# Patient Record
Sex: FEMALE | Race: WHITE | Employment: OTHER | URBAN - METROPOLITAN AREA
[De-identification: names, ages, dates, MRNs, and addresses within clinical notes are randomized per-mention and may not be internally consistent; named-entity substitution may affect disease eponyms.]

---

## 2017-04-25 ENCOUNTER — OFFICE VISIT (OUTPATIENT)
Dept: CARDIOLOGY CLINIC | Age: 82
End: 2017-04-25

## 2017-04-25 VITALS
DIASTOLIC BLOOD PRESSURE: 74 MMHG | SYSTOLIC BLOOD PRESSURE: 118 MMHG | BODY MASS INDEX: 35.15 KG/M2 | HEIGHT: 62 IN | HEART RATE: 62 BPM | WEIGHT: 191 LBS

## 2017-04-25 DIAGNOSIS — I48.0 PAF (PAROXYSMAL ATRIAL FIBRILLATION) (HCC): ICD-10-CM

## 2017-04-25 DIAGNOSIS — I47.1 SVT (SUPRAVENTRICULAR TACHYCARDIA) (HCC): Primary | ICD-10-CM

## 2017-04-25 PROCEDURE — 99213 OFFICE O/P EST LOW 20 MIN: CPT | Performed by: NURSE PRACTITIONER

## 2017-04-25 PROCEDURE — 93000 ELECTROCARDIOGRAM COMPLETE: CPT | Performed by: NURSE PRACTITIONER

## 2017-04-25 RX ORDER — GLYBURIDE 1.25 MG/1
2.5 TABLET ORAL 2 TIMES DAILY WITH MEALS
COMMUNITY

## 2017-04-25 RX ORDER — LOSARTAN POTASSIUM 100 MG/1
100 TABLET ORAL DAILY
COMMUNITY
End: 2019-04-30 | Stop reason: ALTCHOICE

## 2018-05-04 ENCOUNTER — TELEPHONE (OUTPATIENT)
Dept: CARDIOLOGY CLINIC | Age: 83
End: 2018-05-04

## 2018-05-04 ENCOUNTER — OFFICE VISIT (OUTPATIENT)
Dept: CARDIOLOGY CLINIC | Age: 83
End: 2018-05-04

## 2018-05-04 VITALS
BODY MASS INDEX: 34.23 KG/M2 | WEIGHT: 186 LBS | SYSTOLIC BLOOD PRESSURE: 130 MMHG | DIASTOLIC BLOOD PRESSURE: 80 MMHG | HEART RATE: 66 BPM | HEIGHT: 62 IN

## 2018-05-04 DIAGNOSIS — I48.0 PAF (PAROXYSMAL ATRIAL FIBRILLATION) (HCC): Primary | ICD-10-CM

## 2018-05-04 DIAGNOSIS — I47.1 SVT (SUPRAVENTRICULAR TACHYCARDIA) (HCC): ICD-10-CM

## 2018-05-04 DIAGNOSIS — R07.2 PRECORDIAL PAIN: ICD-10-CM

## 2018-05-04 PROCEDURE — 99214 OFFICE O/P EST MOD 30 MIN: CPT | Performed by: INTERNAL MEDICINE

## 2018-05-04 PROCEDURE — 93000 ELECTROCARDIOGRAM COMPLETE: CPT | Performed by: INTERNAL MEDICINE

## 2018-07-18 ENCOUNTER — HOSPITAL ENCOUNTER (INPATIENT)
Age: 83
LOS: 3 days | Discharge: HOME HEALTH | DRG: 389 | End: 2018-07-21
Attending: EMERGENCY MEDICINE | Admitting: INTERNAL MEDICINE
Payer: MEDICARE

## 2018-07-18 ENCOUNTER — APPOINTMENT (OUTPATIENT)
Dept: CT IMAGING | Age: 83
DRG: 389 | End: 2018-07-18
Payer: MEDICARE

## 2018-07-18 DIAGNOSIS — I16.0 HYPERTENSIVE URGENCY: ICD-10-CM

## 2018-07-18 DIAGNOSIS — R10.33 PERIUMBILICAL ABDOMINAL PAIN: Primary | ICD-10-CM

## 2018-07-18 PROBLEM — R10.9 ABDOMINAL PAIN: Status: ACTIVE | Noted: 2018-07-18

## 2018-07-18 LAB
A/G RATIO: 0.9 (ref 1.1–2.2)
ABO/RH: NORMAL
ALBUMIN SERPL-MCNC: 3.9 G/DL (ref 3.4–5)
ALP BLD-CCNC: 102 U/L (ref 40–129)
ALT SERPL-CCNC: 23 U/L (ref 10–40)
ANION GAP SERPL CALCULATED.3IONS-SCNC: 13 MMOL/L (ref 3–16)
ANTIBODY SCREEN: NORMAL
APTT: 30.6 SEC (ref 26–36)
AST SERPL-CCNC: 26 U/L (ref 15–37)
BASOPHILS ABSOLUTE: 0.1 K/UL (ref 0–0.2)
BASOPHILS RELATIVE PERCENT: 0.5 %
BILIRUB SERPL-MCNC: 0.4 MG/DL (ref 0–1)
BUN BLDV-MCNC: 12 MG/DL (ref 7–20)
CALCIUM SERPL-MCNC: 9.8 MG/DL (ref 8.3–10.6)
CHLORIDE BLD-SCNC: 97 MMOL/L (ref 99–110)
CO2: 26 MMOL/L (ref 21–32)
CREAT SERPL-MCNC: 0.8 MG/DL (ref 0.6–1.2)
EKG ATRIAL RATE: 84 BPM
EKG DIAGNOSIS: NORMAL
EKG P AXIS: 50 DEGREES
EKG P-R INTERVAL: 156 MS
EKG Q-T INTERVAL: 356 MS
EKG QRS DURATION: 88 MS
EKG QTC CALCULATION (BAZETT): 420 MS
EKG R AXIS: -3 DEGREES
EKG T AXIS: 75 DEGREES
EKG VENTRICULAR RATE: 84 BPM
EOSINOPHILS ABSOLUTE: 0.3 K/UL (ref 0–0.6)
EOSINOPHILS RELATIVE PERCENT: 3 %
GFR AFRICAN AMERICAN: >60
GFR NON-AFRICAN AMERICAN: >60
GLOBULIN: 4.2 G/DL
GLUCOSE BLD-MCNC: 170 MG/DL (ref 70–99)
GLUCOSE BLD-MCNC: 206 MG/DL (ref 70–99)
GLUCOSE BLD-MCNC: 223 MG/DL (ref 70–99)
HCT VFR BLD CALC: 44.6 % (ref 36–48)
HEMOGLOBIN: 14.4 G/DL (ref 12–16)
INR BLD: 1.04 (ref 0.86–1.14)
LACTIC ACID: 1.5 MMOL/L (ref 0.4–2)
LIPASE: 23 U/L (ref 13–60)
LYMPHOCYTES ABSOLUTE: 2.8 K/UL (ref 1–5.1)
LYMPHOCYTES RELATIVE PERCENT: 24.1 %
MCH RBC QN AUTO: 25.8 PG (ref 26–34)
MCHC RBC AUTO-ENTMCNC: 32.3 G/DL (ref 31–36)
MCV RBC AUTO: 80 FL (ref 80–100)
MONOCYTES ABSOLUTE: 1.2 K/UL (ref 0–1.3)
MONOCYTES RELATIVE PERCENT: 10.3 %
NEUTROPHILS ABSOLUTE: 7.3 K/UL (ref 1.7–7.7)
NEUTROPHILS RELATIVE PERCENT: 62.1 %
PDW BLD-RTO: 15.6 % (ref 12.4–15.4)
PERFORMED ON: ABNORMAL
PERFORMED ON: ABNORMAL
PLATELET # BLD: 321 K/UL (ref 135–450)
PMV BLD AUTO: 8.9 FL (ref 5–10.5)
POTASSIUM REFLEX MAGNESIUM: 3.9 MMOL/L (ref 3.5–5.1)
PROTHROMBIN TIME: 11.8 SEC (ref 9.8–13)
RBC # BLD: 5.58 M/UL (ref 4–5.2)
SODIUM BLD-SCNC: 136 MMOL/L (ref 136–145)
TOTAL PROTEIN: 8.1 G/DL (ref 6.4–8.2)
WBC # BLD: 11.8 K/UL (ref 4–11)

## 2018-07-18 PROCEDURE — 6360000002 HC RX W HCPCS: Performed by: EMERGENCY MEDICINE

## 2018-07-18 PROCEDURE — 6360000002 HC RX W HCPCS: Performed by: PHYSICIAN ASSISTANT

## 2018-07-18 PROCEDURE — 74177 CT ABD & PELVIS W/CONTRAST: CPT

## 2018-07-18 PROCEDURE — 80053 COMPREHEN METABOLIC PANEL: CPT

## 2018-07-18 PROCEDURE — 2580000003 HC RX 258: Performed by: PHYSICIAN ASSISTANT

## 2018-07-18 PROCEDURE — 2060000000 HC ICU INTERMEDIATE R&B

## 2018-07-18 PROCEDURE — 2580000003 HC RX 258: Performed by: INTERNAL MEDICINE

## 2018-07-18 PROCEDURE — 86901 BLOOD TYPING SEROLOGIC RH(D): CPT

## 2018-07-18 PROCEDURE — 6370000000 HC RX 637 (ALT 250 FOR IP): Performed by: INTERNAL MEDICINE

## 2018-07-18 PROCEDURE — 85730 THROMBOPLASTIN TIME PARTIAL: CPT

## 2018-07-18 PROCEDURE — 93005 ELECTROCARDIOGRAM TRACING: CPT | Performed by: PHYSICIAN ASSISTANT

## 2018-07-18 PROCEDURE — 96361 HYDRATE IV INFUSION ADD-ON: CPT

## 2018-07-18 PROCEDURE — 2500000003 HC RX 250 WO HCPCS: Performed by: INTERNAL MEDICINE

## 2018-07-18 PROCEDURE — 93010 ELECTROCARDIOGRAM REPORT: CPT | Performed by: INTERNAL MEDICINE

## 2018-07-18 PROCEDURE — 83690 ASSAY OF LIPASE: CPT

## 2018-07-18 PROCEDURE — 99285 EMERGENCY DEPT VISIT HI MDM: CPT

## 2018-07-18 PROCEDURE — 6360000002 HC RX W HCPCS: Performed by: INTERNAL MEDICINE

## 2018-07-18 PROCEDURE — 85025 COMPLETE CBC W/AUTO DIFF WBC: CPT

## 2018-07-18 PROCEDURE — 86900 BLOOD TYPING SEROLOGIC ABO: CPT

## 2018-07-18 PROCEDURE — 83605 ASSAY OF LACTIC ACID: CPT

## 2018-07-18 PROCEDURE — 2500000003 HC RX 250 WO HCPCS: Performed by: EMERGENCY MEDICINE

## 2018-07-18 PROCEDURE — 86850 RBC ANTIBODY SCREEN: CPT

## 2018-07-18 PROCEDURE — 96374 THER/PROPH/DIAG INJ IV PUSH: CPT

## 2018-07-18 PROCEDURE — 6360000004 HC RX CONTRAST MEDICATION: Performed by: PHYSICIAN ASSISTANT

## 2018-07-18 PROCEDURE — 85610 PROTHROMBIN TIME: CPT

## 2018-07-18 PROCEDURE — 96375 TX/PRO/DX INJ NEW DRUG ADDON: CPT

## 2018-07-18 PROCEDURE — 96376 TX/PRO/DX INJ SAME DRUG ADON: CPT

## 2018-07-18 PROCEDURE — 93005 ELECTROCARDIOGRAM TRACING: CPT | Performed by: EMERGENCY MEDICINE

## 2018-07-18 RX ORDER — FAMOTIDINE 20 MG/1
20 TABLET, FILM COATED ORAL 2 TIMES DAILY
Status: DISCONTINUED | OUTPATIENT
Start: 2018-07-18 | End: 2018-07-21 | Stop reason: HOSPADM

## 2018-07-18 RX ORDER — GLUCAGON 1 MG/ML
1 KIT INJECTION PRN
Status: DISCONTINUED | OUTPATIENT
Start: 2018-07-18 | End: 2018-07-21 | Stop reason: HOSPADM

## 2018-07-18 RX ORDER — SODIUM CHLORIDE 9 MG/ML
INJECTION, SOLUTION INTRAVENOUS CONTINUOUS
Status: DISCONTINUED | OUTPATIENT
Start: 2018-07-18 | End: 2018-07-20

## 2018-07-18 RX ORDER — CIPROFLOXACIN 2 MG/ML
400 INJECTION, SOLUTION INTRAVENOUS EVERY 12 HOURS
Status: DISCONTINUED | OUTPATIENT
Start: 2018-07-18 | End: 2018-07-21 | Stop reason: HOSPADM

## 2018-07-18 RX ORDER — PRAVASTATIN SODIUM 40 MG
40 TABLET ORAL DAILY
Status: DISCONTINUED | OUTPATIENT
Start: 2018-07-18 | End: 2018-07-21 | Stop reason: HOSPADM

## 2018-07-18 RX ORDER — MORPHINE SULFATE 2 MG/ML
2 INJECTION, SOLUTION INTRAMUSCULAR; INTRAVENOUS
Status: DISCONTINUED | OUTPATIENT
Start: 2018-07-18 | End: 2018-07-21 | Stop reason: HOSPADM

## 2018-07-18 RX ORDER — ONDANSETRON 2 MG/ML
4 INJECTION INTRAMUSCULAR; INTRAVENOUS EVERY 6 HOURS PRN
Status: DISCONTINUED | OUTPATIENT
Start: 2018-07-18 | End: 2018-07-21 | Stop reason: HOSPADM

## 2018-07-18 RX ORDER — 0.9 % SODIUM CHLORIDE 0.9 %
1000 INTRAVENOUS SOLUTION INTRAVENOUS ONCE
Status: COMPLETED | OUTPATIENT
Start: 2018-07-18 | End: 2018-07-18

## 2018-07-18 RX ORDER — DEXTROSE MONOHYDRATE 50 MG/ML
100 INJECTION, SOLUTION INTRAVENOUS PRN
Status: DISCONTINUED | OUTPATIENT
Start: 2018-07-18 | End: 2018-07-21 | Stop reason: HOSPADM

## 2018-07-18 RX ORDER — FENTANYL CITRATE 50 UG/ML
25 INJECTION, SOLUTION INTRAMUSCULAR; INTRAVENOUS ONCE
Status: COMPLETED | OUTPATIENT
Start: 2018-07-18 | End: 2018-07-18

## 2018-07-18 RX ORDER — ONDANSETRON 2 MG/ML
4 INJECTION INTRAMUSCULAR; INTRAVENOUS ONCE
Status: COMPLETED | OUTPATIENT
Start: 2018-07-18 | End: 2018-07-18

## 2018-07-18 RX ORDER — NICOTINE POLACRILEX 4 MG
15 LOZENGE BUCCAL PRN
Status: DISCONTINUED | OUTPATIENT
Start: 2018-07-18 | End: 2018-07-21 | Stop reason: HOSPADM

## 2018-07-18 RX ORDER — ONDANSETRON 2 MG/ML
4 INJECTION INTRAMUSCULAR; INTRAVENOUS EVERY 30 MIN PRN
Status: DISCONTINUED | OUTPATIENT
Start: 2018-07-18 | End: 2018-07-18

## 2018-07-18 RX ORDER — DEXTROSE MONOHYDRATE 25 G/50ML
12.5 INJECTION, SOLUTION INTRAVENOUS PRN
Status: DISCONTINUED | OUTPATIENT
Start: 2018-07-18 | End: 2018-07-21 | Stop reason: HOSPADM

## 2018-07-18 RX ORDER — SODIUM CHLORIDE 0.9 % (FLUSH) 0.9 %
10 SYRINGE (ML) INJECTION PRN
Status: DISCONTINUED | OUTPATIENT
Start: 2018-07-18 | End: 2018-07-21 | Stop reason: HOSPADM

## 2018-07-18 RX ORDER — LOSARTAN POTASSIUM 25 MG/1
100 TABLET ORAL DAILY
Status: DISCONTINUED | OUTPATIENT
Start: 2018-07-18 | End: 2018-07-21 | Stop reason: HOSPADM

## 2018-07-18 RX ORDER — FENTANYL CITRATE 50 UG/ML
50 INJECTION, SOLUTION INTRAMUSCULAR; INTRAVENOUS
Status: DISCONTINUED | OUTPATIENT
Start: 2018-07-18 | End: 2018-07-18

## 2018-07-18 RX ORDER — METOPROLOL TARTRATE 5 MG/5ML
5 INJECTION INTRAVENOUS EVERY 5 MIN PRN
Status: DISCONTINUED | OUTPATIENT
Start: 2018-07-18 | End: 2018-07-18

## 2018-07-18 RX ORDER — SODIUM CHLORIDE 0.9 % (FLUSH) 0.9 %
10 SYRINGE (ML) INJECTION EVERY 12 HOURS SCHEDULED
Status: DISCONTINUED | OUTPATIENT
Start: 2018-07-18 | End: 2018-07-21 | Stop reason: HOSPADM

## 2018-07-18 RX ADMIN — SODIUM CHLORIDE 1000 ML: 9 INJECTION, SOLUTION INTRAVENOUS at 14:22

## 2018-07-18 RX ADMIN — ENOXAPARIN SODIUM 40 MG: 40 INJECTION SUBCUTANEOUS at 18:53

## 2018-07-18 RX ADMIN — IOHEXOL 50 ML: 240 INJECTION, SOLUTION INTRATHECAL; INTRAVASCULAR; INTRAVENOUS; ORAL at 14:11

## 2018-07-18 RX ADMIN — PRAVASTATIN SODIUM 40 MG: 40 TABLET ORAL at 18:53

## 2018-07-18 RX ADMIN — Medication 10 ML: at 20:25

## 2018-07-18 RX ADMIN — ONDANSETRON 4 MG: 2 INJECTION INTRAMUSCULAR; INTRAVENOUS at 16:41

## 2018-07-18 RX ADMIN — FENTANYL CITRATE 50 MCG: 50 INJECTION INTRAMUSCULAR; INTRAVENOUS at 15:26

## 2018-07-18 RX ADMIN — CIPROFLOXACIN 400 MG: 2 INJECTION, SOLUTION INTRAVENOUS at 18:53

## 2018-07-18 RX ADMIN — FENTANYL CITRATE 25 MCG: 50 INJECTION INTRAMUSCULAR; INTRAVENOUS at 14:38

## 2018-07-18 RX ADMIN — METRONIDAZOLE 500 MG: 500 INJECTION, SOLUTION INTRAVENOUS at 20:26

## 2018-07-18 RX ADMIN — METOPROLOL TARTRATE 25 MG: 25 TABLET ORAL at 20:25

## 2018-07-18 RX ADMIN — LOSARTAN POTASSIUM 100 MG: 25 TABLET, FILM COATED ORAL at 18:53

## 2018-07-18 RX ADMIN — SODIUM CHLORIDE: 9 INJECTION, SOLUTION INTRAVENOUS at 18:46

## 2018-07-18 RX ADMIN — ONDANSETRON 4 MG: 2 INJECTION INTRAMUSCULAR; INTRAVENOUS at 14:38

## 2018-07-18 RX ADMIN — HYDROMORPHONE HYDROCHLORIDE 0.5 MG: 1 INJECTION, SOLUTION INTRAMUSCULAR; INTRAVENOUS; SUBCUTANEOUS at 16:36

## 2018-07-18 RX ADMIN — FAMOTIDINE 20 MG: 20 TABLET ORAL at 20:25

## 2018-07-18 RX ADMIN — MORPHINE SULFATE 2 MG: 2 INJECTION, SOLUTION INTRAMUSCULAR; INTRAVENOUS at 22:27

## 2018-07-18 RX ADMIN — METOPROLOL TARTRATE 5 MG: 5 INJECTION, SOLUTION INTRAVENOUS at 15:15

## 2018-07-18 ASSESSMENT — PAIN DESCRIPTION - ORIENTATION: ORIENTATION: MID

## 2018-07-18 ASSESSMENT — PAIN DESCRIPTION - LOCATION
LOCATION: ABDOMEN
LOCATION: ABDOMEN

## 2018-07-18 ASSESSMENT — PAIN DESCRIPTION - PAIN TYPE
TYPE: ACUTE PAIN
TYPE: ACUTE PAIN

## 2018-07-18 ASSESSMENT — PAIN SCALES - GENERAL
PAINLEVEL_OUTOF10: 7
PAINLEVEL_OUTOF10: 2
PAINLEVEL_OUTOF10: 3
PAINLEVEL_OUTOF10: 9
PAINLEVEL_OUTOF10: 5

## 2018-07-18 ASSESSMENT — PAIN DESCRIPTION - DESCRIPTORS: DESCRIPTORS: SHOOTING;SHARP

## 2018-07-18 NOTE — PROGRESS NOTES
Patient admitted to 310 bed 2. Admission questions completed.  at bedside. Patient provided clear liquids to eat. No distress noted. Will continue to monitor.

## 2018-07-18 NOTE — ED NOTES
Pt drinking last of oral contrast, began to vomit into garbage can. HR elevated, Dr. Leopold Bloodgood informed and new ECG obtained. Pt remains A&O, new medication orders placed. Will continue to monitor.       Keely Funez RN  07/18/18 [id number]

## 2018-07-18 NOTE — ED PROVIDER NOTES
had an episode of vomiting in which her heart rate became greater than 202 EKGs were done during this episode. It does show sinus tachycardia no atrial fibrillation and no SVT. Patient had good relief of her symptoms with pain control and antinausea medications as well as given IV beta blockers to control her tachycardia as well as her hypertensive urgency. CT scan showed the possibility of ischemic bowel disease. Patient will be admitted. All diagnostic, treatment, and disposition decisions were made by myself in conjunction with the SHON/resident. For further details of the patient's emergency department visit, please see SHON/resident documentation. Please note, critical care time was 0 minutes, exclusive of separately billable procedures and discussion with the family, specifically for management of the presenting complaint and symptoms initially, direct bedside care, reevaluation, review of records, and consultation. The note was completed using Dragon voice recognition transcription. Every effort was made to ensure accuracy; however, inadvertent transcription errors may be present despite my best efforts to edit errors.     Alfred Opitz, MD  [address] MD  07/18/18 [id number]

## 2018-07-18 NOTE — ED PROVIDER NOTES
Department of Emergency Medicine   ED  Provider Note  Admit Date/Time: 7/18/2018  1:31 PM  ED Bed: /3036-10  Chief Complaint:       Abdominal Pain (Pt c/o abdominal pain and dark stools that started yesterday and is getting worse. Pt denies N/V)    History of Present Illness   Source of history provided by:  patient. History/Exam Limitations: none. Newton Tiwari is a 81 y.o. 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 Denies vomiting or diarrhea. Onset several days prior to arrival significantly worsened yesterday and today. Accompanied by darkening of stools. Denies any fevers. Denies any history of similar symptoms. History of appendectomy. No recent antibiotics  ROS   Pertinent positives and negatives are stated within HPI, all other systems reviewed and are negative. Past Surgical History:   Procedure Laterality Date    ABLATION OF DYSRHYTHMIC FOCUS      -2014    APPENDECTOMY      DILATATION, ESOPHAGUS      FRACTURE SURGERY      L ankle    HYSTERECTOMY      SKIN BIOPSY      TONSILLECTOMY     Social History:  reports that she has never smoked. She has never used smokeless tobacco. She reports that she does not drink alcohol or use drugs. Family History: family history includes Cancer in her sister; Diabetes in her mother; Heart Disease in her brother and father; High Blood Pressure in her brother and father; High Cholesterol in her brother and father.    Allergies: Hydrocodone; Metformin and related; and Omeprazole    Physical Exam            ED Triage Vitals [07/18/18 1328]   BP Temp Temp Source Pulse Resp SpO2 Height Weight   (!) 218/99 97.2 °F (36.2 °C) Temporal 88 15 98 % 5' 2\" (1.575 m) 183 lb (83 kg)      Oxygen Saturation Interpretation: Normal.    · General Appearance/Constitutional:  Alert, development consistent with age, NAD. · HEENT:  NC/NT. PERRLA. Airway patent. · Neck:  Supple. No lymphadenopathy. · Respiratory:  No retractions. Lungs Clear to auscultation and breath sounds equal.  · CV:  Regular rate and rhythm. · GI:  General Appearance: normal.         Bowel sounds: normal bowel sounds. Distension:  Mild. Tenderness: moderate tenderness is present in the entire abdomen, guarding is present in the entire abdomen. Liver: non-palpable and non-tender. Spleen:  non-palpable and non-tender. Pulsatile Mass: absent. Hernia:  no inguinal or femoral hernias noted. · Back: CVA Tenderness: No.  · Integument:  Normal turgor. Warm, dry, without visible rash, unless noted elsewhere. · Lymphatics: No edema, cap.refill <3sec. · Neurological:  Orientation age-appropriate. Motor functions intact.     Lab / Imaging Results   (All laboratory and radiology results have been personally reviewed by myself)  Labs:  Results for orders placed or performed during the hospital encounter of 07/18/18   CBC auto differential   Result Value Ref Range    WBC 11.8 (H) 4.0 - 11.0 K/uL    RBC 5.58 (H) 4.00 - 5.20 M/uL    Hemoglobin 14.4 12.0 - 16.0 g/dL    Hematocrit 44.6 36.0 - 48.0 %    MCV 80.0 80.0 - 100.0 fL    MCH 25.8 (L) 26.0 - 34.0 pg    MCHC 32.3 31.0 - 36.0 g/dL    RDW 15.6 (H) 12.4 - 15.4 %    Platelets 921 662 - 058 K/uL    MPV 8.9 5.0 - 10.5 fL    Neutrophils % 62.1 %    Lymphocytes % 24.1 %    Monocytes % 10.3 %    Eosinophils % 3.0 %    Basophils % 0.5 %    Neutrophils # 7.3 1.7 - 7.7 K/uL    Lymphocytes # 2.8 1.0 - 5.1 K/uL    Monocytes # 1.2 0.0 - 1.3 K/uL    Eosinophils # 0.3 0.0 - 0.6 K/uL    Basophils # 0.1 0.0 - 0.2 K/uL   Comprehensive Metabolic Panel w/ Reflex to MG   Result Value Ref Range    Sodium 136 136 - 145 mmol/L    Potassium reflex Magnesium 3.9 3.5 - 5.1 mmol/L    Chloride 97 (L) 99 - 110 mmol/L    CO2 26 21 - 32 mmol/L    Anion Gap 13 3 - 16    Glucose 170 (H) 70 - 99 mg/dL    BUN 12 7 - 20 mg/dL    CREATININE 0.8 0.6 - 1.2 mg/dL    GFR Non-African American >60 >60    GFR African American >60 >60    Calcium 9.8 8.3 - 10.6 mg/dL    Total Protein 8.1 6.4 - 8.2 g/dL    Alb 3.9 3.4 - 5.0 g/dL    Albumin/Globulin Ratio 0.9 (L) 1.1 - 2.2    Total Bilirubin 0.4 0.0 - 1.0 mg/dL    Alkaline Phosphatase 102 40 - 129 U/L    ALT 23 10 - 40 U/L    AST 26 15 - 37 U/L    Globulin 4.2 g/dL   Lipase   Result Value Ref Range    Lipase 23.0 13.0 - 60.0 U/L   APTT   Result Value Ref Range    aPTT 30.6 26.0 - 36.0 sec   Protime-INR   Result Value Ref Range    Protime 11.8 9.8 - 13.0 sec    INR 1.04 0.86 - 1.14   Lactic acid, plasma   Result Value Ref Range    Lactic Acid 1.5 0.4 - 2.0 mmol/L   Protime-INR   Result Value Ref Range    Protime 13.0 9.8 - 13.0 sec    INR 1.14 0.86 - 1.14   POCT Glucose   Result Value Ref Range    POC Glucose 206 (H) 70 - 99 mg/dl    Performed on ACCU-CHEK    POCT Glucose   Result Value Ref Range    POC Glucose 223 (H) 70 - 99 mg/dl    Performed on ACCU-CHEK    POCT Glucose   Result Value Ref Range    POC Glucose 164 (H) 70 - 99 mg/dl    Performed on ACCU-CHEK    POCT Glucose   Result Value Ref Range    POC Glucose 201 (H) 70 - 99 mg/dl    Performed on ACCU-CHEK    POCT Glucose   Result Value Ref Range    POC Glucose 125 (H) 70 - 99 mg/dl    Performed on ACCU-CHEK    POCT Glucose   Result Value Ref Range    POC Glucose 157 (H) 70 - 99 mg/dl    Performed on ACCU-[place]    EKG 12 lead   Result Value Ref Range    Ventricular Rate 84 BPM    Atrial Rate 84 BPM    P-R Interval 156 ms    QRS Duration 88 ms    Q-T Interval 356 ms    QTc Calculation (Bazett) 420 ms    P Axis 50 degrees    R Axis -3 degrees    T 5232)        Re-Evaluations:  7/18/18      Time: 1700   Patients symptoms show no change. Consultations:  General Surgery; Dr. Kaz Barrera; Agrees to admit the patient in stable condition. MDM:Afebrile, stable, patient presents to the ED for evaluation. Seen in conjunction with attending ED provider who agrees with assessment and plan. Patient is exquisitely hypertensive on initial presentation blood pressure of 218/99, patient has no signs of any end organ damage denies any headache no visual changes. No chest pain or shortness of breath. In spite of this I  Discussed this hypertension with attending ED provider. Patients PO2 is 93% on room air they are not hypoxic, patient with severe lower abdominal pain. Distended and tender. CT orders are placed in addition to IV fluids and pain control. Patient is given Zofran and fentanyl and Dilaudid in addition to  Labs also ordered and evaluated. CT shows possibility of ischemic bowel disease feel this is likely consistent with patient's presentation as patient has a history of atrial fibrillation she is no longer anticoagulated has a history of diabetes. Significant abdominal tenderness. I do not feel is safe to discharge the patient home  Especially considering patient's hypertension on initial presentation. Consult to general surgery who agrees to round on the patient and follow her. Consult to hospitalist who agrees to admit the patient in stable condition. Orders are placed the patient on 2 W. which I do not feel is a high enough acuity for this patient considering her hypertensive on initial presentation therefore I requested the hospitalist be re-paged to admit the patient to telemetry. Counseling:   I have  spoken with the spouse and patient and discussed todays results, in addition to providing specific details for the plan of care and counseling regarding the diagnosis and prognosis and are agreeable with the plan. Assessment      1. Periumbilical abdominal pain    2. Hypertensive urgency      This patient's ED course included: a personal history and physicial examination, re-evaluation prior to disposition, multiple bedside re-evaluations, IV medications, cardiac monitoring and continuous pulse oximetry  This patient has remained hemodynamically stable during their ED course. Plan   Admit to telemetry. Patient condition is stable. New Medications     Current Discharge Medication List        Electronically signed by Chacho Lemus PA-C   DD: 7/18/18  **This report was transcribed using voice recognition software. Every effort was made to ensure accuracy; however, inadvertent computerized transcription errors may be present.   END OF PROVIDER NOTE        Chacho Lemus PA-C  07/19/18 [id number]

## 2018-07-18 NOTE — ED NOTES
1642- Call placed to general surgery. 1707-Dr. Blanche Trujillo returned the call.           Annmarie Frank  07/18/18 209 Elvin Alex  07/18/18 172

## 2018-07-19 LAB
EKG ATRIAL RATE: 129 BPM
EKG ATRIAL RATE: 163 BPM
EKG DIAGNOSIS: NORMAL
EKG DIAGNOSIS: NORMAL
EKG P AXIS: 76 DEGREES
EKG P-R INTERVAL: 152 MS
EKG P-R INTERVAL: 96 MS
EKG Q-T INTERVAL: 286 MS
EKG Q-T INTERVAL: 316 MS
EKG QRS DURATION: 82 MS
EKG QRS DURATION: 88 MS
EKG QTC CALCULATION (BAZETT): 442 MS
EKG QTC CALCULATION (BAZETT): 470 MS
EKG R AXIS: 0 DEGREES
EKG R AXIS: 1 DEGREES
EKG T AXIS: 162 DEGREES
EKG T AXIS: 71 DEGREES
EKG VENTRICULAR RATE: 118 BPM
EKG VENTRICULAR RATE: 163 BPM
GLUCOSE BLD-MCNC: 125 MG/DL (ref 70–99)
GLUCOSE BLD-MCNC: 157 MG/DL (ref 70–99)
GLUCOSE BLD-MCNC: 164 MG/DL (ref 70–99)
GLUCOSE BLD-MCNC: 201 MG/DL (ref 70–99)
INR BLD: 1.14 (ref 0.86–1.14)
PERFORMED ON: ABNORMAL
PROTHROMBIN TIME: 13 SEC (ref 9.8–13)

## 2018-07-19 PROCEDURE — 6360000002 HC RX W HCPCS: Performed by: INTERNAL MEDICINE

## 2018-07-19 PROCEDURE — 36415 COLL VENOUS BLD VENIPUNCTURE: CPT

## 2018-07-19 PROCEDURE — 99223 1ST HOSP IP/OBS HIGH 75: CPT | Performed by: INTERNAL MEDICINE

## 2018-07-19 PROCEDURE — 85610 PROTHROMBIN TIME: CPT

## 2018-07-19 PROCEDURE — 93010 ELECTROCARDIOGRAM REPORT: CPT | Performed by: INTERNAL MEDICINE

## 2018-07-19 PROCEDURE — 6370000000 HC RX 637 (ALT 250 FOR IP): Performed by: INTERNAL MEDICINE

## 2018-07-19 PROCEDURE — 2580000003 HC RX 258: Performed by: INTERNAL MEDICINE

## 2018-07-19 PROCEDURE — 2500000003 HC RX 250 WO HCPCS: Performed by: INTERNAL MEDICINE

## 2018-07-19 PROCEDURE — 99222 1ST HOSP IP/OBS MODERATE 55: CPT | Performed by: SURGERY

## 2018-07-19 PROCEDURE — 2060000000 HC ICU INTERMEDIATE R&B

## 2018-07-19 RX ORDER — PROMETHAZINE HYDROCHLORIDE 25 MG/ML
6.25 INJECTION, SOLUTION INTRAMUSCULAR; INTRAVENOUS EVERY 6 HOURS PRN
Status: DISCONTINUED | OUTPATIENT
Start: 2018-07-19 | End: 2018-07-21 | Stop reason: HOSPADM

## 2018-07-19 RX ADMIN — PROMETHAZINE HYDROCHLORIDE 6.25 MG: 25 INJECTION INTRAMUSCULAR; INTRAVENOUS at 09:32

## 2018-07-19 RX ADMIN — ENOXAPARIN SODIUM 40 MG: 40 INJECTION SUBCUTANEOUS at 08:15

## 2018-07-19 RX ADMIN — Medication 10 ML: at 20:41

## 2018-07-19 RX ADMIN — METOPROLOL TARTRATE 25 MG: 25 TABLET ORAL at 20:41

## 2018-07-19 RX ADMIN — LOSARTAN POTASSIUM 100 MG: 25 TABLET, FILM COATED ORAL at 08:15

## 2018-07-19 RX ADMIN — METRONIDAZOLE 500 MG: 500 INJECTION, SOLUTION INTRAVENOUS at 11:31

## 2018-07-19 RX ADMIN — ONDANSETRON 4 MG: 2 INJECTION INTRAMUSCULAR; INTRAVENOUS at 02:07

## 2018-07-19 RX ADMIN — METRONIDAZOLE 500 MG: 500 INJECTION, SOLUTION INTRAVENOUS at 03:50

## 2018-07-19 RX ADMIN — PROMETHAZINE HYDROCHLORIDE 6.25 MG: 25 INJECTION INTRAMUSCULAR; INTRAVENOUS at 16:30

## 2018-07-19 RX ADMIN — MORPHINE SULFATE 2 MG: 2 INJECTION, SOLUTION INTRAMUSCULAR; INTRAVENOUS at 07:02

## 2018-07-19 RX ADMIN — INSULIN LISPRO 1 UNITS: 100 INJECTION, SOLUTION INTRAVENOUS; SUBCUTANEOUS at 01:00

## 2018-07-19 RX ADMIN — ONDANSETRON 4 MG: 2 INJECTION INTRAMUSCULAR; INTRAVENOUS at 08:13

## 2018-07-19 RX ADMIN — SODIUM CHLORIDE: 9 INJECTION, SOLUTION INTRAVENOUS at 17:31

## 2018-07-19 RX ADMIN — MORPHINE SULFATE 2 MG: 2 INJECTION, SOLUTION INTRAMUSCULAR; INTRAVENOUS at 20:41

## 2018-07-19 RX ADMIN — SODIUM CHLORIDE: 9 INJECTION, SOLUTION INTRAVENOUS at 03:50

## 2018-07-19 RX ADMIN — METOPROLOL TARTRATE 25 MG: 25 TABLET ORAL at 08:16

## 2018-07-19 RX ADMIN — MORPHINE SULFATE 2 MG: 2 INJECTION, SOLUTION INTRAMUSCULAR; INTRAVENOUS at 11:35

## 2018-07-19 RX ADMIN — FAMOTIDINE 20 MG: 20 TABLET ORAL at 20:41

## 2018-07-19 RX ADMIN — CIPROFLOXACIN 400 MG: 2 INJECTION, SOLUTION INTRAVENOUS at 19:11

## 2018-07-19 RX ADMIN — MORPHINE SULFATE 2 MG: 2 INJECTION, SOLUTION INTRAMUSCULAR; INTRAVENOUS at 02:06

## 2018-07-19 RX ADMIN — Medication 10 ML: at 08:13

## 2018-07-19 RX ADMIN — FAMOTIDINE 20 MG: 20 TABLET ORAL at 08:16

## 2018-07-19 RX ADMIN — METRONIDAZOLE 500 MG: 500 INJECTION, SOLUTION INTRAVENOUS at 20:41

## 2018-07-19 RX ADMIN — CIPROFLOXACIN 400 MG: 2 INJECTION, SOLUTION INTRAVENOUS at 06:58

## 2018-07-19 RX ADMIN — MORPHINE SULFATE 2 MG: 2 INJECTION, SOLUTION INTRAMUSCULAR; INTRAVENOUS at 16:30

## 2018-07-19 RX ADMIN — PRAVASTATIN SODIUM 40 MG: 40 TABLET ORAL at 08:16

## 2018-07-19 ASSESSMENT — PAIN DESCRIPTION - PAIN TYPE
TYPE: ACUTE PAIN

## 2018-07-19 ASSESSMENT — PAIN SCALES - GENERAL
PAINLEVEL_OUTOF10: 5
PAINLEVEL_OUTOF10: 6
PAINLEVEL_OUTOF10: 5
PAINLEVEL_OUTOF10: 0
PAINLEVEL_OUTOF10: 5
PAINLEVEL_OUTOF10: 6
PAINLEVEL_OUTOF10: 2

## 2018-07-19 ASSESSMENT — PAIN DESCRIPTION - ORIENTATION
ORIENTATION: MID;UPPER
ORIENTATION: MID

## 2018-07-19 ASSESSMENT — PAIN DESCRIPTION - LOCATION
LOCATION: ABDOMEN

## 2018-07-19 ASSESSMENT — PAIN DESCRIPTION - DESCRIPTORS
DESCRIPTORS: SHARP;SHOOTING

## 2018-07-19 ASSESSMENT — PAIN DESCRIPTION - ONSET
ONSET: ON-GOING

## 2018-07-19 ASSESSMENT — PAIN DESCRIPTION - PROGRESSION
CLINICAL_PROGRESSION: NOT CHANGED

## 2018-07-19 NOTE — CONSULTS
Full consult dictated    69year old female with history of HTN, HLD, DM, SVT s/p ablation admitted with progressively worsening of severe abdominal pain. CT showed enteritis,    Recommendation  1. Continue supportive care  2. Advance to low residue diet  3. Agree with antibiotics  4.  Outpatient follow up upon discharge

## 2018-07-19 NOTE — H&P
Hospital Medicine History & Physical      PCP: Taqueria Lemus MD    Date of Admission: 7/18/2018    Date of Service: Pt seen/examined on 7/19/2018    Chief Complaint:    Chief Complaint   Patient presents with    Abdominal Pain     Pt c/o abdominal pain and dark stools that started yesterday and is getting worse. Pt denies N/V         History Of Present Illness: The patient is a 81 y.o. female with a PMH of Atrial Fibrillation s/p ablation, Type II DM, HLD, and HTN who presented to the ED with complaint of abdominal pain. Pt states the pain came on 2 days ago. Location: epigastric. Described as sharp pain. Lasted all day. Pt states she ate a salad prior to the abdominal pain. Pt states the nausea meds in the ER and the pain meds made in better. No exacerbating factors. Non-radiating. Pt did try Pepto-Bismol without any relief. Pt did vomit in  ER but prior to that no fevers, N/V/D/C. No hematemesis. She states her stools are black but denies any hematochezia. Her last BM was yesterday. Pt does not have an appetite. Pt does have a hx of afib s/p ablation. She does take a baby aspirin daily but does not take any anticoagulant. Pt sees Dr. Nino Gosselin as her cardiologist. She had the ablation about 3 years ago. Past Medical History:        Diagnosis Date    Atrial fibrillation ([address]     Diabetes mellitus ([facility]     Hydronephrosis 2/14/2014    Hyperlipidemia     Hypertension     SVT (supraventricular tachycardia) ([address] 8/26/2013    AVNRT       Past Surgical History:        Procedure Laterality Date    ABLATION OF DYSRHYTHMIC FOCUS      -2014    APPENDECTOMY      DILATATION, ESOPHAGUS      FRACTURE SURGERY      L ankle    HYSTERECTOMY      SKIN BIOPSY      TONSILLECTOMY         Medications Prior to Admission:    Prior to Admission medications    Medication Sig Start Date End Date Taking?  Authorizing Provider   metoprolol tartrate (LOPRESSOR) 25 MG tablet TAKE ONE TABLET BY MOUTH TWICE A DAY mild periumbilical tenderness. Non-distended. No masses. No organomegaly. Normal bowel sounds. No hernia. Skin: Warm and dry. No nodule on exposed extremities. No rash on exposed extremities. Lymph: No cervical LAD. No supraclavicular LAD. M/S: No cyanosis. No joint deformity. No clubbing. Neuro: Awake. Moves all 4 extremities, non focal  Psych: Oriented x 3. No anxiety or agitation.            SAM Cullen      CBC:   Recent Labs      07/18/18   1350   WBC  11.8*   HGB  14.4   HCT  44.6   MCV  80.0   PLT  321     BMP:   Recent Labs      07/18/18   1350   NA  136   K  3.9   CL  97*   CO2  26   BUN  12   CREATININE  0.8     LIVER PROFILE:   Recent Labs      07/18/18   1350   AST  26   ALT  23   LIPASE  23.0   BILITOT  0.4   ALKPHOS  102     PT/INR:   Recent Labs      07/18/18   1350  07/19/18   0432   PROTIME  11.8  13.0   INR  1.04  1.14     APTT:   Recent Labs      07/18/18   1350   APTT  30.6     U/A:    Lab Results   Component Value Date    COLORU Yellow 02/18/2014    WBCUA 6-10 02/18/2014    RBCUA 20-50 02/18/2014    BACTERIA 1+ 02/18/2014    CLARITYU Clear 02/18/2014    SPECGRAV 1.015 02/18/2014    LEUKOCYTESUR TRACE 02/18/2014    BLOODU MODERATE 02/18/2014    GLUCOSEU Negative 02/18/2014       CULTURES  N/A    EKG:  I have reviewed the EKG with the following interpretation:   7/18/2018 1508  Supraventricular tachycardia   Premature ventricular complexes  Anterior infarct, age undetermined  Abnormal ECG  When compared with ECG of 18-JUL-2018 15:08, (unconfirmed)  Supraventricular tachycardia is new  Confirmed by ZAIRE Smith MD ([id number]) on 7/19/2018 9:00:20 AM     7/18/2018 1357  Normal sinus rhythm  Nonspecific ST abnormality  No previous ECGs available  Confirmed by ZAIRE FERNÁNDEZ MD ([id number]) on 7/18/2018 3:31:05 PM     RADIOLOGY    CT ABDOMEN PELVIS W IV CONTRAST Additional Contrast? Oral 7/18/2018   Final Result   No evidence of perforation however there is abnormal small bowel wall thickening in the right lower quadrant. This may be due to infection,   inflammation, or ischemia related to developing obstruction. Nearby small   amount of fluid not immediately adjacent to bowel loops is most likely   reactive. Close follow-up is recommended. Pertinent previous results reviewed     Limited ECHO 2/12/2014   Summary   Global ejection fraction is normal and estimated 55%.   There is a trivial posterior pericardial effusion. ASSESSMENT/PLAN:    Abdominal Pain  Dark Stools  - c/o sharp sudden onset epigastric pain, dark stools  - CT abdomen: no evidence of perforation however there is abnormal small bowel wall thickening in the RLQ  Likely gastroenteritis with ileus  - Admit to PCU, telemetry  - NPO, check occult stool, Hgb 14.4  - IVF, Pepcid, Cipro, Flagyl, PRN pain meds, PRN nausea meds  - GI consult  - General Surgery consult    Supraventricular Tachycardia  Hx of Atrial Fibrillation s/p Ablation  - in ED, pt had an episode of vomiting and HR increased to 202 bpm  - given BB in ED, HR improved  - cont Lopressor  - monitor on tele - currently in NSR    HTN  - uncontrolled on admission  -  in ED  - cont Cozaar, Lopressor  - BP improving, if continues to remain elevated, may consider adding another agent    Type II DM  - uncontrolled  - held PO meds, low dose SSI  - POC Glucose, monitor    Leukocytosis  - 11.8  - likely reactive  - monitor    HLD  - cont statin    DVT Prophylaxis: Lovenox  Diet: Diet NPO Effective Now  Code Status: Full Code      Livier Monet PA-C 10:15 AM 7/19/2018      NICHELLE Dubose M.D.

## 2018-07-19 NOTE — FLOWSHEET NOTE
07/18/18 2231   Vital Signs   Temp 98.1 °F (36.7 °C)   Temp Source Oral   Pulse 73   Heart Rate Source Monitor   Resp 18   BP (!) 158/77   Patient Position Semi fowlers   Level of Consciousness 0   MEWS Score 1   Oxygen Therapy   SpO2 96 %   O2 Device None (Room air)   Vital signs stable. Pt is alert and oriented and denies any abdominal pain or nausea/vomiting at the moment. Nothing new noted on head to toe assessment. Pt is NSR on the monitor. Evening medication administration completed. Flagyl infusing without any sign or symptoms of reactions. Bowel sounds equal and active in all four quadrants. Pt denies any further assistance at the moment. Will continue to monitor.

## 2018-07-19 NOTE — CARE COORDINATION
Case Management Assessment  Initial Evaluation    Date/Time of Evaluation: 7/19/2018 11:49 AM  Assessment Completed by: Crescencio Orozco    Patient Name: Savita Hardy Payer  YOB: 1935  Diagnosis: Abdominal pain [R10.9]  Date / Time: 7/18/2018  1:31 PM  Admission status/Date:inpatient 07/18/18  Chart Reviewed: Yes      Patient Interviewed: Yes   Family Interviewed:  No      Hospitalization in the last 30 days:  No    Contacts  : Neomi Romberg Payer   Relationship to Patient:  Phone Number: [phone]  Alternate Contact: Renata Talbot   Relationship to Patient: Son   Phone Number: [phone]    Current PCP  Phoenix Banuelos MD      Financial  Commercial    ADLS  Support Systems:    Transportation: self    Meal Preparation: self    Housing  Home Environment: home with spouse  Steps: one  Plans to Return to Present Housing: Yes  Other Identified Issues: no    Home Care Information  Currently active with [address] : No     Passport/Waiver : No  Passport/Waiver Services: no    Durable Medical Equipment   DME Provider: n/a  Equipment: none    Has Home O2 in place on admit:  No  Informed of need to bring portable home O2 tank on day of discharge for nursing to connect prior to leaving:   Not Indicated  Verbalized agreement/Understanding:   Not Indicated    Community Service Affiliation  Dialysis:  No  Outpatient PT/OT: No    Cancer Center: No     CHF Clinic: No     Pulmonary Rehab: No  Pain Clinic: No  Community Mental Health: No    Wound Clinic: No     Other: no    DISCHARGE PLAN: Reviewed Chart. Met with the pt. Role of dcp explained. Pt from home with spouse and plan return. Pt IPTA and still drives. Probably no needs. Following.

## 2018-07-19 NOTE — CONSULTS
Surgery Consult Note     [place], [place]  Pt Name: Jessica Phillipser  MRN: [id number]  YOB: 1935  Date of evaluation: 7/19/2018  Primary Care Physician: Sarah Silva MD  Patient evaluated at the request of  Alison Ramos PA-C  Reason for evaluation: ABD pain ? For ischemic bowel  IMPRESSIONS:   1. Ct abd and pelvis: abnormal SB wall thickening int eh RLQ ? For infection, inflammation or ischemia (although vessels to dilated loops appear open)  2. ABD: soft, + distention, + N, no vomiting since yesterday, + BM yesterday and dark in color  3. Leuks 11.8  4. VSS  5. Pt rpeorts she is nauseated but, feels better than in the ER  6.  does not have any pertinent problems on file. PLANS:   1. Probable enteritis   2. IV antibiotics  3. NPO  4. ABD xray in the am   5. Pain control and antiemetics  6. Await return of bowel function    SUBJECTIVE:   History of Chief Complaint:    Tonio Hendrickson is a 81 y.o. female who presented to the ER with reports of abdominal pain with nausea and vomiting. The patient states she ate a salad without washing the lettuce about 5 days ago. She was dizzy then, she developed cramps with intermittent pains in the middle of her abdomen. She felt clammy and became nauseated. Yesterday the pain became more sharp so, she decided to come to the er for evaluation. She had a BM yesterday which was dark but, normal. She vomited 3 times in the ER after taking oral contrast. She denies ever having a colonoscopy. She denies having pain like this before. She had a subtotal colectomy (/ for intussusception per chart review). She currently states her abdominal pain feels better. In the ER a CT scan was performed and demonstrated the above findings. We were consulted to see this patient to evaluate the patient's abdominal pain. Past Medical History   has a past medical history of Atrial fibrillation ([address] Diabetes mellitus ([address] Hydronephrosis;  Hyperlipidemia; Hypertension; and SVT (supraventricular tachycardia) ([facility] [address] Past Surgical History   has a past surgical history that includes Hysterectomy; Appendectomy; Tonsillectomy; skin biopsy; ablation of dysrhythmic focus; Dilatation, esophagus; and fracture surgery. Medications  Prior to Admission medications    Medication Sig Start Date End Date Taking? Authorizing Provider   metoprolol tartrate (LOPRESSOR) 25 MG tablet TAKE ONE TABLET BY MOUTH TWICE A DAY 7/6/18  Yes Los Robertson MD   losartan (COZAAR) 100 MG tablet Take 100 mg by mouth daily   Yes Historical Provider, MD   glyBURIDE (DIABETA) 1.25 MG tablet Take 1.25 mg by mouth daily (with breakfast)   Yes Historical Provider, MD   aspirin 81 MG tablet Take 81 mg by mouth daily   Yes Historical Provider, MD   pravastatin (PRAVACHOL) 40 MG tablet Take 1 tablet by mouth daily. 2/20/14  Yes Los Robertson MD   ranitidine (ZANTAC) 300 MG capsule Take 300 mg by mouth every evening. Yes Historical Provider, MD    Scheduled Meds:   losartan  100 mg Oral Daily    metoprolol tartrate  25 mg Oral BID    pravastatin  40 mg Oral Daily    famotidine  20 mg Oral BID    sodium chloride flush  10 mL Intravenous 2 times per day    enoxaparin  40 mg Subcutaneous Daily    insulin lispro  0-6 Units Subcutaneous TID WC    insulin lispro  0-3 Units Subcutaneous Nightly    ciprofloxacin  400 mg Intravenous Q12H    metroNIDAZOLE  500 mg Intravenous Q8H     Continuous Infusions:   dextrose      sodium chloride 125 mL/hr at 07/19/18 0350     PRN Meds:.promethazine, sodium chloride flush, magnesium hydroxide, ondansetron, glucose, dextrose, glucagon (rDNA), dextrose, morphine    Allergies  is allergic to hydrocodone; metformin and related; and omeprazole.     Family History  family history includes Cancer in her sister; Diabetes in her mother; Heart Disease in her brother and father; High Blood Pressure in her brother and father; High Cholesterol in her brother and father. Social History   reports that she has never smoked. She has never used smokeless tobacco. She reports that she does not drink alcohol or use drugs. Review of Systems:  General positive for  Sweats and dizziness  Denies any fever or chills  Resp Denies any shortness of breath, cough or wheezing  Cardiac Denies any chest pain, palpitations, claudication or edema  GI Positive for nausea, vomiting and abdominal pain/bloating  Denies any melena, hematochezia, hematemesis or pyrosis   Denies any frequency, urgency, hesitancy or incontinence  Heme Denies bruising or bleeding easily  Endocrine DM  Neuro Denies any focal motor or sensory deficits    OBJECTIVE:   VITALS:  height is 5' 2\" (1.575 m) and weight is 186 lb (84.4 kg). Her oral temperature is 98.4 °F (36.9 °C). Her blood pressure is 161/68 (abnormal) and her pulse is 67. Her respiration is 16 and oxygen saturation is 94%. CONSTITUTIONAL: Alert and oriented times 3, no acute distress and cooperative to examination with proper mood and affect. SKIN: Skin color, texture, turgor normal. No rashes or lesions. HEENT: Head is normocephalic, atraumatic. EOMI, PERRLA. NECK: Supple, symmetrical, trachea midline, no adenopathy, thyroid symmetric, not enlarged and no tenderness, skin normal.  CHEST/LUNGS: chest symmetric with normal A/P diameter, normal respiratory rate and rhythm, lungs clear to auscultation without wheezes, rales or rhonchi. No accessory muscle use. Scars None   CARDIOVASCULAR: Heart regular rate and rhythm Normal S1 and S2.  ABDOMEN: obese and distended large midline scar and low transverse scar(s) present. As above. no evidence of hernia. Percussion: Normal without hepatosplenomegally. Tenderness: periumbilical.  RECTAL: deferred, not clinically indicated  NEUROLOGIC: There are no focalizing motor or sensory deficits. CN II-XII are grossly intact. Aguilar Rocha EXTREMITIES: no cyanosis and no clubbing.     LABS:     Recent Labs      07/18/18   1350 07/19/18   0432   WBC  11.8*   --    HGB  14.4   --    HCT  44.6   --    PLT  321   --    NA  136   --    K  3.9   --    CL  97*   --    CO2  26   --    BUN  12   --    CREATININE  0.8   --    CALCIUM  9.8   --    INR  1.04  1.14   AST  26   --    ALT  23   --    BILITOT  0.4   --      Recent Labs      07/18/18   1350   ALKPHOS  102   ALT  23   AST  26   BILITOT  0.4   LABALBU  3.9   LIPASE  23.0     CBC with Differential:    Lab Results   Component Value Date    WBC 11.8 07/18/2018    RBC 5.58 07/18/2018    HGB 14.4 07/18/2018    HCT 44.6 07/18/2018     07/18/2018    MCV 80.0 07/18/2018    MCH 25.8 07/18/2018    MCHC 32.3 07/18/2018    RDW 15.6 07/18/2018    BANDSPCT 17 02/19/2014    LYMPHOPCT 24.1 07/18/2018    MONOPCT 10.3 07/18/2018    BASOPCT 0.5 07/18/2018    MONOSABS 1.2 07/18/2018    LYMPHSABS 2.8 07/18/2018    EOSABS 0.3 07/18/2018    BASOSABS 0.1 07/18/2018     CMP:    Lab Results   Component Value Date     07/18/2018    K 3.9 07/18/2018    CL 97 07/18/2018    CO2 26 07/18/2018    BUN 12 07/18/2018    CREATININE 0.8 07/18/2018    GFRAA >60 07/18/2018    AGRATIO 0.9 07/18/2018    LABGLOM >60 07/18/2018    GLUCOSE 170 07/18/2018    PROT 8.1 07/18/2018    LABALBU 3.9 07/18/2018    CALCIUM 9.8 07/18/2018    BILITOT 0.4 07/18/2018    ALKPHOS 102 07/18/2018    AST 26 07/18/2018    ALT 23 07/18/2018       RADIOLOGY:   I have personally reviewed the following films:    CT scan abd/pelvis: See radiology report    Thank you for the interesting evaluation. Further recommendations to follow.       Electronically signed by GUANAKO Galloway CNP on 7/19/2018 at 11:59 AM

## 2018-07-20 ENCOUNTER — APPOINTMENT (OUTPATIENT)
Dept: GENERAL RADIOLOGY | Age: 83
DRG: 389 | End: 2018-07-20
Payer: MEDICARE

## 2018-07-20 LAB
ANION GAP SERPL CALCULATED.3IONS-SCNC: 13 MMOL/L (ref 3–16)
BASOPHILS ABSOLUTE: 0.1 K/UL (ref 0–0.2)
BASOPHILS RELATIVE PERCENT: 0.7 %
BUN BLDV-MCNC: 8 MG/DL (ref 7–20)
CALCIUM SERPL-MCNC: 8.2 MG/DL (ref 8.3–10.6)
CHLORIDE BLD-SCNC: 103 MMOL/L (ref 99–110)
CO2: 22 MMOL/L (ref 21–32)
CREAT SERPL-MCNC: 0.8 MG/DL (ref 0.6–1.2)
EOSINOPHILS ABSOLUTE: 0.4 K/UL (ref 0–0.6)
EOSINOPHILS RELATIVE PERCENT: 5.1 %
GFR AFRICAN AMERICAN: >60
GFR NON-AFRICAN AMERICAN: >60
GLUCOSE BLD-MCNC: 129 MG/DL (ref 70–99)
GLUCOSE BLD-MCNC: 143 MG/DL (ref 70–99)
GLUCOSE BLD-MCNC: 171 MG/DL (ref 70–99)
GLUCOSE BLD-MCNC: 175 MG/DL (ref 70–99)
GLUCOSE BLD-MCNC: 176 MG/DL (ref 70–99)
HCT VFR BLD CALC: 38.9 % (ref 36–48)
HEMOGLOBIN: 12.6 G/DL (ref 12–16)
LYMPHOCYTES ABSOLUTE: 1.5 K/UL (ref 1–5.1)
LYMPHOCYTES RELATIVE PERCENT: 17.9 %
MCH RBC QN AUTO: 26.1 PG (ref 26–34)
MCHC RBC AUTO-ENTMCNC: 32.5 G/DL (ref 31–36)
MCV RBC AUTO: 80.3 FL (ref 80–100)
MONOCYTES ABSOLUTE: 0.8 K/UL (ref 0–1.3)
MONOCYTES RELATIVE PERCENT: 9 %
NEUTROPHILS ABSOLUTE: 5.7 K/UL (ref 1.7–7.7)
NEUTROPHILS RELATIVE PERCENT: 67.3 %
PDW BLD-RTO: 15.5 % (ref 12.4–15.4)
PERFORMED ON: ABNORMAL
PLATELET # BLD: 279 K/UL (ref 135–450)
PMV BLD AUTO: 8.8 FL (ref 5–10.5)
POTASSIUM SERPL-SCNC: 3.9 MMOL/L (ref 3.5–5.1)
RBC # BLD: 4.84 M/UL (ref 4–5.2)
SODIUM BLD-SCNC: 138 MMOL/L (ref 136–145)
WBC # BLD: 8.5 K/UL (ref 4–11)

## 2018-07-20 PROCEDURE — 2060000000 HC ICU INTERMEDIATE R&B

## 2018-07-20 PROCEDURE — 2580000003 HC RX 258: Performed by: INTERNAL MEDICINE

## 2018-07-20 PROCEDURE — 6370000000 HC RX 637 (ALT 250 FOR IP): Performed by: INTERNAL MEDICINE

## 2018-07-20 PROCEDURE — 2500000003 HC RX 250 WO HCPCS: Performed by: INTERNAL MEDICINE

## 2018-07-20 PROCEDURE — 6360000002 HC RX W HCPCS: Performed by: INTERNAL MEDICINE

## 2018-07-20 PROCEDURE — 85025 COMPLETE CBC W/AUTO DIFF WBC: CPT

## 2018-07-20 PROCEDURE — 36415 COLL VENOUS BLD VENIPUNCTURE: CPT

## 2018-07-20 PROCEDURE — 80048 BASIC METABOLIC PNL TOTAL CA: CPT

## 2018-07-20 PROCEDURE — 99232 SBSQ HOSP IP/OBS MODERATE 35: CPT | Performed by: SURGERY

## 2018-07-20 PROCEDURE — 74019 RADEX ABDOMEN 2 VIEWS: CPT

## 2018-07-20 PROCEDURE — 2580000003 HC RX 258: Performed by: NURSE PRACTITIONER

## 2018-07-20 RX ORDER — AMLODIPINE BESYLATE 5 MG/1
5 TABLET ORAL DAILY
Status: DISCONTINUED | OUTPATIENT
Start: 2018-07-20 | End: 2018-07-21 | Stop reason: HOSPADM

## 2018-07-20 RX ORDER — SODIUM CHLORIDE 9 MG/ML
INJECTION, SOLUTION INTRAVENOUS CONTINUOUS
Status: DISCONTINUED | OUTPATIENT
Start: 2018-07-20 | End: 2018-07-21

## 2018-07-20 RX ORDER — HYDRALAZINE HYDROCHLORIDE 20 MG/ML
10 INJECTION INTRAMUSCULAR; INTRAVENOUS EVERY 6 HOURS PRN
Status: DISCONTINUED | OUTPATIENT
Start: 2018-07-20 | End: 2018-07-21 | Stop reason: HOSPADM

## 2018-07-20 RX ADMIN — CIPROFLOXACIN 400 MG: 2 INJECTION, SOLUTION INTRAVENOUS at 17:53

## 2018-07-20 RX ADMIN — FAMOTIDINE 20 MG: 20 TABLET ORAL at 21:01

## 2018-07-20 RX ADMIN — FAMOTIDINE 20 MG: 20 TABLET ORAL at 09:24

## 2018-07-20 RX ADMIN — ENOXAPARIN SODIUM 40 MG: 40 INJECTION SUBCUTANEOUS at 09:24

## 2018-07-20 RX ADMIN — PROMETHAZINE HYDROCHLORIDE 6.25 MG: 25 INJECTION INTRAMUSCULAR; INTRAVENOUS at 17:53

## 2018-07-20 RX ADMIN — SODIUM CHLORIDE: 9 INJECTION, SOLUTION INTRAVENOUS at 16:05

## 2018-07-20 RX ADMIN — AMLODIPINE BESYLATE 5 MG: 5 TABLET ORAL at 16:51

## 2018-07-20 RX ADMIN — METRONIDAZOLE 500 MG: 500 INJECTION, SOLUTION INTRAVENOUS at 11:31

## 2018-07-20 RX ADMIN — MAGNESIUM HYDROXIDE 30 ML: 400 SUSPENSION ORAL at 07:02

## 2018-07-20 RX ADMIN — METRONIDAZOLE 500 MG: 500 INJECTION, SOLUTION INTRAVENOUS at 21:02

## 2018-07-20 RX ADMIN — CIPROFLOXACIN 400 MG: 2 INJECTION, SOLUTION INTRAVENOUS at 07:02

## 2018-07-20 RX ADMIN — PRAVASTATIN SODIUM 40 MG: 40 TABLET ORAL at 09:24

## 2018-07-20 RX ADMIN — METRONIDAZOLE 500 MG: 500 INJECTION, SOLUTION INTRAVENOUS at 04:09

## 2018-07-20 RX ADMIN — METOPROLOL TARTRATE 25 MG: 25 TABLET ORAL at 09:24

## 2018-07-20 RX ADMIN — SODIUM CHLORIDE: 9 INJECTION, SOLUTION INTRAVENOUS at 03:57

## 2018-07-20 RX ADMIN — METOPROLOL TARTRATE 25 MG: 25 TABLET ORAL at 21:01

## 2018-07-20 RX ADMIN — PROMETHAZINE HYDROCHLORIDE 6.25 MG: 25 INJECTION INTRAMUSCULAR; INTRAVENOUS at 08:07

## 2018-07-20 RX ADMIN — LOSARTAN POTASSIUM 100 MG: 25 TABLET, FILM COATED ORAL at 09:24

## 2018-07-20 RX ADMIN — HYDRALAZINE HYDROCHLORIDE 10 MG: 20 INJECTION INTRAMUSCULAR; INTRAVENOUS at 16:51

## 2018-07-20 RX ADMIN — MORPHINE SULFATE 2 MG: 2 INJECTION, SOLUTION INTRAMUSCULAR; INTRAVENOUS at 01:53

## 2018-07-20 ASSESSMENT — PAIN DESCRIPTION - PROGRESSION: CLINICAL_PROGRESSION: NOT CHANGED

## 2018-07-20 ASSESSMENT — PAIN DESCRIPTION - ORIENTATION: ORIENTATION: MID

## 2018-07-20 ASSESSMENT — PAIN DESCRIPTION - DESCRIPTORS: DESCRIPTORS: SHARP;SHOOTING

## 2018-07-20 ASSESSMENT — PAIN DESCRIPTION - LOCATION: LOCATION: ABDOMEN

## 2018-07-20 ASSESSMENT — PAIN SCALES - GENERAL
PAINLEVEL_OUTOF10: 0
PAINLEVEL_OUTOF10: 8

## 2018-07-20 ASSESSMENT — PAIN DESCRIPTION - ONSET: ONSET: ON-GOING

## 2018-07-20 ASSESSMENT — PAIN DESCRIPTION - PAIN TYPE: TYPE: ACUTE PAIN

## 2018-07-20 NOTE — PLAN OF CARE
Problem: Falls - Risk of:  Goal: Will remain free from falls  Will remain free from falls   Outcome: Ongoing      Problem: Infection:  Goal: Will remain free from infection  Will remain free from infection   Outcome: Ongoing      Problem: Pain:  Goal: Patient's pain/discomfort is manageable  Patient's pain/discomfort is manageable   Outcome: Ongoing      Problem: Skin Integrity:  Goal: Skin integrity will stabilize  Skin integrity will stabilize   Outcome: Ongoing

## 2018-07-20 NOTE — PROGRESS NOTES
Prn hydralazine and newly scheduled norvasc given at this time for BP of 190/71. Pt asymptomatic, will continue to monitor.

## 2018-07-20 NOTE — PROGRESS NOTES
Pt is lying in bed with their eyes closed. Respirations are easy and even. Call light within reach bed in lowest position with the wheels locked. Will continue to monitor.  Chu Reveles

## 2018-07-20 NOTE — FLOWSHEET NOTE
07/20/18 0658   Vital Signs   Temp 97.6 °F (36.4 °C)   Temp Source Oral   Pulse 71   Heart Rate Source Monitor   Resp 16   BP (!) 157/65   BP Location Right upper arm   BP Upper/Lower Upper   Level of Consciousness 0   MEWS Score 1   Oxygen Therapy   SpO2 94 %   O2 Device None (Room air)     Shift assessment completed. See flow sheet. Respiration easy, even and non labored. Vital signs WNL. Pt alert and oriented. Prn phenergan given per pt request. BS active in all quads, no BM however. Side rails up x 2. IVF infusing without complications. Pt denies any needs at this time. Call light within reach. Will continue to monitor.

## 2018-07-20 NOTE — PROGRESS NOTES
Pt states to having large BM, passing gas and feels much better. Less distention noted. Okay with NP to start clears again and monitor. No need for suppository at this time.

## 2018-07-20 NOTE — CONSULTS
[address]                  [address] [address]                                   CONSULTATION    PATIENT NAME: Olivia Graham                :        1935  MED REC NO:   [id number]                          ROOM:         ACCOUNT NO:   [de-identified]                           ADMIT DATE: 2018  PROVIDER:     Kacey Wilson MD    CONSULT DATE:  2018    REASON FOR REFERRAL:  1. Severe abdominal pain. 2.  Enteritis per CT scan. REFERRING PROVIDER:  Dr. Sherry Colin. HISTORY OF PRESENT ILLNESS:  This is an 27-year-old female with history of  hypertension, hyperlipidemia, diabetes, severe ventricular tachycardia  status post ablation, admitted with complaints of progressively worsening  mid abdominal pain associated with nausea and vomiting. Her symptoms began  three days ago after eating unwashed salad. She has noticed significant  amount of abdominal pain and indigestion. She has tried taking  Pepto-Bismol and [place] without any significant relief. She  subsequently developed dark color stool. Symptoms were unrelentless  prompting a visit to the emergency room. She denies any recent antibiotic  use or NSAID use. She has never had a colonoscopy. REVIEW OF SYSTEMS:  CONSTITUTIONAL:  No fever, sweats, or chills. No  weight loss. ENT:  No sore throat, no double vision, no blurry vision. CARDIOVASCULAR:  No chest pain, no shortness of breath, no dyspnea on  exertion. RESPIRATORY:  No cough, no sputum production, no hemoptysis. GI:  As per HPI. :  No dysuria, hematuria, urinary hesitancy or  frequency. SKIN:  No jaundice, rash, or bruising. PAST MEDICAL HISTORY:  Hypertension, hyperlipidemia, diabetes, SVT, atrial  fibrillation status post ablation. PAST SURGICAL HISTORY:  Tonsillectomy, hysterectomy, and appendectomy.     CURRENT MEDICATIONS:  At home, metoprolol, losartan, glyburide, aspirin,  pravastatin, ranitidine. ALLERGIES:  HYDROCODONE, METFORMIN, and OMEPRAZOLE. SOCIAL HISTORY:  Denies tobacco use, alcohol use, and drug use. FAMILY HISTORY:  Negative for GI malignancy. PHYSICAL EXAMINATION:  VITAL SIGNS:  Temperature 98, pulse is 66, respirations 16, blood pressure  147/73, sats 95% on room air. Weight is 186 pounds. GENERAL:  She is alert, awake, oriented x3, well-developed, well-nourished  female, who appears to be in no apparent distress. HEENT:  NC/AT, PERRL, dry mucous membranes. NECK:  Supple. No thyromegaly. No cervical lymphadenopathy. No JVD. HEART:  Regular rate and rhythm without any murmurs, gallops, or rubs. LUNGS:  Clear to auscultation with bibasilar rales. ABDOMEN:  Soft, nondistended, nontender. Bowel sounds are present. EXTREMITIES:  No clubbing, cyanosis, or edema. LABORATORY DATA:  White blood cell count 9.8, hemoglobin 14.4, hematocrit  44.6, platelets 632. Sodium 136, potassium 3.9, chloride 97, bicarb 26,  BUN 12, creatinine 0.8, glucose 170. Liver profile; alk phos 102, AST 26,  ALT 23, total protein 8.1, albumin 3.9, total bilirubin 0.4. Lipase 23. IMAGING:  CT scan of the abdomen and pelvis, no evidence of perforation. However, there is abnormal small bowel wall thickening in the right lower  quadrant. This may be due to infection, inflammation, or ischemia, nearby  small amount of fluid not immediately, adjacent to bowel loop is most  likely reactive. IMPRESSION:  This is an 36-year-old female with history of hypertension,  hyperlipidemia, diabetes, atrial fibrillation status post ablation,  admitted with progressively worsening self-limited abdominal pain. CT of  the abdomen did show enteritis likely infectious. RECOMMENDATIONS:  1. Continue supportive care. 2.  Agree with antibiotics. 3.  Advance low residue diet. 4.  If symptoms continue to persist, small bowel follow-through can be  considered.   5.  Outpatient followup upon discharge.         Vicki Santos MD    D: 07/19/2018 16:51:06       T: 07/19/2018 16:56:18     [id number]  Job#: [id number]     Doc#: [id number]    CC:  MD Royal Segura MD

## 2018-07-20 NOTE — PROGRESS NOTES
Hospitalist Progress Note      PCP: Haile Lacy MD    Date of Admission: 7/18/2018    Subjective: states her abd pain improved, tolerating PO diet, no BM yet    Medications:  Reviewed    Infusion Medications    sodium chloride 100 mL/hr at 07/20/18 1605    dextrose       Scheduled Medications    amLODIPine  5 mg Oral Daily    losartan  100 mg Oral Daily    metoprolol tartrate  25 mg Oral BID    pravastatin  40 mg Oral Daily    famotidine  20 mg Oral BID    sodium chloride flush  10 mL Intravenous 2 times per day    enoxaparin  40 mg Subcutaneous Daily    insulin lispro  0-6 Units Subcutaneous TID WC    insulin lispro  0-3 Units Subcutaneous Nightly    ciprofloxacin  400 mg Intravenous Q12H    metroNIDAZOLE  500 mg Intravenous Q8H     PRN Meds: hydrALAZINE, promethazine, sodium chloride flush, magnesium hydroxide, ondansetron, glucose, dextrose, glucagon (rDNA), dextrose, morphine      Intake/Output Summary (Last 24 hours) at 07/20/18 1801  Last data filed at 07/20/18 0807   Gross per 24 hour   Intake              210 ml   Output             1250 ml   Net            -1040 ml       Physical Exam Performed:    BP (!) 190/71   Pulse 66   Temp 97 °F (36.1 °C) (Oral)   Resp 16   Ht 5' 2\" (1.575 m)   Wt 189 lb 12.8 oz (86.1 kg)   SpO2 96%   BMI 34.71 kg/m²     Gen: No distress. Alert. Pleasant  Eyes: PERRL. No sclera icterus. No conjunctival injection. ENT: No discharge. Pharynx clear. Neck: Trachea midline. Resp: No accessory muscle use. No crackles. No wheezes. No rhonchi. RA  CV: Regular rate. Regular rhythm. No murmur. No rub. No edema. Capillary Refill: Brisk,< 3 seconds   Peripheral Pulses: +2 palpable, equal bilaterally   GI: tender epigastric region with voluntary guarding. Non-distended. No masses. No organomegaly. hypo bowel sounds. No hernia. Skin: Warm and dry. No nodule on exposed extremities. No rash on exposed extremities. M/S: No cyanosis. No joint deformity. No clubbing. Neuro: Awake. Grossly nonfocal    Psych: Oriented x 3. No anxiety or agitation. Labs:   Recent Labs      07/18/18   1350  07/20/18   1000   WBC  11.8*  8.5   HGB  14.4  12.6   HCT  44.6  38.9   PLT  321  279     Recent Labs      07/18/18   1350  07/20/18   1000   NA  136  138   K  3.9  3.9   CL  97*  103   CO2  26  22   BUN  12  8   CREATININE  0.8  0.8   CALCIUM  9.8  8.2*     Recent Labs      07/18/18   1350   AST  26   ALT  23   BILITOT  0.4   ALKPHOS  102     Recent Labs      07/18/18   1350  07/19/18   0432   INR  1.04  1.14     No results for input(s): Bree Mccain in the last 72 hours. Urinalysis:    Lab Results   Component Value Date    NITRU Negative 02/18/2014    WBCUA 6-10 02/18/2014    BACTERIA 1+ 02/18/2014    RBCUA 20-50 02/18/2014    BLOODU MODERATE 02/18/2014    SPECGRAV 1.015 02/18/2014    GLUCOSEU Negative 02/18/2014       Radiology:  XR ABDOMEN (2 VIEWS)   Final Result   Increased severity of partial versus incomplete small bowel obstruction. CT ABDOMEN PELVIS W IV CONTRAST Additional Contrast? Oral   Final Result   No evidence of perforation however there is abnormal small bowel wall   thickening in the right lower quadrant. This may be due to infection,   inflammation, or ischemia related to developing obstruction. Nearby small   amount of fluid not immediately adjacent to bowel loops is most likely   reactive. Close follow-up is recommended.                  Assessment/Plan:    Active Hospital Problems    Diagnosis Date Noted    Hypertensive urgency [I16.0]     Enteritis [K52.9]     Abdominal pain [R10.9] 07/18/2018         Abdominal Pain  Dark Stools  - c/o sharp sudden onset epigastric pain, dark stools  - CT abdomen: no evidence of perforation however there is abnormal small bowel wall thickening in the RLQ  Likely gastroenteritis with ileus  - NPO-->diet advanced as tolerated, Hgb 14.4  - IVF, Pepcid, Cipro, Flagyl, PRN pain meds, PRN nausea meds  - GI consulted  - General Surgery consulted  - repeat KUB today     Supraventricular Tachycardia  Hx of Atrial Fibrillation s/p Ablation  - in ED, pt had an episode of vomiting and HR increased to 202 bpm  - given BB in ED, HR improved  - cont Lopressor     HTN  - uncontrolled on admission  -  in ED  - cont Cozaar, Lopressor  - still uncontrolled, add norvasc today, prn IV hydralazine     Type II DM  - fairly controlled  - low dose SSI  - POC Glucose, adjust as diet improves     Leukocytosis  - 11.8  - likely reactive  - resolved     HLD  - cont statin       DVT Prophylaxis: Lovenox  Diet: DIET LOW FIBER;  Code Status: Full Code    PT/OT Eval Status: ordered    Dispo - cont care, await SBO improvement and BP controlled    Zehra Saldivar MD

## 2018-07-21 ENCOUNTER — APPOINTMENT (OUTPATIENT)
Dept: GENERAL RADIOLOGY | Age: 83
DRG: 389 | End: 2018-07-21
Payer: MEDICARE

## 2018-07-21 VITALS
SYSTOLIC BLOOD PRESSURE: 133 MMHG | WEIGHT: 189.2 LBS | DIASTOLIC BLOOD PRESSURE: 85 MMHG | RESPIRATION RATE: 16 BRPM | BODY MASS INDEX: 34.82 KG/M2 | OXYGEN SATURATION: 99 % | HEIGHT: 62 IN | TEMPERATURE: 98.6 F | HEART RATE: 76 BPM

## 2018-07-21 LAB
GLUCOSE BLD-MCNC: 152 MG/DL (ref 70–99)
GLUCOSE BLD-MCNC: 184 MG/DL (ref 70–99)
PERFORMED ON: ABNORMAL
PERFORMED ON: ABNORMAL

## 2018-07-21 PROCEDURE — 2580000003 HC RX 258: Performed by: INTERNAL MEDICINE

## 2018-07-21 PROCEDURE — 6370000000 HC RX 637 (ALT 250 FOR IP): Performed by: INTERNAL MEDICINE

## 2018-07-21 PROCEDURE — 74019 RADEX ABDOMEN 2 VIEWS: CPT

## 2018-07-21 PROCEDURE — 2500000003 HC RX 250 WO HCPCS: Performed by: INTERNAL MEDICINE

## 2018-07-21 PROCEDURE — 99232 SBSQ HOSP IP/OBS MODERATE 35: CPT | Performed by: SURGERY

## 2018-07-21 PROCEDURE — 6360000002 HC RX W HCPCS: Performed by: INTERNAL MEDICINE

## 2018-07-21 RX ORDER — CIPROFLOXACIN 250 MG/1
250 TABLET, FILM COATED ORAL 2 TIMES DAILY
Qty: 14 TABLET | Refills: 0 | Status: SHIPPED | OUTPATIENT
Start: 2018-07-21 | End: 2018-07-28

## 2018-07-21 RX ADMIN — ENOXAPARIN SODIUM 40 MG: 40 INJECTION SUBCUTANEOUS at 08:26

## 2018-07-21 RX ADMIN — METOPROLOL TARTRATE 25 MG: 25 TABLET ORAL at 08:26

## 2018-07-21 RX ADMIN — AMLODIPINE BESYLATE 5 MG: 5 TABLET ORAL at 08:26

## 2018-07-21 RX ADMIN — PRAVASTATIN SODIUM 40 MG: 40 TABLET ORAL at 08:26

## 2018-07-21 RX ADMIN — CIPROFLOXACIN 400 MG: 2 INJECTION, SOLUTION INTRAVENOUS at 07:29

## 2018-07-21 RX ADMIN — LOSARTAN POTASSIUM 100 MG: 25 TABLET, FILM COATED ORAL at 08:26

## 2018-07-21 RX ADMIN — Medication 10 ML: at 08:27

## 2018-07-21 RX ADMIN — FAMOTIDINE 20 MG: 20 TABLET ORAL at 08:26

## 2018-07-21 RX ADMIN — METRONIDAZOLE 500 MG: 500 INJECTION, SOLUTION INTRAVENOUS at 03:41

## 2018-07-21 NOTE — PROGRESS NOTES
[facility] GENERAL SURGERY    PATIENT NAME: Brett Tiwari     TODAY'S DATE: 7/21/2018    CHIEF COMPLAINT: abd pain    INTERVAL HISTORY/HPI:    Pt having bowel movements, some mild nausea and tenderness overnight but better today, no fevers or chills. REVIEW OF SYSTEMS:  Pertinent positives and negatives as per interval history section    OBJECTIVE:  VITALS:  BP (!) 155/68   Pulse 88   Temp 98.7 °F (37.1 °C) (Oral)   Resp 18   Ht 5' 2\" (1.575 m)   Wt 189 lb 3.2 oz (85.8 kg)   SpO2 97%   BMI 34.61 kg/m²     INTAKE/OUTPUT:    I/O last 3 completed shifts: In: 150 [P.O.:150]  Out: 1700 [Urine:1700]  I/O this shift:  In: 10 [I.V.:10]  Out: -     CONSTITUTIONAL:  awake and alert  LUNGS:  Respirations easy and unlabored, no crackles or wheezing  CARD:  regular rate and rhythm  ABDOMEN:  normal bowel sounds, soft, non-distended, minimally tender     Data:  CBC:   Recent Labs      07/18/18   1350  07/20/18   1000   WBC  11.8*  8.5   HGB  14.4  12.6   HCT  44.6  38.9   PLT  321  279     BMP:    Recent Labs      07/18/18   1350  07/20/18   1000   NA  136  138   K  3.9  3.9   CL  97*  103   CO2  26  22   BUN  12  8   CREATININE  0.8  0.8   GLUCOSE  170*  176*     Hepatic:   Recent Labs      07/18/18   1350   AST  26   ALT  23   BILITOT  0.4   ALKPHOS  102     Mag:    No results for input(s): MG in the last 72 hours. Phos:   No results for input(s): PHOS in the last 72 hours. INR:   Recent Labs      07/18/18   1350  07/19/18   0432   INR  1.04  1.14       Radiology Review:  *Imaging personally reviewed by me. NA      ASSESSMENT AND PLAN:  82 yo with sbo vs enteritis  1. Continue diet as tolerated  2. AXR pending  3.   Home once nausea improved    Derik Gillespie   [id number]

## 2018-07-21 NOTE — CARE COORDINATION
DISCHARGE ORDER  Date/Time 2018 12:17 PM  Completed by: Mago Cortés, Case Management    Patient Name: Daved Barthel Payer    : 1935  Admitting Diagnosis: Abdominal pain [R10.9]  Admit Date/Time: 2018  1:31 PM    Noted discharge order. Confirmed discharge plan with patient : Yes   Discharge Plan: Reviewed chart. CM met with pt at bedside and confirmed plan to return home. Pt continues to deny any needs; states she has transportation home and will not have any difficulty obtaining prescriptions. No further needs identified. Discharge timeout done with NOEMY Her. All discharge needs and concerns addressed.

## 2018-07-22 ENCOUNTER — HOSPITAL ENCOUNTER (EMERGENCY)
Age: 83
Discharge: HOME | End: 2018-07-22
Attending: EMERGENCY MEDICINE
Payer: MEDICARE

## 2018-07-22 ENCOUNTER — APPOINTMENT (OUTPATIENT)
Dept: CT IMAGING | Age: 83
End: 2018-07-22
Payer: MEDICARE

## 2018-07-22 VITALS
HEART RATE: 70 BPM | OXYGEN SATURATION: 96 % | RESPIRATION RATE: 18 BRPM | SYSTOLIC BLOOD PRESSURE: 168 MMHG | TEMPERATURE: 98.4 F | DIASTOLIC BLOOD PRESSURE: 55 MMHG | BODY MASS INDEX: 34.57 KG/M2 | WEIGHT: 189 LBS

## 2018-07-22 DIAGNOSIS — R11.0 NAUSEA: ICD-10-CM

## 2018-07-22 DIAGNOSIS — R10.9 ABDOMINAL PAIN, UNSPECIFIED ABDOMINAL LOCATION: Primary | ICD-10-CM

## 2018-07-22 LAB
A/G RATIO: 1 (ref 1.1–2.2)
ALBUMIN SERPL-MCNC: 3.7 G/DL (ref 3.4–5)
ALP BLD-CCNC: 77 U/L (ref 40–129)
ALT SERPL-CCNC: 23 U/L (ref 10–40)
ANION GAP SERPL CALCULATED.3IONS-SCNC: 11 MMOL/L (ref 3–16)
AST SERPL-CCNC: 33 U/L (ref 15–37)
BACTERIA: ABNORMAL /HPF
BASOPHILS ABSOLUTE: 0.1 K/UL (ref 0–0.2)
BASOPHILS RELATIVE PERCENT: 0.8 %
BILIRUB SERPL-MCNC: <0.2 MG/DL (ref 0–1)
BILIRUBIN URINE: NEGATIVE
BLOOD, URINE: NEGATIVE
BUN BLDV-MCNC: 13 MG/DL (ref 7–20)
CALCIUM SERPL-MCNC: 8.1 MG/DL (ref 8.3–10.6)
CHLORIDE BLD-SCNC: 101 MMOL/L (ref 99–110)
CLARITY: CLEAR
CO2: 24 MMOL/L (ref 21–32)
COLOR: YELLOW
CREAT SERPL-MCNC: 0.8 MG/DL (ref 0.6–1.2)
EOSINOPHILS ABSOLUTE: 0.5 K/UL (ref 0–0.6)
EOSINOPHILS RELATIVE PERCENT: 4.7 %
EPITHELIAL CELLS, UA: ABNORMAL /HPF
GFR AFRICAN AMERICAN: >60
GFR NON-AFRICAN AMERICAN: >60
GLOBULIN: 3.6 G/DL
GLUCOSE BLD-MCNC: 166 MG/DL (ref 70–99)
GLUCOSE URINE: NEGATIVE MG/DL
HCT VFR BLD CALC: 38.4 % (ref 36–48)
HEMOGLOBIN: 12.7 G/DL (ref 12–16)
KETONES, URINE: NEGATIVE MG/DL
LEUKOCYTE ESTERASE, URINE: ABNORMAL
LYMPHOCYTES ABSOLUTE: 2.6 K/UL (ref 1–5.1)
LYMPHOCYTES RELATIVE PERCENT: 25.4 %
MCH RBC QN AUTO: 26.6 PG (ref 26–34)
MCHC RBC AUTO-ENTMCNC: 33.2 G/DL (ref 31–36)
MCV RBC AUTO: 80.3 FL (ref 80–100)
MICROSCOPIC EXAMINATION: YES
MONOCYTES ABSOLUTE: 1.2 K/UL (ref 0–1.3)
MONOCYTES RELATIVE PERCENT: 11.9 %
NEUTROPHILS ABSOLUTE: 5.9 K/UL (ref 1.7–7.7)
NEUTROPHILS RELATIVE PERCENT: 57.2 %
NITRITE, URINE: NEGATIVE
PDW BLD-RTO: 15.5 % (ref 12.4–15.4)
PH UA: 6.5
PLATELET # BLD: 285 K/UL (ref 135–450)
PMV BLD AUTO: 9.4 FL (ref 5–10.5)
POTASSIUM REFLEX MAGNESIUM: 3.7 MMOL/L (ref 3.5–5.1)
PROTEIN UA: NEGATIVE MG/DL
RBC # BLD: 4.78 M/UL (ref 4–5.2)
RBC UA: ABNORMAL /HPF (ref 0–2)
SODIUM BLD-SCNC: 136 MMOL/L (ref 136–145)
SPECIFIC GRAVITY UA: <=1.005
TOTAL PROTEIN: 7.3 G/DL (ref 6.4–8.2)
URINE REFLEX TO CULTURE: YES
URINE TYPE: ABNORMAL
UROBILINOGEN, URINE: 0.2 E.U./DL
WBC # BLD: 10.4 K/UL (ref 4–11)
WBC UA: ABNORMAL /HPF (ref 0–5)

## 2018-07-22 PROCEDURE — 96374 THER/PROPH/DIAG INJ IV PUSH: CPT

## 2018-07-22 PROCEDURE — 80053 COMPREHEN METABOLIC PANEL: CPT

## 2018-07-22 PROCEDURE — 81001 URINALYSIS AUTO W/SCOPE: CPT

## 2018-07-22 PROCEDURE — 99284 EMERGENCY DEPT VISIT MOD MDM: CPT

## 2018-07-22 PROCEDURE — 96375 TX/PRO/DX INJ NEW DRUG ADDON: CPT

## 2018-07-22 PROCEDURE — 6360000002 HC RX W HCPCS: Performed by: PHYSICIAN ASSISTANT

## 2018-07-22 PROCEDURE — 6360000004 HC RX CONTRAST MEDICATION: Performed by: PHYSICIAN ASSISTANT

## 2018-07-22 PROCEDURE — 74177 CT ABD & PELVIS W/CONTRAST: CPT

## 2018-07-22 PROCEDURE — 85025 COMPLETE CBC W/AUTO DIFF WBC: CPT

## 2018-07-22 PROCEDURE — 87086 URINE CULTURE/COLONY COUNT: CPT

## 2018-07-22 RX ORDER — ONDANSETRON 2 MG/ML
4 INJECTION INTRAMUSCULAR; INTRAVENOUS EVERY 30 MIN PRN
Status: DISCONTINUED | OUTPATIENT
Start: 2018-07-22 | End: 2018-07-22 | Stop reason: HOSPADM

## 2018-07-22 RX ORDER — OXYCODONE HYDROCHLORIDE AND ACETAMINOPHEN 5; 325 MG/1; MG/1
1 TABLET ORAL EVERY 6 HOURS PRN
Qty: 12 TABLET | Refills: 0 | Status: SHIPPED | OUTPATIENT
Start: 2018-07-22 | End: 2018-07-25

## 2018-07-22 RX ORDER — ONDANSETRON 4 MG/1
4 TABLET, FILM COATED ORAL EVERY 8 HOURS PRN
Qty: 20 TABLET | Refills: 0 | Status: SHIPPED | OUTPATIENT
Start: 2018-07-22 | End: 2019-03-26 | Stop reason: ALTCHOICE

## 2018-07-22 RX ORDER — MORPHINE SULFATE 1 MG/ML
4 INJECTION, SOLUTION EPIDURAL; INTRATHECAL; INTRAVENOUS ONCE
Status: COMPLETED | OUTPATIENT
Start: 2018-07-22 | End: 2018-07-22

## 2018-07-22 RX ADMIN — Medication 4 MG: at 16:35

## 2018-07-22 RX ADMIN — IOPAMIDOL 75 ML: 755 INJECTION, SOLUTION INTRAVENOUS at 18:18

## 2018-07-22 RX ADMIN — ONDANSETRON 4 MG: 2 INJECTION INTRAMUSCULAR; INTRAVENOUS at 16:35

## 2018-07-22 RX ADMIN — IOHEXOL 50 ML: 240 INJECTION, SOLUTION INTRATHECAL; INTRAVASCULAR; INTRAVENOUS; ORAL at 16:48

## 2018-07-22 ASSESSMENT — PAIN SCALES - GENERAL
PAINLEVEL_OUTOF10: 0
PAINLEVEL_OUTOF10: 0

## 2018-07-22 NOTE — ED PROVIDER NOTES
Intravenous Given 7/22/18 1635)   morphine (PF) injection 4 mg (4 mg Intravenous Given 7/22/18 1635)   iohexol (OMNIPAQUE 240) injection 50 mL (50 mLs Oral Given 7/22/18 1648)   iopamidol (ISOVUE-370) 76 % injection 75 mL (75 mLs Intravenous Given 7/22/18 1818)        MDM:  Afebrile, stable, patient presents to the ED for evaluation. Seen in conjunction with attending ED provider who agrees with assessment and plan. Patients PO2 is 99% on room air  they are not hypoxic, Patient is quite hypertensive on arrival however patient has a history of elevated blood pressure. Patient has no signs of end organ damage and blood pressure normalizes once patient's and encircled program patient is given IV fluids pain medication. His Zofran was reordered and evaluated and additional CT abdomen pelvis with IV contrast to rule out a perforation versus a small bowel obstruction versus ischemic bowel versus gastroenteritis. CT findings show improvement in thickening which was seen last week. Patient is feeling improved and comfortable with being discharged home comfortable with this advise close follow-up with GI QUESTIONS answered she is discharged in stable condition      All questions are answered. Indications for return to the ED are discussed. Patient is advised if any new or worsening symptoms arise they should immediately return to the emergency room. Follow-up with primary care in 1-2 days. I estimate there is LOW risk for ACUTE APPENDICITIS, BOWEL OBSTRUCTION, CHOLECYSTITIS, DIVERTICULITIS, INCARCERATED HERNIA, PANCREATITIS, or PERFORATED BOWEL or ULCER, thus I consider the discharge disposition reasonable. Also, there is no evidence or peritonitis, sepsis, or toxicity. Trae Tiwari and I have discussed the diagnosis and risks, and we agree with discharging home to follow-up with their primary doctor. We also discussed returning to the Emergency Department immediately if new or worsening symptoms occur.

## 2018-07-24 LAB — URINE CULTURE, ROUTINE: NORMAL

## 2018-07-26 ENCOUNTER — HOSPITAL ENCOUNTER (EMERGENCY)
Age: 83
Discharge: HOME | End: 2018-07-26
Attending: EMERGENCY MEDICINE
Payer: MEDICARE

## 2018-07-26 ENCOUNTER — APPOINTMENT (OUTPATIENT)
Dept: ULTRASOUND IMAGING | Age: 83
End: 2018-07-26
Payer: MEDICARE

## 2018-07-26 VITALS
SYSTOLIC BLOOD PRESSURE: 162 MMHG | HEIGHT: 62 IN | OXYGEN SATURATION: 98 % | WEIGHT: 181 LBS | BODY MASS INDEX: 33.31 KG/M2 | RESPIRATION RATE: 13 BRPM | HEART RATE: 71 BPM | DIASTOLIC BLOOD PRESSURE: 65 MMHG | TEMPERATURE: 97.8 F

## 2018-07-26 DIAGNOSIS — N39.0 URINARY TRACT INFECTION WITHOUT HEMATURIA, SITE UNSPECIFIED: ICD-10-CM

## 2018-07-26 DIAGNOSIS — R10.13 EPIGASTRIC PAIN: Primary | ICD-10-CM

## 2018-07-26 LAB
A/G RATIO: 1 (ref 1.1–2.2)
ALBUMIN SERPL-MCNC: 4 G/DL (ref 3.4–5)
ALP BLD-CCNC: 93 U/L (ref 40–129)
ALT SERPL-CCNC: 26 U/L (ref 10–40)
ANION GAP SERPL CALCULATED.3IONS-SCNC: 13 MMOL/L (ref 3–16)
AST SERPL-CCNC: 35 U/L (ref 15–37)
BACTERIA: ABNORMAL /HPF
BASOPHILS ABSOLUTE: 0.1 K/UL (ref 0–0.2)
BASOPHILS RELATIVE PERCENT: 0.6 %
BILIRUB SERPL-MCNC: 0.4 MG/DL (ref 0–1)
BILIRUBIN URINE: NEGATIVE
BLOOD, URINE: ABNORMAL
BUN BLDV-MCNC: 9 MG/DL (ref 7–20)
CALCIUM SERPL-MCNC: 9.2 MG/DL (ref 8.3–10.6)
CHLORIDE BLD-SCNC: 99 MMOL/L (ref 99–110)
CLARITY: CLEAR
CO2: 25 MMOL/L (ref 21–32)
COLOR: YELLOW
CREAT SERPL-MCNC: 0.7 MG/DL (ref 0.6–1.2)
EOSINOPHILS ABSOLUTE: 0.3 K/UL (ref 0–0.6)
EOSINOPHILS RELATIVE PERCENT: 3.3 %
GFR AFRICAN AMERICAN: >60
GFR NON-AFRICAN AMERICAN: >60
GLOBULIN: 4.1 G/DL
GLUCOSE BLD-MCNC: 203 MG/DL (ref 70–99)
GLUCOSE URINE: NEGATIVE MG/DL
HCT VFR BLD CALC: 42.3 % (ref 36–48)
HEMOGLOBIN: 13.9 G/DL (ref 12–16)
KETONES, URINE: NEGATIVE MG/DL
LEUKOCYTE ESTERASE, URINE: ABNORMAL
LIPASE: 32 U/L (ref 13–60)
LYMPHOCYTES ABSOLUTE: 2.1 K/UL (ref 1–5.1)
LYMPHOCYTES RELATIVE PERCENT: 22.3 %
MCH RBC QN AUTO: 26.2 PG (ref 26–34)
MCHC RBC AUTO-ENTMCNC: 33 G/DL (ref 31–36)
MCV RBC AUTO: 79.4 FL (ref 80–100)
MICROSCOPIC EXAMINATION: YES
MONOCYTES ABSOLUTE: 1.2 K/UL (ref 0–1.3)
MONOCYTES RELATIVE PERCENT: 13.2 %
NEUTROPHILS ABSOLUTE: 5.6 K/UL (ref 1.7–7.7)
NEUTROPHILS RELATIVE PERCENT: 60.6 %
NITRITE, URINE: NEGATIVE
OCCULT BLOOD SCREENING: NORMAL
PDW BLD-RTO: 15.8 % (ref 12.4–15.4)
PH UA: 6
PLATELET # BLD: 367 K/UL (ref 135–450)
PMV BLD AUTO: 8.4 FL (ref 5–10.5)
POTASSIUM SERPL-SCNC: 4.2 MMOL/L (ref 3.5–5.1)
PROTEIN UA: NEGATIVE MG/DL
RBC # BLD: 5.32 M/UL (ref 4–5.2)
RBC UA: ABNORMAL /HPF (ref 0–2)
SODIUM BLD-SCNC: 137 MMOL/L (ref 136–145)
SPECIFIC GRAVITY UA: 1.01
TOTAL PROTEIN: 8.1 G/DL (ref 6.4–8.2)
URINE TYPE: ABNORMAL
UROBILINOGEN, URINE: 0.2 E.U./DL
WBC # BLD: 9.2 K/UL (ref 4–11)
WBC UA: ABNORMAL /HPF (ref 0–5)

## 2018-07-26 PROCEDURE — 99284 EMERGENCY DEPT VISIT MOD MDM: CPT

## 2018-07-26 PROCEDURE — 83690 ASSAY OF LIPASE: CPT

## 2018-07-26 PROCEDURE — 80053 COMPREHEN METABOLIC PANEL: CPT

## 2018-07-26 PROCEDURE — 85025 COMPLETE CBC W/AUTO DIFF WBC: CPT

## 2018-07-26 PROCEDURE — 76705 ECHO EXAM OF ABDOMEN: CPT

## 2018-07-26 PROCEDURE — 96374 THER/PROPH/DIAG INJ IV PUSH: CPT

## 2018-07-26 PROCEDURE — G0328 FECAL BLOOD SCRN IMMUNOASSAY: HCPCS

## 2018-07-26 PROCEDURE — 6370000000 HC RX 637 (ALT 250 FOR IP): Performed by: NURSE PRACTITIONER

## 2018-07-26 PROCEDURE — 6360000002 HC RX W HCPCS: Performed by: EMERGENCY MEDICINE

## 2018-07-26 PROCEDURE — 81001 URINALYSIS AUTO W/SCOPE: CPT

## 2018-07-26 RX ORDER — CEFUROXIME AXETIL 250 MG/1
250 TABLET ORAL ONCE
Status: COMPLETED | OUTPATIENT
Start: 2018-07-26 | End: 2018-07-26

## 2018-07-26 RX ORDER — PANTOPRAZOLE SODIUM 20 MG/1
40 TABLET, DELAYED RELEASE ORAL DAILY
Qty: 30 TABLET | Refills: 0 | Status: ON HOLD | OUTPATIENT
Start: 2018-07-26 | End: 2021-04-22 | Stop reason: SDUPTHER

## 2018-07-26 RX ORDER — CEFUROXIME AXETIL 250 MG/1
250 TABLET ORAL 2 TIMES DAILY
Qty: 5 TABLET | Refills: 0 | Status: SHIPPED | OUTPATIENT
Start: 2018-07-26 | End: 2018-08-02

## 2018-07-26 RX ORDER — SUCRALFATE ORAL 1 G/10ML
1 SUSPENSION ORAL 4 TIMES DAILY
Qty: 1200 ML | Refills: 3 | Status: SHIPPED | OUTPATIENT
Start: 2018-07-26 | End: 2019-01-08 | Stop reason: ALTCHOICE

## 2018-07-26 RX ORDER — DICYCLOMINE HYDROCHLORIDE 10 MG/1
10 CAPSULE ORAL
Status: DISCONTINUED | OUTPATIENT
Start: 2018-07-26 | End: 2018-07-27 | Stop reason: HOSPADM

## 2018-07-26 RX ORDER — MORPHINE SULFATE 4 MG/ML
4 INJECTION, SOLUTION INTRAMUSCULAR; INTRAVENOUS ONCE
Status: COMPLETED | OUTPATIENT
Start: 2018-07-26 | End: 2018-07-26

## 2018-07-26 RX ADMIN — CEFUROXIME AXETIL 250 MG: 250 TABLET ORAL at 21:49

## 2018-07-26 RX ADMIN — MORPHINE SULFATE 4 MG: 4 INJECTION, SOLUTION INTRAMUSCULAR; INTRAVENOUS at 21:21

## 2018-07-26 RX ADMIN — ALUMINUM HYDROXIDE, MAGNESIUM HYDROXIDE, AND SIMETHICONE: 200; 200; 20 SUSPENSION ORAL at 19:56

## 2018-07-26 RX ADMIN — DICYCLOMINE HYDROCHLORIDE 10 MG: 10 CAPSULE ORAL at 19:56

## 2018-07-26 ASSESSMENT — PAIN DESCRIPTION - PAIN TYPE: TYPE: ACUTE PAIN

## 2018-07-26 ASSESSMENT — PAIN SCALES - GENERAL
PAINLEVEL_OUTOF10: 6
PAINLEVEL_OUTOF10: 2
PAINLEVEL_OUTOF10: 5

## 2018-07-26 ASSESSMENT — PAIN DESCRIPTION - LOCATION: LOCATION: ABDOMEN

## 2018-07-27 NOTE — ED NOTES
Patient discharged with instructions, medication teaching, follow up instructions, verbalizes understanding. Ambulates from Ed without assist, gait steady.      Savanah Carter RN  07/26/18 [id number]

## 2018-07-27 NOTE — ED NOTES
Nurse to room with Hitesh Garcia NP, patient states would like to have provider staff speak with Dr. Chancy Lesch before patient is discharged in case he would want to admit patient.  Hitesh Garcia explains to patient that there is no indications for admission through the ED, however, PMD can independently direct admit patient to the hospital. Page put out for PMD,  patient discharge on hold at this time     Lizbeth Norman RN  07/26/18 [id number]

## 2018-07-28 NOTE — ED PROVIDER NOTES
[address] [place] [address]  ED  eMERGENCY dEPARTMENT eNCOUnter        Pt Name: Andrew Tiwari  MRN: [id number]  Evelyngftyesha 1935  Date of evaluation: 7/26/2018  Provider: GUANAKO Denise CNP-C  PCP: Kaleb Spencer MD  ED Attending: Dr. Osmar Espinal    History provided by the patient. CHIEF COMPLAINT:     Chief Complaint   Patient presents with    Abdominal Pain     Mid, upper abd pain. Was admitted at [facility] for 4 days. Has been having vomiting/diarrhea immediately after eating. Pt scheduled for colonoscopy on the 6th of Aug.        HISTORY OF PRESENT ILLNESS:      Andrew Tiwari is a 81 y.o. female who presents to [address] [place] [address]  ED with complaints of Abdominal pain. Patient states that she's had epigastric abdominal pain that comes and goes. States that it was worse tonight. Patient states that the pain is significantly worse every time she tries to eat. Patient states that when she tries to eat she vomits. Patient was recently admitted to [facility] for 4 days where she was evaluating by GI. Patient has since then been seen both in the ER and by primary care physician problem. She presents today with the same complaint. Patient is on appointment to see Dr. Neville García however its 10 days from now. No fevers. No chest or difficulty breathing. She is here for further evaluation. Nursing Notes were reviewed     REVIEW OF SYSTEMS:     Review of Systems  All systems, a total of 10, are reviewed and negative except for those that were just noted in history present illness.         PAST MEDICAL HISTORY:     Past Medical History:   Diagnosis Date    Atrial fibrillation ([address]     Diabetes mellitus ([address]     Hydronephrosis 2/14/2014    Hyperlipidemia     Hypertension     SVT (supraventricular tachycardia) ([address] 8/26/2013    AVNRT         SURGICAL HISTORY:      Past Surgical History:   Procedure Laterality Date    ABLATION OF DYSRHYTHMIC FOCUS      -2014    APPENDECTOMY      DILATATION, ESOPHAGUS      FRACTURE SURGERY      L ankle    HYSTERECTOMY      SKIN BIOPSY      TONSILLECTOMY           CURRENT MEDICATIONS:       Discharge Medication List as of 7/26/2018  9:43 PM      CONTINUE these medications which have NOT CHANGED    Details   ondansetron (ZOFRAN) 4 MG tablet Take 1 tablet by mouth every 8 hours as needed for Nausea, Disp-20 tablet, R-0Print      ciprofloxacin (CIPRO) 250 MG tablet Take 1 tablet by mouth 2 times daily for 7 days, Disp-14 tablet, R-0Normal      metoprolol tartrate (LOPRESSOR) 25 MG tablet TAKE ONE TABLET BY MOUTH TWICE A DAY, Disp-180 tablet, R-3Normal      losartan (COZAAR) 100 MG tablet Take 100 mg by mouth dailyHistorical Med      glyBURIDE (DIABETA) 1.25 MG tablet Take 1.25 mg by mouth daily (with breakfast)Historical Med      aspirin 81 MG tablet Take 81 mg by mouth daily      pravastatin (PRAVACHOL) 40 MG tablet Take 1 tablet by mouth daily. , Disp-30 tablet, R-3      ranitidine (ZANTAC) 300 MG capsule Take 300 mg by mouth every evening.                ALLERGIES:    Hydrocodone; Metformin and related; and Omeprazole    FAMILY HISTORY:       Family History   Problem Relation Age of Onset    Diabetes Mother     Heart Disease Father     High Blood Pressure Father     High Cholesterol Father     Cancer Sister     Heart Disease Brother     High Blood Pressure Brother     High Cholesterol Brother           SOCIAL HISTORY:       Social History     Social History    Marital status:      Spouse name: N/A    Number of children: N/A    Years of education: N/A     Social History Main Topics    Smoking status: Never Smoker    Smokeless tobacco: Never Used    Alcohol use No    Drug use: No    Sexual activity: Yes     Partners: Male     Other Topics Concern    None     Social History Narrative    None       SCREENINGS:             PHYSICAL EXAM:       ED Triage Vitals   BP Temp Temp Source Pulse Resp SpO2 Height Weight 07/26/18 1817 07/26/18 1817 07/26/18 1817 07/26/18 1817 07/26/18 1817 07/26/18 1817 07/26/18 1818 07/26/18 1818   (!) 211/68 97.8 °F (36.6 °C) Oral 76 18 98 % 5' 2\" (1.575 m) 181 lb (82.1 kg)       Physical Exam    CONSTITUTIONAL: Awake and alert. Cooperative. Well-developed. Well-nourished. Non-toxic. No acute distress. Vitals:    07/26/18 1817 07/26/18 1818 07/26/18 1959 07/26/18 2153   BP: (!) 211/68  (!) 180/63 (!) 162/65   Pulse: 76  76 71   Resp: 18  18 13   Temp: 97.8 °F (36.6 °C)      TempSrc: Oral      SpO2: 98%  98% 98%   Weight:  181 lb (82.1 kg)     Height:  5' 2\" (1.575 m)       HENT: Normocephalic. Atraumatic. External ears normal, without discharge. TMs clear bilaterally. No nasal discharge. Oropharynx clear, no erythema. Mucous membranes moist.  EYES: Conjunctiva non-injected, no lid abnormalities noted. No scleral icterus. PERRL. EOM's grossly intact. Anterior chambers clear. NECK: Supple. Normal ROM. No meningismus. No thyroid tenderness or swelling noted. CARDIOVASCULAR: RRR. No Murmer. Intact distal pulses with no edema. No carotid bruits. PULMONARY/CHEST WALL: Effort normal. No tachypnea. Lungs clear to ausculation. ABDOMEN: Normal BS. Soft. Nondistended. Mild epigastric tenderness. Tobias's negative. No guarding. No hernias noted. No splenomegaly. Back: Spine is midline. No ecchymosis. No crepitus on palpation. No obvious subluxation of vertebral column. No saddle anesthesia or evidence of cauda equina. /ANORECTAL: Not assessed  MUSKULOSKELETAL: Normal ROM. No acute deformities. No edema. No tenderness to palpate. SKIN: Warm and dry. NEUROLOGICAL:  GCS 15. CN II-XII grossly intact. Strength is 5/5 in all extremities and sensation is intact. PSYCHIATRIC: Normal affect, normal insight and judgement. Alert and oriented x 3.         DIAGNOSTIC RESULTS:     LABS:    Results for orders placed or performed during the hospital encounter of 07/26/18   CBC auto differential   Result  (A) 0 - 5 /HPF    RBC, UA 0-2 0 - 2 /HPF    Bacteria, UA Rare (A) /HPF   Blood Occult Stool Screen #1   Result Value Ref Range    Occult Blood Screening Result: Negative  Normal range: Negative            RADIOLOGY:  All x-ray studies are viewed/reviewed by me. Formal interpretations per the radiologist are as follows:      [address]   Preliminary Result   Suboptimal visualization of the gallbladder due to overlying bowel gas and   patient scanning characteristics. Gallstone filled gallbladder is noted. Otherwise no definite sonographic evidence of cholecystitis. Hepatic steatosis. EKG:  See EKG interpretation by Dr. Anisha Naqvi:   N/A    CRITICAL CARE TIME:   N/A    CONSULTS:  [address] [place] and DIFFERENTIAL DIAGNOSIS/MDM:   Vitals:    Vitals:    07/26/18 1817 07/26/18 1818 07/26/18 1959 07/26/18 2153   BP: (!) 211/68  (!) 180/63 (!) 162/65   Pulse: 76  76 71   Resp: 18 18 13   Temp: 97.8 °F (36.6 °C)      TempSrc: Oral      SpO2: 98%  98% 98%   Weight:  181 lb (82.1 kg)     Height:  5' 2\" (1.575 m)         Patient was given the following medications:  Medications   aluminum & magnesium hydroxide-simethicone (MAALOX) 30 mL, lidocaine viscous (XYLOCAINE) 5 mL (GI COCKTAIL) ( Oral Given 7/26/18 1956)   morphine (PF) injection 4 mg (4 mg Intravenous Given 7/26/18 2121)   cefUROXime (CEFTIN) tablet 250 mg (250 mg Oral Given 7/26/18 2149)         Patient was evaluated by both myself and Dr. Alexey Black.  Patient was seen in the emergency department today with complaints of epigastric pain. Patient pain has been going on for quite some time now, she's been an inpatient for 4 days and has subsequently been seen in the ER as well as primary care office for this problem. Patient has had multiple CT scans during that time.   Patient had not had an ultrasound of her gallbladder which edit performed today which just showed some gallstones but no cholecystitis. Tobias's was negative. Patient urinalysis did show large urinary tract infection although the patient is asymptomatic. Patient states that she thinks that she needs an EGD. Patient may have ulcers, do not disagree with this however this is to be decided by GI. Patient does not appear toxic, appears very healthy at this time. She states that she has not eaten in 10 days however I feel that this is not very likely given the patient's current condition and laboratory studies. Patient and patient's son were very adamant about needing to be admitted to the hospital however I saw no indications at this time that the patient required admission to the hospital.  Her vital signs are stable. Her laboratory studies are stable. She's had multiple CT scans, she was already inpatient for 4 days for the same problem and did not receive an EGD. She is having no difficulty swallowing and no esophageal foreign body and did not feel that it was reasonable to call in gi since this has no emergent reasons for resolution. As a courtesy to the patient I did contact the on-call physician for her primary care. Dr. Fela Healy, he had nothing to add that he would let Dr. Gregg Osorio know that the patient was seen in the ER. At this time I'm very comfortable with discharge home. Patient laboratory studies, radiographic imaging, and assessment were all discussed with the patient and/or patient family. There was shared decision-making between myself, the attending physician, as well as the patient and/or their surrogate and we are all in agreement with discharge home. There was an opportunity for questions and all questions were answered to the best of my ability and to the satisfaction of the patient and/or patient family. FINAL IMPRESSION:      1. Epigastric pain    2.  Urinary tract infection without hematuria, site unspecified          DISPOSITION/PLAN:   DISPOSITION Decision To

## 2018-08-17 ENCOUNTER — TELEPHONE (OUTPATIENT)
Dept: CARDIOLOGY CLINIC | Age: 83
End: 2018-08-17

## 2018-08-17 NOTE — TELEPHONE ENCOUNTER
Dr Sanam Shea staff faxed over a form to get premission for pt to hold blood thinners for PCR colonoscopy, pt having on 8/31. They wanted to make sure fax was rec'd. Spoke to Kelsey and she does have form. Will take care of. Thank you.

## 2018-08-20 NOTE — TELEPHONE ENCOUNTER
I called and spoke to Emiliano Dubois from Dr. Sara Murphy office and let her know that patient can hold Asprin for colonoscopy scheduled for 8/31/2018.

## 2019-01-08 ENCOUNTER — APPOINTMENT (OUTPATIENT)
Dept: CT IMAGING | Age: 84
DRG: 390 | End: 2019-01-08
Payer: MEDICARE

## 2019-01-08 ENCOUNTER — HOSPITAL ENCOUNTER (INPATIENT)
Age: 84
LOS: 6 days | Discharge: HOME | DRG: 390 | End: 2019-01-14
Attending: EMERGENCY MEDICINE | Admitting: INTERNAL MEDICINE
Payer: MEDICARE

## 2019-01-08 DIAGNOSIS — R11.2 INTRACTABLE VOMITING WITH NAUSEA, UNSPECIFIED VOMITING TYPE: ICD-10-CM

## 2019-01-08 DIAGNOSIS — D72.829 LEUKOCYTOSIS, UNSPECIFIED TYPE: ICD-10-CM

## 2019-01-08 DIAGNOSIS — I10 ESSENTIAL HYPERTENSION: ICD-10-CM

## 2019-01-08 DIAGNOSIS — K56.609 SMALL BOWEL OBSTRUCTION (HCC): Primary | ICD-10-CM

## 2019-01-08 LAB
A/G RATIO: 0.9 (ref 1.1–2.2)
ALBUMIN SERPL-MCNC: 3.9 G/DL (ref 3.4–5)
ALP BLD-CCNC: 135 U/L (ref 40–129)
ALT SERPL-CCNC: 14 U/L (ref 10–40)
ANION GAP SERPL CALCULATED.3IONS-SCNC: 13 MMOL/L (ref 3–16)
AST SERPL-CCNC: 22 U/L (ref 15–37)
BACTERIA: ABNORMAL /HPF
BASOPHILS ABSOLUTE: 0.1 K/UL (ref 0–0.2)
BASOPHILS RELATIVE PERCENT: 0.6 %
BILIRUB SERPL-MCNC: 0.5 MG/DL (ref 0–1)
BILIRUBIN URINE: NEGATIVE
BLOOD, URINE: ABNORMAL
BUN BLDV-MCNC: 15 MG/DL (ref 7–20)
CALCIUM SERPL-MCNC: 8.9 MG/DL (ref 8.3–10.6)
CHLORIDE BLD-SCNC: 97 MMOL/L (ref 99–110)
CLARITY: CLEAR
CO2: 25 MMOL/L (ref 21–32)
COLOR: ABNORMAL
CREAT SERPL-MCNC: 0.7 MG/DL (ref 0.6–1.2)
EKG ATRIAL RATE: 69 BPM
EKG DIAGNOSIS: NORMAL
EKG P AXIS: 55 DEGREES
EKG P-R INTERVAL: 184 MS
EKG Q-T INTERVAL: 408 MS
EKG QRS DURATION: 88 MS
EKG QTC CALCULATION (BAZETT): 437 MS
EKG R AXIS: 12 DEGREES
EKG T AXIS: 52 DEGREES
EKG VENTRICULAR RATE: 69 BPM
EOSINOPHILS ABSOLUTE: 0.3 K/UL (ref 0–0.6)
EOSINOPHILS RELATIVE PERCENT: 2.2 %
GFR AFRICAN AMERICAN: >60
GFR NON-AFRICAN AMERICAN: >60
GLOBULIN: 4.4 G/DL
GLUCOSE BLD-MCNC: 144 MG/DL (ref 70–99)
GLUCOSE URINE: NEGATIVE MG/DL
HCT VFR BLD CALC: 44.5 % (ref 36–48)
HEMOGLOBIN: 13.9 G/DL (ref 12–16)
KETONES, URINE: ABNORMAL MG/DL
LACTIC ACID: 1.5 MMOL/L (ref 0.4–2)
LEUKOCYTE ESTERASE, URINE: NEGATIVE
LIPASE: 23 U/L (ref 13–60)
LYMPHOCYTES ABSOLUTE: 2.8 K/UL (ref 1–5.1)
LYMPHOCYTES RELATIVE PERCENT: 19.8 %
MCH RBC QN AUTO: 24.7 PG (ref 26–34)
MCHC RBC AUTO-ENTMCNC: 31.1 G/DL (ref 31–36)
MCV RBC AUTO: 79.4 FL (ref 80–100)
MICROSCOPIC EXAMINATION: YES
MONOCYTES ABSOLUTE: 1.2 K/UL (ref 0–1.3)
MONOCYTES RELATIVE PERCENT: 8.5 %
NEUTROPHILS ABSOLUTE: 9.8 K/UL (ref 1.7–7.7)
NEUTROPHILS RELATIVE PERCENT: 68.9 %
NITRITE, URINE: NEGATIVE
PDW BLD-RTO: 15.4 % (ref 12.4–15.4)
PH UA: 6
PLATELET # BLD: 339 K/UL (ref 135–450)
PMV BLD AUTO: 9 FL (ref 5–10.5)
POTASSIUM SERPL-SCNC: 4.9 MMOL/L (ref 3.5–5.1)
PROTEIN UA: NEGATIVE MG/DL
RBC # BLD: 5.61 M/UL (ref 4–5.2)
RBC UA: ABNORMAL /HPF (ref 0–2)
SODIUM BLD-SCNC: 135 MMOL/L (ref 136–145)
SPECIFIC GRAVITY UA: 1.02
TOTAL PROTEIN: 8.3 G/DL (ref 6.4–8.2)
TROPONIN: <0.01 NG/ML
URINE TYPE: ABNORMAL
UROBILINOGEN, URINE: 0.2 E.U./DL
WBC # BLD: 14.2 K/UL (ref 4–11)
WBC UA: ABNORMAL /HPF (ref 0–5)

## 2019-01-08 PROCEDURE — 96372 THER/PROPH/DIAG INJ SC/IM: CPT

## 2019-01-08 PROCEDURE — 6360000002 HC RX W HCPCS: Performed by: NURSE PRACTITIONER

## 2019-01-08 PROCEDURE — 83690 ASSAY OF LIPASE: CPT

## 2019-01-08 PROCEDURE — 96376 TX/PRO/DX INJ SAME DRUG ADON: CPT

## 2019-01-08 PROCEDURE — S0028 INJECTION, FAMOTIDINE, 20 MG: HCPCS | Performed by: NURSE PRACTITIONER

## 2019-01-08 PROCEDURE — 99285 EMERGENCY DEPT VISIT HI MDM: CPT

## 2019-01-08 PROCEDURE — 74177 CT ABD & PELVIS W/CONTRAST: CPT

## 2019-01-08 PROCEDURE — 93010 ELECTROCARDIOGRAM REPORT: CPT | Performed by: INTERNAL MEDICINE

## 2019-01-08 PROCEDURE — 6360000004 HC RX CONTRAST MEDICATION: Performed by: EMERGENCY MEDICINE

## 2019-01-08 PROCEDURE — 83605 ASSAY OF LACTIC ACID: CPT

## 2019-01-08 PROCEDURE — 96374 THER/PROPH/DIAG INJ IV PUSH: CPT

## 2019-01-08 PROCEDURE — 84484 ASSAY OF TROPONIN QUANT: CPT

## 2019-01-08 PROCEDURE — 96361 HYDRATE IV INFUSION ADD-ON: CPT

## 2019-01-08 PROCEDURE — 80053 COMPREHEN METABOLIC PANEL: CPT

## 2019-01-08 PROCEDURE — 81001 URINALYSIS AUTO W/SCOPE: CPT

## 2019-01-08 PROCEDURE — 6360000002 HC RX W HCPCS: Performed by: INTERNAL MEDICINE

## 2019-01-08 PROCEDURE — 85025 COMPLETE CBC W/AUTO DIFF WBC: CPT

## 2019-01-08 PROCEDURE — 93005 ELECTROCARDIOGRAM TRACING: CPT | Performed by: NURSE PRACTITIONER

## 2019-01-08 PROCEDURE — 0D9670Z DRAINAGE OF STOMACH WITH DRAINAGE DEVICE, VIA NATURAL OR ARTIFICIAL OPENING: ICD-10-PCS | Performed by: EMERGENCY MEDICINE

## 2019-01-08 PROCEDURE — 1200000000 HC SEMI PRIVATE

## 2019-01-08 PROCEDURE — 87040 BLOOD CULTURE FOR BACTERIA: CPT

## 2019-01-08 PROCEDURE — 2580000003 HC RX 258: Performed by: NURSE PRACTITIONER

## 2019-01-08 PROCEDURE — 96375 TX/PRO/DX INJ NEW DRUG ADDON: CPT

## 2019-01-08 RX ORDER — MORPHINE SULFATE 4 MG/ML
4 INJECTION, SOLUTION INTRAMUSCULAR; INTRAVENOUS ONCE
Status: COMPLETED | OUTPATIENT
Start: 2019-01-08 | End: 2019-01-08

## 2019-01-08 RX ORDER — ONDANSETRON 2 MG/ML
4 INJECTION INTRAMUSCULAR; INTRAVENOUS ONCE
Status: COMPLETED | OUTPATIENT
Start: 2019-01-08 | End: 2019-01-08

## 2019-01-08 RX ORDER — ONDANSETRON 2 MG/ML
4 INJECTION INTRAMUSCULAR; INTRAVENOUS EVERY 6 HOURS PRN
Status: DISCONTINUED | OUTPATIENT
Start: 2019-01-08 | End: 2019-01-14 | Stop reason: HOSPADM

## 2019-01-08 RX ORDER — NICOTINE POLACRILEX 4 MG
15 LOZENGE BUCCAL PRN
Status: DISCONTINUED | OUTPATIENT
Start: 2019-01-08 | End: 2019-01-14 | Stop reason: HOSPADM

## 2019-01-08 RX ORDER — SODIUM CHLORIDE 0.9 % (FLUSH) 0.9 %
10 SYRINGE (ML) INJECTION EVERY 12 HOURS SCHEDULED
Status: DISCONTINUED | OUTPATIENT
Start: 2019-01-09 | End: 2019-01-14 | Stop reason: HOSPADM

## 2019-01-08 RX ORDER — PROMETHAZINE HYDROCHLORIDE 25 MG/ML
6.25 INJECTION, SOLUTION INTRAMUSCULAR; INTRAVENOUS ONCE
Status: COMPLETED | OUTPATIENT
Start: 2019-01-08 | End: 2019-01-08

## 2019-01-08 RX ORDER — DEXTROSE MONOHYDRATE 50 MG/ML
100 INJECTION, SOLUTION INTRAVENOUS PRN
Status: DISCONTINUED | OUTPATIENT
Start: 2019-01-08 | End: 2019-01-14 | Stop reason: HOSPADM

## 2019-01-08 RX ORDER — DEXTROSE MONOHYDRATE 25 G/50ML
12.5 INJECTION, SOLUTION INTRAVENOUS PRN
Status: DISCONTINUED | OUTPATIENT
Start: 2019-01-08 | End: 2019-01-14 | Stop reason: HOSPADM

## 2019-01-08 RX ORDER — 0.9 % SODIUM CHLORIDE 0.9 %
1000 INTRAVENOUS SOLUTION INTRAVENOUS ONCE
Status: COMPLETED | OUTPATIENT
Start: 2019-01-08 | End: 2019-01-08

## 2019-01-08 RX ORDER — SODIUM CHLORIDE 0.9 % (FLUSH) 0.9 %
10 SYRINGE (ML) INJECTION PRN
Status: DISCONTINUED | OUTPATIENT
Start: 2019-01-08 | End: 2019-01-14 | Stop reason: HOSPADM

## 2019-01-08 RX ADMIN — FAMOTIDINE 20 MG: 10 INJECTION, SOLUTION INTRAVENOUS at 19:01

## 2019-01-08 RX ADMIN — ONDANSETRON 4 MG: 2 INJECTION INTRAMUSCULAR; INTRAVENOUS at 19:01

## 2019-01-08 RX ADMIN — PROMETHAZINE HYDROCHLORIDE 6.25 MG: 25 INJECTION INTRAMUSCULAR; INTRAVENOUS at 21:27

## 2019-01-08 RX ADMIN — MORPHINE SULFATE 4 MG: 4 INJECTION INTRAVENOUS at 19:01

## 2019-01-08 RX ADMIN — MORPHINE SULFATE 4 MG: 4 INJECTION INTRAVENOUS at 20:50

## 2019-01-08 RX ADMIN — ONDANSETRON 4 MG: 2 INJECTION INTRAMUSCULAR; INTRAVENOUS at 20:50

## 2019-01-08 RX ADMIN — IOPAMIDOL 75 ML: 755 INJECTION, SOLUTION INTRAVENOUS at 19:30

## 2019-01-08 RX ADMIN — SODIUM CHLORIDE 1000 ML: 9 INJECTION, SOLUTION INTRAVENOUS at 19:01

## 2019-01-08 RX ADMIN — HYDROMORPHONE HYDROCHLORIDE 0.5 MG: 1 INJECTION, SOLUTION INTRAMUSCULAR; INTRAVENOUS; SUBCUTANEOUS at 23:06

## 2019-01-08 ASSESSMENT — PAIN SCALES - WONG BAKER: WONGBAKER_NUMERICALRESPONSE: 10;8

## 2019-01-08 ASSESSMENT — PAIN SCALES - GENERAL
PAINLEVEL_OUTOF10: 10
PAINLEVEL_OUTOF10: 10
PAINLEVEL_OUTOF10: 9
PAINLEVEL_OUTOF10: 10

## 2019-01-08 ASSESSMENT — PAIN DESCRIPTION - DESCRIPTORS: DESCRIPTORS: SHARP

## 2019-01-08 ASSESSMENT — PAIN DESCRIPTION - LOCATION
LOCATION: ABDOMEN
LOCATION: ABDOMEN

## 2019-01-08 ASSESSMENT — PAIN DESCRIPTION - PAIN TYPE: TYPE: ACUTE PAIN

## 2019-01-08 ASSESSMENT — PAIN DESCRIPTION - ORIENTATION: ORIENTATION: LOWER

## 2019-01-09 ENCOUNTER — APPOINTMENT (OUTPATIENT)
Dept: GENERAL RADIOLOGY | Age: 84
DRG: 390 | End: 2019-01-09
Payer: MEDICARE

## 2019-01-09 LAB
ANION GAP SERPL CALCULATED.3IONS-SCNC: 11 MMOL/L (ref 3–16)
BUN BLDV-MCNC: 17 MG/DL (ref 7–20)
CALCIUM SERPL-MCNC: 8.3 MG/DL (ref 8.3–10.6)
CHLORIDE BLD-SCNC: 99 MMOL/L (ref 99–110)
CO2: 26 MMOL/L (ref 21–32)
CREAT SERPL-MCNC: 0.7 MG/DL (ref 0.6–1.2)
GFR AFRICAN AMERICAN: >60
GFR NON-AFRICAN AMERICAN: >60
GLUCOSE BLD-MCNC: 132 MG/DL (ref 70–99)
GLUCOSE BLD-MCNC: 149 MG/DL (ref 70–99)
GLUCOSE BLD-MCNC: 150 MG/DL (ref 70–99)
GLUCOSE BLD-MCNC: 159 MG/DL (ref 70–99)
GLUCOSE BLD-MCNC: 159 MG/DL (ref 70–99)
GLUCOSE BLD-MCNC: 201 MG/DL (ref 70–99)
GLUCOSE BLD-MCNC: 225 MG/DL (ref 70–99)
HCT VFR BLD CALC: 41.6 % (ref 36–48)
HEMOGLOBIN: 13 G/DL (ref 12–16)
MCH RBC QN AUTO: 24.7 PG (ref 26–34)
MCHC RBC AUTO-ENTMCNC: 31.3 G/DL (ref 31–36)
MCV RBC AUTO: 78.8 FL (ref 80–100)
PDW BLD-RTO: 15.4 % (ref 12.4–15.4)
PERFORMED ON: ABNORMAL
PLATELET # BLD: 333 K/UL (ref 135–450)
PMV BLD AUTO: 9.2 FL (ref 5–10.5)
POTASSIUM REFLEX MAGNESIUM: 4.8 MMOL/L (ref 3.5–5.1)
RBC # BLD: 5.28 M/UL (ref 4–5.2)
SODIUM BLD-SCNC: 136 MMOL/L (ref 136–145)
WBC # BLD: 14.9 K/UL (ref 4–11)

## 2019-01-09 PROCEDURE — 2500000003 HC RX 250 WO HCPCS: Performed by: NURSE PRACTITIONER

## 2019-01-09 PROCEDURE — 1200000000 HC SEMI PRIVATE

## 2019-01-09 PROCEDURE — 74022 RADEX COMPL AQT ABD SERIES: CPT

## 2019-01-09 PROCEDURE — 85027 COMPLETE CBC AUTOMATED: CPT

## 2019-01-09 PROCEDURE — 6360000002 HC RX W HCPCS: Performed by: INTERNAL MEDICINE

## 2019-01-09 PROCEDURE — 80048 BASIC METABOLIC PNL TOTAL CA: CPT

## 2019-01-09 PROCEDURE — 6370000000 HC RX 637 (ALT 250 FOR IP): Performed by: INTERNAL MEDICINE

## 2019-01-09 PROCEDURE — 99222 1ST HOSP IP/OBS MODERATE 55: CPT | Performed by: SURGERY

## 2019-01-09 PROCEDURE — 36415 COLL VENOUS BLD VENIPUNCTURE: CPT

## 2019-01-09 PROCEDURE — 2580000003 HC RX 258: Performed by: INTERNAL MEDICINE

## 2019-01-09 RX ORDER — SODIUM CHLORIDE 9 MG/ML
INJECTION, SOLUTION INTRAVENOUS
Status: COMPLETED
Start: 2019-01-09 | End: 2019-01-09

## 2019-01-09 RX ORDER — METOPROLOL TARTRATE 5 MG/5ML
5 INJECTION INTRAVENOUS EVERY 6 HOURS PRN
Status: DISCONTINUED | OUTPATIENT
Start: 2019-01-09 | End: 2019-01-10

## 2019-01-09 RX ORDER — HYDROMORPHONE HCL 110MG/55ML
0.5 PATIENT CONTROLLED ANALGESIA SYRINGE INTRAVENOUS EVERY 4 HOURS PRN
Status: DISCONTINUED | OUTPATIENT
Start: 2019-01-09 | End: 2019-01-14 | Stop reason: HOSPADM

## 2019-01-09 RX ORDER — SODIUM CHLORIDE 9 MG/ML
INJECTION, SOLUTION INTRAVENOUS
Status: DISPENSED
Start: 2019-01-09 | End: 2019-01-10

## 2019-01-09 RX ADMIN — HYDROMORPHONE HYDROCHLORIDE 0.5 MG: 2 INJECTION, SOLUTION INTRAMUSCULAR; INTRAVENOUS; SUBCUTANEOUS at 19:41

## 2019-01-09 RX ADMIN — ONDANSETRON 4 MG: 2 SOLUTION INTRAMUSCULAR; INTRAVENOUS at 11:53

## 2019-01-09 RX ADMIN — INSULIN LISPRO 1 UNITS: 100 INJECTION, SOLUTION INTRAVENOUS; SUBCUTANEOUS at 10:48

## 2019-01-09 RX ADMIN — HYDROMORPHONE HYDROCHLORIDE 0.5 MG: 2 INJECTION, SOLUTION INTRAMUSCULAR; INTRAVENOUS; SUBCUTANEOUS at 05:18

## 2019-01-09 RX ADMIN — METOPROLOL TARTRATE 5 MG: 1 INJECTION, SOLUTION INTRAVENOUS at 21:37

## 2019-01-09 RX ADMIN — ONDANSETRON 4 MG: 2 SOLUTION INTRAMUSCULAR; INTRAVENOUS at 05:23

## 2019-01-09 RX ADMIN — HYDROMORPHONE HYDROCHLORIDE 0.5 MG: 2 INJECTION, SOLUTION INTRAMUSCULAR; INTRAVENOUS; SUBCUTANEOUS at 14:49

## 2019-01-09 RX ADMIN — HYDROMORPHONE HYDROCHLORIDE 0.5 MG: 2 INJECTION, SOLUTION INTRAMUSCULAR; INTRAVENOUS; SUBCUTANEOUS at 10:43

## 2019-01-09 RX ADMIN — INSULIN LISPRO 2 UNITS: 100 INJECTION, SOLUTION INTRAVENOUS; SUBCUTANEOUS at 04:40

## 2019-01-09 RX ADMIN — ONDANSETRON 4 MG: 2 SOLUTION INTRAMUSCULAR; INTRAVENOUS at 19:48

## 2019-01-09 RX ADMIN — SODIUM CHLORIDE: 4.5 INJECTION, SOLUTION INTRAVENOUS at 00:32

## 2019-01-09 RX ADMIN — INSULIN LISPRO 2 UNITS: 100 INJECTION, SOLUTION INTRAVENOUS; SUBCUTANEOUS at 00:34

## 2019-01-09 RX ADMIN — Medication 10 ML: at 10:44

## 2019-01-09 RX ADMIN — INSULIN LISPRO 1 UNITS: 100 INJECTION, SOLUTION INTRAVENOUS; SUBCUTANEOUS at 21:45

## 2019-01-09 RX ADMIN — ENOXAPARIN SODIUM 40 MG: 40 INJECTION SUBCUTANEOUS at 10:43

## 2019-01-09 ASSESSMENT — PAIN SCALES - GENERAL
PAINLEVEL_OUTOF10: 7
PAINLEVEL_OUTOF10: 10
PAINLEVEL_OUTOF10: 9
PAINLEVEL_OUTOF10: 10

## 2019-01-10 ENCOUNTER — APPOINTMENT (OUTPATIENT)
Dept: GENERAL RADIOLOGY | Age: 84
DRG: 390 | End: 2019-01-10
Payer: MEDICARE

## 2019-01-10 LAB
ANION GAP SERPL CALCULATED.3IONS-SCNC: 11 MMOL/L (ref 3–16)
BASOPHILS ABSOLUTE: 0 K/UL (ref 0–0.2)
BASOPHILS RELATIVE PERCENT: 0.2 %
BUN BLDV-MCNC: 14 MG/DL (ref 7–20)
CALCIUM SERPL-MCNC: 8.2 MG/DL (ref 8.3–10.6)
CHLORIDE BLD-SCNC: 99 MMOL/L (ref 99–110)
CO2: 26 MMOL/L (ref 21–32)
CREAT SERPL-MCNC: 0.7 MG/DL (ref 0.6–1.2)
EOSINOPHILS ABSOLUTE: 0.2 K/UL (ref 0–0.6)
EOSINOPHILS RELATIVE PERCENT: 3.2 %
GFR AFRICAN AMERICAN: >60
GFR NON-AFRICAN AMERICAN: >60
GLUCOSE BLD-MCNC: 103 MG/DL (ref 70–99)
GLUCOSE BLD-MCNC: 122 MG/DL (ref 70–99)
GLUCOSE BLD-MCNC: 126 MG/DL (ref 70–99)
GLUCOSE BLD-MCNC: 140 MG/DL (ref 70–99)
GLUCOSE BLD-MCNC: 142 MG/DL (ref 70–99)
GLUCOSE BLD-MCNC: 146 MG/DL (ref 70–99)
HCT VFR BLD CALC: 40.2 % (ref 36–48)
HEMOGLOBIN: 12.6 G/DL (ref 12–16)
LYMPHOCYTES ABSOLUTE: 1.7 K/UL (ref 1–5.1)
LYMPHOCYTES RELATIVE PERCENT: 23.7 %
MCH RBC QN AUTO: 24.7 PG (ref 26–34)
MCHC RBC AUTO-ENTMCNC: 31.4 G/DL (ref 31–36)
MCV RBC AUTO: 78.5 FL (ref 80–100)
MONOCYTES ABSOLUTE: 1.1 K/UL (ref 0–1.3)
MONOCYTES RELATIVE PERCENT: 15.3 %
NEUTROPHILS ABSOLUTE: 4.1 K/UL (ref 1.7–7.7)
NEUTROPHILS RELATIVE PERCENT: 57.6 %
PDW BLD-RTO: 16.1 % (ref 12.4–15.4)
PERFORMED ON: ABNORMAL
PLATELET # BLD: 307 K/UL (ref 135–450)
PMV BLD AUTO: 9.2 FL (ref 5–10.5)
POTASSIUM REFLEX MAGNESIUM: 4.1 MMOL/L (ref 3.5–5.1)
RBC # BLD: 5.12 M/UL (ref 4–5.2)
SODIUM BLD-SCNC: 136 MMOL/L (ref 136–145)
WBC # BLD: 7.1 K/UL (ref 4–11)

## 2019-01-10 PROCEDURE — 2500000003 HC RX 250 WO HCPCS: Performed by: INTERNAL MEDICINE

## 2019-01-10 PROCEDURE — 74019 RADEX ABDOMEN 2 VIEWS: CPT

## 2019-01-10 PROCEDURE — 80048 BASIC METABOLIC PNL TOTAL CA: CPT

## 2019-01-10 PROCEDURE — 6360000002 HC RX W HCPCS: Performed by: INTERNAL MEDICINE

## 2019-01-10 PROCEDURE — 99232 SBSQ HOSP IP/OBS MODERATE 35: CPT | Performed by: SURGERY

## 2019-01-10 PROCEDURE — 1200000000 HC SEMI PRIVATE

## 2019-01-10 PROCEDURE — 2500000003 HC RX 250 WO HCPCS: Performed by: NURSE PRACTITIONER

## 2019-01-10 PROCEDURE — 36415 COLL VENOUS BLD VENIPUNCTURE: CPT

## 2019-01-10 PROCEDURE — 85025 COMPLETE CBC W/AUTO DIFF WBC: CPT

## 2019-01-10 PROCEDURE — 93005 ELECTROCARDIOGRAM TRACING: CPT | Performed by: INTERNAL MEDICINE

## 2019-01-10 PROCEDURE — 2580000003 HC RX 258: Performed by: INTERNAL MEDICINE

## 2019-01-10 PROCEDURE — 74018 RADEX ABDOMEN 1 VIEW: CPT

## 2019-01-10 RX ORDER — KETOROLAC TROMETHAMINE 30 MG/ML
15 INJECTION, SOLUTION INTRAMUSCULAR; INTRAVENOUS ONCE
Status: COMPLETED | OUTPATIENT
Start: 2019-01-10 | End: 2019-01-10

## 2019-01-10 RX ORDER — HYDRALAZINE HYDROCHLORIDE 20 MG/ML
5 INJECTION INTRAMUSCULAR; INTRAVENOUS EVERY 6 HOURS PRN
Status: DISCONTINUED | OUTPATIENT
Start: 2019-01-10 | End: 2019-01-10

## 2019-01-10 RX ORDER — HYDRALAZINE HYDROCHLORIDE 20 MG/ML
10 INJECTION INTRAMUSCULAR; INTRAVENOUS EVERY 4 HOURS PRN
Status: DISCONTINUED | OUTPATIENT
Start: 2019-01-10 | End: 2019-01-11

## 2019-01-10 RX ORDER — METOPROLOL TARTRATE 5 MG/5ML
5 INJECTION INTRAVENOUS EVERY 8 HOURS
Status: DISCONTINUED | OUTPATIENT
Start: 2019-01-10 | End: 2019-01-10

## 2019-01-10 RX ORDER — METOPROLOL TARTRATE 5 MG/5ML
5 INJECTION INTRAVENOUS ONCE
Status: COMPLETED | OUTPATIENT
Start: 2019-01-10 | End: 2019-01-10

## 2019-01-10 RX ORDER — METOPROLOL TARTRATE 5 MG/5ML
5 INJECTION INTRAVENOUS EVERY 6 HOURS
Status: DISCONTINUED | OUTPATIENT
Start: 2019-01-10 | End: 2019-01-13

## 2019-01-10 RX ADMIN — METOPROLOL TARTRATE 5 MG: 5 INJECTION, SOLUTION INTRAVENOUS at 20:40

## 2019-01-10 RX ADMIN — SODIUM CHLORIDE: 4.5 INJECTION, SOLUTION INTRAVENOUS at 01:40

## 2019-01-10 RX ADMIN — KETOROLAC TROMETHAMINE 15 MG: 30 INJECTION, SOLUTION INTRAMUSCULAR at 19:34

## 2019-01-10 RX ADMIN — HYDROMORPHONE HYDROCHLORIDE 0.5 MG: 2 INJECTION, SOLUTION INTRAMUSCULAR; INTRAVENOUS; SUBCUTANEOUS at 19:17

## 2019-01-10 RX ADMIN — HYDRALAZINE HYDROCHLORIDE 10 MG: 20 INJECTION INTRAMUSCULAR; INTRAVENOUS at 18:35

## 2019-01-10 RX ADMIN — Medication 10 ML: at 08:09

## 2019-01-10 RX ADMIN — METOPROLOL TARTRATE 5 MG: 1 INJECTION, SOLUTION INTRAVENOUS at 05:40

## 2019-01-10 RX ADMIN — ENOXAPARIN SODIUM 40 MG: 40 INJECTION SUBCUTANEOUS at 08:09

## 2019-01-10 RX ADMIN — HYDRALAZINE HYDROCHLORIDE 5 MG: 20 INJECTION INTRAMUSCULAR; INTRAVENOUS at 16:58

## 2019-01-10 RX ADMIN — METOPROLOL TARTRATE 5 MG: 1 INJECTION, SOLUTION INTRAVENOUS at 11:28

## 2019-01-10 RX ADMIN — HYDROMORPHONE HYDROCHLORIDE 0.5 MG: 2 INJECTION, SOLUTION INTRAMUSCULAR; INTRAVENOUS; SUBCUTANEOUS at 02:13

## 2019-01-10 RX ADMIN — HYDROMORPHONE HYDROCHLORIDE 0.5 MG: 2 INJECTION, SOLUTION INTRAMUSCULAR; INTRAVENOUS; SUBCUTANEOUS at 11:35

## 2019-01-10 RX ADMIN — METOPROLOL TARTRATE 5 MG: 1 INJECTION, SOLUTION INTRAVENOUS at 15:45

## 2019-01-10 RX ADMIN — HYDROMORPHONE HYDROCHLORIDE 0.5 MG: 2 INJECTION, SOLUTION INTRAMUSCULAR; INTRAVENOUS; SUBCUTANEOUS at 07:35

## 2019-01-10 ASSESSMENT — PAIN SCALES - GENERAL
PAINLEVEL_OUTOF10: 7
PAINLEVEL_OUTOF10: 2
PAINLEVEL_OUTOF10: 7
PAINLEVEL_OUTOF10: 8
PAINLEVEL_OUTOF10: 4
PAINLEVEL_OUTOF10: 2
PAINLEVEL_OUTOF10: 7
PAINLEVEL_OUTOF10: 3

## 2019-01-10 ASSESSMENT — PAIN - FUNCTIONAL ASSESSMENT: PAIN_FUNCTIONAL_ASSESSMENT: PREVENTS OR INTERFERES WITH MANY ACTIVE NOT PASSIVE ACTIVITIES

## 2019-01-11 ENCOUNTER — APPOINTMENT (OUTPATIENT)
Dept: GENERAL RADIOLOGY | Age: 84
DRG: 390 | End: 2019-01-11
Payer: MEDICARE

## 2019-01-11 LAB
ANION GAP SERPL CALCULATED.3IONS-SCNC: 12 MMOL/L (ref 3–16)
BASOPHILS ABSOLUTE: 0.1 K/UL (ref 0–0.2)
BASOPHILS RELATIVE PERCENT: 0.5 %
BUN BLDV-MCNC: 16 MG/DL (ref 7–20)
CALCIUM SERPL-MCNC: 8.6 MG/DL (ref 8.3–10.6)
CHLORIDE BLD-SCNC: 99 MMOL/L (ref 99–110)
CO2: 25 MMOL/L (ref 21–32)
CREAT SERPL-MCNC: 0.7 MG/DL (ref 0.6–1.2)
EKG ATRIAL RATE: 108 BPM
EKG DIAGNOSIS: NORMAL
EKG P AXIS: 89 DEGREES
EKG P-R INTERVAL: 178 MS
EKG Q-T INTERVAL: 340 MS
EKG QRS DURATION: 88 MS
EKG QTC CALCULATION (BAZETT): 455 MS
EKG R AXIS: -20 DEGREES
EKG T AXIS: 169 DEGREES
EKG VENTRICULAR RATE: 108 BPM
EOSINOPHILS ABSOLUTE: 0.4 K/UL (ref 0–0.6)
EOSINOPHILS RELATIVE PERCENT: 3.7 %
GFR AFRICAN AMERICAN: >60
GFR NON-AFRICAN AMERICAN: >60
GLUCOSE BLD-MCNC: 106 MG/DL (ref 70–99)
GLUCOSE BLD-MCNC: 108 MG/DL (ref 70–99)
GLUCOSE BLD-MCNC: 113 MG/DL (ref 70–99)
GLUCOSE BLD-MCNC: 92 MG/DL (ref 70–99)
GLUCOSE BLD-MCNC: 93 MG/DL (ref 70–99)
GLUCOSE BLD-MCNC: 99 MG/DL (ref 70–99)
HCT VFR BLD CALC: 40.1 % (ref 36–48)
HEMOGLOBIN: 12.8 G/DL (ref 12–16)
LYMPHOCYTES ABSOLUTE: 2 K/UL (ref 1–5.1)
LYMPHOCYTES RELATIVE PERCENT: 19.9 %
MCH RBC QN AUTO: 24.8 PG (ref 26–34)
MCHC RBC AUTO-ENTMCNC: 31.9 G/DL (ref 31–36)
MCV RBC AUTO: 77.8 FL (ref 80–100)
MONOCYTES ABSOLUTE: 1.2 K/UL (ref 0–1.3)
MONOCYTES RELATIVE PERCENT: 11.7 %
NEUTROPHILS ABSOLUTE: 6.5 K/UL (ref 1.7–7.7)
NEUTROPHILS RELATIVE PERCENT: 64.2 %
PDW BLD-RTO: 15.9 % (ref 12.4–15.4)
PERFORMED ON: ABNORMAL
PERFORMED ON: ABNORMAL
PERFORMED ON: NORMAL
PLATELET # BLD: 322 K/UL (ref 135–450)
PMV BLD AUTO: 9.1 FL (ref 5–10.5)
POTASSIUM REFLEX MAGNESIUM: 3.7 MMOL/L (ref 3.5–5.1)
RBC # BLD: 5.15 M/UL (ref 4–5.2)
SODIUM BLD-SCNC: 136 MMOL/L (ref 136–145)
WBC # BLD: 10.2 K/UL (ref 4–11)

## 2019-01-11 PROCEDURE — 1200000000 HC SEMI PRIVATE

## 2019-01-11 PROCEDURE — 80048 BASIC METABOLIC PNL TOTAL CA: CPT

## 2019-01-11 PROCEDURE — 74022 RADEX COMPL AQT ABD SERIES: CPT

## 2019-01-11 PROCEDURE — 2500000003 HC RX 250 WO HCPCS: Performed by: INTERNAL MEDICINE

## 2019-01-11 PROCEDURE — 36415 COLL VENOUS BLD VENIPUNCTURE: CPT

## 2019-01-11 PROCEDURE — 99232 SBSQ HOSP IP/OBS MODERATE 35: CPT | Performed by: SURGERY

## 2019-01-11 PROCEDURE — 85025 COMPLETE CBC W/AUTO DIFF WBC: CPT

## 2019-01-11 PROCEDURE — 93010 ELECTROCARDIOGRAM REPORT: CPT | Performed by: INTERNAL MEDICINE

## 2019-01-11 PROCEDURE — APPSS45 APP SPLIT SHARED TIME 31-45 MINUTES: Performed by: CLINICAL NURSE SPECIALIST

## 2019-01-11 PROCEDURE — 6360000002 HC RX W HCPCS: Performed by: INTERNAL MEDICINE

## 2019-01-11 RX ADMIN — METOPROLOL TARTRATE 5 MG: 5 INJECTION, SOLUTION INTRAVENOUS at 21:52

## 2019-01-11 RX ADMIN — METOPROLOL TARTRATE 5 MG: 5 INJECTION, SOLUTION INTRAVENOUS at 15:48

## 2019-01-11 RX ADMIN — METOPROLOL TARTRATE 5 MG: 5 INJECTION, SOLUTION INTRAVENOUS at 04:16

## 2019-01-11 RX ADMIN — METOPROLOL TARTRATE 5 MG: 5 INJECTION, SOLUTION INTRAVENOUS at 10:19

## 2019-01-11 RX ADMIN — ENOXAPARIN SODIUM 40 MG: 40 INJECTION SUBCUTANEOUS at 08:05

## 2019-01-12 ENCOUNTER — APPOINTMENT (OUTPATIENT)
Dept: GENERAL RADIOLOGY | Age: 84
DRG: 390 | End: 2019-01-12
Payer: MEDICARE

## 2019-01-12 LAB
ANION GAP SERPL CALCULATED.3IONS-SCNC: 14 MMOL/L (ref 3–16)
BASOPHILS ABSOLUTE: 0 K/UL (ref 0–0.2)
BASOPHILS RELATIVE PERCENT: 0.3 %
BUN BLDV-MCNC: 13 MG/DL (ref 7–20)
CALCIUM SERPL-MCNC: 8.5 MG/DL (ref 8.3–10.6)
CHLORIDE BLD-SCNC: 100 MMOL/L (ref 99–110)
CO2: 25 MMOL/L (ref 21–32)
CREAT SERPL-MCNC: 0.6 MG/DL (ref 0.6–1.2)
EOSINOPHILS ABSOLUTE: 0.5 K/UL (ref 0–0.6)
EOSINOPHILS RELATIVE PERCENT: 4.6 %
GFR AFRICAN AMERICAN: >60
GFR NON-AFRICAN AMERICAN: >60
GLUCOSE BLD-MCNC: 101 MG/DL (ref 70–99)
GLUCOSE BLD-MCNC: 76 MG/DL (ref 70–99)
GLUCOSE BLD-MCNC: 78 MG/DL (ref 70–99)
GLUCOSE BLD-MCNC: 80 MG/DL (ref 70–99)
GLUCOSE BLD-MCNC: 92 MG/DL (ref 70–99)
GLUCOSE BLD-MCNC: 95 MG/DL (ref 70–99)
HCT VFR BLD CALC: 38.5 % (ref 36–48)
HEMOGLOBIN: 12.4 G/DL (ref 12–16)
LYMPHOCYTES ABSOLUTE: 1.5 K/UL (ref 1–5.1)
LYMPHOCYTES RELATIVE PERCENT: 15.7 %
MCH RBC QN AUTO: 24.8 PG (ref 26–34)
MCHC RBC AUTO-ENTMCNC: 32.1 G/DL (ref 31–36)
MCV RBC AUTO: 77.2 FL (ref 80–100)
MONOCYTES ABSOLUTE: 1.2 K/UL (ref 0–1.3)
MONOCYTES RELATIVE PERCENT: 11.8 %
NEUTROPHILS ABSOLUTE: 6.6 K/UL (ref 1.7–7.7)
NEUTROPHILS RELATIVE PERCENT: 67.6 %
PDW BLD-RTO: 15.6 % (ref 12.4–15.4)
PERFORMED ON: ABNORMAL
PERFORMED ON: NORMAL
PLATELET # BLD: 291 K/UL (ref 135–450)
PMV BLD AUTO: 8.5 FL (ref 5–10.5)
POTASSIUM REFLEX MAGNESIUM: 3.6 MMOL/L (ref 3.5–5.1)
RBC # BLD: 4.99 M/UL (ref 4–5.2)
SODIUM BLD-SCNC: 139 MMOL/L (ref 136–145)
WBC # BLD: 9.8 K/UL (ref 4–11)

## 2019-01-12 PROCEDURE — 6360000002 HC RX W HCPCS: Performed by: INTERNAL MEDICINE

## 2019-01-12 PROCEDURE — 85025 COMPLETE CBC W/AUTO DIFF WBC: CPT

## 2019-01-12 PROCEDURE — 74019 RADEX ABDOMEN 2 VIEWS: CPT

## 2019-01-12 PROCEDURE — 80048 BASIC METABOLIC PNL TOTAL CA: CPT

## 2019-01-12 PROCEDURE — 2500000003 HC RX 250 WO HCPCS: Performed by: NURSE PRACTITIONER

## 2019-01-12 PROCEDURE — 1200000000 HC SEMI PRIVATE

## 2019-01-12 PROCEDURE — 36415 COLL VENOUS BLD VENIPUNCTURE: CPT

## 2019-01-12 PROCEDURE — 2580000003 HC RX 258: Performed by: INTERNAL MEDICINE

## 2019-01-12 PROCEDURE — 2500000003 HC RX 250 WO HCPCS: Performed by: INTERNAL MEDICINE

## 2019-01-12 PROCEDURE — 99232 SBSQ HOSP IP/OBS MODERATE 35: CPT | Performed by: SURGERY

## 2019-01-12 RX ORDER — DEXTROSE MONOHYDRATE 50 MG/ML
INJECTION, SOLUTION INTRAVENOUS
Status: DISPENSED
Start: 2019-01-12 | End: 2019-01-13

## 2019-01-12 RX ORDER — SODIUM CHLORIDE 9 MG/ML
INJECTION, SOLUTION INTRAVENOUS
Status: DISPENSED
Start: 2019-01-12 | End: 2019-01-12

## 2019-01-12 RX ORDER — ENALAPRILAT 2.5 MG/2ML
1.25 INJECTION INTRAVENOUS ONCE
Status: COMPLETED | OUTPATIENT
Start: 2019-01-12 | End: 2019-01-12

## 2019-01-12 RX ADMIN — METOPROLOL TARTRATE 5 MG: 5 INJECTION, SOLUTION INTRAVENOUS at 20:25

## 2019-01-12 RX ADMIN — METOPROLOL TARTRATE 5 MG: 5 INJECTION, SOLUTION INTRAVENOUS at 04:47

## 2019-01-12 RX ADMIN — METOPROLOL TARTRATE 5 MG: 5 INJECTION, SOLUTION INTRAVENOUS at 16:00

## 2019-01-12 RX ADMIN — DEXTROSE MONOHYDRATE 100 ML/HR: 50 INJECTION, SOLUTION INTRAVENOUS at 20:23

## 2019-01-12 RX ADMIN — METOPROLOL TARTRATE 5 MG: 5 INJECTION, SOLUTION INTRAVENOUS at 09:45

## 2019-01-12 RX ADMIN — SODIUM CHLORIDE: 4.5 INJECTION, SOLUTION INTRAVENOUS at 11:40

## 2019-01-12 RX ADMIN — ENOXAPARIN SODIUM 40 MG: 40 INJECTION SUBCUTANEOUS at 09:24

## 2019-01-12 RX ADMIN — Medication 10 ML: at 20:26

## 2019-01-12 RX ADMIN — ENALAPRILAT 1.25 MG: 2.5 INJECTION INTRAVENOUS at 22:53

## 2019-01-12 RX ADMIN — Medication 10 ML: at 09:45

## 2019-01-13 LAB
ANION GAP SERPL CALCULATED.3IONS-SCNC: 13 MMOL/L (ref 3–16)
BASOPHILS ABSOLUTE: 0.1 K/UL (ref 0–0.2)
BASOPHILS RELATIVE PERCENT: 0.5 %
BLOOD CULTURE, ROUTINE: NORMAL
BUN BLDV-MCNC: 14 MG/DL (ref 7–20)
CALCIUM SERPL-MCNC: 8.3 MG/DL (ref 8.3–10.6)
CHLORIDE BLD-SCNC: 98 MMOL/L (ref 99–110)
CO2: 24 MMOL/L (ref 21–32)
CREAT SERPL-MCNC: 0.6 MG/DL (ref 0.6–1.2)
CULTURE, BLOOD 2: NORMAL
EOSINOPHILS ABSOLUTE: 0.4 K/UL (ref 0–0.6)
EOSINOPHILS RELATIVE PERCENT: 4.1 %
GFR AFRICAN AMERICAN: >60
GFR NON-AFRICAN AMERICAN: >60
GLUCOSE BLD-MCNC: 106 MG/DL (ref 70–99)
GLUCOSE BLD-MCNC: 112 MG/DL (ref 70–99)
GLUCOSE BLD-MCNC: 81 MG/DL (ref 70–99)
GLUCOSE BLD-MCNC: 85 MG/DL (ref 70–99)
GLUCOSE BLD-MCNC: 91 MG/DL (ref 70–99)
GLUCOSE BLD-MCNC: 92 MG/DL (ref 70–99)
HCT VFR BLD CALC: 39.4 % (ref 36–48)
HEMOGLOBIN: 12.5 G/DL (ref 12–16)
LYMPHOCYTES ABSOLUTE: 1.5 K/UL (ref 1–5.1)
LYMPHOCYTES RELATIVE PERCENT: 14.3 %
MAGNESIUM: 1.9 MG/DL (ref 1.8–2.4)
MCH RBC QN AUTO: 24.9 PG (ref 26–34)
MCHC RBC AUTO-ENTMCNC: 31.7 G/DL (ref 31–36)
MCV RBC AUTO: 78.7 FL (ref 80–100)
MONOCYTES ABSOLUTE: 1.2 K/UL (ref 0–1.3)
MONOCYTES RELATIVE PERCENT: 11.2 %
NEUTROPHILS ABSOLUTE: 7.4 K/UL (ref 1.7–7.7)
NEUTROPHILS RELATIVE PERCENT: 69.9 %
PDW BLD-RTO: 15.6 % (ref 12.4–15.4)
PERFORMED ON: ABNORMAL
PERFORMED ON: ABNORMAL
PERFORMED ON: NORMAL
PLATELET # BLD: 296 K/UL (ref 135–450)
PMV BLD AUTO: 8.5 FL (ref 5–10.5)
POTASSIUM REFLEX MAGNESIUM: 3.4 MMOL/L (ref 3.5–5.1)
RBC # BLD: 5.01 M/UL (ref 4–5.2)
SODIUM BLD-SCNC: 135 MMOL/L (ref 136–145)
WBC # BLD: 10.6 K/UL (ref 4–11)

## 2019-01-13 PROCEDURE — 80048 BASIC METABOLIC PNL TOTAL CA: CPT

## 2019-01-13 PROCEDURE — 83036 HEMOGLOBIN GLYCOSYLATED A1C: CPT

## 2019-01-13 PROCEDURE — 6360000002 HC RX W HCPCS: Performed by: INTERNAL MEDICINE

## 2019-01-13 PROCEDURE — 36415 COLL VENOUS BLD VENIPUNCTURE: CPT

## 2019-01-13 PROCEDURE — 99232 SBSQ HOSP IP/OBS MODERATE 35: CPT | Performed by: SURGERY

## 2019-01-13 PROCEDURE — 85025 COMPLETE CBC W/AUTO DIFF WBC: CPT

## 2019-01-13 PROCEDURE — 83735 ASSAY OF MAGNESIUM: CPT

## 2019-01-13 PROCEDURE — 2580000003 HC RX 258: Performed by: INTERNAL MEDICINE

## 2019-01-13 PROCEDURE — 2500000003 HC RX 250 WO HCPCS: Performed by: INTERNAL MEDICINE

## 2019-01-13 PROCEDURE — 1200000000 HC SEMI PRIVATE

## 2019-01-13 PROCEDURE — 6370000000 HC RX 637 (ALT 250 FOR IP): Performed by: INTERNAL MEDICINE

## 2019-01-13 RX ORDER — POTASSIUM CHLORIDE AND SODIUM CHLORIDE 900; 300 MG/100ML; MG/100ML
INJECTION, SOLUTION INTRAVENOUS CONTINUOUS
Status: DISCONTINUED | OUTPATIENT
Start: 2019-01-13 | End: 2019-01-13

## 2019-01-13 RX ORDER — LOSARTAN POTASSIUM 100 MG/1
100 TABLET ORAL DAILY
Status: DISCONTINUED | OUTPATIENT
Start: 2019-01-13 | End: 2019-01-14 | Stop reason: HOSPADM

## 2019-01-13 RX ADMIN — METOPROLOL TARTRATE 5 MG: 5 INJECTION, SOLUTION INTRAVENOUS at 09:39

## 2019-01-13 RX ADMIN — LOSARTAN POTASSIUM 100 MG: 100 TABLET, FILM COATED ORAL at 11:47

## 2019-01-13 RX ADMIN — METOPROLOL TARTRATE 5 MG: 5 INJECTION, SOLUTION INTRAVENOUS at 03:01

## 2019-01-13 RX ADMIN — Medication 10 ML: at 09:00

## 2019-01-13 RX ADMIN — SODIUM CHLORIDE: 4.5 INJECTION, SOLUTION INTRAVENOUS at 01:10

## 2019-01-13 RX ADMIN — ENOXAPARIN SODIUM 40 MG: 40 INJECTION SUBCUTANEOUS at 09:30

## 2019-01-13 RX ADMIN — POTASSIUM CHLORIDE AND SODIUM CHLORIDE: 900; 300 INJECTION, SOLUTION INTRAVENOUS at 11:51

## 2019-01-13 RX ADMIN — METOPROLOL TARTRATE 25 MG: 25 TABLET ORAL at 11:47

## 2019-01-13 RX ADMIN — METOPROLOL TARTRATE 25 MG: 25 TABLET ORAL at 20:31

## 2019-01-13 RX ADMIN — Medication 10 ML: at 20:31

## 2019-01-13 ASSESSMENT — PAIN SCALES - GENERAL: PAINLEVEL_OUTOF10: 0

## 2019-01-14 VITALS
HEART RATE: 67 BPM | WEIGHT: 180 LBS | RESPIRATION RATE: 16 BRPM | HEIGHT: 62 IN | BODY MASS INDEX: 33.13 KG/M2 | TEMPERATURE: 98.4 F | OXYGEN SATURATION: 96 % | SYSTOLIC BLOOD PRESSURE: 127 MMHG | DIASTOLIC BLOOD PRESSURE: 82 MMHG

## 2019-01-14 LAB
A/G RATIO: 0.7 (ref 1.1–2.2)
ALBUMIN SERPL-MCNC: 3.2 G/DL (ref 3.4–5)
ALP BLD-CCNC: 105 U/L (ref 40–129)
ALT SERPL-CCNC: 19 U/L (ref 10–40)
ANION GAP SERPL CALCULATED.3IONS-SCNC: 16 MMOL/L (ref 3–16)
AST SERPL-CCNC: 30 U/L (ref 15–37)
BASOPHILS ABSOLUTE: 0.1 K/UL (ref 0–0.2)
BASOPHILS RELATIVE PERCENT: 0.8 %
BILIRUB SERPL-MCNC: 0.7 MG/DL (ref 0–1)
BUN BLDV-MCNC: 13 MG/DL (ref 7–20)
CALCIUM SERPL-MCNC: 8.7 MG/DL (ref 8.3–10.6)
CHLORIDE BLD-SCNC: 98 MMOL/L (ref 99–110)
CO2: 21 MMOL/L (ref 21–32)
CREAT SERPL-MCNC: 0.6 MG/DL (ref 0.6–1.2)
EOSINOPHILS ABSOLUTE: 0.5 K/UL (ref 0–0.6)
EOSINOPHILS RELATIVE PERCENT: 5.7 %
ESTIMATED AVERAGE GLUCOSE: 162.8 MG/DL
GFR AFRICAN AMERICAN: >60
GFR NON-AFRICAN AMERICAN: >60
GLOBULIN: 4.4 G/DL
GLUCOSE BLD-MCNC: 105 MG/DL (ref 70–99)
GLUCOSE BLD-MCNC: 109 MG/DL (ref 70–99)
GLUCOSE BLD-MCNC: 111 MG/DL (ref 70–99)
GLUCOSE BLD-MCNC: 148 MG/DL (ref 70–99)
GLUCOSE BLD-MCNC: 90 MG/DL (ref 70–99)
HBA1C MFR BLD: 7.3 %
HCT VFR BLD CALC: 39.8 % (ref 36–48)
HEMOGLOBIN: 12.8 G/DL (ref 12–16)
LYMPHOCYTES ABSOLUTE: 1.7 K/UL (ref 1–5.1)
LYMPHOCYTES RELATIVE PERCENT: 19.4 %
MCH RBC QN AUTO: 24.9 PG (ref 26–34)
MCHC RBC AUTO-ENTMCNC: 32.2 G/DL (ref 31–36)
MCV RBC AUTO: 77.2 FL (ref 80–100)
MONOCYTES ABSOLUTE: 1.2 K/UL (ref 0–1.3)
MONOCYTES RELATIVE PERCENT: 14.2 %
NEUTROPHILS ABSOLUTE: 5.2 K/UL (ref 1.7–7.7)
NEUTROPHILS RELATIVE PERCENT: 59.9 %
PDW BLD-RTO: 15.7 % (ref 12.4–15.4)
PERFORMED ON: ABNORMAL
PERFORMED ON: NORMAL
PLATELET # BLD: 285 K/UL (ref 135–450)
PMV BLD AUTO: 8.7 FL (ref 5–10.5)
POTASSIUM REFLEX MAGNESIUM: 4.2 MMOL/L (ref 3.5–5.1)
RBC # BLD: 5.16 M/UL (ref 4–5.2)
REASON FOR REJECTION: NORMAL
REJECTED TEST: NORMAL
SODIUM BLD-SCNC: 135 MMOL/L (ref 136–145)
TOTAL PROTEIN: 7.6 G/DL (ref 6.4–8.2)
WBC # BLD: 8.7 K/UL (ref 4–11)

## 2019-01-14 PROCEDURE — 99232 SBSQ HOSP IP/OBS MODERATE 35: CPT | Performed by: SURGERY

## 2019-01-14 PROCEDURE — 36415 COLL VENOUS BLD VENIPUNCTURE: CPT

## 2019-01-14 PROCEDURE — APPSS30 APP SPLIT SHARED TIME 16-30 MINUTES: Performed by: CLINICAL NURSE SPECIALIST

## 2019-01-14 PROCEDURE — 2580000003 HC RX 258: Performed by: INTERNAL MEDICINE

## 2019-01-14 PROCEDURE — 6370000000 HC RX 637 (ALT 250 FOR IP): Performed by: INTERNAL MEDICINE

## 2019-01-14 PROCEDURE — 6360000002 HC RX W HCPCS: Performed by: INTERNAL MEDICINE

## 2019-01-14 PROCEDURE — 80053 COMPREHEN METABOLIC PANEL: CPT

## 2019-01-14 PROCEDURE — 85025 COMPLETE CBC W/AUTO DIFF WBC: CPT

## 2019-01-14 RX ADMIN — LOSARTAN POTASSIUM 100 MG: 100 TABLET, FILM COATED ORAL at 09:00

## 2019-01-14 RX ADMIN — METOPROLOL TARTRATE 25 MG: 25 TABLET ORAL at 09:00

## 2019-01-14 RX ADMIN — ENOXAPARIN SODIUM 40 MG: 40 INJECTION SUBCUTANEOUS at 09:00

## 2019-01-14 RX ADMIN — Medication 10 ML: at 09:00

## 2019-01-14 RX ADMIN — INSULIN LISPRO 1 UNITS: 100 INJECTION, SOLUTION INTRAVENOUS; SUBCUTANEOUS at 13:14

## 2019-01-14 ASSESSMENT — PAIN SCALES - GENERAL
PAINLEVEL_OUTOF10: 0

## 2019-02-07 ENCOUNTER — OFFICE VISIT (OUTPATIENT)
Dept: CARDIOLOGY CLINIC | Age: 84
End: 2019-02-07
Payer: MEDICARE

## 2019-02-07 VITALS
SYSTOLIC BLOOD PRESSURE: 156 MMHG | HEIGHT: 62 IN | DIASTOLIC BLOOD PRESSURE: 60 MMHG | OXYGEN SATURATION: 97 % | WEIGHT: 173.2 LBS | BODY MASS INDEX: 31.87 KG/M2 | HEART RATE: 60 BPM

## 2019-02-07 DIAGNOSIS — R06.09 DYSPNEA ON EXERTION: ICD-10-CM

## 2019-02-07 DIAGNOSIS — R94.31 ABNORMAL ELECTROCARDIOGRAM: ICD-10-CM

## 2019-02-07 DIAGNOSIS — I48.0 PAF (PAROXYSMAL ATRIAL FIBRILLATION) (HCC): Primary | ICD-10-CM

## 2019-02-07 DIAGNOSIS — I10 ESSENTIAL HYPERTENSION: ICD-10-CM

## 2019-02-07 PROCEDURE — 99214 OFFICE O/P EST MOD 30 MIN: CPT | Performed by: NURSE PRACTITIONER

## 2019-02-19 ENCOUNTER — HOSPITAL ENCOUNTER (OUTPATIENT)
Dept: NON INVASIVE DIAGNOSTICS | Age: 84
Discharge: HOME | End: 2019-02-19
Payer: MEDICARE

## 2019-02-19 DIAGNOSIS — R94.31 ABNORMAL ELECTROCARDIOGRAM: ICD-10-CM

## 2019-02-19 DIAGNOSIS — I10 ESSENTIAL HYPERTENSION: ICD-10-CM

## 2019-02-19 LAB
LV EF: 58 %
LVEF MODALITY: NORMAL

## 2019-02-19 PROCEDURE — 6360000004 HC RX CONTRAST MEDICATION: Performed by: FAMILY MEDICINE

## 2019-02-19 PROCEDURE — C8929 TTE W OR WO FOL WCON,DOPPLER: HCPCS

## 2019-02-19 RX ADMIN — PERFLUTREN 2.2 MG: 6.52 INJECTION, SUSPENSION INTRAVENOUS at 13:29

## 2019-02-20 ENCOUNTER — TELEPHONE (OUTPATIENT)
Dept: CARDIOLOGY CLINIC | Age: 84
End: 2019-02-20

## 2019-02-20 DIAGNOSIS — I10 ESSENTIAL HYPERTENSION: ICD-10-CM

## 2019-02-20 DIAGNOSIS — I48.0 PAF (PAROXYSMAL ATRIAL FIBRILLATION) (HCC): Primary | ICD-10-CM

## 2019-02-20 DIAGNOSIS — E11.8 TYPE 2 DIABETES MELLITUS WITH COMPLICATION, WITHOUT LONG-TERM CURRENT USE OF INSULIN (HCC): ICD-10-CM

## 2019-02-20 RX ORDER — FUROSEMIDE 20 MG/1
20 TABLET ORAL DAILY
Qty: 90 TABLET | Refills: 1 | Status: SHIPPED | OUTPATIENT
Start: 2019-02-20 | End: 2019-08-16 | Stop reason: SDUPTHER

## 2019-03-01 ENCOUNTER — HOSPITAL ENCOUNTER (OUTPATIENT)
Age: 84
Discharge: HOME | End: 2019-03-01
Payer: MEDICARE

## 2019-03-01 DIAGNOSIS — I48.0 PAF (PAROXYSMAL ATRIAL FIBRILLATION) (HCC): ICD-10-CM

## 2019-03-01 DIAGNOSIS — I10 ESSENTIAL HYPERTENSION: ICD-10-CM

## 2019-03-01 DIAGNOSIS — E11.8 TYPE 2 DIABETES MELLITUS WITH COMPLICATION, WITHOUT LONG-TERM CURRENT USE OF INSULIN (HCC): ICD-10-CM

## 2019-03-01 LAB
ANION GAP SERPL CALCULATED.3IONS-SCNC: 15 MMOL/L (ref 3–16)
BUN BLDV-MCNC: 14 MG/DL (ref 7–20)
CALCIUM SERPL-MCNC: 8.7 MG/DL (ref 8.3–10.6)
CHLORIDE BLD-SCNC: 99 MMOL/L (ref 99–110)
CO2: 26 MMOL/L (ref 21–32)
CREAT SERPL-MCNC: 0.7 MG/DL (ref 0.6–1.2)
GFR AFRICAN AMERICAN: >60
GFR NON-AFRICAN AMERICAN: >60
GLUCOSE BLD-MCNC: 168 MG/DL (ref 70–99)
POTASSIUM SERPL-SCNC: 4.1 MMOL/L (ref 3.5–5.1)
PRO-BNP: 588 PG/ML (ref 0–449)
SODIUM BLD-SCNC: 140 MMOL/L (ref 136–145)

## 2019-03-01 PROCEDURE — 83880 ASSAY OF NATRIURETIC PEPTIDE: CPT

## 2019-03-01 PROCEDURE — 36415 COLL VENOUS BLD VENIPUNCTURE: CPT

## 2019-03-01 PROCEDURE — 80048 BASIC METABOLIC PNL TOTAL CA: CPT

## 2019-03-07 ENCOUNTER — TELEPHONE (OUTPATIENT)
Dept: CARDIOLOGY CLINIC | Age: 84
End: 2019-03-07

## 2019-03-07 DIAGNOSIS — R00.0 TACHYCARDIA: Primary | ICD-10-CM

## 2019-03-12 ENCOUNTER — TELEPHONE (OUTPATIENT)
Dept: CARDIOLOGY CLINIC | Age: 84
End: 2019-03-12

## 2019-03-26 ENCOUNTER — OFFICE VISIT (OUTPATIENT)
Dept: CARDIOLOGY CLINIC | Age: 84
End: 2019-03-26
Payer: MEDICARE

## 2019-03-26 VITALS
BODY MASS INDEX: 33.38 KG/M2 | DIASTOLIC BLOOD PRESSURE: 68 MMHG | HEART RATE: 64 BPM | OXYGEN SATURATION: 98 % | HEIGHT: 62 IN | SYSTOLIC BLOOD PRESSURE: 144 MMHG | WEIGHT: 181.4 LBS

## 2019-03-26 DIAGNOSIS — I10 ESSENTIAL HYPERTENSION: ICD-10-CM

## 2019-03-26 DIAGNOSIS — I27.20 PULMONARY HYPERTENSION (HCC): ICD-10-CM

## 2019-03-26 DIAGNOSIS — I48.0 PAF (PAROXYSMAL ATRIAL FIBRILLATION) (HCC): ICD-10-CM

## 2019-03-26 DIAGNOSIS — I50.32 CHRONIC DIASTOLIC HEART FAILURE (HCC): Primary | ICD-10-CM

## 2019-03-26 PROCEDURE — 99215 OFFICE O/P EST HI 40 MIN: CPT | Performed by: INTERNAL MEDICINE

## 2019-04-03 PROCEDURE — 93228 REMOTE 30 DAY ECG REV/REPORT: CPT | Performed by: INTERNAL MEDICINE

## 2019-04-04 DIAGNOSIS — R00.0 TACHYCARDIA: ICD-10-CM

## 2019-04-15 ENCOUNTER — HOSPITAL ENCOUNTER (OUTPATIENT)
Age: 84
Discharge: HOME | End: 2019-04-15
Payer: MEDICARE

## 2019-04-15 DIAGNOSIS — I27.20 PULMONARY HYPERTENSION (HCC): ICD-10-CM

## 2019-04-15 LAB
ANION GAP SERPL CALCULATED.3IONS-SCNC: 18 MMOL/L (ref 3–16)
BUN BLDV-MCNC: 16 MG/DL (ref 7–20)
CALCIUM SERPL-MCNC: 8.8 MG/DL (ref 8.3–10.6)
CHLORIDE BLD-SCNC: 100 MMOL/L (ref 99–110)
CO2: 21 MMOL/L (ref 21–32)
CREAT SERPL-MCNC: 0.7 MG/DL (ref 0.6–1.2)
GFR AFRICAN AMERICAN: >60
GFR NON-AFRICAN AMERICAN: >60
GLUCOSE BLD-MCNC: 215 MG/DL (ref 70–99)
POTASSIUM SERPL-SCNC: 3.8 MMOL/L (ref 3.5–5.1)
PRO-BNP: 411 PG/ML (ref 0–449)
SODIUM BLD-SCNC: 139 MMOL/L (ref 136–145)

## 2019-04-15 PROCEDURE — 80048 BASIC METABOLIC PNL TOTAL CA: CPT

## 2019-04-15 PROCEDURE — 36415 COLL VENOUS BLD VENIPUNCTURE: CPT

## 2019-04-15 PROCEDURE — 83880 ASSAY OF NATRIURETIC PEPTIDE: CPT

## 2019-04-18 ENCOUNTER — TELEPHONE (OUTPATIENT)
Dept: CARDIOLOGY CLINIC | Age: 84
End: 2019-04-18

## 2019-04-29 NOTE — PROGRESS NOTES
[facility]  Advanced CHF/Pulmonary Hypertension   Cardiac Evaluation      Juan Tiwari  YOB: 1935    Date of Visit:  4/30/19    Chief Complaint   Patient presents with    Follow-up           History of Present Illness:  Juan Tiwari is a 81 y.o. female who presents from referral from [facility] for consultation and management of severe pulmonary HTN. She has a history of paroxysmal atrial fibrillation, HTN, SVT, and DM. She had SVT in 2013, wore an event monitor, and atrial fibrillation was detected. She had recurrent atrial fibrillation in 2014 and had a cryoablation for PAF in 2/2014. Post procedure she had a retroperitoneal bleed and required a urinary stent. She has had no known recurrence of afib post ablation but has had palpitations. She was admitted in 1/2019 with SBO versus ileus that was medically managed. Her echo from 02/19/19 showed her EF was 55-60%. She has mild MR, TR and CT. SPAP 60mmHg consistent with severe pulmonary HTN. She reports about 1 month ago (02/2019) she had an episode of palpitations at 200 bpm for about 1 hour. She has not had a sleep study. She reports occasional chest twinge. She states the twinge occurs with fast heart rate. She reports her breathing is stable now. She did have SOB with walking frequently but this has subsided. She denies dizziness or syncope. She states she has been on lasix for 2-3 weeks. Today she reports she feels well overall. She denies CP, SOB, dizziness or syncope. She reports increased urination with increased diuretic but denies symptoms changes. She check BP at home. Her AM SBP was 170.       Allergies   Allergen Reactions    Hydrocodone     Metformin And Related     Omeprazole     Hydralazine Palpitations and Rash     Current Outpatient Medications   Medication Sig Dispense Refill    furosemide (LASIX) 20 MG tablet Take 1 tablet by mouth daily 90 tablet 1    pantoprazole (PROTONIX) 20 MG tablet Take 2 tablets by mouth daily (Patient taking differently: Take 20 mg by mouth daily ) 30 tablet 0    metoprolol tartrate (LOPRESSOR) 25 MG tablet TAKE ONE TABLET BY MOUTH TWICE A  tablet 3    losartan (COZAAR) 100 MG tablet Take 100 mg by mouth daily      glyBURIDE (DIABETA) 1.25 MG tablet Take 2.5 mg by mouth daily (with breakfast)       aspirin 81 MG tablet Take 81 mg by mouth daily      pravastatin (PRAVACHOL) 40 MG tablet Take 1 tablet by mouth daily. 30 tablet 3     No current facility-administered medications for this visit.         Past Medical History:   Diagnosis Date    [facility]     Diabetes mellitus ([facility]     Hydronephrosis 2/14/2014    Hyperlipidemia     Hypertension     [facility]ussusception [facility]     SVT (supraventricular tachycardia) ([facility] 8/26/2013    AVNRT     Past Surgical History:   Procedure Laterality Date    ABLATION OF DYSRHYTHMIC FOCUS      -2014    APPENDECTOMY      DILATATION, ESOPHAGUS      FRACTURE SURGERY      L ankle    HYSTERECTOMY      SKIN BIOPSY      SMALL INTESTINE SURGERY      TONSILLECTOMY       Family History   Problem Relation Age of Onset    Diabetes Mother     Heart Disease Father     High Blood Pressure Father     High Cholesterol Father     Cancer Sister     Heart Disease Brother     High Blood Pressure Brother     High Cholesterol Brother      Social History     Socioeconomic History    Marital status:      Spouse name: Not on file    Number of children: Not on file    Years of education: Not on file    Highest education level: Not on file   Occupational History    Not on file   Social Needs    Financial resource strain: Not on file    Food insecurity:     Worry: Not on file     Inability: Not on file    Transportation needs:     Medical: Not on file     Non-medical: Not on file   Tobacco Use    Smoking status: Never Smoker    Smokeless tobacco: Never Used   Substance and Sexual Activity    Alcohol use: No    Drug use: No    Sexual activity: Yes     Partners: Male   Lifestyle    Physical activity:     Days per week: Not on file     Minutes per session: Not on file    Stress: Not on file   Relationships    Social connections:     Talks on phone: Not on file     Gets together: Not on file     Attends Gnosticist service: Not on file     Active member of club or organization: Not on file     Attends meetings of clubs or organizations: Not on file     Relationship status: Not on file    Intimate partner violence:     Fear of current or ex partner: Not on file     Emotionally abused: Not on file     Physically abused: Not on file     Forced sexual activity: Not on file   Other Topics Concern    Not on file   Social History Narrative    Not on file       Review of Systems:   · Constitutional: there has been no unanticipated weight loss. There's been no change in energy level, sleep pattern, or activity level. · Eyes: No visual changes or diplopia. No scleral icterus. · ENT: No Headaches, hearing loss or vertigo. No mouth sores or sore throat. · Cardiovascular: Reviewed in HPI  · Respiratory: No cough or wheezing, no sputum production. No hematemesis. · Gastrointestinal: No abdominal pain, appetite loss, blood in stools. No change in bowel or bladder habits. · Genitourinary: No dysuria, trouble voiding, or hematuria. · Musculoskeletal:  No gait disturbance, weakness or joint complaints. · Integumentary: No rash or pruritis. · Neurological: No headache, diplopia, change in muscle strength, numbness or tingling. No change in gait, balance, coordination, mood, affect, memory, mentation, behavior. · Psychiatric: No anxiety, no depression. · Endocrine: No malaise, fatigue or temperature intolerance. No excessive thirst, fluid intake, or urination. No tremor. · Hematologic/Lymphatic: No abnormal bruising or bleeding, blood clots or swollen lymph nodes.   · Allergic/Immunologic: No nasal congestion or with severe pulmonary hypertension. Mild pulmonic regurgitation present. Lipids: 03/12/19: , , HDL 45, LDL 60    Labs were reviewed including labs from other hospital systems through [facility]. Cardiac testing was reviewed including echos, nuclear scans, cardiac catheterization, including from other hospital systems through [facility]. Assessment:    1. Chronic diastolic heart failure ([facility]    2. PAF (paroxysmal atrial fibrillation) ([facility]    3. Pulmonary hypertension ([facility]    4. Essential hypertension           Plan:  1. Stop losartan   2. Start valsartan 320 mg daily  3. BMP and BNP in mid May  4. Follow up in July        QUALITY MEASURES  1. Tobacco Cessation Counseling: NA  2. Retake of BP if >140/90:   Yes  3. Documentation to PCP/referring for new patient:  Sent to PCP at close of office visit  4. CAD patient on anti-platelet: NA  5. CAD patient on STATIN therapy:  NA  6. Patient with CHF and aFib on anticoagulation:  NA       Scribe's attestation: This note was scribed in the presence of Betzy Joshi M.D. By Surinder Evans RN     The scribe's documentation has been prepared under my direction and personally reviewed by me in its entirety. I confirm that the note above accurately reflects all work, treatment, procedures, and medical decision making performed by me. I appreciate the opportunity of cooperating in the care of this patient.     Betzy Joshi M.D., [facility]

## 2019-04-30 ENCOUNTER — OFFICE VISIT (OUTPATIENT)
Dept: CARDIOLOGY CLINIC | Age: 84
End: 2019-04-30
Payer: MEDICARE

## 2019-04-30 VITALS
WEIGHT: 184.6 LBS | HEART RATE: 57 BPM | SYSTOLIC BLOOD PRESSURE: 140 MMHG | OXYGEN SATURATION: 98 % | HEIGHT: 62 IN | DIASTOLIC BLOOD PRESSURE: 70 MMHG | BODY MASS INDEX: 33.97 KG/M2

## 2019-04-30 DIAGNOSIS — I10 ESSENTIAL HYPERTENSION: ICD-10-CM

## 2019-04-30 DIAGNOSIS — I48.0 PAF (PAROXYSMAL ATRIAL FIBRILLATION) (HCC): ICD-10-CM

## 2019-04-30 DIAGNOSIS — I50.32 CHRONIC DIASTOLIC HEART FAILURE (HCC): Primary | ICD-10-CM

## 2019-04-30 DIAGNOSIS — I27.20 PULMONARY HYPERTENSION (HCC): ICD-10-CM

## 2019-04-30 PROCEDURE — 99214 OFFICE O/P EST MOD 30 MIN: CPT | Performed by: INTERNAL MEDICINE

## 2019-04-30 RX ORDER — VALSARTAN 320 MG/1
320 TABLET ORAL DAILY
Qty: 90 TABLET | Refills: 3 | Status: SHIPPED | OUTPATIENT
Start: 2019-04-30 | End: 2019-07-30 | Stop reason: CLARIF

## 2019-04-30 NOTE — PATIENT INSTRUCTIONS
Plan:  1. Stop losartan   2. Start valsartan 320 mg daily  3. BMP and BNP in mid May  4.  Follow up in July

## 2019-04-30 NOTE — LETTER
[address]  Advanced CHF/Pulmonary Hypertension   Cardiac Evaluation      Juanpablo Tiwari  YOB: 1935    Date of Visit:  4/30/19    Chief Complaint   Patient presents with    Follow-up           History of Present Illness:  Juanpablo Tiwari is a 81 y.o. female who presents from referral from [facility] for consultation and management of severe pulmonary HTN. She has a history of paroxysmal atrial fibrillation, HTN, SVT, and DM. She had SVT in 2013, wore an event monitor, and atrial fibrillation was detected. She had recurrent atrial fibrillation in 2014 and had a cryoablation for PAF in 2/2014. Post procedure she had a retroperitoneal bleed and required a urinary stent. She has had no known recurrence of afib post ablation but has had palpitations. She was admitted in 1/2019 with SBO versus ileus that was medically managed. Her echo from 02/19/19 showed her EF was 55-60%. She has mild MR, TR and CT. SPAP 60mmHg consistent with severe pulmonary HTN. She reports about 1 month ago (02/2019) she had an episode of palpitations at 200 bpm for about 1 hour. She has not had a sleep study. She reports occasional chest twinge. She states the twinge occurs with fast heart rate. She reports her breathing is stable now. She did have SOB with walking frequently but this has subsided. She denies dizziness or syncope. She states she has been on lasix for 2-3 weeks. Today she reports she feels well overall. She denies CP, SOB, dizziness or syncope. She reports increased urination with increased diuretic but denies symptoms changes. She check BP at home. Her AM SBP was 170.       Allergies   Allergen Reactions    Hydrocodone     Metformin And Related     Omeprazole     Hydralazine Palpitations and Rash     Current Outpatient Medications   Medication Sig Dispense Refill    furosemide (LASIX) 20 MG tablet Take 1 tablet by mouth daily 90 tablet 1  pantoprazole (PROTONIX) 20 MG tablet Take 2 tablets by mouth daily (Patient taking differently: Take 20 mg by mouth daily ) 30 tablet 0    metoprolol tartrate (LOPRESSOR) 25 MG tablet TAKE ONE TABLET BY MOUTH TWICE A  tablet 3    losartan (COZAAR) 100 MG tablet Take 100 mg by mouth daily      glyBURIDE (DIABETA) 1.25 MG tablet Take 2.5 mg by mouth daily (with breakfast)       aspirin 81 MG tablet Take 81 mg by mouth daily      pravastatin (PRAVACHOL) 40 MG tablet Take 1 tablet by mouth daily. 30 tablet 3     No current facility-administered medications for this visit.         Past Medical History:   Diagnosis Date    [facility]     Diabetes mellitus ([facility] [facility]     Hydronephrosis 2/14/2014    Hyperlipidemia     Hypertension     [facility]ussusception intestine ([facility] [facility]     [facility]    AVNRT     Past Surgical History:   Procedure Laterality Date    ABLATION OF DYSRHYTHMIC FOCUS      -2014    APPENDECTOMY      DILATATION, ESOPHAGUS      FRACTURE SURGERY      L ankle    HYSTERECTOMY      SKIN BIOPSY      SMALL INTESTINE SURGERY      TONSILLECTOMY       Family History   Problem Relation Age of Onset    Diabetes Mother     Heart Disease Father     High Blood Pressure Father     High Cholesterol Father     Cancer Sister     Heart Disease Brother     High Blood Pressure Brother     High Cholesterol Brother      Social History     Socioeconomic History    Marital status:      Spouse name: Not on file    Number of children: Not on file    Years of education: Not on file    Highest education level: Not on file   Occupational History    Not on file   Social Needs    Financial resource strain: Not on file    Food insecurity:     Worry: Not on file     Inability: Not on file    Transportation needs:     Medical: Not on file     Non-medical: Not on file   Tobacco Use    Smoking status: Never Smoker    Smokeless tobacco: Never Used Substance and Sexual Activity    Alcohol use: No    Drug use: No    Sexual activity: Yes     Partners: Male   Lifestyle    Physical activity:     Days per week: Not on file     Minutes per session: Not on file    Stress: Not on file   Relationships    Social connections:     Talks on phone: Not on file     Gets together: Not on file     Attends Anabaptist service: Not on file     Active member of club or organization: Not on file     Attends meetings of clubs or organizations: Not on file     Relationship status: Not on file    Intimate partner violence:     Fear of current or ex partner: Not on file     Emotionally abused: Not on file     Physically abused: Not on file     Forced sexual activity: Not on file   Other Topics Concern    Not on file   Social History Narrative    Not on file       Review of Systems:   · Constitutional: there has been no unanticipated weight loss. There's been no change in energy level, sleep pattern, or activity level. · Eyes: No visual changes or diplopia. No scleral icterus. · ENT: No Headaches, hearing loss or vertigo. No mouth sores or sore throat. · Cardiovascular: Reviewed in HPI  · Respiratory: No cough or wheezing, no sputum production. No hematemesis. · Gastrointestinal: No abdominal pain, appetite loss, blood in stools. No change in bowel or bladder habits. · Genitourinary: No dysuria, trouble voiding, or hematuria. · Musculoskeletal:  No gait disturbance, weakness or joint complaints. · Integumentary: No rash or pruritis. · Neurological: No headache, diplopia, change in muscle strength, numbness or tingling. No change in gait, balance, coordination, mood, affect, memory, mentation, behavior. · Psychiatric: No anxiety, no depression. · Endocrine: No malaise, fatigue or temperature intolerance. No excessive thirst, fluid intake, or urination. No tremor. · Hematologic/Lymphatic: No abnormal bruising or bleeding, blood clots or swollen lymph nodes. · Allergic/Immunologic: No nasal congestion or hives. Physical Examination:    Vitals:    04/30/19 1115 04/30/19 1118   BP: (!) 140/70 (!) 140/70   Pulse: 57    SpO2: 98%    Weight: 184 lb 9.6 oz (83.7 kg)    Height: 5' 2\" (1.575 m)      Body mass index is 33.76 kg/m². Wt Readings from Last 3 Encounters:   04/30/19 184 lb 9.6 oz (83.7 kg)   03/26/19 181 lb 6.4 oz (82.3 kg)   02/07/19 173 lb 3.2 oz (78.6 kg)     BP Readings from Last 3 Encounters:   04/30/19 (!) 140/70   03/26/19 (!) 144/68   02/07/19 (!) 156/60          Constitutional and General Appearance:   WD/WN in NAD,   HEENT:  NC/AT  ROSARIO  No problems with hearing  Skin:  Warm, dry  Respiratory:  · Normal excursion and expansion without use of accessory muscles  · Resp Auscultation: Normal breath sounds without dullness  Cardiovascular:  · The apical impulses not displaced  · Heart tones are crisp and normal  · Cervical veins are not engorged  · The carotid upstroke is normal in amplitude and contour without delay or bruit  · JVP normal  RRR with nl S1 and S2 without m,r,g  · Peripheral pulses are symmetrical and full  · There is no clubbing, cyanosis of the extremities. · No edema  · Femoral Arteries: 2+ and equal  · Pedal Pulses: 2+ and equal   Neck:  · No thyromegaly  Abdomen:  · No masses or tenderness  · Liver/Spleen: No Abnormalities Noted  Neurological/Psychiatric:  · Alert and oriented in all spheres  · Moves all extremities well  · Exhibits normal gait balance and coordination  · No abnormalities of mood, affect, memory, mentation, or behavior are noted    Echo: 02/19/19   Summary   Definity contrast administered. Normal left ventricular systolic function with an estimated ejection   fraction of 55-60%. There is mild septal hypertrophy with normal remaining wall thickness. Normal left ventricular diastolic filling pressure. Mild mitral regurgitation. Mild tricuspid regurgitation. Systolic pulmonary artery pressure (SPAP) estimated at 60 mmHg (RA pressure   3 mmHg), consistent with severe pulmonary hypertension. Mild pulmonic regurgitation present. Lipids: 03/12/19: , , HDL 45, LDL 60    Labs were reviewed including labs from other hospital systems through [facility]. Cardiac testing was reviewed including echos, nuclear scans, cardiac catheterization, including from other hospital systems through [facility]. Assessment:    1. Chronic diastolic heart failure ([facility]    2. PAF (paroxysmal atrial fibrillation) ([facility] [address] [facility]    3. Pulmonary hypertension ([facility] [address]    4. Essential hypertension           Plan:  1. Stop losartan   2. Start valsartan 320 mg daily  3. BMP and BNP in mid May  4. Follow up in July        QUALITY MEASURES  1. Tobacco Cessation Counseling: NA  2. Retake of BP if >140/90:   Yes  3. Documentation to PCP/referring for new patient:  Sent to PCP at close of office visit  4. CAD patient on anti-platelet: NA  5. CAD patient on STATIN therapy:  NA  6. Patient with CHF and aFib on anticoagulation:  NA       Scribe's attestation: This note was scribed in the presence of Elva Walker M.D. By Kait Olivas RN     The scribe's documentation has been prepared under my direction and personally reviewed by me in its entirety. I confirm that the note above accurately reflects all work, treatment, procedures, and medical decision making performed by me. I appreciate the opportunity of cooperating in the care of this patient.     Elva Walker M.D., [facility]

## 2019-07-15 ENCOUNTER — HOSPITAL ENCOUNTER (OUTPATIENT)
Age: 84
Discharge: HOME | End: 2019-07-15
Payer: MEDICARE

## 2019-07-15 DIAGNOSIS — I50.32 CHRONIC DIASTOLIC HEART FAILURE (HCC): ICD-10-CM

## 2019-07-15 LAB
ANION GAP SERPL CALCULATED.3IONS-SCNC: 15 MMOL/L (ref 3–16)
BUN BLDV-MCNC: 16 MG/DL (ref 7–20)
CALCIUM SERPL-MCNC: 8.8 MG/DL (ref 8.3–10.6)
CHLORIDE BLD-SCNC: 98 MMOL/L (ref 99–110)
CO2: 25 MMOL/L (ref 21–32)
CREAT SERPL-MCNC: 0.8 MG/DL (ref 0.6–1.2)
GFR AFRICAN AMERICAN: >60
GFR NON-AFRICAN AMERICAN: >60
GLUCOSE BLD-MCNC: 386 MG/DL (ref 70–99)
POTASSIUM SERPL-SCNC: 4.2 MMOL/L (ref 3.5–5.1)
PRO-BNP: 170 PG/ML (ref 0–449)
SODIUM BLD-SCNC: 138 MMOL/L (ref 136–145)

## 2019-07-15 PROCEDURE — 80048 BASIC METABOLIC PNL TOTAL CA: CPT

## 2019-07-15 PROCEDURE — 36415 COLL VENOUS BLD VENIPUNCTURE: CPT

## 2019-07-15 PROCEDURE — 83880 ASSAY OF NATRIURETIC PEPTIDE: CPT

## 2019-07-19 ENCOUNTER — TELEPHONE (OUTPATIENT)
Dept: CARDIOLOGY CLINIC | Age: 84
End: 2019-07-19

## 2019-07-29 NOTE — PROGRESS NOTES
Outpatient Medications   Medication Sig Dispense Refill    furosemide (LASIX) 20 MG tablet Take 1 tablet by mouth daily 90 tablet 1    pantoprazole (PROTONIX) 20 MG tablet Take 2 tablets by mouth daily (Patient taking differently: Take 20 mg by mouth daily ) 30 tablet 0    metoprolol tartrate (LOPRESSOR) 25 MG tablet TAKE ONE TABLET BY MOUTH TWICE A  tablet 3    glyBURIDE (DIABETA) 1.25 MG tablet Take 2.5 mg by mouth daily (with breakfast)       aspirin 81 MG tablet Take 81 mg by mouth daily      pravastatin (PRAVACHOL) 40 MG tablet Take 1 tablet by mouth daily. 30 tablet 3     No current facility-administered medications for this visit.         Past Medical History:   Diagnosis Date    [facility]     Diabetes mellitus ([facility] [facility]     Hydronephrosis 2/14/2014    Hyperlipidemia     Hypertension     Intussusception intestine ([facility] [facility]     SVT (supraventricular tachycardia) ([facility] [address] 8/26/2013    AVNRT     Past Surgical History:   Procedure Laterality Date    ABLATION OF DYSRHYTHMIC FOCUS      -2014    APPENDECTOMY      DILATATION, ESOPHAGUS      FRACTURE SURGERY      L ankle    HYSTERECTOMY      SKIN BIOPSY      SMALL INTESTINE SURGERY      TONSILLECTOMY       Family History   Problem Relation Age of Onset    Diabetes Mother     Heart Disease Father     High Blood Pressure Father     High Cholesterol Father     Cancer Sister     Heart Disease Brother     High Blood Pressure Brother     High Cholesterol Brother      Social History     Socioeconomic History    Marital status:      Spouse name: Not on file    Number of children: Not on file    Years of education: Not on file    Highest education level: Not on file   Occupational History    Not on file   Social Needs    Financial resource strain: Not on file    Food insecurity:     Worry: Not on file     Inability: Not on file    Transportation needs:     Medical: Not on file     Non-medical: Not on file   Tobacco Use    pressure (SPAP) estimated at 60 mmHg (RA pressure   3 mmHg), consistent with severe pulmonary hypertension. Mild pulmonic regurgitation present. Lipids: 03/12/19: , , HDL 45, LDL 60    Labs were reviewed including labs from other hospital systems through [facility]. Cardiac testing was reviewed including echos, nuclear scans, cardiac catheterization, including from other hospital systems through [facility]. Assessment:    1. Chronic diastolic heart failure ([facility] [address]    2. PAF (paroxysmal atrial fibrillation) ([facility] [address]    3. Essential hypertension    4. [facility]         Plan:  1. Restart valsartan 40 mg (1/2) nightly for 2 weeks. If tolerated, decrease lasix to 1/2 dose daily and increase valsartan to 80 mg nightly  2. BMP and BNP in mid September  3. Follow up end of Sept/ Oct      QUALITY MEASURES  1. Tobacco Cessation Counseling: NA  2. Retake of BP if >140/90:   Yes  3. Documentation to PCP/referring for new patient:  Sent to PCP at close of office visit  4. CAD patient on anti-platelet: NA  5. CAD patient on STATIN therapy:  NA  6. Patient with CHF and aFib on anticoagulation:  NA       Scribe's attestation: This note was scribed in the presence of Angel Duval M.D. By Juan Garcia RN     The scribe's documentation has been prepared under my direction and personally reviewed by me in its entirety. I confirm that the note above accurately reflects all work, treatment, procedures, and medical decision making performed by me. I appreciate the opportunity of cooperating in the care of this patient.     Angel Duval M.D., [facility]

## 2019-07-30 ENCOUNTER — OFFICE VISIT (OUTPATIENT)
Dept: CARDIOLOGY CLINIC | Age: 84
End: 2019-07-30
Payer: MEDICARE

## 2019-07-30 VITALS
SYSTOLIC BLOOD PRESSURE: 114 MMHG | OXYGEN SATURATION: 97 % | HEART RATE: 63 BPM | HEIGHT: 62 IN | BODY MASS INDEX: 34.19 KG/M2 | DIASTOLIC BLOOD PRESSURE: 84 MMHG | WEIGHT: 185.8 LBS

## 2019-07-30 DIAGNOSIS — I10 ESSENTIAL HYPERTENSION: ICD-10-CM

## 2019-07-30 DIAGNOSIS — I50.32 CHRONIC DIASTOLIC HEART FAILURE (HCC): Primary | ICD-10-CM

## 2019-07-30 DIAGNOSIS — I27.20 PULMONARY HYPERTENSION (HCC): ICD-10-CM

## 2019-07-30 DIAGNOSIS — I48.0 PAF (PAROXYSMAL ATRIAL FIBRILLATION) (HCC): ICD-10-CM

## 2019-07-30 PROCEDURE — 99214 OFFICE O/P EST MOD 30 MIN: CPT | Performed by: INTERNAL MEDICINE

## 2019-07-30 RX ORDER — VALSARTAN 80 MG/1
80 TABLET ORAL DAILY
Qty: 30 TABLET | Refills: 5 | Status: SHIPPED | OUTPATIENT
Start: 2019-07-30 | End: 2019-09-13 | Stop reason: SDUPTHER

## 2019-07-30 NOTE — LETTER
Current Outpatient Medications   Medication Sig Dispense Refill    furosemide (LASIX) 20 MG tablet Take 1 tablet by mouth daily 90 tablet 1    pantoprazole (PROTONIX) 20 MG tablet Take 2 tablets by mouth daily (Patient taking differently: Take 20 mg by mouth daily ) 30 tablet 0    metoprolol tartrate (LOPRESSOR) 25 MG tablet TAKE ONE TABLET BY MOUTH TWICE A  tablet 3    glyBURIDE (DIABETA) 1.25 MG tablet Take 2.5 mg by mouth daily (with breakfast)       aspirin 81 MG tablet Take 81 mg by mouth daily      pravastatin (PRAVACHOL) 40 MG tablet Take 1 tablet by mouth daily. 30 tablet 3     No current facility-administered medications for this visit.         Past Medical History:   Diagnosis Date    Atrial fibrillation ([facility] [address]     Diabetes mellitus ([facility] [address] [facility]     Hydronephrosis 2/14/2014    Hyperlipidemia     Hypertension     Intussusception intestine ([facility] [address] [facility]     SVT (supraventricular tachycardia) ([facility] [address] 8/26/2013    AVNRT     Past Surgical History:   Procedure Laterality Date    ABLATION OF DYSRHYTHMIC FOCUS      -2014    APPENDECTOMY      DILATATION, ESOPHAGUS      FRACTURE SURGERY      L ankle    HYSTERECTOMY      SKIN BIOPSY      SMALL INTESTINE SURGERY      TONSILLECTOMY       Family History   Problem Relation Age of Onset    Diabetes Mother     Heart Disease Father     High Blood Pressure Father     High Cholesterol Father     Cancer Sister     Heart Disease Brother     High Blood Pressure Brother     High Cholesterol Brother      Social History     Socioeconomic History    Marital status:      Spouse name: Not on file    Number of children: Not on file    Years of education: Not on file    Highest education level: Not on file   Occupational History    Not on file   Social Needs    Financial resource strain: Not on file    Food insecurity:     Worry: Not on file     Inability: Not on file    Transportation needs:     Medical: Not on file     Non-medical: Not on file

## 2019-08-16 RX ORDER — FUROSEMIDE 20 MG/1
TABLET ORAL
Qty: 90 TABLET | Refills: 0 | Status: SHIPPED | OUTPATIENT
Start: 2019-08-16 | End: 2020-01-21 | Stop reason: SDUPTHER

## 2019-09-13 DIAGNOSIS — I50.32 CHRONIC DIASTOLIC HEART FAILURE (HCC): ICD-10-CM

## 2019-09-13 RX ORDER — VALSARTAN 80 MG/1
80 TABLET ORAL DAILY
Qty: 90 TABLET | Refills: 3 | Status: ON HOLD
Start: 2019-09-13 | End: 2020-05-31 | Stop reason: HOSPADM

## 2019-10-10 ENCOUNTER — APPOINTMENT (OUTPATIENT)
Dept: CT IMAGING | Age: 84
DRG: 389 | End: 2019-10-10
Payer: MEDICARE

## 2019-10-10 ENCOUNTER — HOSPITAL ENCOUNTER (INPATIENT)
Age: 84
LOS: 3 days | Discharge: HOME | DRG: 389 | End: 2019-10-13
Attending: EMERGENCY MEDICINE | Admitting: INTERNAL MEDICINE
Payer: MEDICARE

## 2019-10-10 DIAGNOSIS — K56.609 SBO (SMALL BOWEL OBSTRUCTION) (HCC): Primary | ICD-10-CM

## 2019-10-10 PROBLEM — E78.5 HYPERLIPIDEMIA: Status: ACTIVE | Noted: 2019-10-10

## 2019-10-10 PROBLEM — E87.5 HYPERKALEMIA: Status: ACTIVE | Noted: 2019-10-10

## 2019-10-10 LAB
A/G RATIO: 0.9 (ref 1.1–2.2)
ALBUMIN SERPL-MCNC: 3.9 G/DL (ref 3.4–5)
ALP BLD-CCNC: 147 U/L (ref 40–129)
ALT SERPL-CCNC: 25 U/L (ref 10–40)
ANION GAP SERPL CALCULATED.3IONS-SCNC: 13 MMOL/L (ref 3–16)
AST SERPL-CCNC: 43 U/L (ref 15–37)
BACTERIA: ABNORMAL /HPF
BACTERIA: ABNORMAL /HPF
BASOPHILS ABSOLUTE: 0.1 K/UL (ref 0–0.2)
BASOPHILS RELATIVE PERCENT: 0.6 %
BILIRUB SERPL-MCNC: 0.4 MG/DL (ref 0–1)
BILIRUBIN URINE: NEGATIVE
BILIRUBIN URINE: NEGATIVE
BLOOD, URINE: ABNORMAL
BLOOD, URINE: ABNORMAL
BUN BLDV-MCNC: 14 MG/DL (ref 7–20)
CALCIUM SERPL-MCNC: 8.9 MG/DL (ref 8.3–10.6)
CHLORIDE BLD-SCNC: 98 MMOL/L (ref 99–110)
CLARITY: CLEAR
CLARITY: CLEAR
CO2: 24 MMOL/L (ref 21–32)
COLOR: YELLOW
COLOR: YELLOW
CREAT SERPL-MCNC: 0.6 MG/DL (ref 0.6–1.2)
EOSINOPHILS ABSOLUTE: 0.3 K/UL (ref 0–0.6)
EOSINOPHILS RELATIVE PERCENT: 2.6 %
EPITHELIAL CELLS, UA: ABNORMAL /HPF
GFR AFRICAN AMERICAN: >60
GFR NON-AFRICAN AMERICAN: >60
GLOBULIN: 4.2 G/DL
GLUCOSE BLD-MCNC: 263 MG/DL (ref 70–99)
GLUCOSE BLD-MCNC: 271 MG/DL (ref 70–99)
GLUCOSE BLD-MCNC: 279 MG/DL (ref 70–99)
GLUCOSE URINE: 250 MG/DL
GLUCOSE URINE: 500 MG/DL
HCT VFR BLD CALC: 39.4 % (ref 36–48)
HEMOGLOBIN: 13 G/DL (ref 12–16)
KETONES, URINE: NEGATIVE MG/DL
KETONES, URINE: NEGATIVE MG/DL
LEUKOCYTE ESTERASE, URINE: ABNORMAL
LEUKOCYTE ESTERASE, URINE: NEGATIVE
LIPASE: 21 U/L (ref 13–60)
LYMPHOCYTES ABSOLUTE: 2.3 K/UL (ref 1–5.1)
LYMPHOCYTES RELATIVE PERCENT: 22.5 %
MCH RBC QN AUTO: 25.2 PG (ref 26–34)
MCHC RBC AUTO-ENTMCNC: 33.1 G/DL (ref 31–36)
MCV RBC AUTO: 76.1 FL (ref 80–100)
MICROSCOPIC EXAMINATION: YES
MICROSCOPIC EXAMINATION: YES
MONOCYTES ABSOLUTE: 0.9 K/UL (ref 0–1.3)
MONOCYTES RELATIVE PERCENT: 8.9 %
NEUTROPHILS ABSOLUTE: 6.6 K/UL (ref 1.7–7.7)
NEUTROPHILS RELATIVE PERCENT: 65.4 %
NITRITE, URINE: NEGATIVE
NITRITE, URINE: NEGATIVE
PDW BLD-RTO: 15.8 % (ref 12.4–15.4)
PERFORMED ON: ABNORMAL
PERFORMED ON: ABNORMAL
PH UA: 5.5 (ref 5–8)
PH UA: 5.5 (ref 5–8)
PLATELET # BLD: 303 K/UL (ref 135–450)
PMV BLD AUTO: 9.3 FL (ref 5–10.5)
POTASSIUM SERPL-SCNC: 4.5 MMOL/L (ref 3.5–5.1)
POTASSIUM SERPL-SCNC: 5.3 MMOL/L (ref 3.5–5.1)
PROTEIN UA: ABNORMAL MG/DL
PROTEIN UA: ABNORMAL MG/DL
RBC # BLD: 5.18 M/UL (ref 4–5.2)
RBC UA: ABNORMAL /HPF (ref 0–2)
RBC UA: ABNORMAL /HPF (ref 0–2)
SODIUM BLD-SCNC: 135 MMOL/L (ref 136–145)
SPECIFIC GRAVITY UA: 1.01 (ref 1–1.03)
SPECIFIC GRAVITY UA: 1.02 (ref 1–1.03)
TOTAL PROTEIN: 8.1 G/DL (ref 6.4–8.2)
URINE REFLEX TO CULTURE: YES
URINE TYPE: ABNORMAL
URINE TYPE: ABNORMAL
UROBILINOGEN, URINE: 0.2 E.U./DL
UROBILINOGEN, URINE: 0.2 E.U./DL
WBC # BLD: 10.2 K/UL (ref 4–11)
WBC UA: ABNORMAL /HPF (ref 0–5)
WBC UA: ABNORMAL /HPF (ref 0–5)

## 2019-10-10 PROCEDURE — 96374 THER/PROPH/DIAG INJ IV PUSH: CPT

## 2019-10-10 PROCEDURE — 93005 ELECTROCARDIOGRAM TRACING: CPT | Performed by: EMERGENCY MEDICINE

## 2019-10-10 PROCEDURE — 80053 COMPREHEN METABOLIC PANEL: CPT

## 2019-10-10 PROCEDURE — 6360000002 HC RX W HCPCS: Performed by: INTERNAL MEDICINE

## 2019-10-10 PROCEDURE — 99285 EMERGENCY DEPT VISIT HI MDM: CPT

## 2019-10-10 PROCEDURE — 99222 1ST HOSP IP/OBS MODERATE 55: CPT | Performed by: SURGERY

## 2019-10-10 PROCEDURE — 1200000000 HC SEMI PRIVATE

## 2019-10-10 PROCEDURE — 2580000003 HC RX 258: Performed by: INTERNAL MEDICINE

## 2019-10-10 PROCEDURE — C9113 INJ PANTOPRAZOLE SODIUM, VIA: HCPCS | Performed by: INTERNAL MEDICINE

## 2019-10-10 PROCEDURE — 81001 URINALYSIS AUTO W/SCOPE: CPT

## 2019-10-10 PROCEDURE — 74177 CT ABD & PELVIS W/CONTRAST: CPT

## 2019-10-10 PROCEDURE — 83036 HEMOGLOBIN GLYCOSYLATED A1C: CPT

## 2019-10-10 PROCEDURE — 83690 ASSAY OF LIPASE: CPT

## 2019-10-10 PROCEDURE — 6370000000 HC RX 637 (ALT 250 FOR IP): Performed by: INTERNAL MEDICINE

## 2019-10-10 PROCEDURE — 6360000004 HC RX CONTRAST MEDICATION: Performed by: EMERGENCY MEDICINE

## 2019-10-10 PROCEDURE — 85025 COMPLETE CBC W/AUTO DIFF WBC: CPT

## 2019-10-10 PROCEDURE — 96376 TX/PRO/DX INJ SAME DRUG ADON: CPT

## 2019-10-10 PROCEDURE — 2500000003 HC RX 250 WO HCPCS: Performed by: INTERNAL MEDICINE

## 2019-10-10 PROCEDURE — 36415 COLL VENOUS BLD VENIPUNCTURE: CPT

## 2019-10-10 PROCEDURE — 6360000002 HC RX W HCPCS: Performed by: NURSE PRACTITIONER

## 2019-10-10 PROCEDURE — 84132 ASSAY OF SERUM POTASSIUM: CPT

## 2019-10-10 PROCEDURE — 87086 URINE CULTURE/COLONY COUNT: CPT

## 2019-10-10 PROCEDURE — 96375 TX/PRO/DX INJ NEW DRUG ADDON: CPT

## 2019-10-10 RX ORDER — PANTOPRAZOLE SODIUM 40 MG/10ML
40 INJECTION, POWDER, LYOPHILIZED, FOR SOLUTION INTRAVENOUS DAILY
Status: DISCONTINUED | OUTPATIENT
Start: 2019-10-10 | End: 2019-10-12

## 2019-10-10 RX ORDER — MORPHINE SULFATE 2 MG/ML
2 INJECTION, SOLUTION INTRAMUSCULAR; INTRAVENOUS
Status: DISCONTINUED | OUTPATIENT
Start: 2019-10-10 | End: 2019-10-13 | Stop reason: HOSPADM

## 2019-10-10 RX ORDER — METOPROLOL TARTRATE 5 MG/5ML
2.5 INJECTION INTRAVENOUS EVERY 6 HOURS
Status: DISCONTINUED | OUTPATIENT
Start: 2019-10-10 | End: 2019-10-11

## 2019-10-10 RX ORDER — DEXTROSE MONOHYDRATE 50 MG/ML
100 INJECTION, SOLUTION INTRAVENOUS PRN
Status: DISCONTINUED | OUTPATIENT
Start: 2019-10-10 | End: 2019-10-13 | Stop reason: HOSPADM

## 2019-10-10 RX ORDER — SODIUM CHLORIDE 0.9 % (FLUSH) 0.9 %
10 SYRINGE (ML) INJECTION PRN
Status: DISCONTINUED | OUTPATIENT
Start: 2019-10-10 | End: 2019-10-13 | Stop reason: HOSPADM

## 2019-10-10 RX ORDER — ONDANSETRON 2 MG/ML
4 INJECTION INTRAMUSCULAR; INTRAVENOUS EVERY 6 HOURS PRN
Status: DISCONTINUED | OUTPATIENT
Start: 2019-10-10 | End: 2019-10-13 | Stop reason: HOSPADM

## 2019-10-10 RX ORDER — PROMETHAZINE HYDROCHLORIDE 25 MG/ML
6.25 INJECTION, SOLUTION INTRAMUSCULAR; INTRAVENOUS ONCE
Status: DISCONTINUED | OUTPATIENT
Start: 2019-10-10 | End: 2019-10-13 | Stop reason: HOSPADM

## 2019-10-10 RX ORDER — MORPHINE SULFATE 4 MG/ML
4 INJECTION, SOLUTION INTRAMUSCULAR; INTRAVENOUS
Status: COMPLETED | OUTPATIENT
Start: 2019-10-10 | End: 2019-10-10

## 2019-10-10 RX ORDER — METOPROLOL TARTRATE 5 MG/5ML
5 INJECTION INTRAVENOUS EVERY 6 HOURS
Status: DISCONTINUED | OUTPATIENT
Start: 2019-10-10 | End: 2019-10-10

## 2019-10-10 RX ORDER — AMLODIPINE BESYLATE 2.5 MG/1
2.5 TABLET ORAL DAILY
Status: ON HOLD | COMMUNITY
End: 2019-10-13 | Stop reason: HOSPADM

## 2019-10-10 RX ORDER — ACETAMINOPHEN 325 MG/1
650 TABLET ORAL EVERY 4 HOURS PRN
Status: DISCONTINUED | OUTPATIENT
Start: 2019-10-10 | End: 2019-10-13 | Stop reason: HOSPADM

## 2019-10-10 RX ORDER — ONDANSETRON 2 MG/ML
INJECTION INTRAMUSCULAR; INTRAVENOUS
Status: DISPENSED
Start: 2019-10-10 | End: 2019-10-10

## 2019-10-10 RX ORDER — SODIUM CHLORIDE 0.9 % (FLUSH) 0.9 %
10 SYRINGE (ML) INJECTION EVERY 12 HOURS SCHEDULED
Status: DISCONTINUED | OUTPATIENT
Start: 2019-10-10 | End: 2019-10-13 | Stop reason: HOSPADM

## 2019-10-10 RX ORDER — DEXTROSE MONOHYDRATE 25 G/50ML
12.5 INJECTION, SOLUTION INTRAVENOUS PRN
Status: DISCONTINUED | OUTPATIENT
Start: 2019-10-10 | End: 2019-10-13 | Stop reason: HOSPADM

## 2019-10-10 RX ORDER — SODIUM CHLORIDE 9 MG/ML
INJECTION, SOLUTION INTRAVENOUS CONTINUOUS
Status: DISCONTINUED | OUTPATIENT
Start: 2019-10-10 | End: 2019-10-12

## 2019-10-10 RX ORDER — KETOROLAC TROMETHAMINE 30 MG/ML
15 INJECTION, SOLUTION INTRAMUSCULAR; INTRAVENOUS ONCE
Status: DISCONTINUED | OUTPATIENT
Start: 2019-10-10 | End: 2019-10-13 | Stop reason: HOSPADM

## 2019-10-10 RX ORDER — ONDANSETRON 2 MG/ML
4 INJECTION INTRAMUSCULAR; INTRAVENOUS ONCE
Status: COMPLETED | OUTPATIENT
Start: 2019-10-10 | End: 2019-10-10

## 2019-10-10 RX ORDER — MORPHINE SULFATE 4 MG/ML
4 INJECTION, SOLUTION INTRAMUSCULAR; INTRAVENOUS
Status: DISCONTINUED | OUTPATIENT
Start: 2019-10-10 | End: 2019-10-13 | Stop reason: HOSPADM

## 2019-10-10 RX ORDER — NICOTINE POLACRILEX 4 MG
15 LOZENGE BUCCAL PRN
Status: DISCONTINUED | OUTPATIENT
Start: 2019-10-10 | End: 2019-10-13 | Stop reason: HOSPADM

## 2019-10-10 RX ADMIN — ONDANSETRON 4 MG: 2 INJECTION INTRAMUSCULAR; INTRAVENOUS at 19:14

## 2019-10-10 RX ADMIN — INSULIN LISPRO 2 UNITS: 100 INJECTION, SOLUTION INTRAVENOUS; SUBCUTANEOUS at 20:44

## 2019-10-10 RX ADMIN — IOPAMIDOL 75 ML: 755 INJECTION, SOLUTION INTRAVENOUS at 09:36

## 2019-10-10 RX ADMIN — MORPHINE SULFATE 4 MG: 4 INJECTION, SOLUTION INTRAMUSCULAR; INTRAVENOUS at 10:46

## 2019-10-10 RX ADMIN — ONDANSETRON 4 MG: 2 INJECTION INTRAMUSCULAR; INTRAVENOUS at 08:50

## 2019-10-10 RX ADMIN — ONDANSETRON 4 MG: 2 INJECTION INTRAMUSCULAR; INTRAVENOUS at 11:19

## 2019-10-10 RX ADMIN — MORPHINE SULFATE 2 MG: 2 INJECTION, SOLUTION INTRAMUSCULAR; INTRAVENOUS at 19:16

## 2019-10-10 RX ADMIN — METOPROLOL TARTRATE 2.5 MG: 5 INJECTION INTRAVENOUS at 20:17

## 2019-10-10 RX ADMIN — PANTOPRAZOLE SODIUM 40 MG: 40 INJECTION, POWDER, FOR SOLUTION INTRAVENOUS at 15:28

## 2019-10-10 RX ADMIN — SODIUM CHLORIDE: 9 INJECTION, SOLUTION INTRAVENOUS at 15:28

## 2019-10-10 RX ADMIN — MORPHINE SULFATE 4 MG: 4 INJECTION, SOLUTION INTRAMUSCULAR; INTRAVENOUS at 08:50

## 2019-10-10 ASSESSMENT — PAIN DESCRIPTION - LOCATION: LOCATION: ABDOMEN

## 2019-10-10 ASSESSMENT — PAIN DESCRIPTION - ONSET: ONSET: ON-GOING

## 2019-10-10 ASSESSMENT — PAIN DESCRIPTION - PAIN TYPE: TYPE: ACUTE PAIN

## 2019-10-10 ASSESSMENT — PAIN SCALES - GENERAL
PAINLEVEL_OUTOF10: 9
PAINLEVEL_OUTOF10: 10
PAINLEVEL_OUTOF10: 2
PAINLEVEL_OUTOF10: 10
PAINLEVEL_OUTOF10: 0
PAINLEVEL_OUTOF10: 4
PAINLEVEL_OUTOF10: 4

## 2019-10-10 ASSESSMENT — PAIN DESCRIPTION - DESCRIPTORS: DESCRIPTORS: ACHING

## 2019-10-10 ASSESSMENT — PAIN DESCRIPTION - FREQUENCY: FREQUENCY: CONTINUOUS

## 2019-10-10 ASSESSMENT — PAIN DESCRIPTION - PROGRESSION: CLINICAL_PROGRESSION: NOT CHANGED

## 2019-10-11 ENCOUNTER — APPOINTMENT (OUTPATIENT)
Dept: GENERAL RADIOLOGY | Age: 84
DRG: 389 | End: 2019-10-11
Payer: MEDICARE

## 2019-10-11 LAB
ANION GAP SERPL CALCULATED.3IONS-SCNC: 10 MMOL/L (ref 3–16)
BACTERIA: ABNORMAL /HPF
BASOPHILS ABSOLUTE: 0 K/UL (ref 0–0.2)
BASOPHILS RELATIVE PERCENT: 0.2 %
BILIRUBIN URINE: NEGATIVE
BLOOD, URINE: NEGATIVE
BUN BLDV-MCNC: 16 MG/DL (ref 7–20)
CALCIUM SERPL-MCNC: 8.2 MG/DL (ref 8.3–10.6)
CHLORIDE BLD-SCNC: 105 MMOL/L (ref 99–110)
CLARITY: CLEAR
CO2: 24 MMOL/L (ref 21–32)
COLOR: YELLOW
CREAT SERPL-MCNC: 0.7 MG/DL (ref 0.6–1.2)
EKG ATRIAL RATE: 72 BPM
EKG DIAGNOSIS: NORMAL
EKG P AXIS: 73 DEGREES
EKG P-R INTERVAL: 174 MS
EKG Q-T INTERVAL: 380 MS
EKG QRS DURATION: 92 MS
EKG QTC CALCULATION (BAZETT): 416 MS
EKG R AXIS: -5 DEGREES
EKG T AXIS: 51 DEGREES
EKG VENTRICULAR RATE: 72 BPM
EOSINOPHILS ABSOLUTE: 0.2 K/UL (ref 0–0.6)
EOSINOPHILS RELATIVE PERCENT: 1.6 %
EPITHELIAL CELLS, UA: ABNORMAL /HPF
ESTIMATED AVERAGE GLUCOSE: 257.5 MG/DL
GFR AFRICAN AMERICAN: >60
GFR NON-AFRICAN AMERICAN: >60
GLUCOSE BLD-MCNC: 113 MG/DL (ref 70–99)
GLUCOSE BLD-MCNC: 154 MG/DL (ref 70–99)
GLUCOSE BLD-MCNC: 162 MG/DL (ref 70–99)
GLUCOSE BLD-MCNC: 165 MG/DL (ref 70–99)
GLUCOSE BLD-MCNC: 193 MG/DL (ref 70–99)
GLUCOSE URINE: 500 MG/DL
HBA1C MFR BLD: 10.6 %
HCT VFR BLD CALC: 34.3 % (ref 36–48)
HEMOGLOBIN: 11 G/DL (ref 12–16)
KETONES, URINE: NEGATIVE MG/DL
LEUKOCYTE ESTERASE, URINE: NEGATIVE
LYMPHOCYTES ABSOLUTE: 2.1 K/UL (ref 1–5.1)
LYMPHOCYTES RELATIVE PERCENT: 20.4 %
MCH RBC QN AUTO: 24.6 PG (ref 26–34)
MCHC RBC AUTO-ENTMCNC: 32.2 G/DL (ref 31–36)
MCV RBC AUTO: 76.5 FL (ref 80–100)
MICROSCOPIC EXAMINATION: YES
MONOCYTES ABSOLUTE: 1.1 K/UL (ref 0–1.3)
MONOCYTES RELATIVE PERCENT: 10.6 %
NEUTROPHILS ABSOLUTE: 6.9 K/UL (ref 1.7–7.7)
NEUTROPHILS RELATIVE PERCENT: 67.2 %
NITRITE, URINE: NEGATIVE
PDW BLD-RTO: 15.9 % (ref 12.4–15.4)
PERFORMED ON: ABNORMAL
PH UA: 5 (ref 5–8)
PLATELET # BLD: 267 K/UL (ref 135–450)
PMV BLD AUTO: 9.1 FL (ref 5–10.5)
POTASSIUM REFLEX MAGNESIUM: 3.9 MMOL/L (ref 3.5–5.1)
PROTEIN UA: ABNORMAL MG/DL
RBC # BLD: 4.48 M/UL (ref 4–5.2)
RBC UA: ABNORMAL /HPF (ref 0–2)
SODIUM BLD-SCNC: 139 MMOL/L (ref 136–145)
SPECIFIC GRAVITY UA: >=1.03 (ref 1–1.03)
URINE CULTURE, ROUTINE: NORMAL
URINE TYPE: ABNORMAL
UROBILINOGEN, URINE: 0.2 E.U./DL
WBC # BLD: 10.2 K/UL (ref 4–11)
WBC UA: ABNORMAL /HPF (ref 0–5)

## 2019-10-11 PROCEDURE — C9113 INJ PANTOPRAZOLE SODIUM, VIA: HCPCS | Performed by: INTERNAL MEDICINE

## 2019-10-11 PROCEDURE — 99232 SBSQ HOSP IP/OBS MODERATE 35: CPT | Performed by: SURGERY

## 2019-10-11 PROCEDURE — 93010 ELECTROCARDIOGRAM REPORT: CPT | Performed by: INTERNAL MEDICINE

## 2019-10-11 PROCEDURE — 74250 X-RAY XM SM INT 1CNTRST STD: CPT

## 2019-10-11 PROCEDURE — 2500000003 HC RX 250 WO HCPCS: Performed by: INTERNAL MEDICINE

## 2019-10-11 PROCEDURE — APPSS45 APP SPLIT SHARED TIME 31-45 MINUTES: Performed by: CLINICAL NURSE SPECIALIST

## 2019-10-11 PROCEDURE — 80048 BASIC METABOLIC PNL TOTAL CA: CPT

## 2019-10-11 PROCEDURE — 85025 COMPLETE CBC W/AUTO DIFF WBC: CPT

## 2019-10-11 PROCEDURE — 36415 COLL VENOUS BLD VENIPUNCTURE: CPT

## 2019-10-11 PROCEDURE — 2580000003 HC RX 258: Performed by: INTERNAL MEDICINE

## 2019-10-11 PROCEDURE — 6360000002 HC RX W HCPCS: Performed by: INTERNAL MEDICINE

## 2019-10-11 PROCEDURE — 74022 RADEX COMPL AQT ABD SERIES: CPT

## 2019-10-11 PROCEDURE — 6370000000 HC RX 637 (ALT 250 FOR IP): Performed by: INTERNAL MEDICINE

## 2019-10-11 PROCEDURE — 1200000000 HC SEMI PRIVATE

## 2019-10-11 PROCEDURE — 81001 URINALYSIS AUTO W/SCOPE: CPT

## 2019-10-11 RX ORDER — METOPROLOL TARTRATE 5 MG/5ML
5 INJECTION INTRAVENOUS EVERY 6 HOURS
Status: DISCONTINUED | OUTPATIENT
Start: 2019-10-11 | End: 2019-10-12

## 2019-10-11 RX ADMIN — INSULIN LISPRO 1 UNITS: 100 INJECTION, SOLUTION INTRAVENOUS; SUBCUTANEOUS at 08:45

## 2019-10-11 RX ADMIN — METOPROLOL TARTRATE 2.5 MG: 5 INJECTION INTRAVENOUS at 04:11

## 2019-10-11 RX ADMIN — METOPROLOL TARTRATE 5 MG: 5 INJECTION INTRAVENOUS at 20:37

## 2019-10-11 RX ADMIN — METOPROLOL TARTRATE 5 MG: 5 INJECTION INTRAVENOUS at 14:42

## 2019-10-11 RX ADMIN — MORPHINE SULFATE 2 MG: 2 INJECTION, SOLUTION INTRAMUSCULAR; INTRAVENOUS at 11:38

## 2019-10-11 RX ADMIN — MORPHINE SULFATE 2 MG: 2 INJECTION, SOLUTION INTRAMUSCULAR; INTRAVENOUS at 17:43

## 2019-10-11 RX ADMIN — INSULIN LISPRO 1 UNITS: 100 INJECTION, SOLUTION INTRAVENOUS; SUBCUTANEOUS at 11:38

## 2019-10-11 RX ADMIN — ENOXAPARIN SODIUM 40 MG: 40 INJECTION SUBCUTANEOUS at 08:49

## 2019-10-11 RX ADMIN — SODIUM CHLORIDE: 9 INJECTION, SOLUTION INTRAVENOUS at 04:11

## 2019-10-11 RX ADMIN — INSULIN LISPRO 1 UNITS: 100 INJECTION, SOLUTION INTRAVENOUS; SUBCUTANEOUS at 17:40

## 2019-10-11 RX ADMIN — PANTOPRAZOLE SODIUM 40 MG: 40 INJECTION, POWDER, FOR SOLUTION INTRAVENOUS at 08:47

## 2019-10-11 RX ADMIN — ONDANSETRON 4 MG: 2 INJECTION INTRAMUSCULAR; INTRAVENOUS at 11:39

## 2019-10-11 RX ADMIN — Medication 10 ML: at 08:47

## 2019-10-11 RX ADMIN — METOPROLOL TARTRATE 2.5 MG: 5 INJECTION INTRAVENOUS at 08:50

## 2019-10-11 ASSESSMENT — PAIN SCALES - GENERAL
PAINLEVEL_OUTOF10: 5
PAINLEVEL_OUTOF10: 4
PAINLEVEL_OUTOF10: 2

## 2019-10-11 ASSESSMENT — PAIN DESCRIPTION - PAIN TYPE: TYPE: ACUTE PAIN

## 2019-10-11 ASSESSMENT — PAIN DESCRIPTION - FREQUENCY: FREQUENCY: CONTINUOUS

## 2019-10-11 ASSESSMENT — PAIN DESCRIPTION - LOCATION: LOCATION: ABDOMEN

## 2019-10-12 LAB
GLUCOSE BLD-MCNC: 124 MG/DL (ref 70–99)
GLUCOSE BLD-MCNC: 136 MG/DL (ref 70–99)
GLUCOSE BLD-MCNC: 140 MG/DL (ref 70–99)
GLUCOSE BLD-MCNC: 149 MG/DL (ref 70–99)
PERFORMED ON: ABNORMAL

## 2019-10-12 PROCEDURE — C9113 INJ PANTOPRAZOLE SODIUM, VIA: HCPCS | Performed by: INTERNAL MEDICINE

## 2019-10-12 PROCEDURE — 6360000002 HC RX W HCPCS: Performed by: INTERNAL MEDICINE

## 2019-10-12 PROCEDURE — 6370000000 HC RX 637 (ALT 250 FOR IP): Performed by: SURGERY

## 2019-10-12 PROCEDURE — 6370000000 HC RX 637 (ALT 250 FOR IP): Performed by: INTERNAL MEDICINE

## 2019-10-12 PROCEDURE — 1200000000 HC SEMI PRIVATE

## 2019-10-12 PROCEDURE — 2500000003 HC RX 250 WO HCPCS: Performed by: INTERNAL MEDICINE

## 2019-10-12 PROCEDURE — 99232 SBSQ HOSP IP/OBS MODERATE 35: CPT | Performed by: SURGERY

## 2019-10-12 RX ORDER — PANTOPRAZOLE SODIUM 40 MG/1
40 TABLET, DELAYED RELEASE ORAL
Status: DISCONTINUED | OUTPATIENT
Start: 2019-10-13 | End: 2019-10-13 | Stop reason: HOSPADM

## 2019-10-12 RX ORDER — FUROSEMIDE 20 MG/1
20 TABLET ORAL DAILY
Status: DISCONTINUED | OUTPATIENT
Start: 2019-10-12 | End: 2019-10-13 | Stop reason: HOSPADM

## 2019-10-12 RX ORDER — ASPIRIN 81 MG/1
81 TABLET, CHEWABLE ORAL DAILY
Status: DISCONTINUED | OUTPATIENT
Start: 2019-10-12 | End: 2019-10-13 | Stop reason: HOSPADM

## 2019-10-12 RX ORDER — METOPROLOL TARTRATE 5 MG/5ML
5 INJECTION INTRAVENOUS EVERY 6 HOURS PRN
Status: DISCONTINUED | OUTPATIENT
Start: 2019-10-12 | End: 2019-10-13 | Stop reason: HOSPADM

## 2019-10-12 RX ORDER — PRAVASTATIN SODIUM 20 MG
10 TABLET ORAL NIGHTLY
Status: DISCONTINUED | OUTPATIENT
Start: 2019-10-12 | End: 2019-10-13 | Stop reason: HOSPADM

## 2019-10-12 RX ORDER — AMLODIPINE BESYLATE 2.5 MG/1
2.5 TABLET ORAL DAILY
Status: DISCONTINUED | OUTPATIENT
Start: 2019-10-12 | End: 2019-10-13

## 2019-10-12 RX ORDER — VALSARTAN 80 MG/1
80 TABLET ORAL DAILY
Status: DISCONTINUED | OUTPATIENT
Start: 2019-10-12 | End: 2019-10-13 | Stop reason: HOSPADM

## 2019-10-12 RX ADMIN — VALSARTAN 80 MG: 80 TABLET, FILM COATED ORAL at 14:48

## 2019-10-12 RX ADMIN — FUROSEMIDE 20 MG: 20 TABLET ORAL at 10:31

## 2019-10-12 RX ADMIN — METOPROLOL TARTRATE 25 MG: 25 TABLET ORAL at 20:50

## 2019-10-12 RX ADMIN — ENOXAPARIN SODIUM 40 MG: 40 INJECTION SUBCUTANEOUS at 08:06

## 2019-10-12 RX ADMIN — ASPIRIN 81 MG 81 MG: 81 TABLET ORAL at 20:51

## 2019-10-12 RX ADMIN — PANTOPRAZOLE SODIUM 40 MG: 40 INJECTION, POWDER, FOR SOLUTION INTRAVENOUS at 08:06

## 2019-10-12 RX ADMIN — METOPROLOL TARTRATE 5 MG: 5 INJECTION INTRAVENOUS at 02:55

## 2019-10-12 RX ADMIN — PRAVASTATIN SODIUM 10 MG: 20 TABLET ORAL at 20:51

## 2019-10-12 RX ADMIN — METOPROLOL TARTRATE 5 MG: 5 INJECTION INTRAVENOUS at 08:02

## 2019-10-12 RX ADMIN — AMLODIPINE BESYLATE 2.5 MG: 2.5 TABLET ORAL at 10:31

## 2019-10-12 RX ADMIN — INSULIN LISPRO 1 UNITS: 100 INJECTION, SOLUTION INTRAVENOUS; SUBCUTANEOUS at 08:07

## 2019-10-13 VITALS
HEIGHT: 64 IN | RESPIRATION RATE: 19 BRPM | OXYGEN SATURATION: 97 % | DIASTOLIC BLOOD PRESSURE: 76 MMHG | WEIGHT: 180 LBS | SYSTOLIC BLOOD PRESSURE: 160 MMHG | HEART RATE: 57 BPM | TEMPERATURE: 97.3 F | BODY MASS INDEX: 30.73 KG/M2

## 2019-10-13 LAB
BASOPHILS ABSOLUTE: 0 K/UL (ref 0–0.2)
BASOPHILS RELATIVE PERCENT: 0.4 %
EOSINOPHILS ABSOLUTE: 0.3 K/UL (ref 0–0.6)
EOSINOPHILS RELATIVE PERCENT: 3.8 %
GLUCOSE BLD-MCNC: 152 MG/DL (ref 70–99)
GLUCOSE BLD-MCNC: 190 MG/DL (ref 70–99)
HCT VFR BLD CALC: 37.2 % (ref 36–48)
HEMOGLOBIN: 11.9 G/DL (ref 12–16)
LYMPHOCYTES ABSOLUTE: 1.9 K/UL (ref 1–5.1)
LYMPHOCYTES RELATIVE PERCENT: 25.9 %
MCH RBC QN AUTO: 24.4 PG (ref 26–34)
MCHC RBC AUTO-ENTMCNC: 31.9 G/DL (ref 31–36)
MCV RBC AUTO: 76.6 FL (ref 80–100)
MONOCYTES ABSOLUTE: 0.9 K/UL (ref 0–1.3)
MONOCYTES RELATIVE PERCENT: 11.5 %
NEUTROPHILS ABSOLUTE: 4.4 K/UL (ref 1.7–7.7)
NEUTROPHILS RELATIVE PERCENT: 58.4 %
PDW BLD-RTO: 15.6 % (ref 12.4–15.4)
PERFORMED ON: ABNORMAL
PERFORMED ON: ABNORMAL
PLATELET # BLD: 262 K/UL (ref 135–450)
PMV BLD AUTO: 9 FL (ref 5–10.5)
RBC # BLD: 4.86 M/UL (ref 4–5.2)
WBC # BLD: 7.5 K/UL (ref 4–11)

## 2019-10-13 PROCEDURE — 6370000000 HC RX 637 (ALT 250 FOR IP): Performed by: INTERNAL MEDICINE

## 2019-10-13 PROCEDURE — 36415 COLL VENOUS BLD VENIPUNCTURE: CPT

## 2019-10-13 PROCEDURE — 85025 COMPLETE CBC W/AUTO DIFF WBC: CPT

## 2019-10-13 PROCEDURE — 99232 SBSQ HOSP IP/OBS MODERATE 35: CPT | Performed by: SURGERY

## 2019-10-13 PROCEDURE — 2580000003 HC RX 258: Performed by: INTERNAL MEDICINE

## 2019-10-13 PROCEDURE — 6370000000 HC RX 637 (ALT 250 FOR IP): Performed by: SURGERY

## 2019-10-13 RX ORDER — AMLODIPINE BESYLATE 2.5 MG/1
2.5 TABLET ORAL ONCE
Status: COMPLETED | OUTPATIENT
Start: 2019-10-13 | End: 2019-10-13

## 2019-10-13 RX ORDER — AMLODIPINE BESYLATE 5 MG/1
5 TABLET ORAL DAILY
Qty: 30 TABLET | Refills: 3 | Status: SHIPPED | OUTPATIENT
Start: 2019-10-14 | End: 2020-06-03 | Stop reason: DRUGHIGH

## 2019-10-13 RX ORDER — AMLODIPINE BESYLATE 5 MG/1
5 TABLET ORAL DAILY
Status: DISCONTINUED | OUTPATIENT
Start: 2019-10-14 | End: 2019-10-13 | Stop reason: HOSPADM

## 2019-10-13 RX ORDER — METOPROLOL TARTRATE 50 MG/1
50 TABLET, FILM COATED ORAL 2 TIMES DAILY
Status: DISCONTINUED | OUTPATIENT
Start: 2019-10-13 | End: 2019-10-13

## 2019-10-13 RX ADMIN — VALSARTAN 80 MG: 80 TABLET, FILM COATED ORAL at 09:26

## 2019-10-13 RX ADMIN — INSULIN LISPRO 1 UNITS: 100 INJECTION, SOLUTION INTRAVENOUS; SUBCUTANEOUS at 09:27

## 2019-10-13 RX ADMIN — MAGNESIUM HYDROXIDE 30 ML: 400 SUSPENSION ORAL at 09:26

## 2019-10-13 RX ADMIN — FUROSEMIDE 20 MG: 20 TABLET ORAL at 09:26

## 2019-10-13 RX ADMIN — ASPIRIN 81 MG 81 MG: 81 TABLET ORAL at 09:26

## 2019-10-13 RX ADMIN — PANTOPRAZOLE SODIUM 40 MG: 40 TABLET, DELAYED RELEASE ORAL at 05:45

## 2019-10-13 RX ADMIN — AMLODIPINE BESYLATE 2.5 MG: 2.5 TABLET ORAL at 13:44

## 2019-10-13 RX ADMIN — INSULIN LISPRO 1 UNITS: 100 INJECTION, SOLUTION INTRAVENOUS; SUBCUTANEOUS at 12:06

## 2019-10-13 RX ADMIN — AMLODIPINE BESYLATE 2.5 MG: 2.5 TABLET ORAL at 09:26

## 2019-10-13 RX ADMIN — METOPROLOL TARTRATE 25 MG: 25 TABLET ORAL at 09:26

## 2019-10-13 RX ADMIN — Medication 10 ML: at 09:26

## 2019-10-15 ENCOUNTER — HOSPITAL ENCOUNTER (OUTPATIENT)
Age: 84
Discharge: HOME | End: 2019-10-15
Payer: MEDICARE

## 2019-10-15 DIAGNOSIS — I50.32 CHRONIC DIASTOLIC HEART FAILURE (HCC): ICD-10-CM

## 2019-10-15 LAB
ANION GAP SERPL CALCULATED.3IONS-SCNC: 16 MMOL/L (ref 3–16)
BUN BLDV-MCNC: 15 MG/DL (ref 7–20)
CALCIUM SERPL-MCNC: 8.5 MG/DL (ref 8.3–10.6)
CHLORIDE BLD-SCNC: 99 MMOL/L (ref 99–110)
CO2: 23 MMOL/L (ref 21–32)
CREAT SERPL-MCNC: 0.9 MG/DL (ref 0.6–1.2)
GFR AFRICAN AMERICAN: >60
GFR NON-AFRICAN AMERICAN: 60
GLUCOSE BLD-MCNC: 229 MG/DL (ref 70–99)
POTASSIUM SERPL-SCNC: 4.4 MMOL/L (ref 3.5–5.1)
PRO-BNP: 150 PG/ML (ref 0–449)
SODIUM BLD-SCNC: 138 MMOL/L (ref 136–145)

## 2019-10-15 PROCEDURE — 80048 BASIC METABOLIC PNL TOTAL CA: CPT

## 2019-10-15 PROCEDURE — 83880 ASSAY OF NATRIURETIC PEPTIDE: CPT

## 2019-10-15 PROCEDURE — 36415 COLL VENOUS BLD VENIPUNCTURE: CPT

## 2019-10-22 ENCOUNTER — OFFICE VISIT (OUTPATIENT)
Dept: CARDIOLOGY CLINIC | Age: 84
End: 2019-10-22
Payer: MEDICARE

## 2019-10-22 VITALS
HEART RATE: 60 BPM | OXYGEN SATURATION: 99 % | HEIGHT: 62 IN | BODY MASS INDEX: 33.9 KG/M2 | DIASTOLIC BLOOD PRESSURE: 62 MMHG | WEIGHT: 184.2 LBS | SYSTOLIC BLOOD PRESSURE: 140 MMHG

## 2019-10-22 DIAGNOSIS — I50.32 CHRONIC DIASTOLIC HEART FAILURE (HCC): Primary | ICD-10-CM

## 2019-10-22 DIAGNOSIS — I48.0 PAF (PAROXYSMAL ATRIAL FIBRILLATION) (HCC): ICD-10-CM

## 2019-10-22 DIAGNOSIS — I10 ESSENTIAL HYPERTENSION: ICD-10-CM

## 2019-10-22 PROCEDURE — 99214 OFFICE O/P EST MOD 30 MIN: CPT | Performed by: INTERNAL MEDICINE

## 2020-01-21 ENCOUNTER — OFFICE VISIT (OUTPATIENT)
Dept: CARDIOLOGY CLINIC | Age: 85
End: 2020-01-21
Payer: MEDICARE

## 2020-01-21 VITALS
WEIGHT: 185 LBS | DIASTOLIC BLOOD PRESSURE: 62 MMHG | HEART RATE: 107 BPM | HEIGHT: 62 IN | OXYGEN SATURATION: 99 % | BODY MASS INDEX: 34.04 KG/M2 | SYSTOLIC BLOOD PRESSURE: 130 MMHG

## 2020-01-21 PROCEDURE — 93000 ELECTROCARDIOGRAM COMPLETE: CPT | Performed by: INTERNAL MEDICINE

## 2020-01-21 PROCEDURE — 99215 OFFICE O/P EST HI 40 MIN: CPT | Performed by: INTERNAL MEDICINE

## 2020-01-21 RX ORDER — FUROSEMIDE 20 MG/1
TABLET ORAL
Qty: 90 TABLET | Refills: 3 | Status: SHIPPED | OUTPATIENT
Start: 2020-01-21 | End: 2021-01-18 | Stop reason: SDUPTHER

## 2020-01-21 RX ORDER — METOPROLOL TARTRATE 50 MG/1
50 TABLET, FILM COATED ORAL 2 TIMES DAILY
Qty: 60 TABLET | Refills: 5 | Status: SHIPPED | OUTPATIENT
Start: 2020-01-21 | End: 2020-07-22

## 2020-01-21 NOTE — LETTER
[facility]  Advanced CHF/Pulmonary Hypertension   Cardiac Evaluation      Sanket Tiwari  YOB: 1935    Date of Visit:  1/21/20      Chief Complaint   Patient presents with    Follow-up    Chest Pain    Shortness of Breath          History of Present Illness:  Sanket Tiwari is a 81 y.o. female who presents from referral from [facility] for consultation and management of severe pulmonary HTN. She has a history of paroxysmal atrial fibrillation, HTN, SVT, and DM. She had SVT in 2013, wore an event monitor, and atrial fibrillation was detected. She had recurrent atrial fibrillation in 2014 and had a cryoablation for PAF in 2/2014. Post procedure she had a retroperitoneal bleed and required a urinary stent. She has had no known recurrence of afib post ablation but has had palpitations. She was admitted in 1/2019 with SBO versus ileus that was medically managed. Her echo from 02/19/19 showed her EF was 55-60%. She has mild MR, TR and DC. SPAP 60mmHg consistent with severe pulmonary HTN. She reports about 1 month ago (02/2019) she had an episode of palpitations at 200 bpm for about 1 hour. She has not had a sleep study. She reports occasional chest twinge. She states the twinge occurs with fast heart rate. She reports her breathing is stable now. She did have SOB with walking frequently but this has subsided. She denies dizziness or syncope. She states she has been on lasix for 2-3 weeks. Her cardiac monitor from 04/2019 showed no arrhythmias. She was admitted 10/10-10/13/19 with SBO. General surgery was consulted and the SBO resolved without surgery. On 10/22/19 she reported her SBO occurred at the bowel resection site. Today she reports SOB and palpitations. She reports the symptoms started about 3-4 weeks intermittently. She thought she was in AFib again. Her EKG shows AFib . She denies CP, dizziness, BLE edema or syncope.   She Socioeconomic History    Marital status:      Spouse name: Not on file    Number of children: Not on file    Years of education: Not on file    Highest education level: Not on file   Occupational History    Not on file   Social Needs    Financial resource strain: Not on file    Food insecurity:     Worry: Not on file     Inability: Not on file    Transportation needs:     Medical: Not on file     Non-medical: Not on file   Tobacco Use    Smoking status: Never Smoker    Smokeless tobacco: Never Used   Substance and Sexual Activity    Alcohol use: No    Drug use: No    Sexual activity: Yes     Partners: Male   Lifestyle    Physical activity:     Days per week: Not on file     Minutes per session: Not on file    Stress: Not on file   Relationships    Social connections:     Talks on phone: Not on file     Gets together: Not on file     Attends Gnosticist service: Not on file     Active member of club or organization: Not on file     Attends meetings of clubs or organizations: Not on file     Relationship status: Not on file    Intimate partner violence:     Fear of current or ex partner: Not on file     Emotionally abused: Not on file     Physically abused: Not on file     Forced sexual activity: Not on file   Other Topics Concern    Not on file   Social History Narrative    Not on file       Review of Systems:   · Constitutional: there has been no unanticipated weight loss. There's been no change in energy level, sleep pattern, or activity level. · Eyes: No visual changes or diplopia. No scleral icterus. · ENT: No Headaches, hearing loss or vertigo. No mouth sores or sore throat. · Cardiovascular: Reviewed in HPI  · Respiratory: No cough or wheezing, no sputum production. No hematemesis. · Gastrointestinal: No abdominal pain, appetite loss, blood in stools. No change in bowel or bladder habits. · Genitourinary: No dysuria, trouble voiding, or hematuria.

## 2020-01-21 NOTE — PATIENT INSTRUCTIONS
Plan:  1. Start Eliquis 5 mg twice daily. Continue aspirin  2. Increase metoprolol to 50 mg twice daily  3. Increase lasix to 20 mg daily  4. CBC, CMP, BNP in 1 week  5.  Follow up with DR Damion Jacobs as soon as possible

## 2020-01-21 NOTE — PROGRESS NOTES
[address]  Advanced CHF/Pulmonary Hypertension   Cardiac Evaluation      Parrish Tiwari  YOB: 1935    Date of Visit:  1/21/20      Chief Complaint   Patient presents with    Follow-up    Chest Pain    Shortness of Breath          History of Present Illness:  Parrish Tiwari is a 81 y.o. female who presents from referral from [facility] for consultation and management of severe pulmonary HTN. She has a history of paroxysmal atrial fibrillation, HTN, SVT, and DM. She had SVT in 2013, wore an event monitor, and atrial fibrillation was detected. She had recurrent atrial fibrillation in 2014 and had a cryoablation for PAF in 2/2014. Post procedure she had a retroperitoneal bleed and required a urinary stent. She has had no known recurrence of afib post ablation but has had palpitations. She was admitted in 1/2019 with SBO versus ileus that was medically managed. Her echo from 02/19/19 showed her EF was 55-60%. She has mild MR, TR and NE. SPAP 60mmHg consistent with severe pulmonary HTN. She reports about 1 month ago (02/2019) she had an episode of palpitations at 200 bpm for about 1 hour. She has not had a sleep study. She reports occasional chest twinge. She states the twinge occurs with fast heart rate. She reports her breathing is stable now. She did have SOB with walking frequently but this has subsided. She denies dizziness or syncope. She states she has been on lasix for 2-3 weeks. Her cardiac monitor from 04/2019 showed no arrhythmias. She was admitted 10/10-10/13/19 with SBO. General surgery was consulted and the SBO resolved without surgery. On 10/22/19 she reported her SBO occurred at the bowel resection site. Today she reports SOB and palpitations. She reports the symptoms started about 3-4 weeks intermittently. She thought she was in AFib again. Her EKG shows AFib . She denies CP, dizziness, BLE edema or syncope.   She has noticed that her heart joint complaints. · Integumentary: No rash or pruritis. · Neurological: No headache, diplopia, change in muscle strength, numbness or tingling. No change in gait, balance, coordination, mood, affect, memory, mentation, behavior. · Psychiatric: No anxiety, no depression. · Endocrine: No malaise, fatigue or temperature intolerance. No excessive thirst, fluid intake, or urination. No tremor. · Hematologic/Lymphatic: No abnormal bruising or bleeding, blood clots or swollen lymph nodes. · Allergic/Immunologic: No nasal congestion or hives. Physical Examination:    Vitals:    01/21/20 1303 01/21/20 1321   BP: 130/62    Pulse: 56 107   SpO2: 99%    Weight: 185 lb (83.9 kg)    Height: 5' 2\" (1.575 m)      Body mass index is 33.84 kg/m². Wt Readings from Last 3 Encounters:   01/21/20 185 lb (83.9 kg)   10/22/19 184 lb 3.2 oz (83.6 kg)   10/10/19 180 lb (81.6 kg)     BP Readings from Last 3 Encounters:   01/21/20 130/62   10/22/19 (!) 140/62   10/13/19 (!) 160/76            Constitutional and General Appearance:   WD/WN in NAD,   HEENT:  NC/AT  ROSARIO  No problems with hearing  Skin:  Warm, dry  Respiratory:  · Normal excursion and expansion without use of accessory muscles  · Resp Auscultation: Normal breath sounds without dullness  Cardiovascular:  · The apical impulses not displaced  · Heart tones are crisp and normal  · Cervical veins are not engorged  · The carotid upstroke is normal in amplitude and contour without delay or bruit  · JVP normal  Irregularly irregular rhythm with nl S1 and S2 without m,r,g  · Peripheral pulses are symmetrical and full  · There is no clubbing, cyanosis of the extremities.   · Trace bilateral edema  · Femoral Arteries: 2+ and equal  · Pedal Pulses: 2+ and equal   Neck:  · No thyromegaly  Abdomen:  · No masses or tenderness  · Liver/Spleen: No Abnormalities Noted  Neurological/Psychiatric:  · Alert and oriented in all spheres  · Moves all extremities well  · Exhibits normal gait under my direction and personally reviewed by me in its entirety. I confirm that the note above accurately reflects all work, treatment, procedures, and medical decision making performed by me. Spent 50 minutes with patient reviewing chart, examining patient, and developing plan of care. I appreciate the opportunity of cooperating in the care of this patient.     Norm Bobo M.D., [facility]

## 2020-01-28 ENCOUNTER — HOSPITAL ENCOUNTER (OUTPATIENT)
Age: 85
Discharge: HOME | End: 2020-01-28
Payer: MEDICARE

## 2020-01-28 LAB
A/G RATIO: 0.9 (ref 1.1–2.2)
ALBUMIN SERPL-MCNC: 3.6 G/DL (ref 3.4–5)
ALP BLD-CCNC: 117 U/L (ref 40–129)
ALT SERPL-CCNC: 18 U/L (ref 10–40)
ANION GAP SERPL CALCULATED.3IONS-SCNC: 10 MMOL/L (ref 3–16)
AST SERPL-CCNC: 20 U/L (ref 15–37)
BASOPHILS ABSOLUTE: 0.1 K/UL (ref 0–0.2)
BASOPHILS RELATIVE PERCENT: 1 %
BILIRUB SERPL-MCNC: 0.4 MG/DL (ref 0–1)
BUN BLDV-MCNC: 23 MG/DL (ref 7–20)
CALCIUM SERPL-MCNC: 8.7 MG/DL (ref 8.3–10.6)
CHLORIDE BLD-SCNC: 98 MMOL/L (ref 99–110)
CO2: 25 MMOL/L (ref 21–32)
CREAT SERPL-MCNC: 1 MG/DL (ref 0.6–1.2)
EOSINOPHILS ABSOLUTE: 0.4 K/UL (ref 0–0.6)
EOSINOPHILS RELATIVE PERCENT: 4.2 %
GFR AFRICAN AMERICAN: >60
GFR NON-AFRICAN AMERICAN: 53
GLOBULIN: 3.9 G/DL
GLUCOSE BLD-MCNC: 335 MG/DL (ref 70–99)
HCT VFR BLD CALC: 40.3 % (ref 36–48)
HEMOGLOBIN: 12.6 G/DL (ref 12–16)
LYMPHOCYTES ABSOLUTE: 2.2 K/UL (ref 1–5.1)
LYMPHOCYTES RELATIVE PERCENT: 23.7 %
MCH RBC QN AUTO: 23.9 PG (ref 26–34)
MCHC RBC AUTO-ENTMCNC: 31.3 G/DL (ref 31–36)
MCV RBC AUTO: 76.3 FL (ref 80–100)
MONOCYTES ABSOLUTE: 0.9 K/UL (ref 0–1.3)
MONOCYTES RELATIVE PERCENT: 9.2 %
NEUTROPHILS ABSOLUTE: 5.8 K/UL (ref 1.7–7.7)
NEUTROPHILS RELATIVE PERCENT: 61.9 %
PDW BLD-RTO: 16.3 % (ref 12.4–15.4)
PLATELET # BLD: 280 K/UL (ref 135–450)
PMV BLD AUTO: 10 FL (ref 5–10.5)
POTASSIUM SERPL-SCNC: 4.5 MMOL/L (ref 3.5–5.1)
PRO-BNP: 1854 PG/ML (ref 0–449)
RBC # BLD: 5.28 M/UL (ref 4–5.2)
SODIUM BLD-SCNC: 133 MMOL/L (ref 136–145)
TOTAL PROTEIN: 7.5 G/DL (ref 6.4–8.2)
WBC # BLD: 9.3 K/UL (ref 4–11)

## 2020-01-28 PROCEDURE — 36415 COLL VENOUS BLD VENIPUNCTURE: CPT

## 2020-01-28 PROCEDURE — 80053 COMPREHEN METABOLIC PANEL: CPT

## 2020-01-28 PROCEDURE — 83880 ASSAY OF NATRIURETIC PEPTIDE: CPT

## 2020-01-28 PROCEDURE — 85025 COMPLETE CBC W/AUTO DIFF WBC: CPT

## 2020-01-29 ENCOUNTER — TELEPHONE (OUTPATIENT)
Dept: CARDIOLOGY CLINIC | Age: 85
End: 2020-01-29

## 2020-01-29 NOTE — TELEPHONE ENCOUNTER
Created telephone encounter. Spoke with Diaz Isaacs relayed message per NPRB regarding labs. Pt verbalized understanding. Pt states she has SOB. The SOB gets worse as the days goes on. Her daily weight for the past week has been 185-183 lb. She says that is steady and she has no swelling. She has been taking lasix 20 mg two tabs in the morning. Lab orders placed in epic, she will have them one in 1 week.

## 2020-01-29 NOTE — TELEPHONE ENCOUNTER
----- Message from GUANAKO Benavidez CNP sent at 1/28/2020  4:41 PM EST -----  Covering for SHELLY. Please get an update on her breathing, weights and symptoms since last med changes, adding the lasix. Need to repeat BMP, BNP and TIBC and iron sat in 1 week.

## 2020-01-30 NOTE — PROGRESS NOTES
[address]             Electrophysiology note  Date:  1/31/2020   Patient: Alison Rdz Payer  Age:  81 y.o. SUBJECTIVE:     Ruben Cobian is a 81 y.o. female who initially presented for follow up of Paroxysmal atrial fibrillation. Initially she presented to the hospital in August 2013 for rapid heart rate, palpitations and dizziness lasting about an hour. Patient had similar episode 2 days prior to admission which was self terminating and lasted for 1 hour. In ER patient was found to be in SVT. She wore an MCOT monitor in October showing Atrial fib for 3 hrs and 32 minutes (longest episode lasted 2 hrs and 43 minutes, highest rate of A fib was 160 bpm). She was readmitted on 1/25/14 with racing heart and associated chest pain with radiation to neck and arms. EKG showed Atrial fib with RVR. She was treated with IV Diltiazem. She spontaneously converted to SR prior to discharge. After discharge, she had two additional episodes of racing heart rate including this morning at 11 am which makes her feel poorly including feeling weak. She was taking coumadin. Her  INR was 2.5 on 1/23/14. She was taking Diltiazem  mg daily. She underwent successful cryoablation for atrial fib on 02/11/2014. She had complications after the procedure with retroperitoneal bleed. She had urinary stent placement for retroperitoneal hematoma. On 5/4/2018 her EKG showed sinus rhythm rate 66    Interval history since last office visit:   She was admitted in 1/2019 with small bowel obstruction versus ileus that was medically managed. Her EKG from 1/10/2019 shows sinus tach with a HR of 108. Her heart monitor from 4/4/19 showed no arrhythmias. She saw Dr. Areli Valdovinos on 1/21/20 with complaints of shortness of breath and palpitations. Her EKG showed afib with 107 bpm.  She was started on Eliquis and her metoprolol was increased.     Today she states she has been short of breath and following with  tablet, Rfl: 3       Review of system:  All 14-point review of systems are completed and pertinent positives are mentioned in the history of present illness. Other systems are reviewed and are negative. Diagnostics:   EC20  AFib 107 bpm  Echo: 2013  Conclusions  Summary  Overall left ventricular function is normal.  Ejection fraction is visually estimated to be 50-55 %. There is trivial tricuspid regurgitation with RVSP estimated at 30 mmHg. Limited 2-D echocardiogram  There is limited visualization of the valvular structures but no obvious  major abnormalities noted. There is reversal of E/A inflow velocities across the mitral valve  suggesting impaired left ventricular relaxation. ECHO:  19  Summary   Definity contrast administered. Normal left ventricular systolic function with an estimated ejection   fraction of 55-60%. There is mild septal hypertrophy with normal remaining wall thickness. Normal left ventricular diastolic filling pressure. Mild mitral regurgitation. Mild tricuspid regurgitation. Systolic pulmonary artery pressure (SPAP) estimated at 60 mmHg (RA pressure   3 mmHg), consistent with severe pulmonary hypertension. Mild pulmonic regurgitation present. LA Area: 20.1 cm2                                LA volume/Index: 58.67 ml /33 ml/m2    [id number] Score for Atrial Fibrillation Stroke Risk   Risk   Factors  Component Value   C CHF No 0   H HTN Yes 1   A2 Age >= 76 Yes,  (81 y.o.) 2   D DM Yes 1   S2 Prior Stroke/TIA No 0   V Vascular Disease No 0   A Age 74-69 No,  (81 y.o.) 0   Sc Sex female 1    [id number]  Score  5   Score last updated 3/57/22 67:02 PM    Click here for a link to the UpToDate guideline \"Atrial Fibrillation: Anticoagulation therapy to prevent embolization    Disclaimer: Risk Score calculation is dependent on accuracy of patient problem list and past encounter diagnosis.       ASSESSMENT :  1.  Paroxysmal atrial fibrillation(ongoing with symptoms)   ~s/p cryoablation in 2/2014    ~[id number] Score 5 (HTN, age, DM, and gender)   ~Eliquis 5mg twice a day started on 1/21/20 for new onset atrial fibrillation   ~Metoprolol was increased to 50mg twice day on 1/21/20 for the tachycardia  2. Supraventricular tachycardia   ~noted 2013(no recent episode documented)   3. Hypertension   -Stable  4. Diabetes mellitus    ~A1C 10.7 11/22/19   ~Risk of poorly controlled diabetes discussed    RECOMMENDATION:  1. Amiodarone 200 mg one daily in morning with food. Risk of Amiodarone therapy and need to monitor closely discussed   2. Follow up here in 3 weeks for EKG  3. Recommend a cardioversion to restore rhythm if patient remains in AF in 3 weeks on Amiodarone. Risk, benefit, alternative, rationale of the procedure were discussed with the patient     Given the complex nature of the disease no guarantee was given on the success of the procedure. Explained to patient that discontinuation of anticoagulation is not an indication for the procedure. This note was scribed in the presence of DANIELLE Moya MD by Adri Olivas RN.     I, Nora Roberts, personally performed the services described in this documentation as scribed by the above signed scribe in my presence, and it is both accurate and complete to the best of our ability and knowledge. I agree with the details independently gathered by my clinical support staff, while the remaining scribed note accurately describes my personal service to the patient. The above RN is working as a scribe for and in the presence of myself . Working as a scribe, the RN may have prepopulated components of this note with my historical intellectual property under my direct supervision. Any additions to this intellectual property were performed at my direction. Furthermore, the content and accuracy of this note have been reviewed by me to the best of my ability. QUALITY MEASURES  1. Tobacco Cessation Counseling: NA  2. Retake of BP if >140/90:   NA  3. Documentation to PCP/referring for new patient:  Sent to PCP at close of office visit  4. CAD patient on anti-platelet: NA  5. CAD patient on STATIN therapy:  NA  6. Patient with CHF and aFib on anticoagulation:  Yes       This note was scribed in the presence of DANIELLE Ledesma MD by Malik Rosales RN.         ALF DamianC. Electrophysiology  [facility]. [address] [place] [address]. [address].   [place] [address]  Phone: [phone]  Fax: [phone]   1/31/2020  12:13 PM

## 2020-01-31 ENCOUNTER — OFFICE VISIT (OUTPATIENT)
Dept: CARDIOLOGY CLINIC | Age: 85
End: 2020-01-31
Payer: MEDICARE

## 2020-01-31 VITALS
SYSTOLIC BLOOD PRESSURE: 128 MMHG | HEART RATE: 94 BPM | OXYGEN SATURATION: 99 % | HEIGHT: 62 IN | WEIGHT: 184 LBS | DIASTOLIC BLOOD PRESSURE: 70 MMHG | BODY MASS INDEX: 33.86 KG/M2

## 2020-01-31 PROCEDURE — 99214 OFFICE O/P EST MOD 30 MIN: CPT | Performed by: INTERNAL MEDICINE

## 2020-01-31 NOTE — PATIENT INSTRUCTIONS
RECOMMENDATION:  1. Amiodarone 200mg one daily in morning with food. 2.  Follow up here in 3 weeks for EKG  3. Recommend a cardioversion to shock your heart back into rhythm. Risk, benefit, alternative, rationale of the procedure were discussed with the patient which included but were not limited to allergic reaction to the medications, pain, bleeding, infection, nerve injury, injury to diaphragm(breathing muscle), pulmonary embolus(blood clot in lungs), deep vein blood clot, pneumothorax, hemothorax, acute renal failure, cardiac perforation,  tamponade, need for emergent surgery (open heart), need for any future surgery, pulmonary vein stenosis requiring stent or angioplasty, left atrial to esophageal fistula requiring emergent surgery, stroke, myocardial infarction, need for permanent pacemaker, lead dislodgement and death. Given the complex nature of the disease no guarantee was given on the success of the procedure. Explained to patient that discontinuation of anticoagulation is not an indication for the procedure. This note was scribed in the presence of DANIELLE oMta MD by Jose Adams RN.

## 2020-02-02 RX ORDER — AMIODARONE HYDROCHLORIDE 200 MG/1
200 TABLET ORAL DAILY
Qty: 30 TABLET | Refills: 3 | Status: SHIPPED | OUTPATIENT
Start: 2020-02-02 | End: 2020-04-28

## 2020-02-05 ENCOUNTER — HOSPITAL ENCOUNTER (OUTPATIENT)
Age: 85
Discharge: HOME | End: 2020-02-05
Payer: MEDICARE

## 2020-02-05 LAB
ANION GAP SERPL CALCULATED.3IONS-SCNC: 10 MMOL/L (ref 3–16)
BUN BLDV-MCNC: 18 MG/DL (ref 7–20)
CALCIUM SERPL-MCNC: 8.6 MG/DL (ref 8.3–10.6)
CHLORIDE BLD-SCNC: 102 MMOL/L (ref 99–110)
CO2: 26 MMOL/L (ref 21–32)
CREAT SERPL-MCNC: 0.9 MG/DL (ref 0.6–1.2)
GFR AFRICAN AMERICAN: >60
GFR NON-AFRICAN AMERICAN: 60
GLUCOSE BLD-MCNC: 192 MG/DL (ref 70–99)
IRON SATURATION: 11 % (ref 15–50)
IRON: 42 UG/DL (ref 37–145)
POTASSIUM SERPL-SCNC: 4.6 MMOL/L (ref 3.5–5.1)
PRO-BNP: 608 PG/ML (ref 0–449)
SODIUM BLD-SCNC: 138 MMOL/L (ref 136–145)
TOTAL IRON BINDING CAPACITY: 387 UG/DL (ref 260–445)

## 2020-02-05 PROCEDURE — 83550 IRON BINDING TEST: CPT

## 2020-02-05 PROCEDURE — 80048 BASIC METABOLIC PNL TOTAL CA: CPT

## 2020-02-05 PROCEDURE — 83880 ASSAY OF NATRIURETIC PEPTIDE: CPT

## 2020-02-05 PROCEDURE — 36415 COLL VENOUS BLD VENIPUNCTURE: CPT

## 2020-02-05 PROCEDURE — 83540 ASSAY OF IRON: CPT

## 2020-02-06 ENCOUNTER — TELEPHONE (OUTPATIENT)
Dept: CARDIOLOGY CLINIC | Age: 85
End: 2020-02-06

## 2020-02-06 RX ORDER — FERROUS SULFATE 325(65) MG
325 TABLET ORAL
Qty: 30 TABLET | Refills: 5 | Status: CANCELLED | OUTPATIENT
Start: 2020-02-06

## 2020-02-06 NOTE — TELEPHONE ENCOUNTER
----- Message from GUANAKO Savage CNP sent at 2/6/2020 11:34 AM EST -----  Covering for SHELLY. Please get an update on her weights and breathing. Heart number is much better. Kidney function is also stable. Iron saturation (level) is a little low. Recommend she start ferrous sulfate 325mg daily PO.

## 2020-02-12 ENCOUNTER — TELEPHONE (OUTPATIENT)
Dept: CARDIOLOGY CLINIC | Age: 85
End: 2020-02-12

## 2020-02-20 ENCOUNTER — TELEPHONE (OUTPATIENT)
Dept: CARDIOLOGY CLINIC | Age: 85
End: 2020-02-20

## 2020-02-21 ENCOUNTER — TELEPHONE (OUTPATIENT)
Dept: CARDIOLOGY CLINIC | Age: 85
End: 2020-02-21

## 2020-02-21 ENCOUNTER — NURSE ONLY (OUTPATIENT)
Dept: CARDIOLOGY CLINIC | Age: 85
End: 2020-02-21
Payer: MEDICARE

## 2020-02-21 PROCEDURE — 93000 ELECTROCARDIOGRAM COMPLETE: CPT | Performed by: INTERNAL MEDICINE

## 2020-02-21 NOTE — TELEPHONE ENCOUNTER
Pt came in for an EKG today per RPS. Please review and advise. Pt would like staff to contact her at her home number  with results.

## 2020-03-31 RX ORDER — APIXABAN 5 MG/1
TABLET, FILM COATED ORAL
Qty: 60 TABLET | Refills: 5 | Status: SHIPPED | OUTPATIENT
Start: 2020-03-31 | End: 2020-10-27

## 2020-04-27 ENCOUNTER — TELEPHONE (OUTPATIENT)
Dept: CARDIOLOGY CLINIC | Age: 85
End: 2020-04-27

## 2020-04-27 NOTE — PROGRESS NOTES
palpitations. She states she was told not to take amiodarone and she is no longer taking it. She denies CP, SOB, dizziness or syncope. Her echo from 04/28/20 showed her EF was 55-60% with mild MR and TR. Allergies   Allergen Reactions    Hydrocodone     Metformin And Related     Omeprazole     Hydralazine Palpitations and Rash     Current Outpatient Medications   Medication Sig Dispense Refill    ELIQUIS 5 MG TABS tablet TAKE ONE TABLET BY MOUTH TWICE A DAY 60 tablet 5    amiodarone (CORDARONE) 200 MG tablet Take 1 tablet by mouth daily 30 tablet 3    metoprolol tartrate (LOPRESSOR) 50 MG tablet Take 1 tablet by mouth 2 times daily TAKE ONE TABLET BY MOUTH TWICE A DAY 60 tablet 5    furosemide (LASIX) 20 MG tablet TAKE ONE TABLET BY MOUTH DAILY (Patient taking differently: 40 mg bid) 90 tablet 3    amLODIPine (NORVASC) 5 MG tablet Take 1 tablet by mouth daily 30 tablet 3    valsartan (DIOVAN) 80 MG tablet Take 1 tablet by mouth daily 90 tablet 3    pantoprazole (PROTONIX) 20 MG tablet Take 2 tablets by mouth daily (Patient taking differently: Take 20 mg by mouth daily ) 30 tablet 0    glyBURIDE (DIABETA) 1.25 MG tablet Take 2.5 mg by mouth daily (with breakfast)       aspirin 81 MG tablet Take 81 mg by mouth daily      pravastatin (PRAVACHOL) 40 MG tablet Take 1 tablet by mouth daily. 30 tablet 3     No current facility-administered medications for this visit.         Past Medical History:   Diagnosis Date    Atrial fibrillation ([address]     [facility]     Hydronephrosis 2/14/2014    Hyperlipidemia     Hypertension     Intussusception intestine ([address]     SVT (supraventricular tachycardia) ([facility] [facility] 8/26/2013    AVNRT     Past Surgical History:   Procedure Laterality Date    ABLATION OF DYSRHYTHMIC FOCUS      -2014    APPENDECTOMY      DILATATION, ESOPHAGUS      FRACTURE SURGERY      L ankle    HYSTERECTOMY      SKIN BIOPSY      SMALL INTESTINE SURGERY      TONSILLECTOMY

## 2020-04-28 ENCOUNTER — HOSPITAL ENCOUNTER (OUTPATIENT)
Dept: CARDIOLOGY | Age: 85
Discharge: HOME | End: 2020-04-28
Payer: MEDICARE

## 2020-04-28 ENCOUNTER — HOSPITAL ENCOUNTER (OUTPATIENT)
Age: 85
Discharge: HOME | End: 2020-04-28
Payer: MEDICARE

## 2020-04-28 ENCOUNTER — OFFICE VISIT (OUTPATIENT)
Dept: CARDIOLOGY CLINIC | Age: 85
End: 2020-04-28
Payer: MEDICARE

## 2020-04-28 VITALS
WEIGHT: 184 LBS | BODY MASS INDEX: 33.65 KG/M2 | HEART RATE: 58 BPM | DIASTOLIC BLOOD PRESSURE: 61 MMHG | SYSTOLIC BLOOD PRESSURE: 138 MMHG | OXYGEN SATURATION: 99 %

## 2020-04-28 LAB
A/G RATIO: 1 (ref 1.1–2.2)
ALBUMIN SERPL-MCNC: 3.9 G/DL (ref 3.4–5)
ALP BLD-CCNC: 133 U/L (ref 40–129)
ALT SERPL-CCNC: 18 U/L (ref 10–40)
ANION GAP SERPL CALCULATED.3IONS-SCNC: 11 MMOL/L (ref 3–16)
AST SERPL-CCNC: 22 U/L (ref 15–37)
BASOPHILS ABSOLUTE: 0.1 K/UL (ref 0–0.2)
BASOPHILS RELATIVE PERCENT: 0.9 %
BILIRUB SERPL-MCNC: 0.4 MG/DL (ref 0–1)
BUN BLDV-MCNC: 15 MG/DL (ref 7–20)
CALCIUM SERPL-MCNC: 9.2 MG/DL (ref 8.3–10.6)
CHLORIDE BLD-SCNC: 97 MMOL/L (ref 99–110)
CO2: 27 MMOL/L (ref 21–32)
CREAT SERPL-MCNC: 0.7 MG/DL (ref 0.6–1.2)
EOSINOPHILS ABSOLUTE: 0.5 K/UL (ref 0–0.6)
EOSINOPHILS RELATIVE PERCENT: 4.2 %
GFR AFRICAN AMERICAN: >60
GFR NON-AFRICAN AMERICAN: >60
GLOBULIN: 4.1 G/DL
GLUCOSE BLD-MCNC: 211 MG/DL (ref 70–99)
HCT VFR BLD CALC: 40.8 % (ref 36–48)
HEMOGLOBIN: 12.7 G/DL (ref 12–16)
LV EF: 58 %
LVEF MODALITY: NORMAL
LYMPHOCYTES ABSOLUTE: 2.2 K/UL (ref 1–5.1)
LYMPHOCYTES RELATIVE PERCENT: 19.7 %
MCH RBC QN AUTO: 23.5 PG (ref 26–34)
MCHC RBC AUTO-ENTMCNC: 31.1 G/DL (ref 31–36)
MCV RBC AUTO: 75.5 FL (ref 80–100)
MONOCYTES ABSOLUTE: 1 K/UL (ref 0–1.3)
MONOCYTES RELATIVE PERCENT: 8.7 %
NEUTROPHILS ABSOLUTE: 7.3 K/UL (ref 1.7–7.7)
NEUTROPHILS RELATIVE PERCENT: 66.5 %
PDW BLD-RTO: 17.8 % (ref 12.4–15.4)
PLATELET # BLD: 343 K/UL (ref 135–450)
PMV BLD AUTO: 9.7 FL (ref 5–10.5)
POTASSIUM SERPL-SCNC: 4.6 MMOL/L (ref 3.5–5.1)
PRO-BNP: 400 PG/ML (ref 0–449)
RBC # BLD: 5.4 M/UL (ref 4–5.2)
SODIUM BLD-SCNC: 135 MMOL/L (ref 136–145)
TOTAL PROTEIN: 8 G/DL (ref 6.4–8.2)
WBC # BLD: 11 K/UL (ref 4–11)

## 2020-04-28 PROCEDURE — 36415 COLL VENOUS BLD VENIPUNCTURE: CPT

## 2020-04-28 PROCEDURE — 85025 COMPLETE CBC W/AUTO DIFF WBC: CPT

## 2020-04-28 PROCEDURE — 6360000004 HC RX CONTRAST MEDICATION: Performed by: INTERNAL MEDICINE

## 2020-04-28 PROCEDURE — 80053 COMPREHEN METABOLIC PANEL: CPT

## 2020-04-28 PROCEDURE — 93000 ELECTROCARDIOGRAM COMPLETE: CPT | Performed by: INTERNAL MEDICINE

## 2020-04-28 PROCEDURE — 99214 OFFICE O/P EST MOD 30 MIN: CPT | Performed by: INTERNAL MEDICINE

## 2020-04-28 PROCEDURE — 83880 ASSAY OF NATRIURETIC PEPTIDE: CPT

## 2020-04-28 PROCEDURE — C8929 TTE W OR WO FOL WCON,DOPPLER: HCPCS

## 2020-04-28 RX ADMIN — PERFLUTREN 2.2 MG: 6.52 INJECTION, SUSPENSION INTRAVENOUS at 10:39

## 2020-04-28 NOTE — LETTER
[facility]  Advanced CHF/Pulmonary Hypertension   Cardiac Evaluation      Marielle Tiwari  YOB: 1935    Date of Visit:  4/28/20      Chief Complaint   Patient presents with    Follow-up    Congestive Heart Failure          History of Present Illness:  Marielle Tiwari is a 81 y.o. female who presents from referral from [facility] for consultation and management of severe pulmonary HTN. She has a history of paroxysmal atrial fibrillation, HTN, SVT, and DM. She had SVT in 2013, wore an event monitor, and atrial fibrillation was detected. She had recurrent atrial fibrillation in 2014 and had a cryoablation for PAF in 2/2014. Post procedure she had a retroperitoneal bleed and required a urinary stent. She has had no known recurrence of afib post ablation but has had palpitations. She was admitted in 1/2019 with SBO versus ileus that was medically managed. Her echo from 02/19/19 showed her EF was 55-60%. She has mild MR, TR and MI. SPAP 60mmHg consistent with severe pulmonary HTN. She reports about 1 month ago (02/2019) she had an episode of palpitations at 200 bpm for about 1 hour. She has not had a sleep study. She reports occasional chest twinge. She states the twinge occurs with fast heart rate. She reports her breathing is stable now. She did have SOB with walking frequently but this has subsided. She denies dizziness or syncope. She states she has been on lasix for 2-3 weeks. Her cardiac monitor from 04/2019 showed no arrhythmias. She was admitted 10/10-10/13/19 with SBO. General surgery was consulted and the SBO resolved without surgery. On 10/22/19 she reported her SBO occurred at the bowel resection site. During her last OV with me on 01/21/20, her EKG showed AFib . She was started on eliquis and her metoprolol was increased to 50 mg twice daily. Today she reports she has occasional palpitations.  She sits down and deep · Peripheral pulses are symmetrical and full  · There is no clubbing, cyanosis of the extremities. · Trace bilateral edema  · Femoral Arteries: 2+ and equal  · Pedal Pulses: 2+ and equal   Neck:  · No thyromegaly  Abdomen:  · No masses or tenderness  · Liver/Spleen: No Abnormalities Noted  Neurological/Psychiatric:  · Alert and oriented in all spheres  · Moves all extremities well  · Exhibits normal gait balance and coordination  · No abnormalities of mood, affect, memory, mentation, or behavior are noted    Echo: 02/19/19   Summary   Definity contrast administered. Normal left ventricular systolic function with an estimated ejection   fraction of 55-60%. There is mild septal hypertrophy with normal remaining wall thickness. Normal left ventricular diastolic filling pressure. Mild mitral regurgitation. Mild tricuspid regurgitation. Systolic pulmonary artery pressure (SPAP) estimated at 60 mmHg (RA pressure   3 mmHg), consistent with severe pulmonary hypertension. Mild pulmonic regurgitation present. Lipids: 03/12/19: , , HDL 45, LDL 60    01/21/20 EKG shows AFIB     Echo: 04/28/20  Summary   Normal left ventricle systolic function with an estimated ejection fraction   of 55-60%. Definity contrast administered with no evidence of left   ventricular mass or thrombus noted. No regional wall motion abnormalities are seen. Normal left ventricular   diastolic filling pressure. The left atrium is mildly dilated. Mild mitral and tricuspid regurgitation. Systolic pulmonary artery pressure (SPAP) estimated at 42 mmHg (right atrial   pressure 3 mmHg), consistent with mild pulmonary hypertension. Labs were reviewed including labs from other hospital systems through [facility]. Cardiac testing was reviewed including echos, nuclear scans, cardiac catheterization, including from other hospital systems through [facility].     Assessment:

## 2020-04-28 NOTE — PATIENT INSTRUCTIONS
Plan:  1. Verify you are not taking amiodarone. Call with a list of your current medications  2. CBC, CMP, and BNP today  3. Follow up with Madeleine Blackwell in 3 months  4.  Follow up with me in 6 months

## 2020-05-04 ENCOUNTER — TELEPHONE (OUTPATIENT)
Dept: CARDIOLOGY CLINIC | Age: 85
End: 2020-05-04

## 2020-05-04 NOTE — TELEPHONE ENCOUNTER
The test results show that your current treatment is working. I would like to continue your treatment that we discussed during your last office visit. No changes will be made at this time to your regimen. Spoke with pt, relayed message per SHELLY. Pt v/u.

## 2020-05-26 ENCOUNTER — HOSPITAL ENCOUNTER (INPATIENT)
Age: 85
LOS: 5 days | Discharge: HOME | DRG: 389 | End: 2020-05-31
Attending: INTERNAL MEDICINE | Admitting: INTERNAL MEDICINE
Payer: MEDICARE

## 2020-05-26 ENCOUNTER — APPOINTMENT (OUTPATIENT)
Dept: GENERAL RADIOLOGY | Age: 85
DRG: 389 | End: 2020-05-26
Payer: MEDICARE

## 2020-05-26 ENCOUNTER — APPOINTMENT (OUTPATIENT)
Dept: CT IMAGING | Age: 85
DRG: 389 | End: 2020-05-26
Payer: MEDICARE

## 2020-05-26 PROBLEM — K56.600 PARTIAL SMALL BOWEL OBSTRUCTION (HCC): Status: ACTIVE | Noted: 2020-05-26

## 2020-05-26 LAB
A/G RATIO: 0.8 (ref 1.1–2.2)
ALBUMIN SERPL-MCNC: 3.8 G/DL (ref 3.4–5)
ALP BLD-CCNC: 139 U/L (ref 40–129)
ALT SERPL-CCNC: 21 U/L (ref 10–40)
ANION GAP SERPL CALCULATED.3IONS-SCNC: 12 MMOL/L (ref 3–16)
AST SERPL-CCNC: 31 U/L (ref 15–37)
BASOPHILS ABSOLUTE: 0.1 K/UL (ref 0–0.2)
BASOPHILS RELATIVE PERCENT: 0.4 %
BILIRUB SERPL-MCNC: 0.4 MG/DL (ref 0–1)
BILIRUBIN URINE: NEGATIVE
BLOOD, URINE: NEGATIVE
BUN BLDV-MCNC: 16 MG/DL (ref 7–20)
CALCIUM SERPL-MCNC: 8.9 MG/DL (ref 8.3–10.6)
CHLORIDE BLD-SCNC: 99 MMOL/L (ref 99–110)
CLARITY: CLEAR
CO2: 22 MMOL/L (ref 21–32)
COLOR: YELLOW
CREAT SERPL-MCNC: 0.8 MG/DL (ref 0.6–1.2)
EOSINOPHILS ABSOLUTE: 0.2 K/UL (ref 0–0.6)
EOSINOPHILS RELATIVE PERCENT: 1.2 %
GFR AFRICAN AMERICAN: >60
GFR NON-AFRICAN AMERICAN: >60
GLOBULIN: 4.5 G/DL
GLUCOSE BLD-MCNC: 176 MG/DL (ref 70–99)
GLUCOSE BLD-MCNC: 222 MG/DL (ref 70–99)
GLUCOSE URINE: NEGATIVE MG/DL
HCT VFR BLD CALC: 40.3 % (ref 36–48)
HEMOGLOBIN: 12.9 G/DL (ref 12–16)
INR BLD: 1.5 (ref 0.86–1.14)
KETONES, URINE: NEGATIVE MG/DL
LEUKOCYTE ESTERASE, URINE: NEGATIVE
LIPASE: 20 U/L (ref 13–60)
LYMPHOCYTES ABSOLUTE: 1.8 K/UL (ref 1–5.1)
LYMPHOCYTES RELATIVE PERCENT: 12 %
MAGNESIUM: 2.1 MG/DL (ref 1.8–2.4)
MCH RBC QN AUTO: 23.9 PG (ref 26–34)
MCHC RBC AUTO-ENTMCNC: 32.1 G/DL (ref 31–36)
MCV RBC AUTO: 74.4 FL (ref 80–100)
MICROSCOPIC EXAMINATION: NORMAL
MONOCYTES ABSOLUTE: 1.3 K/UL (ref 0–1.3)
MONOCYTES RELATIVE PERCENT: 8.7 %
NEUTROPHILS ABSOLUTE: 11.5 K/UL (ref 1.7–7.7)
NEUTROPHILS RELATIVE PERCENT: 77.7 %
NITRITE, URINE: NEGATIVE
PDW BLD-RTO: 17.8 % (ref 12.4–15.4)
PERFORMED ON: ABNORMAL
PH UA: 6 (ref 5–8)
PLATELET # BLD: 346 K/UL (ref 135–450)
PMV BLD AUTO: 8.9 FL (ref 5–10.5)
POTASSIUM SERPL-SCNC: 4.5 MMOL/L (ref 3.5–5.1)
PROTEIN UA: NEGATIVE MG/DL
PROTHROMBIN TIME: 17.5 SEC (ref 10–13.2)
RBC # BLD: 5.42 M/UL (ref 4–5.2)
SODIUM BLD-SCNC: 133 MMOL/L (ref 136–145)
SPECIFIC GRAVITY UA: 1.01 (ref 1–1.03)
TOTAL PROTEIN: 8.3 G/DL (ref 6.4–8.2)
URINE REFLEX TO CULTURE: NORMAL
URINE TYPE: NORMAL
UROBILINOGEN, URINE: 0.2 E.U./DL
WBC # BLD: 14.8 K/UL (ref 4–11)

## 2020-05-26 PROCEDURE — 81003 URINALYSIS AUTO W/O SCOPE: CPT

## 2020-05-26 PROCEDURE — 6360000002 HC RX W HCPCS: Performed by: PHYSICIAN ASSISTANT

## 2020-05-26 PROCEDURE — 2580000003 HC RX 258: Performed by: NURSE PRACTITIONER

## 2020-05-26 PROCEDURE — 80053 COMPREHEN METABOLIC PANEL: CPT

## 2020-05-26 PROCEDURE — 1200000000 HC SEMI PRIVATE

## 2020-05-26 PROCEDURE — 85610 PROTHROMBIN TIME: CPT

## 2020-05-26 PROCEDURE — 6370000000 HC RX 637 (ALT 250 FOR IP): Performed by: NURSE PRACTITIONER

## 2020-05-26 PROCEDURE — 96375 TX/PRO/DX INJ NEW DRUG ADDON: CPT

## 2020-05-26 PROCEDURE — 99285 EMERGENCY DEPT VISIT HI MDM: CPT

## 2020-05-26 PROCEDURE — 74177 CT ABD & PELVIS W/CONTRAST: CPT

## 2020-05-26 PROCEDURE — 96374 THER/PROPH/DIAG INJ IV PUSH: CPT

## 2020-05-26 PROCEDURE — 83735 ASSAY OF MAGNESIUM: CPT

## 2020-05-26 PROCEDURE — 2580000003 HC RX 258: Performed by: PHYSICIAN ASSISTANT

## 2020-05-26 PROCEDURE — 74018 RADEX ABDOMEN 1 VIEW: CPT

## 2020-05-26 PROCEDURE — 6360000004 HC RX CONTRAST MEDICATION: Performed by: PHYSICIAN ASSISTANT

## 2020-05-26 PROCEDURE — 6360000002 HC RX W HCPCS: Performed by: NURSE PRACTITIONER

## 2020-05-26 PROCEDURE — 85025 COMPLETE CBC W/AUTO DIFF WBC: CPT

## 2020-05-26 PROCEDURE — 83690 ASSAY OF LIPASE: CPT

## 2020-05-26 RX ORDER — DEXTROSE MONOHYDRATE 50 MG/ML
100 INJECTION, SOLUTION INTRAVENOUS PRN
Status: DISCONTINUED | OUTPATIENT
Start: 2020-05-26 | End: 2020-05-31 | Stop reason: HOSPADM

## 2020-05-26 RX ORDER — SODIUM CHLORIDE 0.9 % (FLUSH) 0.9 %
10 SYRINGE (ML) INJECTION EVERY 12 HOURS SCHEDULED
Status: DISCONTINUED | OUTPATIENT
Start: 2020-05-26 | End: 2020-05-31 | Stop reason: HOSPADM

## 2020-05-26 RX ORDER — MORPHINE SULFATE 2 MG/ML
2 INJECTION, SOLUTION INTRAMUSCULAR; INTRAVENOUS
Status: DISCONTINUED | OUTPATIENT
Start: 2020-05-26 | End: 2020-05-27

## 2020-05-26 RX ORDER — ACETAMINOPHEN 325 MG/1
650 TABLET ORAL EVERY 4 HOURS PRN
Status: DISCONTINUED | OUTPATIENT
Start: 2020-05-26 | End: 2020-05-31 | Stop reason: HOSPADM

## 2020-05-26 RX ORDER — DEXTROSE MONOHYDRATE 25 G/50ML
12.5 INJECTION, SOLUTION INTRAVENOUS PRN
Status: DISCONTINUED | OUTPATIENT
Start: 2020-05-26 | End: 2020-05-31 | Stop reason: HOSPADM

## 2020-05-26 RX ORDER — SODIUM CHLORIDE 9 MG/ML
INJECTION, SOLUTION INTRAVENOUS CONTINUOUS
Status: DISCONTINUED | OUTPATIENT
Start: 2020-05-26 | End: 2020-05-27

## 2020-05-26 RX ORDER — ONDANSETRON 2 MG/ML
4 INJECTION INTRAMUSCULAR; INTRAVENOUS EVERY 6 HOURS PRN
Status: DISCONTINUED | OUTPATIENT
Start: 2020-05-26 | End: 2020-05-31 | Stop reason: HOSPADM

## 2020-05-26 RX ORDER — MORPHINE SULFATE 4 MG/ML
4 INJECTION, SOLUTION INTRAMUSCULAR; INTRAVENOUS
Status: DISCONTINUED | OUTPATIENT
Start: 2020-05-26 | End: 2020-05-27

## 2020-05-26 RX ORDER — 0.9 % SODIUM CHLORIDE 0.9 %
1000 INTRAVENOUS SOLUTION INTRAVENOUS ONCE
Status: COMPLETED | OUTPATIENT
Start: 2020-05-26 | End: 2020-05-26

## 2020-05-26 RX ORDER — ONDANSETRON 2 MG/ML
4 INJECTION INTRAMUSCULAR; INTRAVENOUS ONCE
Status: COMPLETED | OUTPATIENT
Start: 2020-05-26 | End: 2020-05-26

## 2020-05-26 RX ORDER — NICOTINE POLACRILEX 4 MG
15 LOZENGE BUCCAL PRN
Status: DISCONTINUED | OUTPATIENT
Start: 2020-05-26 | End: 2020-05-31 | Stop reason: HOSPADM

## 2020-05-26 RX ORDER — SODIUM CHLORIDE 0.9 % (FLUSH) 0.9 %
10 SYRINGE (ML) INJECTION PRN
Status: DISCONTINUED | OUTPATIENT
Start: 2020-05-26 | End: 2020-05-31 | Stop reason: HOSPADM

## 2020-05-26 RX ORDER — MORPHINE SULFATE 4 MG/ML
4 INJECTION, SOLUTION INTRAMUSCULAR; INTRAVENOUS ONCE
Status: COMPLETED | OUTPATIENT
Start: 2020-05-26 | End: 2020-05-26

## 2020-05-26 RX ADMIN — SODIUM CHLORIDE 1000 ML: 9 INJECTION, SOLUTION INTRAVENOUS at 18:42

## 2020-05-26 RX ADMIN — HYDROMORPHONE HYDROCHLORIDE 0.5 MG: 1 INJECTION, SOLUTION INTRAMUSCULAR; INTRAVENOUS; SUBCUTANEOUS at 22:15

## 2020-05-26 RX ADMIN — ONDANSETRON 4 MG: 2 INJECTION INTRAMUSCULAR; INTRAVENOUS at 18:42

## 2020-05-26 RX ADMIN — MORPHINE SULFATE 4 MG: 4 INJECTION, SOLUTION INTRAMUSCULAR; INTRAVENOUS at 23:38

## 2020-05-26 RX ADMIN — IOPAMIDOL 75 ML: 755 INJECTION, SOLUTION INTRAVENOUS at 19:31

## 2020-05-26 RX ADMIN — SODIUM CHLORIDE: 9 INJECTION, SOLUTION INTRAVENOUS at 23:38

## 2020-05-26 RX ADMIN — INSULIN LISPRO 2 UNITS: 100 INJECTION, SOLUTION INTRAVENOUS; SUBCUTANEOUS at 23:55

## 2020-05-26 RX ADMIN — MORPHINE SULFATE 4 MG: 4 INJECTION, SOLUTION INTRAMUSCULAR; INTRAVENOUS at 18:42

## 2020-05-26 ASSESSMENT — ENCOUNTER SYMPTOMS
DIARRHEA: 0
NAUSEA: 1
VOMITING: 0
COLOR CHANGE: 0
CONSTIPATION: 0
ABDOMINAL PAIN: 1
BACK PAIN: 0
EYES NEGATIVE: 1
SHORTNESS OF BREATH: 0

## 2020-05-26 ASSESSMENT — PAIN SCALES - GENERAL
PAINLEVEL_OUTOF10: 10
PAINLEVEL_OUTOF10: 10
PAINLEVEL_OUTOF10: 8
PAINLEVEL_OUTOF10: 1
PAINLEVEL_OUTOF10: 10
PAINLEVEL_OUTOF10: 10

## 2020-05-26 ASSESSMENT — PAIN DESCRIPTION - ORIENTATION
ORIENTATION: UPPER;MID;LOWER
ORIENTATION: UPPER;MID

## 2020-05-26 ASSESSMENT — PAIN DESCRIPTION - LOCATION
LOCATION: ABDOMEN
LOCATION: ABDOMEN

## 2020-05-26 NOTE — ED PROVIDER NOTES
the ED  Old records were reviewed. Patient arrives to the ED with worsening abdominal pain since 10 AM that she states feels just like prior bowel obstructions. Given her complaint and history labs and CT imaging are ordered. Patient has normal urinalysis. CBC reveals white count of 14.8. H&H are normal.  CMP is unremarkable. Lipase is normal.  CT imaging of the abdomen and pelvis shows probable mild chronic obstructive component at the emteroenterostomy right anterior pelvis. Small bowel loops appear adherent to the anterior abdominal wall. There is cholecystolithiasis without findings of cholecystitis. Given the findings I did speak with general surgery. They have taken care of this patient in the recent past.  NG tube is placed. Patient will be admitted to the hospitalist.  While in the ED she did receive 1 L of normal saline IV with initially morphine 4 mg and Zofran 4 mg IV. Dilaudid 0.5 mg IV was ordered beyond this for further pain. Patient has remained hemodynamically stable in the ED. I spoke with Dr. Love Del Rio and Kellie Armendariz, CNP. We thoroughly discussed the history, physical exam, laboratory and imaging studies, as well as, emergency department course. Based upon that discussion, we've decided to admit Merissa Tiwari for further observation and evaluation of Merissa Tiwari's abdominal pain and what appears to be acute on chronic bowel obstruction. As I have deemed necessary from their history, physical and studies, I have considered and evaluated Merissa Tiwari for the following diagnoses:  ACUTE APPENDICITIS, CHOLECYSTITIS, DIVERTICULITIS, PANCREATITIS, PYELONEPHRITIS, BOWEL OBSTRUCTION, INCARCERATED HERNIA, ISCHEMIC GUT, GI BLEED, PERFORATED BOWEL or ULCER. FINAL IMPRESSION:      1. Partial intestinal obstruction, unspecified cause ([address]    2. Anticoagulated  3.    Cholelithiasis    DISPOSITION/PLAN:   DISPOSITION     ADMIT               (Please note thatportions of this note were completed with a voice recognition program.  Efforts were made to edit the dictations, but occasionally words are mis-transcribed.)    Jose Enrique Norman PA-C (electronicallysigned)              Syble Sicard, [place]  05/27/20 0004

## 2020-05-27 ENCOUNTER — APPOINTMENT (OUTPATIENT)
Dept: GENERAL RADIOLOGY | Age: 85
DRG: 389 | End: 2020-05-27
Payer: MEDICARE

## 2020-05-27 PROBLEM — K56.600 PARTIAL INTESTINAL OBSTRUCTION (HCC): Status: ACTIVE | Noted: 2019-01-08

## 2020-05-27 LAB
A/G RATIO: 0.9 (ref 1.1–2.2)
ALBUMIN SERPL-MCNC: 3.5 G/DL (ref 3.4–5)
ALP BLD-CCNC: 127 U/L (ref 40–129)
ALT SERPL-CCNC: 18 U/L (ref 10–40)
ANION GAP SERPL CALCULATED.3IONS-SCNC: 11 MMOL/L (ref 3–16)
AST SERPL-CCNC: 25 U/L (ref 15–37)
BASOPHILS ABSOLUTE: 0 K/UL (ref 0–0.2)
BASOPHILS RELATIVE PERCENT: 0.1 %
BILIRUB SERPL-MCNC: 0.4 MG/DL (ref 0–1)
BUN BLDV-MCNC: 15 MG/DL (ref 7–20)
CALCIUM SERPL-MCNC: 8.1 MG/DL (ref 8.3–10.6)
CHLORIDE BLD-SCNC: 104 MMOL/L (ref 99–110)
CO2: 24 MMOL/L (ref 21–32)
CREAT SERPL-MCNC: 0.8 MG/DL (ref 0.6–1.2)
EOSINOPHILS ABSOLUTE: 0 K/UL (ref 0–0.6)
EOSINOPHILS RELATIVE PERCENT: 0.1 %
ESTIMATED AVERAGE GLUCOSE: 211.6 MG/DL
GFR AFRICAN AMERICAN: >60
GFR NON-AFRICAN AMERICAN: >60
GLOBULIN: 3.8 G/DL
GLUCOSE BLD-MCNC: 102 MG/DL (ref 70–99)
GLUCOSE BLD-MCNC: 110 MG/DL (ref 70–99)
GLUCOSE BLD-MCNC: 120 MG/DL (ref 70–99)
GLUCOSE BLD-MCNC: 172 MG/DL (ref 70–99)
GLUCOSE BLD-MCNC: 188 MG/DL (ref 70–99)
GLUCOSE BLD-MCNC: 194 MG/DL (ref 70–99)
GLUCOSE BLD-MCNC: 197 MG/DL (ref 70–99)
HBA1C MFR BLD: 9 %
HCT VFR BLD CALC: 36.7 % (ref 36–48)
HEMOGLOBIN: 11.6 G/DL (ref 12–16)
LYMPHOCYTES ABSOLUTE: 1.5 K/UL (ref 1–5.1)
LYMPHOCYTES RELATIVE PERCENT: 11.9 %
MCH RBC QN AUTO: 23.8 PG (ref 26–34)
MCHC RBC AUTO-ENTMCNC: 31.6 G/DL (ref 31–36)
MCV RBC AUTO: 75.3 FL (ref 80–100)
MONOCYTES ABSOLUTE: 0.9 K/UL (ref 0–1.3)
MONOCYTES RELATIVE PERCENT: 6.6 %
NEUTROPHILS ABSOLUTE: 10.5 K/UL (ref 1.7–7.7)
NEUTROPHILS RELATIVE PERCENT: 81.3 %
PDW BLD-RTO: 17.3 % (ref 12.4–15.4)
PERFORMED ON: ABNORMAL
PLATELET # BLD: 310 K/UL (ref 135–450)
PMV BLD AUTO: 8.9 FL (ref 5–10.5)
POTASSIUM REFLEX MAGNESIUM: 5 MMOL/L (ref 3.5–5.1)
RBC # BLD: 4.87 M/UL (ref 4–5.2)
SODIUM BLD-SCNC: 139 MMOL/L (ref 136–145)
TOTAL PROTEIN: 7.3 G/DL (ref 6.4–8.2)
WBC # BLD: 12.9 K/UL (ref 4–11)

## 2020-05-27 PROCEDURE — 6370000000 HC RX 637 (ALT 250 FOR IP): Performed by: INTERNAL MEDICINE

## 2020-05-27 PROCEDURE — 6360000002 HC RX W HCPCS: Performed by: INTERNAL MEDICINE

## 2020-05-27 PROCEDURE — 85025 COMPLETE CBC W/AUTO DIFF WBC: CPT

## 2020-05-27 PROCEDURE — 83036 HEMOGLOBIN GLYCOSYLATED A1C: CPT

## 2020-05-27 PROCEDURE — 2580000003 HC RX 258: Performed by: INTERNAL MEDICINE

## 2020-05-27 PROCEDURE — 6370000000 HC RX 637 (ALT 250 FOR IP): Performed by: NURSE PRACTITIONER

## 2020-05-27 PROCEDURE — 80053 COMPREHEN METABOLIC PANEL: CPT

## 2020-05-27 PROCEDURE — 1200000000 HC SEMI PRIVATE

## 2020-05-27 PROCEDURE — 2580000003 HC RX 258: Performed by: NURSE PRACTITIONER

## 2020-05-27 PROCEDURE — 6360000002 HC RX W HCPCS: Performed by: NURSE PRACTITIONER

## 2020-05-27 PROCEDURE — 74018 RADEX ABDOMEN 1 VIEW: CPT

## 2020-05-27 PROCEDURE — 99222 1ST HOSP IP/OBS MODERATE 55: CPT | Performed by: SURGERY

## 2020-05-27 PROCEDURE — 36415 COLL VENOUS BLD VENIPUNCTURE: CPT

## 2020-05-27 RX ORDER — METOPROLOL TARTRATE 50 MG/1
50 TABLET, FILM COATED ORAL 2 TIMES DAILY
Status: DISCONTINUED | OUTPATIENT
Start: 2020-05-27 | End: 2020-05-31 | Stop reason: HOSPADM

## 2020-05-27 RX ORDER — PROCHLORPERAZINE EDISYLATE 5 MG/ML
10 INJECTION INTRAMUSCULAR; INTRAVENOUS EVERY 6 HOURS PRN
Status: DISCONTINUED | OUTPATIENT
Start: 2020-05-27 | End: 2020-05-31 | Stop reason: HOSPADM

## 2020-05-27 RX ORDER — AMLODIPINE BESYLATE 5 MG/1
5 TABLET ORAL DAILY
Status: DISCONTINUED | OUTPATIENT
Start: 2020-05-27 | End: 2020-05-31 | Stop reason: HOSPADM

## 2020-05-27 RX ORDER — VALSARTAN 80 MG/1
80 TABLET ORAL DAILY
Status: DISCONTINUED | OUTPATIENT
Start: 2020-05-28 | End: 2020-05-31

## 2020-05-27 RX ORDER — HYDROMORPHONE HCL 110MG/55ML
0.5 PATIENT CONTROLLED ANALGESIA SYRINGE INTRAVENOUS
Status: DISCONTINUED | OUTPATIENT
Start: 2020-05-27 | End: 2020-05-31 | Stop reason: HOSPADM

## 2020-05-27 RX ORDER — PANTOPRAZOLE SODIUM 40 MG/1
40 TABLET, DELAYED RELEASE ORAL DAILY
Status: DISCONTINUED | OUTPATIENT
Start: 2020-05-27 | End: 2020-05-31 | Stop reason: HOSPADM

## 2020-05-27 RX ORDER — OXYCODONE HYDROCHLORIDE AND ACETAMINOPHEN 5; 325 MG/1; MG/1
1 TABLET ORAL EVERY 4 HOURS PRN
Status: DISCONTINUED | OUTPATIENT
Start: 2020-05-27 | End: 2020-05-31 | Stop reason: HOSPADM

## 2020-05-27 RX ORDER — ASPIRIN 81 MG/1
81 TABLET ORAL DAILY
Status: DISCONTINUED | OUTPATIENT
Start: 2020-05-27 | End: 2020-05-31 | Stop reason: HOSPADM

## 2020-05-27 RX ORDER — SODIUM CHLORIDE, SODIUM LACTATE, POTASSIUM CHLORIDE, CALCIUM CHLORIDE 600; 310; 30; 20 MG/100ML; MG/100ML; MG/100ML; MG/100ML
INJECTION, SOLUTION INTRAVENOUS CONTINUOUS
Status: DISCONTINUED | OUTPATIENT
Start: 2020-05-27 | End: 2020-05-30

## 2020-05-27 RX ADMIN — PHENOL 1 SPRAY: 1.5 LIQUID ORAL at 19:23

## 2020-05-27 RX ADMIN — ASPIRIN 81 MG: 81 TABLET ORAL at 17:38

## 2020-05-27 RX ADMIN — HYDROMORPHONE HYDROCHLORIDE 0.5 MG: 2 INJECTION, SOLUTION INTRAMUSCULAR; INTRAVENOUS; SUBCUTANEOUS at 17:50

## 2020-05-27 RX ADMIN — PANTOPRAZOLE SODIUM 40 MG: 40 TABLET, DELAYED RELEASE ORAL at 12:54

## 2020-05-27 RX ADMIN — HYDROMORPHONE HYDROCHLORIDE 0.5 MG: 2 INJECTION, SOLUTION INTRAMUSCULAR; INTRAVENOUS; SUBCUTANEOUS at 00:53

## 2020-05-27 RX ADMIN — OXYCODONE HYDROCHLORIDE AND ACETAMINOPHEN 1 TABLET: 5; 325 TABLET ORAL at 12:54

## 2020-05-27 RX ADMIN — HYDROMORPHONE HYDROCHLORIDE 0.5 MG: 2 INJECTION, SOLUTION INTRAMUSCULAR; INTRAVENOUS; SUBCUTANEOUS at 06:38

## 2020-05-27 RX ADMIN — SODIUM CHLORIDE, POTASSIUM CHLORIDE, SODIUM LACTATE AND CALCIUM CHLORIDE: 600; 310; 30; 20 INJECTION, SOLUTION INTRAVENOUS at 12:06

## 2020-05-27 RX ADMIN — INSULIN LISPRO 1 UNITS: 100 INJECTION, SOLUTION INTRAVENOUS; SUBCUTANEOUS at 17:34

## 2020-05-27 RX ADMIN — HYDROMORPHONE HYDROCHLORIDE 0.5 MG: 2 INJECTION, SOLUTION INTRAMUSCULAR; INTRAVENOUS; SUBCUTANEOUS at 10:56

## 2020-05-27 RX ADMIN — METOPROLOL TARTRATE 50 MG: 50 TABLET, FILM COATED ORAL at 20:51

## 2020-05-27 RX ADMIN — HYDROMORPHONE HYDROCHLORIDE 0.5 MG: 2 INJECTION, SOLUTION INTRAMUSCULAR; INTRAVENOUS; SUBCUTANEOUS at 23:57

## 2020-05-27 RX ADMIN — INSULIN LISPRO 1 UNITS: 100 INJECTION, SOLUTION INTRAVENOUS; SUBCUTANEOUS at 13:06

## 2020-05-27 RX ADMIN — HYDROMORPHONE HYDROCHLORIDE 0.5 MG: 2 INJECTION, SOLUTION INTRAMUSCULAR; INTRAVENOUS; SUBCUTANEOUS at 03:34

## 2020-05-27 RX ADMIN — ENOXAPARIN SODIUM 80 MG: 80 INJECTION SUBCUTANEOUS at 20:50

## 2020-05-27 RX ADMIN — AMLODIPINE BESYLATE 5 MG: 5 TABLET ORAL at 12:54

## 2020-05-27 RX ADMIN — PROCHLORPERAZINE EDISYLATE 10 MG: 5 INJECTION INTRAMUSCULAR; INTRAVENOUS at 12:05

## 2020-05-27 RX ADMIN — INSULIN LISPRO 1 UNITS: 100 INJECTION, SOLUTION INTRAVENOUS; SUBCUTANEOUS at 08:20

## 2020-05-27 RX ADMIN — SODIUM CHLORIDE: 9 INJECTION, SOLUTION INTRAVENOUS at 10:55

## 2020-05-27 RX ADMIN — METOPROLOL TARTRATE 50 MG: 50 TABLET, FILM COATED ORAL at 12:05

## 2020-05-27 RX ADMIN — SODIUM CHLORIDE, POTASSIUM CHLORIDE, SODIUM LACTATE AND CALCIUM CHLORIDE: 600; 310; 30; 20 INJECTION, SOLUTION INTRAVENOUS at 21:01

## 2020-05-27 RX ADMIN — ONDANSETRON 4 MG: 2 INJECTION INTRAMUSCULAR; INTRAVENOUS at 06:55

## 2020-05-27 RX ADMIN — HYDROMORPHONE HYDROCHLORIDE 0.5 MG: 2 INJECTION, SOLUTION INTRAMUSCULAR; INTRAVENOUS; SUBCUTANEOUS at 20:50

## 2020-05-27 ASSESSMENT — PAIN SCALES - GENERAL
PAINLEVEL_OUTOF10: 8
PAINLEVEL_OUTOF10: 7
PAINLEVEL_OUTOF10: 7
PAINLEVEL_OUTOF10: 9
PAINLEVEL_OUTOF10: 6
PAINLEVEL_OUTOF10: 1

## 2020-05-27 ASSESSMENT — PAIN DESCRIPTION - LOCATION: LOCATION: ABDOMEN

## 2020-05-27 ASSESSMENT — PAIN DESCRIPTION - PAIN TYPE: TYPE: ACUTE PAIN

## 2020-05-27 NOTE — H&P
[facility]     SVT (supraventricular tachycardia) ([facility] 8/26/2013    AVNRT       Past Surgical History:          Procedure Laterality Date    ABLATION OF DYSRHYTHMIC FOCUS      -2014    APPENDECTOMY      DILATATION, ESOPHAGUS      FRACTURE SURGERY      L ankle    HYSTERECTOMY      SKIN BIOPSY      SMALL INTESTINE SURGERY      TONSILLECTOMY         Medications Prior to Admission:      Prior to Admission medications    Medication Sig Start Date End Date Taking? Authorizing Provider   ELIQUIS 5 MG TABS tablet TAKE ONE TABLET BY MOUTH TWICE A DAY 3/31/20  Yes Tash Salas MD   metoprolol tartrate (LOPRESSOR) 50 MG tablet Take 1 tablet by mouth 2 times daily TAKE ONE TABLET BY MOUTH TWICE A DAY 1/21/20  Yes Tash Salas MD   furosemide (LASIX) 20 MG tablet TAKE ONE TABLET BY MOUTH DAILY  Patient taking differently: 20 mg  1/21/20  Yes Tash Salas MD   amLODIPine (NORVASC) 5 MG tablet Take 1 tablet by mouth daily 10/14/19  Yes Loleta Felty Anusionwu, MD   valsartan (DIOVAN) 80 MG tablet Take 1 tablet by mouth daily 9/13/19  Yes Tash Salas MD   pantoprazole (PROTONIX) 20 MG tablet Take 2 tablets by mouth daily  Patient taking differently: Take 20 mg by mouth daily  7/26/18  Yes GUANAKO Rivera CNP   glyBURIDE (DIABETA) 1.25 MG tablet Take 2.5 mg by mouth daily (with breakfast)    Yes Historical Provider, MD   aspirin 81 MG tablet Take 81 mg by mouth daily   Yes Historical Provider, MD       Allergies:  Hydrocodone; Metformin and related; Omeprazole; and Hydralazine    Social History:      The patient currently lives independently. TOBACCO:   reports that she has never smoked. She has never used smokeless tobacco.  ETOH:   reports no history of alcohol use. Family History:      Reviewed in detail.  Positive as follows:        Problem Relation Age of Onset    Diabetes Mother     Heart Disease Father     High Blood Pressure Father     High Cholesterol Father     Cancer Sister     Heart Disease 72 hours. Urinalysis:      Lab Results   Component Value Date    NITRU Negative 05/26/2020    WBCUA 3-5 10/11/2019    BACTERIA Rare 10/11/2019    RBCUA 0-2 10/11/2019    BLOODU Negative 05/26/2020    SPECGRAV 1.010 05/26/2020    GLUCOSEU Negative 05/26/2020       Radiology:     CXR: I have reviewed the CXR with the following interpretation: n/a  EKG:  I have reviewed the EKG with the following interpretation: n/a    XR ABDOMEN (KUB) (SINGLE AP VIEW)   Final Result   Nasogastric tube extends to the left upper quadrant at the expected location   of stomach. XR ABDOMEN (KUB) (SINGLE AP VIEW)   Final Result   1. The gastric tube sideport is located just above the gastroesophageal   junction in the distal thoracic esophagus. Recommend advancement. 2. Nonobstructive bowel gas pattern. 3. Cholelithiasis. CT ABDOMEN PELVIS W IV CONTRAST Additional Contrast? None   Final Result   Addendum 1 of 1   ADDENDUM:   Critical results were called by Dr. Margo Mooney to Camille Medellin on   5/26/2020 at 20:17. Final          ASSESSMENT:    Active Hospital Problems    Diagnosis Date Noted    Hyperlipidemia [E78.5] 10/10/2019    Chronic diastolic heart failure ([address] [I50.32] 03/26/2019    Essential hypertension [I10] 02/07/2019    SBO (small bowel obstruction) ([address] [K56.609] 01/08/2019    SVT (supraventricular tachycardia) (HCC) [I47.1] 08/26/2013    PAF (paroxysmal atrial fibrillation) ([address] [I48.0] 08/26/2013    DM2 (diabetes mellitus, type 2) ([address] [E11.9] 08/26/2013     PLAN:    Acute SBO in setting of prior small bowel resection   - CT abdomen pelvis revealed: Probable mild chronic obstructive component at enteroenterostomy right anterior pelvis, small bowel loops appear adherent to the anterior abdominal wall.   - General surgery consulted in the ED, thank you for recommendations  - Conservative measures for now  - Analgesia and antiemetics as needed  - NG tube to LWS, confirmed positioning

## 2020-05-27 NOTE — PROGRESS NOTES
0.5 mg Dilaudid given to patient in room 12 with Lien Yo. Forgot to scan med in room. Med wasted with Mikie total of 0.5 mg given and wasted.

## 2020-05-27 NOTE — ED NOTES
[facility] @ 2044  RE: acute on chronic partial SBO  Sri called back @ 2100     [facility]  05/26/20 2128

## 2020-05-27 NOTE — CARE COORDINATION
CASE MANAGEMENT INITIAL ASSESSMENT      Reviewed chart and completed assessment via telephone with: patient and   Explained Case Management role/services. Primary contact information: Miles Holman    Admit date/status: 5/26/20  Diagnosis: partial SBO  Is this a Readmission?:  n    Insurance: [id number] required for SNF - waived currently        3 night stay required - N    Living arrangements, Adls, care needs, prior to admission: lives in ranch style home with spouse    Transportation: private    Durable Medical Equipment at home: Walker__Cane__RTS__ BSC__Shower Chair__  02__ HHN__ CPAP__  BiPap__  Hospital Bed__ W/C___ Other__________    Services in the home and/or outpatient, prior to admission: none    Dialysis Facility (if applicable) none  · Name:  · Address:  · Dialysis Schedule:  · Phone:  · Fax:    PT/OT recs: none    Hospital Exemption Notification (HEN): not initiated    Barriers to discharge: none    Plan/comments: spoke with patient and . Reported IPTA and drives. Will be able to get to any follow up appointments. Denied any DCP needs. currently with NGT. Will need return of GI function prior to d/c.      ECOC on chart for MD signature

## 2020-05-27 NOTE — PROGRESS NOTES
Pt alert and orientated. Call light within reach. No complaints at this time. Will continue to monitor. Abhinav Thao

## 2020-05-27 NOTE — CONSULTS
Negative  * All other ROS reviewed and negative. PHYSICAL EXAM:  VITALS:  /60   Pulse 66   Temp 98.5 °F (36.9 °C) (Oral)   Resp 16   Ht 5' 2\" (1.575 m)   Wt 184 lb 4.8 oz (83.6 kg)   SpO2 93%   BMI 33.71 kg/m²   24HR INTAKE/OUTPUT:    I/O last 3 completed shifts: In: 0   Out: 100 [Emesis/NG output:100]  I/O this shift:  In: -   Out: 200 [Urine:200]    CONSTITUTIONAL:  alert, mild distress and mildly obese  EYES:  PERRL, sclera clear  ENT:  Normocephalic,atraumatic, without obvious abnormality  NECK:  supple, symmetrical, trachea midline  LUNGS: Resp effort easy and unlabored, clear to auscultation  CARDIOVASCULAR:  NO JVD, irregularly irregular rhythm and    ABDOMEN:   , absent bowel sounds, soft, non-distended,  , involuntary guarding absent, no masses palpated and    MUSCULOSKELETAL: No clubbing or cyanosis, 0+ pitting edema lower extremities  NEUROLOGIC:  Mental Status Exam:  Level of Alertness:   awake  PSYCHIATRIC:   person, place, time  SKIN:  normal skin color, texture, turgor    DATA:    CBC:   Recent Labs     05/26/20 1837 05/27/20  0643   WBC 14.8* 12.9*   HGB 12.9 11.6*   HCT 40.3 36.7    310     BMP:    Recent Labs     05/26/20 1837 05/27/20  0643   * 139   K 4.5 5.0   CL 99 104   CO2 22 24   BUN 16 15   CREATININE 0.8 0.8   GLUCOSE 176* 197*     Hepatic:   Recent Labs     05/26/20 1837 05/27/20  0643   AST 31 25   ALT 21 18   BILITOT 0.4 0.4   ALKPHOS 139* 127     Mag:      Recent Labs     05/26/20 1837   MG 2.10      Phos:   No results for input(s): PHOS in the last 72 hours. INR:   Recent Labs     05/26/20 1837   INR 1.50*       Radiology Review: Images personally reviewed by me. 1. Probable mild chronic obstructive component at the enteroenterostomy right   anterior pelvis. 2. Small bowel loops appear adherent to the anterior abdominal wall. 3. Cholelithiasis without findings of acute cholecystitis.    4. Mild lymph node enlargement right upper quadrant is

## 2020-05-27 NOTE — PROGRESS NOTES
Hospitalist Progress Note      PCP: Arsenio Lopez MD    Date of Admission: 5/26/2020    Chief Complaint:  Abdominal pain, nausea    Hospital Course:  \"81 y.o. female, with PMH of HTN, HLD, DM 2, A. fib, SVT, partial bowel resection, SBO, and obesity, who presented to [facility] with abdominal pain. The patient does have a history of partial bowel resection, appendectomy, and hysterectomy. The patient states that this morning around 9 AM, she began experiencing severe mid upper abdominal pain. States that this feels similar to when she has had bowel obstruction in the past, states that she had 3 admissions for SBO last year. She does state that she had several bowel movements this morning prior to the abdominal pain starting. \"       Subjective:  She still feels badly. Nauseated. Complains of abdominal pain. Not much NG output. Medications:  Reviewed    Infusion Medications    lactated ringers      dextrose       Scheduled Medications    amLODIPine  5 mg Oral Daily    aspirin  81 mg Oral Daily    metoprolol tartrate  50 mg Oral BID    pantoprazole  40 mg Oral Daily    [START ON 5/28/2020] valsartan  80 mg Oral Daily    enoxaparin  1 mg/kg Subcutaneous BID    sodium chloride flush  10 mL Intravenous 2 times per day    insulin lispro  0-6 Units Subcutaneous Q4H     PRN Meds: HYDROmorphone, prochlorperazine, oxyCODONE-acetaminophen, sodium chloride flush, acetaminophen, ondansetron, glucose, dextrose, glucagon (rDNA), dextrose      Intake/Output Summary (Last 24 hours) at 5/27/2020 1152  Last data filed at 5/27/2020 0706  Gross per 24 hour   Intake 0 ml   Output 300 ml   Net -300 ml       Physical Exam Performed:    /73   Pulse 56   Temp 97.7 °F (36.5 °C) (Oral)   Resp 16   Ht 5' 2\" (1.575 m)   Wt 184 lb 4.8 oz (83.6 kg)   SpO2 91%   BMI 33.71 kg/m²     General appearance: No apparent distress, appears stated age and cooperative.   HEENT: Pupils equal, round, and

## 2020-05-28 ENCOUNTER — APPOINTMENT (OUTPATIENT)
Dept: GENERAL RADIOLOGY | Age: 85
DRG: 389 | End: 2020-05-28
Payer: MEDICARE

## 2020-05-28 LAB
ANION GAP SERPL CALCULATED.3IONS-SCNC: 10 MMOL/L (ref 3–16)
BUN BLDV-MCNC: 17 MG/DL (ref 7–20)
CALCIUM SERPL-MCNC: 7.9 MG/DL (ref 8.3–10.6)
CHLORIDE BLD-SCNC: 104 MMOL/L (ref 99–110)
CO2: 25 MMOL/L (ref 21–32)
CREAT SERPL-MCNC: 0.8 MG/DL (ref 0.6–1.2)
GFR AFRICAN AMERICAN: >60
GFR NON-AFRICAN AMERICAN: >60
GLUCOSE BLD-MCNC: 114 MG/DL (ref 70–99)
GLUCOSE BLD-MCNC: 118 MG/DL (ref 70–99)
GLUCOSE BLD-MCNC: 119 MG/DL (ref 70–99)
GLUCOSE BLD-MCNC: 122 MG/DL (ref 70–99)
GLUCOSE BLD-MCNC: 127 MG/DL (ref 70–99)
GLUCOSE BLD-MCNC: 132 MG/DL (ref 70–99)
GLUCOSE BLD-MCNC: 140 MG/DL (ref 70–99)
HCT VFR BLD CALC: 34.6 % (ref 36–48)
HEMOGLOBIN: 11 G/DL (ref 12–16)
MCH RBC QN AUTO: 23.9 PG (ref 26–34)
MCHC RBC AUTO-ENTMCNC: 31.6 G/DL (ref 31–36)
MCV RBC AUTO: 75.6 FL (ref 80–100)
PDW BLD-RTO: 18.1 % (ref 12.4–15.4)
PERFORMED ON: ABNORMAL
PLATELET # BLD: 296 K/UL (ref 135–450)
PMV BLD AUTO: 8.9 FL (ref 5–10.5)
POTASSIUM REFLEX MAGNESIUM: 4.3 MMOL/L (ref 3.5–5.1)
RBC # BLD: 4.58 M/UL (ref 4–5.2)
SODIUM BLD-SCNC: 139 MMOL/L (ref 136–145)
WBC # BLD: 9.5 K/UL (ref 4–11)

## 2020-05-28 PROCEDURE — 36415 COLL VENOUS BLD VENIPUNCTURE: CPT

## 2020-05-28 PROCEDURE — 2580000003 HC RX 258: Performed by: INTERNAL MEDICINE

## 2020-05-28 PROCEDURE — 6360000002 HC RX W HCPCS: Performed by: INTERNAL MEDICINE

## 2020-05-28 PROCEDURE — 74019 RADEX ABDOMEN 2 VIEWS: CPT

## 2020-05-28 PROCEDURE — 6370000000 HC RX 637 (ALT 250 FOR IP): Performed by: SURGERY

## 2020-05-28 PROCEDURE — 85027 COMPLETE CBC AUTOMATED: CPT

## 2020-05-28 PROCEDURE — 1200000000 HC SEMI PRIVATE

## 2020-05-28 PROCEDURE — 6370000000 HC RX 637 (ALT 250 FOR IP): Performed by: INTERNAL MEDICINE

## 2020-05-28 PROCEDURE — 80048 BASIC METABOLIC PNL TOTAL CA: CPT

## 2020-05-28 PROCEDURE — 99232 SBSQ HOSP IP/OBS MODERATE 35: CPT | Performed by: SURGERY

## 2020-05-28 PROCEDURE — 6360000002 HC RX W HCPCS: Performed by: NURSE PRACTITIONER

## 2020-05-28 RX ADMIN — AMLODIPINE BESYLATE 5 MG: 5 TABLET ORAL at 09:31

## 2020-05-28 RX ADMIN — PHENOL 1 SPRAY: 1.5 LIQUID ORAL at 03:12

## 2020-05-28 RX ADMIN — ENOXAPARIN SODIUM 80 MG: 80 INJECTION SUBCUTANEOUS at 21:16

## 2020-05-28 RX ADMIN — VALSARTAN 80 MG: 80 TABLET, FILM COATED ORAL at 09:31

## 2020-05-28 RX ADMIN — HYDROMORPHONE HYDROCHLORIDE 0.5 MG: 2 INJECTION, SOLUTION INTRAMUSCULAR; INTRAVENOUS; SUBCUTANEOUS at 21:43

## 2020-05-28 RX ADMIN — PHENOL 1 SPRAY: 1.5 LIQUID ORAL at 09:38

## 2020-05-28 RX ADMIN — PHENOL 1 SPRAY: 1.5 LIQUID ORAL at 06:27

## 2020-05-28 RX ADMIN — METOPROLOL TARTRATE 50 MG: 50 TABLET, FILM COATED ORAL at 21:10

## 2020-05-28 RX ADMIN — HYDROMORPHONE HYDROCHLORIDE 0.5 MG: 2 INJECTION, SOLUTION INTRAMUSCULAR; INTRAVENOUS; SUBCUTANEOUS at 09:27

## 2020-05-28 RX ADMIN — HYDROMORPHONE HYDROCHLORIDE 0.5 MG: 2 INJECTION, SOLUTION INTRAMUSCULAR; INTRAVENOUS; SUBCUTANEOUS at 06:21

## 2020-05-28 RX ADMIN — PANTOPRAZOLE SODIUM 40 MG: 40 TABLET, DELAYED RELEASE ORAL at 09:31

## 2020-05-28 RX ADMIN — ENOXAPARIN SODIUM 80 MG: 80 INJECTION SUBCUTANEOUS at 09:38

## 2020-05-28 RX ADMIN — SODIUM CHLORIDE, POTASSIUM CHLORIDE, SODIUM LACTATE AND CALCIUM CHLORIDE: 600; 310; 30; 20 INJECTION, SOLUTION INTRAVENOUS at 21:10

## 2020-05-28 RX ADMIN — HYDROMORPHONE HYDROCHLORIDE 0.5 MG: 2 INJECTION, SOLUTION INTRAMUSCULAR; INTRAVENOUS; SUBCUTANEOUS at 18:59

## 2020-05-28 RX ADMIN — HYDROMORPHONE HYDROCHLORIDE 0.5 MG: 2 INJECTION, SOLUTION INTRAMUSCULAR; INTRAVENOUS; SUBCUTANEOUS at 03:11

## 2020-05-28 RX ADMIN — METOPROLOL TARTRATE 50 MG: 50 TABLET, FILM COATED ORAL at 09:31

## 2020-05-28 RX ADMIN — ASPIRIN 81 MG: 81 TABLET ORAL at 09:31

## 2020-05-28 RX ADMIN — SODIUM CHLORIDE, POTASSIUM CHLORIDE, SODIUM LACTATE AND CALCIUM CHLORIDE: 600; 310; 30; 20 INJECTION, SOLUTION INTRAVENOUS at 09:25

## 2020-05-28 RX ADMIN — Medication 1 LOZENGE: at 15:58

## 2020-05-28 ASSESSMENT — PAIN SCALES - GENERAL
PAINLEVEL_OUTOF10: 4
PAINLEVEL_OUTOF10: 8
PAINLEVEL_OUTOF10: 5
PAINLEVEL_OUTOF10: 4
PAINLEVEL_OUTOF10: 4

## 2020-05-28 NOTE — PROGRESS NOTES
stated age and cooperative. HEENT: Pupils equal, round, and reactive to light. Conjunctivae/corneas clear. Neck: Supple, with full range of motion. No jugular venous distention. Trachea midline. Respiratory:  Normal respiratory effort. Clear to auscultation, bilaterally without Rales/Wheezes/Rhonchi. Cardiovascular: Regular rate and rhythm with normal S1/S2 without murmurs, rubs or gallops. Abdomen: Soft, mild diffuse tenderness, moderate in the epigastrium, mildly distended, with normal bowel sounds  Musculoskeletal: No clubbing, cyanosis or edema bilaterally. Full range of motion without deformity. Skin: Skin color, texture, turgor normal.  No rashes or lesions. Neurologic:  Neurovascularly intact without any focal sensory/motor deficits. Cranial nerves: II-XII intact, grossly non-focal.  Psychiatric: Alert and oriented, thought content appropriate, normal insight  Capillary Refill: Brisk,< 3 seconds   Peripheral Pulses: +2 palpable, equal bilaterally       Labs:   Recent Labs     05/26/20 1837 05/27/20  0643 05/28/20  0610   WBC 14.8* 12.9* 9.5   HGB 12.9 11.6* 11.0*   HCT 40.3 36.7 34.6*    310 296     Recent Labs     05/26/20 1837 05/27/20  0643 05/28/20  0610   * 139 139   K 4.5 5.0 4.3   CL 99 104 104   CO2 22 24 25   BUN 16 15 17   CREATININE 0.8 0.8 0.8   CALCIUM 8.9 8.1* 7.9*     Recent Labs     05/26/20 1837 05/27/20  0643   AST 31 25   ALT 21 18   BILITOT 0.4 0.4   ALKPHOS 139* 127     Recent Labs     05/26/20 1837   INR 1.50*     No results for input(s): Emily Mckoy in the last 72 hours.     Urinalysis:      Lab Results   Component Value Date    NITRU Negative 05/26/2020    WBCUA 3-5 10/11/2019    BACTERIA Rare 10/11/2019    RBCUA 0-2 10/11/2019    BLOODU Negative 05/26/2020    SPECGRAV 1.010 05/26/2020    GLUCOSEU Negative 05/26/2020       Radiology:  XR ABDOMEN (KUB) (SINGLE AP VIEW)   Final Result   Nasogastric tube extends to the left upper quadrant at the expected location   of stomach. XR ABDOMEN (KUB) (SINGLE AP VIEW)   Final Result   1. The gastric tube sideport is located just above the gastroesophageal   junction in the distal thoracic esophagus. Recommend advancement. 2. Nonobstructive bowel gas pattern. 3. Cholelithiasis. CT ABDOMEN PELVIS W IV CONTRAST Additional Contrast? None   Final Result   Addendum 1 of 1   ADDENDUM:   Critical results were called by Dr. Mickey Ayala to Sanam Briceno on   5/26/2020 at 20:17. Final      XR ABDOMEN (2 VIEWS)    (Results Pending)           Assessment/Plan:    Active Hospital Problems    Diagnosis    Hyperlipidemia [E78.5]    Chronic diastolic heart failure (HCC) [I50.32]    Essential hypertension [I10]    Partial intestinal obstruction (HCC) [K56.600]    SVT (supraventricular tachycardia) (HCC) [I47.1]    PAF (paroxysmal atrial fibrillation) (HCC) [I48.0]    DM2 (diabetes mellitus, type 2) ([facility] [address] [E11.9]       Gal.Sething y.o. female, with PMH of HTN, HLD, DM 2, A. fib, SVT, partial bowel resection, SBO, and obesity, who presented to [facility] with abdominal pain. The patient does have a history of partial bowel resection, appendectomy, and hysterectomy. The patient states that this morning around 9 AM, she began experiencing severe mid upper abdominal pain. States that this feels similar to when she has had bowel obstruction in the past, states that she had 3 admissions for SBO last year. She does state that she had several bowel movements this morning prior to the abdominal pain starting. \"      Partial SBO, likely due to adhesions from prior surgeries  - trial of conservative management with IVFs, cautious analgesia, antiemetics, NG tube, gradual diet advancement per surgery. - f/u Xray    HTN, chronic diastolic CHF  - resumed amlodipine, metoprolol, valsartan  - held furosemide initially. Monitor while on IVFs. Paroxysmal Afib  - held apixaban in case she eventually needs surgery. Enoxaparin meanwhile.  - metoprolol as above    DM2  - adjusted insulin regimen      DVT Prophylaxis: anticoagulation as above  Diet: Diet NPO Effective Now Exceptions are: Ice Chips, Sips with Meds  Code Status: Full Code    PT/OT Eval Status: not indicated    Dispo - when tolerating PO, perhaps 5/30 - 6/1. She lives at home.       Indira Peña MD

## 2020-05-28 NOTE — PROGRESS NOTES
Pt was noted to have a high BP, coughed up a bright red blood clot, and blood in NG tubed. Charge nurse called to bedside and crosscover Dr. Za Waite notified via perfect serve \"Pt of Dr. Miriam Avina. Blood noted in the NG tube. Pt also coughed up a bright red blood clot as well. NG to suction now. /72. Will continue to monitor.  \"

## 2020-05-28 NOTE — PLAN OF CARE
Problem: Falls - Risk of:  Goal: Will remain free from falls  Description: Will remain free from falls  5/27/2020 2146 by Felicia Wong RN  Outcome: Ongoing  5/27/2020 2040 by Brian Bhakta RN  Outcome: Ongoing  Goal: Absence of physical injury  Description: Absence of physical injury  5/27/2020 2146 by Felicia Wong RN  Outcome: Ongoing  5/27/2020 2040 by Brian Bhakta RN  Outcome: Ongoing   Pt calls for assistance to get out of bed. Felicia Wong

## 2020-05-28 NOTE — PLAN OF CARE
Problem: Falls - Risk of:  Goal: Will remain free from falls  Description: Will remain free from falls  Outcome: Ongoing  Note: Pt uses call light appropriately to call out for assistance to get out of bed. Bed alarm in use.  Bed locked in lowest position; side rails 2/4; call light and bedside table within reach

## 2020-05-29 ENCOUNTER — APPOINTMENT (OUTPATIENT)
Dept: CT IMAGING | Age: 85
DRG: 389 | End: 2020-05-29
Payer: MEDICARE

## 2020-05-29 LAB
GLUCOSE BLD-MCNC: 121 MG/DL (ref 70–99)
GLUCOSE BLD-MCNC: 126 MG/DL (ref 70–99)
GLUCOSE BLD-MCNC: 133 MG/DL (ref 70–99)
GLUCOSE BLD-MCNC: 134 MG/DL (ref 70–99)
GLUCOSE BLD-MCNC: 138 MG/DL (ref 70–99)
GLUCOSE BLD-MCNC: 147 MG/DL (ref 70–99)
PERFORMED ON: ABNORMAL

## 2020-05-29 PROCEDURE — 2580000003 HC RX 258: Performed by: INTERNAL MEDICINE

## 2020-05-29 PROCEDURE — 6370000000 HC RX 637 (ALT 250 FOR IP): Performed by: SURGERY

## 2020-05-29 PROCEDURE — 99232 SBSQ HOSP IP/OBS MODERATE 35: CPT | Performed by: SURGERY

## 2020-05-29 PROCEDURE — APPSS45 APP SPLIT SHARED TIME 31-45 MINUTES: Performed by: CLINICAL NURSE SPECIALIST

## 2020-05-29 PROCEDURE — 6360000004 HC RX CONTRAST MEDICATION

## 2020-05-29 PROCEDURE — 74177 CT ABD & PELVIS W/CONTRAST: CPT

## 2020-05-29 PROCEDURE — 6360000002 HC RX W HCPCS: Performed by: INTERNAL MEDICINE

## 2020-05-29 PROCEDURE — 6370000000 HC RX 637 (ALT 250 FOR IP): Performed by: INTERNAL MEDICINE

## 2020-05-29 PROCEDURE — 6360000002 HC RX W HCPCS: Performed by: NURSE PRACTITIONER

## 2020-05-29 PROCEDURE — 2580000003 HC RX 258: Performed by: NURSE PRACTITIONER

## 2020-05-29 PROCEDURE — 6360000004 HC RX CONTRAST MEDICATION: Performed by: CLINICAL NURSE SPECIALIST

## 2020-05-29 PROCEDURE — 1200000000 HC SEMI PRIVATE

## 2020-05-29 RX ORDER — FUROSEMIDE 20 MG/1
20 TABLET ORAL DAILY
Status: DISCONTINUED | OUTPATIENT
Start: 2020-05-30 | End: 2020-05-31 | Stop reason: HOSPADM

## 2020-05-29 RX ADMIN — Medication 10 ML: at 10:05

## 2020-05-29 RX ADMIN — PHENOL 1 SPRAY: 1.5 LIQUID ORAL at 01:04

## 2020-05-29 RX ADMIN — SODIUM CHLORIDE, POTASSIUM CHLORIDE, SODIUM LACTATE AND CALCIUM CHLORIDE: 600; 310; 30; 20 INJECTION, SOLUTION INTRAVENOUS at 20:45

## 2020-05-29 RX ADMIN — METOPROLOL TARTRATE 50 MG: 50 TABLET, FILM COATED ORAL at 20:45

## 2020-05-29 RX ADMIN — AMLODIPINE BESYLATE 5 MG: 5 TABLET ORAL at 10:05

## 2020-05-29 RX ADMIN — HYDROMORPHONE HYDROCHLORIDE 0.5 MG: 2 INJECTION, SOLUTION INTRAMUSCULAR; INTRAVENOUS; SUBCUTANEOUS at 05:05

## 2020-05-29 RX ADMIN — IOPAMIDOL 75 ML: 755 INJECTION, SOLUTION INTRAVENOUS at 14:00

## 2020-05-29 RX ADMIN — IOHEXOL 50 ML: 240 INJECTION, SOLUTION INTRATHECAL; INTRAVASCULAR; INTRAVENOUS; ORAL at 11:19

## 2020-05-29 RX ADMIN — HYDROMORPHONE HYDROCHLORIDE 0.5 MG: 2 INJECTION, SOLUTION INTRAMUSCULAR; INTRAVENOUS; SUBCUTANEOUS at 01:05

## 2020-05-29 RX ADMIN — Medication 10 ML: at 20:45

## 2020-05-29 RX ADMIN — ENOXAPARIN SODIUM 80 MG: 80 INJECTION SUBCUTANEOUS at 10:05

## 2020-05-29 RX ADMIN — ASPIRIN 81 MG: 81 TABLET ORAL at 10:04

## 2020-05-29 RX ADMIN — SODIUM CHLORIDE, POTASSIUM CHLORIDE, SODIUM LACTATE AND CALCIUM CHLORIDE: 600; 310; 30; 20 INJECTION, SOLUTION INTRAVENOUS at 20:44

## 2020-05-29 RX ADMIN — VALSARTAN 80 MG: 80 TABLET, FILM COATED ORAL at 10:04

## 2020-05-29 RX ADMIN — HYDROMORPHONE HYDROCHLORIDE 0.5 MG: 2 INJECTION, SOLUTION INTRAMUSCULAR; INTRAVENOUS; SUBCUTANEOUS at 17:08

## 2020-05-29 RX ADMIN — Medication 1 LOZENGE: at 20:45

## 2020-05-29 RX ADMIN — SODIUM CHLORIDE, POTASSIUM CHLORIDE, SODIUM LACTATE AND CALCIUM CHLORIDE: 600; 310; 30; 20 INJECTION, SOLUTION INTRAVENOUS at 11:19

## 2020-05-29 RX ADMIN — PANTOPRAZOLE SODIUM 40 MG: 40 TABLET, DELAYED RELEASE ORAL at 10:04

## 2020-05-29 RX ADMIN — METOPROLOL TARTRATE 50 MG: 50 TABLET, FILM COATED ORAL at 10:05

## 2020-05-29 RX ADMIN — ENOXAPARIN SODIUM 80 MG: 80 INJECTION SUBCUTANEOUS at 20:45

## 2020-05-29 ASSESSMENT — PAIN SCALES - GENERAL
PAINLEVEL_OUTOF10: 4
PAINLEVEL_OUTOF10: 7
PAINLEVEL_OUTOF10: 6

## 2020-05-29 NOTE — PROGRESS NOTES
Pt denies N/V; denies abd pain. NG clamped. Will cont to monitor.  Electronically signed by Pamela Keller RN on 5/29/2020 at 6:34 PM

## 2020-05-29 NOTE — PROGRESS NOTES
Pt denies N/V; denies abd pain. NG to CLWS unclamped. Will cont to monitor.  Electronically signed by Marquis Dean RN on 5/29/2020 at 4:32 PM

## 2020-05-29 NOTE — PROGRESS NOTES
Mu Maynard CNP from today. Need to reassess whether there is still an obstructive focus in small bowel that could warrant surgical intervention. Await results of CT today.     [facility]

## 2020-05-29 NOTE — FLOWSHEET NOTE
05/29/20 1159   Encounter Summary   Services provided to: Patient   Referral/Consult From: [facility]   Support System Spouse; Children   Place of [facility]   Continue Visiting   (5/29 Tel-riana lady comforted by chat + prayer)   Complexity of Encounter Low   Length of Encounter 15 minutes   Spiritual Assessment Completed Yes   Routine   Type Initial   Assessment Approachable   Intervention Explored feelings, thoughts, concerns;Nurtured hope;Collins   Outcome Comfort;Expressed gratitude

## 2020-05-29 NOTE — PROGRESS NOTES
age and cooperative. HEENT: Pupils equal, round, and reactive to light. Conjunctivae/corneas clear. Neck: Supple, with full range of motion. No jugular venous distention. Trachea midline. Respiratory:  Normal respiratory effort. Clear to auscultation, bilaterally without Rales/Wheezes/Rhonchi. Cardiovascular: Regular rate and rhythm with normal S1/S2 without murmurs, rubs or gallops. Abdomen: Soft, mild diffuse tenderness, moderate in the epigastrium, mildly distended, with normal bowel sounds  Musculoskeletal: No clubbing, cyanosis or edema bilaterally. Full range of motion without deformity. Skin: Skin color, texture, turgor normal.  No rashes or lesions. Neurologic:  Neurovascularly intact without any focal sensory/motor deficits. Cranial nerves: II-XII intact, grossly non-focal.  Psychiatric: Alert and oriented, thought content appropriate, normal insight  Capillary Refill: Brisk,< 3 seconds   Peripheral Pulses: +2 palpable, equal bilaterally       Labs:   Recent Labs     05/26/20 1837 05/27/20  0643 05/28/20  0610   WBC 14.8* 12.9* 9.5   HGB 12.9 11.6* 11.0*   HCT 40.3 36.7 34.6*    310 296     Recent Labs     05/26/20 1837 05/27/20  0643 05/28/20  0610   * 139 139   K 4.5 5.0 4.3   CL 99 104 104   CO2 22 24 25   BUN 16 15 17   CREATININE 0.8 0.8 0.8   CALCIUM 8.9 8.1* 7.9*     Recent Labs     05/26/20 1837 05/27/20  0643   AST 31 25   ALT 21 18   BILITOT 0.4 0.4   ALKPHOS 139* 127     Recent Labs     05/26/20 1837   INR 1.50*     No results for input(s): Gary Copa in the last 72 hours. Urinalysis:      Lab Results   Component Value Date    NITRU Negative 05/26/2020    WBCUA 3-5 10/11/2019    BACTERIA Rare 10/11/2019    RBCUA 0-2 10/11/2019    BLOODU Negative 05/26/2020    SPECGRAV 1.010 05/26/2020    GLUCOSEU Negative 05/26/2020       Radiology:  CT ABDOMEN PELVIS W IV CONTRAST Additional Contrast? Oral   Final Result   1.  No evidence of small or large bowel obstruction similar to when she has had bowel obstruction in the past, states that she had 3 admissions for SBO last year. She does state that she had several bowel movements this morning prior to the abdominal pain starting. \"      Partial SBO, likely due to adhesions from prior surgeries  - trial of conservative management with IVFs, cautious analgesia, antiemetics, NG tube, gradual diet advancement per surgery. - serial imaging showed improvement    HTN, chronic diastolic CHF  - resumed amlodipine, metoprolol, valsartan  - held furosemide initially. Monitor while on IVFs. Paroxysmal Afib  - held apixaban in case she eventually needs surgery. Enoxaparin meanwhile.  - metoprolol as above    DM2  - adjusted insulin regimen      DVT Prophylaxis: anticoagulation as above  Diet: Diet NPO Effective Now Exceptions are: Ice Chips, Sips with Meds  Code Status: Full Code    PT/OT Eval Status: not indicated    Dispo - when tolerating PO, perhaps 6/1. She lives at home.       Cristina Michel MD

## 2020-05-29 NOTE — ADT AUTH CERT
Intestinal Obstruction - Care Day 3 (5/28/2020) by Casey Mclaughlin RN         Review Status Review Entered   Completed 5/29/2020 11:14       Criteria Review      Care Day: 3 Care Date: 5/28/2020 Level of Care:    Guideline Day 3    Clinical Status    ( ) * Hemodynamic stability    5/29/2020 11:14 AM EDT by Jovita Huitron      5/28    99.1   16  64-91  158/76   89-93%ra   rates pain 5/10, 8/10    wbc  9.5    (X) * Nausea and vomiting absent or controlled and acceptable for next level of care    5/29/2020 11:14 AM EDT by Jovita Huitron      NG tube clamped    (X) * Electrolytes normal or acceptable for next level of care    ( ) * Oral hydration maintained    (X) * Pain absent or managed    5/29/2020 11:14 AM EDT by Jovita Huitron      iv Dilaudid is given x5 doses    ( ) * Surgery not indicated    ( ) * Discharge plans and education understood    Activity    ( ) * Ambulatory [B]    Routes    ( ) * Oral hydration, medications, and diet    5/29/2020 11:14 AM EDT by Jovita Huitron      lovenox 80mg sq bid  LR iv @ 75 cc/hr  iv Dilaudid is given x5 doses  phenol throat spray is used x3    * Milestone   Additional Notes   5/28      No BM or flatus   Clamped NG tube today, per Gen Surg to possible DC NG tube 5/29   Npo cont       Intestinal Obstruction - Care Day 2 (5/27/2020) by Casey Mclaughlin RN         Review Status Review Entered   Completed 5/29/2020 11:06       Criteria Review      Care Day: 2 Care Date: 5/27/2020 Level of Care:    Guideline Day 2    Level Of Care    ( ) Floor    5/29/2020 11:06 AM EDT by Jovita Huitron      5/27   98.4  16  58-64  134/72  90%ra     rates abd pain 8/10    Clinical Status    (X) * Hypotension absent    5/29/2020 11:06 AM EDT by Jovita Huitron      145/72    ( ) * Nausea and vomiting absent or improved    5/29/2020 11:06 AM EDT by Jovita Huitron      antiemetics given x2    (X) * Pain absent or managed    5/29/2020 11:06 AM EDT by Jovita Huitron      rates abd pain  8/10    ( ) * Abdominal exam stable

## 2020-05-30 LAB
ANION GAP SERPL CALCULATED.3IONS-SCNC: 10 MMOL/L (ref 3–16)
BUN BLDV-MCNC: 10 MG/DL (ref 7–20)
CALCIUM SERPL-MCNC: 8.2 MG/DL (ref 8.3–10.6)
CHLORIDE BLD-SCNC: 104 MMOL/L (ref 99–110)
CO2: 25 MMOL/L (ref 21–32)
CREAT SERPL-MCNC: 0.6 MG/DL (ref 0.6–1.2)
GFR AFRICAN AMERICAN: >60
GFR NON-AFRICAN AMERICAN: >60
GLUCOSE BLD-MCNC: 111 MG/DL (ref 70–99)
GLUCOSE BLD-MCNC: 115 MG/DL (ref 70–99)
GLUCOSE BLD-MCNC: 117 MG/DL (ref 70–99)
GLUCOSE BLD-MCNC: 166 MG/DL (ref 70–99)
HCT VFR BLD CALC: 33.8 % (ref 36–48)
HEMOGLOBIN: 10.6 G/DL (ref 12–16)
MCH RBC QN AUTO: 23.3 PG (ref 26–34)
MCHC RBC AUTO-ENTMCNC: 31.4 G/DL (ref 31–36)
MCV RBC AUTO: 74 FL (ref 80–100)
PDW BLD-RTO: 17.6 % (ref 12.4–15.4)
PERFORMED ON: ABNORMAL
PLATELET # BLD: 288 K/UL (ref 135–450)
PMV BLD AUTO: 8.8 FL (ref 5–10.5)
POTASSIUM REFLEX MAGNESIUM: 4.2 MMOL/L (ref 3.5–5.1)
RBC # BLD: 4.56 M/UL (ref 4–5.2)
SODIUM BLD-SCNC: 139 MMOL/L (ref 136–145)
WBC # BLD: 13.7 K/UL (ref 4–11)

## 2020-05-30 PROCEDURE — 6360000002 HC RX W HCPCS: Performed by: INTERNAL MEDICINE

## 2020-05-30 PROCEDURE — 6370000000 HC RX 637 (ALT 250 FOR IP): Performed by: NURSE PRACTITIONER

## 2020-05-30 PROCEDURE — 94761 N-INVAS EAR/PLS OXIMETRY MLT: CPT

## 2020-05-30 PROCEDURE — 80048 BASIC METABOLIC PNL TOTAL CA: CPT

## 2020-05-30 PROCEDURE — 36415 COLL VENOUS BLD VENIPUNCTURE: CPT

## 2020-05-30 PROCEDURE — 2580000003 HC RX 258: Performed by: NURSE PRACTITIONER

## 2020-05-30 PROCEDURE — 85027 COMPLETE CBC AUTOMATED: CPT

## 2020-05-30 PROCEDURE — 2700000000 HC OXYGEN THERAPY PER DAY

## 2020-05-30 PROCEDURE — 1200000000 HC SEMI PRIVATE

## 2020-05-30 PROCEDURE — 6370000000 HC RX 637 (ALT 250 FOR IP): Performed by: INTERNAL MEDICINE

## 2020-05-30 PROCEDURE — 99232 SBSQ HOSP IP/OBS MODERATE 35: CPT | Performed by: SURGERY

## 2020-05-30 RX ORDER — NICOTINE POLACRILEX 4 MG
15 LOZENGE BUCCAL PRN
Status: DISCONTINUED | OUTPATIENT
Start: 2020-05-30 | End: 2020-05-31 | Stop reason: HOSPADM

## 2020-05-30 RX ORDER — DEXTROSE MONOHYDRATE 25 G/50ML
12.5 INJECTION, SOLUTION INTRAVENOUS PRN
Status: DISCONTINUED | OUTPATIENT
Start: 2020-05-30 | End: 2020-05-31 | Stop reason: HOSPADM

## 2020-05-30 RX ORDER — LABETALOL HYDROCHLORIDE 5 MG/ML
5 INJECTION, SOLUTION INTRAVENOUS EVERY 4 HOURS PRN
Status: DISCONTINUED | OUTPATIENT
Start: 2020-05-30 | End: 2020-05-31 | Stop reason: HOSPADM

## 2020-05-30 RX ORDER — DEXTROSE MONOHYDRATE 50 MG/ML
100 INJECTION, SOLUTION INTRAVENOUS PRN
Status: DISCONTINUED | OUTPATIENT
Start: 2020-05-30 | End: 2020-05-31 | Stop reason: HOSPADM

## 2020-05-30 RX ADMIN — METOPROLOL TARTRATE 50 MG: 50 TABLET, FILM COATED ORAL at 08:38

## 2020-05-30 RX ADMIN — FUROSEMIDE 20 MG: 20 TABLET ORAL at 08:38

## 2020-05-30 RX ADMIN — ENOXAPARIN SODIUM 80 MG: 80 INJECTION SUBCUTANEOUS at 08:46

## 2020-05-30 RX ADMIN — ASPIRIN 81 MG: 81 TABLET ORAL at 08:38

## 2020-05-30 RX ADMIN — AMLODIPINE BESYLATE 5 MG: 5 TABLET ORAL at 08:38

## 2020-05-30 RX ADMIN — INSULIN LISPRO 1 UNITS: 100 INJECTION, SOLUTION INTRAVENOUS; SUBCUTANEOUS at 12:42

## 2020-05-30 RX ADMIN — PANTOPRAZOLE SODIUM 40 MG: 40 TABLET, DELAYED RELEASE ORAL at 08:38

## 2020-05-30 RX ADMIN — ENOXAPARIN SODIUM 80 MG: 80 INJECTION SUBCUTANEOUS at 20:55

## 2020-05-30 RX ADMIN — ACETAMINOPHEN 650 MG: 325 TABLET ORAL at 02:32

## 2020-05-30 RX ADMIN — Medication 10 ML: at 20:55

## 2020-05-30 RX ADMIN — VALSARTAN 80 MG: 80 TABLET, FILM COATED ORAL at 08:38

## 2020-05-30 RX ADMIN — METOPROLOL TARTRATE 50 MG: 50 TABLET, FILM COATED ORAL at 20:55

## 2020-05-30 ASSESSMENT — PAIN SCALES - GENERAL: PAINLEVEL_OUTOF10: 4

## 2020-05-30 NOTE — PROGRESS NOTES
Pt had O2 off when writer entered room. Writer noted SpO2 of 86% on RA. SpO2 increased to 92%-93% on 1.5 L NC. IS provided with instruction- pt able to reach 100 mL with goal rate of 1650 mL. Call light within reach, will continue to monitor.

## 2020-05-30 NOTE — PLAN OF CARE
Problem: Falls - Risk of:  Goal: Will remain free from falls  Description: Will remain free from falls  Outcome: Ongoing  Note: Pt remains safe. Ambulates independently. Uses call light appropriately to call out for assistance. Bed locked in lowest position; side rails 2/4; call light and bedside table within reach.

## 2020-05-30 NOTE — PROGRESS NOTES
Hospitalist Progress Note      PCP: Rosemarie Begum MD    Date of Admission: 5/26/2020    Chief Complaint:  Abdominal pain, nausea    Hospital Course:  \"81 y.o. female, with PMH of HTN, HLD, DM 2, A. fib, SVT, partial bowel resection, SBO, and obesity, who presented to [facility] with [facility]. The patient does have a history of partial bowel resection, appendectomy, and hysterectomy. The patient states that this morning around 9 AM, she began experiencing severe mid upper abdominal pain. States that this feels similar to when she has had bowel obstruction in the past, states that she had 3 admissions for SBO last year. She does state that she had several bowel movements this morning prior to the abdominal pain starting. \"       Subjective:  She feels well. No n/v.  Multiple BMs. Tolerated liquid breakfast.  Angela Caldwell to discharge, has things to do, hoping for tomorrow.        Medications:  Reviewed    Infusion Medications    dextrose       Scheduled Medications    furosemide  20 mg Oral Daily    amLODIPine  5 mg Oral Daily    aspirin  81 mg Oral Daily    metoprolol tartrate  50 mg Oral BID    pantoprazole  40 mg Oral Daily    valsartan  80 mg Oral Daily    enoxaparin  1 mg/kg Subcutaneous BID    sodium chloride flush  10 mL Intravenous 2 times per day    insulin lispro  0-6 Units Subcutaneous Q4H     PRN Meds: labetalol, benzocaine-menthol, HYDROmorphone, prochlorperazine, oxyCODONE-acetaminophen, phenol, sodium chloride flush, acetaminophen, ondansetron, glucose, dextrose, glucagon (rDNA), dextrose      Intake/Output Summary (Last 24 hours) at 5/30/2020 1058  Last data filed at 5/30/2020 0630  Gross per 24 hour   Intake 3658 ml   Output 2550 ml   Net 1108 ml       Physical Exam Performed:    BP (!) 170/74   Pulse 67   Temp 98.4 °F (36.9 °C) (Oral)   Resp 16   Ht 5' 2\" (1.575 m)   Wt 186 lb 3.2 oz (84.5 kg)   SpO2 98%   BMI 34.06 kg/m²     General appearance: No apparent distress, appears stated age and cooperative. HEENT: Pupils equal, round, and reactive to light. Conjunctivae/corneas clear. Neck: Supple, with full range of motion. No jugular venous distention. Trachea midline. Respiratory:  Normal respiratory effort. Clear to auscultation, bilaterally without Rales/Wheezes/Rhonchi. Cardiovascular: Regular rate and rhythm with normal S1/S2 without murmurs, rubs or gallops. Abdomen: Soft, mild diffuse tenderness, moderate in the epigastrium, mildly distended, with normal bowel sounds  Musculoskeletal: No clubbing, cyanosis or edema bilaterally. Full range of motion without deformity. Skin: Skin color, texture, turgor normal.  No rashes or lesions. Neurologic:  Neurovascularly intact without any focal sensory/motor deficits. Cranial nerves: II-XII intact, grossly non-focal.  Psychiatric: Alert and oriented, thought content appropriate, normal insight  Capillary Refill: Brisk,< 3 seconds   Peripheral Pulses: +2 palpable, equal bilaterally       Labs:   Recent Labs     05/28/20  0610 05/30/20  0623   WBC 9.5 13.7*   HGB 11.0* 10.6*   HCT 34.6* 33.8*    288     Recent Labs     05/28/20  0610 05/30/20  0623    139   K 4.3 4.2    104   CO2 25 25   BUN 17 10   CREATININE 0.8 0.6   CALCIUM 7.9* 8.2*     No results for input(s): AST, ALT, BILIDIR, BILITOT, ALKPHOS in the last 72 hours. No results for input(s): INR in the last 72 hours. No results for input(s): Joce Bending in the last 72 hours. Urinalysis:      Lab Results   Component Value Date    NITRU Negative 05/26/2020    WBCUA 3-5 10/11/2019    BACTERIA Rare 10/11/2019    RBCUA 0-2 10/11/2019    BLOODU Negative 05/26/2020    SPECGRAV 1.010 05/26/2020    GLUCOSEU Negative 05/26/2020       Radiology:  CT ABDOMEN PELVIS W IV CONTRAST Additional Contrast? Oral   Final Result   1. No evidence of small or large bowel obstruction   2. Small bilateral pleural effusions and bibasilar opacities. Differential   considerations would include atelectasis versus pneumonia   3. Nonspecific periportal and gastrohepatic ligament lymph nodes. Follow-up   imaging in 6 months recommended to confirm stability   4. Cholelithiasis, without evidence of cholecystitis   5. Hepatic steatosis         XR ABDOMEN (2 VIEWS)   Final Result   Nonspecific bowel gas pattern without significant change from prior imaging. Stool and air in the colon persist suggesting underlying ileus. XR ABDOMEN (KUB) (SINGLE AP VIEW)   Final Result   Nasogastric tube extends to the left upper quadrant at the expected location   of stomach. XR ABDOMEN (KUB) (SINGLE AP VIEW)   Final Result   1. The gastric tube sideport is located just above the gastroesophageal   junction in the distal thoracic esophagus. Recommend advancement. 2. Nonobstructive bowel gas pattern. 3. Cholelithiasis. CT ABDOMEN PELVIS W IV CONTRAST Additional Contrast? None   Final Result   Addendum 1 of 1   ADDENDUM:   Critical results were called by Dr. Tiffanie Moran to [facility] on   5/26/2020 at 20:17. Final              Assessment/Plan:    Active Hospital Problems    Diagnosis    Hyperlipidemia [E78.5]    Chronic diastolic heart failure (HCC) [I50.32]    Essential hypertension [I10]    Partial intestinal obstruction (HCC) [K56.600]    SVT (supraventricular tachycardia) (HCC) [I47.1]    PAF (paroxysmal atrial fibrillation) (HCC) [I48.0]    DM[facility]) [facility] [E11.9]       Fall[age] y.o. female, with PMH of HTN, HLD, DM 2, A. fib, SVT, partial bowel resection, SBO, and obesity, who presented to [facility] with abdominal pain. The patient does have a history of partial bowel resection, appendectomy, and hysterectomy. The patient states that this morning around 9 AM, she began experiencing severe mid upper abdominal pain.   States that this feels similar to when she has had bowel obstruction in the past, states that she had 3 admissions for SBO last year. She does state that she had several bowel movements this morning prior to the abdominal pain starting. \"      Partial SBO, likely due to adhesions from prior surgeries  - improved with conservative management. S/p IVFs. Analgesia, antiemetics, removed NG tube, gradual diet advancement per surgery. - serial imaging showed improvement  - she has now had multiple BMs    HTN, chronic diastolic CHF  - resumed amlodipine, metoprolol, valsartan  - held furosemide initially, then resumed when drinking liquids. Paroxysmal Afib  - held apixaban in case she eventually needed surgery. Used enoxaparin meanwhile. Transition back to apixaban upon discharge. - metoprolol as above    DM2  - adjusted insulin regimen      DVT Prophylaxis: anticoagulation as above  Diet: DIET CLEAR LIQUID;  Code Status: Full Code    PT/OT Eval Status: not indicated    Dispo - when tolerating PO, likely 5/31. She lives at home.       Kathryn Quispe MD

## 2020-05-30 NOTE — PLAN OF CARE
Problem: Nutrition  Goal: Optimal nutrition therapy  Outcome: Ongoing  Note: Nutrition Problem: Inadequate oral intake  Intervention: Food and/or Nutrient Delivery: Continue current diet, Start ONS  Nutritional Goals: Pt will consume 50% of meals and ONS this admission w/o GI disturbances

## 2020-05-30 NOTE — PROGRESS NOTES
Pt a/o. Pt stated no pain. Pt educated on clear liquid diet and tolerating. Stated no nausea; no emesis. Pt up in chair with call light within reach.

## 2020-05-31 VITALS
OXYGEN SATURATION: 97 % | DIASTOLIC BLOOD PRESSURE: 77 MMHG | SYSTOLIC BLOOD PRESSURE: 144 MMHG | HEART RATE: 67 BPM | TEMPERATURE: 98.2 F | WEIGHT: 182.5 LBS | BODY MASS INDEX: 33.58 KG/M2 | RESPIRATION RATE: 16 BRPM | HEIGHT: 62 IN

## 2020-05-31 LAB
GLUCOSE BLD-MCNC: 120 MG/DL (ref 70–99)
GLUCOSE BLD-MCNC: 178 MG/DL (ref 70–99)
PERFORMED ON: ABNORMAL
PERFORMED ON: ABNORMAL

## 2020-05-31 PROCEDURE — 99232 SBSQ HOSP IP/OBS MODERATE 35: CPT | Performed by: SURGERY

## 2020-05-31 PROCEDURE — 6370000000 HC RX 637 (ALT 250 FOR IP): Performed by: INTERNAL MEDICINE

## 2020-05-31 PROCEDURE — 2580000003 HC RX 258: Performed by: NURSE PRACTITIONER

## 2020-05-31 PROCEDURE — 6360000002 HC RX W HCPCS: Performed by: INTERNAL MEDICINE

## 2020-05-31 RX ORDER — VALSARTAN 160 MG/1
160 TABLET ORAL DAILY
Qty: 30 TABLET | Refills: 3 | Status: SHIPPED | OUTPATIENT
Start: 2020-06-01 | End: 2021-05-26

## 2020-05-31 RX ORDER — VALSARTAN 80 MG/1
160 TABLET ORAL DAILY
Status: DISCONTINUED | OUTPATIENT
Start: 2020-06-01 | End: 2020-05-31 | Stop reason: HOSPADM

## 2020-05-31 RX ORDER — VALSARTAN 80 MG/1
80 TABLET ORAL ONCE
Status: COMPLETED | OUTPATIENT
Start: 2020-05-31 | End: 2020-05-31

## 2020-05-31 RX ADMIN — VALSARTAN 80 MG: 80 TABLET, FILM COATED ORAL at 07:51

## 2020-05-31 RX ADMIN — Medication 10 ML: at 07:52

## 2020-05-31 RX ADMIN — VALSARTAN 80 MG: 80 TABLET, FILM COATED ORAL at 11:11

## 2020-05-31 RX ADMIN — PANTOPRAZOLE SODIUM 40 MG: 40 TABLET, DELAYED RELEASE ORAL at 07:51

## 2020-05-31 RX ADMIN — FUROSEMIDE 20 MG: 20 TABLET ORAL at 07:51

## 2020-05-31 RX ADMIN — METOPROLOL TARTRATE 50 MG: 50 TABLET, FILM COATED ORAL at 07:51

## 2020-05-31 RX ADMIN — ASPIRIN 81 MG: 81 TABLET ORAL at 07:51

## 2020-05-31 RX ADMIN — AMLODIPINE BESYLATE 5 MG: 5 TABLET ORAL at 07:51

## 2020-05-31 RX ADMIN — ENOXAPARIN SODIUM 80 MG: 80 INJECTION SUBCUTANEOUS at 07:51

## 2020-05-31 NOTE — PROGRESS NOTES
Assessment complete and charted. Pt denies needs at this time- eager to go home so she can take her  to an MRI Monday morning. Pt tolerating clear liquids. Pt + bowel sounds, flatus and BM on AM shift. Call light within reach, will continue to monitor.

## 2020-06-01 ENCOUNTER — TELEPHONE (OUTPATIENT)
Dept: CARDIOLOGY CLINIC | Age: 85
End: 2020-06-01

## 2020-06-01 NOTE — TELEPHONE ENCOUNTER
Pt sts she was released from hospital last night. She is still having SOB. Pt believes this is from the fluids she received at hospital. Pt feels bloated. She receive fluids for MRI scan. Can she double up on Lasix?

## 2020-06-01 NOTE — TELEPHONE ENCOUNTER
Patient was in [facility] for a bowel blockage. Weight today- 178lb  Yesterday @ hosp. 186lb    On lasix 20mg daily. Patient got a bad head cold while in the hosp. SOB is not as bad as in the hosp. No edema, no other symptoms. Please advise.

## 2020-06-01 NOTE — ADT AUTH CERT
Consult General Surgery   Daily Weight, I/O every 8h   VS routine      General Surgery Note   CHIEF COMPLAINT: throat discomfort      ASSESSMENT AND PLAN:   80 yo with sbo vs ileus   Plan for repeat CT scan today with PO contrast.    Continue with IVF and IV pain medicine as needed.        Surgery Staff       I have examined this patient and read and agree with the note by Eloisa Canas CNP from today.  Need to reassess whether there is still an obstructive focus in small bowel that could warrant surgical intervention.  Await results of CT today         Internal Medicine Note   Subjective:  She feels good today.  No n/v or abdominal pain.  Hopes to get NG removed.  No BM but is passing flatus. Assessment/Plan:       Active Hospital Problems     Diagnosis   · Hyperlipidemia [E78.5]   · Chronic diastolic heart failure (HCC) [I50.32]   · Essential hypertension [I10]   · Partial intestinal obstruction (HCC) [K56.600]   · SVT (supraventricular tachycardia) (HCC) [I47.1]   · PAF (paroxysmal atrial fibrillation) (HCC) [I48.0]   · DM2 (diabetes mellitus, type 2) ([facility] [E11.9]           \"84 y. o. female, with PMH of HTN, HLD, DM 2, A. fib, SVT, partial bowel resection, SBO, and obesity, who presented to [facility] with abdominal pain.  The patient does have a history of partial bowel resection, appendectomy, and hysterectomy.  The patient states that this morning around 9 AM, she began experiencing severe mid upper abdominal pain.  States that this feels similar to when she has had bowel obstruction in the past, states that she had 3 admissions for SBO last year. [facility] does state that she had several bowel movements this morning prior to the abdominal pain starting. \"       Partial SBO, likely due to adhesions from prior surgeries   - trial of conservative management with IVFs, cautious analgesia, antiemetics, NG tube, gradual diet advancement per surgery.    - serial imaging showed improvement       HTN, chronic diastolic CHF   - resumed amlodipine, metoprolol, valsartan   - held furosemide initially.  Monitor while on IVFs.     Paroxysmal Afib   - held apixaban in case she eventually needs surgery.  Enoxaparin meanwhile.   - metoprolol as above       DM2   - adjusted insulin regimen           DVT Prophylaxis: anticoagulation as above   Diet: Diet NPO Effective Now Exceptions are: Ice Chips, Sips with Meds   Code Status: Full Code       PT/OT Eval Status: not indicated       Dispo - when tolerating PO, perhaps 6/1.  She lives at home. RN Note   Assessment complete and charted. Pt with bowel sounds and flatus. Pt denies nausea or pain. Per report, pt tolerated clamping throughout day. NGT removed at 2109 per order. Pt tolerated removal without difficulty- tip intact at removal      RN Note   Pt had O2 off when writer entered room. Writer noted SpO2 of 86% on RA. SpO2 increased to 92%-93% on 1.5 L NC. IS provided with instruction- pt able to reach 100 mL with goal rate of 1650 mL. Call light within reach, will continue to monitor.

## 2020-06-02 NOTE — ADT AUTH CERT
Intestinal Obstruction - Care Day 5 (5/30/2020) by Prince Rai RN         Review Status Review Entered   Completed 6/2/2020 11:00       Criteria Review      Care Day: 5 Care Date: 5/30/2020 Level of Care: Telemetry    Guideline Day 3    Clinical Status    (X) * Hemodynamic stability    ( ) * Nausea and vomiting absent or controlled and acceptable for next level of care    (X) * Electrolytes normal or acceptable for next level of care    ( ) * Oral hydration maintained    (X) * Pain absent or managed    (X) * Surgery not indicated    ( ) * Discharge plans and education understood    Activity    (X) * Ambulatory [B]    Routes    ( ) * Oral hydration, medications, and diet    * Milestone   Additional Notes   5/30/2020      Vitals-   Temp- 98.4 oral    Pulse- 67  Resp- 16   BP- 170/74   SpO2- 95% RA       Labs-   Calcium: 8.2 (L)   Glucose: 111 (H)   WBC: 13.7 (H)   Hemoglobin Quant: 10.6 (L)   Hematocrit: 33.8 (L)     Medications- Amlodipine daily, aspirin daily, enoxaparin BID, furosemide daily, insulin lispro PRN, metoprolol tartrate BID, pantoprazole daily, valsartan daily, lactated ringers IV 75 ml/hr, acetaminophen x1      Internal Medicine-   Assessment/Plan:       Active Hospital Problems     Diagnosis   · Hyperlipidemia [E78.5]   · Chronic diastolic heart failure (HCC) [I50.32]   · Essential hypertension [I10]   · Partial intestinal obstruction (HCC) [K56.600]   · SVT (supraventricular tachycardia) ([facility]) [I47.1]   · PAF (paroxysmal atrial fibrillation) (HCC) [I48.0]   · DM2 (diabetes mellitus, type 2) ([facility] [E11.9]           \"84 y. o. female, with PMH of HTN, HLD, DM 2, A. fib, SVT, partial bowel resection, SBO, and obesity, who presented to [facility] with abdominal pain.  The patient does have a history of partial bowel resection, appendectomy, and hysterectomy.  The patient states that this morning around 9 AM, she began experiencing severe mid upper abdominal pain.  States that this feels similar to

## 2020-06-03 ENCOUNTER — OFFICE VISIT (OUTPATIENT)
Dept: CARDIOLOGY CLINIC | Age: 85
End: 2020-06-03
Payer: MEDICARE

## 2020-06-03 VITALS
HEIGHT: 62 IN | HEART RATE: 58 BPM | OXYGEN SATURATION: 97 % | DIASTOLIC BLOOD PRESSURE: 84 MMHG | BODY MASS INDEX: 33.13 KG/M2 | SYSTOLIC BLOOD PRESSURE: 158 MMHG | WEIGHT: 180 LBS

## 2020-06-03 PROCEDURE — 99214 OFFICE O/P EST MOD 30 MIN: CPT | Performed by: NURSE PRACTITIONER

## 2020-06-03 RX ORDER — AMLODIPINE BESYLATE 5 MG/1
10 TABLET ORAL DAILY
Qty: 30 TABLET | Refills: 0 | Status: ON HOLD
Start: 2020-06-03 | End: 2020-12-22 | Stop reason: HOSPADM

## 2020-06-03 NOTE — PATIENT INSTRUCTIONS
1. Check daily weights  2. Adjust Furosemide (lasix) to 40mg daily for the next 4 days and then go back to 20mg daily   3. Continue the norvasc and valsartan as you are taking. 4. Call the office on Monday if shortness of breath not improving. May consider heart monitor to evaluate   5. Follow up with [facility] as planned in July  6.  Follow up with Dr. Colette Montaño in Nov.

## 2020-06-03 NOTE — LETTER
[facility]   Cardiac Follow-up    Primary Care Doctor:  Henrique Baca MD    Chief Complaint   Patient presents with    New Patient    Shortness of Breath        History of Present Illness:   I had the pleasure of seeing Merissa Tiwari in follow up for acute visit. Follows with Dr. Kailyn Mcmullen for pulmonary HTN. Hx of Afib, SVT, DM. S/p cryoablation 2014, admitted 1/2019 with SBO- medically managed. Admitted 10/2019 with SBO- med managed  Echo from 2/2019 showed EF 55-60%, SPAP 60mmhg  Started on Eliquis 1/21/20 for afib and metoprolol was increased. Since last visit with Dr. Kailyn Mcmullen, admitted 5/26/20-5/31/20 with abd pain. Had partial SBO, improved with conservative management. Received IVFs, and lasix was held. Hospital weight was 186lbs, weight on 6/1/20 was down to 178lbs. Having shortness of breath worse since discharge. CT of the abd at the beginning of admission reviewed by me and shows no pleural effusions. And the CT scan of the ABD a few days later shows small bilateral pleural effusions. She feels palpitations- occasionally. Taking lasix 20mg daily  norvasc recently doubled prior to admission. Valsartan recently doubled prior to admission   abd feels better since discharge- back to normal, bowels are back to her normal.   Not able to drink caffeine due to the palpitations;   OJ in the am, 1 cup water twice a day, doesn't drink much fluids through the day. Merissa Tiwari describes symptoms including dyspnea, palpitations but denies orthopnea, PND, early saiety, syncope. Home weights: 179lbs    Past Medical History:   has a past medical history of Atrial fibrillation ([address] Diabetes mellitus ([address] Hydronephrosis, Hyperlipidemia, Hypertension, Intussusception intestine ([address] and SVT (supraventricular tachycardia) ([address] Surgical History:   has a past surgical history that includes Hysterectomy;  Appendectomy; Tonsillectomy; skin biopsy; ablation of dysrhythmic focus; Dilatation, esophagus; Small intestine surgery; and fracture surgery. Social History:   reports that she has never smoked. She has never used smokeless tobacco. She reports that she does not drink alcohol or use drugs. Family History:   Family History   Problem Relation Age of Onset    Diabetes Mother     Heart Disease Father     High Blood Pressure Father     High Cholesterol Father     Cancer Sister     Heart Disease Brother     High Blood Pressure Brother     High Cholesterol Brother        Home Medications:  Prior to Admission medications    Medication Sig Start Date End Date Taking? Authorizing Provider   metoprolol tartrate (LOPRESSOR) 50 MG tablet Take 1 tablet by mouth 2 times daily TAKE ONE TABLET BY MOUTH TWICE A DAY 1/21/20  Yes Lonnie Gupta MD   furosemide (LASIX) 20 MG tablet TAKE ONE TABLET BY MOUTH DAILY  Patient taking differently: 20 mg  1/21/20  Yes Lonnie Gupta MD   pantoprazole (PROTONIX) 20 MG tablet Take 2 tablets by mouth daily  Patient taking differently: Take 20 mg by mouth daily  7/26/18  Yes GUANAKO Murguia CNP   glyBURIDE (DIABETA) 1.25 MG tablet Take 2.5 mg by mouth daily (with breakfast)    Yes Historical Provider, MD   aspirin 81 MG tablet Take 81 mg by mouth daily   Yes Historical Provider, MD   valsartan (DIOVAN) 160 MG tablet Take 1 tablet by mouth daily 6/1/20   Sandra Patton MD   ELIQUIS 5 MG TABS tablet TAKE ONE TABLET BY MOUTH TWICE A DAY 3/31/20   Lonnie Gupta MD   amLODIPine (NORVASC) 5 MG tablet Take 1 tablet by mouth daily  Patient taking differently: Take 10 mg by mouth daily  10/14/19   Destin Porter MD        Allergies:  Hydrocodone; Metformin and related; Omeprazole; and Hydralazine     Review of Systems:   · Constitutional: there has been no unanticipated weight loss. · Eyes: No vision changes  · ENT: No Headaches, no nasal congestion. No mouth sores or sore throat.  of 55-60%. Definity contrast administered with no evidence of left   ventricular mass or thrombus noted.   No regional wall motion abnormalities are seen. Normal left ventricular   diastolic filling pressure.   The left atrium is mildly dilated.   Mild mitral and tricuspid regurgitation.   Systolic pulmonary artery pressure (SPAP) estimated at 42 mmHg (right atrial   pressure 3 mmHg), consistent with mild pulmonary hypertension. NYHA:   II-III  ACC/ AHA Stage:    C    Pertinent Problems:  · Shortness of breath due to bilateral pleural effusion as noted on Abd CT scans from iatrogenic fluids during admission for SBO. · Small bilateral pleural effusions  · Chronic diastolic heart failure  · afib  · Pulmonary hypertension    Visit Diagnosis:    1. Bilateral pleural effusion    2. Chronic diastolic heart failure ([address]    3. Dyspnea on exertion    4. Essential hypertension    5. PAF (paroxysmal atrial fibrillation) ([address]          Plan:   1. Check daily weights  2. Adjust Furosemide (lasix) to 40mg daily for the next 4 days and then go back to 20mg daily   3. Continue the norvasc and valsartan as you are taking. 4. Call the office on Monday if shortness of breath not improving. May consider heart monitor to evaluate; discussed avoiding caffeine   5. Follow up with [facility] as planned in July  6. Follow up with Dr. Jhony Villanueva in Nov.       QUALITY MEASURES  1. Tobacco Cessation Counseling: NA  2. Retake of BP if >140/90:   Yes  3. Documentation to PCP/referring for new patient:  Sent to PCP at close of office visit  4. CAD patient on anti-platelet: NA  5. CAD patient on STATIN therapy:  NA  6. Patient with CHF and aFib on anticoagulation:  Yes     I appreciate the opportunity for caring for this patient.      Mundo Yan CNP, 6/3/2020, 11:13 AM

## 2020-06-03 NOTE — PROGRESS NOTES
dysrhythmic focus; Dilatation, esophagus; Small intestine surgery; and fracture surgery. Social History:   reports that she has never smoked. She has never used smokeless tobacco. She reports that she does not drink alcohol or use drugs. Family History:   Family History   Problem Relation Age of Onset    Diabetes Mother     Heart Disease Father     High Blood Pressure Father     High Cholesterol Father     Cancer Sister     Heart Disease Brother     High Blood Pressure Brother     High Cholesterol Brother        Home Medications:  Prior to Admission medications    Medication Sig Start Date End Date Taking? Authorizing Provider   metoprolol tartrate (LOPRESSOR) 50 MG tablet Take 1 tablet by mouth 2 times daily TAKE ONE TABLET BY MOUTH TWICE A DAY 1/21/20  Yes Lena Gentile MD   furosemide (LASIX) 20 MG tablet TAKE ONE TABLET BY MOUTH DAILY  Patient taking differently: 20 mg  1/21/20  Yes Lena Gentile MD   pantoprazole (PROTONIX) 20 MG tablet Take 2 tablets by mouth daily  Patient taking differently: Take 20 mg by mouth daily  7/26/18  Yes GUANAKO Sparks CNP   glyBURIDE (DIABETA) 1.25 MG tablet Take 2.5 mg by mouth daily (with breakfast)    Yes Historical Provider, MD   aspirin 81 MG tablet Take 81 mg by mouth daily   Yes Historical Provider, MD   valsartan (DIOVAN) 160 MG tablet Take 1 tablet by mouth daily 6/1/20   Ketty Pereyra MD   ELIQUIS 5 MG TABS tablet TAKE ONE TABLET BY MOUTH TWICE A DAY 3/31/20   Lena Gentile MD   amLODIPine (NORVASC) 5 MG tablet Take 1 tablet by mouth daily  Patient taking differently: Take 10 mg by mouth daily  10/14/19   Melvin Porter MD        Allergies:  Hydrocodone; Metformin and related; Omeprazole; and Hydralazine     Review of Systems:   · Constitutional: there has been no unanticipated weight loss. · Eyes: No vision changes  · ENT: No Headaches, no nasal congestion. No mouth sores or sore throat.   · Cardiovascular: Reviewed in HPI  · Respiratory: 05/30/2020    MCV 75.6 05/28/2020    MCV 75.3 05/27/2020    RDW 17.6 05/30/2020    RDW 18.1 05/28/2020    RDW 17.3 05/27/2020     05/30/2020     05/28/2020     05/27/2020     Iron:   Lab Results   Component Value Date    IRON 42 02/05/2020    TIBC 387 02/05/2020     BMP:   Lab Results   Component Value Date     05/30/2020     05/28/2020     05/27/2020    K 4.2 05/30/2020    K 4.3 05/28/2020    K 5.0 05/27/2020     05/30/2020     05/28/2020     05/27/2020    CO2 25 05/30/2020    CO2 25 05/28/2020    CO2 24 05/27/2020    PHOS 2.6 02/18/2014    PHOS 4.2 02/12/2014    BUN 10 05/30/2020    BUN 17 05/28/2020    BUN 15 05/27/2020    CREATININE 0.6 05/30/2020    CREATININE 0.8 05/28/2020    CREATININE 0.8 05/27/2020     BNP:   Lab Results   Component Value Date    PROBNP 400 04/28/2020    PROBNP 608 02/05/2020    PROBNP 1,854 01/28/2020     Lipids: No results found for: CHOL   No results found for: TRIG   No results found for: HDL   No results found for: LDLCHOLESTEROL, LDLCALC   No results found for: LABVLDL, VLDL   No results found for: CHOLHDLRATIO    EF:   Lab Results   Component Value Date    LVEF 58 04/28/2020       Recent Testing:  Echo: 02/19/19   Summary   Definity contrast administered.   Normal left ventricular systolic function with an estimated ejection   fraction of 55-60%.   There is mild septal hypertrophy with normal remaining wall thickness.   Normal left ventricular diastolic filling pressure.   Mild mitral regurgitation.   Mild tricuspid regurgitation.   Systolic pulmonary artery pressure (SPAP) estimated at 60 mmHg (RA pressure   3 mmHg), consistent with severe pulmonary hypertension.   Mild pulmonic regurgitation present. Echo: 04/28/20  Summary   Normal left ventricle systolic function with an estimated ejection fraction   of 55-60%.  Definity contrast administered with no evidence of left   ventricular mass or thrombus noted.   No regional

## 2020-06-15 ENCOUNTER — TELEPHONE (OUTPATIENT)
Dept: CARDIOLOGY CLINIC | Age: 85
End: 2020-06-15

## 2020-06-15 NOTE — TELEPHONE ENCOUNTER
Amiodarone is not on her current medication list. If she is taking amiodarone, she needs to follow up.

## 2020-06-16 NOTE — TELEPHONE ENCOUNTER
Spoke with pt. She has the medication but is not taking it. She states that someone told her to stop it but can not remember who or why. She has been feeling well and wants to know if she should remain off of it.

## 2020-07-22 RX ORDER — METOPROLOL TARTRATE 50 MG/1
TABLET, FILM COATED ORAL
Qty: 180 TABLET | Refills: 1 | Status: ON HOLD
Start: 2020-07-22 | End: 2020-12-22 | Stop reason: HOSPADM

## 2020-07-22 NOTE — TELEPHONE ENCOUNTER
6/03/2020 NPRB  Plan:   1. Check daily weights  2. Adjust Furosemide (lasix) to 40mg daily for the next 4 days and then go back to 20mg daily   3. Continue the norvasc and valsartan as you are taking. 4. Call the office on Monday if shortness of breath not improving. May consider heart monitor to evaluate; discussed avoiding caffeine   5. Follow up with [facility] as planned in July  6.  Follow up with Dr. Rajni Gates in Nov.        11/3/2020 SHELLY appt

## 2020-07-29 ENCOUNTER — OFFICE VISIT (OUTPATIENT)
Dept: CARDIOLOGY CLINIC | Age: 85
End: 2020-07-29
Payer: MEDICARE

## 2020-07-29 VITALS
HEIGHT: 62 IN | OXYGEN SATURATION: 97 % | HEART RATE: 57 BPM | BODY MASS INDEX: 33.58 KG/M2 | WEIGHT: 182.5 LBS | DIASTOLIC BLOOD PRESSURE: 60 MMHG | SYSTOLIC BLOOD PRESSURE: 110 MMHG

## 2020-07-29 PROCEDURE — 93000 ELECTROCARDIOGRAM COMPLETE: CPT | Performed by: NURSE PRACTITIONER

## 2020-07-29 PROCEDURE — 99214 OFFICE O/P EST MOD 30 MIN: CPT | Performed by: NURSE PRACTITIONER

## 2020-07-29 NOTE — PROGRESS NOTES
[address]   Electrophysiology  Note              Date:  July 29, 2020  Patient name: Olivia Mart Payer  YOB: 1935    Primary Care physician: Tonya Parekh MD    HISTORY OF PRESENT ILLNESS: The patient is an 81 y.o.  female with a history of afib, HTN, SVT, pulmonary HTN, chronic diastolic CHF, and DM. She is a poor historian regarding medications. She had SVT in 2013, wore an event monitor, and atrial fibrillation was detected. She had recurrent AF in 2014 and had a cryoablation for PAF in 2/2014. Post procedure she had a retroperitoneal bleed and required a urinary stent. Echo in 2/2019 showed an EF of 55-60% and severe pulmonary HTN and she was referred to heart failure team. Had recurrent afib 1/2020 and was started on Eliquis and amiodarone. Amiodarone was stopped for unknown reasons. Today she is being seen for paroxysmal atrial fibrillation. EKG shows SR with a HR of 61. She is feeling well and denies chest pain, palpitations, shortness of breath, and dizziness. Past Medical History:   has a past medical history of Atrial fibrillation ([address] Diabetes mellitus ([address] Hydronephrosis, Hyperlipidemia, Hypertension, Intussusception intestine ([address] and SVT (supraventricular tachycardia) ([address] Past Surgical History:   has a past surgical history that includes Hysterectomy; Appendectomy; Tonsillectomy; skin biopsy; ablation of dysrhythmic focus; Dilatation, esophagus; Small intestine surgery; and fracture surgery. Home Medications:    Prior to Admission medications    Medication Sig Start Date End Date Taking?  Authorizing Provider   metoprolol tartrate (LOPRESSOR) 50 MG tablet TAKE ONE TABLET BY MOUTH TWICE A DAY 7/22/20  Yes Jonel Brunner, APRN - CNP   amLODIPine (NORVASC) 5 MG tablet Take 2 tablets by mouth daily 6/3/20  Yes Jonel Brunner, APRN - CNP   valsartan (DIOVAN) 160 MG tablet Take 1 tablet by mouth daily 6/1/20  Yes Lolly العلي MD   ELIQUIS 5 MG TABS tablet TAKE ONE TABLET BY MOUTH TWICE A DAY 3/31/20  Yes Ivana Sherman MD   furosemide (LASIX) 20 MG tablet TAKE ONE TABLET BY MOUTH DAILY  Patient taking differently: 20 mg  1/21/20  Yes Ivana Sherman MD   pantoprazole (PROTONIX) 20 MG tablet Take 2 tablets by mouth daily  Patient taking differently: Take 20 mg by mouth daily  7/26/18  Yes Eugena Range, APRN - CNP   glyBURIDE (DIABETA) 1.25 MG tablet Take 2.5 mg by mouth daily (with breakfast)    Yes Historical Provider, MD   aspirin 81 MG tablet Take 81 mg by mouth daily   Yes Historical Provider, MD       Allergies:  Hydrocodone; Metformin and related; Omeprazole; and Hydralazine    Social History:   reports that she has never smoked. She has never used smokeless tobacco. She reports that she does not drink alcohol or use drugs. Family History: family history includes Cancer in her sister; Diabetes in her mother; Heart Disease in her brother and father; High Blood Pressure in her brother and father; High Cholesterol in her brother and father. Review of Systems   Constitutional: Negative. HENT: Negative. Eyes: Negative. Respiratory: Negative. Cardiovascular: see HPI  Gastrointestinal: Negative. Genitourinary: Negative. Musculoskeletal: Negative. Skin: Negative. Neurological: Negative. Hematological: Negative. Psychiatric/Behavioral: Negative. PHYSICAL EXAM:    Vital signs:    /60   Pulse 57   Ht 5' 2\" (1.575 m)   Wt 182 lb 8 oz (82.8 kg)   SpO2 97%   BMI 33.38 kg/m²      Constitutional and general appearance: alert, cooperative, no distress and appears stated age  HEENT: PERRL, no cervical lymphadenopathy. No masses palpable.  Normal oral mucosa  Respiratory:  · Normal excursion and expansion without use of accessory muscles  · Resp auscultation: Normal breath sounds without dullness or wheezing  Cardiovascular:  · The apical impulse is not displaced  · Heart tones are crisp and normal. Regular S1 and S2.  · Jugular venous pulsation Normal  · The carotid upstroke is normal in amplitude and contour without delay or bruit  · Peripheral pulses are symmetrical and full   Abdomen:  · No masses or tenderness  · Bowel sounds present  Extremities:  ·  No cyanosis or clubbing  ·  No lower extremity edema  ·  Skin: warm and dry  Neurological:  · Alert and oriented  · Moves all extremities well  · No abnormalities of mood, affect, memory, mentation, or behavior are noted    DATA:    ECG 7/29/2020:  SR HR 61    Echo 2/19/2019:  Definity contrast administered. Normal left ventricular systolic function with an estimated ejection fraction of 55-60%. There is mild septal hypertrophy with normal remaining wall thickness. Normal left ventricular diastolic filling pressure. Mild mitral regurgitation. Mild tricuspid regurgitation. Systolic pulmonary artery pressure (SPAP) estimated at 60 mmHg (RA pressure 3 mmHg), consistent with severe pulmonary hypertension. Mild pulmonic regurgitation present. RAINER 2/11/2014:  Global ejection fraction is normal and estimated from 50 % to 55 %. Echo 8/26/2013:  Overall left ventricular function is normal.   Ejection fraction is visually estimated to be 50-55 %.   There is trivial tricuspid regurgitation with RVSP estimated at 30 mmHg.   Limited 2-D echocardiogram   There is limited visualization of the valvular structures but no obvious major abnormalities noted.  Dorothye Hero is reversal of E/A inflow velocities across the mitral valve suggesting impaired left ventricular relaxation. CARDIOLOGY LABS:   CBC: No results for input(s): WBC, HGB, HCT, PLT in the last 72 hours. BMP: No results for input(s): NA, K, CO2, BUN, CREATININE, LABGLOM, GLUCOSE in the last 72 hours. PT/INR: No results for input(s): PROTIME, INR in the last 72 hours. APTT:No results for input(s): APTT in the last 72 hours.   FASTING LIPID PANEL:No results found for: HDL, LDLDIRECT, LDLCALC, TRIG  LIVER PROFILE:No results for input(s): AST, ALT, ALB in the last 72 hours. Assessment:   1. Paroxysmal atrial fibrillation: stable    -s/p cryoablation in 2/2014 (patient had a significant retroperitoneal bleed post procedure)   -recurrence 1/2020, amiodarone recommended, patient stopped 3/2020   -[id number] score 6 (age, gender, HTN, DM, CHF)  2. Supraventricular tachycardia: noted in 2013  3. HTN: controlled   4. Pulmonary HTN  5. Chronic diastolic CHF: compensated  6. DM  7. SBO: s/p resection    Plan:   1. Continue metoprolol, amlodipine, valsartan, and Eliqus   2. Annual CBC and BMP (due 5/2021)  3. Will restart amiodarone if afib is recurrent  4.  Follow up in 6 months with EP    Ashley Cano, [address]  [phone]

## 2020-09-13 ENCOUNTER — APPOINTMENT (OUTPATIENT)
Dept: CT IMAGING | Age: 85
DRG: 390 | End: 2020-09-13
Payer: MEDICARE

## 2020-09-13 ENCOUNTER — HOSPITAL ENCOUNTER (INPATIENT)
Age: 85
LOS: 2 days | Discharge: HOME | DRG: 390 | End: 2020-09-15
Attending: EMERGENCY MEDICINE | Admitting: HOSPITALIST
Payer: MEDICARE

## 2020-09-13 PROBLEM — K56.609 BOWEL OBSTRUCTION (HCC): Status: ACTIVE | Noted: 2020-09-13

## 2020-09-13 LAB
A/G RATIO: 1.1 (ref 1.1–2.2)
ALBUMIN SERPL-MCNC: 4.4 G/DL (ref 3.4–5)
ALP BLD-CCNC: 129 U/L (ref 40–129)
ALT SERPL-CCNC: 27 U/L (ref 10–40)
ANION GAP SERPL CALCULATED.3IONS-SCNC: 15 MMOL/L (ref 3–16)
AST SERPL-CCNC: 36 U/L (ref 15–37)
BASOPHILS ABSOLUTE: 0.1 K/UL (ref 0–0.2)
BASOPHILS RELATIVE PERCENT: 0.6 %
BILIRUB SERPL-MCNC: 0.4 MG/DL (ref 0–1)
BILIRUBIN URINE: NEGATIVE
BLOOD, URINE: NEGATIVE
BUN BLDV-MCNC: 16 MG/DL (ref 7–20)
CALCIUM SERPL-MCNC: 9.3 MG/DL (ref 8.3–10.6)
CHLORIDE BLD-SCNC: 98 MMOL/L (ref 99–110)
CLARITY: CLEAR
CO2: 23 MMOL/L (ref 21–32)
COLOR: NORMAL
CREAT SERPL-MCNC: 0.9 MG/DL (ref 0.6–1.2)
EOSINOPHILS ABSOLUTE: 0.2 K/UL (ref 0–0.6)
EOSINOPHILS RELATIVE PERCENT: 2 %
GFR AFRICAN AMERICAN: >60
GFR NON-AFRICAN AMERICAN: 60
GLOBULIN: 4 G/DL
GLUCOSE BLD-MCNC: 204 MG/DL (ref 70–99)
GLUCOSE BLD-MCNC: 222 MG/DL (ref 70–99)
GLUCOSE BLD-MCNC: 233 MG/DL (ref 70–99)
GLUCOSE BLD-MCNC: 280 MG/DL (ref 70–99)
GLUCOSE URINE: NEGATIVE MG/DL
HCT VFR BLD CALC: 40 % (ref 36–48)
HEMOGLOBIN: 12.8 G/DL (ref 12–16)
KETONES, URINE: NEGATIVE MG/DL
LACTIC ACID: 1.4 MMOL/L (ref 0.4–2)
LEUKOCYTE ESTERASE, URINE: NEGATIVE
LIPASE: 19 U/L (ref 13–60)
LYMPHOCYTES ABSOLUTE: 2.7 K/UL (ref 1–5.1)
LYMPHOCYTES RELATIVE PERCENT: 23.2 %
MCH RBC QN AUTO: 24.2 PG (ref 26–34)
MCHC RBC AUTO-ENTMCNC: 32.1 G/DL (ref 31–36)
MCV RBC AUTO: 75.5 FL (ref 80–100)
MICROSCOPIC EXAMINATION: NORMAL
MONOCYTES ABSOLUTE: 1 K/UL (ref 0–1.3)
MONOCYTES RELATIVE PERCENT: 8.8 %
NEUTROPHILS ABSOLUTE: 7.5 K/UL (ref 1.7–7.7)
NEUTROPHILS RELATIVE PERCENT: 65.4 %
NITRITE, URINE: NEGATIVE
PDW BLD-RTO: 17.5 % (ref 12.4–15.4)
PERFORMED ON: ABNORMAL
PH UA: 5.5 (ref 5–8)
PLATELET # BLD: 333 K/UL (ref 135–450)
PMV BLD AUTO: 8.8 FL (ref 5–10.5)
POTASSIUM SERPL-SCNC: 4.6 MMOL/L (ref 3.5–5.1)
PROTEIN UA: NEGATIVE MG/DL
RBC # BLD: 5.29 M/UL (ref 4–5.2)
SODIUM BLD-SCNC: 136 MMOL/L (ref 136–145)
SPECIFIC GRAVITY UA: 1.01 (ref 1–1.03)
TOTAL PROTEIN: 8.4 G/DL (ref 6.4–8.2)
URINE REFLEX TO CULTURE: NORMAL
URINE TYPE: NORMAL
UROBILINOGEN, URINE: 0.2 E.U./DL
WBC # BLD: 11.5 K/UL (ref 4–11)

## 2020-09-13 PROCEDURE — 6360000002 HC RX W HCPCS: Performed by: SURGERY

## 2020-09-13 PROCEDURE — 6360000002 HC RX W HCPCS: Performed by: HOSPITALIST

## 2020-09-13 PROCEDURE — 96375 TX/PRO/DX INJ NEW DRUG ADDON: CPT

## 2020-09-13 PROCEDURE — 81003 URINALYSIS AUTO W/O SCOPE: CPT

## 2020-09-13 PROCEDURE — 2580000003 HC RX 258: Performed by: HOSPITALIST

## 2020-09-13 PROCEDURE — 83690 ASSAY OF LIPASE: CPT

## 2020-09-13 PROCEDURE — 80053 COMPREHEN METABOLIC PANEL: CPT

## 2020-09-13 PROCEDURE — 85025 COMPLETE CBC W/AUTO DIFF WBC: CPT

## 2020-09-13 PROCEDURE — 6360000002 HC RX W HCPCS: Performed by: PHYSICIAN ASSISTANT

## 2020-09-13 PROCEDURE — 1200000000 HC SEMI PRIVATE

## 2020-09-13 PROCEDURE — 99222 1ST HOSP IP/OBS MODERATE 55: CPT | Performed by: SURGERY

## 2020-09-13 PROCEDURE — 6360000004 HC RX CONTRAST MEDICATION: Performed by: PHYSICIAN ASSISTANT

## 2020-09-13 PROCEDURE — 74177 CT ABD & PELVIS W/CONTRAST: CPT

## 2020-09-13 PROCEDURE — 83036 HEMOGLOBIN GLYCOSYLATED A1C: CPT

## 2020-09-13 PROCEDURE — 99285 EMERGENCY DEPT VISIT HI MDM: CPT

## 2020-09-13 PROCEDURE — 96374 THER/PROPH/DIAG INJ IV PUSH: CPT

## 2020-09-13 PROCEDURE — 83605 ASSAY OF LACTIC ACID: CPT

## 2020-09-13 PROCEDURE — 2580000003 HC RX 258: Performed by: PHYSICIAN ASSISTANT

## 2020-09-13 RX ORDER — POLYETHYLENE GLYCOL 3350 17 G/17G
17 POWDER, FOR SOLUTION ORAL DAILY PRN
Status: DISCONTINUED | OUTPATIENT
Start: 2020-09-13 | End: 2020-09-15 | Stop reason: HOSPADM

## 2020-09-13 RX ORDER — ACETAMINOPHEN 650 MG/1
650 SUPPOSITORY RECTAL EVERY 6 HOURS PRN
Status: DISCONTINUED | OUTPATIENT
Start: 2020-09-13 | End: 2020-09-15 | Stop reason: HOSPADM

## 2020-09-13 RX ORDER — SODIUM CHLORIDE 9 MG/ML
INJECTION, SOLUTION INTRAVENOUS CONTINUOUS
Status: DISCONTINUED | OUTPATIENT
Start: 2020-09-13 | End: 2020-09-15 | Stop reason: HOSPADM

## 2020-09-13 RX ORDER — 0.9 % SODIUM CHLORIDE 0.9 %
500 INTRAVENOUS SOLUTION INTRAVENOUS ONCE
Status: COMPLETED | OUTPATIENT
Start: 2020-09-13 | End: 2020-09-13

## 2020-09-13 RX ORDER — POTASSIUM CHLORIDE 7.45 MG/ML
10 INJECTION INTRAVENOUS PRN
Status: DISCONTINUED | OUTPATIENT
Start: 2020-09-13 | End: 2020-09-15 | Stop reason: HOSPADM

## 2020-09-13 RX ORDER — ACETAMINOPHEN 325 MG/1
650 TABLET ORAL EVERY 6 HOURS PRN
Status: DISCONTINUED | OUTPATIENT
Start: 2020-09-13 | End: 2020-09-15 | Stop reason: HOSPADM

## 2020-09-13 RX ORDER — PROMETHAZINE HYDROCHLORIDE 25 MG/1
12.5 TABLET ORAL EVERY 6 HOURS PRN
Status: DISCONTINUED | OUTPATIENT
Start: 2020-09-13 | End: 2020-09-15 | Stop reason: HOSPADM

## 2020-09-13 RX ORDER — MORPHINE SULFATE 4 MG/ML
4 INJECTION, SOLUTION INTRAMUSCULAR; INTRAVENOUS
Status: DISCONTINUED | OUTPATIENT
Start: 2020-09-13 | End: 2020-09-15 | Stop reason: HOSPADM

## 2020-09-13 RX ORDER — NICOTINE POLACRILEX 4 MG
15 LOZENGE BUCCAL PRN
Status: DISCONTINUED | OUTPATIENT
Start: 2020-09-13 | End: 2020-09-15 | Stop reason: HOSPADM

## 2020-09-13 RX ORDER — DEXTROSE MONOHYDRATE 25 G/50ML
12.5 INJECTION, SOLUTION INTRAVENOUS PRN
Status: DISCONTINUED | OUTPATIENT
Start: 2020-09-13 | End: 2020-09-15 | Stop reason: HOSPADM

## 2020-09-13 RX ORDER — ONDANSETRON 2 MG/ML
4 INJECTION INTRAMUSCULAR; INTRAVENOUS ONCE
Status: COMPLETED | OUTPATIENT
Start: 2020-09-13 | End: 2020-09-13

## 2020-09-13 RX ORDER — DEXTROSE AND SODIUM CHLORIDE 5; .45 G/100ML; G/100ML
INJECTION, SOLUTION INTRAVENOUS CONTINUOUS
Status: DISCONTINUED | OUTPATIENT
Start: 2020-09-13 | End: 2020-09-13

## 2020-09-13 RX ORDER — SODIUM CHLORIDE 0.9 % (FLUSH) 0.9 %
10 SYRINGE (ML) INJECTION EVERY 12 HOURS SCHEDULED
Status: DISCONTINUED | OUTPATIENT
Start: 2020-09-13 | End: 2020-09-15 | Stop reason: HOSPADM

## 2020-09-13 RX ORDER — MORPHINE SULFATE 4 MG/ML
4 INJECTION, SOLUTION INTRAMUSCULAR; INTRAVENOUS ONCE
Status: COMPLETED | OUTPATIENT
Start: 2020-09-13 | End: 2020-09-13

## 2020-09-13 RX ORDER — ONDANSETRON 2 MG/ML
4 INJECTION INTRAMUSCULAR; INTRAVENOUS EVERY 6 HOURS PRN
Status: DISCONTINUED | OUTPATIENT
Start: 2020-09-13 | End: 2020-09-15 | Stop reason: HOSPADM

## 2020-09-13 RX ORDER — DEXTROSE MONOHYDRATE 50 MG/ML
100 INJECTION, SOLUTION INTRAVENOUS PRN
Status: DISCONTINUED | OUTPATIENT
Start: 2020-09-13 | End: 2020-09-15 | Stop reason: HOSPADM

## 2020-09-13 RX ORDER — SODIUM CHLORIDE 0.9 % (FLUSH) 0.9 %
10 SYRINGE (ML) INJECTION PRN
Status: DISCONTINUED | OUTPATIENT
Start: 2020-09-13 | End: 2020-09-15 | Stop reason: HOSPADM

## 2020-09-13 RX ADMIN — SODIUM CHLORIDE: 9 INJECTION, SOLUTION INTRAVENOUS at 20:57

## 2020-09-13 RX ADMIN — ONDANSETRON 4 MG: 2 INJECTION INTRAMUSCULAR; INTRAVENOUS at 14:38

## 2020-09-13 RX ADMIN — ONDANSETRON 4 MG: 2 INJECTION INTRAMUSCULAR; INTRAVENOUS at 19:49

## 2020-09-13 RX ADMIN — MORPHINE SULFATE 4 MG: 4 INJECTION, SOLUTION INTRAMUSCULAR; INTRAVENOUS at 19:47

## 2020-09-13 RX ADMIN — SODIUM CHLORIDE 500 ML: 9 INJECTION, SOLUTION INTRAVENOUS at 14:38

## 2020-09-13 RX ADMIN — MORPHINE SULFATE 4 MG: 4 INJECTION, SOLUTION INTRAMUSCULAR; INTRAVENOUS at 14:38

## 2020-09-13 RX ADMIN — SODIUM CHLORIDE: 9 INJECTION, SOLUTION INTRAVENOUS at 18:08

## 2020-09-13 RX ADMIN — IOPAMIDOL 75 ML: 755 INJECTION, SOLUTION INTRAVENOUS at 15:18

## 2020-09-13 ASSESSMENT — PAIN DESCRIPTION - LOCATION
LOCATION: ABDOMEN
LOCATION: ABDOMEN

## 2020-09-13 ASSESSMENT — ENCOUNTER SYMPTOMS
COLOR CHANGE: 0
CONSTIPATION: 0
BACK PAIN: 0
COUGH: 0
NAUSEA: 1
ABDOMINAL PAIN: 1
SHORTNESS OF BREATH: 0
VOMITING: 0
DIARRHEA: 0
EYES NEGATIVE: 1

## 2020-09-13 ASSESSMENT — PAIN DESCRIPTION - ORIENTATION
ORIENTATION: UPPER;MID
ORIENTATION: UPPER;MID

## 2020-09-13 ASSESSMENT — PAIN SCALES - GENERAL
PAINLEVEL_OUTOF10: 2
PAINLEVEL_OUTOF10: 7
PAINLEVEL_OUTOF10: 6
PAINLEVEL_OUTOF10: 2

## 2020-09-13 NOTE — ED PROVIDER NOTES
[facility]  ED  EMERGENCY DEPARTMENT ENCOUNTER        Pt Name: Leatha Tiwari  MRN: [id number]  Armstrongftyesha 1935  Date of evaluation: 9/13/2020  Provider: Barb Nuñez PA-C  PCP: Kacy Oneill MD  ED Attending: Emi Partida DO      This patient was seen by the attending provider Emi Partida DO    History provided by the patient    CHIEF COMPLAINT:     Chief Complaint   Patient presents with    Abdominal Pain     pt has a history of bowel obstruction - last one in february, pt feels that is what it is again        HISTORY OF PRESENT ILLNESS:      Leatha Tiwari is a 81 y.o. female who arrives to the ED by private vehicle. Patient is here with abdominal pain that started yesterday. She has had some nausea but no vomiting. She reports normal bowel movements. She denies urinary symptoms. Patient is here concerned about bowel obstruction. She has had these multiple times that she states stems from a prior surgery. (She describes having an intussusception surgery and having the bowel obstructions recurrently happen at that prior anastomosis site). Patient cannot identify exacerbating or alleviating factors to symptoms    Nursing Notes were reviewed     REVIEW OF SYSTEMS:     Review of Systems   Constitutional: Positive for appetite change. Negative for chills and fever. HENT: Negative. Eyes: Negative. Respiratory: Negative for cough and shortness of breath. Cardiovascular: Negative for chest pain. Gastrointestinal: Positive for abdominal pain and nausea. Negative for constipation, diarrhea and vomiting. Genitourinary: Negative for difficulty urinating and dysuria. Musculoskeletal: Negative for back pain and neck pain. Skin: Negative for color change. Neurological: Negative for dizziness and headaches. All other systems reviewed and are negative. Except as noted above in the ROS, all other systems were reviewed and negative.          PAST Transportation needs     Medical: None     Non-medical: None   Tobacco Use    Smoking status: Never Smoker    Smokeless tobacco: Never Used   Substance and Sexual Activity    Alcohol use: No    Drug use: No    Sexual activity: Yes     Partners: Male   Lifestyle    Physical activity     Days per week: None     Minutes per session: None    Stress: None   Relationships    Social connections     Talks on phone: None     Gets together: None     Attends Gnosticism service: None     Active member of club or organization: None     Attends meetings of clubs or organizations: None     Relationship status: None    Intimate partner violence     Fear of current or ex partner: None     Emotionally abused: None     Physically abused: None     Forced sexual activity: None   Other Topics Concern    None   Social History Narrative    None       SCREENINGS:    [facility] Coma Scale  Eye Opening: Spontaneous  Best Verbal Response: Oriented  Best Motor Response: Obeys commands  [facility] Coma Scale Score: 15        PHYSICAL EXAM:       ED Triage Vitals [09/13/20 1356]   BP Temp Temp Source Pulse Resp SpO2 Height Weight   (!) 164/127 98.1 °F (36.7 °C) Oral 66 20 100 % 5' 2\" (1.575 m) 180 lb (81.6 kg)       Physical Exam    CONSTITUTIONAL: Awake and alert. Cooperative. Well-developed. Well-nourished. Non-toxic. No acute distress. HENT: Normocephalic. Atraumatic. External ears normal, without discharge. No nasal discharge. Oropharynx clear. Mucous membranes moist.  EYES: Conjunctiva non-injected. No scleral icterus. PERRL. EOM's grossly intact. NECK: Supple. Normal ROM. CARDIOVASCULAR: RRR. No Murmer. Intact distal pulses. PULMONARY/CHEST WALL: Effort normal. No tachypnea. Lungs clear to ausculation. ABDOMEN: Decreased BS. Soft. Nondistended. Mild, generalized tenderness to palpate. No guarding. /ANORECTAL: Not assessed  MUSKULOSKELETAL: Normal ROM. No acute deformities. No edema. No tenderness to palpate.   SKIN: Warm and dry. No rash. NEUROLOGICAL: Alert and oriented x 3. GCS 15. CN II-XII grossly intact. Strength is 5/5 in all extremities and sensation is intact. Normal gait.    PSYCHIATRIC: Normal affect        DIAGNOSTICRESULTS:     LABS:    Results for orders placed or performed during the hospital encounter of 09/13/20   CBC Auto Differential   Result Value Ref Range    WBC 11.5 (H) 4.0 - 11.0 K/uL    RBC 5.29 (H) 4.00 - 5.20 M/uL    Hemoglobin 12.8 12.0 - 16.0 g/dL    Hematocrit 40.0 36.0 - 48.0 %    MCV 75.5 (L) 80.0 - 100.0 fL    MCH 24.2 (L) 26.0 - 34.0 pg    MCHC 32.1 31.0 - 36.0 g/dL    RDW 17.5 (H) 12.4 - 15.4 %    Platelets 583 547 - 621 K/uL    MPV 8.8 5.0 - 10.5 fL    Neutrophils % 65.4 %    Lymphocytes % 23.2 %    Monocytes % 8.8 %    Eosinophils % 2.0 %    Basophils % 0.6 %    Neutrophils Absolute 7.5 1.7 - 7.7 K/uL    Lymphocytes Absolute 2.7 1.0 - 5.1 K/uL    Monocytes Absolute 1.0 0.0 - 1.3 K/uL    Eosinophils Absolute 0.2 0.0 - 0.6 K/uL    Basophils Absolute 0.1 0.0 - 0.2 K/uL   Comprehensive Metabolic Panel   Result Value Ref Range    Sodium 136 136 - 145 mmol/L    Potassium 4.6 3.5 - 5.1 mmol/L    Chloride 98 (L) 99 - 110 mmol/L    CO2 23 21 - 32 mmol/L    Anion Gap 15 3 - 16    Glucose 280 (H) 70 - 99 mg/dL    BUN 16 7 - 20 mg/dL    CREATININE 0.9 0.6 - 1.2 mg/dL    GFR Non-African American 60 (A) >60    GFR African American >60 >60    Calcium 9.3 8.3 - 10.6 mg/dL    Total Protein 8.4 (H) 6.4 - 8.2 g/dL    Alb 4.4 3.4 - 5.0 g/dL    Albumin/Globulin Ratio 1.1 1.1 - 2.2    Total Bilirubin 0.4 0.0 - 1.0 mg/dL    Alkaline Phosphatase 129 40 - 129 U/L    ALT 27 10 - 40 U/L    AST 36 15 - 37 U/L    Globulin 4.0 g/dL   Lipase   Result Value Ref Range    Lipase 19.0 13.0 - 60.0 U/L   Urinalysis Reflex to Culture    Specimen: Urine, clean catch   Result Value Ref Range    Color, UA Straw Straw/Yellow    Clarity, UA Clear Clear    Glucose, Ur Negative Negative mg/dL    Bilirubin Urine Negative Negative    Ketones, Urine Negative Negative mg/dL    Specific Gravity, UA 1.010 1.005 - 1.030    Blood, Urine Negative Negative    pH, UA 5.5 5.0 - 8.0    Protein, UA Negative Negative mg/dL    Urobilinogen, Urine 0.2 <2.0 E.U./dL    Nitrite, Urine Negative Negative    Leukocyte Esterase, Urine Negative Negative    Microscopic Examination Not Indicated     Urine Type NotGiven     Urine Reflex to Culture Not Indicated          RADIOLOGY:  All x-ray studies areviewed/reviewed by me. Formal interpretations per the radiologist are as follows:      Ct Abdomen Pelvis W Iv Contrast Additional Contrast? None    Result Date: 9/13/2020  EXAMINATION: CT OF THE ABDOMEN AND PELVIS WITH CONTRAST 9/13/2020 3:08 pm TECHNIQUE: CT of the abdomen and pelvis was performed with the administration of intravenous contrast. Multiplanar reformatted images are provided for review. Dose modulation, iterative reconstruction, and/or weight based adjustment of the mA/kV was utilized to reduce the radiation dose to as low as reasonably achievable. COMPARISON: May 29, 2020 HISTORY: ORDERING SYSTEM PROVIDED HISTORY: abd pain, hx SBO TECHNOLOGIST PROVIDED HISTORY: Reason for exam:->abd pain, hx SBO Additional Contrast?->None Reason for Exam: abd pain, hx SBO Acuity: Acute Type of Exam: Initial Relevant Medical/Surgical History: appy, hyst; small bowel surgery FINDINGS: Lower Chest: Right lower lobe calcified granuloma. No pleural effusions. Organs: Multiple calcifications within the liver and spleen from prior granulomatous disease. No focal liver lesion. Decreased attenuation of the liver is consistent with hepatic steatosis. Cholelithiasis without CT evidence of acute cholecystitis. No pancreatic lesion. No splenomegaly. No adrenal lesion. No hydronephrosis. No renal calculus. GI/Bowel: Right lower quadrant small bowel anastomosis. Small bowel proximal to the anastomosis is mildly dilated with small amount of fecalization. No acute inflammatory process. Pelvis: No free fluid. No bladder calculus. Peritoneum/Retroperitoneum: Atherosclerotic calcification of the abdominal aorta without aneurysmal dilatation. No adenopathy. Bones/Soft Tissues: No acute soft tissue abnormality. Lumbar spine degenerative changes. Delayed transit at the level of the small bowel anastomosis suggesting partial low-grade obstruction. No high-grade obstruction. No acute inflammatory process. PROCEDURES:   N/A    CRITICAL CARE TIME:       None      CONSULTS:  IP CONSULT TO HOSPITALIST  IP CONSULT TO GENERAL SURGERY      EMERGENCY DEPARTMENT COURSE and DIFFERENTIAL DIAGNOSIS/MDM:   Vitals:    Vitals:    09/13/20 1356 09/13/20 1502 09/13/20 1611   BP: (!) 164/127 (!) 178/61 (!) 144/63   Pulse: 66 63 63   Resp: 20 16 18   Temp: 98.1 °F (36.7 °C)     TempSrc: Oral     SpO2: 100% 97% 97%   Weight: 180 lb (81.6 kg)     Height: 5' 2\" (1.575 m)         Patient was given the following medications:  Normal saline 500 mls IV  Morphine 4 mg IV  Zofran 4 mg IV      Patient was evaluated by both myself and Sandrine Leyva DO. Old records were reviewed. This patient arrives to the emergency department describing abdominal pain since yesterday with nausea and concerns for small bowel obstruction (given a history of recurrent small bowel obstructions). CBC reveals white count of 11.5. H&H normal.  CMP reveals hyperglycemia at 280 but is otherwise unremarkable. Lipase normal  Urinalysis normal  CT imaging of the abdomen and pelvis shows a delayed transit at the level of the small bowel anastomosis suggesting partial low-grade obstruction. No high-grade obstruction. No acute inflammatory process. Patient is given a 500 mL bolus of normal saline in the ED with morphine 4 mg IV and Zofran 4 mg IV. She is remained hemodynamically stable but warrants hospitalization given the findings on CT scan. I will consult the hospitalist and general surgery.   I spoke with Dr. Chente Mcgowan and  Deandre Pelaez. We thoroughly discussed the history, physical exam, laboratory and imaging studies, as well as, emergency department course. Based upon that discussion, we've decided to admit Aguilar Tiwari for further observation and evaluation of Aguilar Tiwari's abdominal pain with findings of SBO. As I have deemed necessary from their history, physical and studies, I have considered and evaluated Aguilar Tiwari for the following diagnoses:  ACUTE APPENDICITIS, CHOLECYSTITIS, DIVERTICULITIS, PANCREATITIS, PYELONEPHRITIS, BOWEL OBSTRUCTION, INCARCERATED HERNIA, ISCHEMIC GUT, GI BLEED, PERFORATED BOWEL or ULCER. FINAL IMPRESSION:      1. Small bowel obstruction ([address]    2. Generalized abdominal pain    3.  Nausea          DISPOSITION/PLAN:   DISPOSITION Admitted                 (Please note thatportions of this note were completed with a voice recognition program.  Efforts were made to edit the dictations, but occasionally words are mis-transcribed.)    Camilla Abernathy PA-C (electronicallysigned)              Saurabh Maysel, [place]  09/13/20 [id number]

## 2020-09-13 NOTE — H&P
input(s): MG in the last 72 hours. Phos:   No results for input(s): PHOS in the last 72 hours. INR: No results for input(s): INR in the last 72 hours. Radiology Review: Images personally reviewed by me. CT -   Delayed transit at the level of the small bowel anastomosis suggesting    partial low-grade obstruction.  No high-grade obstruction.  No acute    inflammatory process. ASSESSMENT AND PLAN:  80 yo with psbo  1. Similar presentation as before. Has improved without operations multiple times. 2.  Symptoms not severe and no emesis. AXR not very dilated so will hold off on NG.  3.  IVF  4. AXR tomorrow      PRACTITIONER CERTIFICATION   I certify that [facility] for >2 days based on the above assessment and plan.       Electronically signed by Yuko Christiansen, [address] [place] [address] Surgery  [id number]

## 2020-09-13 NOTE — ED NOTES
Ps general surgery consult @0221  Re: partial SBO per Leonides Ambriz Clerk in  [facility]  09/13/20 8821

## 2020-09-13 NOTE — H&P
HOSPITAL MEDICINE     History & Physical        Patient:  Franki Tiwari  YOB: 1935    MRN: [id number]     Acct: [de-identified]    PCP: Yolette Martinez MD    Date of Admission: 9/13/2020    Date of Service:   Pt seen/examined on 9/13/2020     Admitted to Inpatient with expected LOS greater than two midnights due to medical therapy. History obtained from reviewing the medical record and patient/  Interview. Assessment:     Franki Tiwari is a 81 y.o. female who presented/brought to  on 9/13/2020  For abdominal pain, known history of recurrent partial bowel obstruction versus ileus    1. Recurrent abdominal pain, CT scan raise concern of possible partial bowel obstruction at the level of anastomosis from previous surgery versus adhesion. 2. Multiple incidental finding including calcified granuloma in the right lower lobe, liver and spleen suggestive of prior granulomatous disease. Cholelithiasis without cholecystitis          comorbidities:    · Paroxysmal atrial fibrillation, SVT, anticoagulated with Eliquis (currently on hold pending tolerance to oral intake)  · Severe pulmonary hypertension, EF 55 to 60% on echo 2/2019  · Essential hypertension, hold blood pressure medication currently  · Diabetes mellitus type 2, non-insulin-dependent   · History of SVT  ·           Plan:  1. Admit to acute care unit  2. N.p.o., bowel rest, antiemetics as needed, judicious IV fluid surgery service consulted by ED team, NG tube per the recommendation  3. Check lactic acid  3. Hold oral hypoglycemic agent, start insulin per sliding scale. 4. Initiate therapeutic Lovenox tomorrow to replace Eliquis  5.  Hold other oral home medications      Code Status: Full Code          DVT prophylaxis: [x] Lovenox                                 [] SCDs                                 [] SQ Heparin                                 [] Encourage ambulation           [] Already on Anticoagulation Disposition:    [x] Home       [] TCU       [] Rehab       [] Psych       [] SNF       [] Penelope       [] Other-    -------------------------------------------------------------  Chief Complaint: Abdominal pain      History Of Present Illness:       81 y.o. female who presented to ED with presented to the emergency room with abdominal pain started yesterday but worsened today. She described it as sharp middle abdominal pain without radiation. No clear exacerbating or relieving factor. Pain is constant, patient denied associated nausea, vomiting, diarrhea or constipation, no hematemesis or hematochezia. Patient had what it sounds prior abdominal surgery (sounds like intussusception) many years ago (unable to quantify) she had about 1 foot of her bowels small. Since then she has been having intermittent hospitalization for the same her symptoms usually resolve with conservative therapy only. Patient expressed her frustration and wonder if repeated surgery may be beneficial.      In the ED patient received IV fluid, pain medication, surgery service was consulted by ED team.  Hospitalist was consulted for admission. 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  Authorizing Provider   metoprolol tartrate (LOPRESSOR) 50 MG tablet TAKE ONE TABLET BY MOUTH TWICE A DAY 7/22/20 Yes GUANAKO Cai CNP   amLODIPine (NORVASC) 5 MG tablet Take 2 tablets by mouth daily 6/3/20  Yes Leobardocassandra GUANAKO Colon CNP   ELIQUIS 5 MG TABS tablet TAKE ONE TABLET BY MOUTH TWICE A DAY 3/31/20  Yes Dylon Hennessy MD   furosemide (LASIX) 20 MG tablet TAKE ONE TABLET BY MOUTH DAILY  Patient taking differently: 20 mg  1/21/20  Yes Dylon Hennessy MD   pantoprazole (PROTONIX) 20 MG tablet Take 2 tablets by mouth daily  Patient taking differently: Take 20 mg by mouth daily  7/26/18  Yes Tiffaniekiana TiptonGUANAKO CNP   glyBURIDE (DIABETA) 1.25 MG tablet Take 2.5 mg by mouth daily (with breakfast)    Yes Historical Provider, MD   aspirin 81 MG tablet Take 81 mg by mouth daily   Yes Historical Provider, MD   valsartan (DIOVAN) 160 MG tablet Take 1 tablet by mouth daily 6/1/20   Abel Adorno MD       Allergies:  Hydrocodone; Metformin and related; Omeprazole; and Hydralazine    Social History:      The patient currently lives     TOBACCO:   reports that she has never smoked. She has never used smokeless tobacco.  ETOH:   reports no history of alcohol use. Family History:       Reviewed in detail . Positive as follows:           Problem Relation Age of Onset    Diabetes Mother     Heart Disease Father     High Blood Pressure Father     High Cholesterol Father     Cancer Sister     Heart Disease Brother     High Blood Pressure Brother     High Cholesterol Brother        Diet:  Diet NPO Effective Now    REVIEW OF SYSTEMS:   Pertinent positives as noted in the HPI. All other systems reviewed and negative.     PHYSICAL EXAM:    BP (!) 144/63   Pulse 63   Temp 98.1 °F (36.7 °C) (Oral)   Resp 18   Ht 5' 2\" (1.575 m)   Wt 180 lb (81.6 kg)   SpO2 97%   BMI 32.92 kg/m²          Vitals:    09/13/20 1356 09/13/20 1502 09/13/20 1611   BP: (!) 164/127 (!) 178/61 (!) 144/63   Pulse: 66 63 63   Resp: 20 16 18   Temp: 98.1 °F (36.7 °C)     TempSrc: Oral     SpO2: 100% 97% 97%   Weight: 180 lb (81.6 kg) Height: 5' 2\" (1.575 m)         Gen: Not in distress. Alert. Head: Normocephalic. Atraumatic. Eyes: Conjunctivae/corneas clear. ENT: Oral mucosa moist  Neck: No JVD. No obvious thyromegaly. CVS: Nml S1S2, no murmur , RRR  Pulmomary: Clear bilaterally. No crackles. No wheezes. Gastrointestinal: Soft, tenderness with deep palpation in the middle of abdomen, no rebound tenderness or sign of acute abdomen. , non distend, . Musculoskeletal: No edema. Warm  Neuro: No focal deficit. Moves extremity spontaneously. Psychiatry: Appropriate affect. Not agitated. Labs:     Recent Labs     09/13/20  1401   WBC 11.5*   HGB 12.8   HCT 40.0        Recent Labs     09/13/20  1401      K 4.6   CL 98*   CO2 23   BUN 16   CREATININE 0.9   CALCIUM 9.3     Recent Labs     09/13/20  1401   AST 36   ALT 27   BILITOT 0.4   ALKPHOS 129     No results for input(s): INR in the last 72 hours. No results for input(s): Silverio Drier in the last 72 hours. Urinalysis:      Lab Results   Component Value Date    NITRU Negative 09/13/2020    WBCUA 3-5 10/11/2019    BACTERIA Rare 10/11/2019    RBCUA 0-2 10/11/2019    BLOODU Negative 09/13/2020    SPECGRAV 1.010 09/13/2020    GLUCOSEU Negative 09/13/2020       Radiology:     CXR: I have reviewed the CXR with the following interpretation:         CT ABDOMEN PELVIS W IV CONTRAST Additional Contrast? None   Final Result   Delayed transit at the level of the small bowel anastomosis suggesting   partial low-grade obstruction. No high-grade obstruction. No acute   inflammatory process. Thank you Renay Ayala MD for the opportunity to be involved in this patient's care.     Electronically signed by Philip Mcghee MD on 9/13/2020 at 4:31 PM

## 2020-09-14 ENCOUNTER — APPOINTMENT (OUTPATIENT)
Dept: GENERAL RADIOLOGY | Age: 85
DRG: 390 | End: 2020-09-14
Payer: MEDICARE

## 2020-09-14 LAB
ALBUMIN SERPL-MCNC: 3.1 G/DL (ref 3.4–5)
ANION GAP SERPL CALCULATED.3IONS-SCNC: 10 MMOL/L (ref 3–16)
BASOPHILS ABSOLUTE: 0 K/UL (ref 0–0.2)
BASOPHILS RELATIVE PERCENT: 0.2 %
BUN BLDV-MCNC: 18 MG/DL (ref 7–20)
CALCIUM SERPL-MCNC: 7.7 MG/DL (ref 8.3–10.6)
CHLORIDE BLD-SCNC: 105 MMOL/L (ref 99–110)
CO2: 24 MMOL/L (ref 21–32)
CREAT SERPL-MCNC: 0.8 MG/DL (ref 0.6–1.2)
EOSINOPHILS ABSOLUTE: 0.2 K/UL (ref 0–0.6)
EOSINOPHILS RELATIVE PERCENT: 1.8 %
ESTIMATED AVERAGE GLUCOSE: 251.8 MG/DL
GFR AFRICAN AMERICAN: >60
GFR NON-AFRICAN AMERICAN: >60
GLUCOSE BLD-MCNC: 116 MG/DL (ref 70–99)
GLUCOSE BLD-MCNC: 121 MG/DL (ref 70–99)
GLUCOSE BLD-MCNC: 129 MG/DL (ref 70–99)
GLUCOSE BLD-MCNC: 177 MG/DL (ref 70–99)
GLUCOSE BLD-MCNC: 208 MG/DL (ref 70–99)
GLUCOSE BLD-MCNC: 249 MG/DL (ref 70–99)
GLUCOSE BLD-MCNC: 92 MG/DL (ref 70–99)
HBA1C MFR BLD: 10.4 %
HCT VFR BLD CALC: 35.5 % (ref 36–48)
HEMOGLOBIN: 11.3 G/DL (ref 12–16)
LYMPHOCYTES ABSOLUTE: 1.9 K/UL (ref 1–5.1)
LYMPHOCYTES RELATIVE PERCENT: 17.1 %
MCH RBC QN AUTO: 24 PG (ref 26–34)
MCHC RBC AUTO-ENTMCNC: 31.8 G/DL (ref 31–36)
MCV RBC AUTO: 75.4 FL (ref 80–100)
MONOCYTES ABSOLUTE: 1 K/UL (ref 0–1.3)
MONOCYTES RELATIVE PERCENT: 9.3 %
NEUTROPHILS ABSOLUTE: 7.8 K/UL (ref 1.7–7.7)
NEUTROPHILS RELATIVE PERCENT: 71.6 %
PDW BLD-RTO: 17.3 % (ref 12.4–15.4)
PERFORMED ON: ABNORMAL
PERFORMED ON: NORMAL
PHOSPHORUS: 2.9 MG/DL (ref 2.5–4.9)
PLATELET # BLD: 285 K/UL (ref 135–450)
PMV BLD AUTO: 9.1 FL (ref 5–10.5)
POTASSIUM SERPL-SCNC: 4.5 MMOL/L (ref 3.5–5.1)
RBC # BLD: 4.71 M/UL (ref 4–5.2)
SODIUM BLD-SCNC: 139 MMOL/L (ref 136–145)
WBC # BLD: 10.8 K/UL (ref 4–11)

## 2020-09-14 PROCEDURE — 74022 RADEX COMPL AQT ABD SERIES: CPT

## 2020-09-14 PROCEDURE — 85025 COMPLETE CBC W/AUTO DIFF WBC: CPT

## 2020-09-14 PROCEDURE — APPSS45 APP SPLIT SHARED TIME 31-45 MINUTES: Performed by: CLINICAL NURSE SPECIALIST

## 2020-09-14 PROCEDURE — 36415 COLL VENOUS BLD VENIPUNCTURE: CPT

## 2020-09-14 PROCEDURE — 1200000000 HC SEMI PRIVATE

## 2020-09-14 PROCEDURE — 6360000002 HC RX W HCPCS: Performed by: HOSPITALIST

## 2020-09-14 PROCEDURE — 6370000000 HC RX 637 (ALT 250 FOR IP): Performed by: HOSPITALIST

## 2020-09-14 PROCEDURE — 80069 RENAL FUNCTION PANEL: CPT

## 2020-09-14 PROCEDURE — 99232 SBSQ HOSP IP/OBS MODERATE 35: CPT | Performed by: SURGERY

## 2020-09-14 PROCEDURE — 2580000003 HC RX 258: Performed by: HOSPITALIST

## 2020-09-14 RX ADMIN — SODIUM CHLORIDE: 9 INJECTION, SOLUTION INTRAVENOUS at 17:45

## 2020-09-14 RX ADMIN — INSULIN LISPRO 2 UNITS: 100 INJECTION, SOLUTION INTRAVENOUS; SUBCUTANEOUS at 04:33

## 2020-09-14 RX ADMIN — ENOXAPARIN SODIUM 80 MG: 80 INJECTION SUBCUTANEOUS at 20:37

## 2020-09-14 ASSESSMENT — PAIN SCALES - GENERAL: PAINLEVEL_OUTOF10: 0

## 2020-09-14 NOTE — PROGRESS NOTES
Assessment complete and charted. Spoke to surgery at shift change for pain coverage. Pt also received antiemetic at this time. Pt comfortable since this episode. Call light within reach, will continue to monitor.

## 2020-09-14 NOTE — PROGRESS NOTES
[facility]    PATIENT NAME: Aguilar Malloy Payer     TODAY'S DATE: 9/14/2020    CHIEF COMPLAINT: abd pain    INTERVAL HISTORY/HPI:    Pt reports decreased abd pain. No flatus or BM yet. No nausea. REVIEW OF SYSTEMS:  Pertinent positives and negatives as per interval history section    OBJECTIVE:  VITALS:  /71   Pulse 65   Temp 98.4 °F (36.9 °C) (Oral)   Resp 16   Ht 5' 2\" (1.575 m)   Wt 180 lb (81.6 kg)   SpO2 95%   BMI 32.92 kg/m²     INTAKE/OUTPUT:    I/O last 3 completed shifts: In: 778 [I.V.:778]  Out: -   No intake/output data recorded. CONSTITUTIONAL:  awake and alert  LUNGS:  Respirations easy and unlabored, no crackles or wheezing  CARD:  regular rate and rhythm  ABDOMEN:  hypoactive bowel sounds, soft, non-distended, non-tender     Data:  CBC:   Recent Labs     09/13/20  1401 09/14/20  0611   WBC 11.5* 10.8   HGB 12.8 11.3*   HCT 40.0 35.5*    285     BMP:    Recent Labs     09/13/20  1401 09/14/20  0611    139   K 4.6 4.5   CL 98* 105   CO2 23 24   BUN 16 18   CREATININE 0.9 0.8   GLUCOSE 280* 208*     Hepatic:   Recent Labs     09/13/20  1401   AST 36   ALT 27   BILITOT 0.4   ALKPHOS 129     Mag:    No results for input(s): MG in the last 72 hours. Phos:     Recent Labs     09/14/20  0611   PHOS 2.9      INR: No results for input(s): INR in the last 72 hours. Radiology Review:  *Imaging personally reviewed by me. EXAMINATION:   TWO XRAY VIEWS OF THE ABDOMEN AND SINGLE  XRAY VIEW OF THE CHEST     9/14/2020 8:45 am     COMPARISON:   CT dated 09/13/2020     HISTORY:   ORDERING SYSTEM PROVIDED HISTORY: sbo   TECHNOLOGIST PROVIDED HISTORY:   Reason for exam:->sbo     FINDINGS:   Elevation of right hemidiaphragm.  Calcified nodule at the right base,   compatible with granuloma.  No confluent airspace disease.  No pleural   effusion or pneumothorax.  Cardiac and mediastinal silhouettes are within   normal limits.  Calcification of aorta.      3 rounded calcifications are seen within the right upper quadrant with dense   center and calcific periphery, suggestive of gallbladder calculi, as had been   seen on recent [place] is seen within stomach and there is mild gaseous   distension of bowel within the mid abdomen.  Contrast is seen within the   bladder. Impression:      Mild gaseous distension of bowel can reflect given history of obstruction. Cholelithiasis. Sequela of granulomatous disease. ASSESSMENT AND PLAN:  PSBO: resolving. Clear liquid diet today - await return of further bowel function prior to advancing diet. Continue with IV fluids     Electronically signed by Elise Servin, GUANAKO - CNP     [id number]    Patient seen and agree with above. Less pain, no nausea, no fevers or chills. AXR improved.   Start clear liquid diet today  Jenn Cobb MD

## 2020-09-14 NOTE — PROGRESS NOTES
Pt arrived to room. Pt alert and oriented, VSS. Shift assessment completed and documented. Oriented to room, belongings put away. Told plan of care. Denies any needs at this time. Will continue to monitor.

## 2020-09-14 NOTE — PROGRESS NOTES
Hospitalist Progress Note    Date of Admission: 9/13/2020    Chief Complaint: Abdominal pain    Hospital Course:  Santi Tiwari is a 81 y.o. female who presented/brought to  on 9/13/2020 for abdominal pain, known history of recurrent partial bowel obstruction versus ileus. Subjective: Symptoms improving. Tolerating clears. Labs:   Recent Labs     09/13/20  1401 09/14/20  0611   WBC 11.5* 10.8   HGB 12.8 11.3*   HCT 40.0 35.5*    285     Recent Labs     09/13/20  1401 09/14/20  0611    139   K 4.6 4.5   CL 98* 105   CO2 23 24   BUN 16 18   CREATININE 0.9 0.8   CALCIUM 9.3 7.7*   PHOS  --  2.9     Recent Labs     09/13/20  1401   AST 36   ALT 27   BILITOT 0.4   ALKPHOS 129     No results for input(s): INR in the last 72 hours. Physical Exam Performed:    BP (!) 159/76   Pulse 61   Temp 98.1 °F (36.7 °C) (Oral)   Resp 16   Ht 5' 2\" (1.575 m)   Wt 180 lb (81.6 kg)   SpO2 96%   BMI 32.92 kg/m²     General appearance: No apparent distress, appears stated age and cooperative. HEENT: Pupils equal, round, and reactive to light. Conjunctivae/corneas clear. Neck: Supple, no jugular venous distention. Trachea midline with full range of motion. Respiratory:  Normal respiratory effort. Clear to auscultation, bilaterally without Rales/Wheezes/Rhonchi. Cardiovascular: Regular rate and rhythm with normal S1/S2 without murmurs, rubs or gallops. Abdomen: Soft, non-tender, non-distended with normal bowel sounds. Musculoskelatal: No clubbing, cyanosis or edema bilaterally. Full range of motion without deformity. Neurologic:  Neurovascularly intact without any focal sensory/motor deficits. Cranial nerves: II-XII intact, grossly non-focal.  Psychiatric: Alert and oriented, thought content appropriate, normal insight  Skin: Skin color, texture, turgor normal.  No rashes or lesions.   Capillary Refill: Brisk,< 3 seconds   Peripheral Pulses: +2 palpable, equal bilaterally Assessment/Plan:    Active Hospital Problems    Diagnosis    Small bowel obstruction ([facility] [address] [K56.049]       Partial SBO: Recurrent abdominal pain, CT scan raise concern of possible partial bowel obstruction at the level of anastomosis from previous surgery versus adhesion. GenSurg following. Symptoms improving with bowel rest. Advanced to clears. Continue pain control, anti-emetics. Multiple incidental finding including calcified granuloma in the right lower lobe, liver and spleen suggestive of prior granulomatous disease.       Cholelithiasis without cholecystitis    Paroxysmal atrial fibrillation, SVT, anticoagulated with Eliquis (currently on hold pending tolerance to oral intake)    Severe pulmonary hypertension, EF 55 to 60% on echo 2/2019    Essential hypertension, hold blood pressure medication currently    Diabetes mellitus type 2, non-insulin-dependent     History of SVT    DVT Prophylaxis: Lovenox in place of Eliquis while NPO  Diet: DIET CLEAR LIQUID;  Code Status: Full Code  PT/OT Eval Status: NA    Dispo - 1-2 days    Estela Voss MD

## 2020-09-14 NOTE — CARE COORDINATION
CASE MANAGEMENT INITIAL ASSESSMENT      Reviewed chart and completed assessment with:patient  Explained Case Management role/services. Primary contact information:[facility]    Health Care Decision Maker:   Who do you trust or have selected to make healthcare decisions for you? Name:   Lily Perdomo  Phone  Number: [phone]  Can this person be reached and be able to respond quickly, such as within a few minutes or hours? yes  Who would be your back-up decision maker? Name Ata Roblero  Phone [phone] date/status: 9/13/20  Diagnosis: bowel obst  Is this a Readmission?: n     Insurance: [id number] required for SNF - Y      3 night stay required - N    Living arrangements, Adls, care needs, prior to admission: lives in ranch style home with spouse    Transportation: private    Durable Medical Equipment at home: Walker__Cane__RTS__ BSC__Shower Chair__  02__ HHN__ CPAP__  BiPap__  Hospital Bed__ W/C___ Other__________    Services in the home and/or outpatient, prior to admission: none    Dialysis Facility (if applicable) none  · Name:  · Address:  · Dialysis Schedule:  · Phone:  · Fax:    PT/OT recs: none    Hospital Exemption Notification (HEN): needed for SNF    Barriers to discharge: none    Plan/comments: spoke with patient. Plans on returning home at discharge. Reports IPTA and drives will be able to get to any Follow up appointments. denied any DCP needs. Please notify should needs arise.  Carmen Costa RN      ECOC on chart for MD signature

## 2020-09-15 VITALS
HEIGHT: 62 IN | RESPIRATION RATE: 16 BRPM | OXYGEN SATURATION: 100 % | DIASTOLIC BLOOD PRESSURE: 69 MMHG | SYSTOLIC BLOOD PRESSURE: 175 MMHG | HEART RATE: 65 BPM | WEIGHT: 180 LBS | BODY MASS INDEX: 33.13 KG/M2 | TEMPERATURE: 97.6 F

## 2020-09-15 LAB
ALBUMIN SERPL-MCNC: 3.2 G/DL (ref 3.4–5)
ANION GAP SERPL CALCULATED.3IONS-SCNC: 9 MMOL/L (ref 3–16)
BASOPHILS ABSOLUTE: 0 K/UL (ref 0–0.2)
BASOPHILS RELATIVE PERCENT: 0.3 %
BUN BLDV-MCNC: 9 MG/DL (ref 7–20)
CALCIUM SERPL-MCNC: 7.8 MG/DL (ref 8.3–10.6)
CHLORIDE BLD-SCNC: 107 MMOL/L (ref 99–110)
CO2: 23 MMOL/L (ref 21–32)
CREAT SERPL-MCNC: 0.7 MG/DL (ref 0.6–1.2)
EOSINOPHILS ABSOLUTE: 0.4 K/UL (ref 0–0.6)
EOSINOPHILS RELATIVE PERCENT: 5.5 %
GFR AFRICAN AMERICAN: >60
GFR NON-AFRICAN AMERICAN: >60
GLUCOSE BLD-MCNC: 109 MG/DL (ref 70–99)
GLUCOSE BLD-MCNC: 117 MG/DL (ref 70–99)
GLUCOSE BLD-MCNC: 118 MG/DL (ref 70–99)
GLUCOSE BLD-MCNC: 99 MG/DL (ref 70–99)
HCT VFR BLD CALC: 33.8 % (ref 36–48)
HEMOGLOBIN: 10.8 G/DL (ref 12–16)
LYMPHOCYTES ABSOLUTE: 1.9 K/UL (ref 1–5.1)
LYMPHOCYTES RELATIVE PERCENT: 26.8 %
MCH RBC QN AUTO: 24 PG (ref 26–34)
MCHC RBC AUTO-ENTMCNC: 32 G/DL (ref 31–36)
MCV RBC AUTO: 75.2 FL (ref 80–100)
MONOCYTES ABSOLUTE: 0.7 K/UL (ref 0–1.3)
MONOCYTES RELATIVE PERCENT: 10.7 %
NEUTROPHILS ABSOLUTE: 3.9 K/UL (ref 1.7–7.7)
NEUTROPHILS RELATIVE PERCENT: 56.7 %
PDW BLD-RTO: 17.6 % (ref 12.4–15.4)
PERFORMED ON: ABNORMAL
PERFORMED ON: ABNORMAL
PERFORMED ON: NORMAL
PHOSPHORUS: 2.5 MG/DL (ref 2.5–4.9)
PLATELET # BLD: 278 K/UL (ref 135–450)
PMV BLD AUTO: 9.1 FL (ref 5–10.5)
POTASSIUM SERPL-SCNC: 4 MMOL/L (ref 3.5–5.1)
RBC # BLD: 4.5 M/UL (ref 4–5.2)
SODIUM BLD-SCNC: 139 MMOL/L (ref 136–145)
WBC # BLD: 6.9 K/UL (ref 4–11)

## 2020-09-15 PROCEDURE — 80069 RENAL FUNCTION PANEL: CPT

## 2020-09-15 PROCEDURE — 99231 SBSQ HOSP IP/OBS SF/LOW 25: CPT | Performed by: SURGERY

## 2020-09-15 PROCEDURE — 2580000003 HC RX 258: Performed by: HOSPITALIST

## 2020-09-15 PROCEDURE — 85025 COMPLETE CBC W/AUTO DIFF WBC: CPT

## 2020-09-15 RX ADMIN — SODIUM CHLORIDE: 9 INJECTION, SOLUTION INTRAVENOUS at 10:38

## 2020-09-15 ASSESSMENT — PAIN SCALES - GENERAL
PAINLEVEL_OUTOF10: 0
PAINLEVEL_OUTOF10: 2

## 2020-09-15 NOTE — PLAN OF CARE
Problem: Falls - Risk of:  Goal: Will remain free from falls  Description: Will remain free from falls  Outcome: Ongoing   Pt remains free from falls during this shift. Pt a/o and calls out appropriately. Bed in lowest position and wheels locked. Call light within reach. Bedside table within reach. Pt educated to call out if needing assistance. Pt independent at home will continue to monitor.

## 2020-09-15 NOTE — PROGRESS NOTES
Pt assessment completed and charted. VSS. Pt a/o. Pt denies pain at this time. Pt tolerating diet. Pt denies any other needs at this time. Pt calls out appropriately. Will continue to monitor. Pt is a fall risk;  -Bed in lowest position and wheels locked. -Call light within reach.   -Bedside table within reach.   -Non-skid footwear in place.

## 2020-09-15 NOTE — DISCHARGE SUMMARY
Hospital Medicine Discharge Summary    Patient: Olivia Tiwari     Age: 81 y.o. Gender: female  : 1935   MRN: [id number]  Code Status: Full     Admit Date: 2020   Discharge Date: 9/15/2020    Disposition:  Home     Condition at Discharge: Stable    Primary Care Provider: Tonya Parekh MD    Admitting Physician: Kike Bain MD  Discharge Physician: Reji Nova MD       Discharge Diagnoses: Active Hospital Problems    Diagnosis    Small bowel obstruction [facility]) [Q79.114]       Hospital Course:   Olivia Tiwari is a 81 y.o. female who presented/brought to  on 2020 for abdominal pain, known history of recurrent partial bowel obstruction versus ileus. Assessment/Plan:    Partial SBO: Recurrent abdominal pain, CT scan raise concern of possible partial bowel obstruction at the level of anastomosis from previous surgery versus adhesion. GenSurg following. Symptoms improved and tolerating diet. Multiple incidental finding including calcified granuloma in the right lower lobe, liver and spleen suggestive of prior granulomatous disease. Cholelithiasis without cholecystitis    Paroxysmal atrial fibrillation, SVT, anticoagulated with Eliquis     Severe pulmonary hypertension, EF 55 to 60% on echo 2019    Essential hypertension, controlled    Diabetes mellitus type 2, non-insulin-dependent     History of SVT          Exam:   BP (!) 175/69   Pulse 65   Temp 97.6 °F (36.4 °C) (Oral)   Resp 16   Ht 5' 2\" (1.575 m)   Wt 180 lb (81.6 kg)   SpO2 100%   BMI 32.92 kg/m²   General appearance: No apparent distress, appears stated age and cooperative. HEENT: Pupils equal, round, and reactive to light. Conjunctivae/corneas clear. Neck: Supple, no jugular venous distention. Trachea midline with full range of motion. Respiratory:  Normal respiratory effort. Clear to auscultation, bilaterally without Rales/Wheezes/Rhonchi.   Cardiovascular: Regular rate and rhythm with normal S1/S2 without murmurs, rubs or gallops. Abdomen: Soft, non-tender, non-distended with normal bowel sounds. Musculoskelatal: No clubbing, cyanosis or edema bilaterally. Full range of motion without deformity. Neurologic:  Neurovascularly intact without any focal sensory/motor deficits. Cranial nerves: II-XII intact, grossly non-focal.  Psychiatric: Alert and oriented, thought content appropriate, normal insight  Skin: Skin color, texture, turgor normal.  No rashes or lesions. Capillary Refill: Brisk,< 3 seconds   Peripheral Pulses: +2 palpable, equal bilaterally       Patient Discharge Instructions: Follow up:  1. Primary Care Provider Marianela Raman MD in the next 1-2 weeks.     Discharge Medications:   Discharge Medication List as of 9/15/2020  3:42 PM        Discharge Medication List as of 9/15/2020  3:42 PM        Discharge Medication List as of 9/15/2020  3:42 PM      CONTINUE these medications which have NOT CHANGED    Details   metoprolol tartrate (LOPRESSOR) 50 MG tablet TAKE ONE TABLET BY MOUTH TWICE A DAY, Disp-180 tablet,R-1Normal      amLODIPine (NORVASC) 5 MG tablet Take 2 tablets by mouth daily, Disp-30 tablet, R-0Adjust Sig      valsartan (DIOVAN) 160 MG tablet Take 1 tablet by mouth daily, Disp-30 tablet, R-3Normal      ELIQUIS 5 MG TABS tablet TAKE ONE TABLET BY MOUTH TWICE A DAY, Disp-60 tablet, R-5Normal      furosemide (LASIX) 20 MG tablet TAKE ONE TABLET BY MOUTH DAILY, Disp-90 tablet, R-3Normal      pantoprazole (PROTONIX) 20 MG tablet Take 2 tablets by mouth daily, Disp-30 tablet, R-0Print      glyBURIDE (DIABETA) 1.25 MG tablet Take 2.5 mg by mouth daily (with breakfast) Historical Med      aspirin 81 MG tablet Take 81 mg by mouth daily           Discharge Medication List as of 9/15/2020  3:42 PM            Significant Test Results    Xr Acute Abd Series Chest 1 Vw    Result Date: 9/14/2020  EXAMINATION: TWO XRAY VIEWS OF THE ABDOMEN AND SINGLE  XRAY VIEW OF THE CHEST you Bin Vargas MD for the opportunity to be involved in this patient's care. If you have any questions or concerns please feel free to contact my office [phone].

## 2020-10-27 RX ORDER — APIXABAN 5 MG/1
TABLET, FILM COATED ORAL
Qty: 60 TABLET | Refills: 5 | Status: SHIPPED | OUTPATIENT
Start: 2020-10-27 | End: 2021-05-10

## 2020-11-02 NOTE — PROGRESS NOTES
[address]  Advanced CHF/Pulmonary Hypertension   Cardiac Evaluation      Javier Tiwari  YOB: 1935    Date of Visit:  11/3/20      Chief Complaint   Patient presents with    6 Month Follow-Up    Shortness of Breath     on exertion    Chest Pain     mid sternal and radiates up bilateral sides of neck    Dizziness    Palpitations    Fatigue          History of Present Illness:  Javier Tiwari is a 81 y.o. female who [facility] for consultation and management of severe pulmonary HTN. She has a history of paroxysmal atrial fibrillation, HTN, SVT, and DM. She had SVT in 2013, wore an event monitor, and atrial fibrillation was detected. She had recurrent atrial fibrillation in 2014 and had a cryoablation for PAF in 2/2014. Post procedure she had a retroperitoneal bleed and required a urinary stent. She has had no known recurrence of afib post ablation but has had palpitations. She was admitted in 1/2019 with SBO versus ileus that was medically managed. Her echo from 02/19/19 showed her EF was 55-60%. She has mild MR, TR and MT. SPAP 60mmHg consistent with severe pulmonary HTN. She reports about 1 month ago (02/2019) she had an episode of palpitations at 200 bpm for about 1 hour. She has not had a sleep study. She reports occasional chest twinge. She states the twinge occurs with fast heart rate. She reports her breathing is stable now. She did have SOB with walking frequently but this has subsided. She denies dizziness or syncope. She states she has been on lasix for 2-3 weeks. Her cardiac monitor from 04/2019 showed no arrhythmias. She was admitted 10/10-10/13/19 with SBO. General surgery was consulted and the SBO resolved without surgery. On 10/22/19 she reported her SBO occurred at the bowel resection site. During her last OV with me on 01/21/20, her EKG showed AFib .  She was started on eliquis and her metoprolol was increased to 50 mg twice daily. Her echo from 04/28/20 showed her EF was 55-60% with mild MR and TR. At her OV with Renaldo Mata CNP on 06/03/20 her lasix was adjusted to 40 mg daily for 4 days then reduce back to 20 mg daily. She was admitted 09/13-09/15/20 with partial bowel obstruction. Today she reports she started feeling badly about a week ago. She had dizziness, CP, palpitations, SOB and listless which all started last week. She states the episodes are intermediate. She takes lasix 2 tablets in the am. She denies waking up gasping for air. Her EKG today shows atrial fibrillation with RVR.  (last OV was in [facility])      Allergies   Allergen Reactions    Hydrocodone Other (See Comments)     NAUSEA AND DIZZINESS    Metformin And Related Other (See Comments)     NAUSEA AND DIZZINESS    Omeprazole     Hydralazine Palpitations and Rash     Current Outpatient Medications   Medication Sig Dispense Refill    ELIQUIS 5 MG TABS tablet TAKE ONE TABLET BY MOUTH TWICE A DAY 60 tablet 5    metoprolol tartrate (LOPRESSOR) 50 MG tablet TAKE ONE TABLET BY MOUTH TWICE A  tablet 1    amLODIPine (NORVASC) 5 MG tablet Take 2 tablets by mouth daily 30 tablet 0    valsartan (DIOVAN) 160 MG tablet Take 1 tablet by mouth daily 30 tablet 3    furosemide (LASIX) 20 MG tablet TAKE ONE TABLET BY MOUTH DAILY (Patient taking differently: 20 mg ) 90 tablet 3    pantoprazole (PROTONIX) 20 MG tablet Take 2 tablets by mouth daily (Patient taking differently: Take 20 mg by mouth daily ) 30 tablet 0    glyBURIDE (DIABETA) 1.25 MG tablet Take 2.5 mg by mouth daily (with breakfast)       aspirin 81 MG tablet Take 81 mg by mouth daily       No current facility-administered medications for this visit.         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     Past Surgical History:   Procedure Laterality Date    ABLATION OF DYSRHYTHMIC FOCUS      -2014    APPENDECTOMY      DILATATION, ESOPHAGUS      FRACTURE SURGERY      L ankle    HYSTERECTOMY      SKIN BIOPSY      SMALL INTESTINE SURGERY      TONSILLECTOMY       Family History   Problem Relation Age of Onset    Diabetes Mother     Heart Disease Father     High Blood Pressure Father     High Cholesterol Father     Cancer Sister     Heart Disease Brother     High Blood Pressure Brother     High Cholesterol Brother      Social History     Socioeconomic History    Marital status:      Spouse name: Not on file    Number of children: Not on file    Years of education: Not on file    Highest education level: Not on file   Occupational History    Not on file   Social Needs    Financial resource strain: Not on file    Food insecurity     Worry: Not on file     Inability: Not on file    Transportation needs     Medical: Not on file     Non-medical: Not on file   Tobacco Use    Smoking status: Never Smoker    Smokeless tobacco: Never Used   Substance and Sexual Activity    Alcohol use: No    Drug use: No    Sexual activity: Yes     Partners: Male   Lifestyle    Physical activity     Days per week: Not on file     Minutes per session: Not on file    Stress: Not on file   Relationships    Social connections     Talks on phone: Not on file     Gets together: Not on file     Attends Jehovah's witness service: Not on file     Active member of club or organization: Not on file     Attends meetings of clubs or organizations: Not on file     Relationship status: Not on file    Intimate partner violence     Fear of current or ex partner: Not on file     Emotionally abused: Not on file     Physically abused: Not on file     Forced sexual activity: Not on file   Other Topics Concern    Not on file   Social History Narrative    Not on file Review of Systems:   · Constitutional: there has been no unanticipated weight loss. There's been no change in energy level, sleep pattern, or activity level. · Eyes: No visual changes or diplopia. No scleral icterus. · ENT: No Headaches, hearing loss or vertigo. No mouth sores or sore throat. · Cardiovascular: Reviewed in HPI  · Respiratory: No cough or wheezing, no sputum production. No hematemesis. · Gastrointestinal: No abdominal pain, appetite loss, blood in stools. No change in bowel or bladder habits. · Genitourinary: No dysuria, trouble voiding, or hematuria. · Musculoskeletal:  No gait disturbance, weakness or joint complaints. · Integumentary: No rash or pruritis. · Neurological: No headache, diplopia, change in muscle strength, numbness or tingling. No change in gait, balance, coordination, mood, affect, memory, mentation, behavior. · Psychiatric: No anxiety, no depression. · Endocrine: No malaise, fatigue or temperature intolerance. No excessive thirst, fluid intake, or urination. No tremor. · Hematologic/Lymphatic: No abnormal bruising or bleeding, blood clots or swollen lymph nodes. · Allergic/Immunologic: No nasal congestion or hives. Physical Examination:    Vitals:    11/03/20 0929   BP: 132/70   Pulse: 60   SpO2: 98%   Weight: 180 lb 8 oz (81.9 kg)   Height: 5' 2\" (1.575 m)     Body mass index is 33.01 kg/m².      Wt Readings from Last 3 Encounters:   11/03/20 180 lb 8 oz (81.9 kg)   09/13/20 180 lb (81.6 kg)   07/29/20 182 lb 8 oz (82.8 kg)     BP Readings from Last 3 Encounters:   11/03/20 132/70   09/15/20 (!) 175/69   07/29/20 110/60             Constitutional and General Appearance:   WD/WN in NAD,   HEENT:  NC/AT  ROSARIO  No problems with hearing  Skin:  Warm, dry  Respiratory:  · Normal excursion and expansion without use of accessory muscles  · Resp Auscultation: Normal breath sounds without dullness  Cardiovascular:  · The apical impulses not displaced  · Heart tones are crisp and normal  · Cervical veins are not engorged  · The carotid upstroke is normal in amplitude and contour without delay or bruit  · JVP normal  Irregularly irregular rhythm with nl S1 and S2 without m,r,g  · Peripheral pulses are symmetrical and full  · There is no clubbing, cyanosis of the extremities. · Trace bilateral edema  · Femoral Arteries: 2+ and equal  · Pedal Pulses: 2+ and equal   Neck:  · No thyromegaly  Abdomen:  · No masses or tenderness  · Liver/Spleen: No Abnormalities Noted  Neurological/Psychiatric:  · Alert and oriented in all spheres  · Moves all extremities well  · Exhibits normal gait balance and coordination  · No abnormalities of mood, affect, memory, mentation, or behavior are noted    Echo: 02/19/19   Summary   Definity contrast administered. Normal left ventricular systolic function with an estimated ejection   fraction of 55-60%. There is mild septal hypertrophy with normal remaining wall thickness. Normal left ventricular diastolic filling pressure. Mild mitral regurgitation. Mild tricuspid regurgitation. Systolic pulmonary artery pressure (SPAP) estimated at 60 mmHg (RA pressure   3 mmHg), consistent with severe pulmonary hypertension. Mild pulmonic regurgitation present. Lipids: 03/12/19: , , HDL 45, LDL 60    01/21/20 EKG shows AFIB     Echo: 04/28/20  Summary   Normal left ventricle systolic function with an estimated ejection fraction   of 55-60%. Definity contrast administered with no evidence of left   ventricular mass or thrombus noted. No regional wall motion abnormalities are seen. Normal left ventricular   diastolic filling pressure. The left atrium is mildly dilated. Mild mitral and tricuspid regurgitation. Systolic pulmonary artery pressure (SPAP) estimated at 42 mmHg (right atrial   pressure 3 mmHg), consistent with mild pulmonary hypertension.        .Labs were reviewed including labs from other hospital systems through [facility] Every[facility]. Cardiac testing was reviewed including echos, nuclear scans, cardiac catheterization, including from other hospital systems through [facility]. Assessment:    1. Atrial fibrillation with rapid ventricular response ([address]    2. Chronic diastolic heart failure ([address]    3. PAF (paroxysmal atrial fibrillation) ([address]    4. Essential hypertension    5. Bilateral pleural effusion         Her atrial fibrillation has returned. She continues off amiodarone. The plan was to restart amiodarone if atrial fibrillation recurs. She continues on Xarelto. Plan:  1. Will have EP see patient today to discuss restarting antiarrhythmic  2. Follow up in 4-6 months  3. Her heart rate needs to be controlled in order to keep her from developing decompensated HF. Scribe's attestation: This note was scribed in the presence of Geovani Kelley M.D. By Calvin Parker RN     The scribe's documentation has been prepared under my direction and personally reviewed by me in its entirety. I confirm that the note above accurately reflects all work, treatment, procedures, and medical decision making performed by me. I appreciate the opportunity of cooperating in the care of this patient.     Geovani Kelley M.D., [facility] [place]

## 2020-11-03 ENCOUNTER — OFFICE VISIT (OUTPATIENT)
Dept: CARDIOLOGY CLINIC | Age: 85
End: 2020-11-03
Payer: MEDICARE

## 2020-11-03 VITALS
OXYGEN SATURATION: 98 % | SYSTOLIC BLOOD PRESSURE: 132 MMHG | HEIGHT: 62 IN | DIASTOLIC BLOOD PRESSURE: 70 MMHG | WEIGHT: 180 LBS | HEART RATE: 60 BPM | BODY MASS INDEX: 33.13 KG/M2

## 2020-11-03 VITALS
HEIGHT: 62 IN | HEART RATE: 60 BPM | DIASTOLIC BLOOD PRESSURE: 70 MMHG | BODY MASS INDEX: 33.21 KG/M2 | OXYGEN SATURATION: 98 % | SYSTOLIC BLOOD PRESSURE: 132 MMHG | WEIGHT: 180.5 LBS

## 2020-11-03 PROCEDURE — 99214 OFFICE O/P EST MOD 30 MIN: CPT | Performed by: INTERNAL MEDICINE

## 2020-11-03 PROCEDURE — 93000 ELECTROCARDIOGRAM COMPLETE: CPT | Performed by: INTERNAL MEDICINE

## 2020-11-03 RX ORDER — METOPROLOL SUCCINATE 100 MG/1
100 TABLET, EXTENDED RELEASE ORAL 2 TIMES DAILY
Qty: 180 TABLET | Refills: 3 | Status: ON HOLD | OUTPATIENT
Start: 2020-11-03 | End: 2020-12-22 | Stop reason: SDUPTHER

## 2020-11-03 NOTE — LETTER
[address]  Advanced CHF/Pulmonary Hypertension   Cardiac Evaluation      Araceli Tiwari  YOB: 1935    Date of Visit:  11/3/20      Chief Complaint   Patient presents with    6 Month Follow-Up    Shortness of Breath     on exertion    Chest Pain     mid sternal and radiates up bilateral sides of neck    Dizziness    Palpitations    Fatigue          History of Present Illness:  Araceli Tiwari is a 81 y.o. female who presents from referral from [facility] for consultation and management of severe pulmonary HTN. She has a history of paroxysmal atrial fibrillation, HTN, SVT, and DM. She had SVT in 2013, wore an event monitor, and atrial fibrillation was detected. She had recurrent atrial fibrillation in 2014 and had a cryoablation for PAF in 2/2014. Post procedure she had a retroperitoneal bleed and required a urinary stent. She has had no known recurrence of afib post ablation but has had palpitations. She was admitted in 1/2019 with SBO versus ileus that was medically managed. Her echo from 02/19/19 showed her EF was 55-60%. She has mild MR, TR and PA. SPAP 60mmHg consistent with severe pulmonary HTN. She reports about 1 month ago (02/2019) she had an episode of palpitations at 200 bpm for about 1 hour. She has not had a sleep study. She reports occasional chest twinge. She states the twinge occurs with fast heart rate. She reports her breathing is stable now. She did have SOB with walking frequently but this has subsided. She denies dizziness or syncope. She states she has been on lasix for 2-3 weeks. Her cardiac monitor from 04/2019 showed no arrhythmias. She was admitted 10/10-10/13/19 with SBO. General surgery was consulted and the SBO resolved without surgery. On 10/22/19 she reported her SBO occurred at the bowel resection site. During her last OV with me on 01/21/20, her EKG showed AFib .  She was started on eliquis and her metoprolol was increased to 50 mg twice daily. Her echo from 04/28/20 showed her EF was 55-60% with mild MR and TR. At her OV with Casandra Player CNP on 06/03/20 her lasix was adjusted to 40 mg daily for 4 days then reduce back to 20 mg daily. She was admitted 09/13-09/15/20 with partial bowel obstruction. Today she reports she started feeling badly about a week ago. She had dizziness, CP, palpitations, SOB and listless which all started last week. She states the episodes are intermediate. She takes lasix 2 tablets in the am. She denies waking up gasping for air. Her EKG today shows atrial fibrillation with RVR.  (last OV was in [facility])      Allergies   Allergen Reactions    Hydrocodone Other (See Comments)     NAUSEA AND DIZZINESS    Metformin And Related Other (See Comments)     NAUSEA AND DIZZINESS    Omeprazole     Hydralazine Palpitations and Rash     Current Outpatient Medications   Medication Sig Dispense Refill    ELIQUIS 5 MG TABS tablet TAKE ONE TABLET BY MOUTH TWICE A DAY 60 tablet 5    metoprolol tartrate (LOPRESSOR) 50 MG tablet TAKE ONE TABLET BY MOUTH TWICE A  tablet 1    amLODIPine (NORVASC) 5 MG tablet Take 2 tablets by mouth daily 30 tablet 0    valsartan (DIOVAN) 160 MG tablet Take 1 tablet by mouth daily 30 tablet 3    furosemide (LASIX) 20 MG tablet TAKE ONE TABLET BY MOUTH DAILY (Patient taking differently: 20 mg ) 90 tablet 3    pantoprazole (PROTONIX) 20 MG tablet Take 2 tablets by mouth daily (Patient taking differently: Take 20 mg by mouth daily ) 30 tablet 0    glyBURIDE (DIABETA) 1.25 MG tablet Take 2.5 mg by mouth daily (with breakfast)       aspirin 81 MG tablet Take 81 mg by mouth daily       No current facility-administered medications for this visit.         Past Medical History:   Diagnosis Date    Atrial fibrillation ([facility]     Diabetes mellitus ([facility]     Hydronephrosis 2/14/2014    Hyperlipidemia     Hypertension  Intussusception intestine (HCC)     SVT (supraventricular tachycardia) ([facility] 8/26/2013    AVNRT     Past Surgical History:   Procedure Laterality Date    ABLATION OF DYSRHYTHMIC FOCUS      -2014    APPENDECTOMY      DILATATION, ESOPHAGUS      FRACTURE SURGERY      L ankle    HYSTERECTOMY      SKIN BIOPSY      SMALL INTESTINE SURGERY      TONSILLECTOMY       Family History   Problem Relation Age of Onset    Diabetes Mother     Heart Disease Father     High Blood Pressure Father     High Cholesterol Father     Cancer Sister     Heart Disease Brother     High Blood Pressure Brother     High Cholesterol Brother      Social History     Socioeconomic History    Marital status:      Spouse name: Not on file    Number of children: Not on file    Years of education: Not on file    Highest education level: Not on file   Occupational History    Not on file   Social Needs    Financial resource strain: Not on file    Food insecurity     Worry: Not on file     Inability: Not on file    Transportation needs     Medical: Not on file     Non-medical: Not on file   Tobacco Use    Smoking status: Never Smoker    Smokeless tobacco: Never Used   Substance and Sexual Activity    Alcohol use: No    Drug use: No    Sexual activity: Yes     Partners: Male   Lifestyle    Physical activity     Days per week: Not on file     Minutes per session: Not on file    Stress: Not on file   Relationships    Social connections     Talks on phone: Not on file     Gets together: Not on file     Attends Yazidism service: Not on file     Active member of club or organization: Not on file     Attends meetings of clubs or organizations: Not on file     Relationship status: Not on file    Intimate partner violence     Fear of current or ex partner: Not on file     Emotionally abused: Not on file     Physically abused: Not on file     Forced sexual activity: Not on file   Other Topics Concern    Not on file · The apical impulses not displaced  · Heart tones are crisp and normal  · Cervical veins are not engorged  · The carotid upstroke is normal in amplitude and contour without delay or bruit  · JVP normal  Irregularly irregular rhythm with nl S1 and S2 without m,r,g  · Peripheral pulses are symmetrical and full  · There is no clubbing, cyanosis of the extremities. · Trace bilateral edema  · Femoral Arteries: 2+ and equal  · Pedal Pulses: 2+ and equal   Neck:  · No thyromegaly  Abdomen:  · No masses or tenderness  · Liver/Spleen: No Abnormalities Noted  Neurological/Psychiatric:  · Alert and oriented in all spheres  · Moves all extremities well  · Exhibits normal gait balance and coordination  · No abnormalities of mood, affect, memory, mentation, or behavior are noted    Echo: 02/19/19   Summary   Definity contrast administered. Normal left ventricular systolic function with an estimated ejection   fraction of 55-60%. There is mild septal hypertrophy with normal remaining wall thickness. Normal left ventricular diastolic filling pressure. Mild mitral regurgitation. Mild tricuspid regurgitation. Systolic pulmonary artery pressure (SPAP) estimated at 60 mmHg (RA pressure   3 mmHg), consistent with severe pulmonary hypertension. Mild pulmonic regurgitation present. Lipids: 03/12/19: , , HDL 45, LDL 60    01/21/20 EKG shows AFIB     Echo: 04/28/20  Summary   Normal left ventricle systolic function with an estimated ejection fraction   of 55-60%. Definity contrast administered with no evidence of left   ventricular mass or thrombus noted. No regional wall motion abnormalities are seen. Normal left ventricular   diastolic filling pressure. The left atrium is mildly dilated. Mild mitral and tricuspid regurgitation. Systolic pulmonary artery pressure (SPAP) estimated at 42 mmHg (right atrial   pressure 3 mmHg), consistent with mild pulmonary hypertension. .Labs were reviewed including labs from other hospital systems through [facility]. Cardiac testing was reviewed including echos, nuclear scans, cardiac catheterization, including from other hospital systems through [facility]. Assessment:    1. Atrial fibrillation with rapid ventricular response ([address]    2. Chronic diastolic heart failure ([address]    3. PAF (paroxysmal atrial fibrillation) ([address]    4. Essential hypertension    5. Bilateral pleural effusion         Her atrial fibrillation has returned. She continues off amiodarone. The plan was to restart amiodarone if atrial fibrillation recurs. She continues on Xarelto. Plan:  1. Will have EP see patient today to discuss restarting antiarrhythmic  2. Follow up in 4-6 months  3. Her heart rate needs to be controlled in order to keep her from developing decompensated HF. Scribe's attestation: This note was scribed in the presence of Leslie Gonzalez M.D. By Peyton Ramos RN     The scribe's documentation has been prepared under my direction and personally reviewed by me in its entirety. I confirm that the note above accurately reflects all work, treatment, procedures, and medical decision making performed by me. I appreciate the opportunity of cooperating in the care of this patient.     Leslie Gonzalez M.D., [facility]

## 2020-11-03 NOTE — PATIENT INSTRUCTIONS
Plan:  1. Will have EP see patient to discuss restarting antiarrhythmic  2.  Follow up in 4-6 months

## 2020-11-03 NOTE — LETTER
[facility]   Electrophysiology Note      Date: 11/3/20  Patient Name: Javier Tiwari  YOB: 1935     Chief Complaint:  Atrial Fibrillation    Primary Care Physician: Teo Barreto MD     HISTORY OF PRESENT ILLNESS: Javier Tiwari is a 81 y.o. female who presents for follow up for paroxysmal atrial fibrillation. She has a past medical history of HTN, pulmonary HTN, dCHF, DM and SVT. She wore a cardiac event monitor in 10/2013 and AF was detected, longest lasting 2hrs 43 minutes with HR max 160 BPM. She had a Cryoablation for PAF on 2/11/2014. She underwent an echocardiogram in 2/2019 that demonstrated EF of 55-60% with severe pulmonary HTN. She was then referred to the heart failure team. She had recurrent PAF in 1/2020 and was started on eliquis and amiodarone. Amiodarone was stopped for unknown reasons. She underwent an echocardiogram on 4/28/20 that demonstrated an EF of 55-60% with no regional wall motion abnormalities, with LA mildly dilated, mild MR and TR. While seeing Dr. Tony Sanchez in office today 11/3/20 she was found to be in AF with RVR. EKG 11/3/20 demonstrates AF with  BPM. She reports she had an RFCA for AF several years ago and had been in rhythm since. She reports recently she started to feel palpitations with dizziness again. She reports she feels the same as she did when she had AF before. She reports she is taking her medications as prescribed and tolerates them well. She denies any abnormal bleeding or bruising. Patient denies current edema, chest pain, sob, or syncope. Past Medical History:   has a past medical history of Atrial fibrillation ([address] Diabetes mellitus ([address] Hydronephrosis, Hyperlipidemia, Hypertension, Intussusception intestine ([address] and SVT (supraventricular tachycardia) ([address] Past Surgical History:   has a past surgical history that includes Hysterectomy; Appendectomy;  Tonsillectomy; skin biopsy; ablation of dysrhythmic focus; Dilatation, esophagus; Small intestine surgery; and fracture surgery. Social History:   reports that she has never smoked. She has never used smokeless tobacco. She reports that she does not drink alcohol or use drugs. Family History: family history includes Cancer in her sister; Diabetes in her mother; Heart Disease in her brother and father; High Blood Pressure in her brother and father; High Cholesterol in her brother and father. Allergies:  Hydrocodone; Metformin and related; Omeprazole; and Hydralazine    Home Medications:    Prior to Admission medications    Medication Sig Start Date End Date Taking? Authorizing Provider   ELIQUIS 5 MG TABS tablet TAKE ONE TABLET BY MOUTH TWICE A DAY 10/27/20  Yes Sanna Opitz, APRN - CNP   metoprolol tartrate (LOPRESSOR) 50 MG tablet TAKE ONE TABLET BY MOUTH TWICE A DAY 7/22/20  Yes GUANAKO Alexander CNP   amLODIPine (NORVASC) 5 MG tablet Take 2 tablets by mouth daily 6/3/20  Yes GUANAKO Alexander CNP   valsartan (DIOVAN) 160 MG tablet Take 1 tablet by mouth daily 6/1/20  Yes Rehan Gaitan MD   furosemide (LASIX) 20 MG tablet TAKE ONE TABLET BY MOUTH DAILY  Patient taking differently: 20 mg  1/21/20  Yes David Piña MD   pantoprazole (PROTONIX) 20 MG tablet Take 2 tablets by mouth daily  Patient taking differently: Take 20 mg by mouth daily  7/26/18  Yes GUANAKO Crawford CNP   glyBURIDE (DIABETA) 1.25 MG tablet Take 2.5 mg by mouth daily (with breakfast)    Yes Historical Provider, MD   aspirin 81 MG tablet Take 81 mg by mouth daily   Yes Historical Provider, MD       REVIEW OF SYSTEMS:      All 14-point review of systems are completed and pertinent positives are mentioned in the history of present illness. Other systems are reviewed and are negative.      Physical Examination:    /70   Pulse 60   Ht 5' 2\" (1.575 m)   Wt 180 lb (81.6 kg)   SpO2 98%   BMI 32.92 kg/m² Constitutional and General Appearance: alert, cooperative, no distress and appears stated age  [de-identified]: PERRL, no cervical lymphadenopathy. No masses palpable. Normal oral mucosa  Respiratory:  · Normal excursion and expansion without use of accessory muscles  · Resp Auscultation: Normal breath sounds without dullness or wheezing  Cardiovascular:  · The apical impulse is not displaced  · Mildly tachycardic. Irregular rate and rhythm. Abdomen:  · No masses or tenderness  · Bowel sounds present  Extremities:  ·  No Cyanosis or Clubbing  ·  Lower extremity edema: No  · Skin: Warm and dry  Neurological:  · Alert and oriented. · Moves all extremities well  · No abnormalities of mood, affect, memory, mentation, or behavior are noted    DATA:    EC/3/20: AF with  BPM    ECHO: 20:  Summary   Normal left ventricle systolic function with an estimated ejection fraction   of 55-60%. Definity contrast administered with no evidence of left   ventricular mass or thrombus noted. No regional wall motion abnormalities are seen. Normal left ventricular   diastolic filling pressure. The left atrium is mildly dilated. Mild mitral and tricuspid regurgitation. Systolic pulmonary artery pressure (SPAP) estimated at 42 mmHg (right atrial   pressure 3 mmHg), consistent with mild pulmonary hypertension. ECHO: 19:  Summary   Definity contrast administered. Normal left ventricular systolic function with an estimated ejection   fraction of 55-60%. There is mild septal hypertrophy with normal remaining wall thickness. Normal left ventricular diastolic filling pressure. Mild mitral regurgitation. Mild tricuspid regurgitation. Systolic pulmonary artery pressure (SPAP) estimated at 60 mmHg (RA pressure   3 mmHg), consistent with severe pulmonary hypertension. Mild pulmonic regurgitation present. IMPRESSION:    1.  Paroxysmal Atrial Fibrillation  11/3/2020 [address] [facility]. [place] [address]  Phone: [phone]  Fax: [phone]   11/3/2020     NOTE: This report was transcribed using voice recognition software. Every effort was made to ensure accuracy, however, inadvertent computerized transcription errors may be present. The scribe's documentation has been prepared under my direction and personally reviewed by me in its entirety. I confirm that the note above accurately reflects all work, physical examination, the discussion of treatments and procedures, and medical decision making performed by me. Gerardine Klinefelter, MD personally performed the services described in this documentation as scribed by nurse in my presence, and is both accurate and complete.     Electronically signed by Katie Go MD on 11/3/2020 at 11:59 AM

## 2020-11-03 NOTE — PROGRESS NOTES
[facility]   Electrophysiology Note      Date: 11/3/20  Patient Name: Melinda Tiwari  YOB: 1935     Chief Complaint:  Atrial Fibrillation    Primary Care Physician: Christine Espinoza MD     HISTORY OF PRESENT ILLNESS: Melinda Tiwari is a 81 y.o. female who presents for follow up for paroxysmal atrial fibrillation. She has a past medical history of HTN, pulmonary HTN, dCHF, DM and SVT. She wore a cardiac event monitor in 10/2013 and AF was detected, longest lasting 2hrs 43 minutes with HR max 160 BPM. She had a Cryoablation for PAF on 2/11/2014. She underwent an echocardiogram in 2/2019 that demonstrated EF of 55-60% with severe pulmonary HTN. She was then referred to the heart failure team. She had recurrent PAF in 1/2020 and was started on eliquis and amiodarone. Amiodarone was stopped for unknown reasons. She underwent an echocardiogram on 4/28/20 that demonstrated an EF of 55-60% with no regional wall motion abnormalities, with LA mildly dilated, mild MR and TR. While seeing Dr. Bia Boss in office today 11/3/20 she was found to be in AF with RVR. EKG 11/3/20 demonstrates AF with  BPM. She reports she had an RFCA for AF several years ago and had been in rhythm since. She reports recently she started to feel palpitations with dizziness again. She reports she feels the same as she did when she had AF before. She reports she is taking her medications as prescribed and tolerates them well. She denies any abnormal bleeding or bruising. Patient denies current edema, chest pain, sob, or syncope. Past Medical History:   has a past medical history of Atrial fibrillation ([address] Diabetes mellitus ([address] Hydronephrosis, Hyperlipidemia, Hypertension, Intussusception intestine ([address] and SVT (supraventricular tachycardia) ([address] Past Surgical History:   has a past surgical history that includes Hysterectomy; Appendectomy;  Tonsillectomy; skin biopsy; ablation of dysrhythmic focus; Dilatation, esophagus; Small intestine surgery; and fracture surgery. Social History:   reports that she has never smoked. She has never used smokeless tobacco. She reports that she does not drink alcohol or use drugs. Family History: family history includes Cancer in her sister; Diabetes in her mother; Heart Disease in her brother and father; High Blood Pressure in her brother and father; High Cholesterol in her brother and father. Allergies:  Hydrocodone; Metformin and related; Omeprazole; and Hydralazine    Home Medications:    Prior to Admission medications    Medication Sig Start Date End Date Taking? Authorizing Provider   ELIQUIS 5 MG TABS tablet TAKE ONE TABLET BY MOUTH TWICE A DAY 10/27/20  Yes GUANAKO Delvalle CNP   metoprolol tartrate (LOPRESSOR) 50 MG tablet TAKE ONE TABLET BY MOUTH TWICE A DAY 7/22/20  Yes GUANAKO Baker CNP   amLODIPine (NORVASC) 5 MG tablet Take 2 tablets by mouth daily 6/3/20  Yes GUANAKO Baker CNP   valsartan (DIOVAN) 160 MG tablet Take 1 tablet by mouth daily 6/1/20  Yes Nancy Johnson MD   furosemide (LASIX) 20 MG tablet TAKE ONE TABLET BY MOUTH DAILY  Patient taking differently: 20 mg  1/21/20  Yes Eugene Butler MD   pantoprazole (PROTONIX) 20 MG tablet Take 2 tablets by mouth daily  Patient taking differently: Take 20 mg by mouth daily  7/26/18  Yes GUANAKO Ross CNP   glyBURIDE (DIABETA) 1.25 MG tablet Take 2.5 mg by mouth daily (with breakfast)    Yes Historical Provider, MD   aspirin 81 MG tablet Take 81 mg by mouth daily   Yes Historical Provider, MD       REVIEW OF SYSTEMS:      All 14-point review of systems are completed and pertinent positives are mentioned in the history of present illness. Other systems are reviewed and are negative.      Physical Examination:    /70   Pulse 60   Ht 5' 2\" (1.575 m)   Wt 180 lb (81.6 kg)   SpO2 98%   BMI 32.92 kg/m²    Constitutional and General Appearance: alert, atrial fibrillation status post ablation, diabetes mellitus type 2, heart failure preserved ejection fraction, pulmonary hypertension, and hyperlipidemia who presents from Dr. Christiano Hirsch clinic with atrial fibrillation with RVR. Patient's heart rates are now well controlled. She is currently on apixaban and metoprolol tartrate. Her blood pressure is within normal limits/slightly elevated. We discussed potentially doing a cardioversion and starting her on flecainide or rate control or ablation. At this point patient would like to avoid cardioversion and ablation strategy. We will therefore move forward with rate control which is noninferior to any of the above options. - Increase metoprolol and change to metoprolol succinate 100 mg BID.  - Continue apixaban. - Cardiac monitor for rate control evaluation.  - From EP standpoint patient at low risk for MACE for low risk procedure. - Follow up with NPBB (patient not interested in advanced therapies). 2. Heart failure with preserved ejection fraction. 11/03/2020  - Continue to follow up with HF service. RECOMMENDATIONS:  1. Stop Lopressor, we are changing this medicaiton  2. Start Toprol  mg twice daily for AF  3. 14 Day cardiac event monitor for AF burden   4. Follow up with EP NP in 3 months       QUALITY MEASURES  1. Tobacco Cessation Counseling: NA  2. Retake of BP if >140/90:   NA  3. Documentation to PCP/referring for new patient:  Sent to PCP at close of office visit  4. CAD patient on anti-platelet: Yes  5. CAD patient on STATIN therapy:  No  6. Patient with CHF and aFib on anticoagulation:  Yes     All questions and concerns were addressed to the patient/family. Alternatives to my treatment were discussed. This note was scribed in the presence of Fidelina Escobar MD by Kurt Hampton. Tia Silva RN. Dr. Ivory Arce MD  Electrophysiology  [facility]. [address]. [address].   [place], [address]  Phone: [phone]  Fax: [phone]   11/3/2020     NOTE: This report was transcribed using voice recognition software. Every effort was made to ensure accuracy, however, inadvertent computerized transcription errors may be present. The scribe's documentation has been prepared under my direction and personally reviewed by me in its entirety. I confirm that the note above accurately reflects all work, physical examination, the discussion of treatments and procedures, and medical decision making performed by me. Loretta Earl MD personally performed the services described in this documentation as scribed by nurse in my presence, and is both accurate and complete.     Electronically signed by Michael Negrete MD on 11/3/2020 at 11:59 AM

## 2020-11-03 NOTE — PATIENT INSTRUCTIONS
RECOMMENDATIONS:  1. Stop Lopressor, we are changing this medicaiton  2. Start Toprol  mg twice daily for AF  3. 14 Day cardiac event monitor for AF burden   4.  Follow up with EP NP in 3 months

## 2020-11-18 PROCEDURE — 93268 ECG RECORD/REVIEW: CPT | Performed by: INTERNAL MEDICINE

## 2020-11-19 ENCOUNTER — TELEPHONE (OUTPATIENT)
Dept: CARDIOLOGY CLINIC | Age: 85
End: 2020-11-19

## 2020-11-19 NOTE — TELEPHONE ENCOUNTER
Spoke with patient. Monitor results discussed. Patient verbalized understanding.    She reports since increasing the medication at [address] on she has felt much improvement and does not want to move forward with AF Ablation

## 2020-11-25 ENCOUNTER — TELEPHONE (OUTPATIENT)
Dept: CARDIOLOGY CLINIC | Age: 85
End: 2020-11-25

## 2020-11-25 NOTE — TELEPHONE ENCOUNTER
Pt requesting cardiac clearance for eye surgery next week. Also wants to know if she should hold Eliquis and or Aspirin prior to.

## 2020-11-25 NOTE — TELEPHONE ENCOUNTER
CARDIAC CLEARANCE     What type of procedure are you having?  caterac - Kim bulbar short block MAC  Which physician is performing your procedure? Dr. Ami Brito  When is your procedure scheduled for?  12/01/20  Where are you having this procedure? [facility]   Are you taking Blood Thinners? If so what? (Name/dose/frequesncy)  Eliquis 5mg and aspirin 81 mg   Does the surgeon want you to stop your blood thinner? If so for how long?     Phone Number and Contact Name for Physicians office:  Kip Kaur [phone]  Fax number to send information:  [phone]

## 2020-11-25 NOTE — TELEPHONE ENCOUNTER
I would suggest continuation of anticoagulation for as long as possible prior to procedure. Decision as to when to hold anticoagulation and aspirin should be up to her surgeon. From my standpoint she is at low to intermediate risk for MACE during a low risk procedure, thanks.

## 2020-12-18 ENCOUNTER — HOSPITAL ENCOUNTER (INPATIENT)
Age: 85
LOS: 4 days | Discharge: HOME | DRG: 309 | End: 2020-12-22
Attending: EMERGENCY MEDICINE | Admitting: INTERNAL MEDICINE
Payer: MEDICARE

## 2020-12-18 ENCOUNTER — APPOINTMENT (OUTPATIENT)
Dept: CT IMAGING | Age: 85
DRG: 309 | End: 2020-12-18
Payer: MEDICARE

## 2020-12-18 ENCOUNTER — APPOINTMENT (OUTPATIENT)
Dept: GENERAL RADIOLOGY | Age: 85
DRG: 309 | End: 2020-12-18
Payer: MEDICARE

## 2020-12-18 PROBLEM — I48.91 ATRIAL FIBRILLATION WITH RVR (HCC): Status: ACTIVE | Noted: 2020-12-18

## 2020-12-18 LAB
A/G RATIO: 0.9 (ref 1.1–2.2)
ALBUMIN SERPL-MCNC: 4.2 G/DL (ref 3.4–5)
ALP BLD-CCNC: 125 U/L (ref 40–129)
ALT SERPL-CCNC: 19 U/L (ref 10–40)
ANION GAP SERPL CALCULATED.3IONS-SCNC: 12 MMOL/L (ref 3–16)
AST SERPL-CCNC: 30 U/L (ref 15–37)
BACTERIA: ABNORMAL /HPF
BASOPHILS ABSOLUTE: 0.1 K/UL (ref 0–0.2)
BASOPHILS RELATIVE PERCENT: 0.9 %
BILIRUB SERPL-MCNC: 0.5 MG/DL (ref 0–1)
BILIRUBIN URINE: NEGATIVE
BLOOD, URINE: ABNORMAL
BUN BLDV-MCNC: 17 MG/DL (ref 7–20)
CALCIUM SERPL-MCNC: 9.3 MG/DL (ref 8.3–10.6)
CHLORIDE BLD-SCNC: 93 MMOL/L (ref 99–110)
CLARITY: CLEAR
CO2: 26 MMOL/L (ref 21–32)
COLOR: YELLOW
CREAT SERPL-MCNC: 0.9 MG/DL (ref 0.6–1.2)
EKG ATRIAL RATE: 182 BPM
EKG DIAGNOSIS: NORMAL
EKG Q-T INTERVAL: 310 MS
EKG QRS DURATION: 84 MS
EKG QTC CALCULATION (BAZETT): 450 MS
EKG R AXIS: -7 DEGREES
EKG T AXIS: 59 DEGREES
EKG VENTRICULAR RATE: 127 BPM
EOSINOPHILS ABSOLUTE: 0.3 K/UL (ref 0–0.6)
EOSINOPHILS RELATIVE PERCENT: 2.2 %
EPITHELIAL CELLS, UA: ABNORMAL /HPF (ref 0–5)
GFR AFRICAN AMERICAN: >60
GFR NON-AFRICAN AMERICAN: 60
GLOBULIN: 4.6 G/DL
GLUCOSE BLD-MCNC: 195 MG/DL (ref 70–99)
GLUCOSE BLD-MCNC: 263 MG/DL (ref 70–99)
GLUCOSE URINE: NEGATIVE MG/DL
HCT VFR BLD CALC: 46.6 % (ref 36–48)
HEMOGLOBIN: 14.8 G/DL (ref 12–16)
INR BLD: 1.36 (ref 0.86–1.14)
KETONES, URINE: NEGATIVE MG/DL
LACTIC ACID: 1.5 MMOL/L (ref 0.4–2)
LEUKOCYTE ESTERASE, URINE: NEGATIVE
LYMPHOCYTES ABSOLUTE: 2.4 K/UL (ref 1–5.1)
LYMPHOCYTES RELATIVE PERCENT: 18.8 %
MAGNESIUM: 1.7 MG/DL (ref 1.8–2.4)
MCH RBC QN AUTO: 23.7 PG (ref 26–34)
MCHC RBC AUTO-ENTMCNC: 31.8 G/DL (ref 31–36)
MCV RBC AUTO: 74.8 FL (ref 80–100)
MICROSCOPIC EXAMINATION: YES
MONOCYTES ABSOLUTE: 1 K/UL (ref 0–1.3)
MONOCYTES RELATIVE PERCENT: 8.1 %
NEUTROPHILS ABSOLUTE: 8.8 K/UL (ref 1.7–7.7)
NEUTROPHILS RELATIVE PERCENT: 70 %
NITRITE, URINE: NEGATIVE
PDW BLD-RTO: 18 % (ref 12.4–15.4)
PERFORMED ON: ABNORMAL
PH UA: 5.5 (ref 5–8)
PLATELET # BLD: 395 K/UL (ref 135–450)
PMV BLD AUTO: 9.1 FL (ref 5–10.5)
POTASSIUM SERPL-SCNC: 4.5 MMOL/L (ref 3.5–5.1)
PROTEIN UA: NEGATIVE MG/DL
PROTHROMBIN TIME: 15.8 SEC (ref 10–13.2)
RBC # BLD: 6.23 M/UL (ref 4–5.2)
RBC UA: ABNORMAL /HPF (ref 0–4)
SODIUM BLD-SCNC: 131 MMOL/L (ref 136–145)
SPECIFIC GRAVITY UA: 1.02 (ref 1–1.03)
TOTAL PROTEIN: 8.8 G/DL (ref 6.4–8.2)
TROPONIN: <0.01 NG/ML
URINE TYPE: ABNORMAL
UROBILINOGEN, URINE: 0.2 E.U./DL
WBC # BLD: 12.5 K/UL (ref 4–11)
WBC UA: ABNORMAL /HPF (ref 0–5)

## 2020-12-18 PROCEDURE — 96374 THER/PROPH/DIAG INJ IV PUSH: CPT

## 2020-12-18 PROCEDURE — 2500000003 HC RX 250 WO HCPCS: Performed by: PHYSICIAN ASSISTANT

## 2020-12-18 PROCEDURE — 96361 HYDRATE IV INFUSION ADD-ON: CPT

## 2020-12-18 PROCEDURE — 96375 TX/PRO/DX INJ NEW DRUG ADDON: CPT

## 2020-12-18 PROCEDURE — 6360000004 HC RX CONTRAST MEDICATION: Performed by: EMERGENCY MEDICINE

## 2020-12-18 PROCEDURE — 74177 CT ABD & PELVIS W/CONTRAST: CPT

## 2020-12-18 PROCEDURE — 83735 ASSAY OF MAGNESIUM: CPT

## 2020-12-18 PROCEDURE — 84484 ASSAY OF TROPONIN QUANT: CPT

## 2020-12-18 PROCEDURE — 93005 ELECTROCARDIOGRAM TRACING: CPT | Performed by: EMERGENCY MEDICINE

## 2020-12-18 PROCEDURE — 85025 COMPLETE CBC W/AUTO DIFF WBC: CPT

## 2020-12-18 PROCEDURE — 71045 X-RAY EXAM CHEST 1 VIEW: CPT

## 2020-12-18 PROCEDURE — 93010 ELECTROCARDIOGRAM REPORT: CPT | Performed by: INTERNAL MEDICINE

## 2020-12-18 PROCEDURE — 99284 EMERGENCY DEPT VISIT MOD MDM: CPT

## 2020-12-18 PROCEDURE — 6360000002 HC RX W HCPCS: Performed by: NURSE PRACTITIONER

## 2020-12-18 PROCEDURE — 81001 URINALYSIS AUTO W/SCOPE: CPT

## 2020-12-18 PROCEDURE — 36415 COLL VENOUS BLD VENIPUNCTURE: CPT

## 2020-12-18 PROCEDURE — 85610 PROTHROMBIN TIME: CPT

## 2020-12-18 PROCEDURE — 1200000000 HC SEMI PRIVATE

## 2020-12-18 PROCEDURE — 6360000002 HC RX W HCPCS: Performed by: PHYSICIAN ASSISTANT

## 2020-12-18 PROCEDURE — 6370000000 HC RX 637 (ALT 250 FOR IP): Performed by: NURSE PRACTITIONER

## 2020-12-18 PROCEDURE — 80053 COMPREHEN METABOLIC PANEL: CPT

## 2020-12-18 PROCEDURE — 6360000002 HC RX W HCPCS: Performed by: EMERGENCY MEDICINE

## 2020-12-18 PROCEDURE — 2580000003 HC RX 258: Performed by: PHYSICIAN ASSISTANT

## 2020-12-18 PROCEDURE — 2580000003 HC RX 258: Performed by: NURSE PRACTITIONER

## 2020-12-18 PROCEDURE — 83605 ASSAY OF LACTIC ACID: CPT

## 2020-12-18 RX ORDER — DILTIAZEM HYDROCHLORIDE 5 MG/ML
10 INJECTION INTRAVENOUS ONCE
Status: COMPLETED | OUTPATIENT
Start: 2020-12-18 | End: 2020-12-18

## 2020-12-18 RX ORDER — VALSARTAN 80 MG/1
160 TABLET ORAL DAILY
Status: DISCONTINUED | OUTPATIENT
Start: 2020-12-18 | End: 2020-12-22 | Stop reason: HOSPADM

## 2020-12-18 RX ORDER — DEXTROSE MONOHYDRATE 50 MG/ML
100 INJECTION, SOLUTION INTRAVENOUS PRN
Status: DISCONTINUED | OUTPATIENT
Start: 2020-12-18 | End: 2020-12-22 | Stop reason: HOSPADM

## 2020-12-18 RX ORDER — ACETAMINOPHEN 325 MG/1
650 TABLET ORAL EVERY 6 HOURS PRN
Status: DISCONTINUED | OUTPATIENT
Start: 2020-12-18 | End: 2020-12-22 | Stop reason: HOSPADM

## 2020-12-18 RX ORDER — ACETAMINOPHEN 650 MG/1
650 SUPPOSITORY RECTAL EVERY 6 HOURS PRN
Status: DISCONTINUED | OUTPATIENT
Start: 2020-12-18 | End: 2020-12-22 | Stop reason: HOSPADM

## 2020-12-18 RX ORDER — MORPHINE SULFATE 4 MG/ML
4 INJECTION, SOLUTION INTRAMUSCULAR; INTRAVENOUS ONCE
Status: COMPLETED | OUTPATIENT
Start: 2020-12-18 | End: 2020-12-18

## 2020-12-18 RX ORDER — POLYETHYLENE GLYCOL 3350 17 G/17G
17 POWDER, FOR SOLUTION ORAL DAILY PRN
Status: DISCONTINUED | OUTPATIENT
Start: 2020-12-18 | End: 2020-12-22 | Stop reason: HOSPADM

## 2020-12-18 RX ORDER — PANTOPRAZOLE SODIUM 40 MG/1
40 TABLET, DELAYED RELEASE ORAL DAILY
Status: DISCONTINUED | OUTPATIENT
Start: 2020-12-18 | End: 2020-12-22 | Stop reason: HOSPADM

## 2020-12-18 RX ORDER — FUROSEMIDE 20 MG/1
20 TABLET ORAL DAILY
Status: DISCONTINUED | OUTPATIENT
Start: 2020-12-18 | End: 2020-12-22 | Stop reason: HOSPADM

## 2020-12-18 RX ORDER — DEXTROSE MONOHYDRATE 25 G/50ML
12.5 INJECTION, SOLUTION INTRAVENOUS PRN
Status: DISCONTINUED | OUTPATIENT
Start: 2020-12-18 | End: 2020-12-22 | Stop reason: HOSPADM

## 2020-12-18 RX ORDER — METOPROLOL SUCCINATE 50 MG/1
100 TABLET, EXTENDED RELEASE ORAL 2 TIMES DAILY
Status: DISCONTINUED | OUTPATIENT
Start: 2020-12-18 | End: 2020-12-22

## 2020-12-18 RX ORDER — PROMETHAZINE HYDROCHLORIDE 25 MG/1
12.5 TABLET ORAL EVERY 6 HOURS PRN
Status: DISCONTINUED | OUTPATIENT
Start: 2020-12-18 | End: 2020-12-22 | Stop reason: HOSPADM

## 2020-12-18 RX ORDER — 0.9 % SODIUM CHLORIDE 0.9 %
500 INTRAVENOUS SOLUTION INTRAVENOUS ONCE
Status: COMPLETED | OUTPATIENT
Start: 2020-12-18 | End: 2020-12-18

## 2020-12-18 RX ORDER — NICOTINE POLACRILEX 4 MG
15 LOZENGE BUCCAL PRN
Status: DISCONTINUED | OUTPATIENT
Start: 2020-12-18 | End: 2020-12-22 | Stop reason: HOSPADM

## 2020-12-18 RX ORDER — SODIUM CHLORIDE 0.9 % (FLUSH) 0.9 %
10 SYRINGE (ML) INJECTION EVERY 12 HOURS SCHEDULED
Status: DISCONTINUED | OUTPATIENT
Start: 2020-12-18 | End: 2020-12-22 | Stop reason: HOSPADM

## 2020-12-18 RX ORDER — ONDANSETRON 2 MG/ML
4 INJECTION INTRAMUSCULAR; INTRAVENOUS ONCE
Status: COMPLETED | OUTPATIENT
Start: 2020-12-18 | End: 2020-12-18

## 2020-12-18 RX ORDER — ONDANSETRON 2 MG/ML
4 INJECTION INTRAMUSCULAR; INTRAVENOUS EVERY 6 HOURS PRN
Status: DISCONTINUED | OUTPATIENT
Start: 2020-12-18 | End: 2020-12-22 | Stop reason: HOSPADM

## 2020-12-18 RX ORDER — SODIUM CHLORIDE 0.9 % (FLUSH) 0.9 %
10 SYRINGE (ML) INJECTION PRN
Status: DISCONTINUED | OUTPATIENT
Start: 2020-12-18 | End: 2020-12-22 | Stop reason: HOSPADM

## 2020-12-18 RX ORDER — ASPIRIN 81 MG/1
81 TABLET, CHEWABLE ORAL DAILY
Status: DISCONTINUED | OUTPATIENT
Start: 2020-12-18 | End: 2020-12-22 | Stop reason: HOSPADM

## 2020-12-18 RX ORDER — MAGNESIUM SULFATE 1 G/100ML
1 INJECTION INTRAVENOUS ONCE
Status: COMPLETED | OUTPATIENT
Start: 2020-12-18 | End: 2020-12-18

## 2020-12-18 RX ADMIN — ONDANSETRON 4 MG: 2 INJECTION INTRAMUSCULAR; INTRAVENOUS at 21:01

## 2020-12-18 RX ADMIN — IOPAMIDOL 75 ML: 755 INJECTION, SOLUTION INTRAVENOUS at 11:19

## 2020-12-18 RX ADMIN — PANTOPRAZOLE SODIUM 40 MG: 40 TABLET, DELAYED RELEASE ORAL at 21:58

## 2020-12-18 RX ADMIN — DILTIAZEM HYDROCHLORIDE 10 MG: 5 INJECTION INTRAVENOUS at 10:27

## 2020-12-18 RX ADMIN — APIXABAN 5 MG: 5 TABLET, FILM COATED ORAL at 21:58

## 2020-12-18 RX ADMIN — ONDANSETRON 4 MG: 2 INJECTION INTRAMUSCULAR; INTRAVENOUS at 10:26

## 2020-12-18 RX ADMIN — MAGNESIUM SULFATE HEPTAHYDRATE 1 G: 1 INJECTION, SOLUTION INTRAVENOUS at 12:23

## 2020-12-18 RX ADMIN — MORPHINE SULFATE 4 MG: 4 INJECTION, SOLUTION INTRAMUSCULAR; INTRAVENOUS at 10:27

## 2020-12-18 RX ADMIN — DILTIAZEM HYDROCHLORIDE 5 MG/HR: 5 INJECTION INTRAVENOUS at 10:38

## 2020-12-18 RX ADMIN — SODIUM CHLORIDE, PRESERVATIVE FREE 10 ML: 5 INJECTION INTRAVENOUS at 21:02

## 2020-12-18 RX ADMIN — VALSARTAN 160 MG: 80 TABLET, FILM COATED ORAL at 21:58

## 2020-12-18 RX ADMIN — SODIUM CHLORIDE 500 ML: 9 INJECTION, SOLUTION INTRAVENOUS at 10:27

## 2020-12-18 ASSESSMENT — ENCOUNTER SYMPTOMS
ABDOMINAL PAIN: 1
NAUSEA: 0
COUGH: 0
VOMITING: 0
SHORTNESS OF BREATH: 0
CONSTIPATION: 0
DIARRHEA: 0
EYES NEGATIVE: 1
COLOR CHANGE: 0
BACK PAIN: 0

## 2020-12-18 ASSESSMENT — PAIN SCALES - GENERAL
PAINLEVEL_OUTOF10: 0
PAINLEVEL_OUTOF10: 5
PAINLEVEL_OUTOF10: 4
PAINLEVEL_OUTOF10: 0

## 2020-12-18 NOTE — H&P
Hospital Medicine History & Physical      PCP: Jamison Rivera MD    Date of Admission: 12/18/2020    Date of Service: Pt seen/examined on 12/18/2020 and Admitted to Inpatient with expected LOS greater than two midnights due to medical therapy. Chief Complaint:  Abdominal pain    History Of Present Illness:   81 y.o. female, with a PMH of Afib, DM2, HTN, HLD, and hx of intussusception, who presented to [facility] with abdominal pain. The patient has been experiencing intermittent, sharp, mid abdominal pain for last past couple of days. She has a history of intussusception and thought maybe that was acting up again. She notes that her A-fib has also been bothering her the last couple of days as well. She has some SOB due to her rapid A-fib. She denies fever, chills, chest pain, nausea, vomiting, diarrhea, and constipation. She denies any known exposure to COVID-19. She follows with Dr. Annmarie Massey as an outpatient for her A-fib. Past Medical History:          Diagnosis Date    Atrial fibrillation ([address]     Diabetes mellitus ([address]     Hydronephrosis 2/14/2014    Hyperlipidemia     Hypertension     Intussusception intestine ([address]     SVT (supraventricular tachycardia) ([address] 8/26/2013    AVNRT       Past Surgical History:          Procedure Laterality Date    ABLATION OF DYSRHYTHMIC FOCUS      -2014    APPENDECTOMY      DILATATION, ESOPHAGUS      FRACTURE SURGERY      L ankle    HYSTERECTOMY      SKIN BIOPSY      SMALL INTESTINE SURGERY      TONSILLECTOMY         Medications Prior to Admission:      Prior to Admission medications    Medication Sig Start Date End Date Taking?  Authorizing Provider   metoprolol succinate (TOPROL XL) 100 MG extended release tablet Take 1 tablet by mouth 2 times daily 11/3/20   Azalia Cordova MD   ELIQUIS 5 MG TABS tablet TAKE ONE TABLET BY MOUTH TWICE A DAY 10/27/20   GUANAKO Chavez - CNP metoprolol tartrate (LOPRESSOR) 50 MG tablet TAKE ONE TABLET BY MOUTH TWICE A DAY 7/22/20   GUANAKO Hunt CNP   amLODIPine (NORVASC) 5 MG tablet Take 2 tablets by mouth daily 6/3/20   GUANAKO Hunt CNP   valsartan (DIOVAN) 160 MG tablet Take 1 tablet by mouth daily 6/1/20   Mini Travis MD   furosemide (LASIX) 20 MG tablet TAKE ONE TABLET BY MOUTH DAILY  Patient taking differently: 20 mg  1/21/20   Alfonso Stanford MD   pantoprazole (PROTONIX) 20 MG tablet Take 2 tablets by mouth daily  Patient taking differently: Take 20 mg by mouth daily  7/26/18   GUANAKO Chong CNP   glyBURIDE (DIABETA) 1.25 MG tablet Take 2.5 mg by mouth daily (with breakfast)     Historical Provider, MD   aspirin 81 MG tablet Take 81 mg by mouth daily    Historical Provider, MD       Allergies:  Hydrocodone, Metformin and related, Omeprazole, and Hydralazine    Social History:      The patient currently lives with her spouse. TOBACCO:   reports that she has never smoked. She has never used smokeless tobacco.  ETOH:   reports no history of alcohol use. E-Cigarettes/Vaping Use     Questions Responses    E-Cigarette/Vaping Use Never User    Start Date     Passive Exposure     Quit Date     Counseling Given     Comments Unknown            Family History:      Reviewed in detail. Positive as follows:        Problem Relation Age of Onset    Diabetes Mother     Heart Disease Father     High Blood Pressure Father     High Cholesterol Father     Cancer Sister     Heart Disease Brother     High Blood Pressure Brother     High Cholesterol Brother        REVIEW OF SYSTEMS:   Pertinent positives as noted in the HPI. All other systems reviewed and negative. PHYSICAL EXAM PERFORMED:    /62   Pulse 110   Temp 98.4 °F (36.9 °C) (Oral)   Resp 20   Wt 180 lb (81.6 kg)   SpO2 96%   BMI 32.92 kg/m²     General appearance:  No apparent distress, appears stated age and cooperative. HEENT:  Normal cephalic, atraumatic without obvious deformity. Pupils equal, round, and reactive to light. Extra ocular muscles intact. Conjunctivae/corneas clear. Neck: Supple, with full range of motion. No jugular venous distention. Trachea midline. Respiratory:  Normal respiratory effort. Clear to auscultation, bilaterally without Rales/Wheezes/Rhonchi. Cardiovascular:  Irregularly irregular with normal S1/S2 without murmurs, rubs or gallops. Abdomen: Soft, non-tender, non-distended with normal bowel sounds. Musculoskeletal:  No clubbing, cyanosis or edema bilaterally. Full range of motion without deformity. Skin: Skin color, texture, turgor normal.  No rashes or lesions. Neurologic:  Neurovascularly intact without any focal sensory/motor deficits. Cranial nerves: II-XII intact, grossly non-focal.  Psychiatric:  Alert and oriented, thought content appropriate, normal insight  Capillary Refill: Brisk,< 3 seconds   Peripheral Pulses: +2 palpable, equal bilaterally       Labs:     Recent Labs     12/18/20  1013   WBC 12.5*   HGB 14.8   HCT 46.6        Recent Labs     12/18/20  1013   *   K 4.5   CL 93*   CO2 26   BUN 17   CREATININE 0.9   CALCIUM 9.3     Recent Labs     12/18/20  1013   AST 30   ALT 19   BILITOT 0.5   ALKPHOS 125     Recent Labs     12/18/20  1013   INR 1.36*     Recent Labs     12/18/20  1013   TROPONINI <0.01       Urinalysis:      Lab Results   Component Value Date    NITRU Negative 12/18/2020    WBCUA 0-2 12/18/2020    BACTERIA 1+ 12/18/2020    RBCUA None seen 12/18/2020    BLOODU TRACE-INTACT 12/18/2020    SPECGRAV 1.020 12/18/2020    GLUCOSEU Negative 12/18/2020       Radiology:     CXR: I have reviewed the CXR with the following interpretation: Negative. EKG:  I have reviewed the EKG with the following interpretation: A-fib with RVR, ventricular rate 182.     CT ABDOMEN PELVIS W IV CONTRAST Additional Contrast? None   Final Result   No acute intra-abdominal abnormality XR CHEST PORTABLE   Final Result   No active cardiopulmonary disease             ASSESSMENT:    Active Hospital Problems    Diagnosis Date Noted    Atrial fibrillation with RVR ([facility] [address] [I48.91] 12/18/2020         PLAN:    Afib with RVR  - known history of same. Follows with Dr. Katie Bolaños as outpatient. - recently changed to Toprol 100 mg BID as outpatient. Plan was to resume amiodarone, should RVR re-occur.  - started on Cardizem gtt in ED - continued. - consult EP. - continue home Toprol and Eliquis. Abdominal pain - unclear etiology. - CT abd/pelvis negative. - monitor. Pain is currently gone. DM2, uncontrolled. Last A1c 10.4 9/13/2020.  - hold home oral agents. - low dose SSI for now. HTN - controlled. - continue home Toprol, lasix and valsartan. - holding home amlodipine given Cardizem gtt. DVT Prophylaxis: Eliquis  Diet: No diet orders on file  Code Status: Prior    PT/OT Eval Status: not indicated    Dispo - likely 1-2 days       GUANAKO Cardoza - CNP    Thank you Brent Muhammad MD for the opportunity to be involved in this patient's care. If you have any questions or concerns please feel free to contact me at [phone].

## 2020-12-18 NOTE — ED PROVIDER NOTES
[address] [facility]  ED  EMERGENCY DEPARTMENT ENCOUNTER        Pt Name: Colleen Waterman Payer  MRN: [id number]  Monalisa 1935  Date of evaluation: 12/18/2020  Provider: Carissa Mendoza PA-C  PCP: Billy Mckay MD  ED Attending: Maynor Gao MD      This patient was seen by the attending provider     History provided by the patient    CHIEF COMPLAINT:     Chief Complaint   Patient presents with    Abdominal Pain     hx of bowel obstruction started 2 days ago had BM this AM       HISTORY OF PRESENT ILLNESS:      Pau Keating is a 81 y.o. female who arrives to the ED by private vehicle. Patient is here with a chief complaint of abdominal pain that she believes may be from a recurrent partial or complete bowel obstruction. She still with these numerous times in the past and is well established with general surgery. She reports pain x2 days. She denies nausea or vomiting. She did have a normal bowel movement this morning. She describes mid, upper abdominal pain that she rates 5/10 upon arrival.  A secondary complaint for this patient is that of atrial fibrillation. She has chronic A. fib. She is anticoagulated on Eliquis and is rate controlled with metoprolol. She has been taking medications as prescribed but has felt her heart racing and believes she is experiencing a flare of her atrial fibrillation. There are no identifiable exacerbating or alleviating factors to either issues today. Nursing Notes were reviewed     REVIEW OF SYSTEMS:     Review of Systems   Constitutional: Positive for activity change and appetite change. Negative for chills and fever. HENT: Negative. Eyes: Negative. Respiratory: Negative for cough and shortness of breath. Cardiovascular: Positive for palpitations. Negative for chest pain and leg swelling. Gastrointestinal: Positive for abdominal pain. Negative for constipation, diarrhea, nausea and vomiting. Genitourinary: Negative. Musculoskeletal: Negative for back pain and neck pain. Skin: Negative for color change. Neurological: Negative for dizziness and headaches. All other systems reviewed and are negative. Except as noted above in the ROS, all other systems were reviewed and negative.          PAST MEDICAL HISTORY:     Past Medical History:   Diagnosis Date    Atrial fibrillation ([facility] [address]     Diabetes mellitus ([facility] [address]     Hydronephrosis 2/14/2014    Hyperlipidemia     Hypertension     Intussusception intestine ([facility] [address]     SVT (supraventricular tachycardia) ([facility] [address] 8/26/2013    AVNRT         SURGICAL HISTORY:      Past Surgical History:   Procedure Laterality Date    ABLATION OF DYSRHYTHMIC FOCUS      -2014    APPENDECTOMY      DILATATION, ESOPHAGUS      FRACTURE SURGERY      L ankle    HYSTERECTOMY      SKIN BIOPSY      SMALL INTESTINE SURGERY      TONSILLECTOMY           CURRENT MEDICATIONS:       Previous Medications    AMLODIPINE (NORVASC) 5 MG TABLET    Take 2 tablets by mouth daily    ASPIRIN 81 MG TABLET    Take 81 mg by mouth daily    ELIQUIS 5 MG TABS TABLET    TAKE ONE TABLET BY MOUTH TWICE A DAY    FUROSEMIDE (LASIX) 20 MG TABLET    TAKE ONE TABLET BY MOUTH DAILY    GLYBURIDE (DIABETA) 1.25 MG TABLET    Take 2.5 mg by mouth daily (with breakfast)     METOPROLOL SUCCINATE (TOPROL XL) 100 MG EXTENDED RELEASE TABLET    Take 1 tablet by mouth 2 times daily    METOPROLOL TARTRATE (LOPRESSOR) 50 MG TABLET    TAKE ONE TABLET BY MOUTH TWICE A DAY    PANTOPRAZOLE (PROTONIX) 20 MG TABLET    Take 2 tablets by mouth daily    VALSARTAN (DIOVAN) 160 MG TABLET    Take 1 tablet by mouth daily         ALLERGIES:    Hydrocodone, Metformin and related, Omeprazole, and Hydralazine    FAMILY HISTORY:       Family History   Problem Relation Age of Onset    Diabetes Mother     Heart Disease Father     High Blood Pressure Father     High Cholesterol Father     Cancer Sister     Heart Disease Brother  High Blood Pressure Brother     High Cholesterol Brother           SOCIAL HISTORY:       Social History     Socioeconomic History    Marital status:      Spouse name: None    Number of children: None    Years of education: None    Highest education level: None   Occupational History    None   Social Needs    Financial resource strain: None    Food insecurity     Worry: None     Inability: None    Transportation needs     Medical: None     Non-medical: None   Tobacco Use    Smoking status: Never Smoker    Smokeless tobacco: Never Used   Substance and Sexual Activity    Alcohol use: No    Drug use: No    Sexual activity: Yes     Partners: Male   Lifestyle    Physical activity     Days per week: None     Minutes per session: None    Stress: None   Relationships    Social connections     Talks on phone: None     Gets together: None     Attends [employer] service: None     Active member of club or organization: None     Attends meetings of clubs or organizations: None     Relationship status: None    Intimate partner violence     Fear of current or ex partner: None     Emotionally abused: None     Physically abused: None     Forced sexual activity: None   Other Topics Concern    None   Social History Narrative    None       SCREENINGS:    Rob Coma Scale  Eye Opening: Spontaneous  Best Verbal Response: Oriented  Best Motor Response: Obeys commands  [place] Coma Scale Score: 15        PHYSICAL EXAM:       ED Triage Vitals   BP Temp Temp Source Pulse Resp SpO2 Height Weight   12/18/20 0946 12/18/20 0946 12/18/20 0946 12/18/20 0914 12/18/20 0946 12/18/20 0914 -- 12/18/20 0946   (!) 146/87 98.4 °F (36.9 °C) Oral 113 20 97 %  180 lb (81.6 kg)       Physical Exam    CONSTITUTIONAL: Awake and alert. Cooperative. Well-developed. Well-nourished. Non-toxic. No acute distress. HENT: Normocephalic. Atraumatic. External ears normal, without discharge. No nasal discharge. Oropharynx clear. Mucous membranes moist.  EYES: Conjunctiva non-injected. No scleral icterus. PERRL. EOM's grossly intact. NECK: Supple. Normal ROM. CARDIOVASCULAR: Tachycardic with irregularly irregular rhythm. No Murmer. Intact distal pulses. PULMONARY/CHEST WALL: Effort normal. No tachypnea. Lungs clear to ausculation. ABDOMEN: Normal BS. Soft. Nondistended. Epigastric tenderness to palpate. No guarding. /ANORECTAL: Not assessed  MUSKULOSKELETAL: Normal ROM. No acute deformities. No edema. No tenderness to palpate. SKIN: Warm and dry. No rash. NEUROLOGICAL: Alert and oriented x 3. GCS 15. CN II-XII grossly intact. Strength is 5/5 in all extremities and sensation is intact. Normal gait.    PSYCHIATRIC: Normal affect        DIAGNOSTICRESULTS:     LABS:    Results for orders placed or performed during the hospital encounter of 12/18/20   CBC Auto Differential   Result Value Ref Range    WBC 12.5 (H) 4.0 - 11.0 K/uL    RBC 6.23 (H) 4.00 - 5.20 M/uL    Hemoglobin 14.8 12.0 - 16.0 g/dL    Hematocrit 46.6 36.0 - 48.0 %    MCV 74.8 (L) 80.0 - 100.0 fL    MCH 23.7 (L) 26.0 - 34.0 pg    MCHC 31.8 31.0 - 36.0 g/dL    RDW 18.0 (H) 12.4 - 15.4 %    Platelets 735 275 - 597 K/uL    MPV 9.1 5.0 - 10.5 fL    Neutrophils % 70.0 %    Lymphocytes % 18.8 %    Monocytes % 8.1 %    Eosinophils % 2.2 %    Basophils % 0.9 %    Neutrophils Absolute 8.8 (H) 1.7 - 7.7 K/uL    Lymphocytes Absolute 2.4 1.0 - 5.1 K/uL    Monocytes Absolute 1.0 0.0 - 1.3 K/uL    Eosinophils Absolute 0.3 0.0 - 0.6 K/uL    Basophils Absolute 0.1 0.0 - 0.2 K/uL   Comprehensive Metabolic Panel   Result Value Ref Range    Sodium 131 (L) 136 - 145 mmol/L    Potassium 4.5 3.5 - 5.1 mmol/L    Chloride 93 (L) 99 - 110 mmol/L    CO2 26 21 - 32 mmol/L    Anion Gap 12 3 - 16    Glucose 263 (H) 70 - 99 mg/dL    BUN 17 7 - 20 mg/dL    CREATININE 0.9 0.6 - 1.2 mg/dL GFR Non- 60 (A) >60    GFR African American >60 >60    Calcium 9.3 8.3 - 10.6 mg/dL    Total Protein 8.8 (H) 6.4 - 8.2 g/dL    Alb 4.2 3.4 - 5.0 g/dL    Albumin/Globulin Ratio 0.9 (L) 1.1 - 2.2    Total Bilirubin 0.5 0.0 - 1.0 mg/dL    Alkaline Phosphatase 125 40 - 129 U/L    ALT 19 10 - 40 U/L    AST 30 15 - 37 U/L    Globulin 4.6 g/dL   Lactic Acid, Plasma   Result Value Ref Range    Lactic Acid 1.5 0.4 - 2.0 mmol/L   Urinalysis   Result Value Ref Range    Color, UA Yellow Straw/Yellow    Clarity, UA Clear Clear    Glucose, Ur Negative Negative mg/dL    Bilirubin Urine Negative Negative    Ketones, Urine Negative Negative mg/dL    Specific Gravity, UA 1.020 1.005 - 1.030    Blood, Urine TRACE-INTACT (A) Negative    pH, UA 5.5 5.0 - 8.0    Protein, UA Negative Negative mg/dL    Urobilinogen, Urine 0.2 <2.0 E.U./dL    Nitrite, Urine Negative Negative    Leukocyte Esterase, Urine Negative Negative    Microscopic Examination YES     Urine Type NotGiven    Troponin   Result Value Ref Range    Troponin <0.01 <0.01 ng/mL   Microscopic Urinalysis   Result Value Ref Range    WBC, UA 0-2 0 - 5 /HPF    RBC, UA None seen 0 - 4 /HPF    Epithelial Cells, UA 0-1 0 - 5 /HPF    Bacteria, UA 1+ (A) None Seen /HPF   Magnesium   Result Value Ref Range    Magnesium 1.70 (L) 1.80 - 2.40 mg/dL   EKG 12 Lead   Result Value Ref Range    Ventricular Rate 127 BPM    Atrial Rate 182 BPM    QRS Duration 84 ms    Q-T Interval 310 ms    QTc Calculation (Bazett) 450 ms    R Axis -7 degrees    T Axis 59 degrees    Diagnosis       Atrial fibrillation with rapid ventricular responseNonspecific ST abnormality , probably digitalis effectAbnormal ECGWhen compared with ECG of 10-OCT-2019 08:32,Atrial fibrillation has replaced Sinus rhythmVent. rate has increased BY  55 BPM         RADIOLOGY:  All x-ray studies areviewed/reviewed by me.   Formal interpretations per the radiologist are as follows: Ct Abdomen Pelvis W Iv Contrast Additional Contrast? None    Result Date: 12/18/2020  EXAMINATION: CT OF THE ABDOMEN AND PELVIS WITH CONTRAST 12/18/2020 11:04 am TECHNIQUE: CT of the abdomen and pelvis was performed with the administration of intravenous contrast. Multiplanar reformatted images are provided for review. Dose modulation, iterative reconstruction, and/or weight based adjustment of the mA/kV was utilized to reduce the radiation dose to as low as reasonably achievable. COMPARISON: None. HISTORY: ORDERING SYSTEM PROVIDED HISTORY: abdominal pain, history SBO TECHNOLOGIST PROVIDED HISTORY: Reason for exam:->abdominal pain, history SBO Additional Contrast?->None Reason for Exam: mid abd pain, hx of sbo Acuity: Acute Type of Exam: Initial Relevant Medical/Surgical History: bowel ressection 20 years ago, hyst FINDINGS: Lower Chest:The lung bases are clear Organs: The liver, spleen, adrenal glands, kidneys, and pancreas demonstrate no acute abnormality. There are multiple gallstones with no evidence of acute inflammation. GI/Bowel: The visualized portions of the bowel are unremarkable. Peritoneum/Retroperitoneum: Unremarkable Pelvis: Unremarkable Bones: No suspicious osseous lesions are identified     No acute intra-abdominal abnormality     Xr Chest Portable    Result Date: 12/18/2020  EXAMINATION: ONE XRAY VIEW OF THE CHEST 12/18/2020 10:02 am COMPARISON: 09/14/2020 HISTORY: ORDERING SYSTEM PROVIDED HISTORY: chest pain TECHNOLOGIST PROVIDED HISTORY: Reason for exam:->chest pain Reason for Exam: CHEST PAIN Acuity: Acute Type of Exam: Initial FINDINGS: Lordotic positioning. Heart size and pulmonary vasculature within normal limits. Chronic elevation of the right hemidiaphragm. Lungs clear except for calcified granuloma in the right lower lobe projecting behind the diaphragm. Costophrenic angles sharp.   No pneumothorax     No active cardiopulmonary disease         EKG: Patient will be worked up to evaluate for possible bowel obstruction as well as A. fib with RVR. She is ordered Cardizem bolus and infusion to control the rate and she is ordered morphine 4 hours IV with Zofran 4 mg IV and a 500 mL bolus of normal saline for her abdominal complaints. CBC reveals white count of 12.5 with H&H 14.8 and 46.6  CMP shows mild hyponatremia 131 and chloride 93 the glucose is elevated at 261. BUN 17, creatinine 0.9 and GFR 60. Magnesium slightly low 1.7  Lactate 1.5  Troponin <0.01  Urinalysis unremarkable. Portable chest x-ray shows no acute cardiopulmonary disease  CT abdomen and pelvis with IV contrast shows no acute abnormality. Patient's heart rate has come down. She does however warrant hospitalization secondary to her A. fib with RVR. I will consult the hospitalist.    I spoke with Hernan Soto CNP. We thoroughly discussed the history, physical exam, laboratory and imaging studies, as well as, emergency department course. Based upon that discussion, we've decided to admit [facility] for further observation and evaluation of Colleen Masker Payer's a fib with rvr. As I have deemed necessary from their history, physical, and studies, I have considered and evaluated Colleen Tiwari for the following diagnoses:  ACUTE CORONARY SYNDROME, PERICARDIAL TAMPONADE, CHF, SEPSIS, COPD EXACERBATION, PNEUMONIA, PNEUMOTHORAX, PULMONARY EMBOLISM, and THORACIC DISSECTION. FINAL IMPRESSION:      1.  Atrial fibrillation with RVR (HCC)    2. Epigastric pain          DISPOSITION/PLAN:   DISPOSITION     ADMIT               (Please note thatportions of this note were completed with a voice recognition program.  Efforts were made to edit the dictations, but occasionally words are mis-transcribed.)    Carissa Mendoza PA-C (electronicallysigned)              Kai Younger, [id number]  12/18/20 [id number]

## 2020-12-18 NOTE — ED NOTES
1139 - PS to hospitalists  Re: Jen Clifton with RVR  1205 - NP Colin Snow called back to speak with HERMINIO Clarke  12/18/20 121

## 2020-12-18 NOTE — ED PROVIDER NOTES
I independently performed a history and physical on Bernarda Tiwari. All diagnostic, treatment, and disposition decisions were made by myself in conjunction with the advanced practice provider. For further details of Bernarda Buckley [facility] emergency department encounter, please see HERMINIO Salguero's documentation. Patient is an 69-year-old female presenting today due to concern for abdominal pain and bloating for the last 2 days with concern for having a bowel obstruction. She also has a history of atrial fibrillation and was having palpitations recently although denies any chest pain or shortness of breath. She is on Eliquis. Her abdominal discomfort feels similar to prior bowel obstruction. She did have a bowel movement this morning. She denies any blood in the stool. No fever. Her main concern is the abdominal pain. No unilateral numbness or weakness in the arms or legs. Physical:   Gen: No acute distress. AOx3. Psych: Normal mood and affect  HEENT: NCAT  Neck: supple  Cardiac: Tachycardia, irregularly irregular rhythm, pulses 2+ in upper extremities  Lungs: C2AB, no R/R/W  Abdomen: soft and mild distention and mild generalized tenderness with no R/G  Neuro: no focal neuro deficits with strength and sensation 5/5 in all 4 extremities    The Ekg interpreted by me shows  atrial fibrillation with a rate of 127  Axis is   Normal  QTc is  normal  Intervals and Durations are unremarkable.       ST Segments: no acute change and nonspecific changes  No significant change from prior EKG dated - 11/3/20  No STEMI MDM: Patient was evaluated due to concern for mild abdominal discomfort associated with distention concerning for bowel obstruction and elevated heart rate. She was started on diltiazem due to concern for atrial fibrillation with rapid ventricular response. CT was negative for bowel obstruction. She will need further evaluation in the hospital in regards to her atrial fibrillation. Heart rate did improve while on diltiazem drip. I have low suspicion for Covid at this time.      Kady Cleaning MD  12/18/20 [id number]

## 2020-12-19 LAB
ANION GAP SERPL CALCULATED.3IONS-SCNC: 11 MMOL/L (ref 3–16)
BUN BLDV-MCNC: 22 MG/DL (ref 7–20)
CALCIUM SERPL-MCNC: 8.5 MG/DL (ref 8.3–10.6)
CHLORIDE BLD-SCNC: 101 MMOL/L (ref 99–110)
CO2: 25 MMOL/L (ref 21–32)
CREAT SERPL-MCNC: 1 MG/DL (ref 0.6–1.2)
GFR AFRICAN AMERICAN: >60
GFR NON-AFRICAN AMERICAN: 53
GLUCOSE BLD-MCNC: 156 MG/DL (ref 70–99)
GLUCOSE BLD-MCNC: 159 MG/DL (ref 70–99)
GLUCOSE BLD-MCNC: 174 MG/DL (ref 70–99)
GLUCOSE BLD-MCNC: 179 MG/DL (ref 70–99)
GLUCOSE BLD-MCNC: 194 MG/DL (ref 70–99)
PERFORMED ON: ABNORMAL
POTASSIUM REFLEX MAGNESIUM: 4.1 MMOL/L (ref 3.5–5.1)
SODIUM BLD-SCNC: 137 MMOL/L (ref 136–145)

## 2020-12-19 PROCEDURE — 80048 BASIC METABOLIC PNL TOTAL CA: CPT

## 2020-12-19 PROCEDURE — 93005 ELECTROCARDIOGRAM TRACING: CPT | Performed by: INTERNAL MEDICINE

## 2020-12-19 PROCEDURE — 6360000002 HC RX W HCPCS: Performed by: INTERNAL MEDICINE

## 2020-12-19 PROCEDURE — 2580000003 HC RX 258: Performed by: NURSE PRACTITIONER

## 2020-12-19 PROCEDURE — 2580000003 HC RX 258: Performed by: PHYSICIAN ASSISTANT

## 2020-12-19 PROCEDURE — 2060000000 HC ICU INTERMEDIATE R&B

## 2020-12-19 PROCEDURE — 2500000003 HC RX 250 WO HCPCS: Performed by: PHYSICIAN ASSISTANT

## 2020-12-19 PROCEDURE — 6370000000 HC RX 637 (ALT 250 FOR IP): Performed by: NURSE PRACTITIONER

## 2020-12-19 PROCEDURE — 99223 1ST HOSP IP/OBS HIGH 75: CPT | Performed by: INTERNAL MEDICINE

## 2020-12-19 PROCEDURE — 36415 COLL VENOUS BLD VENIPUNCTURE: CPT

## 2020-12-19 PROCEDURE — 6370000000 HC RX 637 (ALT 250 FOR IP): Performed by: INTERNAL MEDICINE

## 2020-12-19 RX ORDER — MAGNESIUM SULFATE IN WATER 40 MG/ML
2 INJECTION, SOLUTION INTRAVENOUS ONCE
Status: COMPLETED | OUTPATIENT
Start: 2020-12-19 | End: 2020-12-19

## 2020-12-19 RX ORDER — DOFETILIDE 0.12 MG/1
125 CAPSULE ORAL EVERY 12 HOURS SCHEDULED
Status: DISCONTINUED | OUTPATIENT
Start: 2020-12-19 | End: 2020-12-22 | Stop reason: HOSPADM

## 2020-12-19 RX ADMIN — DILTIAZEM HYDROCHLORIDE 5 MG/HR: 5 INJECTION INTRAVENOUS at 07:56

## 2020-12-19 RX ADMIN — INSULIN LISPRO 1 UNITS: 100 INJECTION, SOLUTION INTRAVENOUS; SUBCUTANEOUS at 21:40

## 2020-12-19 RX ADMIN — VALSARTAN 160 MG: 80 TABLET, FILM COATED ORAL at 09:43

## 2020-12-19 RX ADMIN — APIXABAN 5 MG: 5 TABLET, FILM COATED ORAL at 09:43

## 2020-12-19 RX ADMIN — MAGNESIUM SULFATE HEPTAHYDRATE 2 G: 40 INJECTION, SOLUTION INTRAVENOUS at 13:57

## 2020-12-19 RX ADMIN — INSULIN LISPRO 1 UNITS: 100 INJECTION, SOLUTION INTRAVENOUS; SUBCUTANEOUS at 17:30

## 2020-12-19 RX ADMIN — DOFETILIDE 125 MCG: 0.12 CAPSULE ORAL at 21:40

## 2020-12-19 RX ADMIN — ASPIRIN 81 MG: 81 TABLET, CHEWABLE ORAL at 09:43

## 2020-12-19 RX ADMIN — SODIUM CHLORIDE, PRESERVATIVE FREE 10 ML: 5 INJECTION INTRAVENOUS at 09:44

## 2020-12-19 RX ADMIN — PANTOPRAZOLE SODIUM 40 MG: 40 TABLET, DELAYED RELEASE ORAL at 09:44

## 2020-12-19 RX ADMIN — APIXABAN 5 MG: 5 TABLET, FILM COATED ORAL at 21:40

## 2020-12-19 ASSESSMENT — PAIN SCALES - GENERAL
PAINLEVEL_OUTOF10: 0

## 2020-12-19 NOTE — PROGRESS NOTES
Hospitalist Progress Note      PCP: Duane Toth MD    Date of Admission: 12/18/2020    Chief Complaint: Abdominal pain    Hospital Course:  81 y.o. female, with a PMH of Afib, DM2, HTN, HLD, and hx of intussusception, who presented to [facility] with abdominal pain. The patient has been experiencing intermittent, sharp, mid abdominal pain for last past couple of days. She has a history of intussusception and thought maybe that was acting up again. She notes that her A-fib has also been bothering her the last couple of days as well. She has some SOB due to her rapid A-fib. She denies fever, chills, chest pain, nausea, vomiting, diarrhea, and constipation. She denies any known exposure to COVID-19. She follows with Dr. Richelle Pinto as an outpatient for her A-fib.     Subjective:  Still in A-fib. HR is controlled on Cardizem gtt. Says she was dizzy when she got up to the bathroom. Went into RVR transiently when out of bed.     Medications:  Reviewed    Infusion Medications    dilTIAZem (CARDIZEM) 125 mg in dextrose 5% 125 mL infusion 5 mg/hr (12/19/20 0756)    dextrose       Scheduled Medications    aspirin  81 mg Oral Daily    apixaban  5 mg Oral BID    furosemide  20 mg Oral Daily    metoprolol succinate  100 mg Oral BID    pantoprazole  40 mg Oral Daily    valsartan  160 mg Oral Daily    sodium chloride flush  10 mL Intravenous 2 times per day    insulin lispro  0-6 Units Subcutaneous TID WC    insulin lispro  0-3 Units Subcutaneous Nightly     PRN Meds: sodium chloride flush, promethazine **OR** ondansetron, polyethylene glycol, acetaminophen **OR** acetaminophen, glucose, dextrose, glucagon (rDNA), dextrose      Intake/Output Summary (Last 24 hours) at 12/19/2020 0934  Last data filed at 12/18/2020 1700  Gross per 24 hour   Intake 29 ml   Output    Net 29 ml       Physical Exam Performed: XR CHEST PORTABLE   Final Result   No active cardiopulmonary disease                 Assessment/Plan:    Active Hospital Problems    Diagnosis    Atrial fibrillation with RVR (HCC) [I48.91]     Afib with RVR  - known history of same. Follows with Dr. Karina Leary as outpatient. - recently changed to Toprol 100 mg BID as outpatient. Plan was to resume amiodarone, should RVR re-occur.  - started on Cardizem gtt in ED - continued. - consult EP. - continue home Toprol and Eliquis.     Abdominal pain - unclear etiology. - CT abd/pelvis negative. - monitor. Pain is currently gone.     DM2, uncontrolled. Last A1c 10.4 9/13/2020.  - hold home oral agents. - low dose SSI for now.       HTN - controlled. - continue home Toprol, lasix and valsartan.   - holding home amlodipine given Cardizem gtt.       DVT Prophylaxis: Eliquis  Diet: DIET CARDIAC; Carb Control: 5 carb choices (75 gms)/meal; No Caffeine  Code Status: Full Code    PT/OT Eval Status: not indicated    Dispo - pending EP eval    Nighat January, APRN - CNP

## 2020-12-19 NOTE — CONSULTS
Electrophysiology Consultation   Date: 12/19/2020  Admit Date:  12/18/2020  Reason for Consultation: Atrial fibrillation  Consult Requesting Physician: Louise Richard MD     Chief Complaint   Patient presents with    Abdominal Pain     hx of bowel obstruction started 2 days ago had BM this AM     HPI:   Mrs. Cl Jamil is a pleasant 28-year-old female, known to me, with a medical history significant for paroxysmal atrial fibrillation status post ablation, diabetes mellitus type 2, heart failure preserved ejection fraction, pulmonary hypertension, and hyperlipidemia who presents from home with symptomatic atrial fibrillation with RVR. According to patient she had been doing quite well with increase in her metoprolol however approximately approximately 2 days when she began to suffer from palpitations. This was later complicated by abdominal pain which concerned her because of her history of small bowel obstruction. She was brought to ER for evaluation. Patient denies fevers, chest pain, orthopnea, PND, lower extremity edema, abdominal swelling, shortness of breath, dyspnea on exertion, chills, visual changes, headaches, sore throat, cough, nausea, vomiting, bleeding, bruising, dysuria, muscle/joint pain, confusion, depression, anxiety, skin lesions, etc.    Emergency Room/Hospital Course:  Patient was evaluated in the emergency room. She was found to be in atrial fibrillation with RVR. Her abdominal CT was reassuring. Her chest radiograph was reassuring. Her EKG showed atrial fibrillation with RVR. Her CMP was reassuring outside of some hyperglycemia. Her CBC was reassuring. Electrophysiology was consulted to assist with further management of patient arrhythmias. Current rhythm: Atrial fibrillation with RVR.   Known history of atrial fibrillation: yes - status post ablation 2014  Valvular disease: No  Associated symptoms: palpitations  Heart rate is not controlled Previous cardioversion and/or ablation: yes  History of CAD: No  History of sleep apnea: No  History of ETOH abuse/drug abuse: No  History of thyroid disease: No  Elevated BNP: No  Left atrial size is Abnormal  Mildly dilated    Past Medical History:   Diagnosis Date    At[facility] fibrillation ([facility]     Diabetes mellitus ([facility]     Hydronephrosis 2/14/2014    Hyperlipidemia     Hypertension     Intussusception intestine (HCC)     SVT (supraventricular tachycardia) ([facility] 8/26/2013    AVNRT        Past Surgical History:   Procedure Laterality Date    ABLATION OF DYSRHYTHMIC FOCUS      -2014    APPENDECTOMY      DILATATION, ESOPHAGUS      FRACTURE SURGERY      L ankle    HYSTERECTOMY      SKIN BIOPSY      SMALL INTESTINE SURGERY      TONSILLECTOMY         Allergies   Allergen Reactions    Hydrocodone Other (See Comments)     NAUSEA AND DIZZINESS    Metformin And Related Other (See Comments)     NAUSEA AND DIZZINESS    Omeprazole     Hydralazine Palpitations and Rash       Social History:  Reviewed. reports that she has never smoked. She has never used smokeless tobacco. She reports that she does not drink alcohol or use drugs. Family History:  Reviewed. family history includes Cancer in her sister; Diabetes in her mother; Heart Disease in her brother and father; High Blood Pressure in her brother and father; High Cholesterol in her brother and father. No premature CAD. Review of System:  All other systems reviewed except for that noted above.  Pertinent negatives and positives are:     · General: negative for fever, chills   · Ophthalmic ROS: negative for - eye pain or loss of vision  · ENT ROS: negative for - headaches, sore throat   · Respiratory: negative for - cough, sputum  · Cardiovascular: Reviewed in HPI  · Gastrointestinal: negative for - abdominal pain, diarrhea, N/V  · Hematology: negative for - bleeding, blood clots, bruising or jaundice · Genito-Urinary:  negative for - Dysuria or incontinence  · Musculoskeletal: negative for - Joint swelling, muscle pain  · Neurological: negative for - confusion, dizziness, headaches   · Psychiatric: No anxiety, no depression. · Dermatological: negative for - rash    Physical Examination:  Vitals:    20 0931   BP: 113/76   Pulse: 117   Resp: 17   Temp: 98.1 °F (36.7 °C)   SpO2: 95%        Intake/Output Summary (Last 24 hours) at 2020 1227  Last data filed at 2020 0943  Gross per 24 hour   Intake 159 ml   Output 0 ml   Net 159 ml     In: 159 [P.O.:120; I.V.:39]  Out: 0    Wt Readings from Last 3 Encounters:   20 176 lb 6.4 oz (80 kg)   20 180 lb (81.6 kg)   20 180 lb 8 oz (81.9 kg)     Temp  Av.2 °F (36.8 °C)  Min: 98 °F (36.7 °C)  Max: 98.4 °F (36.9 °C)  Pulse  Av.8  Min: 72  Max: 117  BP  Min: 92/74  Max: 127/79  SpO2  Av.4 %  Min: 92 %  Max: 96 %    · Telemetry: Atrial fibrillation with RVR   · Constitutional: Alert. Oriented to person, place, and time. No distress. · Head: Normocephalic and atraumatic. · Mouth/Throat: Lips appear moist. Oropharynx is clear and moist.  · Eyes: Conjunctivae normal. EOM are normal.   · Neck: Neck supple. No lymphadenopathy. No rigidity. No JVD present. · Cardiovascular: Irregular rate and rhythm w/o M/G/R  · Pulmonary/Chest: Bilateral respiratory sounds present. No respiratory accessory muscle use. · Abdominal: Soft. Normal bowel sounds present. No distension, No tenderness. · Musculoskeletal: No tenderness. No edema    · Neurological: Alert and oriented. Cranial nerve II-XII grossly intact. · Skin: Skin is warm and dry. No rash, lesions, ulcerations noted. · Psychiatric: No anxiety nor agitation. Labs:  Reviewed.    Recent Labs     20  1013 20  0901   * 137   K 4.5 4.1   CL 93* 101   CO2 26 25   BUN 17 22*   CREATININE 0.9 1.0     Recent Labs     20  1013   WBC 12.5*   HGB 14.8 HCT 46.6   MCV 74.8*        Lab Results   Component Value Date    TROPONINI <0.01 12/18/2020     Lab Results   Component Value Date     02/13/2014     Lab Results   Component Value Date    PROTIME 15.8 12/18/2020    PROTIME 17.5 05/26/2020    PROTIME 13.0 07/19/2018    INR 1.36 12/18/2020    INR 1.50 05/26/2020    INR 1.14 07/19/2018     No results found for: CHOL, HDL, TRIG    Diagnostic and imaging results reviewed. ECG: Atrial fibrillation with RVR. No sign of ongoing ischemia. Echo: 04/28/2020   Summary   Normal left ventricle systolic function with an estimated ejection fraction   of 55-60%. Definity contrast administered with no evidence of left   ventricular mass or thrombus noted. No regional wall motion abnormalities are seen. Normal left ventricular   diastolic filling pressure. The left atrium is mildly dilated. Mild mitral and tricuspid regurgitation. Systolic pulmonary artery pressure (SPAP) estimated at 42 mmHg (right atrial   pressure 3 mmHg), consistent with mild pulmonary hypertension. I independently reviewed the ECG and telemetry.     Scheduled Meds:   aspirin  81 mg Oral Daily    apixaban  5 mg Oral BID    furosemide  20 mg Oral Daily    metoprolol succinate  100 mg Oral BID    pantoprazole  40 mg Oral Daily    valsartan  160 mg Oral Daily    sodium chloride flush  10 mL Intravenous 2 times per day    insulin lispro  0-6 Units Subcutaneous TID WC    insulin lispro  0-3 Units Subcutaneous Nightly     Continuous Infusions:   dilTIAZem (CARDIZEM) 125 mg in dextrose 5% 125 mL infusion 5 mg/hr (12/19/20 0756)    dextrose       PRN Meds:.sodium chloride flush, promethazine **OR** ondansetron, polyethylene glycol, acetaminophen **OR** acetaminophen, glucose, dextrose, glucagon (rDNA), dextrose     Assessment:   Patient Active Problem List    Diagnosis Date Noted    Atrial fibrillation with RVR ([facility] 12/18/2020    Small bowel obstruction ([facility] 09/13/2020  Partial small bowel obstruction ([facility] 05/26/2020    Hyperlipidemia 10/10/2019    Hyperkalemia 10/10/2019    Pulmonary hypertension ([address] 03/26/2019    Chronic diastolic heart failure ([address] 03/26/2019    Essential hypertension 02/07/2019    Partial intestinal obstruction (HCC) 01/08/2019    Enteritis     Abdominal pain 07/18/2018    Pelvic hematoma in female 02/14/2014    Anemia due to blood loss 02/14/2014    Hydronephrosis 02/14/2014    Ileus ([address] 02/14/2014    Angina pectoris ([address] 01/25/2014    LONG TERM ANTICOAGULENT USE 11/25/2013    Dyspnea on exertion 09/04/2013    PAF (paroxysmal atrial fibrillation) ([address] 08/26/2013    SVT (supraventricular tachycardia) ([address] 08/26/2013    DM2 (diabetes mellitus, type 2) ([facility] 08/26/2013      Active Hospital Problems    Diagnosis Date Noted    Atrial fibrillation with RVR ([facility] [I48.91] 12/18/2020       Mrs. Tiwari is a pleasant 71-year-old female, known to me, with a medical history significant for paroxysmal atrial fibrillation status post ablation, diabetes mellitus type 2, heart failure preserved ejection fraction, pulmonary hypertension, and hyperlipidemia who presents from home with symptomatic atrial fibrillation with RVR. Meghna Tiwari is a pleasant 71-year-old female, known to me, with a medical history significant for paroxysmal atrial fibrillation status post ablation, diabetes mellitus type 2, heart failure preserved ejection fraction, pulmonary hypertension, and hyperlipidemia who presents from home with symptomatic atrial fibrillation with RVR. Problem List:  1. Paroxysmal atrial fibrillation with RVR. 2. Abdominal pain. Assessment and Plan:  1. Paroxysmal atrial fibrillation with RVR. Mrs. Chris Sharpe is a pleasant 81year old female with a medical history significant for atrial fibrillation status post cryoballoon ablation (2014), hypertension, diabetes mellitus type II, heart failure with preserved ejection fraction, and pulmonary hypertension who presents from home with symptomatic atrial fibrillation with RVR. We had discussed pursuing rate control strategy as she had been doing well however she now presents with symptomatic atrial fibrillation with RVR. She has missed no doses of her apixaban over the last 4 weeks. Given her age, amiodarone would be reasonable but I think I'd like to try dofetilide to avoid side effects. Unfortunately her CrCl is 29.41. We can start her on lowest dose and if she can't tolerate we will stop and start her on amiodarone. - I will give some additional magnesium.  - Start on dofetilide 125 mcg. EKG and labs per routine.  - Continue metoprolol 100 mg BID.  - Continue apixaban. - We will continue to follow along with you. 2. Abdominal pain. Resolved. - Continue to monitor clinically. Thank you for allowing me to participate in the care of Rylan Tiwari . If you have any questions/comments, please do not hesitate to contact us.     Vlad Chowdary MD  Cardiac Electrophysiology  [address] [facility]  [phone] [facility]

## 2020-12-19 NOTE — CARE COORDINATION
CASE MANAGEMENT INITIAL ASSESSMENT      Reviewed chart and completed assessment  with:patient  Explained Case Management role/services. Primary contact information:Sky Paypamela    Health Care Decision Maker :   Primary Decision Maker: sky rabago - Spouse - [phone]    Secondary Decision Maker: Lashanda Waterman Child - [phone]          Can this person be reached and be able to respond quickly, such as within a few minutes or hours? Yes  Who would be your back-up decision maker? Name Ata Roblero  Phone Number:    [phone] date/status:12/18/20  Diagnosis:aFib   Is this a Readmission?:  No      Insurance:BCBS   Precert required for SNF: No       3 night stay required:n      Living arrangements, Adls, care needs, prior to admission:n      Transportation:lives in ranch style home with spouse     Durable Medical Equipment at home:  Walker__Cane__RTS__ BSC__Shower Chair__  02__ HHN__ CPAP__  BiPap__  Hospital Bed__ W/C___ Other__________    Services in the home and/or outpatient, prior to [address] (if applicable)   · Name:  · Address:  · Dialysis Schedule:  · Phone:  · Fax:    PT/OT recs:none    Hospital Exemption Notification (HEN):needed for SNF    Barriers to discharge:none    Plan/comments:spoke with patient. Reports lives in ranch style home with . IPTA uses no DME. Denied any DCP needs. States she woul like some help with housekeeping. Discussed services [facility] could assist with if she were to qualify. Stated she will be here till tues. Denied any DCP needs.  Clemencia Ann RN       ECOC on chart for MD signature

## 2020-12-19 NOTE — PROGRESS NOTES
Pt up to the bathroom, writer at bedside for standby assist. Pt to bathroom and back to bed, pt then started to complain about vision changes and feeling faint. Encouraged pt to lay back slowly, CMU called to inform pt HR was in the 140s. Pt informed of HR, pt verbalizes understanding. Will continue to monitor.

## 2020-12-19 NOTE — PROGRESS NOTES
Patient is awake, alert and oriented x4. Assessment is complete, see flow sheet. Bed in lowest position, wheels locked, call light is within reach. Patient denies any further needs at the moment. Will continue to monitor.   Vitals:    12/19/20 0931   BP: 113/76   Pulse: 117   Resp: 17   Temp: 98.1 °F (36.7 °C)   SpO2: 95%     Denies pain, on room air  Pt NPO per cardio  Poss stress test and ablation  Glucose 159

## 2020-12-20 LAB
ANION GAP SERPL CALCULATED.3IONS-SCNC: 7 MMOL/L (ref 3–16)
BUN BLDV-MCNC: 21 MG/DL (ref 7–20)
CALCIUM SERPL-MCNC: 8.4 MG/DL (ref 8.3–10.6)
CHLORIDE BLD-SCNC: 102 MMOL/L (ref 99–110)
CO2: 27 MMOL/L (ref 21–32)
CREAT SERPL-MCNC: 0.8 MG/DL (ref 0.6–1.2)
EKG ATRIAL RATE: 133 BPM
EKG ATRIAL RATE: 56 BPM
EKG DIAGNOSIS: NORMAL
EKG DIAGNOSIS: NORMAL
EKG P AXIS: 65 DEGREES
EKG P-R INTERVAL: 200 MS
EKG Q-T INTERVAL: 356 MS
EKG Q-T INTERVAL: 458 MS
EKG QRS DURATION: 86 MS
EKG QRS DURATION: 94 MS
EKG QTC CALCULATION (BAZETT): 441 MS
EKG QTC CALCULATION (BAZETT): 494 MS
EKG R AXIS: -20 DEGREES
EKG R AXIS: -20 DEGREES
EKG T AXIS: 41 DEGREES
EKG T AXIS: 68 DEGREES
EKG VENTRICULAR RATE: 116 BPM
EKG VENTRICULAR RATE: 56 BPM
GFR AFRICAN AMERICAN: >60
GFR NON-AFRICAN AMERICAN: >60
GLUCOSE BLD-MCNC: 164 MG/DL (ref 70–99)
GLUCOSE BLD-MCNC: 170 MG/DL (ref 70–99)
GLUCOSE BLD-MCNC: 184 MG/DL (ref 70–99)
GLUCOSE BLD-MCNC: 201 MG/DL (ref 70–99)
GLUCOSE BLD-MCNC: 204 MG/DL (ref 70–99)
MAGNESIUM: 2.6 MG/DL (ref 1.8–2.4)
PERFORMED ON: ABNORMAL
POTASSIUM SERPL-SCNC: 4.1 MMOL/L (ref 3.5–5.1)
SODIUM BLD-SCNC: 136 MMOL/L (ref 136–145)

## 2020-12-20 PROCEDURE — 2060000000 HC ICU INTERMEDIATE R&B

## 2020-12-20 PROCEDURE — 80048 BASIC METABOLIC PNL TOTAL CA: CPT

## 2020-12-20 PROCEDURE — 2580000003 HC RX 258: Performed by: NURSE PRACTITIONER

## 2020-12-20 PROCEDURE — 93010 ELECTROCARDIOGRAM REPORT: CPT | Performed by: INTERNAL MEDICINE

## 2020-12-20 PROCEDURE — 83735 ASSAY OF MAGNESIUM: CPT

## 2020-12-20 PROCEDURE — 6370000000 HC RX 637 (ALT 250 FOR IP): Performed by: INTERNAL MEDICINE

## 2020-12-20 PROCEDURE — 2500000003 HC RX 250 WO HCPCS: Performed by: PHYSICIAN ASSISTANT

## 2020-12-20 PROCEDURE — 93005 ELECTROCARDIOGRAM TRACING: CPT | Performed by: INTERNAL MEDICINE

## 2020-12-20 PROCEDURE — 6370000000 HC RX 637 (ALT 250 FOR IP): Performed by: NURSE PRACTITIONER

## 2020-12-20 PROCEDURE — 2580000003 HC RX 258: Performed by: PHYSICIAN ASSISTANT

## 2020-12-20 PROCEDURE — 99232 SBSQ HOSP IP/OBS MODERATE 35: CPT | Performed by: INTERNAL MEDICINE

## 2020-12-20 RX ADMIN — APIXABAN 5 MG: 5 TABLET, FILM COATED ORAL at 08:37

## 2020-12-20 RX ADMIN — SODIUM CHLORIDE, PRESERVATIVE FREE 10 ML: 5 INJECTION INTRAVENOUS at 21:01

## 2020-12-20 RX ADMIN — ASPIRIN 81 MG: 81 TABLET, CHEWABLE ORAL at 08:37

## 2020-12-20 RX ADMIN — SODIUM CHLORIDE, PRESERVATIVE FREE 10 ML: 5 INJECTION INTRAVENOUS at 08:38

## 2020-12-20 RX ADMIN — DOFETILIDE 125 MCG: 0.12 CAPSULE ORAL at 21:00

## 2020-12-20 RX ADMIN — INSULIN LISPRO 1 UNITS: 100 INJECTION, SOLUTION INTRAVENOUS; SUBCUTANEOUS at 18:03

## 2020-12-20 RX ADMIN — FUROSEMIDE 20 MG: 20 TABLET ORAL at 08:37

## 2020-12-20 RX ADMIN — INSULIN LISPRO 2 UNITS: 100 INJECTION, SOLUTION INTRAVENOUS; SUBCUTANEOUS at 12:35

## 2020-12-20 RX ADMIN — PANTOPRAZOLE SODIUM 40 MG: 40 TABLET, DELAYED RELEASE ORAL at 08:37

## 2020-12-20 RX ADMIN — INSULIN LISPRO 1 UNITS: 100 INJECTION, SOLUTION INTRAVENOUS; SUBCUTANEOUS at 08:39

## 2020-12-20 RX ADMIN — VALSARTAN 160 MG: 80 TABLET, FILM COATED ORAL at 08:37

## 2020-12-20 RX ADMIN — APIXABAN 5 MG: 5 TABLET, FILM COATED ORAL at 21:00

## 2020-12-20 RX ADMIN — DOFETILIDE 125 MCG: 0.12 CAPSULE ORAL at 08:38

## 2020-12-20 RX ADMIN — INSULIN LISPRO 1 UNITS: 100 INJECTION, SOLUTION INTRAVENOUS; SUBCUTANEOUS at 21:00

## 2020-12-20 RX ADMIN — METOPROLOL SUCCINATE 100 MG: 50 TABLET, EXTENDED RELEASE ORAL at 08:37

## 2020-12-20 RX ADMIN — DILTIAZEM HYDROCHLORIDE 5 MG/HR: 5 INJECTION INTRAVENOUS at 10:21

## 2020-12-20 ASSESSMENT — PAIN SCALES - GENERAL: PAINLEVEL_OUTOF10: 0

## 2020-12-20 NOTE — FLOWSHEET NOTE
12/20/20 0834   Assessment   Charting Type Shift assessment   Neurological   Neuro (WDL) WDL   Level of Consciousness Alert (0)   Orientation Level Oriented X4   Cognition Follows commands; Appropriate safety awareness; Appropriate attention/concentration; Appropriate judgement; Appropriate for developmental age   Language Clear   [place] Coma Scale   Eye Opening 4   Best Verbal Response 5   Best Motor Response 6   [place] Coma Scale Score 15   NIH/MNHISS Stroke Scale   NIH/MNIHSS Stroke Scale Assessed No   HEENT   HEENT (WDL) WDL   Respiratory   Respiratory (WDL) WDL   Respiratory Pattern Regular   Respiratory Depth Normal   Respiratory Quality/Effort Unlabored   Chest Assessment Chest expansion symmetrical;Trachea midline   Breath Sounds   Right Upper Lobe Diminished   Right Middle Lobe Diminished   Right Lower Lobe Diminished   Left Upper Lobe Diminished   Left Lower Lobe Diminished   Cardiac   Cardiac (WDL) X   Cardiac Regularity Irregular   Heart Sounds S1, S2   Cardiac Rhythm Atrial fibrillation   Rhythm Interpretation   Pulse 123   Cardiac Monitor   Telemetry Monitor On Yes   Telemetry Audible Yes   Telemetry Alarms Set Yes   Gastrointestinal   Abdominal (WDL) X   Abdomen Inspection Soft   RUQ Bowel Sounds Active   LUQ Bowel Sounds Active   RLQ Bowel Sounds Active   LLQ Bowel Sounds Active   Peripheral Vascular   Peripheral Vascular (WDL) WDL   Edema None   Skin Color/Condition   Skin Color/Condition (WDL) WDL   Skin Integrity   Skin Integrity (WDL) WDL   Musculoskeletal   Musculoskeletal (WDL) WDL   Genitourinary   Genitourinary (WDL) WDL   Flank Tenderness No   Suprapubic Tenderness No   Dysuria No   Urine Assessment   Incontinence No   Urine Color LISA   Urine Appearance LISA   Urine Odor LISA   Anus/Rectum   Anus/Rectum (WDL) WDL   Psychosocial   Psychosocial (WDL) WDL   Patient Behaviors Appropriate for age;Calm; Cooperative

## 2020-12-20 NOTE — PROGRESS NOTES
Electrophysiology Progress Note     Admit Date: 2020     Reason for follow up: Paroxysmal atrial fibrillation. Interval History:   - Patient seen and examined. - Clinical notes reviewed. - Telemetry reviewed. Patient converted back to NSR.  - No new complaints today. - No major events overnight.   - Denies having chest pain, shortness of breath, dyspnea on exertion, Orthopnea, PND at the time of this visit. Physical Examination:  Vitals:    20 1234   BP: (!) 136/57   Pulse: 57   Resp: 16   Temp: 97.6 °F (36.4 °C)   SpO2: 96%        Intake/Output Summary (Last 24 hours) at 2020 1512  Last data filed at 2020 0834  Gross per 24 hour   Intake 100 ml   Output 500 ml   Net -400 ml     In: 30 [P.O.:30]  Out: 500    Wt Readings from Last 3 Encounters:   20 176 lb 9.6 oz (80.1 kg)   20 180 lb (81.6 kg)   20 180 lb 8 oz (81.9 kg)     Temp  Av.9 °F (36.6 °C)  Min: 97.5 °F (36.4 °C)  Max: 98.4 °F (36.9 °C)  Pulse  Av.8  Min: 57  Max: 123  BP  Min: 112/67  Max: 140/79  SpO2  Av.6 %  Min: 93 %  Max: 96 %    · Telemetry: Sinus rhythm   · Constitutional: Alert. Oriented to person, place, and time. No distress. · Head: Normocephalic and atraumatic. · Neck: Neck supple. No lymphadenopathy. No rigidity. No JVD present. · Cardiovascular: Normal rate, regular rhythm. Normal S1&S2. · Pulmonary/Chest: Bilateral respiratory sounds present. No respiratory accessory muscle use. · Abdominal: Soft. Normal bowel sounds present. No distension, No tenderness. · Musculoskeletal: No tenderness. No edema    · Neurological: Alert and oriented. Cranial nerve II-XII grossly intact, No gross deficit to touch. · Skin: Skin is warm and dry. No rash, lesions, ulcerations noted. · Psychiatric: No anxiety nor agitation. Labs, diagnostic and imaging results reviewed. Reviewed.    Recent Labs     20  1013 20  0901 20  0535   * 137 136 K 4.5 4.1 4.1   CL 93* 101 102   CO2 26 25 27   BUN 17 22* 21*   CREATININE 0.9 1.0 0.8     Recent Labs     12/18/20  1013   WBC 12.5*   HGB 14.8   HCT 46.6   MCV 74.8*        Lab Results   Component Value Date    TROPONINI <0.01 12/18/2020     Estimated Creatinine Clearance: 51 mL/min (based on SCr of 0.8 mg/dL).    Lab Results   Component Value Date     02/13/2014     Lab Results   Component Value Date    PROTIME 15.8 12/18/2020    PROTIME 17.5 05/26/2020    PROTIME 13.0 07/19/2018    INR 1.36 12/18/2020    INR 1.50 05/26/2020    INR 1.14 07/19/2018     No results found for: CHOL, HDL, TRIG    Scheduled Meds:   dofetilide  125 mcg Oral 2 times per day    aspirin  81 mg Oral Daily    apixaban  5 mg Oral BID    furosemide  20 mg Oral Daily    metoprolol succinate  100 mg Oral BID    pantoprazole  40 mg Oral Daily    valsartan  160 mg Oral Daily    sodium chloride flush  10 mL Intravenous 2 times per day    insulin lispro  0-6 Units Subcutaneous TID WC    insulin lispro  0-3 Units Subcutaneous Nightly     Continuous Infusions:   dilTIAZem (CARDIZEM) 125 mg in dextrose 5% 125 mL infusion 5 mg/hr (12/20/20 1021)    dextrose       PRN Meds:sodium chloride flush, promethazine **OR** ondansetron, polyethylene glycol, acetaminophen **OR** acetaminophen, glucose, dextrose, glucagon (rDNA), dextrose     Patient Active Problem List    Diagnosis Date Noted    Atrial fibrillation with RVR ([address] 12/18/2020    Small bowel obstruction ([facility] 09/13/2020    Partial small bowel obstruction ([address] 05/26/2020    Hyperlipidemia 10/10/2019    Hyperkalemia 10/10/2019    Pulmonary hypertension ([address] 03/26/2019    Chronic diastolic heart failure ([address] 03/26/2019    Essential hypertension 02/07/2019    Partial intestinal obstruction ([address] 01/08/2019    Enteritis     Abdominal pain 07/18/2018    Pelvic hematoma in female 02/14/2014    Anemia due to blood loss 02/14/2014    Hydronephrosis 02/14/2014  Ileus ([facility] 02/14/2014    Angina pectoris ([facility] 01/25/2014    LONG TERM ANTICOAGULENT USE 11/25/2013    Dyspnea on exertion 09/04/2013    PAF (paroxysmal atrial fibrillation) ([facility] 08/26/2013    SVT (supraventricular tachycardia) ([facility] 08/26/2013    DM2 (diabetes mellitus, type 2) ([facility] 08/26/2013      Active Hospital Problems    Diagnosis Date Noted    Atrial fibrillation with RVR [facility]) [I48.91] 12/18/2020     Mrs. Tiwari is a pleasant 52-year-old female, known to me, with a medical history significant for paroxysmal atrial fibrillation status post ablation, diabetes mellitus type 2, heart failure preserved ejection fraction, pulmonary hypertension, and hyperlipidemia who presents from home with symptomatic atrial fibrillation with RVR. Meghna Tiwari is a pleasant 52-year-old female, known to me, with a medical history significant for paroxysmal atrial fibrillation status post ablation, diabetes mellitus type 2, heart failure preserved ejection fraction, pulmonary hypertension, and hyperlipidemia who presents from home with symptomatic atrial fibrillation with RVR.      Problem List:  1. Paroxysmal atrial fibrillation with RVR. 2. Abdominal pain.     Assessment and Plan:  1. Paroxysmal atrial fibrillation with RVR.   12/19/2020 Mrs. Allison Cortez is a pleasant 81year old female with a medical history significant for atrial fibrillation status post cryoballoon ablation (2014), hypertension, diabetes mellitus type II, heart failure with preserved ejection fraction, and pulmonary hypertension who presents from home with symptomatic atrial fibrillation with RVR. We had discussed pursuing rate control strategy as she had been doing well however she now presents with symptomatic atrial fibrillation with RVR. She has missed no doses of her apixaban over the last 4 weeks. Given her age, amiodarone would be reasonable but I think I'd like to try dofetilide to avoid side effects. Unfortunately her CrCl is 29.41. We can start her on lowest dose and if she can't tolerate we will stop and start her on amiodarone. 12/20/2020  Patient converted back to normal sinus rhythm after second dose of dofetilide. Her QTc is 441. She still needs three more doses, likely discharge on Tuesday morning. Doesn't need to wait for morning EKG, can base tolerance on Monday evening EKG. - I will give some additional magnesium.  - Continue on dofetilide 125 mcg. EKG and labs per routine.  - Continue metoprolol 100 mg BID.  - Continue apixaban. - I will stop diltiazem drip. If blood pressure tolerates I may start oral diltiazem as she was still tachycardic prior to cardioversion.  - We will continue to follow along with you.     2. Abdominal pain. Resolved. - Continue to monitor clinically.     Thank you for allowing me to participate in the care of this patient. If you have any questions, please do not hesitate to contact me.     René Blum MD  Cardiac Electrophysiology  [address] [facility]  [phone] [facility]

## 2020-12-20 NOTE — FLOWSHEET NOTE
12/19/20 2103   Assessment   Charting Type Shift assessment   Neurological   Neuro (WDL) WDL   Level of Consciousness Alert (0)   Orientation Level Oriented X4   Cognition Follows commands; Appropriate safety awareness; Appropriate attention/concentration; Appropriate judgement; Appropriate for developmental age   Language Clear   [place] Coma Scale   Eye Opening 4   Best Verbal Response 5   Best Motor Response 6   [place] Coma Scale Score 15   HEENT   HEENT (WDL) WDL   Respiratory   Respiratory (WDL) WDL   Respiratory Pattern Regular   Respiratory Depth Normal   Respiratory Quality/Effort Unlabored   Chest Assessment Chest expansion symmetrical;Trachea midline   Cardiac   Cardiac (WDL) X   Cardiac Regularity Irregular   Heart Sounds S1, S2   Cardiac Rhythm Atrial fibrillation   Rhythm Interpretation   Pulse 98   Cardiac Monitor   Telemetry Monitor On Yes   Telemetry Audible Yes   Telemetry Alarms Set Yes   Gastrointestinal   Abdominal (WDL) X   Abdomen Inspection Soft   RUQ Bowel Sounds Active   LUQ Bowel Sounds Active   RLQ Bowel Sounds Active   LLQ Bowel Sounds Active   Peripheral Vascular   Peripheral Vascular (WDL) WDL   Edema None   Skin Color/Condition   Skin Color/Condition (WDL) WDL   Skin Integrity   Skin Integrity (WDL) WDL   Musculoskeletal   Musculoskeletal (WDL) WDL   Genitourinary   Genitourinary (WDL) WDL   Flank Tenderness No   Suprapubic Tenderness No   Dysuria No   Urine Assessment   Incontinence No   Urine Color LISA   Urine Appearance LISA   Urine Odor LISA   Anus/Rectum   Anus/Rectum (WDL) WDL   Psychosocial   Psychosocial (WDL) WDL   Patient Behaviors Appropriate for age;Calm; Cooperative

## 2020-12-20 NOTE — PROGRESS NOTES
Hospitalist Progress Note      PCP: Melyssa Franz MD    Date of Admission: 12/18/2020    Chief Complaint: Abdominal pain    Hospital Course:  81 y.o. female, with a PMH of Afib, DM2, HTN, HLD, and hx of intussusception, who presented to [facility] with abdominal pain. The patient has been experiencing intermittent, sharp, mid abdominal pain for last past couple of days. She has a history of intussusception and thought maybe that was acting up again. She notes that her A-fib has also been bothering her the last couple of days as well. She has some SOB due to her rapid A-fib. She denies fever, chills, chest pain, nausea, vomiting, diarrhea, and constipation. She denies any known exposure to COVID-19. She follows with Dr. Wale Cerda as an outpatient for her A-fib.     Subjective:  Converted to NSR. Feels great.     Medications:  Reviewed    Infusion Medications    dilTIAZem (CARDIZEM) 125 mg in dextrose 5% 125 mL infusion 5 mg/hr (12/20/20 1021)    dextrose       Scheduled Medications    dofetilide  125 mcg Oral 2 times per day    aspirin  81 mg Oral Daily    apixaban  5 mg Oral BID    furosemide  20 mg Oral Daily    metoprolol succinate  100 mg Oral BID    pantoprazole  40 mg Oral Daily    valsartan  160 mg Oral Daily    sodium chloride flush  10 mL Intravenous 2 times per day    insulin lispro  0-6 Units Subcutaneous TID WC    insulin lispro  0-3 Units Subcutaneous Nightly     PRN Meds: sodium chloride flush, promethazine **OR** ondansetron, polyethylene glycol, acetaminophen **OR** acetaminophen, glucose, dextrose, glucagon (rDNA), dextrose      Intake/Output Summary (Last 24 hours) at 12/20/2020 1442  Last data filed at 12/20/2020 0834  Gross per 24 hour   Intake 100 ml   Output 500 ml   Net -400 ml       Physical Exam Performed: BP (!) 136/57   Pulse 57   Temp 97.6 °F (36.4 °C) (Oral)   Resp 16   Ht 5' 2\" (1.575 m)   Wt 176 lb 9.6 oz (80.1 kg)   SpO2 96%   BMI 32.30 kg/m²     General appearance: No apparent distress, appears stated age and cooperative. HEENT: Pupils equal, round, and reactive to light. Conjunctivae/corneas clear. Neck: Supple, with full range of motion. No jugular venous distention. Trachea midline. Respiratory:  Normal respiratory effort. Clear to auscultation, bilaterally without Rales/Wheezes/Rhonchi. Cardiovascular: Regular rate and rhythm with normal S1/S2 without murmurs, rubs or gallops. Abdomen: Soft, non-tender, non-distended with normal bowel sounds. Musculoskeletal: No clubbing, cyanosis or edema bilaterally. Full range of motion without deformity. Skin: Skin color, texture, turgor normal.  No rashes or lesions. Neurologic:  Neurovascularly intact without any focal sensory/motor deficits.  Cranial nerves: II-XII intact, grossly non-focal.  Psychiatric: Alert and oriented, thought content appropriate, normal insight  Capillary Refill: Brisk,< 3 seconds   Peripheral Pulses: +2 palpable, equal bilaterally       Labs:   Recent Labs     12/18/20  1013   WBC 12.5*   HGB 14.8   HCT 46.6        Recent Labs     12/18/20  1013 12/19/20  0901 12/20/20  0535   * 137 136   K 4.5 4.1 4.1   CL 93* 101 102   CO2 26 25 27   BUN 17 22* 21*   CREATININE 0.9 1.0 0.8   CALCIUM 9.3 8.5 8.4     Recent Labs     12/18/20  1013   AST 30   ALT 19   BILITOT 0.5   ALKPHOS 125     Recent Labs     12/18/20  1013   INR 1.36*     Recent Labs     12/18/20  1013   TROPONINI <0.01       Urinalysis:      Lab Results   Component Value Date    NITRU Negative 12/18/2020    WBCUA 0-2 12/18/2020    BACTERIA 1+ 12/18/2020    RBCUA None seen 12/18/2020    BLOODU TRACE-INTACT 12/18/2020    SPECGRAV 1.020 12/18/2020    GLUCOSEU Negative 12/18/2020       Radiology:  CT ABDOMEN PELVIS W IV CONTRAST Additional Contrast? None Final Result   No acute intra-abdominal abnormality         XR CHEST PORTABLE   Final Result   No active cardiopulmonary disease                 Assessment/Plan:    Active Hospital Problems    Diagnosis    Atrial fibrillation with RVR (HCC) [I48.91]     Afib with RVR  - known history of same. Follows with Dr. Ashleigh Camacho as outpatient. - recently changed to Toprol 100 mg BID as outpatient. - started on Cardizem gtt in ED - continued. - EP consulted and following.  - started Tikosyn 12/19.  - continue home Toprol and Eliquis.     Abdominal pain - unclear etiology. - CT abd/pelvis negative. - monitor. Pain is currently gone.     DM2, uncontrolled. Last A1c 10.4 9/13/2020.  - hold home oral agents. - low dose SSI for now.       HTN - controlled. - continue home Toprol, lasix and valsartan.   - holding home amlodipine given Cardizem gtt.       DVT Prophylaxis: Eliquis  Diet: DIET CARDIAC; Carb Control: 5 carb choices (75 gms)/meal; No Caffeine  Code Status: Full Code    PT/OT Eval Status: not indicated    Dispo - likely tomorrow if tolerates Tikosyn    GUANAKO Peoples - CNP

## 2020-12-21 LAB
ANION GAP SERPL CALCULATED.3IONS-SCNC: 10 MMOL/L (ref 3–16)
BUN BLDV-MCNC: 20 MG/DL (ref 7–20)
CALCIUM SERPL-MCNC: 8.8 MG/DL (ref 8.3–10.6)
CHLORIDE BLD-SCNC: 101 MMOL/L (ref 99–110)
CO2: 24 MMOL/L (ref 21–32)
CREAT SERPL-MCNC: 1.1 MG/DL (ref 0.6–1.2)
EKG ATRIAL RATE: 58 BPM
EKG ATRIAL RATE: 64 BPM
EKG DIAGNOSIS: NORMAL
EKG DIAGNOSIS: NORMAL
EKG P AXIS: 77 DEGREES
EKG P AXIS: 83 DEGREES
EKG P-R INTERVAL: 182 MS
EKG P-R INTERVAL: 186 MS
EKG Q-T INTERVAL: 456 MS
EKG Q-T INTERVAL: 468 MS
EKG QRS DURATION: 94 MS
EKG QRS DURATION: 94 MS
EKG QTC CALCULATION (BAZETT): 459 MS
EKG QTC CALCULATION (BAZETT): 470 MS
EKG R AXIS: -11 DEGREES
EKG R AXIS: -18 DEGREES
EKG T AXIS: 48 DEGREES
EKG T AXIS: 49 DEGREES
EKG VENTRICULAR RATE: 58 BPM
EKG VENTRICULAR RATE: 64 BPM
GFR AFRICAN AMERICAN: 57
GFR NON-AFRICAN AMERICAN: 47
GLUCOSE BLD-MCNC: 135 MG/DL (ref 70–99)
GLUCOSE BLD-MCNC: 147 MG/DL (ref 70–99)
GLUCOSE BLD-MCNC: 157 MG/DL (ref 70–99)
GLUCOSE BLD-MCNC: 180 MG/DL (ref 70–99)
GLUCOSE BLD-MCNC: 181 MG/DL (ref 70–99)
MAGNESIUM: 2.1 MG/DL (ref 1.8–2.4)
PERFORMED ON: ABNORMAL
POTASSIUM SERPL-SCNC: 4 MMOL/L (ref 3.5–5.1)
SODIUM BLD-SCNC: 135 MMOL/L (ref 136–145)

## 2020-12-21 PROCEDURE — 6370000000 HC RX 637 (ALT 250 FOR IP): Performed by: INTERNAL MEDICINE

## 2020-12-21 PROCEDURE — 80048 BASIC METABOLIC PNL TOTAL CA: CPT

## 2020-12-21 PROCEDURE — 36415 COLL VENOUS BLD VENIPUNCTURE: CPT

## 2020-12-21 PROCEDURE — 2060000000 HC ICU INTERMEDIATE R&B

## 2020-12-21 PROCEDURE — 93010 ELECTROCARDIOGRAM REPORT: CPT | Performed by: INTERNAL MEDICINE

## 2020-12-21 PROCEDURE — 83735 ASSAY OF MAGNESIUM: CPT

## 2020-12-21 PROCEDURE — 93005 ELECTROCARDIOGRAM TRACING: CPT | Performed by: INTERNAL MEDICINE

## 2020-12-21 PROCEDURE — 2580000003 HC RX 258: Performed by: NURSE PRACTITIONER

## 2020-12-21 PROCEDURE — 99233 SBSQ HOSP IP/OBS HIGH 50: CPT | Performed by: NURSE PRACTITIONER

## 2020-12-21 PROCEDURE — 6370000000 HC RX 637 (ALT 250 FOR IP): Performed by: NURSE PRACTITIONER

## 2020-12-21 RX ADMIN — INSULIN LISPRO 1 UNITS: 100 INJECTION, SOLUTION INTRAVENOUS; SUBCUTANEOUS at 09:24

## 2020-12-21 RX ADMIN — DOFETILIDE 125 MCG: 0.12 CAPSULE ORAL at 09:23

## 2020-12-21 RX ADMIN — ASPIRIN 81 MG: 81 TABLET, CHEWABLE ORAL at 09:23

## 2020-12-21 RX ADMIN — INSULIN LISPRO 1 UNITS: 100 INJECTION, SOLUTION INTRAVENOUS; SUBCUTANEOUS at 20:54

## 2020-12-21 RX ADMIN — SODIUM CHLORIDE, PRESERVATIVE FREE 10 ML: 5 INJECTION INTRAVENOUS at 09:23

## 2020-12-21 RX ADMIN — FUROSEMIDE 20 MG: 20 TABLET ORAL at 09:23

## 2020-12-21 RX ADMIN — VALSARTAN 160 MG: 80 TABLET, FILM COATED ORAL at 09:23

## 2020-12-21 RX ADMIN — APIXABAN 5 MG: 5 TABLET, FILM COATED ORAL at 20:54

## 2020-12-21 RX ADMIN — APIXABAN 5 MG: 5 TABLET, FILM COATED ORAL at 09:23

## 2020-12-21 RX ADMIN — DOFETILIDE 125 MCG: 0.12 CAPSULE ORAL at 20:54

## 2020-12-21 RX ADMIN — INSULIN LISPRO 1 UNITS: 100 INJECTION, SOLUTION INTRAVENOUS; SUBCUTANEOUS at 12:08

## 2020-12-21 RX ADMIN — PANTOPRAZOLE SODIUM 40 MG: 40 TABLET, DELAYED RELEASE ORAL at 09:23

## 2020-12-21 RX ADMIN — SODIUM CHLORIDE, PRESERVATIVE FREE 10 ML: 5 INJECTION INTRAVENOUS at 21:31

## 2020-12-21 ASSESSMENT — PAIN SCALES - GENERAL
PAINLEVEL_OUTOF10: 0

## 2020-12-21 NOTE — PROGRESS NOTES
Hospitalist Progress Note      PCP: Elly Campo MD    Date of Admission: 2020    Chief Complaint: Abdominal pain    Hospital Course:  81 y.o. female, with a PMH of Afib, DM2, HTN, HLD, and hx of intussusception, who presented to [facility] with abdominal pain. The patient has been experiencing intermittent, sharp, mid abdominal pain for last past couple of days. She has a history of intussusception and thought maybe that was acting up again. She notes that her A-fib has also been bothering her the last couple of days as well. She has some SOB due to her rapid A-fib. She denies fever, chills, chest pain, nausea, vomiting, diarrhea, and constipation. She denies any known exposure to COVID-19. She follows with Dr. Niki Hernandez as an outpatient for her A-fib.     Subjective:  Converted to NSR. Sad this AM.   Lost her Brother in law to [facility] last week.  Was unable to attend the .     Medications:  Reviewed    Infusion Medications    dextrose       Scheduled Medications    dofetilide  125 mcg Oral 2 times per day    aspirin  81 mg Oral Daily    apixaban  5 mg Oral BID    furosemide  20 mg Oral Daily    metoprolol succinate  100 mg Oral BID    pantoprazole  40 mg Oral Daily    valsartan  160 mg Oral Daily    sodium chloride flush  10 mL Intravenous 2 times per day    insulin lispro  0-6 Units Subcutaneous TID WC    insulin lispro  0-3 Units Subcutaneous Nightly     PRN Meds: sodium chloride flush, promethazine **OR** ondansetron, polyethylene glycol, acetaminophen **OR** acetaminophen, glucose, dextrose, glucagon (rDNA), dextrose      Intake/Output Summary (Last 24 hours) at 2020 1845  Last data filed at 2020 1512  Gross per 24 hour   Intake 720 ml   Output 850 ml   Net -130 ml       Physical Exam Performed:    BP (!) 155/67   Pulse 60   Temp 98 °F (36.7 °C) (Oral)   Resp 16   Ht 5' 2\" (1.575 m)   Wt 176 lb 14.4 oz (80.2 kg)   SpO2 97%   BMI 32.36 kg/m² General appearance: No apparent distress, appears stated age and cooperative. HEENT: Pupils equal, round, and reactive to light. Conjunctivae/corneas clear. Neck: Supple, with full range of motion. No jugular venous distention. Trachea midline. Respiratory:  Normal respiratory effort. Clear to auscultation, bilaterally without Rales/Wheezes/Rhonchi. Cardiovascular: Regular rate and rhythm with normal S1/S2 without murmurs, rubs or gallops. Abdomen: Soft, non-tender, non-distended with normal bowel sounds. Musculoskeletal: No clubbing, cyanosis or edema bilaterally. Full range of motion without deformity. Skin: Skin color, texture, turgor normal.  No rashes or lesions. Neurologic:  Neurovascularly intact without any focal sensory/motor deficits. Cranial nerves: II-XII intact, grossly non-focal.  Psychiatric: Alert and oriented, thought content appropriate, normal insight  Capillary Refill: Brisk,< 3 seconds   Peripheral Pulses: +2 palpable, equal bilaterally       Labs:   No results for input(s): WBC, HGB, HCT, PLT in the last 72 hours. Recent Labs     12/19/20  0901 12/20/20  0535 12/21/20  0431    136 135*   K 4.1 4.1 4.0    102 101   CO2 25 27 24   BUN 22* 21* 20   CREATININE 1.0 0.8 1.1   CALCIUM 8.5 8.4 8.8     No results for input(s): AST, ALT, BILIDIR, BILITOT, ALKPHOS in the last 72 hours. No results for input(s): INR in the last 72 hours. No results for input(s): Shubham Nick in the last 72 hours.     Urinalysis:      Lab Results   Component Value Date    NITRU Negative 12/18/2020    WBCUA 0-2 12/18/2020    BACTERIA 1+ 12/18/2020    RBCUA None seen 12/18/2020    BLOODU TRACE-INTACT 12/18/2020    SPECGRAV 1.020 12/18/2020    GLUCOSEU Negative 12/18/2020       Radiology:  CT ABDOMEN PELVIS W IV CONTRAST Additional Contrast? None   Final Result   No acute intra-abdominal abnormality         XR CHEST PORTABLE   Final Result   No active cardiopulmonary disease Assessment/Plan:    Active Hospital Problems    Diagnosis    Atrial fibrillation with RVR (HCC) [I48.91]    Chronic diastolic heart failure (HCC) [I50.32]    Essential hypertension [I10]    PAF (paroxysmal atrial fibrillation) ([facility] [I48.0]     Afib with RVR  - known history of same. Follows with Dr. Laurie Bradshaw as outpatient. - recently changed to Toprol 100 mg BID as outpatient. - started on Cardizem gtt in ED - continued. - EP consulted and following.  - started Tikosyn 12/19.  - continue home Toprol and Eliquis.     Abdominal pain - unclear etiology. - CT abd/pelvis negative. - monitor. Pain is currently gone.     DM2, uncontrolled. Last A1c 10.4 9/13/2020.  - hold home oral agents. - low dose SSI for now.       HTN - controlled. - continue home Toprol, lasix and valsartan.   - holding home amlodipine given Cardizem gtt.       DVT Prophylaxis: Eliquis  Diet: DIET CARDIAC; Carb Control: 5 carb choices (75 gms)/meal; No Caffeine  Code Status: Full Code    PT/OT Eval Status: not indicated    Dispo - pending last dose of Tikosyn     GUANAKO Browning - CNP

## 2020-12-21 NOTE — PROGRESS NOTES
[address]     Electrophysiology                                     Progress Note    Admission date:  2020    Reason for follow up visit: PAF    HPI/CC: Steffen Tiwari was admitted on 2020 with abd pain and palpitations. EKG showed afib with a HR of 127 and Tikosyn was started 2020. Spontaneous conversion to sinus noted  and she remains in sinus rhythm. Subjective: She complains of some fatigue due to not sleeping well. Denies chest pain, palpitations, shortness of breath, and dizziness. Vitals:  Blood pressure (!) 169/80, pulse 58, temperature 98.1 °F (36.7 °C), temperature source Oral, resp. rate 16, height 5' 2\" (1.575 m), weight 176 lb 14.4 oz (80.2 kg), SpO2 98 %.   Temp  Av.9 °F (36.6 °C)  Min: 97.6 °F (36.4 °C)  Max: 98.2 °F (36.8 °C)  Pulse  Av.8  Min: 55  Max: 58  BP  Min: 128/76  Max: 171/77  SpO2  Av.5 %  Min: 96 %  Max: 98 %    24 hour I/O    Intake/Output Summary (Last 24 hours) at 2020 1116  Last data filed at 2020 7390  Gross per 24 hour   Intake 720 ml   Output 500 ml   Net 220 ml     Current Facility-Administered Medications   Medication Dose Route Frequency Provider Last Rate Last Admin    dofetilide (TIKOSYN) capsule 125 mcg  125 mcg Oral 2 times per day Brenda Silva MD   125 mcg at 20 6724    aspirin chewable tablet 81 mg  81 mg Oral Daily Zerita Fermo, APRN - CNP   81 mg at 20 0549    apixaban (ELIQUIS) tablet 5 mg  5 mg Oral BID Zerita Fermo, APRN - CNP   5 mg at 20 8470    furosemide (LASIX) tablet 20 mg  20 mg Oral Daily Zerita Fermo, APRN - CNP   20 mg at 20 9659    metoprolol succinate (TOPROL XL) extended release tablet 100 mg  100 mg Oral BID Zerita Fermo, APRN - CNP   Stopped at 20    pantoprazole (PROTONIX) tablet 40 mg  40 mg Oral Daily GUANAKO Zuniga CNP   40 mg at 20 7045  valsartan (DIOVAN) tablet 160 mg  160 mg Oral Daily Rosaline Chanelle, APRN - CNP   160 mg at 12/21/20 2424    sodium chloride flush 0.9 % injection 10 mL  10 mL Intravenous 2 times per day Rosaline Chanelle, APRN - CNP   10 mL at 12/21/20 2957    sodium chloride flush 0.9 % injection 10 mL  10 mL Intravenous PRN Rosaline Chanelle, APRN - CNP        promethazine (PHENERGAN) tablet 12.5 mg  12.5 mg Oral Q6H PRN Rosaline Chanelle, APRN - CNP        Or    ondansetron [facility]) injection 4 mg  4 mg Intravenous Q6H PRN Rosaline Chanelle, APRN - CNP   4 mg at 12/18/20 2101    polyethylene glycol (GLYCOLAX) packet 17 g  17 g Oral Daily PRN Rosaline Chanelle, APRN - CNP        acetaminophen (TYLENOL) tablet 650 mg  650 mg Oral Q6H PRN Rosaline Chanelle, APRN - CNP        Or    acetaminophen (TYLENOL) suppository 650 mg  650 mg Rectal Q6H PRN Rosaline Chanelle, APRN - CNP        insulin lispro (HUMALOG) injection vial 0-6 Units  0-6 Units Subcutaneous TID WC Rosaline Chanelle, APRN - CNP   1 Units at 12/21/20 1384    insulin lispro (HUMALOG) injection vial 0-3 Units  0-3 Units Subcutaneous Nightly Rosaline Chanelle, APRN - CNP   1 Units at 12/20/20 2100    glucose (GLUTOSE) 40 % oral gel 15 g  15 g Oral PRN Rosaline Chanelle, APRN - CNP        dextrose 50 % IV solution  12.5 g Intravenous PRN Rosaline Chanelle, APRN - CNP        glucagon (rDNA) injection 1 mg  1 mg Intramuscular PRN Rosaline Chanelle, APRN - CNP        dextrose 5 % solution  100 mL/hr Intravenous PRN Rosaline Chanelle, APRN - CNP           Objective:     Telemetry monitor: sinus/sinus hammad    Physical Exam:  Constitutional and general appearance: alert, cooperative, no distress and appears stated age  HEENT: PERRL, no cervical lymphadenopathy. No masses palpable.  Normal oral mucosa  Respiratory:  · Normal excursion and expansion without use of accessory muscles  · Resp auscultation: Normal breath sounds without wheezing, rhonchi, and rales  Cardiovascular: · The apical impulse is not displaced  · Heart tones are crisp and normal. regular S1 and S2.  · Jugular venous pulsation Normal  · The carotid upstroke is normal in amplitude and contour without delay or bruit  · Peripheral pulses are symmetrical and full   Abdomen:  · No masses or tenderness  · Bowel sounds present  Extremities:  ·  No cyanosis or clubbing  ·  No lower extremity edema  ·  Skin: warm and dry  Neurological:  · Alert and oriented  · Moves all extremities well  · No abnormalities of mood, affect, memory, mentation, or behavior are noted    Data  EKG 12/20/2020:   Sinus hammad HR 58    Echo 4/28/2020:   Normal left ventricle systolic function with an estimated ejection fraction of 55-60%. Definity contrast administered with no evidence of left ventricular mass or thrombus noted. No regional wall motion abnormalities are seen. Normal left ventricular diastolic filling pressure. The left atrium is mildly dilated. Mild mitral and tricuspid regurgitation. Systolic pulmonary artery pressure (SPAP) estimated at 42 mmHg (right atrial pressure 3 mmHg), consistent with mild pulmonary hypertension. Echo 2/19/2019:  Definity contrast administered. Normal left ventricular systolic function with an estimated ejection fraction of 55-60%. There is mild septal hypertrophy with normal remaining wall thickness. Normal left ventricular diastolic filling pressure. Mild mitral regurgitation. Mild tricuspid regurgitation. Systolic pulmonary artery pressure (SPAP) estimated at 60 mmHg (RA pressure 3 mmHg), consistent with severe pulmonary hypertension. Mild pulmonic regurgitation present. All labs and testing reviewed.   Lab Review     Renal Profile:   Lab Results   Component Value Date    CREATININE 1.1 12/21/2020    BUN 20 12/21/2020     12/21/2020    K 4.0 12/21/2020    K 4.1 12/19/2020     12/21/2020    CO2 24 12/21/2020     CBC:    Lab Results   Component Value Date    WBC 12.5 12/18/2020 RBC 6.23 12/18/2020    HGB 14.8 12/18/2020    HCT 46.6 12/18/2020    MCV 74.8 12/18/2020    RDW 18.0 12/18/2020     12/18/2020     BNP:    Lab Results   Component Value Date     02/13/2014     Fasting Lipid Panel:  No results found for: CHOL, HDL, TRIG  Cardiac Enzymes:  CK/MbTroponin  Lab Results   Component Value Date    TROPONINI <0.01 12/18/2020     PT/ INR   Lab Results   Component Value Date    INR 1.36 12/18/2020    INR 1.50 05/26/2020    INR 1.14 07/19/2018    PROTIME 15.8 12/18/2020    PROTIME 17.5 05/26/2020    PROTIME 13.0 07/19/2018     PTT No results found for: PTT   Lab Results   Component Value Date    MG 2.10 12/21/2020      Lab Results   Component Value Date    TSH 2.30 09/04/2013       Assessment:  1. Paroxysmal atrial fibrillation: improved              -s/p cryoablation in 2/2014 (patient had a significant retroperitoneal bleed post procedure)              -recurrence 1/2020, amiodarone recommended, patient stopped 3/2020   -Tikosyn started 12/19/2020              -[id number] score 6 (age, gender, HTN, DM, CHF)  2. Supraventricular tachycardia: noted in 2013  3. HTN: uncontrolled   4. Pulmonary HTN  5. Chronic diastolic CHF: compensated  6. DM  7. Abd pain: resolved    -has been followed by surgery before for SBO versus ileus     Plan:   1. Continue Eliquis,Toprol, Lasix, and valsartan   2. Continue Tikosyn 125mcg po BID  3. Daily BMP and magnesium  4. Keep K+ over 4 and mag over 2  5. EKG 2 hours after the first 5 doses  6. Continuous telemetry monitoring  7. Will hold off on oral Cardizem due to sinus bradycardia  8. Increase valsartan if BP remains persistently elevated     EKG review/Tikosyn dosing per Dr. Abdoulaye Reeder.      Dispo: to remain inpatient for the first 5 doses of Tikosyn/EKG review (5th dose 12/21 9pm)    Chayo Swan, GUANAKO-CNP  [facility]  [phone]

## 2020-12-22 VITALS
DIASTOLIC BLOOD PRESSURE: 75 MMHG | HEART RATE: 62 BPM | OXYGEN SATURATION: 97 % | WEIGHT: 176.3 LBS | SYSTOLIC BLOOD PRESSURE: 156 MMHG | TEMPERATURE: 97.9 F | HEIGHT: 62 IN | RESPIRATION RATE: 16 BRPM | BODY MASS INDEX: 32.44 KG/M2

## 2020-12-22 LAB
ANION GAP SERPL CALCULATED.3IONS-SCNC: 8 MMOL/L (ref 3–16)
BUN BLDV-MCNC: 21 MG/DL (ref 7–20)
CALCIUM SERPL-MCNC: 8.7 MG/DL (ref 8.3–10.6)
CHLORIDE BLD-SCNC: 101 MMOL/L (ref 99–110)
CO2: 24 MMOL/L (ref 21–32)
CREAT SERPL-MCNC: 1 MG/DL (ref 0.6–1.2)
EKG ATRIAL RATE: 57 BPM
EKG DIAGNOSIS: NORMAL
EKG P AXIS: 75 DEGREES
EKG P-R INTERVAL: 188 MS
EKG Q-T INTERVAL: 460 MS
EKG QRS DURATION: 92 MS
EKG QTC CALCULATION (BAZETT): 447 MS
EKG R AXIS: -22 DEGREES
EKG T AXIS: 43 DEGREES
EKG VENTRICULAR RATE: 57 BPM
GFR AFRICAN AMERICAN: >60
GFR NON-AFRICAN AMERICAN: 53
GLUCOSE BLD-MCNC: 141 MG/DL (ref 70–99)
GLUCOSE BLD-MCNC: 143 MG/DL (ref 70–99)
GLUCOSE BLD-MCNC: 150 MG/DL (ref 70–99)
MAGNESIUM: 2.3 MG/DL (ref 1.8–2.4)
PERFORMED ON: ABNORMAL
PERFORMED ON: ABNORMAL
POTASSIUM SERPL-SCNC: 5 MMOL/L (ref 3.5–5.1)
SODIUM BLD-SCNC: 133 MMOL/L (ref 136–145)

## 2020-12-22 PROCEDURE — 99233 SBSQ HOSP IP/OBS HIGH 50: CPT | Performed by: NURSE PRACTITIONER

## 2020-12-22 PROCEDURE — 93010 ELECTROCARDIOGRAM REPORT: CPT | Performed by: INTERNAL MEDICINE

## 2020-12-22 PROCEDURE — 6370000000 HC RX 637 (ALT 250 FOR IP): Performed by: INTERNAL MEDICINE

## 2020-12-22 PROCEDURE — 80048 BASIC METABOLIC PNL TOTAL CA: CPT

## 2020-12-22 PROCEDURE — 6370000000 HC RX 637 (ALT 250 FOR IP): Performed by: NURSE PRACTITIONER

## 2020-12-22 PROCEDURE — 36415 COLL VENOUS BLD VENIPUNCTURE: CPT

## 2020-12-22 PROCEDURE — 83735 ASSAY OF MAGNESIUM: CPT

## 2020-12-22 PROCEDURE — 2580000003 HC RX 258: Performed by: NURSE PRACTITIONER

## 2020-12-22 RX ORDER — METOPROLOL SUCCINATE 50 MG/1
50 TABLET, EXTENDED RELEASE ORAL DAILY
Status: DISCONTINUED | OUTPATIENT
Start: 2020-12-22 | End: 2020-12-22 | Stop reason: HOSPADM

## 2020-12-22 RX ORDER — METOPROLOL SUCCINATE 100 MG/1
50 TABLET, EXTENDED RELEASE ORAL DAILY
Qty: 180 TABLET | Refills: 3 | Status: SHIPPED
Start: 2020-12-22 | End: 2021-03-16 | Stop reason: SDUPTHER

## 2020-12-22 RX ORDER — DOFETILIDE 0.12 MG/1
125 CAPSULE ORAL EVERY 12 HOURS SCHEDULED
Qty: 60 CAPSULE | Refills: 3 | Status: SHIPPED | OUTPATIENT
Start: 2020-12-22 | End: 2021-02-04 | Stop reason: SDUPTHER

## 2020-12-22 RX ADMIN — SODIUM CHLORIDE, PRESERVATIVE FREE 10 ML: 5 INJECTION INTRAVENOUS at 08:58

## 2020-12-22 RX ADMIN — FUROSEMIDE 20 MG: 20 TABLET ORAL at 08:54

## 2020-12-22 RX ADMIN — APIXABAN 5 MG: 5 TABLET, FILM COATED ORAL at 08:54

## 2020-12-22 RX ADMIN — DOFETILIDE 125 MCG: 0.12 CAPSULE ORAL at 08:56

## 2020-12-22 RX ADMIN — ASPIRIN 81 MG: 81 TABLET, CHEWABLE ORAL at 08:54

## 2020-12-22 RX ADMIN — PANTOPRAZOLE SODIUM 40 MG: 40 TABLET, DELAYED RELEASE ORAL at 08:54

## 2020-12-22 RX ADMIN — INSULIN LISPRO 1 UNITS: 100 INJECTION, SOLUTION INTRAVENOUS; SUBCUTANEOUS at 08:59

## 2020-12-22 RX ADMIN — VALSARTAN 160 MG: 80 TABLET, FILM COATED ORAL at 08:54

## 2020-12-22 RX ADMIN — METOPROLOL SUCCINATE 50 MG: 50 TABLET, EXTENDED RELEASE ORAL at 10:55

## 2020-12-22 NOTE — PROGRESS NOTES
Order for pt to be discharged. IV and tele monitor removed. Tele monitor returned to nurses station. Medication for tikosyn being picked up from outpatient pharmacy. Information provided on tikosyn along with side effects. Information provided and reviewed on atrial fibrillation and abdominal pain along with signs/symptoms to call MD/Jhony with positive teach back. Pt is to follow up with PCP in 1 week. Writer encouraged pt to monitor B/P and heart rate and keep a log and notify MD if certain parameters are met. Pt has a scheduled follow up appointment with Dr. Cuong Li on February 4th at 9:45 a.m. Pt verbalizes understanding of all discharge instructions having no further questions. Pt transported to exit via wheel chair to main entrance,  awaiting.   Diana Craig  12/22/2020

## 2020-12-22 NOTE — ADT AUTH CERT
Atrial Fibrillation - Care Day 3 (12/20/2020) by Edwin Tsai RN       Review Status Review Entered   Completed 12/22/2020 10:06      Criteria Review      Care Day: 3 Care Date: 12/20/2020 Level of Care: Intermediate Care    Guideline Day 2    Level Of Care    (X) ICU, intermediate care, or telemetry to discharge    Clinical Status    ( ) * Hemodynamic stability    12/22/2020 10:06 AM EST by Amanda Aguirre      98.2  16  , 55, 57   149/74  98%ra     ma 2.60  bun 21    ( ) * Sinus rhythm or acceptable ventricular rate    12/22/2020 10:06 AM EST by Amanda Aguirre      afib    ( ) * No evidence of myocardial ischemia    (X) * Mental status at baseline    (X) * Tachypnea absent    (X) * Hypoxemia absent    ( ) * Anticoagulants regimen for next level of care established    ( ) * Discharge plans and education understood    Activity    ( ) * Ambulatory or acceptable for next level of care    Routes    (X) * Oral hydration, medications, and diet    12/22/2020 10:06 AM EST by Amanda Aguirre      lasix 20mg qd  toprol xl 100mg qd  diovan 160mg qd    Interventions    (X) Cardiac monitoring    12/22/2020 10:06 AM EST by Amanda Aguirre      afib    Medications    (X) Anticoagulants [F]    12/22/2020 10:06 AM EST by Amanda Aguirre      eliquis 5mg bid  asa 81mg qd    (X) Possible rate and rhythm control medications    12/22/2020 10:06 AM EST by Amanda Aguirre      tikosyn 125mcg bid  diltiazem iv gtt is dc'd @ 1515hr    * Milestone   Additional Notes   12/20   Per EP:   Patient converted back to normal sinus rhythm after second dose of dofetilide.  Her QTc is 441.  She still needs three more doses, likely discharge on Tuesday morning.  Doesn't need to wait for morning EKG, can base tolerance on Monday evening EKG. - I will give some additional magnesium.   - Continue on dofetilide 125 mcg.  EKG and labs per routine.   - Continue metoprolol 100 mg BID.   - Continue apixaban. - I will stop diltiazem drip.  If blood pressure tolerates I may start oral diltiazem as she was still tachycardic prior to cardioversion.

## 2020-12-22 NOTE — FLOWSHEET NOTE
12/21/20 2032   Assessment   Charting Type Shift assessment   Neurological   Neuro (WDL) WDL   Level of Consciousness Alert (0)   Orientation Level Oriented X4   Cognition Follows commands; Appropriate safety awareness; Appropriate attention/concentration; Appropriate judgement; Appropriate for developmental age   Language Clear   [place] Coma Scale   Eye Opening 4   Best Verbal Response 5   Best Motor Response 6   [place] Coma Scale Score 15   HEENT   HEENT (WDL) WDL   Respiratory   Respiratory (WDL) WDL   Respiratory Pattern Regular   Respiratory Depth Normal   Respiratory Quality/Effort Unlabored   Chest Assessment Chest expansion symmetrical;Trachea midline   Breath Sounds   Right Upper Lobe Clear   Right Middle Lobe Clear   Right Lower Lobe Clear   Left Upper Lobe Clear   Left Lower Lobe Clear   Cardiac   Cardiac (WDL) WDL   Cardiac Regularity Irregular   Heart Sounds S1, S2   Cardiac Rhythm NSR;SB   Cardiac Monitor   Telemetry Monitor On Yes   Telemetry Audible Yes   Telemetry Alarms Set Yes   Gastrointestinal   Abdominal (WDL) WDL   Abdomen Inspection Soft   Tenderness Nontender   RUQ Bowel Sounds Active   LUQ Bowel Sounds Active   RLQ Bowel Sounds Active   LLQ Bowel Sounds Active   Peripheral Vascular   Peripheral Vascular (WDL) WDL   Edema None   Skin Color/Condition   Skin Color/Condition (WDL) WDL   Skin Integrity   Skin Integrity (WDL) WDL   Musculoskeletal   Musculoskeletal (WDL) WDL   Genitourinary   Genitourinary (WDL) WDL   Flank Tenderness No   Suprapubic Tenderness No   Dysuria No   Anus/Rectum   Anus/Rectum (WDL) WDL   Psychosocial   Psychosocial (WDL) WDL

## 2020-12-22 NOTE — PROGRESS NOTES
[address]     Electrophysiology                                     Progress Note    Admission date:  2020    Reason for follow up visit: PAF    HPI/CC: Katie Dominguez Payer was admitted on 2020 with abd pain and palpitations. EKG showed afib with a HR of 127 and Tikosyn was started 2020. Spontaneous conversion to sinus noted  and she remains in sinus rhythm. Subjective: She is feeling well and denies chest pain, palpitations, shortness of breath, and dizziness. Vitals:  Blood pressure (!) 155/85, pulse 65, temperature 97.9 °F (36.6 °C), temperature source Oral, resp. rate 16, height 5' 2\" (1.575 m), weight 176 lb 4.8 oz (80 kg), SpO2 96 %.   Temp  Av.9 °F (36.6 °C)  Min: 97.8 °F (36.6 °C)  Max: 98 °F (36.7 °C)  Pulse  Av.2  Min: 57  Max: 65  BP  Min: 123/48  Max: 159/82  SpO2  Av.5 %  Min: 95 %  Max: 98 %    24 hour I/O    Intake/Output Summary (Last 24 hours) at 2020 0953  Last data filed at 2020 0901  Gross per 24 hour   Intake 770 ml   Output 925 ml   Net -155 ml     Current Facility-Administered Medications   Medication Dose Route Frequency Provider Last Rate Last Admin    dofetilide (TIKOSYN) capsule 125 mcg  125 mcg Oral 2 times per day America Banegas MD   125 mcg at 20 0856    aspirin chewable tablet 81 mg  81 mg Oral Daily Donnie Tafoya, APRN - CNP   81 mg at 20 0854    apixaban (ELIQUIS) tablet 5 mg  5 mg Oral BID Donnie Tafoya APRN - CNP   5 mg at 20 0854    furosemide (LASIX) tablet 20 mg  20 mg Oral Daily Donnie Tafoya, GUANAKO - CNP   20 mg at 20 0854    metoprolol succinate (TOPROL XL) extended release tablet 100 mg  100 mg Oral BID GUANAKO Brady - CNP   Stopped at 20    pantoprazole (PROTONIX) tablet 40 mg  40 mg Oral Daily Fields Michelet, APRN - CNP   40 mg at 20 6625  valsartan (DIOVAN) tablet 160 mg  160 mg Oral Daily GUANAKO George CNP   160 mg at 12/22/20 5946    sodium chloride flush 0.9 % injection 10 mL  10 mL Intravenous 2 times per day GUANAKO George CNP   10 mL at 12/22/20 0858    sodium chloride flush 0.9 % injection 10 mL  10 mL Intravenous PRN GUANAKO George CNP        promethazine (PHENERGAN) tablet 12.5 mg  12.5 mg Oral Q6H PRN GUANAKO George CNP        Or    ondansetron [facility]) injection 4 mg  4 mg Intravenous Q6H PRN GUANAKO George CNP   4 mg at 12/18/20 2101    polyethylene glycol (GLYCOLAX) packet 17 g  17 g Oral Daily PRN GUANAKO George CNP        acetaminophen (TYLENOL) tablet 650 mg  650 mg Oral Q6H PRN GUANAKO George CNP        Or    acetaminophen (TYLENOL) suppository 650 mg  650 mg Rectal Q6H PRN GUANAKO George CNP        insulin lispro (HUMALOG) injection vial 0-6 Units  0-6 Units Subcutaneous TID WC GUANAKO George CNP   1 Units at 12/22/20 0859    insulin lispro (HUMALOG) injection vial 0-3 Units  0-3 Units Subcutaneous Nightly GUANAKO George CNP   1 Units at 12/21/20 2054    glucose (GLUTOSE) 40 % oral gel 15 g  15 g Oral PRN GUANAKO George CNP        dextrose 50 % IV solution  12.5 g Intravenous PRN GUANAKO George CNP        glucagon (rDNA) injection 1 mg  1 mg Intramuscular PRN GUANAKO George CNP        dextrose 5 % solution  100 mL/hr Intravenous PRN GUANAKO George CNP           Objective:     Telemetry monitor: sinus/sinus hammad    Physical Exam:  Constitutional and general appearance: alert, cooperative, no distress and appears stated age  HEENT: PERRL, no cervical lymphadenopathy. No masses palpable.  Normal oral mucosa  Respiratory:  · Normal excursion and expansion without use of accessory muscles  · Resp auscultation: Normal breath sounds without wheezing, rhonchi, and rales  Cardiovascular: · The apical impulse is not displaced  · Heart tones are crisp and normal. regular S1 and S2.  · Jugular venous pulsation Normal  · The carotid upstroke is normal in amplitude and contour without delay or bruit  · Peripheral pulses are symmetrical and full   Abdomen:  · No masses or tenderness  · Bowel sounds present  Extremities:  ·  No cyanosis or clubbing  ·  No lower extremity edema  ·  Skin: warm and dry  Neurological:  · Alert and oriented  · Moves all extremities well  · No abnormalities of mood, affect, memory, mentation, or behavior are noted    Data  EKG 12/20/2020:   Sinus hammad HR 58    Echo 4/28/2020:   Normal left ventricle systolic function with an estimated ejection fraction of 55-60%. Definity contrast administered with no evidence of left ventricular mass or thrombus noted. No regional wall motion abnormalities are seen. Normal left ventricular diastolic filling pressure. The left atrium is mildly dilated. Mild mitral and tricuspid regurgitation. Systolic pulmonary artery pressure (SPAP) estimated at 42 mmHg (right atrial pressure 3 mmHg), consistent with mild pulmonary hypertension. Echo 2/19/2019:  Definity contrast administered. Normal left ventricular systolic function with an estimated ejection fraction of 55-60%. There is mild septal hypertrophy with normal remaining wall thickness. Normal left ventricular diastolic filling pressure. Mild mitral regurgitation. Mild tricuspid regurgitation. Systolic pulmonary artery pressure (SPAP) estimated at 60 mmHg (RA pressure 3 mmHg), consistent with severe pulmonary hypertension. Mild pulmonic regurgitation present. All labs and testing reviewed.   Lab Review     Renal Profile:   Lab Results   Component Value Date    CREATININE 1.0 12/22/2020    BUN 21 12/22/2020     12/22/2020    K 5.0 12/22/2020    K 4.1 12/19/2020     12/22/2020    CO2 24 12/22/2020     CBC:    Lab Results   Component Value Date    WBC 12.5 12/18/2020 RBC 6.23 12/18/2020    HGB 14.8 12/18/2020    HCT 46.6 12/18/2020    MCV 74.8 12/18/2020    RDW 18.0 12/18/2020     12/18/2020     BNP:    Lab Results   Component Value Date     02/13/2014     Fasting Lipid Panel:  No results found for: CHOL, HDL, TRIG  Cardiac Enzymes:  CK/MbTroponin  Lab Results   Component Value Date    TROPONINI <0.01 12/18/2020     PT/ INR   Lab Results   Component Value Date    INR 1.36 12/18/2020    INR 1.50 05/26/2020    INR 1.14 07/19/2018    PROTIME 15.8 12/18/2020    PROTIME 17.5 05/26/2020    PROTIME 13.0 07/19/2018     PTT No results found for: PTT   Lab Results   Component Value Date    MG 2.30 12/22/2020      Lab Results   Component Value Date    TSH 2.30 09/04/2013       Assessment:  1. Paroxysmal atrial fibrillation: improved, stable               -s/p cryoablation in 2/2014 (patient had a significant retroperitoneal bleed post procedure)              -recurrence 1/2020, amiodarone recommended, patient stopped 3/2020   -Tikosyn started 12/19/2020              -[id number] score 6 (age, gender, HTN, DM, CHF)  2. Supraventricular tachycardia: noted in 2013  3. HTN: control is varied   4. Pulmonary HTN  5. Chronic diastolic CHF: compensated  6. DM  7. Abd pain: resolved    -has been followed by surgery before for SBO versus ileus     Plan:   1. Continue Eliquis, Lasix, and valsartan   2. Decrease Toprol to 50mg po QD due to sinus bradycardia  3. Continue Tikosyn 125mcg po BID  4. Will hold off on oral Cardizem due to sinus bradycardia  5. Will have to add amlodipine if BP is persistently elevated due to borderline elevated potassium   6. Monitor BP at home and call if consistently out of goal ranges     EKG review/Tikosyn dosing per Dr. Javi Thakkar. EP will sign off but remains available if needed. Patient to follow up in office on 2/4/2021.     GUANAKO Armijo-CNP  [address]  [phone]

## 2020-12-22 NOTE — PROGRESS NOTES
BP (!) 155/85   Pulse 65   Temp 97.9 °F (36.6 °C) (Oral)   Resp 16   Ht 5' 2\" (1.575 m)   Wt 176 lb 4.8 oz (80 kg)   SpO2 96%   BMI 32.25 kg/m²  on room air. Pt denies pain, discomfort or shortness of breath. Lungs clear. NSR on tele. Metoprolol has been held, held this a.m per pt request until discuss with MD during rounds. Pt denies any needs at this time. Bedside table and call light within reach. Pt instructed to call out for any needs and assistance. Will continue to monitor.   Anahi Pepe  12/22/2020

## 2021-01-04 ENCOUNTER — APPOINTMENT (OUTPATIENT)
Dept: CT IMAGING | Age: 86
End: 2021-01-04
Payer: MEDICARE

## 2021-01-04 ENCOUNTER — HOSPITAL ENCOUNTER (EMERGENCY)
Age: 86
Discharge: HOME | End: 2021-01-04
Payer: MEDICARE

## 2021-01-04 VITALS
OXYGEN SATURATION: 96 % | BODY MASS INDEX: 33.13 KG/M2 | WEIGHT: 180 LBS | RESPIRATION RATE: 20 BRPM | HEART RATE: 60 BPM | DIASTOLIC BLOOD PRESSURE: 63 MMHG | TEMPERATURE: 97.7 F | SYSTOLIC BLOOD PRESSURE: 159 MMHG | HEIGHT: 62 IN

## 2021-01-04 DIAGNOSIS — R10.9 ABDOMINAL PAIN, UNSPECIFIED ABDOMINAL LOCATION: Primary | ICD-10-CM

## 2021-01-04 DIAGNOSIS — K52.9 ENTERITIS: ICD-10-CM

## 2021-01-04 LAB
A/G RATIO: 0.8 (ref 1.1–2.2)
ALBUMIN SERPL-MCNC: 3.5 G/DL (ref 3.4–5)
ALP BLD-CCNC: 102 U/L (ref 40–129)
ALT SERPL-CCNC: 12 U/L (ref 10–40)
ANION GAP SERPL CALCULATED.3IONS-SCNC: 14 MMOL/L (ref 3–16)
AST SERPL-CCNC: 18 U/L (ref 15–37)
BASOPHILS ABSOLUTE: 0.1 K/UL (ref 0–0.2)
BASOPHILS RELATIVE PERCENT: 1 %
BILIRUB SERPL-MCNC: 0.3 MG/DL (ref 0–1)
BILIRUBIN URINE: NEGATIVE
BLOOD, URINE: NEGATIVE
BUN BLDV-MCNC: 17 MG/DL (ref 7–20)
CALCIUM SERPL-MCNC: 8.8 MG/DL (ref 8.3–10.6)
CHLORIDE BLD-SCNC: 99 MMOL/L (ref 99–110)
CLARITY: CLEAR
CO2: 21 MMOL/L (ref 21–32)
COLOR: NORMAL
CREAT SERPL-MCNC: 0.8 MG/DL (ref 0.6–1.2)
EOSINOPHILS ABSOLUTE: 0.3 K/UL (ref 0–0.6)
EOSINOPHILS RELATIVE PERCENT: 2.2 %
GFR AFRICAN AMERICAN: >60
GFR NON-AFRICAN AMERICAN: >60
GLOBULIN: 4.3 G/DL
GLUCOSE BLD-MCNC: 194 MG/DL (ref 70–99)
GLUCOSE URINE: NEGATIVE MG/DL
HCT VFR BLD CALC: 41.9 % (ref 36–48)
HEMOGLOBIN: 13.1 G/DL (ref 12–16)
KETONES, URINE: NEGATIVE MG/DL
LACTIC ACID: 1.4 MMOL/L (ref 0.4–2)
LEUKOCYTE ESTERASE, URINE: NEGATIVE
LIPASE: 29 U/L (ref 13–60)
LYMPHOCYTES ABSOLUTE: 2.3 K/UL (ref 1–5.1)
LYMPHOCYTES RELATIVE PERCENT: 17.6 %
MCH RBC QN AUTO: 23.6 PG (ref 26–34)
MCHC RBC AUTO-ENTMCNC: 31.3 G/DL (ref 31–36)
MCV RBC AUTO: 75.3 FL (ref 80–100)
MICROSCOPIC EXAMINATION: NORMAL
MONOCYTES ABSOLUTE: 1 K/UL (ref 0–1.3)
MONOCYTES RELATIVE PERCENT: 8 %
NEUTROPHILS ABSOLUTE: 9.3 K/UL (ref 1.7–7.7)
NEUTROPHILS RELATIVE PERCENT: 71.2 %
NITRITE, URINE: NEGATIVE
PDW BLD-RTO: 18.1 % (ref 12.4–15.4)
PH UA: 6 (ref 5–8)
PLATELET # BLD: 358 K/UL (ref 135–450)
PMV BLD AUTO: 8.9 FL (ref 5–10.5)
POTASSIUM SERPL-SCNC: 4 MMOL/L (ref 3.5–5.1)
PROTEIN UA: NEGATIVE MG/DL
RBC # BLD: 5.57 M/UL (ref 4–5.2)
REASON FOR REJECTION: NORMAL
REASON FOR REJECTION: NORMAL
REJECTED TEST: NORMAL
REJECTED TEST: NORMAL
SODIUM BLD-SCNC: 134 MMOL/L (ref 136–145)
SPECIFIC GRAVITY UA: 1.01 (ref 1–1.03)
TOTAL PROTEIN: 7.8 G/DL (ref 6.4–8.2)
URINE TYPE: NORMAL
UROBILINOGEN, URINE: 0.2 E.U./DL
WBC # BLD: 13 K/UL (ref 4–11)

## 2021-01-04 PROCEDURE — 80053 COMPREHEN METABOLIC PANEL: CPT

## 2021-01-04 PROCEDURE — 87086 URINE CULTURE/COLONY COUNT: CPT

## 2021-01-04 PROCEDURE — 99284 EMERGENCY DEPT VISIT MOD MDM: CPT

## 2021-01-04 PROCEDURE — 81003 URINALYSIS AUTO W/O SCOPE: CPT

## 2021-01-04 PROCEDURE — 85025 COMPLETE CBC W/AUTO DIFF WBC: CPT

## 2021-01-04 PROCEDURE — 6360000004 HC RX CONTRAST MEDICATION: Performed by: PHYSICIAN ASSISTANT

## 2021-01-04 PROCEDURE — 83605 ASSAY OF LACTIC ACID: CPT

## 2021-01-04 PROCEDURE — 83690 ASSAY OF LIPASE: CPT

## 2021-01-04 PROCEDURE — 74177 CT ABD & PELVIS W/CONTRAST: CPT

## 2021-01-04 RX ADMIN — IOPAMIDOL 75 ML: 755 INJECTION, SOLUTION INTRAVENOUS at 17:37

## 2021-01-04 ASSESSMENT — ENCOUNTER SYMPTOMS
ABDOMINAL PAIN: 1
SHORTNESS OF BREATH: 0
DIARRHEA: 0
COLOR CHANGE: 0
CONSTIPATION: 0
NAUSEA: 0
COUGH: 0
VOMITING: 0
BACK PAIN: 0
EYES NEGATIVE: 1

## 2021-01-04 ASSESSMENT — PAIN SCALES - GENERAL: PAINLEVEL_OUTOF10: 4

## 2021-01-04 NOTE — ED NOTES
--Patient provided with discharge instructions and any prescriptions. --Instructions, dosing, and follow-up appointments reviewed with patient/family. No further questions or needs at this time. --Vital signs and patient stable upon discharge. --Patient ambulatory to [facility].        David Maria RN  01/04/21 3791

## 2021-01-04 NOTE — ED PROVIDER NOTES
[address] [facility]  ED  EMERGENCY DEPARTMENT ENCOUNTER        Pt Name: Daniel Tiwari  MRN: [id number]  Armstrongftyesha 1935  Date of evaluation: 1/4/2021  Provider: Corey Isaacs PA-C  PCP: Sergio Arroyo MD  ED Attending: Maria D Shukla MD      This patient was seen by the attending provider     History provided by the patient    CHIEF COMPLAINT:     Chief Complaint   Patient presents with    Abdominal Pain     per pt upper quadrant I think I have a blocked Bowel again started hurting last night'       HISTORY OF PRESENT ILLNESS:      Daniel Tiwari is a 81 y.o. female who arrives to the ED by private vehicle. Patient is here complaining of pain across her upper abdomen that started early this afternoon. She denies any associated nausea or vomiting. She had a normal bowel movement today. By the time the patient arrives to the ED she in fact states her pain is gone. However, patient reports a history of bowel obstruction and she is concerned about that today. Again, by the time I talked her she is asymptomatic. She cannot identify exacerbating or alleviating factors to symptoms. Nursing Notes were reviewed     REVIEW OF SYSTEMS:     Review of Systems   Constitutional: Negative for appetite change, chills and fever. HENT: Negative. Eyes: Negative. Respiratory: Negative for cough and shortness of breath. Cardiovascular: Negative for chest pain. Gastrointestinal: Positive for abdominal pain. Negative for constipation, diarrhea, nausea and vomiting. Genitourinary: Negative for dysuria. Musculoskeletal: Negative for back pain and neck pain. Skin: Negative for color change. Neurological: Negative for dizziness and headaches. All other systems reviewed and are negative. Except as noted above in the ROS, all other systems were reviewed and negative.          PAST MEDICAL HISTORY:     Past Medical History:   Diagnosis Date    Atrial fibrillation ([facility] [address] Tobacco Use    Smoking status: Never Smoker    Smokeless tobacco: Never Used   Substance and Sexual Activity    Alcohol use: No    Drug use: No    Sexual activity: Yes     Partners: Male   Lifestyle    Physical activity     Days per week: None     Minutes per session: None    Stress: None   Relationships    Social connections     Talks on phone: None     Gets together: None     Attends Mandaeism service: None     Active member of club or organization: None     Attends meetings of clubs or organizations: None     Relationship status: None    Intimate partner violence     Fear of current or ex partner: None     Emotionally abused: None     Physically abused: None     Forced sexual activity: None   Other Topics Concern    None   Social History Narrative    None       SCREENINGS:    [place] Coma Scale  Eye Opening: Spontaneous  Best Verbal Response: Oriented  Best Motor Response: Obeys commands  Rob Coma Scale Score: 15        PHYSICAL EXAM:       ED Triage Vitals [01/04/21 1418]   BP Temp Temp Source Pulse Resp SpO2 Height Weight   (!) 202/65 97.7 °F (36.5 °C) Oral 63 20 98 % 5' 2\" (1.575 m) 180 lb (81.6 kg)       Physical Exam    CONSTITUTIONAL: Awake and alert. Cooperative. Well-developed. Well-nourished. Non-toxic. No acute distress. HENT: Normocephalic. Atraumatic. External ears normal, without discharge. No nasal discharge. Oropharynx clear. Mucous membranes moist.  EYES: Conjunctiva non-injected. No scleral icterus. PERRL. EOM's grossly intact. NECK: Supple. Normal ROM. CARDIOVASCULAR: RRR. No Murmer. Intact distal pulses. PULMONARY/CHEST WALL: Effort normal. No tachypnea. Lungs clear to ausculation. ABDOMEN: Normal BS. Soft. Nondistended. No tenderness to palpate. No guarding. /ANORECTAL: Not assessed  MUSKULOSKELETAL: Normal ROM. No acute deformities. No edema. No tenderness to palpate. SKIN: Warm and dry. No rash. Potassium 4.0 3.5 - 5.1 mmol/L    Chloride 99 99 - 110 mmol/L    CO2 21 21 - 32 mmol/L    Anion Gap 14 3 - 16    Glucose 194 (H) 70 - 99 mg/dL    BUN 17 7 - 20 mg/dL    CREATININE 0.8 0.6 - 1.2 mg/dL    GFR Non-African American >60 >60    GFR African American >60 >60    Calcium 8.8 8.3 - 10.6 mg/dL    Total Protein 7.8 6.4 - 8.2 g/dL    Alb 3.5 3.4 - 5.0 g/dL    Albumin/Globulin Ratio 0.8 (L) 1.1 - 2.2    Total Bilirubin 0.3 0.0 - 1.0 mg/dL    Alkaline Phosphatase 102 40 - 129 U/L    ALT 12 10 - 40 U/L    AST 18 15 - 37 U/L    Globulin 4.3 g/dL   Lipase   Result Value Ref Range    Lipase 29.0 13.0 - 60.0 U/L         RADIOLOGY:  All x-ray studies areviewed/reviewed by me.   Formal interpretations per the radiologist are as follows:    Ct Abdomen Pelvis W Iv Contrast Additional Contrast? None    Result Date: 1/4/2021 EXAMINATION: CT OF THE ABDOMEN AND PELVIS WITH CONTRAST 1/4/2021 2:30 pm TECHNIQUE: CT of the abdomen and pelvis was performed with the administration of intravenous contrast. Multiplanar reformatted images are provided for review. Dose modulation, iterative reconstruction, and/or weight based adjustment of the mA/kV was utilized to reduce the radiation dose to as low as reasonably achievable. COMPARISON: 12/18/2020, 09/13/2020 HISTORY: ORDERING SYSTEM PROVIDED HISTORY: upper abdominal pain, history SBO TECHNOLOGIST PROVIDED HISTORY: Reason for exam:->upper abdominal pain, history SBO Additional Contrast?->None Reason for Exam: Upper abdominal pain that got worse this afternoon but has eased up since she has been here in the ER Acuity: Acute Type of Exam: Initial Relevant Medical/Surgical History: hx of SBO, hx of hysteretomy, hx of appendectomy FINDINGS: Lower Chest: Lungs are clear aside from an unchanged granuloma in the right lower lobe. No pleural or pericardial effusion. Organs: Calcified granulomata within the liver and spleen. Cholelithiasis without pericholecystic inflammation. Unchanged pancreatic parenchymal atrophy. Adrenal glands and kidneys are grossly unremarkable. Normal caliber ureters. GI/Bowel: There is no bowel obstruction. There is mild wall thickening and mucosal hyperenhancement of the small bowel in the region of the anastomosis in the right lower quadrant. Small hiatal hernia. Pelvis: Status post hysterectomy with evidence of pelvic floor descent. Bilateral adnexa grossly unremarkable. Peritoneum/Retroperitoneum: No free fluid or free air. Unchanged sky mesentery. Normal caliber aorta with heavy atherosclerotic disease in the infrarenal aorta and common iliac vasculature. Bones/Soft Tissues: Degenerative changes, greatest at L5-S1, with transitional anatomy at the lumbosacral junction with a right pseudoarthrosis. No acute bony findings.   No acute findings within the superficial [address] [facility]  Go to   If symptoms worsen      DISCHARGE MEDICATIONS:  New Prescriptions    No medications on file                  (Please note thatportions of this note were completed with a voice recognition program.  Efforts were made to edit the dictations, but occasionally words are mis-transcribed.)    Kimber Moser PA-C (electronicallysigned)              Marilyn Flowersma  01/04/21 [id number]

## 2021-01-05 LAB — URINE CULTURE, ROUTINE: NORMAL

## 2021-01-14 DIAGNOSIS — I50.32 CHRONIC DIASTOLIC HEART FAILURE (HCC): ICD-10-CM

## 2021-01-18 ENCOUNTER — TELEPHONE (OUTPATIENT)
Dept: CARDIOLOGY CLINIC | Age: 86
End: 2021-01-18

## 2021-01-18 DIAGNOSIS — I50.32 CHRONIC DIASTOLIC HEART FAILURE (HCC): ICD-10-CM

## 2021-01-18 RX ORDER — FUROSEMIDE 20 MG/1
20 TABLET ORAL DAILY
Qty: 30 TABLET | Refills: 11 | Status: SHIPPED | OUTPATIENT
Start: 2021-01-18

## 2021-01-18 RX ORDER — FUROSEMIDE 20 MG/1
TABLET ORAL
Qty: 90 TABLET | Refills: 2 | Status: ON HOLD
Start: 2021-01-18 | End: 2021-04-22 | Stop reason: HOSPADM

## 2021-01-18 NOTE — TELEPHONE ENCOUNTER
LOV 11/03/2020 w/ AGK    RECOMMENDATIONS:  1. Stop Lopressor, we are changing this medicaiton  2. Start Toprol  mg twice daily for AF  3. 14 Day cardiac event monitor for AF burden   4.  Follow up with EP NP in 3 months

## 2021-01-22 ENCOUNTER — TELEPHONE (OUTPATIENT)
Dept: CARDIOLOGY CLINIC | Age: 86
End: 2021-01-22

## 2021-01-22 NOTE — TELEPHONE ENCOUNTER
Pt sts she can not afforddofetilide (TIKOSYN) 125 MCG capsule. Is there an alternative medication cheaper or coupon to help with cost. Please advise.

## 2021-01-25 NOTE — TELEPHONE ENCOUNTER
Spoke with patient. Offered to send dofetilide RX to [place] and patient reports this was still 72 dollars a month there and this cost is unattainable for her. Has f/u OV with [address] [place],[address] on 2/4/21. She reports she has a 30 day supply of dofetilide at this time and would like to discuss medication changes with AGK at this next OV. Any other recommendations?   thanks

## 2021-01-28 NOTE — DISCHARGE SUMMARY
Respiratory:  Normal respiratory effort. Clear to auscultation, bilaterally without Rales/Wheezes/Rhonchi. Cardiovascular: Regular rate and rhythm with normal S1/S2 without murmurs, rubs or gallops. Abdomen: Soft, non-tender, non-distended with normal bowel sounds. Musculoskeletal: No clubbing, cyanosis or edema bilaterally. Full range of motion without deformity. Skin: Skin color, texture, turgor normal.  No rashes or lesions. Neurologic:  Neurovascularly intact without any focal sensory/motor deficits. Cranial nerves: II-XII intact, grossly non-focal.  Psychiatric: Alert and oriented, thought content appropriate, normal insight  Capillary Refill: Brisk,< 3 seconds   Peripheral Pulses: +2 palpable, equal bilaterally       Labs:   No results for input(s): WBC, HGB, HCT, PLT in the last 72 hours. No results for input(s): NA, K, CL, CO2, BUN, CREATININE, CALCIUM, PHOS in the last 72 hours. Invalid input(s): MAGNES  No results for input(s): AST, ALT, BILIDIR, BILITOT, ALKPHOS in the last 72 hours. No results for input(s): INR in the last 72 hours. No results for input(s): Sharath Seed in the last 72 hours.     Urinalysis:      Lab Results   Component Value Date    NITRU Negative 01/04/2021    WBCUA 0-2 12/18/2020    BACTERIA 1+ 12/18/2020    RBCUA None seen 12/18/2020    BLOODU Negative 01/04/2021    SPECGRAV 1.015 01/04/2021    GLUCOSEU Negative 01/04/2021       Radiology:  CT ABDOMEN PELVIS W IV CONTRAST Additional Contrast? None   Final Result   No acute intra-abdominal abnormality         XR CHEST PORTABLE   Final Result   No active cardiopulmonary disease                 Assessment/Plan:    Active Hospital Problems    Diagnosis    Atrial fibrillation with RVR ([facility]) [I48.91]    Chronic diastolic heart failure ([facility]) [I50.32]    Essential hypertension [I10]    PAF (paroxysmal atrial fibrillation) ([facility]) [I48.0]   Condition: Stable  DVT Prophylaxis: Eliquis  Code Status: Full Code PT/OT Eval Status: not indicated    Dispo - Home with cardiology follow up  Future Appointments   Date Time Provider Sushil Janeth   2/4/2021  9:45 AM MD Daja Torres   3/16/2021  9:30 AM MD Daja Beck, APRN - CNP

## 2021-02-04 ENCOUNTER — OFFICE VISIT (OUTPATIENT)
Dept: CARDIOLOGY CLINIC | Age: 86
End: 2021-02-04
Payer: MEDICARE

## 2021-02-04 VITALS
BODY MASS INDEX: 33.77 KG/M2 | HEART RATE: 54 BPM | DIASTOLIC BLOOD PRESSURE: 80 MMHG | HEIGHT: 62 IN | OXYGEN SATURATION: 98 % | SYSTOLIC BLOOD PRESSURE: 120 MMHG | WEIGHT: 183.5 LBS

## 2021-02-04 DIAGNOSIS — I48.0 PAF (PAROXYSMAL ATRIAL FIBRILLATION) (HCC): Primary | ICD-10-CM

## 2021-02-04 PROCEDURE — 99214 OFFICE O/P EST MOD 30 MIN: CPT | Performed by: INTERNAL MEDICINE

## 2021-02-04 PROCEDURE — 93000 ELECTROCARDIOGRAM COMPLETE: CPT | Performed by: INTERNAL MEDICINE

## 2021-02-04 RX ORDER — DOFETILIDE 0.12 MG/1
125 CAPSULE ORAL EVERY 12 HOURS SCHEDULED
Qty: 180 CAPSULE | Refills: 3 | Status: SHIPPED | OUTPATIENT
Start: 2021-02-04

## 2021-02-04 NOTE — PROGRESS NOTES
[address]   Electrophysiology Note      Date: 2/4/21  Patient Name: Jesus Tiwari  YOB: 1935     Chief Complaint:  3 Month Follow-Up and Atrial Fibrillation    Primary Care Physician: Megan Engle MD     HISTORY OF PRESENT ILLNESS: Jesus Tiwari is a 81 y.o. female who presents for follow up for paroxysmal atrial fibrillation. She has a past medical history of HTN, pulmonary HTN, dCHF, DM and SVT. She wore a cardiac event monitor in 10/2013 and AF was detected, longest lasting 2hrs 43 minutes with HR max 160 BPM. She had a Cryoablation for PAF on 2/11/2014. She underwent an echocardiogram in 2/2019 that demonstrated EF of 55-60% with severe pulmonary HTN. She was then referred to the heart failure team. She had recurrent PAF in 1/2020 and was started on eliquis and amiodarone. Amiodarone was stopped for unknown reasons. She underwent an echocardiogram on 4/28/20 that demonstrated an EF of 55-60% with no regional wall motion abnormalities, with LA mildly dilated, mild MR and TR. While seeing Dr. Stephanie Del Rio in office 11/3/20 she was found to be in AF with RVR. EKG 11/3/20 demonstrates AF with  BPM. She reported she had an RFCA for AF several years ago and had been in rhythm since.      Interval history since last office visit: Her cardiac event monitor from 11/3-11/12/20, showed a high burden of afib with poorly controlled rates. She presented to the ER on 12/18/20 with abdominal pain. Her abdominal pain started with palpitations. Her EKG showed afib rvr. She converted to sinus after the first dose of Tikosyn. ECG, 2/4/2021, sinus bradycardiac rate 57. Today 2/4/2021, she reports that she has been feeling well from a cardiac standpoint. She does report an episode of afib a few nights ago. This lasted roughly 30 minutes then resolved. She has been taking her medications as prescribed without any known side effects. Patient currently denies any weight gain, edema, palpitations, chest pain, shortness of breath, dizziness, and syncope. Past Medical History:   has a past medical history of Atrial fibrillation ([address] Diabetes mellitus ([address] Hydronephrosis, Hyperlipidemia, Hypertension, Intussusception intestine ([address] and SVT (supraventricular tachycardia) ([address] Past Surgical History:   has a past surgical history that includes Hysterectomy; Appendectomy; Tonsillectomy; skin biopsy; ablation of dysrhythmic focus; Dilatation, esophagus; Small intestine surgery; and fracture surgery. Social History:   reports that she has never smoked. She has never used smokeless tobacco. She reports that she does not drink alcohol or use drugs. Family History: family history includes Cancer in her sister; Diabetes in her mother; Heart Disease in her brother and father; High Blood Pressure in her brother and father; High Cholesterol in her brother and father. Allergies:  Hydrocodone, Metformin and related, Omeprazole, and Hydralazine    Home Medications:    Prior to Admission medications    Medication Sig Start Date End Date Taking?  Authorizing Provider   furosemide (LASIX) 20 MG tablet TAKE ONE TABLET BY MOUTH DAILY 1/18/21  Yes Cesar Burr MD furosemide (LASIX) 20 MG tablet Take 1 tablet by mouth daily 1/18/21  Yes Moi Tesfaye MD   dofetilide [facility]) 125 MCG capsule Take 1 capsule by mouth every 12 hours 12/22/20  Yes Penelope Eastman MD   metoprolol succinate (TOPROL XL) 100 MG extended release tablet Take 0.5 tablets by mouth daily 12/22/20  Yes GUANAKO Hedrick CNP   ELIQUIS 5 MG TABS tablet TAKE ONE TABLET BY MOUTH TWICE A DAY 10/27/20  Yes GUANAKO Hedrick CNP   valsartan (DIOVAN) 160 MG tablet Take 1 tablet by mouth daily 6/1/20  Yes Karla Dubois MD   pantoprazole (PROTONIX) 20 MG tablet Take 2 tablets by mouth daily  Patient taking differently: Take 20 mg by mouth daily  7/26/18  Yes GUANAKO Pereira CNP   glyBURIDE (DIABETA) 1.25 MG tablet Take 2.5 mg by mouth daily (with breakfast)    Yes Historical Provider, MD   aspirin 81 MG tablet Take 81 mg by mouth daily   Yes Historical Provider, MD       REVIEW OF SYSTEMS:      All 14-point review of systems are completed and pertinent positives are mentioned in the history of present illness. Other systems are reviewed and are negative. Physical Examination:    /80   Pulse 54   Ht 5' 2\" (1.575 m)   Wt 183 lb 8 oz (83.2 kg)   SpO2 98%   BMI 33.56 kg/m²    Constitutional and General Appearance: alert, cooperative, no distress and appears stated age  [de-identified]: PERRL, no cervical lymphadenopathy. No masses palpable. Normal oral mucosa  Respiratory:  · Normal excursion and expansion without use of accessory muscles  · Resp Auscultation: Normal breath sounds without dullness or wheezing  Cardiovascular:  · The apical impulse is not displaced  · RRR S1S2 w/o M/G/R  Abdomen:  · No masses or tenderness  · Bowel sounds present  Extremities:  ·  No Cyanosis or Clubbing  ·  Lower extremity edema: No  · Skin: Warm and dry  Neurological:  · Alert and oriented.   · Moves all extremities well  · No abnormalities of mood, affect, memory, mentation, or behavior are noted DATA:    ECG 2021 sinus hammad rate 57   EC/3/20: AF with  BPM    ECHO: 20:  Summary   Normal left ventricle systolic function with an estimated ejection fraction   of 55-60%. Definity contrast administered with no evidence of left   ventricular mass or thrombus noted. No regional wall motion abnormalities are seen. Normal left ventricular   diastolic filling pressure. The left atrium is mildly dilated. Mild mitral and tricuspid regurgitation. Systolic pulmonary artery pressure (SPAP) estimated at 42 mmHg (right atrial   pressure 3 mmHg), consistent with mild pulmonary hypertension. ECHO: 19:  Summary   Definity contrast administered. Normal left ventricular systolic function with an estimated ejection   fraction of 55-60%. There is mild septal hypertrophy with normal remaining wall thickness. Normal left ventricular diastolic filling pressure. Mild mitral regurgitation. Mild tricuspid regurgitation. Systolic pulmonary artery pressure (SPAP) estimated at 60 mmHg (RA pressure   3 mmHg), consistent with severe pulmonary hypertension. Mild pulmonic regurgitation present. IMPRESSION:    1.  Paroxysmal Atrial Fibrillation  11/3/2020 Patient is a pleasant 69-year-old female with a medical history significant for paroxysmal atrial fibrillation status post ablation, diabetes mellitus type 2, heart failure preserved ejection fraction, pulmonary hypertension, and hyperlipidemia who presents from Dr. Cedric Bennett clinic with atrial fibrillation with RVR. Patient's heart rates are now well controlled. She is currently on apixaban and metoprolol tartrate. Her blood pressure is within normal limits/slightly elevated. We discussed potentially doing a cardioversion and starting her on flecainide or rate control or ablation. At this point patient would like to avoid cardioversion and ablation strategy. We will therefore move forward with rate control which is noninferior to any of the above options. - Increase metoprolol and change to metoprolol succinate 100 mg BID.  - Continue apixaban. - Cardiac monitor for rate control evaluation.  - From EP standpoint patient at low risk for MACE for low risk procedure. - Follow up with NPBB (patient not interested in advanced therapies). 2/4/2021  Patient presents today for follow up. She has been doing well since she was started on dofetilide. She intermittently has some palpitations however this is few and far between. We discussed switching her to amiodarone however given the known side effects she decided to stay with her dofetilide. She would like to try 90-day prescription or to see if this prescription will be cost once. She is tolerating her anticoagulation without any issues including falls or bleeding. We will ask her to follow-up with us in 6 months and then will extend every year. She will need a yearly CBC and BMP while she is on dofetilide and apixaban. - Continue dofetilide ( msec). - Continue metoprolol 50 mg daily. - Continue apixaban. 2. Heart failure with preserved ejection fraction. 11/03/2020 - 02/04/2021  - Continue to follow up with HF service.     RECOMMENDATIONS: 1. As you are feeling well, we will continue current medications as prescribed. If the Tikosyn is no longer affordable, we can consider another medication called Amiodarone. This has significant potential side effects. 2. If you feel that your afib returns, call our office. 3. Follow up with my nurse practitioner in 6 months. QUALITY MEASURES  1. Tobacco Cessation Counseling: NA  2. Retake of BP if >140/90:   NA  3. Documentation to PCP/referring for new patient:  Sent to PCP at close of office visit  4. CAD patient on anti-platelet: Yes  5. CAD patient on STATIN therapy:  No  6. Patient with CHF and aFib on anticoagulation:  Yes     This note was scribed in the presence of Dr. aCrla Caraballo MD by Ervin Vidal RN. All questions and concerns were addressed to the patient/family. Alternatives to my treatment were discussed. Dr. Alina Chavarria MD  Electrophysiology  [place] [address]. [address] [place] [address]. [address]. [facility] [address]  Phone: [phone]  Fax: [phone]   2/4/2021     NOTE: This report was transcribed using voice recognition software. Every effort was made to ensure accuracy, however, inadvertent computerized transcription errors may be present. Electronically signed by Julia Carmona RN on 2/4/2021 at 10:41 AM       The scribe's documentation has been prepared under my direction and personally reviewed by me in its entirety. I confirm that the note above accurately reflects all work, physical examination, the discussion of treatments and procedures, and medical decision making performed by me. Olivia Horne MD personally performed the services described in this documentation as scribed by nurse in my presence, and is both accurate and complete.     Electronically signed by Mary Bowden MD on 2/4/2021 at 11:20 AM

## 2021-02-04 NOTE — PATIENT INSTRUCTIONS
RECOMMENDATIONS:  1. As you are feeling well, we will continue current medications as prescribed. If the Tikosyn is no longer affordable, we can consider another medication called Amiodarone. This has significant potential side effects. 2. If you feel that your afib returns, call our office. 3. Follow up with my nurse practitioner in 6 months.

## 2021-02-04 NOTE — LETTER
[address]   Electrophysiology Note      Date: 2/4/21  Patient Name: Lyly Tiwari  YOB: 1935     Chief Complaint:  3 Month Follow-Up and Atrial Fibrillation    Primary Care Physician: Claudia Daniels MD     HISTORY OF PRESENT ILLNESS: Lyly Tiwari is a 81 y.o. female who presents for follow up for paroxysmal atrial fibrillation. She has a past medical history of HTN, pulmonary HTN, dCHF, DM and SVT. She wore a cardiac event monitor in 10/2013 and AF was detected, longest lasting 2hrs 43 minutes with HR max 160 BPM. She had a Cryoablation for PAF on 2/11/2014. She underwent an echocardiogram in 2/2019 that demonstrated EF of 55-60% with severe pulmonary HTN. She was then referred to the heart failure team. She had recurrent PAF in 1/2020 and was started on eliquis and amiodarone. Amiodarone was stopped for unknown reasons. She underwent an echocardiogram on 4/28/20 that demonstrated an EF of 55-60% with no regional wall motion abnormalities, with LA mildly dilated, mild MR and TR. While seeing Dr. Juan Castro in office 11/3/20 she was found to be in AF with RVR. EKG 11/3/20 demonstrates AF with  BPM. She reported she had an RFCA for AF several years ago and had been in rhythm since.      Interval history since last office visit: Her cardiac event monitor from 11/3-11/12/20, showed a high burden of afib with poorly controlled rates. She presented to the ER on 12/18/20 with abdominal pain. Her abdominal pain started with palpitations. Her EKG showed afib rvr. She converted to sinus after the first dose of Tikosyn. ECG, 2/4/2021, sinus bradycardiac rate 57. Today 2/4/2021, she reports that she has been feeling well from a cardiac standpoint. She does report an episode of afib a few nights ago. This lasted roughly 30 minutes then resolved. She has been taking her medications as prescribed without any known side effects. Patient currently denies any weight gain, edema, palpitations, chest pain, shortness of breath, dizziness, and syncope. Past Medical History:   has a past medical history of Atrial fibrillation ([address] Diabetes mellitus ([address] Hydronephrosis, Hyperlipidemia, Hypertension, Intussusception intestine ([address] and SVT (supraventricular tachycardia) ([address] Past Surgical History:   has a past surgical history that includes Hysterectomy; Appendectomy; Tonsillectomy; skin biopsy; ablation of dysrhythmic focus; Dilatation, esophagus; Small intestine surgery; and fracture surgery. Social History:   reports that she has never smoked. She has never used smokeless tobacco. She reports that she does not drink alcohol or use drugs. Family History: family history includes Cancer in her sister; Diabetes in her mother; Heart Disease in her brother and father; High Blood Pressure in her brother and father; High Cholesterol in her brother and father. Allergies:  Hydrocodone, Metformin and related, Omeprazole, and Hydralazine    Home Medications:    Prior to Admission medications    Medication Sig Start Date End Date Taking?  Authorizing Provider   furosemide (LASIX) 20 MG tablet TAKE ONE TABLET BY MOUTH DAILY 1/18/21  Yes Jason Reyes MD furosemide (LASIX) 20 MG tablet Take 1 tablet by mouth daily 1/18/21  Yes Toshia Ruano MD   dofetilide [facility]) 125 MCG capsule Take 1 capsule by mouth every 12 hours 12/22/20  Yes Julia Prince MD   metoprolol succinate (TOPROL XL) 100 MG extended release tablet Take 0.5 tablets by mouth daily 12/22/20  Yes GUANAKO Palacio CNP   ELIQUIS 5 MG TABS tablet TAKE ONE TABLET BY MOUTH TWICE A DAY 10/27/20  Yes GUANAKO Palacio CNP   valsartan (DIOVAN) 160 MG tablet Take 1 tablet by mouth daily 6/1/20  Yes Arleen Whitfield MD   pantoprazole (PROTONIX) 20 MG tablet Take 2 tablets by mouth daily  Patient taking differently: Take 20 mg by mouth daily  7/26/18  Yes GUANAKO Mejias CNP   glyBURIDE (DIABETA) 1.25 MG tablet Take 2.5 mg by mouth daily (with breakfast)    Yes Historical Provider, MD   aspirin 81 MG tablet Take 81 mg by mouth daily   Yes Historical Provider, MD       REVIEW OF SYSTEMS:      All 14-point review of systems are completed and pertinent positives are mentioned in the history of present illness. Other systems are reviewed and are negative. Physical Examination:    /80   Pulse 54   Ht 5' 2\" (1.575 m)   Wt 183 lb 8 oz (83.2 kg)   SpO2 98%   BMI 33.56 kg/m²    Constitutional and General Appearance: alert, cooperative, no distress and appears stated age  [de-identified]: PERRL, no cervical lymphadenopathy. No masses palpable. Normal oral mucosa  Respiratory:  · Normal excursion and expansion without use of accessory muscles  · Resp Auscultation: Normal breath sounds without dullness or wheezing  Cardiovascular:  · The apical impulse is not displaced  · RRR S1S2 w/o M/G/R  Abdomen:  · No masses or tenderness  · Bowel sounds present  Extremities:  ·  No Cyanosis or Clubbing  ·  Lower extremity edema: No  · Skin: Warm and dry  Neurological:  · Alert and oriented.   · Moves all extremities well  · No abnormalities of mood, affect, memory, mentation, or behavior are noted Patient is a pleasant 61-year-old female with a medical history significant for paroxysmal atrial fibrillation status post ablation, diabetes mellitus type 2, heart failure preserved ejection fraction, pulmonary hypertension, and hyperlipidemia who presents from Dr. [facility]adna Lozada clinic with atrial fibrillation with RVR. Patient's heart rates are now well controlled. She is currently on apixaban and metoprolol tartrate. Her blood pressure is within normal limits/slightly elevated. We discussed potentially doing a cardioversion and starting her on flecainide or rate control or ablation. At this point patient would like to avoid cardioversion and ablation strategy. We will therefore move forward with rate control which is noninferior to any of the above options. - Increase metoprolol and change to metoprolol succinate 100 mg BID.  - Continue apixaban. - Cardiac monitor for rate control evaluation.  - From EP standpoint patient at low risk for MACE for low risk procedure. - Follow up with [facility] (patient not interested in advanced therapies). 2/4/2021  Patient presents today for follow up. She has been doing well since she was started on dofetilide. She intermittently has some palpitations however this is few and far between. We discussed switching her to amiodarone however given the known side effects she decided to stay with her dofetilide. She would like to try 90-day prescription or to see if this prescription will be cost once. She is tolerating her anticoagulation without any issues including falls or bleeding. We will ask her to follow-up with us in 6 months and then will extend every year. She will need a yearly CBC and BMP while she is on dofetilide and apixaban. - Continue dofetilide ( msec). - Continue metoprolol 50 mg daily. - Continue apixaban. 2. Heart failure with preserved ejection fraction. 11/03/2020 - 02/04/2021  - Continue to follow up with HF service.     RECOMMENDATIONS: 1. As you are feeling well, we will continue current medications as prescribed. If the Tikosyn is no longer affordable, we can consider another medication called Amiodarone. This has significant potential side effects. 2. If you feel that your afib returns, call our office. 3. Follow up with my nurse practitioner in 6 months. QUALITY MEASURES  1. Tobacco Cessation Counseling: NA  2. Retake of BP if >140/90:   NA  3. Documentation to PCP/referring for new patient:  Sent to PCP at close of office visit  4. CAD patient on anti-platelet: Yes  5. CAD patient on STATIN therapy:  No  6. Patient with CHF and aFib on anticoagulation:  Yes     This note was scribed in the presence of Dr. Jessica Galvan MD by Clyde Morales RN. All questions and concerns were addressed to the patient/family. Alternatives to my treatment were discussed. Dr. Lacey Silva MD  Electrophysiology  [facility]. [address] [place] [address]. [address] [facility]. [facility] [address]  Phone: [phone]  Fax: [phone]   2/4/2021     NOTE: This report was transcribed using voice recognition software. Every effort was made to ensure accuracy, however, inadvertent computerized transcription errors may be present. Electronically signed by Phoenix Duarte RN on 2/4/2021 at 10:41 AM       The scribe's documentation has been prepared under my direction and personally reviewed by me in its entirety. I confirm that the note above accurately reflects all work, physical examination, the discussion of treatments and procedures, and medical decision making performed by me. Pinky Jaramillo MD personally performed the services described in this documentation as scribed by nurse in my presence, and is both accurate and complete.     Electronically signed by Lauren Winchester MD on 2/4/2021 at 11:20 AM

## 2021-03-16 ENCOUNTER — OFFICE VISIT (OUTPATIENT)
Dept: CARDIOLOGY CLINIC | Age: 86
End: 2021-03-16
Payer: MEDICARE

## 2021-03-16 VITALS
DIASTOLIC BLOOD PRESSURE: 72 MMHG | HEART RATE: 57 BPM | WEIGHT: 189 LBS | HEIGHT: 62 IN | OXYGEN SATURATION: 99 % | BODY MASS INDEX: 34.78 KG/M2 | SYSTOLIC BLOOD PRESSURE: 126 MMHG

## 2021-03-16 DIAGNOSIS — I10 ESSENTIAL HYPERTENSION: ICD-10-CM

## 2021-03-16 DIAGNOSIS — I48.0 PAF (PAROXYSMAL ATRIAL FIBRILLATION) (HCC): ICD-10-CM

## 2021-03-16 DIAGNOSIS — I50.32 CHRONIC DIASTOLIC HEART FAILURE (HCC): Primary | ICD-10-CM

## 2021-03-16 PROCEDURE — 99214 OFFICE O/P EST MOD 30 MIN: CPT | Performed by: INTERNAL MEDICINE

## 2021-03-16 PROCEDURE — 93000 ELECTROCARDIOGRAM COMPLETE: CPT | Performed by: INTERNAL MEDICINE

## 2021-03-16 RX ORDER — METOPROLOL SUCCINATE 50 MG/1
50 TABLET, EXTENDED RELEASE ORAL DAILY
Qty: 90 TABLET | Refills: 0 | Status: ON HOLD
Start: 2021-03-16 | End: 2021-03-31 | Stop reason: HOSPADM

## 2021-03-16 RX ORDER — PRAVASTATIN SODIUM 80 MG/1
40 TABLET ORAL DAILY
COMMUNITY

## 2021-03-16 RX ORDER — PIOGLITAZONEHYDROCHLORIDE 15 MG/1
15 TABLET ORAL DAILY
COMMUNITY

## 2021-03-16 NOTE — PROGRESS NOTES
[address]  Advanced CHF/Pulmonary Hypertension   Cardiac Evaluation      Pilar Tiwari  YOB: 1935    Date of Visit:  3/16/21      Chief Complaint   Patient presents with    Follow-up    Congestive Heart Failure          History of Present Illness:  Pilar Tiwari is a 81 y.o. female who presents from referral from Komal Perez CNP for consultation and management of severe pulmonary HTN. She has a history of paroxysmal atrial fibrillation, HTN, SVT, and DM. She had SVT in 2013, wore an event monitor, and atrial fibrillation was detected. She had recurrent atrial fibrillation in 2014 and had a cryoablation for PAF in 2/2014. Post procedure she had a retroperitoneal bleed and required a urinary stent. She has had no known recurrence of afib post ablation but has had palpitations. She was admitted in 1/2019 with SBO versus ileus that was medically managed. Her echo from 02/19/19 showed her EF was 55-60%. She has mild MR, TR and RI. SPAP 60mmHg consistent with severe pulmonary HTN. She reports about 1 month ago (02/2019) she had an episode of palpitations at 200 bpm for about 1 hour. She has not had a sleep study. She reports occasional chest twinge. She states the twinge occurs with fast heart rate. She reports her breathing is stable now. She did have SOB with walking frequently but this has subsided. She denies dizziness or syncope. She states she has been on lasix for 2-3 weeks. Her cardiac monitor from 04/2019 showed no arrhythmias. She was admitted 10/10-10/13/19 with SBO. General surgery was consulted and the SBO resolved without surgery. On 10/22/19 she reported her SBO occurred at the bowel resection site. During her last OV with me on 01/21/20, her EKG showed AFib . She was started on eliquis and her metoprolol was increased to 50 mg twice daily. Her echo from 04/28/20 showed her EF was 55-60% with mild MR and TR.     At her OV with [place] CNP on 06/03/20 her lasix was adjusted to 40 mg daily for 4 days then reduce back to 20 mg daily. She was admitted 09/13-09/15/20 with partial bowel obstruction. Today she reports her HR has been slow since last night. She has noticed the slower heart beat since starting tikosyn which was started 12/2020. She reports she has palpitations she describes as a skipped beat. She denies fast heart beats. She reports her breathing is stable. She reports she has increased fatigue since 12/2020. She denies CP, dizziness, BLE edema or syncope. Her heart rate has been in the 50's. Her EKG today shows sinus bradycardia with rate 57.         Allergies   Allergen Reactions    Hydrocodone Other (See Comments)     NAUSEA AND DIZZINESS    Metformin And Related Other (See Comments)     NAUSEA AND DIZZINESS    Omeprazole     Hydralazine Palpitations and Rash     Current Outpatient Medications   Medication Sig Dispense Refill    pravastatin (PRAVACHOL) 80 MG tablet Take 40 mg by mouth daily      pioglitazone (ACTOS) 15 MG tablet Take 15 mg by mouth daily      dofetilide (TIKOSYN) 125 MCG capsule Take 1 capsule by mouth every 12 hours 180 capsule 3    furosemide (LASIX) 20 MG tablet TAKE ONE TABLET BY MOUTH DAILY 90 tablet 2    metoprolol succinate (TOPROL XL) 100 MG extended release tablet Take 0.5 tablets by mouth daily (Patient taking differently: Take 100 mg by mouth 2 times daily ) 180 tablet 3    ELIQUIS 5 MG TABS tablet TAKE ONE TABLET BY MOUTH TWICE A DAY 60 tablet 5    valsartan (DIOVAN) 160 MG tablet Take 1 tablet by mouth daily 30 tablet 3    pantoprazole (PROTONIX) 20 MG tablet Take 2 tablets by mouth daily (Patient taking differently: Take 20 mg by mouth daily ) 30 tablet 0    glyBURIDE (DIABETA) 1.25 MG tablet Take 2.5 mg by mouth daily (with breakfast)       aspirin 81 MG tablet Take 81 mg by mouth daily      furosemide (LASIX) 20 MG tablet Take 1 tablet by mouth daily 30 tablet 11     No current facility-administered medications for this visit.         Past Medical History:   Diagnosis Date    [facility]     Diabetes mellitus ([facility]     Hydronephrosis 2/14/2014    Hyperlipidemia     Hypertension     Intussusception intestine (HCC)     SVT (supraventricular tachycardia) ([facility] 8/26/2013    AVNRT     Past Surgical History:   Procedure Laterality Date    ABLATION OF DYSRHYTHMIC FOCUS      -2014    APPENDECTOMY      DILATATION, ESOPHAGUS      FRACTURE SURGERY      L ankle    HYSTERECTOMY      SKIN BIOPSY      SMALL INTESTINE SURGERY      TONSILLECTOMY       Family History   Problem Relation Age of Onset    Diabetes Mother     Heart Disease Father     High Blood Pressure Father     High Cholesterol Father     Cancer Sister     Heart Disease Brother     High Blood Pressure Brother     High Cholesterol Brother      Social History     Socioeconomic History    Marital status:      Spouse name: Not on file    Number of children: Not on file    Years of education: Not on file    Highest education level: Not on file   Occupational History    Not on file   Social Needs    Financial resource strain: Not on file    Food insecurity     Worry: Not on file     Inability: Not on file    Transportation needs     Medical: Not on file     Non-medical: Not on file   Tobacco Use    Smoking status: Never Smoker    Smokeless tobacco: Never Used   Substance and Sexual Activity    Alcohol use: No    Drug use: No    Sexual activity: Yes     Partners: Male   Lifestyle    Physical activity     Days per week: Not on file     Minutes per session: Not on file    Stress: Not on file   Relationships    Social connections     Talks on phone: Not on file     Gets together: Not on file     Attends Ten[employer] service: Not on file     Active member of club or organization: Not on file     Attends meetings of clubs or organizations: Not on file     Relationship status: Not on file   [employer] Intimate partner violence     Fear of current or ex partner: Not on file     Emotionally abused: Not on file     Physically abused: Not on file     Forced sexual activity: Not on file   Other Topics Concern    Not on file   Social History Narrative    Not on file       Review of Systems:   · Constitutional: there has been no unanticipated weight loss. There's been no change in energy level, sleep pattern, or activity level. · Eyes: No visual changes or diplopia. No scleral icterus. · ENT: No Headaches, hearing loss or vertigo. No mouth sores or sore throat. · Cardiovascular: Reviewed in HPI  · Respiratory: No cough or wheezing, no sputum production. No hematemesis. · Gastrointestinal: No abdominal pain, appetite loss, blood in stools. No change in bowel or bladder habits. · Genitourinary: No dysuria, trouble voiding, or hematuria. · Musculoskeletal:  No gait disturbance, weakness or joint complaints. · Integumentary: No rash or pruritis. · Neurological: No headache, diplopia, change in muscle strength, numbness or tingling. No change in gait, balance, coordination, mood, affect, memory, mentation, behavior. · Psychiatric: No anxiety, no depression. · Endocrine: No malaise, fatigue or temperature intolerance. No excessive thirst, fluid intake, or urination. No tremor. · Hematologic/Lymphatic: No abnormal bruising or bleeding, blood clots or swollen lymph nodes. · Allergic/Immunologic: No nasal congestion or hives. Physical Examination:    Vitals:    03/16/21 0930   BP: 126/72   Pulse: 57   SpO2: 99%   Weight: 189 lb (85.7 kg)   Height: 5' 2\" (1.575 m)     Body mass index is 34.57 kg/m².      Wt Readings from Last 3 Encounters:   03/16/21 189 lb (85.7 kg)   02/04/21 183 lb 8 oz (83.2 kg)   01/04/21 180 lb (81.6 kg)     BP Readings from Last 3 Encounters:   03/16/21 126/72   02/04/21 120/80   01/04/21 (!) 159/63             Constitutional and General Appearance:   WD/WN in NAD,   HEENT: NC/AT  ROSARIO  No problems with hearing  Skin:  Warm, dry  Respiratory:  · Normal excursion and expansion without use of accessory muscles  · Resp Auscultation: Normal breath sounds without dullness  Cardiovascular:  · The apical impulses not displaced  · Heart tones are crisp and normal  · Cervical veins are not engorged  · The carotid upstroke is normal in amplitude and contour without delay or bruit  · JVP normal  Regular bradycardic rhythm with nl S1 and S2 without m,r,g  · Peripheral pulses are symmetrical and full  · There is no clubbing, cyanosis of the extremities. · Trace bilateral edema  · Femoral Arteries: 2+ and equal  · Pedal Pulses: 2+ and equal   Neck:  · No thyromegaly  Abdomen:  · No masses or tenderness  · Liver/Spleen: No Abnormalities Noted  Neurological/Psychiatric:  · Alert and oriented in all spheres  · Moves all extremities well  · Exhibits normal gait balance and coordination  · No abnormalities of mood, affect, memory, mentation, or behavior are noted    Echo: 02/19/19   Summary   Definity contrast administered. Normal left ventricular systolic function with an estimated ejection   fraction of 55-60%. There is mild septal hypertrophy with normal remaining wall thickness. Normal left ventricular diastolic filling pressure. Mild mitral regurgitation. Mild tricuspid regurgitation. Systolic pulmonary artery pressure (SPAP) estimated at 60 mmHg (RA pressure   3 mmHg), consistent with severe pulmonary hypertension. Mild pulmonic regurgitation present. Lipids: 03/12/19: , , HDL 45, LDL 60    01/21/20 EKG shows AFIB     Echo: 04/28/20  Summary   Normal left ventricle systolic function with an estimated ejection fraction   of 55-60%. Definity contrast administered with no evidence of left   ventricular mass or thrombus noted. No regional wall motion abnormalities are seen. Normal left ventricular   diastolic filling pressure.    The left atrium is mildly dilated. Mild mitral and tricuspid regurgitation. Systolic pulmonary artery pressure (SPAP) estimated at 42 mmHg (right atrial   pressure 3 mmHg), consistent with mild pulmonary hypertension. .Labs were reviewed including labs from other hospital systems through [facility]. Cardiac testing was reviewed including echos, nuclear scans, cardiac catheterization, including from other hospital systems through [facility]. Assessment:    1. Chronic diastolic heart failure ([address]    2. PAF (paroxysmal atrial fibrillation) ([facility]    3. Essential hypertension           Plan:  1. Decrease metoprolol to 50 mg (1/2 tab) twice daily. Skip this evenings dose. Call office if heart rate is not above 60 by Friday. [phone]  2. Follow up in 6 months        Scribe's attestation: This note was scribed in the presence of Ady Freire M.D. By Danna Gupta RN        Time Based Itemization  A total of 30 minutes was spent on today's patient encounter. If applicable, non-patient-facing activities:  ( x)Preparing to see the patient and reviewing records  ( x) Individual interpretation of results  ( ) Discussion or coordination of care with other health care professionals  ( x) Ordering of unique tests, medications, or procedures  ( x) Documentation within the EHR        I appreciate the opportunity of cooperating in the care of this patient.     Ady Freire M.D., [facility]

## 2021-03-16 NOTE — PATIENT INSTRUCTIONS
Plan:  1. Decrease metoprolol to 50 mg (1/2 tab) twice daily. Skip this evenings dose. Call office if heart rate is not above 60 by Friday. [phone]  2.  Follow up in 6 months

## 2021-03-16 NOTE — LETTER
[facility], [place], operated by [facility]  Advanced CHF/Pulmonary Hypertension   Cardiac Evaluation      Frances Tiwari  YOB: 1935    Date of Visit:  3/16/21      Chief Complaint   Patient presents with    Follow-up    Congestive Heart Failure          History of Present Illness:  Frances Tiwari is a 81 y.o. female who presents from referral from [facility] for consultation and management of severe pulmonary HTN. She has a history of paroxysmal atrial fibrillation, HTN, SVT, and DM. She had SVT in 2013, wore an event monitor, and atrial fibrillation was detected. She had recurrent atrial fibrillation in 2014 and had a cryoablation for PAF in 2/2014. Post procedure she had a retroperitoneal bleed and required a urinary stent. She has had no known recurrence of afib post ablation but has had palpitations. She was admitted in 1/2019 with SBO versus ileus that was medically managed. Her echo from 02/19/19 showed her EF was 55-60%. She has mild MR, TR and WA. SPAP 60mmHg consistent with severe pulmonary HTN. She reports about 1 month ago (02/2019) she had an episode of palpitations at 200 bpm for about 1 hour. She has not had a sleep study. She reports occasional chest twinge. She states the twinge occurs with fast heart rate. She reports her breathing is stable now. She did have SOB with walking frequently but this has subsided. She denies dizziness or syncope. She states she has been on lasix for 2-3 weeks. Her cardiac monitor from 04/2019 showed no arrhythmias. She was admitted 10/10-10/13/19 with SBO. General surgery was consulted and the SBO resolved without surgery. On 10/22/19 she reported her SBO occurred at the bowel resection site. During her last OV with me on 01/21/20, her EKG showed AFib . She was started on eliquis and her metoprolol was increased to 50 mg twice daily. Her echo from 04/28/20 showed her EF was 55-60% with mild MR and TR.     At her OV with Meier Cramp CNP on 06/03/20 her lasix was adjusted to 40 mg daily for 4 days then reduce back to 20 mg daily. She was admitted 09/13-09/15/20 with partial bowel obstruction. Today she reports her HR has been slow since last night. She has noticed the slower heart beat since starting tikosyn which was started 12/2020. She reports she has palpitations she describes as a skipped beat. She denies fast heart beats. She reports her breathing is stable. She reports she has increased fatigue since 12/2020. She denies CP, dizziness, BLE edema or syncope. Her heart rate has been in the 50's. Her EKG today shows sinus bradycardia with rate 57.         Allergies   Allergen Reactions    Hydrocodone Other (See Comments)     NAUSEA AND DIZZINESS    Metformin And Related Other (See Comments)     NAUSEA AND DIZZINESS    Omeprazole     Hydralazine Palpitations and Rash     Current Outpatient Medications   Medication Sig Dispense Refill    pravastatin (PRAVACHOL) 80 MG tablet Take 40 mg by mouth daily      pioglitazone (ACTOS) 15 MG tablet Take 15 mg by mouth daily      dofetilide (TIKOSYN) 125 MCG capsule Take 1 capsule by mouth every 12 hours 180 capsule 3    furosemide (LASIX) 20 MG tablet TAKE ONE TABLET BY MOUTH DAILY 90 tablet 2    metoprolol succinate (TOPROL XL) 100 MG extended release tablet Take 0.5 tablets by mouth daily (Patient taking differently: Take 100 mg by mouth 2 times daily ) 180 tablet 3    ELIQUIS 5 MG TABS tablet TAKE ONE TABLET BY MOUTH TWICE A DAY 60 tablet 5    valsartan (DIOVAN) 160 MG tablet Take 1 tablet by mouth daily 30 tablet 3    pantoprazole (PROTONIX) 20 MG tablet Take 2 tablets by mouth daily (Patient taking differently: Take 20 mg by mouth daily ) 30 tablet 0    glyBURIDE (DIABETA) 1.25 MG tablet Take 2.5 mg by mouth daily (with breakfast)       aspirin 81 MG tablet Take 81 mg by mouth daily      furosemide (LASIX) 20 MG tablet Take 1 tablet by mouth daily 30 tablet 11     No current facility-administered medications for this visit.         Past Medical History:   Diagnosis Date    Atrial fibrillation ([facility] [address]     Diabetes mellitus ([facility] [address]     Hydronephrosis 2/14/2014    Hyperlipidemia     Hypertension     Intussusception intestine (HCC)     SVT (supraventricular tachycardia) ([facility] [address] 8/26/2013    AVNRT     Past Surgical History:   Procedure Laterality Date    ABLATION OF DYSRHYTHMIC FOCUS      -2014    APPENDECTOMY      DILATATION, ESOPHAGUS      FRACTURE SURGERY      L ankle    HYSTERECTOMY      SKIN BIOPSY      SMALL INTESTINE SURGERY      TONSILLECTOMY       Family History   Problem Relation Age of Onset    Diabetes Mother     Heart Disease Father     High Blood Pressure Father     High Cholesterol Father     Cancer Sister     Heart Disease Brother     High Blood Pressure Brother     High Cholesterol Brother      Social History     Socioeconomic History    Marital status:      Spouse name: Not on file    Number of children: Not on file    Years of education: Not on file    Highest education level: Not on file   Occupational History    Not on file   Social Needs    Financial resource strain: Not on file    Food insecurity     Worry: Not on file     Inability: Not on file    Transportation needs     Medical: Not on file     Non-medical: Not on file   Tobacco Use    Smoking status: Never Smoker    Smokeless tobacco: Never Used   Substance and Sexual Activity    Alcohol use: No    Drug use: No    Sexual activity: Yes     Partners: Male   Lifestyle    Physical activity     Days per week: Not on file     Minutes per session: Not on file    Stress: Not on file   Relationships    Social connections     Talks on phone: Not on file     Gets together: Not on file     Attends Hindu service: Not on file     Active member of club or organization: Not on file     Attends meetings of clubs or organizations: Not on file     Relationship status: Not on file   Lesli Intimate partner violence     Fear of current or ex partner: Not on file     Emotionally abused: Not on file     Physically abused: Not on file     Forced sexual activity: Not on file   Other Topics Concern    Not on file   Social History Narrative    Not on file       Review of Systems:   · Constitutional: there has been no unanticipated weight loss. There's been no change in energy level, sleep pattern, or activity level. · Eyes: No visual changes or diplopia. No scleral icterus. · ENT: No Headaches, hearing loss or vertigo. No mouth sores or sore throat. · Cardiovascular: Reviewed in HPI  · Respiratory: No cough or wheezing, no sputum production. No hematemesis. · Gastrointestinal: No abdominal pain, appetite loss, blood in stools. No change in bowel or bladder habits. · Genitourinary: No dysuria, trouble voiding, or hematuria. · Musculoskeletal:  No gait disturbance, weakness or joint complaints. · Integumentary: No rash or pruritis. · Neurological: No headache, diplopia, change in muscle strength, numbness or tingling. No change in gait, balance, coordination, mood, affect, memory, mentation, behavior. · Psychiatric: No anxiety, no depression. · Endocrine: No malaise, fatigue or temperature intolerance. No excessive thirst, fluid intake, or urination. No tremor. · Hematologic/Lymphatic: No abnormal bruising or bleeding, blood clots or swollen lymph nodes. · Allergic/Immunologic: No nasal congestion or hives. Physical Examination:    Vitals:    03/16/21 0930   BP: 126/72   Pulse: 57   SpO2: 99%   Weight: 189 lb (85.7 kg)   Height: 5' 2\" (1.575 m)     Body mass index is 34.57 kg/m².      Wt Readings from Last 3 Encounters:   03/16/21 189 lb (85.7 kg)   02/04/21 183 lb 8 oz (83.2 kg)   01/04/21 180 lb (81.6 kg)     BP Readings from Last 3 Encounters:   03/16/21 126/72   02/04/21 120/80   01/04/21 (!) 159/63             Constitutional and General Appearance:   WD/WN in NAD,   HEENT:  NC/AT ROSARIO  No problems with hearing  Skin:  Warm, dry  Respiratory:  · Normal excursion and expansion without use of accessory muscles  · Resp Auscultation: Normal breath sounds without dullness  Cardiovascular:  · The apical impulses not displaced  · Heart tones are crisp and normal  · Cervical veins are not engorged  · The carotid upstroke is normal in amplitude and contour without delay or bruit  · JVP normal  Regular bradycardic rhythm with nl S1 and S2 without m,r,g  · Peripheral pulses are symmetrical and full  · There is no clubbing, cyanosis of the extremities. · Trace bilateral edema  · Femoral Arteries: 2+ and equal  · Pedal Pulses: 2+ and equal   Neck:  · No thyromegaly  Abdomen:  · No masses or tenderness  · Liver/Spleen: No Abnormalities Noted  Neurological/Psychiatric:  · Alert and oriented in all spheres  · Moves all extremities well  · Exhibits normal gait balance and coordination  · No abnormalities of mood, affect, memory, mentation, or behavior are noted    Echo: 02/19/19   Summary   Definity contrast administered. Normal left ventricular systolic function with an estimated ejection   fraction of 55-60%. There is mild septal hypertrophy with normal remaining wall thickness. Normal left ventricular diastolic filling pressure. Mild mitral regurgitation. Mild tricuspid regurgitation. Systolic pulmonary artery pressure (SPAP) estimated at 60 mmHg (RA pressure   3 mmHg), consistent with severe pulmonary hypertension. Mild pulmonic regurgitation present. Lipids: 03/12/19: , , HDL 45, LDL 60    01/21/20 EKG shows AFIB     Echo: 04/28/20  Summary   Normal left ventricle systolic function with an estimated ejection fraction   of 55-60%. Definity contrast administered with no evidence of left   ventricular mass or thrombus noted. No regional wall motion abnormalities are seen. Normal left ventricular   diastolic filling pressure. The left atrium is mildly dilated.    Mild

## 2021-03-23 ENCOUNTER — TELEPHONE (OUTPATIENT)
Dept: CARDIOLOGY CLINIC | Age: 86
End: 2021-03-23

## 2021-03-30 ENCOUNTER — APPOINTMENT (OUTPATIENT)
Dept: GENERAL RADIOLOGY | Age: 86
End: 2021-03-30
Payer: MEDICARE

## 2021-03-30 ENCOUNTER — HOSPITAL ENCOUNTER (OUTPATIENT)
Age: 86
Setting detail: OBSERVATION
Discharge: HOME | End: 2021-03-31
Attending: EMERGENCY MEDICINE | Admitting: INTERNAL MEDICINE
Payer: MEDICARE

## 2021-03-30 ENCOUNTER — TELEPHONE (OUTPATIENT)
Dept: CARDIOLOGY CLINIC | Age: 86
End: 2021-03-30

## 2021-03-30 DIAGNOSIS — I48.91 ATRIAL FIBRILLATION WITH RVR (HCC): ICD-10-CM

## 2021-03-30 DIAGNOSIS — R07.9 CHEST PAIN, UNSPECIFIED TYPE: Primary | ICD-10-CM

## 2021-03-30 LAB
A/G RATIO: 0.9 (ref 1.1–2.2)
ALBUMIN SERPL-MCNC: 4.2 G/DL (ref 3.4–5)
ALP BLD-CCNC: 115 U/L (ref 40–129)
ALT SERPL-CCNC: 16 U/L (ref 10–40)
ANION GAP SERPL CALCULATED.3IONS-SCNC: 10 MMOL/L (ref 3–16)
AST SERPL-CCNC: 28 U/L (ref 15–37)
BASOPHILS ABSOLUTE: 0.1 K/UL (ref 0–0.2)
BASOPHILS RELATIVE PERCENT: 0.7 %
BILIRUB SERPL-MCNC: 0.3 MG/DL (ref 0–1)
BUN BLDV-MCNC: 15 MG/DL (ref 7–20)
CALCIUM SERPL-MCNC: 9.3 MG/DL (ref 8.3–10.6)
CHLORIDE BLD-SCNC: 97 MMOL/L (ref 99–110)
CO2: 25 MMOL/L (ref 21–32)
CREAT SERPL-MCNC: 1 MG/DL (ref 0.6–1.2)
EOSINOPHILS ABSOLUTE: 0.4 K/UL (ref 0–0.6)
EOSINOPHILS RELATIVE PERCENT: 3.3 %
GFR AFRICAN AMERICAN: >60
GFR NON-AFRICAN AMERICAN: 53
GLOBULIN: 4.5 G/DL
GLUCOSE BLD-MCNC: 166 MG/DL (ref 70–99)
HCT VFR BLD CALC: 41.4 % (ref 36–48)
HEMOGLOBIN: 13.4 G/DL (ref 12–16)
LYMPHOCYTES ABSOLUTE: 2.9 K/UL (ref 1–5.1)
LYMPHOCYTES RELATIVE PERCENT: 26.7 %
MCH RBC QN AUTO: 24.7 PG (ref 26–34)
MCHC RBC AUTO-ENTMCNC: 32.5 G/DL (ref 31–36)
MCV RBC AUTO: 76 FL (ref 80–100)
MONOCYTES ABSOLUTE: 1.2 K/UL (ref 0–1.3)
MONOCYTES RELATIVE PERCENT: 11.2 %
NEUTROPHILS ABSOLUTE: 6.3 K/UL (ref 1.7–7.7)
NEUTROPHILS RELATIVE PERCENT: 58.1 %
PDW BLD-RTO: 16.5 % (ref 12.4–15.4)
PLATELET # BLD: 348 K/UL (ref 135–450)
PMV BLD AUTO: 8.9 FL (ref 5–10.5)
POTASSIUM SERPL-SCNC: 4.6 MMOL/L (ref 3.5–5.1)
RBC # BLD: 5.44 M/UL (ref 4–5.2)
SODIUM BLD-SCNC: 132 MMOL/L (ref 136–145)
TOTAL PROTEIN: 8.7 G/DL (ref 6.4–8.2)
TROPONIN: <0.01 NG/ML
WBC # BLD: 10.8 K/UL (ref 4–11)

## 2021-03-30 PROCEDURE — 6370000000 HC RX 637 (ALT 250 FOR IP): Performed by: EMERGENCY MEDICINE

## 2021-03-30 PROCEDURE — 93005 ELECTROCARDIOGRAM TRACING: CPT | Performed by: EMERGENCY MEDICINE

## 2021-03-30 PROCEDURE — 84484 ASSAY OF TROPONIN QUANT: CPT

## 2021-03-30 PROCEDURE — 80053 COMPREHEN METABOLIC PANEL: CPT

## 2021-03-30 PROCEDURE — 2500000003 HC RX 250 WO HCPCS: Performed by: EMERGENCY MEDICINE

## 2021-03-30 PROCEDURE — 71045 X-RAY EXAM CHEST 1 VIEW: CPT

## 2021-03-30 PROCEDURE — 96374 THER/PROPH/DIAG INJ IV PUSH: CPT

## 2021-03-30 PROCEDURE — 99285 EMERGENCY DEPT VISIT HI MDM: CPT

## 2021-03-30 PROCEDURE — 85025 COMPLETE CBC W/AUTO DIFF WBC: CPT

## 2021-03-30 RX ORDER — ASPIRIN 325 MG
325 TABLET ORAL ONCE
Status: COMPLETED | OUTPATIENT
Start: 2021-03-30 | End: 2021-03-30

## 2021-03-30 RX ORDER — METOPROLOL TARTRATE 5 MG/5ML
5 INJECTION INTRAVENOUS ONCE
Status: COMPLETED | OUTPATIENT
Start: 2021-03-30 | End: 2021-03-30

## 2021-03-30 RX ADMIN — METOPROLOL TARTRATE 5 MG: 5 INJECTION INTRAVENOUS at 21:42

## 2021-03-30 RX ADMIN — ASPIRIN 325 MG: 325 TABLET, FILM COATED ORAL at 21:42

## 2021-03-30 ASSESSMENT — ENCOUNTER SYMPTOMS
NAUSEA: 0
ABDOMINAL PAIN: 0
COUGH: 0
BACK PAIN: 0
EYE ITCHING: 1
SHORTNESS OF BREATH: 1
RHINORRHEA: 1
VOMITING: 0

## 2021-03-30 ASSESSMENT — PAIN SCALES - GENERAL
PAINLEVEL_OUTOF10: 5
PAINLEVEL_OUTOF10: 0

## 2021-03-30 ASSESSMENT — PAIN DESCRIPTION - LOCATION: LOCATION: CHEST

## 2021-03-30 NOTE — TELEPHONE ENCOUNTER
Spoke with pt, she has no edema. BP yesterday 132/60, she did not check it today. She says her heart is beating no fast but she can feel it with movement, pulse is 62 when she walks around is 72. She can feel it palpitating so hard in her head when she moves around. When ever she feels the beating she feels her neck beating and chest beating and then has SOB with it. When she relaxes and sits she does not have that feeling.

## 2021-03-30 NOTE — TELEPHONE ENCOUNTER
She has 2 options. Restart the metoprolol 1/2 pill a day and see EP soon (Jasson Kovacs or Jane Laguerre)  Or just stay off the metoprolol and see EP. A pulse of 60-70 is not fast, so that alone is not concerning. Maybe she just is not used to having a higher heart rate.     SHELLY

## 2021-03-30 NOTE — TELEPHONE ENCOUNTER
Patient informed and VU. She wants to wait and see how see does this weekend and will call us back next.

## 2021-03-30 NOTE — TELEPHONE ENCOUNTER
Heart is beating very hard in left ear. SOB, along with  neck and jaw pain. Pt sts she can't  even walk around bed to make it with out becoming SOB.

## 2021-03-31 VITALS
DIASTOLIC BLOOD PRESSURE: 75 MMHG | BODY MASS INDEX: 34.04 KG/M2 | SYSTOLIC BLOOD PRESSURE: 194 MMHG | HEART RATE: 64 BPM | OXYGEN SATURATION: 97 % | HEIGHT: 62 IN | WEIGHT: 185 LBS | TEMPERATURE: 98 F | RESPIRATION RATE: 15 BRPM

## 2021-03-31 PROBLEM — I48.91 ATRIAL FIBRILLATION WITH RAPID VENTRICULAR RESPONSE (HCC): Status: ACTIVE | Noted: 2021-03-31

## 2021-03-31 PROBLEM — E66.9 OBESITY: Status: ACTIVE | Noted: 2021-03-31

## 2021-03-31 LAB
ANION GAP SERPL CALCULATED.3IONS-SCNC: 9 MMOL/L (ref 3–16)
BASOPHILS ABSOLUTE: 0.1 K/UL (ref 0–0.2)
BASOPHILS RELATIVE PERCENT: 0.7 %
BUN BLDV-MCNC: 16 MG/DL (ref 7–20)
CALCIUM SERPL-MCNC: 9.2 MG/DL (ref 8.3–10.6)
CHLORIDE BLD-SCNC: 104 MMOL/L (ref 99–110)
CO2: 27 MMOL/L (ref 21–32)
CREAT SERPL-MCNC: 0.9 MG/DL (ref 0.6–1.2)
EKG ATRIAL RATE: 19 BPM
EKG DIAGNOSIS: NORMAL
EKG Q-T INTERVAL: 322 MS
EKG QRS DURATION: 86 MS
EKG QTC CALCULATION (BAZETT): 503 MS
EKG R AXIS: 63 DEGREES
EKG T AXIS: 57 DEGREES
EKG VENTRICULAR RATE: 147 BPM
EOSINOPHILS ABSOLUTE: 0.3 K/UL (ref 0–0.6)
EOSINOPHILS RELATIVE PERCENT: 3.2 %
GFR AFRICAN AMERICAN: >60
GFR NON-AFRICAN AMERICAN: 60
GLUCOSE BLD-MCNC: 111 MG/DL (ref 70–99)
GLUCOSE BLD-MCNC: 129 MG/DL (ref 70–99)
GLUCOSE BLD-MCNC: 157 MG/DL (ref 70–99)
GLUCOSE BLD-MCNC: 95 MG/DL (ref 70–99)
HCT VFR BLD CALC: 38.3 % (ref 36–48)
HEMOGLOBIN: 12.1 G/DL (ref 12–16)
LYMPHOCYTES ABSOLUTE: 2.4 K/UL (ref 1–5.1)
LYMPHOCYTES RELATIVE PERCENT: 27.2 %
MAGNESIUM: 2.3 MG/DL (ref 1.8–2.4)
MCH RBC QN AUTO: 24.1 PG (ref 26–34)
MCHC RBC AUTO-ENTMCNC: 31.7 G/DL (ref 31–36)
MCV RBC AUTO: 76.1 FL (ref 80–100)
MONOCYTES ABSOLUTE: 1.1 K/UL (ref 0–1.3)
MONOCYTES RELATIVE PERCENT: 12.7 %
NEUTROPHILS ABSOLUTE: 4.9 K/UL (ref 1.7–7.7)
NEUTROPHILS RELATIVE PERCENT: 56.2 %
PDW BLD-RTO: 16.2 % (ref 12.4–15.4)
PERFORMED ON: ABNORMAL
PERFORMED ON: ABNORMAL
PERFORMED ON: NORMAL
PLATELET # BLD: 323 K/UL (ref 135–450)
PMV BLD AUTO: 8.8 FL (ref 5–10.5)
POTASSIUM SERPL-SCNC: 4.3 MMOL/L (ref 3.5–5.1)
RBC # BLD: 5.03 M/UL (ref 4–5.2)
SODIUM BLD-SCNC: 140 MMOL/L (ref 136–145)
WBC # BLD: 8.7 K/UL (ref 4–11)

## 2021-03-31 PROCEDURE — 93010 ELECTROCARDIOGRAM REPORT: CPT | Performed by: INTERNAL MEDICINE

## 2021-03-31 PROCEDURE — G0378 HOSPITAL OBSERVATION PER HR: HCPCS

## 2021-03-31 PROCEDURE — 83735 ASSAY OF MAGNESIUM: CPT

## 2021-03-31 PROCEDURE — 80048 BASIC METABOLIC PNL TOTAL CA: CPT

## 2021-03-31 PROCEDURE — 2580000003 HC RX 258: Performed by: INTERNAL MEDICINE

## 2021-03-31 PROCEDURE — 6370000000 HC RX 637 (ALT 250 FOR IP): Performed by: INTERNAL MEDICINE

## 2021-03-31 PROCEDURE — 36415 COLL VENOUS BLD VENIPUNCTURE: CPT

## 2021-03-31 PROCEDURE — 85025 COMPLETE CBC W/AUTO DIFF WBC: CPT

## 2021-03-31 RX ORDER — SODIUM CHLORIDE 9 MG/ML
25 INJECTION, SOLUTION INTRAVENOUS PRN
Status: DISCONTINUED | OUTPATIENT
Start: 2021-03-31 | End: 2021-03-31 | Stop reason: HOSPADM

## 2021-03-31 RX ORDER — VALSARTAN 80 MG/1
160 TABLET ORAL DAILY
Status: DISCONTINUED | OUTPATIENT
Start: 2021-03-31 | End: 2021-03-31 | Stop reason: HOSPADM

## 2021-03-31 RX ORDER — MAGNESIUM SULFATE 1 G/100ML
1000 INJECTION INTRAVENOUS PRN
Status: DISCONTINUED | OUTPATIENT
Start: 2021-03-31 | End: 2021-03-31

## 2021-03-31 RX ORDER — PRAVASTATIN SODIUM 40 MG
40 TABLET ORAL DAILY
Status: DISCONTINUED | OUTPATIENT
Start: 2021-03-31 | End: 2021-03-31 | Stop reason: HOSPADM

## 2021-03-31 RX ORDER — PANTOPRAZOLE SODIUM 40 MG/1
40 TABLET, DELAYED RELEASE ORAL DAILY
Status: DISCONTINUED | OUTPATIENT
Start: 2021-03-31 | End: 2021-03-31 | Stop reason: HOSPADM

## 2021-03-31 RX ORDER — POTASSIUM CHLORIDE 20 MEQ/1
40 TABLET, EXTENDED RELEASE ORAL PRN
Status: DISCONTINUED | OUTPATIENT
Start: 2021-03-31 | End: 2021-03-31

## 2021-03-31 RX ORDER — DEXTROSE MONOHYDRATE 25 G/50ML
12.5 INJECTION, SOLUTION INTRAVENOUS PRN
Status: DISCONTINUED | OUTPATIENT
Start: 2021-03-31 | End: 2021-03-31 | Stop reason: HOSPADM

## 2021-03-31 RX ORDER — SODIUM CHLORIDE 0.9 % (FLUSH) 0.9 %
10 SYRINGE (ML) INJECTION PRN
Status: DISCONTINUED | OUTPATIENT
Start: 2021-03-31 | End: 2021-03-31 | Stop reason: HOSPADM

## 2021-03-31 RX ORDER — LANOLIN ALCOHOL/MO/W.PET/CERES
3 CREAM (GRAM) TOPICAL NIGHTLY PRN
Status: DISCONTINUED | OUTPATIENT
Start: 2021-03-31 | End: 2021-03-31 | Stop reason: HOSPADM

## 2021-03-31 RX ORDER — NICOTINE POLACRILEX 4 MG
15 LOZENGE BUCCAL PRN
Status: DISCONTINUED | OUTPATIENT
Start: 2021-03-31 | End: 2021-03-31 | Stop reason: HOSPADM

## 2021-03-31 RX ORDER — ACETAMINOPHEN 650 MG/1
650 SUPPOSITORY RECTAL EVERY 6 HOURS PRN
Status: DISCONTINUED | OUTPATIENT
Start: 2021-03-31 | End: 2021-03-31 | Stop reason: HOSPADM

## 2021-03-31 RX ORDER — METOPROLOL SUCCINATE 25 MG/1
25 TABLET, EXTENDED RELEASE ORAL DAILY
Qty: 30 TABLET | Refills: 0 | Status: ON HOLD | OUTPATIENT
Start: 2021-04-01 | End: 2021-04-22

## 2021-03-31 RX ORDER — POTASSIUM CHLORIDE 7.45 MG/ML
10 INJECTION INTRAVENOUS PRN
Status: DISCONTINUED | OUTPATIENT
Start: 2021-03-31 | End: 2021-03-31

## 2021-03-31 RX ORDER — ACETAMINOPHEN 325 MG/1
650 TABLET ORAL EVERY 6 HOURS PRN
Status: DISCONTINUED | OUTPATIENT
Start: 2021-03-31 | End: 2021-03-31 | Stop reason: HOSPADM

## 2021-03-31 RX ORDER — DOFETILIDE 0.12 MG/1
125 CAPSULE ORAL EVERY 12 HOURS SCHEDULED
Status: DISCONTINUED | OUTPATIENT
Start: 2021-03-31 | End: 2021-03-31 | Stop reason: HOSPADM

## 2021-03-31 RX ORDER — ASPIRIN 81 MG/1
81 TABLET, CHEWABLE ORAL DAILY
Status: DISCONTINUED | OUTPATIENT
Start: 2021-03-31 | End: 2021-03-31 | Stop reason: HOSPADM

## 2021-03-31 RX ORDER — DEXTROSE MONOHYDRATE 50 MG/ML
100 INJECTION, SOLUTION INTRAVENOUS PRN
Status: DISCONTINUED | OUTPATIENT
Start: 2021-03-31 | End: 2021-03-31 | Stop reason: HOSPADM

## 2021-03-31 RX ORDER — INSULIN GLARGINE 100 [IU]/ML
0.25 INJECTION, SOLUTION SUBCUTANEOUS NIGHTLY
Status: DISCONTINUED | OUTPATIENT
Start: 2021-03-31 | End: 2021-03-31 | Stop reason: HOSPADM

## 2021-03-31 RX ORDER — METOPROLOL SUCCINATE 50 MG/1
25 TABLET, EXTENDED RELEASE ORAL DAILY
Status: DISCONTINUED | OUTPATIENT
Start: 2021-03-31 | End: 2021-03-31 | Stop reason: HOSPADM

## 2021-03-31 RX ADMIN — VALSARTAN 160 MG: 80 TABLET, FILM COATED ORAL at 09:11

## 2021-03-31 RX ADMIN — INSULIN LISPRO 1 UNITS: 100 INJECTION, SOLUTION INTRAVENOUS; SUBCUTANEOUS at 12:10

## 2021-03-31 RX ADMIN — PANTOPRAZOLE SODIUM 40 MG: 40 TABLET, DELAYED RELEASE ORAL at 09:11

## 2021-03-31 RX ADMIN — Medication 10 ML: at 09:11

## 2021-03-31 RX ADMIN — PRAVASTATIN SODIUM 40 MG: 40 TABLET ORAL at 09:11

## 2021-03-31 RX ADMIN — ASPIRIN 81 MG: 81 TABLET, CHEWABLE ORAL at 09:11

## 2021-03-31 RX ADMIN — INSULIN LISPRO 7 UNITS: 100 INJECTION, SOLUTION INTRAVENOUS; SUBCUTANEOUS at 12:10

## 2021-03-31 RX ADMIN — METOPROLOL SUCCINATE 25 MG: 50 TABLET, EXTENDED RELEASE ORAL at 09:11

## 2021-03-31 RX ADMIN — DOFETILIDE 125 MCG: 0.12 CAPSULE ORAL at 02:29

## 2021-03-31 RX ADMIN — APIXABAN 5 MG: 5 TABLET, FILM COATED ORAL at 02:29

## 2021-03-31 ASSESSMENT — PAIN SCALES - GENERAL
PAINLEVEL_OUTOF10: 0

## 2021-03-31 NOTE — DISCHARGE SUMMARY
Hospital Medicine Discharge Summary    Patient ID: Alba Tiwari      Patient's PCP: Omaira Will MD    Admit Date: 3/30/2021     Discharge Date: 3/31/2021      Admitting Physician: Cl Urrutia MD     Discharge Physician: Mike Wei MD     Discharge Diagnoses: Active Hospital Problems    Diagnosis    Atrial fibrillation with rapid ventricular response (HCC) [I48.91]    Obesity [E66.9]    Essential hypertension [I10]    Chronic anticoagulation [Z79.01]    PAF (paroxysmal atrial fibrillation) (HCC) [I48.0]    DM2 (diabetes mellitus, type 2) ([facility] [address] [E11.9]       The patient was seen and examined on day of discharge and this discharge summary is in conjunction with any daily progress note from day of discharge. Hospital Course:       Afib - chronic persistent of unspecified and clinically unable to determine etiology, s/p prior ablation. Normally rate controlled on BBlocker/Tikosyn - continued Tikosyn and resumed BBlocker at 1/2 prior dose. Anticoagulated at baseline on home Eliquis - continued. Monitored on tele. Has outpt f/u w/ primary EP Cardiology Thurs 1 April.      HTN - w/out known CAD and no evidence of active signs/sxs of ischemia/failure. Currently controlled on home meds      DM2 - controlled on home oral antiGlycemics - held. Started on wt based basal/bolus w/ FSBS/SSI low regimen. Resumed home regimen at discharge.      HypoNatremia - etiology clinically unable to determine but likely hypovolemic. Followed serial labs off IVF. eviewed and documented as above.      Obesity -  With Body mass index is 33.84 kg/m². Complicating assessment and treatment. Placing patient at risk for multiple co-morbidities as well as early death and contributing to the patient's presentation. Counseled on weight loss.       Labs:  For convenience and continuity at follow-up the following most recent labs are provided:      CBC:    Lab Results   Component Value Date    WBC 8.7 03/31/2021    HGB 12.1 03/31/2021    HCT 38.3 03/31/2021     03/31/2021       Renal:    Lab Results   Component Value Date     03/31/2021    K 4.3 03/31/2021    K 4.1 12/19/2020     03/31/2021    CO2 27 03/31/2021    BUN 16 03/31/2021    CREATININE 0.9 03/31/2021    CALCIUM 9.2 03/31/2021    PHOS 2.5 09/15/2020         Significant Diagnostic Studies    Radiology:   XR CHEST PORTABLE   Final Result   No airspace disease by radiograph. Consults:     IP CONSULT TO HOSPITALIST    Disposition: home w/  of nearly 72 years (June)     Condition at Discharge: Stable    Discharge Instructions/Follow-up:  w/ PCP 1-2 weeks and subspecialists as arranged. Code Status:  Full code    Activity: activity as tolerated    Diet: regular diet      Discharge Medications:     Discharge Medication List as of 3/31/2021  3:55 PM           Details   metoprolol succinate (TOPROL XL) 25 MG extended release tablet Take 1 tablet by mouth daily, Disp-30 tablet, R-0Print              Details   pravastatin (PRAVACHOL) 80 MG tablet Take 40 mg by mouth dailyHistorical Med      pioglitazone (ACTOS) 15 MG tablet Take 15 mg by mouth dailyHistorical Med      dofetilide (TIKOSYN) 125 MCG capsule Take 1 capsule by mouth every 12 hours, Disp-180 capsule, R-3Normal      !! furosemide (LASIX) 20 MG tablet TAKE ONE TABLET BY MOUTH DAILY, Disp-90 tablet, R-2Normal      !! furosemide (LASIX) 20 MG tablet Take 1 tablet by mouth daily, Disp-30 tablet, R-11Normal      ELIQUIS 5 MG TABS tablet TAKE ONE TABLET BY MOUTH TWICE A DAY, Disp-60 tablet,R-5Normal      valsartan (DIOVAN) 160 MG tablet Take 1 tablet by mouth daily, Disp-30 tablet, R-3Normal      pantoprazole (PROTONIX) 20 MG tablet Take 2 tablets by mouth daily, Disp-30 tablet, R-0Print      glyBURIDE (DIABETA) 1.25 MG tablet Take 2.5 mg by mouth daily (with breakfast) Historical Med      aspirin 81 MG tablet Take 81 mg by mouth daily       ! ! - Potential duplicate medications found. Please discuss with provider. Time Spent on discharge is more than 30 minutes in the examination, evaluation, counseling and review of medications and discharge plan. Signed:    Ceci Mo MD   3/31/2021      Thank you Chente Casanova MD for the opportunity to be involved in this patient's care. If you have any questions or concerns please feel free to contact me at [phone].

## 2021-03-31 NOTE — ED PROVIDER NOTES
[address]  EMERGENCY DEPARTMENT ENCOUNTER      Pt Name: Savanah Tiwari  MRN: [id number]  [place] 1935  Date of evaluation: 3/30/2021  Provider: Cora Fernández MD    CHIEF COMPLAINT       Chief Complaint   Patient presents with    Chest Pain     5 out of 10 intermittent sternal chest pain that started yesterday. Described as thumping. Hx A-fib w/ablation. Pt was taken off of Metoprolol 4 days ago due to bradycardia. HISTORY OF PRESENT ILLNESS   (Location/Symptom, Timing/Onset,Context/Setting, Quality, Duration, Modifying Factors, Severity)  Note limiting factors. Savanah Tiwari is a 81 y.o. female who presents to the emergency department for chest pain and palpitations. This is a patient with a history of atrial fibrillation who had an ablation. She was on metoprolol however this was discontinued approximately a week ago due to bradycardia. This evening while she was at home she began having palpitations. This is associated with exertional chest pain and shortness of breath. She also noticed significant increase in her blood pressure. Patient became concerned and decided to come to the emergency room. Nursing notes were reviewed. REVIEW OF SYSTEMS    (2-9 systems for level 4, 10 or more for level 5)     Review of Systems   Constitutional: Negative for chills and fever. HENT: Positive for rhinorrhea. Eyes: Positive for itching. Respiratory: Positive for shortness of breath. Negative for cough. Cardiovascular: Positive for chest pain and palpitations. Gastrointestinal: Negative for abdominal pain, nausea and vomiting. Endocrine: Negative for polyuria. Genitourinary: Positive for frequency. Musculoskeletal: Negative for back pain. Skin: Negative for rash. Neurological: Negative for headaches. Hematological: Bruises/bleeds easily.          PAST MEDICAL HISTORY     Past Medical History:   Diagnosis Date    Atrial fibrillation ([address]     Diabetes mellitus ([facility]     Hydronephrosis 2/14/2014    Hyperlipidemia     Hypertension     Intussusception intestine (HCC)     SVT (supraventricular tachycardia) ([facility] 8/26/2013    AVNRT         SURGICALHISTORY       Past Surgical History:   Procedure Laterality Date    ABLATION OF DYSRHYTHMIC FOCUS      -2014    APPENDECTOMY      DILATATION, ESOPHAGUS      FRACTURE SURGERY      L ankle    HYSTERECTOMY      SKIN BIOPSY      SMALL INTESTINE SURGERY      TONSILLECTOMY           CURRENT MEDICATIONS       Current Discharge Medication List      CONTINUE these medications which have NOT CHANGED    Details   pravastatin (PRAVACHOL) 80 MG tablet Take 40 mg by mouth daily      metoprolol succinate (TOPROL XL) 50 MG extended release tablet Take 1 tablet by mouth daily  Qty: 90 tablet, Refills: 0    Associated Diagnoses: PAF (paroxysmal atrial fibrillation) ([facility])      dofetilide (TIKOSYN) 125 MCG capsule Take 1 capsule by mouth every 12 hours  Qty: 180 capsule, Refills: 3      !! furosemide (LASIX) 20 MG tablet TAKE ONE TABLET BY MOUTH DAILY  Qty: 90 tablet, Refills: 2    Associated Diagnoses: Chronic diastolic heart failure ([facility]      ! ! furosemide (LASIX) 20 MG tablet Take 1 tablet by mouth daily  Qty: 30 tablet, Refills: 11    Associated Diagnoses: Chronic diastolic heart failure (HCC)      ELIQUIS 5 MG TABS tablet TAKE ONE TABLET BY MOUTH TWICE A DAY  Qty: 60 tablet, Refills: 5    Associated Diagnoses: Atrial fibrillation with rapid ventricular response ([facility] Chronic diastolic heart failure (HCC)      valsartan (DIOVAN) 160 MG tablet Take 1 tablet by mouth daily  Qty: 30 tablet, Refills: 3      pantoprazole (PROTONIX) 20 MG tablet Take 2 tablets by mouth daily  Qty: 30 tablet, Refills: 0      glyBURIDE (DIABETA) 1.25 MG tablet Take 2.5 mg by mouth daily (with breakfast)       aspirin 81 MG tablet Take 81 mg by mouth daily      pioglitazone (ACTOS) 15 MG tablet Take 15 mg by mouth daily       ! ! - Potential duplicate medications found. Please discuss with provider.           ALLERGIES     Hydrocodone, Metformin and related, Nickel, Omeprazole, Prednisone, and Hydralazine    FAMILY HISTORY       Family History   Problem Relation Age of Onset    Diabetes Mother     Heart Disease Father     High Blood Pressure Father     High Cholesterol Father     Cancer Sister     Heart Disease Brother     High Blood Pressure Brother     High Cholesterol Brother           SOCIAL HISTORY       Social History     Socioeconomic History    Marital status:      Spouse name: None    Number of children: None    Years of education: None    Highest education level: None   Occupational History    None   Social Needs    Financial resource strain: None    Food insecurity     Worry: None     Inability: None    Transportation needs     Medical: None     Non-medical: None   Tobacco Use    Smoking status: Never Smoker    Smokeless tobacco: Never Used   Substance and Sexual Activity    Alcohol use: No    Drug use: No    Sexual activity: Yes     Partners: Male   Lifestyle    Physical activity     Days per week: None     Minutes per session: None    Stress: None   Relationships    Social connections     Talks on phone: None     Gets together: None     Attends [employer] service: None     Active member of club or organization: None     Attends meetings of clubs or organizations: None     Relationship status: None    Intimate partner violence     Fear of current or ex partner: None     Emotionally abused: None     Physically abused: None     Forced sexual activity: None   Other Topics Concern    None   Social History Narrative    None       SCREENINGS    Rob Coma Scale  Eye Opening: Spontaneous  Best Verbal Response: Oriented  Best Motor Response: Obeys commands  [place] Coma Scale Score: 15        PHYSICAL EXAM    (up to 7 for level 4, 8 or more for level 5)     ED Triage Vitals [03/30/21 2058]   BP Temp Temp Source Pulse Resp SpO2 Height Weight   (!) 208/107 98.1 °F (36.7 °C) Oral 147 18 96 % 5' 2\" (1.575 m) 185 lb (83.9 kg)       Physical Exam  Vitals signs and nursing note reviewed. Constitutional:       Appearance: Normal appearance. She is well-developed. She is not ill-appearing. HENT:      Head: Normocephalic and atraumatic. Right Ear: External ear normal.      Left Ear: External ear normal.      Nose: Nose normal.   Eyes:      General: No scleral icterus. Right eye: No discharge. Left eye: No discharge. Conjunctiva/sclera: Conjunctivae normal.   Neck:      Musculoskeletal: Neck supple. Cardiovascular:      Rate and Rhythm: Tachycardia present. Rhythm irregular. Heart sounds: Normal heart sounds. Pulmonary:      Effort: Pulmonary effort is normal. No respiratory distress. Breath sounds: Normal breath sounds. No wheezing or rales. Abdominal:      General: Bowel sounds are normal. There is no distension. Palpations: Abdomen is soft. Tenderness: There is no abdominal tenderness. There is no guarding or rebound. Musculoskeletal:         General: No swelling, tenderness, deformity or signs of injury. Skin:     Coloration: Skin is not pale. Neurological:      Mental Status: She is alert. Psychiatric:         Mood and Affect: Mood normal.         Behavior: Behavior normal.           DIAGNOSTIC RESULTS     EKG: All EKG's are interpreted by the Emergency Department Physician who either signs or Co-signs this chart in the absence of a cardiologist.    12 lead EKG shows atrial fibrillation of rapid ventricular response at a rate of 147 bpm, QRS normal.  Prolonged QTC no acute ischemic findings. ST depression in V4 through V6. These are new when compared to March 16, 2021.     RADIOLOGY:   Non-plain film images such as CT, Ultrasound and MRI are read by the radiologist. Plain radiographic images are visualized and preliminarily interpreted by the emergency physician with the below findings:        Interpretation per the Radiologist below, if available at the time of this note:    XR CHEST PORTABLE   Final Result   No airspace disease by radiograph. ED BEDSIDE ULTRASOUND:   Performed by ED Physician - none    LABS:  Labs Reviewed   CBC WITH AUTO DIFFERENTIAL - Abnormal; Notable for the following components:       Result Value    RBC 5.44 (*)     MCV 76.0 (*)     MCH 24.7 (*)     RDW 16.5 (*)     All other components within normal limits    Narrative:     Performed at:  [address],  [place], [address] [employer]   Phone [phone]   COMPREHENSIVE METABOLIC PANEL - Abnormal; Notable for the following components:    Sodium 132 (*)     Chloride 97 (*)     Glucose 166 (*)     GFR Non- 53 (*)     Total Protein 8.7 (*)     Albumin/Globulin Ratio 0.9 (*)     All other components within normal limits    Narrative:     Performed at:  [address],  [place], [address] [employer]   Phone [phone]   POCT GLUCOSE - Abnormal; Notable for the following components:    POC Glucose 129 (*)     All other components within normal limits    Narrative:     Performed at:  [address],  [employer], [address] [employer]   Phone [phone]   TROPONIN    Narrative:     Performed at:  [address],  [place], [address] [employer]   Phone [phone]   CBC WITH AUTO DIFFERENTIAL   BASIC METABOLIC PANEL   MAGNESIUM   POCT GLUCOSE   POCT GLUCOSE   POCT GLUCOSE   POCT GLUCOSE   POCT GLUCOSE   POCT GLUCOSE       All other labs were within normal range or not returned as of this dictation.     EMERGENCY DEPARTMENT COURSE and DIFFERENTIAL DIAGNOSIS/MDM:   Vitals:    Vitals:    03/31/21 0029 03/31/21 0048 03/31/21 0127 03/31/21 0140   BP: (!) 187/68 (!) 150/51 (!) 171/69 (!) 184/70   Pulse: 66 63 64 76   Resp: 17 19 19 16   Temp: 98.2 °F (36.8 °C)   TempSrc:    Oral   SpO2: 97% 98% 98% 94%   Weight:       Height:           Elderly female who comes in with chest pain shortness of breath and palpitations was found to be in atrial fibrillation of rapid ventricular response. Laboratory studies chest x-ray EKG ordered. Patient is placed on cardiac blood pressure and pulse oximetry monitoring. Cardiac monitors interpreted myself shows sinus tachycardia. Patient's chart is reviewed I do see where she had been on metoprolol. This has been discontinued. Patient is given 5 mg of IV metoprolol here. She is given aspirin. Patient states she is currently not in pain and no pain medications are given. Laboratory studies showed no acute process and chest x-ray is negative for acute process however given the patient's history and the A. fib with RVR with medication changes I believe is in her best interest to be admitted to the hospital for further care. Patient is agreeable with this plan. Consult is placed to the hospitalist on duty. Upon reassessment after the metoprolol the patient's heart rate is now controlled               CRITICAL CARE TIME   Total Critical Care time was 32 minutes, excluding separately reportable procedures. There was a high probability of clinically significant/life threatening deterioration in the patient's condition which required my urgent intervention. CONSULTS:  IP CONSULT TO HOSPITALIST    PROCEDURES:       Procedures    FINAL IMPRESSION      1. Chest pain, unspecified type    2. Atrial fibrillation with RVR ([address]          DISPOSITION/PLAN   DISPOSITION Admitted 03/31/2021 12:37:46 AM      PATIENT REFERREDTO:  No follow-up provider specified.     DISCHARGEMEDICATIONS:  Current Discharge Medication List             (Please note that portions of this note were completed with a voice recognition program.  Efforts were made to edit the dictations but occasionally words are mis-transcribed.)    Cande Dyson Saeed Varela MD (electronically signed)  Attending Emergency Physician        David Larkin MD  03/31/21 5031

## 2021-03-31 NOTE — H&P
Hospital Medicine History & Physical      PCP: Des Rogers MD    Date of Admission: 3/30/2021    Date of Service: Pt seen/examined on 31 March and Placed in OBServation    Chief Complaint:  Palpitations and chest pain       History Of Present Illness:       81 y.o. female w/ hx Afib s/p prior ablation who presented to [facility] with a 1 day hx of moderately severe non-exertional substernal chest pain w/ associated palpitations and mild SOB but no other associated typical cardiac features. Patient was taken off Metoprolol 4 days prior due to bradycardia. The patient denies any fever/chills or other signs/sxs of systemic illness or identifiable aggravating/alleviating factors. Past Medical History:          Diagnosis Date    Atrial fibrillation ([address]     [facility]     Hydronephrosis 2/14/2014    Hyperlipidemia     Hypertension     Intussusception intestine ([facility]     SVT (supraventricular tachycardia) ([facility] 8/26/2013    AVNRT       Past Surgical History:          Procedure Laterality Date    ABLATION OF DYSRHYTHMIC FOCUS      -2014    APPENDECTOMY      DILATATION, ESOPHAGUS      FRACTURE SURGERY      L ankle    HYSTERECTOMY      SKIN BIOPSY      SMALL INTESTINE SURGERY      TONSILLECTOMY         Medications Prior to Admission:      Prior to Admission medications    Medication Sig Start Date End Date Taking?  Authorizing Provider   pravastatin (PRAVACHOL) 80 MG tablet Take 40 mg by mouth daily   Yes Historical Provider, MD   metoprolol succinate (TOPROL XL) 50 MG extended release tablet Take 1 tablet by mouth daily 3/16/21  Yes Charli Serrano MD   [facility]) 125 MCG capsule Take 1 capsule by mouth every 12 hours 2/4/21  Yes Susy Martinez MD   furosemide (LASIX) 20 MG tablet TAKE ONE TABLET BY MOUTH DAILY 1/18/21  Yes Charli Serrano MD   furosemide (LASIX) 20 MG tablet Take 1 tablet by mouth daily 1/18/21  Yes Charli Serrano MD   ELIQUIS 5 MG TABS tablet TAKE ONE TABLET BY MOUTH TWICE A DAY 10/27/20  Yes Juwan Macias APRN - CNP   valsartan (DIOVAN) 160 MG tablet Take 1 tablet by mouth daily 6/1/20  Yes Luis North MD   pantoprazole (PROTONIX) 20 MG tablet Take 2 tablets by mouth daily  Patient taking differently: Take 20 mg by mouth daily  7/26/18  Yes GUANAKO Foreman - CNP   glyBURIDE (DIABETA) 1.25 MG tablet Take 2.5 mg by mouth daily (with breakfast)    Yes Historical Provider, MD   aspirin 81 MG tablet Take 81 mg by mouth daily   Yes Historical Provider, MD   pioglitazone (ACTOS) 15 MG tablet Take 15 mg by mouth daily    Historical Provider, MD       Allergies:  Hydrocodone, Metformin and related, Nickel, Omeprazole, Prednisone, and Hydralazine    Social History:      The patient currently lives independently    TOBACCO:   reports that she has never smoked. She has never used smokeless tobacco.  ETOH:   reports no history of alcohol use. Family History:      Reviewed in detail and negative for CVA. Positive as follows:        Problem Relation Age of Onset    Diabetes Mother     Heart Disease Father     High Blood Pressure Father     High Cholesterol Father     Cancer Sister     Heart Disease Brother     High Blood Pressure Brother     High Cholesterol Brother        REVIEW OF SYSTEMS:   Pertinent positives as noted in the HPI. All other systems reviewed and negative. PHYSICAL EXAM:    BP (!) 194/75   Pulse 64   Temp 98 °F (36.7 °C) (Oral)   Resp 15   Ht 5' 2\" (1.575 m)   Wt 185 lb (83.9 kg)   SpO2 97%   BMI 33.84 kg/m²     General appearance:  No apparent distress, appears stated age and cooperative. HEENT:  Normal cephalic, atraumatic without obvious deformity. Pupils equal, round, and reactive to light. Extra ocular muscles intact. Conjunctivae/corneas clear. Neck: Supple, with full range of motion. No jugular venous distention. Trachea midline. Respiratory:  Normal respiratory effort.  Clear to auscultation, bilaterally without Rales/Wheezes/Rhonchi. Cardiovascular:  Regular rate and rhythm with normal S1/S2 without murmurs, rubs or gallops. Abdomen: Soft, non-tender, non-distended with normal bowel sounds. Musculoskeletal:  No clubbing, cyanosis or edema bilaterally. Full range of motion without deformity. Skin: Skin color, texture, turgor normal.  No rashes or lesions. Neurologic:  Neurovascularly intact without any focal sensory/motor deficits. Cranial nerves: II-XII intact, grossly non-focal.  Psychiatric:  Alert and oriented, thought content appropriate, normal insight  Capillary Refill: Brisk,< 3 seconds   Peripheral Pulses: +2 palpable, equal bilaterally       CXR:  I have reviewed the CXR with the following interpretation: No acute ASD   EKG:  I have reviewed the EKG with the following interpretation: no acute ischemic changes. Labs:     Recent Labs     03/30/21 2108 03/31/21  0548   WBC 10.8 8.7   HGB 13.4 12.1   HCT 41.4 38.3    323     Recent Labs     03/30/21 2108 03/31/21  0548   * 140   K 4.6 4.3   CL 97* 104   CO2 25 27   BUN 15 16   CREATININE 1.0 0.9   CALCIUM 9.3 9.2     Recent Labs     03/30/21 2108   AST 28   ALT 16   BILITOT 0.3   ALKPHOS 115     No results for input(s): INR in the last 72 hours.   Recent Labs     03/30/21 2108   TROPONINI <0.01       Urinalysis:      Lab Results   Component Value Date    NITRU Negative 01/04/2021    WBCUA 0-2 12/18/2020    BACTERIA 1+ 12/18/2020    RBCUA None seen 12/18/2020    BLOODU Negative 01/04/2021    SPECGRAV 1.015 01/04/2021    GLUCOSEU Negative 01/04/2021         ASSESSMENT:    Active Hospital Problems    Diagnosis Date Noted    Atrial fibrillation with rapid ventricular response ([facility] [I48.91] 03/31/2021    Obesity [E66.9] 03/31/2021    Essential hypertension [I10] 02/07/2019    Chronic anticoagulation [Z79.01] 11/25/2013    PAF (paroxysmal atrial fibrillation) ([address] [I48.0] 08/26/2013    DM2 (diabetes mellitus, type 2) ([facility] [E11.9] 08/26/2013       PLAN:      Afib - chronic persistent of unspecified and clinically unable to determine etiology, s/p prior ablation. Normally rate controlled on BBlocker/Tikosyn - continued. Aticoagulated at baseline on home Eliquis - continued. Monitored on tele. HTN - w/out known CAD and no evidence of active signs/sxs of ischemia/failure. Currently controlled on home meds w/ vitals reviewed and documented as above. DM2 - controlled on home oral antiGlycemics - held. Started on wt based basal/bolus w/ FSBS/SSI low regimen. Anticipate resuming/continuing home regimen at discharge. HypoNatremia - etiology clinically unable to determine but likely hypovolemic. Follow serial labs off IVF. Reviewed and documented as above. Obesity -  With Body mass index is 33.84 kg/m². Complicating assessment and treatment. Placing patient at risk for multiple co-morbidities as well as early death and contributing to the patient's presentation. Counseled on weight loss. DVT Prophylaxis: Eliquis/IPC  Diet: DIET GENERAL;  Code Status: Full Code      PT/OT Eval Status: not ordered. Dispo - Placed in OBServation. Barrett Leung MD    Thank you Cassandra Vidal MD for the opportunity to be involved in this patient's care. If you have any questions or concerns please feel free to contact me at [phone].

## 2021-04-01 ENCOUNTER — OFFICE VISIT (OUTPATIENT)
Dept: CARDIOLOGY CLINIC | Age: 86
End: 2021-04-01
Payer: MEDICARE

## 2021-04-01 VITALS
DIASTOLIC BLOOD PRESSURE: 60 MMHG | SYSTOLIC BLOOD PRESSURE: 130 MMHG | BODY MASS INDEX: 34.6 KG/M2 | WEIGHT: 188 LBS | HEIGHT: 62 IN | HEART RATE: 60 BPM | OXYGEN SATURATION: 98 %

## 2021-04-01 DIAGNOSIS — I10 ESSENTIAL HYPERTENSION: ICD-10-CM

## 2021-04-01 DIAGNOSIS — I47.1 SVT (SUPRAVENTRICULAR TACHYCARDIA) (HCC): ICD-10-CM

## 2021-04-01 DIAGNOSIS — I48.0 PAF (PAROXYSMAL ATRIAL FIBRILLATION) (HCC): Primary | ICD-10-CM

## 2021-04-01 PROBLEM — I48.91 ATRIAL FIBRILLATION WITH RVR (HCC): Status: RESOLVED | Noted: 2020-12-18 | Resolved: 2021-04-01

## 2021-04-01 PROCEDURE — 93000 ELECTROCARDIOGRAM COMPLETE: CPT | Performed by: NURSE PRACTITIONER

## 2021-04-01 PROCEDURE — 99214 OFFICE O/P EST MOD 30 MIN: CPT | Performed by: NURSE PRACTITIONER

## 2021-04-01 NOTE — PROGRESS NOTES
[facility], [place]   Electrophysiology  Note              Date:  April 1, 2021  Patient name: Demetra Yan Payer  YOB: 1935    Primary Care physician: Connie Junior MD    HISTORY OF PRESENT ILLNESS: The patient is an 81 y.o.  female with a history of afib, HTN, SVT, sinus bradycardia, pulmonary HTN, chronic diastolic CHF, and DM. She is a poor historian regarding medications. She had SVT in 2013, wore an event monitor, and atrial fibrillation was detected. She had recurrent AF in 2014 and had a cryoablation for PAF in 2/2014. Post procedure she had a retroperitoneal bleed and required a urinary stent. Echo in 2/2019 showed an EF of 55-60% and severe pulmonary HTN and she was referred to heart failure team. Had recurrent afib 1/2020 and was started on Eliquis and amiodarone. Amiodarone was stopped for unknown reasons. Most recent echo in 4/2020 showed an EF of 55-60%. She was admitted in 12/2020 with afib and was started on Tikosyn. Due to bradycardia, Toprol was stopped 3/23/2021. On 3/30/2021 she went to the ER with PAF and Toprol was restarted at 25mg po QD. Today she is being seen for paroxysmal atrial fibrillation. EKG shows SB with a HR of 59. She complains of chronic SOB with exertion that she relates to weight gain. Denies current chest pain, palpitations, and dizziness. Past Medical History:   has a past medical history of Atrial fibrillation ([address] Diabetes mellitus ([address] Hydronephrosis, Hyperlipidemia, Hypertension, Intussusception intestine ([address] and SVT (supraventricular tachycardia) ([address] Past Surgical History:   has a past surgical history that includes Hysterectomy; Appendectomy; Tonsillectomy; skin biopsy; ablation of dysrhythmic focus; Dilatation, esophagus; Small intestine surgery; and fracture surgery. Home Medications:    Prior to Admission medications    Medication Sig Start Date End Date Taking?  Authorizing Provider   metoprolol succinate (TOPROL XL) 25 MG extended release tablet Take 1 tablet by mouth daily  Patient taking differently: Take 25 mg by mouth daily Indications: Pt reports to taking 1/2 tab daily  4/1/21 5/1/21 Yes Meme Duong MD   pravastatin (PRAVACHOL) 80 MG tablet Take 40 mg by mouth daily   Yes Historical Provider, MD   pioglitazone (ACTOS) 15 MG tablet Take 15 mg by mouth daily   Yes Historical Provider, MD   dofetilide (TIKOSYN) 125 MCG capsule Take 1 capsule by mouth every 12 hours 2/4/21  Yes Pastora Higuera MD   furosemide (LASIX) 20 MG tablet TAKE ONE TABLET BY MOUTH DAILY 1/18/21  Yes Saurabh Zamora MD   furosemide (LASIX) 20 MG tablet Take 1 tablet by mouth daily 1/18/21  Yes Saurabh Zamora MD   ELIQUIS 5 MG TABS tablet TAKE ONE TABLET BY MOUTH TWICE A DAY 10/27/20  Yes GUANAKO Nettles CNP   valsartan (DIOVAN) 160 MG tablet Take 1 tablet by mouth daily 6/1/20  Yes Leopold Pump, MD   pantoprazole (PROTONIX) 20 MG tablet Take 2 tablets by mouth daily  Patient taking differently: Take 20 mg by mouth daily  7/26/18  Yes GUANAKO Grijalva CNP   glyBURIDE (DIABETA) 1.25 MG tablet Take 2.5 mg by mouth daily (with breakfast)    Yes Historical Provider, MD   aspirin 81 MG tablet Take 81 mg by mouth daily   Yes Historical Provider, MD       Allergies:  Hydrocodone, Metformin and related, Nickel, Omeprazole, Prednisone, and Hydralazine    Social History:   reports that she has never smoked. She has never used smokeless tobacco. She reports that she does not drink alcohol or use drugs. Family History: family history includes Cancer in her sister; Diabetes in her mother; Heart Disease in her brother and father; High Blood Pressure in her brother and father; High Cholesterol in her brother and father. Review of Systems   All 14-point review of systems are completed and pertinent positives are mentioned in the history of present illness. Other systems are reviewed and are negative.      PHYSICAL EXAM:    Vital signs: /60   Pulse 60   Ht 5' 2\" (1.575 m)   Wt 188 lb (85.3 kg)   SpO2 98%   BMI 34.39 kg/m²      Constitutional and general appearance: alert, cooperative, no distress and appears stated age  HEENT: PERRL, no cervical lymphadenopathy. No masses palpable. Normal oral mucosa  Respiratory:  · Normal excursion and expansion without use of accessory muscles  · Resp auscultation: Normal breath sounds without dullness or wheezing  Cardiovascular:  · The apical impulse is not displaced  · Heart tones are crisp and normal. Regular S1 and S2.  · Jugular venous pulsation Normal  · The carotid upstroke is normal in amplitude and contour without delay or bruit  · Peripheral pulses are symmetrical and full   Abdomen:  · No masses or tenderness  · Bowel sounds present  Extremities:  ·  No cyanosis or clubbing  ·  No lower extremity edema  ·  Skin: warm and dry  Neurological:  · Alert and oriented  · Moves all extremities well  · No abnormalities of mood, affect, memory, mentation, or behavior are noted    DATA:    ECG 4/1/2021:  SB HR 59    Echo 4/28/2020:  Normal left ventricle systolic function with an estimated ejection fraction of 55-60%. Definity contrast administered with no evidence of left ventricular mass or thrombus noted. No regional wall motion abnormalities are seen. Normal left ventricular diastolic filling pressure. The left atrium is mildly dilated. Mild mitral and tricuspid regurgitation. Systolic pulmonary artery pressure (SPAP) estimated at 42 mmHg (right atrial pressure 3 mmHg), consistent with mild pulmonary hypertension. Echo 2/19/2019:  Definity contrast administered. Normal left ventricular systolic function with an estimated ejection fraction of 55-60%. There is mild septal hypertrophy with normal remaining wall thickness. Normal left ventricular diastolic filling pressure. Mild mitral regurgitation. Mild tricuspid regurgitation.   Systolic pulmonary artery pressure (SPAP) 3/2022)  3. Follow up in 6 weeks     Options discussed due to recurrent afib on Tikosyn. Patient has been doing well on Tikosyn and feels afib with brought on by stopping Toprol. This has been restarted at a lower dose. She will continue the current course and call if afib is recurrent.      MDM: GUANAKO Topete-BATSHEVA  [facility], [place]  [phone]

## 2021-04-19 ENCOUNTER — APPOINTMENT (OUTPATIENT)
Dept: CT IMAGING | Age: 86
DRG: 394 | End: 2021-04-19
Payer: MEDICARE

## 2021-04-19 ENCOUNTER — APPOINTMENT (OUTPATIENT)
Dept: GENERAL RADIOLOGY | Age: 86
DRG: 394 | End: 2021-04-19
Payer: MEDICARE

## 2021-04-19 ENCOUNTER — HOSPITAL ENCOUNTER (INPATIENT)
Age: 86
LOS: 3 days | Discharge: HOME | DRG: 394 | End: 2021-04-22
Attending: EMERGENCY MEDICINE | Admitting: INTERNAL MEDICINE
Payer: MEDICARE

## 2021-04-19 ENCOUNTER — APPOINTMENT (OUTPATIENT)
Dept: ULTRASOUND IMAGING | Age: 86
DRG: 394 | End: 2021-04-19
Payer: MEDICARE

## 2021-04-19 DIAGNOSIS — R10.13 ABDOMINAL PAIN, EPIGASTRIC: Primary | ICD-10-CM

## 2021-04-19 DIAGNOSIS — R11.2 INTRACTABLE VOMITING WITH NAUSEA, UNSPECIFIED VOMITING TYPE: ICD-10-CM

## 2021-04-19 DIAGNOSIS — K80.20 CALCULUS OF GALLBLADDER WITHOUT CHOLECYSTITIS WITHOUT OBSTRUCTION: ICD-10-CM

## 2021-04-19 LAB
A/G RATIO: 1 (ref 1.1–2.2)
ALBUMIN SERPL-MCNC: 4.3 G/DL (ref 3.4–5)
ALP BLD-CCNC: 97 U/L (ref 40–129)
ALT SERPL-CCNC: 11 U/L (ref 10–40)
ANION GAP SERPL CALCULATED.3IONS-SCNC: 12 MMOL/L (ref 3–16)
AST SERPL-CCNC: 17 U/L (ref 15–37)
BASOPHILS ABSOLUTE: 0.1 K/UL (ref 0–0.2)
BASOPHILS RELATIVE PERCENT: 0.5 %
BILIRUB SERPL-MCNC: 0.5 MG/DL (ref 0–1)
BILIRUBIN URINE: NEGATIVE
BLOOD, URINE: ABNORMAL
BUN BLDV-MCNC: 16 MG/DL (ref 7–20)
CALCIUM SERPL-MCNC: 9.1 MG/DL (ref 8.3–10.6)
CHLORIDE BLD-SCNC: 97 MMOL/L (ref 99–110)
CLARITY: CLEAR
CO2: 26 MMOL/L (ref 21–32)
COLOR: YELLOW
CREAT SERPL-MCNC: 0.8 MG/DL (ref 0.6–1.2)
EKG ATRIAL RATE: 65 BPM
EKG DIAGNOSIS: NORMAL
EKG P AXIS: 55 DEGREES
EKG P-R INTERVAL: 176 MS
EKG Q-T INTERVAL: 428 MS
EKG QRS DURATION: 82 MS
EKG QTC CALCULATION (BAZETT): 445 MS
EKG R AXIS: 0 DEGREES
EKG T AXIS: 55 DEGREES
EKG VENTRICULAR RATE: 65 BPM
EOSINOPHILS ABSOLUTE: 0.2 K/UL (ref 0–0.6)
EOSINOPHILS RELATIVE PERCENT: 1.7 %
GFR AFRICAN AMERICAN: >60
GFR NON-AFRICAN AMERICAN: >60
GLOBULIN: 4.5 G/DL
GLUCOSE BLD-MCNC: 157 MG/DL (ref 70–99)
GLUCOSE BLD-MCNC: 175 MG/DL (ref 70–99)
GLUCOSE URINE: NEGATIVE MG/DL
HCT VFR BLD CALC: 39.7 % (ref 36–48)
HEMOGLOBIN: 12.4 G/DL (ref 12–16)
KETONES, URINE: NEGATIVE MG/DL
LEUKOCYTE ESTERASE, URINE: ABNORMAL
LIPASE: 26 U/L (ref 13–60)
LYMPHOCYTES ABSOLUTE: 2.2 K/UL (ref 1–5.1)
LYMPHOCYTES RELATIVE PERCENT: 19.6 %
MCH RBC QN AUTO: 23.6 PG (ref 26–34)
MCHC RBC AUTO-ENTMCNC: 31.3 G/DL (ref 31–36)
MCV RBC AUTO: 75.5 FL (ref 80–100)
MICROSCOPIC EXAMINATION: YES
MONOCYTES ABSOLUTE: 1 K/UL (ref 0–1.3)
MONOCYTES RELATIVE PERCENT: 9.1 %
NEUTROPHILS ABSOLUTE: 7.9 K/UL (ref 1.7–7.7)
NEUTROPHILS RELATIVE PERCENT: 69.1 %
NITRITE, URINE: NEGATIVE
PDW BLD-RTO: 15.8 % (ref 12.4–15.4)
PERFORMED ON: ABNORMAL
PH UA: 5.5 (ref 5–8)
PLATELET # BLD: 359 K/UL (ref 135–450)
PMV BLD AUTO: 8.9 FL (ref 5–10.5)
POTASSIUM REFLEX MAGNESIUM: 4 MMOL/L (ref 3.5–5.1)
PROTEIN UA: NEGATIVE MG/DL
RBC # BLD: 5.27 M/UL (ref 4–5.2)
RBC UA: NORMAL /HPF (ref 0–4)
SODIUM BLD-SCNC: 135 MMOL/L (ref 136–145)
SPECIFIC GRAVITY UA: 1.01 (ref 1–1.03)
TOTAL PROTEIN: 8.8 G/DL (ref 6.4–8.2)
TROPONIN: <0.01 NG/ML
URINE TYPE: ABNORMAL
UROBILINOGEN, URINE: 0.2 E.U./DL
WBC # BLD: 11.4 K/UL (ref 4–11)
WBC UA: NORMAL /HPF (ref 0–5)

## 2021-04-19 PROCEDURE — 6360000004 HC RX CONTRAST MEDICATION: Performed by: NURSE PRACTITIONER

## 2021-04-19 PROCEDURE — 2500000003 HC RX 250 WO HCPCS: Performed by: NURSE PRACTITIONER

## 2021-04-19 PROCEDURE — 84484 ASSAY OF TROPONIN QUANT: CPT

## 2021-04-19 PROCEDURE — 71045 X-RAY EXAM CHEST 1 VIEW: CPT

## 2021-04-19 PROCEDURE — 6370000000 HC RX 637 (ALT 250 FOR IP): Performed by: INTERNAL MEDICINE

## 2021-04-19 PROCEDURE — 93005 ELECTROCARDIOGRAM TRACING: CPT | Performed by: NURSE PRACTITIONER

## 2021-04-19 PROCEDURE — 83690 ASSAY OF LIPASE: CPT

## 2021-04-19 PROCEDURE — 74177 CT ABD & PELVIS W/CONTRAST: CPT

## 2021-04-19 PROCEDURE — 99285 EMERGENCY DEPT VISIT HI MDM: CPT

## 2021-04-19 PROCEDURE — 1200000000 HC SEMI PRIVATE

## 2021-04-19 PROCEDURE — 80053 COMPREHEN METABOLIC PANEL: CPT

## 2021-04-19 PROCEDURE — 6360000002 HC RX W HCPCS: Performed by: NURSE PRACTITIONER

## 2021-04-19 PROCEDURE — 2580000003 HC RX 258: Performed by: NURSE PRACTITIONER

## 2021-04-19 PROCEDURE — 96374 THER/PROPH/DIAG INJ IV PUSH: CPT

## 2021-04-19 PROCEDURE — 93010 ELECTROCARDIOGRAM REPORT: CPT | Performed by: INTERNAL MEDICINE

## 2021-04-19 PROCEDURE — 96375 TX/PRO/DX INJ NEW DRUG ADDON: CPT

## 2021-04-19 PROCEDURE — 76705 ECHO EXAM OF ABDOMEN: CPT

## 2021-04-19 PROCEDURE — 96376 TX/PRO/DX INJ SAME DRUG ADON: CPT

## 2021-04-19 PROCEDURE — 2580000003 HC RX 258: Performed by: INTERNAL MEDICINE

## 2021-04-19 PROCEDURE — 85025 COMPLETE CBC W/AUTO DIFF WBC: CPT

## 2021-04-19 PROCEDURE — 81001 URINALYSIS AUTO W/SCOPE: CPT

## 2021-04-19 RX ORDER — ACETAMINOPHEN 325 MG/1
650 TABLET ORAL EVERY 6 HOURS PRN
Status: DISCONTINUED | OUTPATIENT
Start: 2021-04-19 | End: 2021-04-22 | Stop reason: HOSPADM

## 2021-04-19 RX ORDER — ASPIRIN 81 MG/1
81 TABLET ORAL DAILY
Status: DISCONTINUED | OUTPATIENT
Start: 2021-04-20 | End: 2021-04-22 | Stop reason: HOSPADM

## 2021-04-19 RX ORDER — DOFETILIDE 0.12 MG/1
125 CAPSULE ORAL EVERY 12 HOURS SCHEDULED
Status: DISCONTINUED | OUTPATIENT
Start: 2021-04-19 | End: 2021-04-22 | Stop reason: HOSPADM

## 2021-04-19 RX ORDER — MORPHINE SULFATE 4 MG/ML
4 INJECTION, SOLUTION INTRAMUSCULAR; INTRAVENOUS ONCE
Status: COMPLETED | OUTPATIENT
Start: 2021-04-19 | End: 2021-04-19

## 2021-04-19 RX ORDER — ONDANSETRON 2 MG/ML
4 INJECTION INTRAMUSCULAR; INTRAVENOUS ONCE
Status: COMPLETED | OUTPATIENT
Start: 2021-04-19 | End: 2021-04-19

## 2021-04-19 RX ORDER — 0.9 % SODIUM CHLORIDE 0.9 %
1000 INTRAVENOUS SOLUTION INTRAVENOUS ONCE
Status: COMPLETED | OUTPATIENT
Start: 2021-04-19 | End: 2021-04-19

## 2021-04-19 RX ORDER — PROCHLORPERAZINE EDISYLATE 5 MG/ML
10 INJECTION INTRAMUSCULAR; INTRAVENOUS ONCE
Status: COMPLETED | OUTPATIENT
Start: 2021-04-19 | End: 2021-04-19

## 2021-04-19 RX ORDER — PRAVASTATIN SODIUM 40 MG
40 TABLET ORAL DAILY
Status: DISCONTINUED | OUTPATIENT
Start: 2021-04-20 | End: 2021-04-22 | Stop reason: HOSPADM

## 2021-04-19 RX ORDER — SODIUM CHLORIDE 9 MG/ML
25 INJECTION, SOLUTION INTRAVENOUS PRN
Status: DISCONTINUED | OUTPATIENT
Start: 2021-04-19 | End: 2021-04-22 | Stop reason: HOSPADM

## 2021-04-19 RX ORDER — MORPHINE SULFATE 2 MG/ML
2 INJECTION, SOLUTION INTRAMUSCULAR; INTRAVENOUS EVERY 4 HOURS PRN
Status: DISCONTINUED | OUTPATIENT
Start: 2021-04-19 | End: 2021-04-22 | Stop reason: HOSPADM

## 2021-04-19 RX ORDER — SODIUM CHLORIDE 0.9 % (FLUSH) 0.9 %
5-40 SYRINGE (ML) INJECTION EVERY 12 HOURS SCHEDULED
Status: DISCONTINUED | OUTPATIENT
Start: 2021-04-19 | End: 2021-04-22 | Stop reason: HOSPADM

## 2021-04-19 RX ORDER — PANTOPRAZOLE SODIUM 40 MG/10ML
40 INJECTION, POWDER, LYOPHILIZED, FOR SOLUTION INTRAVENOUS DAILY
Status: DISCONTINUED | OUTPATIENT
Start: 2021-04-20 | End: 2021-04-22 | Stop reason: HOSPADM

## 2021-04-19 RX ORDER — ACETAMINOPHEN 650 MG/1
650 SUPPOSITORY RECTAL EVERY 6 HOURS PRN
Status: DISCONTINUED | OUTPATIENT
Start: 2021-04-19 | End: 2021-04-22 | Stop reason: HOSPADM

## 2021-04-19 RX ORDER — SODIUM CHLORIDE 0.9 % (FLUSH) 0.9 %
5-40 SYRINGE (ML) INJECTION PRN
Status: DISCONTINUED | OUTPATIENT
Start: 2021-04-19 | End: 2021-04-22 | Stop reason: HOSPADM

## 2021-04-19 RX ORDER — VALSARTAN 80 MG/1
160 TABLET ORAL DAILY
Status: DISCONTINUED | OUTPATIENT
Start: 2021-04-20 | End: 2021-04-22 | Stop reason: HOSPADM

## 2021-04-19 RX ORDER — PROMETHAZINE HYDROCHLORIDE 25 MG/1
12.5 TABLET ORAL EVERY 6 HOURS PRN
Status: DISCONTINUED | OUTPATIENT
Start: 2021-04-19 | End: 2021-04-22 | Stop reason: HOSPADM

## 2021-04-19 RX ORDER — SODIUM CHLORIDE 9 MG/ML
INJECTION, SOLUTION INTRAVENOUS CONTINUOUS
Status: DISCONTINUED | OUTPATIENT
Start: 2021-04-19 | End: 2021-04-21

## 2021-04-19 RX ORDER — METOPROLOL SUCCINATE 25 MG/1
25 TABLET, EXTENDED RELEASE ORAL DAILY
Status: DISCONTINUED | OUTPATIENT
Start: 2021-04-20 | End: 2021-04-22 | Stop reason: HOSPADM

## 2021-04-19 RX ORDER — ONDANSETRON 2 MG/ML
4 INJECTION INTRAMUSCULAR; INTRAVENOUS EVERY 6 HOURS PRN
Status: DISCONTINUED | OUTPATIENT
Start: 2021-04-19 | End: 2021-04-22 | Stop reason: HOSPADM

## 2021-04-19 RX ADMIN — ONDANSETRON 4 MG: 2 INJECTION INTRAMUSCULAR; INTRAVENOUS at 14:06

## 2021-04-19 RX ADMIN — MORPHINE SULFATE 4 MG: 4 INJECTION, SOLUTION INTRAMUSCULAR; INTRAVENOUS at 12:11

## 2021-04-19 RX ADMIN — DOFETILIDE 125 MCG: 0.12 CAPSULE ORAL at 23:15

## 2021-04-19 RX ADMIN — ONDANSETRON 4 MG: 2 INJECTION INTRAMUSCULAR; INTRAVENOUS at 12:11

## 2021-04-19 RX ADMIN — Medication 10 ML: at 22:48

## 2021-04-19 RX ADMIN — IOPAMIDOL 75 ML: 755 INJECTION, SOLUTION INTRAVENOUS at 15:32

## 2021-04-19 RX ADMIN — FAMOTIDINE 20 MG: 10 INJECTION, SOLUTION INTRAVENOUS at 19:34

## 2021-04-19 RX ADMIN — PROCHLORPERAZINE EDISYLATE 10 MG: 5 INJECTION INTRAMUSCULAR; INTRAVENOUS at 18:07

## 2021-04-19 RX ADMIN — MORPHINE SULFATE 2 MG: 2 INJECTION, SOLUTION INTRAMUSCULAR; INTRAVENOUS at 20:36

## 2021-04-19 RX ADMIN — APIXABAN 5 MG: 5 TABLET, FILM COATED ORAL at 23:15

## 2021-04-19 RX ADMIN — SODIUM CHLORIDE: 9 INJECTION, SOLUTION INTRAVENOUS at 22:48

## 2021-04-19 RX ADMIN — MORPHINE SULFATE 4 MG: 4 INJECTION, SOLUTION INTRAMUSCULAR; INTRAVENOUS at 13:39

## 2021-04-19 RX ADMIN — SODIUM CHLORIDE 1000 ML: 9 INJECTION, SOLUTION INTRAVENOUS at 12:10

## 2021-04-19 ASSESSMENT — PAIN SCALES - GENERAL
PAINLEVEL_OUTOF10: 0
PAINLEVEL_OUTOF10: 5
PAINLEVEL_OUTOF10: 0
PAINLEVEL_OUTOF10: 10
PAINLEVEL_OUTOF10: 8

## 2021-04-19 ASSESSMENT — PAIN DESCRIPTION - ORIENTATION: ORIENTATION: MID

## 2021-04-19 ASSESSMENT — PAIN DESCRIPTION - PAIN TYPE: TYPE: ACUTE PAIN

## 2021-04-19 NOTE — ED NOTES
Patient was brought back from CT due to IV infiltrating with the contrast. IV removed, patient denies any pain at the site. New IV started in other arm, CT notified.       Bianca Casanova RN  04/19/21 5098

## 2021-04-19 NOTE — ED PROVIDER NOTES
[facility]  ED  EMERGENCY DEPARTMENT ENCOUNTER        Pt Name: Rama Tiwari  MRN: [id number]  Evelyngftyesha 1935  Date of evaluation: 4/19/2021  Provider: GUANAKO Barahona - BATSHEVA  PCP: Rosalva Pruitt MD    I have seen and evaluated this patient with my supervising physician Dr. Kathie Mckenna       Chief Complaint   Patient presents with    Abdominal Pain     severe mid abdominal pain that began this morning, hx of obstructions, states it feels the same; had three grey/green formed bowel movements this morning    Nausea       HISTORY OF PRESENT ILLNESS   (Location, Timing/Onset, Context/Setting, Quality, Duration, Modifying Factors, Severity, Associated Signs and Symptoms)  Note limiting factors. Rama Tiwari is a 81 y.o. female medical history of A. fib, DM, HTN, HLD, hydronephrosis, intussusception intestine, SVT, SBO, appendectomy, hysterectomy, small distal surgery presents the ED with complaints of epigastric abdominal pain that started approximately 10 AM this morning while she was shopping at the grocery store. She states the pain has been intermittent in nature and lasts a few minutes. Denies any aggravating or alleviating factors. One point she thought she was hungry and attempted to eat a bowl of Cheerios, although she was only able to take a few bites. Patient says she has had frequent episodes of this pain and has noted to be partial small bowel obstructions in the past due to scar tissue. Her last bowel movement was this morning and states that she did have 3 Grey/green, nonbloody normal consistency bowel movements. States she was last admitted in September 2020 for similar symptoms. She does note to last having an EGD and colonoscopy approximately 2 to 3 years ago that was normal to her knowledge. She denies any radiation of pain. She denies taking any medication at home for the symptoms.   She denies any associated chest pain, short of air, cough, wheezing, nausea, vomiting, diarrhea, leg, dizzy, syncope, fevers, rashes, urinary symptoms, leg swelling, headache, lightheaded, dizzy, syncope, numbness, or tingling. She denies any smoking, alcohol use, or street drugs. Nursing Notes were all reviewed and agreed with or any disagreements were addressed in the HPI. Admit Date: 9/13/2020   Discharge Date: 9/15/2020    Disposition:  Home   Condition at Discharge: Stable  Primary Care Provider: Jarred Bro MD  Admitting Physician: Keely Grace MD  Discharge Physician: Andre Basilio MD   Discharge Diagnoses:       C/[address] Problems     Diagnosis    Small bowel obstruction [facility]) [B33.000]   Hospital Course:   Tom Tiwari is a 81 y.o. female who presented/brought to  on 9/13/2020 for abdominal pain, known history of recurrent partial bowel obstruction versus ileus. Assessment/Plan:     Partial SBO: Recurrent abdominal pain, CT scan raise concern of possible partial bowel obstruction at the level of anastomosis from previous surgery versus adhesion. GenSurg following. Symptoms improved and tolerating diet.      Multiple incidental finding including calcified granuloma in the right lower lobe, liver and spleen suggestive of prior granulomatous disease.       Cholelithiasis without cholecystitis     Paroxysmal atrial fibrillation, SVT, anticoagulated with Eliquis      Severe pulmonary hypertension, EF 55 to 60% on echo 2/2019     Essential hypertension, controlled     Diabetes mellitus type 2, non-insulin-dependent      History of SVT    REVIEW OF SYSTEMS    (2-9 systems for level 4, 10 or more for level 5)     Review of Systems    Positives and Pertinent negatives as per HPI. Except as noted above in the ROS, all other systems were reviewed and negative.        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         SURGICAL HISTORY     Past Surgical History:   Procedure Laterality Date    ABLATION OF DYSRHYTHMIC FOCUS      -2014    APPENDECTOMY      DILATATION, ESOPHAGUS      FRACTURE SURGERY      L ankle    HYSTERECTOMY      SKIN BIOPSY      SMALL INTESTINE SURGERY      TONSILLECTOMY           CURRENTMEDICATIONS       Previous Medications    ASPIRIN 81 MG TABLET    Take 81 mg by mouth daily    DOFETILIDE (TIKOSYN) 125 MCG CAPSULE    Take 1 capsule by mouth every 12 hours    ELIQUIS 5 MG TABS TABLET    TAKE ONE TABLET BY MOUTH TWICE A DAY    FUROSEMIDE (LASIX) 20 MG TABLET    TAKE ONE TABLET BY MOUTH DAILY    FUROSEMIDE (LASIX) 20 MG TABLET    Take 1 tablet by mouth daily    GLYBURIDE (DIABETA) 1.25 MG TABLET    Take 2.5 mg by mouth daily (with breakfast)     METOPROLOL SUCCINATE (TOPROL XL) 25 MG EXTENDED RELEASE TABLET    Take 1 tablet by mouth daily    PANTOPRAZOLE (PROTONIX) 20 MG TABLET    Take 2 tablets by mouth daily    PIOGLITAZONE (ACTOS) 15 MG TABLET    Take 15 mg by mouth daily    PRAVASTATIN (PRAVACHOL) 80 MG TABLET    Take 40 mg by mouth daily    VALSARTAN (DIOVAN) 160 MG TABLET    Take 1 tablet by mouth daily         ALLERGIES     Hydrocodone, Metformin and related, Nickel, Omeprazole, Prednisone, and Hydralazine    FAMILYHISTORY       Family History   Problem Relation Age of Onset    Diabetes Mother     Heart Disease Father     High Blood Pressure Father     High Cholesterol Father     Cancer Sister     Heart Disease Brother     High Blood Pressure Brother     High Cholesterol Brother           SOCIAL HISTORY       Social History     Tobacco Use    Smoking status: Never Smoker    Smokeless tobacco: Never Used   Substance Use Topics    Alcohol use: No    Drug use: No       SCREENINGS    Rob Coma Scale  Eye Opening: Spontaneous  Best Verbal Response: Oriented  Best Motor Response: Obeys commands  Orb Coma Scale Score: 15        PHYSICAL EXAM    (up to 7 for level 4, 8 or more for level 5)     ED Triage Vitals [04/19/21 1146]   BP Temp Temp Source Pulse Resp SpO2 Height Weight   (!) 163/111 98.4 °F (36.9 °C) Oral 63 18 99 % 5' 2\" (1.575 m) 180 lb (81.6 kg)       Physical Exam  Vitals signs and nursing note reviewed. Constitutional:       General: She is awake. Appearance: Normal appearance. She is well-developed. She is obese. HENT:      Head: Normocephalic and atraumatic. Nose: Nose normal.   Eyes:      General:         Right eye: No discharge. Left eye: No discharge. Neck:      Musculoskeletal: Normal range of motion. Cardiovascular:      Rate and Rhythm: Normal rate and regular rhythm. Heart sounds: Normal heart sounds. Pulmonary:      Effort: Pulmonary effort is normal. No respiratory distress. Breath sounds: Normal breath sounds. Chest:      Chest wall: No tenderness. Abdominal:      General: Bowel sounds are normal.      Palpations: Abdomen is soft. Tenderness: There is abdominal tenderness in the epigastric area. There is no right CVA tenderness or left CVA tenderness. Musculoskeletal: Normal range of motion. Right lower leg: No edema. Left lower leg: No edema. Skin:     General: Skin is warm and dry. Coloration: Skin is not pale. Neurological:      Mental Status: She is alert and oriented to person, place, and time. Psychiatric:         Behavior: Behavior normal. Behavior is cooperative.          DIAGNOSTIC RESULTS   LABS:    Labs Reviewed   CBC WITH AUTO DIFFERENTIAL - Abnormal; Notable for the following components:       Result Value    WBC 11.4 (*)     RBC 5.27 (*)     MCV 75.5 (*)     MCH 23.6 (*)     RDW 15.8 (*)     Neutrophils Absolute 7.9 (*)     All other components within normal limits    Narrative:     Performed at:  [facility]  [address] [place] [address],  [address] [place] [address], [address]   Phone [phone]   COMPREHENSIVE METABOLIC PANEL W/ REFLEX TO MG FOR LOW K - Abnormal; Notable for the following components:    Sodium 135 (*)     Chloride 97 (*)     Glucose 157 (*)     Total Protein 8.8 (*)     Albumin/Globulin Ratio 1.0 (*)     All other components within normal limits    Narrative:     Performed at:  [address],  [address] [place] [address] Drive, [address]   Phone [phone] - Abnormal; Notable for the following components:    Blood, Urine TRACE-INTACT (*)     Leukocyte Esterase, Urine TRACE (*)     All other components within normal limits    Narrative:     Performed at:  [address],  [address], [address]   Phone [phone]   LIPASE    Narrative:     Performed at:  [address],  [address] [place] [address] Drive, [address]   Phone [phone]   TROPONIN    Narrative:     Performed at:  [address],  [address], [address]   Phone [phone]   MICROSCOPIC URINALYSIS    Narrative:     Performed at:  [address],  [address], [address]   Phone [phone]       All other labs were within normal range or not returned as of this dictation. EKG: All EKG's are interpreted by the Emergency Department Physician in the absence of a cardiologist.  Please see their note for interpretation of EKG. RADIOLOGY:   Non-plain film images such as CT, Ultrasound and MRI are read by the radiologist. Plain radiographic images are visualized and preliminarily interpreted by the ED Provider with the below findings:        Interpretation per the Radiologist below, if available at the time of this note:    [address]   Final Result   Cholelithiasis. No evidence of acute cholecystitis.   No choledocholithiasis   or biliary UTI, vs COVID-19.  -  Work-up included:  See above CT abdomen pelvis, troponin, CBC, CMP, lipase, UA, EKG, CXR,  -  ED treatment included:   Normal saline, Zofran, morphine, Compazine  - Consults: None  -  Results discussed with patient. Labs show  CBC with WBC 11.4, RBC 5.27, MCH 23.6, RDW 15.8, MCV 75.5, absolute neutrophils 7.9. CMP with sodium 135, chloride 97, glucose 157, protein 8.8. Lipase negative. Troponin negative. UA with trace intact blood, trace leukocytes. CXR shows no acute cardiopulmonary process. CT chest abdomen pelvis shows no acute abdominal pelvic abnormality. Cholelithiasis. Patient was given water for p.o. challenge and states that she is feeling a lot better. Informed on pending ultrasound of right upper quadrant. Ultrasound shows cholelithiasis. No evidence of acute cholecystitis. No choledocholithiasis or biliary ductal dilatation. Patient was informed of these results. She is still having emesis despite the Zofran. Compazine was given. She was informed on the need for admission for additional testing and treatment. The patient is agreeable with plan of care and disposition.  -  Disposition: Admission    CRITICAL CARE TIME   Total Critical Care time was 30 minutes, excluding separately reportable procedures. There was a high probability of clinically significant/life threatening deterioration in the patient's condition which required my urgent intervention. FINAL IMPRESSION      1. Abdominal pain, epigastric    2. Calculus of gallbladder without cholecystitis without obstruction    3.  Intractable vomiting with nausea, unspecified vomiting type          DISPOSITION/PLAN   DISPOSITION    Admission         (Please note that portions of this note were completed with a voice recognition program.  Efforts were made to edit the dictations but occasionally words are mis-transcribed.)    GUANAKO Mclaughlin - CNP (electronically signed)            Arcelia Vance Yulia Ko - CNP  04/19/21 2033

## 2021-04-19 NOTE — ED PROVIDER NOTES
I independently performed a history and physical on Demetra Tiwari. All diagnostic, treatment, and disposition decisions were made by myself in conjunction with the advanced practice provider. I have participated in the medical decision making and directed the treatment plan and disposition of the patient. For further details of Demetra Yan [facility] emergency department encounter, please see the advanced practice provider's documentation. CHIEF COMPLAINT  Chief Complaint   Patient presents with    Abdominal Pain     severe mid abdominal pain that began this morning, hx of obstructions, states it feels the same; had three grey/green formed bowel movements this morning    Nausea       Briefly, Livier Johnson is a 81 y.o. female  who presents to the ED complaining of abdominal pain with nausea and vomiting    FOCUSED PHYSICAL EXAMINATION  BP (!) 164/57   Pulse 65   Temp 98.4 °F (36.9 °C) (Oral)   Resp 18   Ht 5' 2\" (1.575 m)   Wt 180 lb (81.6 kg)   SpO2 95%   BMI 32.92 kg/m²      Focused physical examination:  General appearance:  Cooperative. No acute distress. Skin:  Warm. Dry. Eye:  Extraocular movements intact. Ears, nose, mouth and throat:  Oral mucosa moist,  Neck:  Trachea midline. Heart:  Regular rate and rhythm  Perfusion:  intact  Respiratory:  Lungs clear to auscultation bilaterally. Respirations nonlabored. Abdominal:   Non distended. Mild generalized tenderness throughout  Neurological:  Alert and oriented x 3. Moves all extremities spontaneously  Musculoskeletal:   Normal ROM, no deformities          Psychiatric:  Normal mood      EKG: Sinus rhythm rate of 65 bpm.  No ST elevation or arrhythmia    MDM: Patient presents emergency department today for abdominal pain with nausea and vomiting. Fortunately, despite multiple rounds of antiemetics and pain medication patient cannot tolerate oral intake and continues to vomit.   CT scan performed shows cholelithiasis but no signs of cholecystitis or other acute pathology. Will be admitted for symptom control    During the patient's ED course, the patient was given:  Medications   famotidine (PEPCID) injection 20 mg (has no administration in time range)   0.9 % sodium chloride bolus (0 mLs Intravenous Stopped 4/19/21 1437)   morphine (PF) injection 4 mg (4 mg Intravenous Given 4/19/21 1211)   ondansetron (ZOFRAN) injection 4 mg (4 mg Intravenous Given 4/19/21 1211)   morphine (PF) injection 4 mg (4 mg Intravenous Given 4/19/21 1339)   iopamidol (ISOVUE-370) 76 % injection 75 mL (75 mLs Intravenous Given 4/19/21 1532)   ondansetron (ZOFRAN) injection 4 mg (4 mg Intravenous Given 4/19/21 1406)   prochlorperazine (COMPAZINE) injection 10 mg (10 mg Intravenous Given 4/19/21 1807)        CLINICAL IMPRESSION  1. Abdominal pain, epigastric    2. Calculus of gallbladder without cholecystitis without obstruction    3. Intractable vomiting with nausea, unspecified vomiting type        DISPOSITION  Admission      This chart was created using Dragon dictation software. Efforts were made by me to ensure accuracy, however some errors may be present due to limitations of this technology.             Sandra Tamayo MD  04/19/21 8862

## 2021-04-19 NOTE — PROGRESS NOTES
Patient's left arm IV infiltrated during contrast injection. She did receive some contrast,  as she mentioned the typical feeling of warmth in her throat associated with contrast administration. Injection terminated when she complained of discomfort and was found to have a small extravasation, writer would estimate less than 10 ml's. Pola Ambriz RN notified and new IV placed to complete exam. Araceli Jeffries stated she will examine the site. Safe[employer] done and paperwork for extravasation was filled out. Pt had minimal discomfort.

## 2021-04-20 LAB
ANION GAP SERPL CALCULATED.3IONS-SCNC: 8 MMOL/L (ref 3–16)
BASOPHILS ABSOLUTE: 0 K/UL (ref 0–0.2)
BASOPHILS RELATIVE PERCENT: 0.2 %
BUN BLDV-MCNC: 16 MG/DL (ref 7–20)
CALCIUM SERPL-MCNC: 8.1 MG/DL (ref 8.3–10.6)
CHLORIDE BLD-SCNC: 101 MMOL/L (ref 99–110)
CO2: 25 MMOL/L (ref 21–32)
CREAT SERPL-MCNC: 0.8 MG/DL (ref 0.6–1.2)
EOSINOPHILS ABSOLUTE: 0 K/UL (ref 0–0.6)
EOSINOPHILS RELATIVE PERCENT: 0.4 %
GFR AFRICAN AMERICAN: >60
GFR NON-AFRICAN AMERICAN: >60
GLUCOSE BLD-MCNC: 103 MG/DL (ref 70–99)
GLUCOSE BLD-MCNC: 113 MG/DL (ref 70–99)
GLUCOSE BLD-MCNC: 115 MG/DL (ref 70–99)
GLUCOSE BLD-MCNC: 118 MG/DL (ref 70–99)
GLUCOSE BLD-MCNC: 139 MG/DL (ref 70–99)
GLUCOSE BLD-MCNC: 67 MG/DL (ref 70–99)
GLUCOSE BLD-MCNC: 69 MG/DL (ref 70–99)
GLUCOSE BLD-MCNC: 71 MG/DL (ref 70–99)
GLUCOSE BLD-MCNC: 79 MG/DL (ref 70–99)
HCT VFR BLD CALC: 35.2 % (ref 36–48)
HEMOGLOBIN: 11.3 G/DL (ref 12–16)
LYMPHOCYTES ABSOLUTE: 1.2 K/UL (ref 1–5.1)
LYMPHOCYTES RELATIVE PERCENT: 12.1 %
MCH RBC QN AUTO: 24.3 PG (ref 26–34)
MCHC RBC AUTO-ENTMCNC: 32 G/DL (ref 31–36)
MCV RBC AUTO: 76 FL (ref 80–100)
MONOCYTES ABSOLUTE: 1.1 K/UL (ref 0–1.3)
MONOCYTES RELATIVE PERCENT: 10.6 %
NEUTROPHILS ABSOLUTE: 7.9 K/UL (ref 1.7–7.7)
NEUTROPHILS RELATIVE PERCENT: 76.7 %
PDW BLD-RTO: 15.9 % (ref 12.4–15.4)
PERFORMED ON: ABNORMAL
PERFORMED ON: NORMAL
PERFORMED ON: NORMAL
PLATELET # BLD: 303 K/UL (ref 135–450)
PMV BLD AUTO: 8.9 FL (ref 5–10.5)
POTASSIUM REFLEX MAGNESIUM: 4.1 MMOL/L (ref 3.5–5.1)
RBC # BLD: 4.63 M/UL (ref 4–5.2)
SODIUM BLD-SCNC: 134 MMOL/L (ref 136–145)
WBC # BLD: 10.3 K/UL (ref 4–11)

## 2021-04-20 PROCEDURE — 6360000002 HC RX W HCPCS: Performed by: INTERNAL MEDICINE

## 2021-04-20 PROCEDURE — 83036 HEMOGLOBIN GLYCOSYLATED A1C: CPT

## 2021-04-20 PROCEDURE — 85025 COMPLETE CBC W/AUTO DIFF WBC: CPT

## 2021-04-20 PROCEDURE — 80048 BASIC METABOLIC PNL TOTAL CA: CPT

## 2021-04-20 PROCEDURE — C9113 INJ PANTOPRAZOLE SODIUM, VIA: HCPCS | Performed by: INTERNAL MEDICINE

## 2021-04-20 PROCEDURE — 6370000000 HC RX 637 (ALT 250 FOR IP): Performed by: INTERNAL MEDICINE

## 2021-04-20 PROCEDURE — 2580000003 HC RX 258: Performed by: INTERNAL MEDICINE

## 2021-04-20 PROCEDURE — 1200000000 HC SEMI PRIVATE

## 2021-04-20 PROCEDURE — 6360000002 HC RX W HCPCS: Performed by: NURSE PRACTITIONER

## 2021-04-20 RX ORDER — SODIUM CHLORIDE 9 MG/ML
INJECTION, SOLUTION INTRAVENOUS CONTINUOUS
Status: DISCONTINUED | OUTPATIENT
Start: 2021-04-20 | End: 2021-04-21

## 2021-04-20 RX ORDER — FUROSEMIDE 20 MG/1
20 TABLET ORAL DAILY
Status: DISCONTINUED | OUTPATIENT
Start: 2021-04-21 | End: 2021-04-22 | Stop reason: HOSPADM

## 2021-04-20 RX ORDER — NICOTINE POLACRILEX 4 MG
15 LOZENGE BUCCAL PRN
Status: DISCONTINUED | OUTPATIENT
Start: 2021-04-20 | End: 2021-04-22 | Stop reason: HOSPADM

## 2021-04-20 RX ORDER — DEXTROSE MONOHYDRATE 50 MG/ML
100 INJECTION, SOLUTION INTRAVENOUS PRN
Status: DISCONTINUED | OUTPATIENT
Start: 2021-04-20 | End: 2021-04-22 | Stop reason: HOSPADM

## 2021-04-20 RX ORDER — DEXTROSE MONOHYDRATE 25 G/50ML
12.5 INJECTION, SOLUTION INTRAVENOUS PRN
Status: DISCONTINUED | OUTPATIENT
Start: 2021-04-20 | End: 2021-04-22 | Stop reason: HOSPADM

## 2021-04-20 RX ADMIN — Medication 10 ML: at 20:01

## 2021-04-20 RX ADMIN — Medication 10 ML: at 08:19

## 2021-04-20 RX ADMIN — PANTOPRAZOLE SODIUM 40 MG: 40 INJECTION, POWDER, FOR SOLUTION INTRAVENOUS at 08:18

## 2021-04-20 RX ADMIN — APIXABAN 5 MG: 5 TABLET, FILM COATED ORAL at 08:22

## 2021-04-20 RX ADMIN — SODIUM CHLORIDE: 9 INJECTION, SOLUTION INTRAVENOUS at 18:32

## 2021-04-20 RX ADMIN — VALSARTAN 160 MG: 80 TABLET, FILM COATED ORAL at 08:20

## 2021-04-20 RX ADMIN — ASPIRIN 81 MG: 81 TABLET, COATED ORAL at 08:22

## 2021-04-20 RX ADMIN — SODIUM CHLORIDE: 9 INJECTION, SOLUTION INTRAVENOUS at 20:01

## 2021-04-20 RX ADMIN — DOFETILIDE 125 MCG: 0.12 CAPSULE ORAL at 08:23

## 2021-04-20 RX ADMIN — APIXABAN 5 MG: 5 TABLET, FILM COATED ORAL at 20:01

## 2021-04-20 RX ADMIN — METOPROLOL SUCCINATE 12.5 MG: 25 TABLET, EXTENDED RELEASE ORAL at 08:20

## 2021-04-20 RX ADMIN — MORPHINE SULFATE 2 MG: 2 INJECTION, SOLUTION INTRAMUSCULAR; INTRAVENOUS at 01:46

## 2021-04-20 RX ADMIN — DOFETILIDE 125 MCG: 0.12 CAPSULE ORAL at 20:01

## 2021-04-20 RX ADMIN — SODIUM CHLORIDE: 9 INJECTION, SOLUTION INTRAVENOUS at 08:18

## 2021-04-20 RX ADMIN — PRAVASTATIN SODIUM 40 MG: 40 TABLET ORAL at 08:22

## 2021-04-20 ASSESSMENT — PAIN SCALES - GENERAL: PAINLEVEL_OUTOF10: 8

## 2021-04-20 ASSESSMENT — ENCOUNTER SYMPTOMS
RESPIRATORY NEGATIVE: 1
ALLERGIC/IMMUNOLOGIC NEGATIVE: 1
EYES NEGATIVE: 1
GASTROINTESTINAL NEGATIVE: 1

## 2021-04-20 NOTE — CONSULTS
Gastroenterology Consult Note    Patient:   Jerome Santiago Payer   :    1935   Facility:     [place] [address] TELE/MED SURG/ONC  [address] [address]   Referring/PCP: Manjeet Samuel MD  Date:     2021  Consultant:   Bc Shipley DO    Subjective: This 81 y.o. female was admitted 2021 with a diagnosis of \"Abdominal pain [R10.9]\" and is seen in consultation regarding epigastric pain    82 yo female with complaints of epigastric pain. Had some improvement during hospitalization with worsening with food intake. RUQ US demonstrates cholelithiasis but no biliary dilation or choledocholithiasis. Had reported EGD/Colonoscopy with Dr. Rica White 2 years ago. Has microcytic anemia on labs. Complained of epigastric pain with clear liquids and eating.   No radiation to the back or shoulder     Past Medical History:   Diagnosis Date    Atrial fibrillation ([address]     Diabetes mellitus ([address]     Hydronephrosis 2014    Hyperlipidemia     Hypertension     Intussusception intestine ([address]     SVT (supraventricular tachycardia) ([address] 2013    AVNRT     Past Surgical History:   Procedure Laterality Date    ABLATION OF DYSRHYTHMIC FOCUS      -    APPENDECTOMY      DILATATION, ESOPHAGUS      FRACTURE SURGERY      L ankle    HYSTERECTOMY      SKIN BIOPSY      SMALL INTESTINE SURGERY      TONSILLECTOMY         Social:   Social History     Tobacco Use    Smoking status: Never Smoker    Smokeless tobacco: Never Used   Substance Use Topics    Alcohol use: No     Family:   Family History   Problem Relation Age of Onset    Diabetes Mother     Heart Disease Father     High Blood Pressure Father     High Cholesterol Father     Cancer Sister     Heart Disease Brother     High Blood Pressure Brother     High Cholesterol Brother        Scheduled Medications:    insulin lispro  0-6 Units Subcutaneous TID WC    insulin lispro  0-3 Units Subcutaneous Nightly    aspirin  81 mg Oral Daily    dofetilide  125 mcg Oral 2 times per day    apixaban  5 mg Oral BID    metoprolol succinate  25 mg Oral Daily    pravastatin  40 mg Oral Daily    valsartan  160 mg Oral Daily    sodium chloride flush  5-40 mL Intravenous 2 times per day    pantoprazole  40 mg Intravenous Daily     Infusions:    dextrose      sodium chloride      sodium chloride      sodium chloride 100 mL/hr at 04/20/21 0818     PRN Medications: glucose, dextrose, glucagon (rDNA), dextrose, sodium chloride flush, sodium chloride, promethazine **OR** ondansetron, acetaminophen **OR** acetaminophen, morphine  Allergies: Allergies   Allergen Reactions    Hydrocodone Other (See Comments)     NAUSEA AND DIZZINESS    Metformin And Related Other (See Comments)     NAUSEA AND DIZZINESS    Nickel Dermatitis    Omeprazole     Prednisone      Other reaction(s): chest pain & palpitations/H&P    Hydralazine Palpitations and Rash       ROS:   Review of Systems   Constitutional: Negative. HENT: Negative. Eyes: Negative. Respiratory: Negative. Cardiovascular: Negative. Gastrointestinal: Negative. Endocrine: Negative. Genitourinary: Negative. Musculoskeletal: Negative. Skin: Negative. Allergic/Immunologic: Negative. Neurological: Negative. Hematological: Negative. Psychiatric/Behavioral: Negative. Objective:     Physical Exam:   Vitals:    04/20/21 1518   BP: (!) 158/72   Pulse: 64   Resp: 16   Temp: 98.2 °F (36.8 °C)   SpO2: 95%     I/O last 3 completed shifts: In: 250 [P.O.:240;  I.V.:10]  Out: -    General appearance: alert, appears stated age and cooperative  HEENT: PERRLA  Neck: no adenopathy, no carotid bruit, no JVD, supple, symmetrical, trachea midline and thyroid not enlarged, symmetric, no tenderness/mass/nodules  Lungs: clear to auscultation bilaterally  Heart: regular rate and rhythm, S1, S2 normal, no murmur, click, rub or present. The bladder is unremarkable. Peritoneum/Retroperitoneum: There is no free air, free fluid or intraperitoneal inflammatory change. There is no adenopathy. Bones/Soft Tissues: There is no acute fracture or aggressive osseous lesion. 1. No acute abdominopelvic abnormality. 2. Cholelithiasis. Us Gallbladder Ruq    Result Date: 4/19/2021  EXAMINATION: RIGHT UPPER QUADRANT ULTRASOUND 4/19/2021 3:38 pm COMPARISON: Abdominal CT study from earlier the same day. HISTORY: ORDERING SYSTEM PROVIDED HISTORY: RUQ PAIN TECHNOLOGIST PROVIDED HISTORY: Reason for exam:->RUQ PAIN Reason for exam:->epigastric pain, cholelithiasis FINDINGS: A right upper quadrant ultrasound study was performed. The liver appears normal in echotexture and morphology without discrete mass. There is no intrahepatic or extrahepatic biliary ductal dilation. The common bile duct is within normal limits. Gallstones are present. However, the gallbladder is not abnormally distended and there is no significant gallbladder wall thickening or pericholecystic free fluid. There was no reported focal tenderness over the gallbladder during the course of the examination. The visualized right kidney is normal in size and echogenicity with normal renal cortical thickness. Mild fullness of the proximal right renal collecting system is likely due to an extrarenal pelvis, given lack of hydronephrosis on the abdominal CT study. No appreciable renal calculi. The visualized portions of the abdominal aorta and IVC are unremarkable. Cholelithiasis. No evidence of acute cholecystitis. No choledocholithiasis or biliary ductal dilation.      Xr Chest Portable    Result Date: 4/19/2021  EXAMINATION: ONE XRAY VIEW OF THE CHEST 4/19/2021 12:28 pm COMPARISON: 30 March 2021 HISTORY: ORDERING SYSTEM PROVIDED HISTORY: epigastric abd pain TECHNOLOGIST PROVIDED HISTORY: Reason for exam:->epigastric abd pain Reason for Exam: epigastric abd pain Acuity: Acute Type of Exam: Initial FINDINGS: AP portable view of the chest time stamped at 1210 hours demonstrates overlying cardiac monitoring electrodes. A granuloma is present the right lung base. Heart size is normal.  No vascular congestion, focal consolidation, effusion, or pneumothorax is noted. Osseous and mediastinal structures are age-appropriate. No acute cardiopulmonary process. Hospital Problems           Last Modified POA    Abdominal pain 4/19/2021 Yes        Assessment:   80 yo female with epigastric abdominal pain    Plan:   1. Will plan EGD  2.  Consider repeat colonoscopy as outpatient given microcytic anemia      Agapito Keys DO  5:57 PM 4/20/2021            [facility]    [address]      [address] [place] [address]     Phone: [phone]     Fax: [phone]

## 2021-04-20 NOTE — ACP (ADVANCE CARE PLANNING)
Advance Care Planning     General Advance Care Planning (ACP) Conversation    Date of Conversation: 4/19/2021  Conducted with: Patient with Decision Making Capacity    Healthcare Decision Maker:      Primary Decision Maker: iviser,vonda - Spouse - [phone]    Secondary Decision Maker: Stella Persaud Child - [phone]        Content/Action Overview:  Has NO ACP documents/care preferences - information provided, considering goals and options  Reviewed DNR/DNI and patient elects Full Code (Attempt Resuscitation)    Length of Voluntary ACP Conversation in minutes:  <16 minutes (Non-Billable)    Adithya Belts

## 2021-04-20 NOTE — CARE COORDINATION
CASE MANAGEMENT INITIAL ASSESSMENT      Reviewed chart and completed assessment with:patient  Explained Case Management role/services. Primary contact information:Sky Paypamela    Health Care Decision Maker :   Primary Decision Maker: sky rabago - Spouse - [phone]    Secondary Decision Maker: Lily Mitchell Child - [phone]          Can this person be reached and be able to respond quickly, such as within a few minutes or hours? Yes  Who would be your back-up decision maker? Name [address] [facility]  Phone [phone] Satispay date/status:4/19/21  Diagnosis:abd pain   Is this a Readmission?:  No      Insurance:BCBS   Precert required for SNF: Yes       3 night stay required: No    Living arrangements, Adls, care needs, prior to admission:lives in ranch style home with . No steps. Transportation:private     Durable Medical Equipment at home:  Walker__Cane__RTS__ BSC__Shower Chair__  02__ HHN__ CPAP__  BiPap__  Hospital Bed__ W/C___ Other__________    Services in the home and/or outpatient, prior to [address] (if applicable)   · Name:  · Address:  · Dialysis Schedule:  · Phone:  · Fax:    PT/OT recs:none    Hospital Exemption Notification (HEN):needed for SNF    Barriers to discharge:none    Plan/comments:spoke with patient. Plans on returning home with support of spouse. Reported IPTA and denied any DCP needs. Surgical consult remains pending. Will continue to follow for needs.  Rm Parker RN      ECOC on chart for MD signature

## 2021-04-20 NOTE — PLAN OF CARE
Problem: Falls - Risk of:  Goal: Will remain free from falls  Description: Will remain free from falls  Outcome: Ongoing  Note: Pt remains safe. Uses call light appropriately to call out for assistance. Non skid footwear on. Bed locked in lowest position; side rails 2/4; call light and bedside table within reach of pt.

## 2021-04-20 NOTE — H&P
Hospital Medicine History & Physical      PCP: Von Rosales MD    Date of Admission: 4/19/2021    Date of Service: Pt seen/examined on 4/19/2021 and Admitted to Inpatient with expected LOS greater than two midnights due to medical therapy. Chief Complaint: Gastric pain nausea and vomiting today      History Of Present Illness:      81 y.o. female who presented to [facility] with a complaint. He started epigastric pain 6 in intensity no radiation and associated with some nausea and vomiting. Denies hematemesis abdominal distention MD bleed or melena. No change in mental status cough sputum shortness of breath constipation or diarrhea. She denies urinary complaints    Past Medical History:          Diagnosis Date    Atrial fibrillation ([address]     Diabetes mellitus [address]     Hydronephrosis 2/14/2014    Hyperlipidemia     Hypertension     Intussusception intestine ([address]     SVT (supraventricular tachycardia) ([facility] 8/26/2013    AVNRT       Past Surgical History:          Procedure Laterality Date    ABLATION OF DYSRHYTHMIC FOCUS      -2014    APPENDECTOMY      DILATATION, ESOPHAGUS      FRACTURE SURGERY      L ankle    HYSTERECTOMY      SKIN BIOPSY      SMALL INTESTINE SURGERY      TONSILLECTOMY         Medications Prior to Admission:      Prior to Admission medications    Medication Sig Start Date End Date Taking?  Authorizing Provider   metoprolol succinate (TOPROL XL) 25 MG extended release tablet Take 1 tablet by mouth daily  Patient taking differently: Take 25 mg by mouth daily Indications: Pt reports to taking 1/2 tab daily  4/1/21 5/1/21 Yes Kevin Michaels MD   pravastatin (PRAVACHOL) 80 MG tablet Take 40 mg by mouth daily   Yes Historical Provider, MD   pioglitazone (ACTOS) 15 MG tablet Take 15 mg by mouth daily   Yes Historical Provider, MD   dofetilide (TIKOSYN) 125 MCG capsule Take 1 capsule by mouth every 12 hours 2/4/21  Yes Yovani Ascencio MD   furosemide (LASIX) 20 MG tablet TAKE ONE TABLET BY MOUTH DAILY 1/18/21  Yes Manuel Romero MD   furosemide (LASIX) 20 MG tablet Take 1 tablet by mouth daily 1/18/21  Yes Manuel Romero MD   ELIQUIS 5 MG TABS tablet TAKE ONE TABLET BY MOUTH TWICE A DAY 10/27/20  Yes Imelda Foster APRN - CNP   valsartan (DIOVAN) 160 MG tablet Take 1 tablet by mouth daily 6/1/20  Yes Lisa Coles MD   pantoprazole (PROTONIX) 20 MG tablet Take 2 tablets by mouth daily  Patient taking differently: Take 20 mg by mouth daily  7/26/18  Yes Marianna Rios APRN - CNP   glyBURIDE (DIABETA) 1.25 MG tablet Take 2.5 mg by mouth daily (with breakfast)    Yes Historical Provider, MD   aspirin 81 MG tablet Take 81 mg by mouth daily   Yes Historical Provider, MD       Allergies:  Hydrocodone, Metformin and related, Nickel, Omeprazole, Prednisone, and Hydralazine    Social History:      The patient currently lives at home    TOBACCO:   reports that she has never smoked. She has never used smokeless tobacco.  ETOH:   reports no history of alcohol use. E-Cigarettes/Vaping Use     Questions Responses    E-Cigarette/Vaping Use Never User    Start Date     Passive Exposure     Quit Date     Counseling Given     Comments Unknown            Family History:      Reviewed in detail and negative for DM, CAD, Cancer, CVA. Positive as follows:        Problem Relation Age of Onset    Diabetes Mother     Heart Disease Father     High Blood Pressure Father     High Cholesterol Father     Cancer Sister     Heart Disease Brother     High Blood Pressure Brother     High Cholesterol Brother        REVIEW OF SYSTEMS COMPLETED:   Pertinent positives as noted in the HPI. All other systems reviewed and negative. PHYSICAL EXAM PERFORMED:    BP (!) 164/57   Pulse 65   Temp 98.4 °F (36.9 °C) (Oral)   Resp 18   Ht 5' 2\" (1.575 m)   Wt 180 lb (81.6 kg)   SpO2 95%   BMI 32.92 kg/m²     General appearance:  No apparent distress, appears stated age and cooperative.   HEENT: Normal cephalic, atraumatic without obvious deformity. Pupils equal, round, and reactive to light. Extra ocular muscles intact. Conjunctivae/corneas clear. Neck: Supple, with full range of motion. No jugular venous distention. Trachea midline. Respiratory:  Normal respiratory effort. Clear to auscultation, bilaterally without Rales/Wheezes/Rhonchi. Cardiovascular:  Regular rate and rhythm with normal S1/S2 without murmurs, rubs or gallops. Abdomen: Soft, non-tender, non-distended with normal bowel sounds. Musculoskeletal:  No clubbing, cyanosis or edema bilaterally. Full range of motion without deformity. Skin: Skin color, texture, turgor normal.  No rashes or lesions. Neurologic:  Neurovascularly intact without any focal sensory/motor deficits. Cranial nerves: II-XII intact, grossly non-focal.  Psychiatric:  Alert and oriented, thought content appropriate, normal insight  Capillary Refill: Brisk,3 seconds, normal  Peripheral Pulses: +2 palpable, equal bilaterally       Labs:     Recent Labs     04/19/21  1159   WBC 11.4*   HGB 12.4   HCT 39.7        Recent Labs     04/19/21  1159   *   K 4.0   CL 97*   CO2 26   BUN 16   CREATININE 0.8   CALCIUM 9.1     Recent Labs     04/19/21  1159   AST 17   ALT 11   BILITOT 0.5   ALKPHOS 97     No results for input(s): INR in the last 72 hours. Recent Labs     04/19/21  1159   TROPONINI <0.01       Urinalysis:      Lab Results   Component Value Date    NITRU Negative 04/19/2021    WBCUA 0-2 04/19/2021    BACTERIA 1+ 12/18/2020    RBCUA 0-2 04/19/2021    BLOODU TRACE-INTACT 04/19/2021    SPECGRAV 1.015 04/19/2021    GLUCOSEU Negative 04/19/2021       Radiology:     CXR: I have reviewed the CXR with the following interpretation:   EKG:  I have reviewed the EKG with the following interpretation: Normal sinus rhythm 65/min    US GALLBLADDER RUQ   Final Result   Cholelithiasis. No evidence of acute cholecystitis.   No choledocholithiasis   or biliary ductal dilation. CT ABDOMEN PELVIS W IV CONTRAST Additional Contrast? None   Final Result   1. No acute abdominopelvic abnormality. 2. Cholelithiasis. XR CHEST PORTABLE   Final Result   No acute cardiopulmonary process. ASSESSMENT:    Active Hospital Problems    Diagnosis Date Noted    Abdominal pain [R10.9] 07/18/2018         PLAN:  Epigastric pain-possibly biliary colic versus gastritis admit,-liquid diet, advance as tolerated, IV Protonix, GI evaluation, monitor liver function test, serial troponin telemetry, gentle hydration    Atrial fibrillation-on Eliquis, metoprolol and Tikosyn    Diabetes-hold p.o. medications and monitor blood sugar with low correction scale insulin hemoglobin A1c may resume once the patient started to take p.o. well    Hyperlipidemia-statin    Hypertension-on metoprolol and Diovan    Chronic diastolic CHF-no evidence of fluid overload, hold Lasix and reevaluate the need in the morning      DVT Prophylaxis: Eliquis  Diet: Liquid diet advance low-carb diet as tolerated  Code Status: Full code    PT/OT Eval Status:     Dispo -admitted to Ally Greenberg MD    Thank you Cassandra Vidal MD for the opportunity to be involved in this patient's care. If you have any questions or concerns please feel free to contact me at [phone].

## 2021-04-20 NOTE — PROGRESS NOTES
Pt a/o. Pt stated not feeling well after clear liquids. Dr. Loretta Chen is aware, see new orders. No emesis noted. Pt does not want anything for pain or nausea at this time. Call light within reach; will continue to monitor.

## 2021-04-20 NOTE — ED NOTES
Patient requesting more pain medication, perfect serve sent to hospitalist at this time.       Anju Amador RN  04/19/21 2016

## 2021-04-20 NOTE — PROGRESS NOTES
Patient admitted to room 335 from ED. Patient A&Ox4, VSS. Patient oriented to room, call light, bed rails, phone, lights and bathroom. Telemetry box in place, patient aware of placement and reason. Bed locked, in lowest position, side rails up 2/4, call light within reach.

## 2021-04-20 NOTE — PROGRESS NOTES
At 1608 Dr. Crescencio Reyes was perfect served and updated on pt's change in condition. Pt denies any need for prn medication right now. Will continue to monitor.

## 2021-04-21 LAB
ANION GAP SERPL CALCULATED.3IONS-SCNC: 8 MMOL/L (ref 3–16)
BASOPHILS ABSOLUTE: 0 K/UL (ref 0–0.2)
BASOPHILS RELATIVE PERCENT: 0.3 %
BUN BLDV-MCNC: 10 MG/DL (ref 7–20)
CALCIUM SERPL-MCNC: 8.1 MG/DL (ref 8.3–10.6)
CHLORIDE BLD-SCNC: 107 MMOL/L (ref 99–110)
CO2: 24 MMOL/L (ref 21–32)
CREAT SERPL-MCNC: 0.8 MG/DL (ref 0.6–1.2)
EOSINOPHILS ABSOLUTE: 0.3 K/UL (ref 0–0.6)
EOSINOPHILS RELATIVE PERCENT: 3.2 %
ESTIMATED AVERAGE GLUCOSE: 157.1 MG/DL
GFR AFRICAN AMERICAN: >60
GFR NON-AFRICAN AMERICAN: >60
GLUCOSE BLD-MCNC: 110 MG/DL (ref 70–99)
GLUCOSE BLD-MCNC: 133 MG/DL (ref 70–99)
GLUCOSE BLD-MCNC: 70 MG/DL (ref 70–99)
GLUCOSE BLD-MCNC: 80 MG/DL (ref 70–99)
GLUCOSE BLD-MCNC: 90 MG/DL (ref 70–99)
GLUCOSE BLD-MCNC: 91 MG/DL (ref 70–99)
HBA1C MFR BLD: 7.1 %
HCT VFR BLD CALC: 35 % (ref 36–48)
HEMOGLOBIN: 11 G/DL (ref 12–16)
LYMPHOCYTES ABSOLUTE: 2 K/UL (ref 1–5.1)
LYMPHOCYTES RELATIVE PERCENT: 24.6 %
MCH RBC QN AUTO: 24 PG (ref 26–34)
MCHC RBC AUTO-ENTMCNC: 31.6 G/DL (ref 31–36)
MCV RBC AUTO: 76.1 FL (ref 80–100)
MONOCYTES ABSOLUTE: 1 K/UL (ref 0–1.3)
MONOCYTES RELATIVE PERCENT: 11.8 %
NEUTROPHILS ABSOLUTE: 4.9 K/UL (ref 1.7–7.7)
NEUTROPHILS RELATIVE PERCENT: 60.1 %
PDW BLD-RTO: 16.1 % (ref 12.4–15.4)
PERFORMED ON: ABNORMAL
PERFORMED ON: ABNORMAL
PERFORMED ON: NORMAL
PLATELET # BLD: 305 K/UL (ref 135–450)
PMV BLD AUTO: 9 FL (ref 5–10.5)
POTASSIUM SERPL-SCNC: 3.7 MMOL/L (ref 3.5–5.1)
RBC # BLD: 4.6 M/UL (ref 4–5.2)
SODIUM BLD-SCNC: 139 MMOL/L (ref 136–145)
WBC # BLD: 8.2 K/UL (ref 4–11)

## 2021-04-21 PROCEDURE — 80048 BASIC METABOLIC PNL TOTAL CA: CPT

## 2021-04-21 PROCEDURE — 6360000002 HC RX W HCPCS: Performed by: INTERNAL MEDICINE

## 2021-04-21 PROCEDURE — 6370000000 HC RX 637 (ALT 250 FOR IP): Performed by: FAMILY MEDICINE

## 2021-04-21 PROCEDURE — 0DB68ZX EXCISION OF STOMACH, VIA NATURAL OR ARTIFICIAL OPENING ENDOSCOPIC, DIAGNOSTIC: ICD-10-PCS

## 2021-04-21 PROCEDURE — 3609013000 HC EGD TRANSORAL CONTROL BLEEDING ANY METHOD: Performed by: INTERNAL MEDICINE

## 2021-04-21 PROCEDURE — 88342 IMHCHEM/IMCYTCHM 1ST ANTB: CPT

## 2021-04-21 PROCEDURE — 99152 MOD SED SAME PHYS/QHP 5/>YRS: CPT | Performed by: INTERNAL MEDICINE

## 2021-04-21 PROCEDURE — 2580000003 HC RX 258: Performed by: INTERNAL MEDICINE

## 2021-04-21 PROCEDURE — 85025 COMPLETE CBC W/AUTO DIFF WBC: CPT

## 2021-04-21 PROCEDURE — 36415 COLL VENOUS BLD VENIPUNCTURE: CPT

## 2021-04-21 PROCEDURE — 3609013500 HC EGD REMOVAL TUMOR POLYP/OTHER LESION SNARE TECH: Performed by: INTERNAL MEDICINE

## 2021-04-21 PROCEDURE — 88305 TISSUE EXAM BY PATHOLOGIST: CPT

## 2021-04-21 PROCEDURE — 2709999900 HC NON-CHARGEABLE SUPPLY: Performed by: INTERNAL MEDICINE

## 2021-04-21 PROCEDURE — 2720000010 HC SURG SUPPLY STERILE: Performed by: INTERNAL MEDICINE

## 2021-04-21 PROCEDURE — 0DB58ZZ EXCISION OF ESOPHAGUS, VIA NATURAL OR ARTIFICIAL OPENING ENDOSCOPIC: ICD-10-PCS

## 2021-04-21 PROCEDURE — C9113 INJ PANTOPRAZOLE SODIUM, VIA: HCPCS | Performed by: INTERNAL MEDICINE

## 2021-04-21 PROCEDURE — 7100000011 HC PHASE II RECOVERY - ADDTL 15 MIN: Performed by: INTERNAL MEDICINE

## 2021-04-21 PROCEDURE — 1200000000 HC SEMI PRIVATE

## 2021-04-21 PROCEDURE — 99153 MOD SED SAME PHYS/QHP EA: CPT | Performed by: INTERNAL MEDICINE

## 2021-04-21 PROCEDURE — 3609012400 HC EGD TRANSORAL BIOPSY SINGLE/MULTIPLE: Performed by: INTERNAL MEDICINE

## 2021-04-21 PROCEDURE — 7100000010 HC PHASE II RECOVERY - FIRST 15 MIN: Performed by: INTERNAL MEDICINE

## 2021-04-21 PROCEDURE — 6370000000 HC RX 637 (ALT 250 FOR IP): Performed by: INTERNAL MEDICINE

## 2021-04-21 RX ORDER — MIDAZOLAM HYDROCHLORIDE 5 MG/ML
INJECTION INTRAMUSCULAR; INTRAVENOUS PRN
Status: DISCONTINUED | OUTPATIENT
Start: 2021-04-21 | End: 2021-04-21 | Stop reason: ALTCHOICE

## 2021-04-21 RX ORDER — FENTANYL CITRATE 50 UG/ML
INJECTION, SOLUTION INTRAMUSCULAR; INTRAVENOUS PRN
Status: DISCONTINUED | OUTPATIENT
Start: 2021-04-21 | End: 2021-04-21 | Stop reason: ALTCHOICE

## 2021-04-21 RX ADMIN — METOPROLOL SUCCINATE 12.5 MG: 25 TABLET, EXTENDED RELEASE ORAL at 10:48

## 2021-04-21 RX ADMIN — VALSARTAN 160 MG: 80 TABLET, FILM COATED ORAL at 10:47

## 2021-04-21 RX ADMIN — APIXABAN 5 MG: 5 TABLET, FILM COATED ORAL at 20:03

## 2021-04-21 RX ADMIN — DOFETILIDE 125 MCG: 0.12 CAPSULE ORAL at 10:48

## 2021-04-21 RX ADMIN — Medication 10 ML: at 20:04

## 2021-04-21 RX ADMIN — Medication 10 ML: at 10:47

## 2021-04-21 RX ADMIN — PRAVASTATIN SODIUM 20 MG: 40 TABLET ORAL at 10:48

## 2021-04-21 RX ADMIN — DOFETILIDE 125 MCG: 0.12 CAPSULE ORAL at 20:03

## 2021-04-21 RX ADMIN — FUROSEMIDE 20 MG: 20 TABLET ORAL at 10:48

## 2021-04-21 RX ADMIN — PANTOPRAZOLE SODIUM 40 MG: 40 INJECTION, POWDER, FOR SOLUTION INTRAVENOUS at 10:47

## 2021-04-21 NOTE — OP NOTE
Esophagogastroduodenoscopy Note    Patient:   Mary Armas Payer    YOB: 1935    Facility:     [place] [address]   [Inpatient]   Referring/PCP: Gela Braun MD    Procedure:   Esophagogastroduodenoscopy with polypectomy  Date:     4/21/2021   Endoscopist:  Marika Daniels     Preoperative Diagnosis:   Epigastric pain    Postoperative Diagnosis:  Gastric polyp    Anesthesia:  Versed 7 mg IV, fentanyl 100 mcg IV    Estimated blood loss: Minimal    Complications: None    Description of Procedure:  Informed consent was obtained from the patient after explanation of the procedure including indications, description of the procedure,  benefits and possible risks and complications of the procedure, and alternatives. Questions were answered. The patient's history was reviewed and a directed physical examination was performed prior to the procedure. Patient was monitored throughout the procedure with pulse oximetry and periodic assessment of vital signs. Patient was sedated as noted above. The Nursing staff and I performed a time out. With the patient in the left lateral decubitus position, the Olympus videoendoscope was placed in the patient's mouth and under direct visualization passed into the esophagus. The scope was ultimately passed to the second portion of the duodenum. Visualization was performed during both introduction and withdrawal of the endoscope and retroflexed view of the proximal stomach was obtained. Findings[de-identified]   Esophagus: normal. The findings do not support a diagnosis of Lyle's Esophagus. A erythematous 6 mm polyp was seen just below the GE junction towards the greater curve of the stomach and was removed with hot snare. 1 endoclip was placed after resection. Stomach: Mild antral erythema.   Biopsies were obtained  Duodenum: normal    Recommendations:   Advance diet as tolerated  BID

## 2021-04-21 NOTE — PROGRESS NOTES
Pt assessment completed and charted. VSS. Pt a/o. Denies pain and nausea at this time. Pt is currently NPO. Consent is signed and in chart for EGD tomorrow. Bed in lowest position and wheels locked. Call light within reach. Bedside table within reach. Non-skid footwear in place. Pt denies any other needs at this time. Pt calls out appropriately. Will continue to monitor.     No tube feeds are running for this patient

## 2021-04-21 NOTE — PROGRESS NOTES
Hospitalist Progress Note      PCP: Cassandra Vidal MD    Date of Admission: 4/19/2021    Chief Complaint: abdominal pain    Hospital Course: Reviewed    Subjective: Was feeling better this morning but then ate and now abdominal pain returns. Improved with morphine. Medications:  Reviewed    Infusion Medications    dextrose      sodium chloride      sodium chloride      sodium chloride 100 mL/hr at 04/20/21 2001     Scheduled Medications    insulin lispro  0-6 Units Subcutaneous TID WC    insulin lispro  0-3 Units Subcutaneous Nightly    aspirin  81 mg Oral Daily    dofetilide  125 mcg Oral 2 times per day    apixaban  5 mg Oral BID    metoprolol succinate  25 mg Oral Daily    pravastatin  40 mg Oral Daily    valsartan  160 mg Oral Daily    sodium chloride flush  5-40 mL Intravenous 2 times per day    pantoprazole  40 mg Intravenous Daily     PRN Meds: glucose, dextrose, glucagon (rDNA), dextrose, sodium chloride flush, sodium chloride, promethazine **OR** ondansetron, acetaminophen **OR** acetaminophen, morphine      Intake/Output Summary (Last 24 hours) at 4/20/2021 2241  Last data filed at 4/20/2021 1944  Gross per 24 hour   Intake 490 ml   Output --   Net 490 ml       Physical Exam Performed:    BP (!) 174/72   Pulse 65   Temp 98.1 °F (36.7 °C) (Oral)   Resp 16   Ht 5' 2\" (1.575 m)   Wt 180 lb (81.6 kg)   SpO2 95%   BMI 32.92 kg/m²     General appearance: No apparent distress, appears stated age and cooperative. HEENT: Pupils equal, round, and reactive to light. Conjunctivae/corneas clear. Neck: Supple, with full range of motion. No jugular venous distention. Trachea midline. Respiratory:  Normal respiratory effort. Clear to auscultation, bilaterally without Rales/Wheezes/Rhonchi. Cardiovascular: Regular rate and rhythm with normal S1/S2 without murmurs, rubs or gallops. Abdomen: Soft, non-tender, non-distended with normal bowel sounds.   Musculoskeletal: No clubbing, cyanosis or edema bilaterally. Full range of motion without deformity. Skin: Skin color, texture, turgor normal.  No rashes or lesions. Neurologic:  Neurovascularly intact without any focal sensory/motor deficits. Cranial nerves: II-XII intact, grossly non-focal.  Psychiatric: Alert and oriented, thought content appropriate, normal insight  Capillary Refill: Brisk,3 seconds, normal   Peripheral Pulses: +2 palpable, equal bilaterally       Labs:   Recent Labs     04/19/21  1159 04/20/21  0517   WBC 11.4* 10.3   HGB 12.4 11.3*   HCT 39.7 35.2*    303     Recent Labs     04/19/21  1159 04/20/21  0517   * 134*   K 4.0 4.1   CL 97* 101   CO2 26 25   BUN 16 16   CREATININE 0.8 0.8   CALCIUM 9.1 8.1*     Recent Labs     04/19/21  1159   AST 17   ALT 11   BILITOT 0.5   ALKPHOS 97     No results for input(s): INR in the last 72 hours. Recent Labs     04/19/21  1159   TROPONINI <0.01       Urinalysis:      Lab Results   Component Value Date    NITRU Negative 04/19/2021    WBCUA 0-2 04/19/2021    BACTERIA 1+ 12/18/2020    RBCUA 0-2 04/19/2021    BLOODU TRACE-INTACT 04/19/2021    SPECGRAV 1.015 04/19/2021    GLUCOSEU Negative 04/19/2021       Radiology:  US GALLBLADDER RUQ   Final Result   Cholelithiasis. No evidence of acute cholecystitis. No choledocholithiasis   or biliary ductal dilation. CT ABDOMEN PELVIS W IV CONTRAST Additional Contrast? None   Final Result   1. No acute abdominopelvic abnormality. 2. Cholelithiasis. XR CHEST PORTABLE   Final Result   No acute cardiopulmonary process. Assessment/Plan:    Active Hospital Problems    Diagnosis    Abdominal pain [R10.9]     Epigastric pain-possibly biliary colic versus gastritis admit,-liquid diet, advance as tolerated, IV Protonix, GI evaluation, monitor liver function test, serial troponin telemetry, gentle hydration.  Plan for repeat EGD.      Atrial fibrillation-on Eliquis, metoprolol and Tikosyn     Diabetes-hold p.o. medications and monitor blood sugar with low correction scale insulin      Hyperlipidemia-statin     Hypertension-on metoprolol and Diovan     Chronic diastolic CHF-no evidence of fluid overload, hold Lasix and reevaluate the need in the morning    DVT Prophylaxis: eliquis  Diet: Diet NPO Effective Now Exceptions are: Ice Chips, Sips of Water with Meds, Popsicles  Diet NPO Time Specified Exceptions are: Ice Chips, Sips of Water with Meds, Popsicles  Code Status: Full Code    PT/OT Eval Status: baseline    Dispo - 1-2 days    Jigar Ricci MD

## 2021-04-21 NOTE — CARE COORDINATION
Chart reviewed. Care managed per GI and IM. EGD with polypectomy today. Patient IPTA with support of . Denied any DCP needs.  Fabiana Ngo RN

## 2021-04-21 NOTE — PLAN OF CARE
Pt remains free from falls during this shift. Pt a/o and calls out appropriately. Bed in lowest position and wheels locked. Call light within reach. Bedside table within reach. Problem: Falls - Risk of:  Goal: Will remain free from falls  Description: Will remain free from falls  4/20/2021 2201 by Harlan Carl RN  Outcome: Ongoing  4/20/2021 1319 by Edilma Gilliam RN  Outcome: Ongoing  Note: Pt remains safe. Uses call light appropriately to call out for assistance. Non skid footwear on. Bed locked in lowest position; side rails 2/4; call light and bedside table within reach of pt.    Goal: Absence of physical injury  Description: Absence of physical injury  Outcome: Ongoing

## 2021-04-21 NOTE — PROGRESS NOTES
Arrived from inpatient hospital room via  [facility]  accompanied by transport staff  Pt alert and oriented x4  Calm/appropriate  VSS  Iv site assessed without evidence of infiltration on arrival  Respiration  easy unlabored - auscultated -clear bilaterally anterior and posterior fields

## 2021-04-21 NOTE — PROGRESS NOTES
Pt up to bathroom without difficulty, tolerated well. Voided clear yellow urine. Assisted back to bed. No complaints. Awaiting transport.

## 2021-04-21 NOTE — H&P
Gastroenterology Preop Assessment    Patient:   Franc Tiwari   :    1935   Facility:   [facility]  Referring/PCP: Tyesha Rossi MD  Date:     2021    Subjective:   Procedure: egd    HPI/Reason for procedure:  82 yo female with epigastric pain    Past Medical History:   Diagnosis Date    Atrial fibrillation ([address]     [facility]     Hydronephrosis 2014    Hyperlipidemia     Hypertension     Intussusception intestine ([address]     SVT (supraventricular tachycardia) ([facility] 2013    AVNRT     Past Surgical History:   Procedure Laterality Date    ABLATION OF DYSRHYTHMIC FOCUS      -    APPENDECTOMY      DILATATION, ESOPHAGUS      FRACTURE SURGERY      L ankle    HYSTERECTOMY      SKIN BIOPSY      SMALL INTESTINE SURGERY      TONSILLECTOMY         Social:   Social History     Tobacco Use    Smoking status: Never Smoker    Smokeless tobacco: Never Used   Substance Use Topics    Alcohol use: No     Family:   Family History   Problem Relation Age of Onset    Diabetes Mother     Heart Disease Father     High Blood Pressure Father     High Cholesterol Father     Cancer Sister     Heart Disease Brother     High Blood Pressure Brother     High Cholesterol Brother        Scheduled Medications:    insulin lispro  0-6 Units Subcutaneous TID WC    insulin lispro  0-3 Units Subcutaneous Nightly    furosemide  20 mg Oral Daily    aspirin  81 mg Oral Daily    dofetilide  125 mcg Oral 2 times per day    apixaban  5 mg Oral BID    metoprolol succinate  25 mg Oral Daily    pravastatin  40 mg Oral Daily    valsartan  160 mg Oral Daily    sodium chloride flush  5-40 mL Intravenous 2 times per day    pantoprazole  40 mg Intravenous Daily       Current Medications:    Prior to Admission medications    Medication Sig Start Date End Date Taking?  Authorizing Provider   metoprolol succinate (TOPROL XL) 25 MG extended release tablet Take 1 tablet by mouth daily  Patient taking differently: Take 25 mg by mouth daily Indications: Pt reports to taking 1/2 tab daily  4/1/21 5/1/21 Yes Issa Ontiveros MD   pravastatin (PRAVACHOL) 80 MG tablet Take 40 mg by mouth daily   Yes Historical Provider, MD   pioglitazone (ACTOS) 15 MG tablet Take 15 mg by mouth daily   Yes Historical Provider, MD   dofetilide (TIKOSYN) 125 MCG capsule Take 1 capsule by mouth every 12 hours 2/4/21  Yes Vianey Ko MD   furosemide (LASIX) 20 MG tablet TAKE ONE TABLET BY MOUTH DAILY 1/18/21  Yes Denice Macedo MD   furosemide (LASIX) 20 MG tablet Take 1 tablet by mouth daily 1/18/21  Yes Denice Macedo MD   ELIQUIS 5 MG TABS tablet TAKE ONE TABLET BY MOUTH TWICE A DAY 10/27/20  Yes GUANAKO Martins CNP   valsartan (DIOVAN) 160 MG tablet Take 1 tablet by mouth daily 6/1/20  Yes Rubens Martinez MD   pantoprazole (PROTONIX) 20 MG tablet Take 2 tablets by mouth daily  Patient taking differently: Take 20 mg by mouth daily  7/26/18  Yes GUANAKO Mata CNP   glyBURIDE (DIABETA) 1.25 MG tablet Take 2.5 mg by mouth daily (with breakfast)    Yes Historical Provider, MD   aspirin 81 MG tablet Take 81 mg by mouth daily   Yes Historical Provider, MD         Current Facility-Administered Medications:     insulin lispro (HUMALOG) injection vial 0-6 Units, 0-6 Units, Subcutaneous, TID WC, GUANAKO Brewster CNP    insulin lispro (HUMALOG) injection vial 0-3 Units, 0-3 Units, Subcutaneous, Nightly, GUANAKO Brewster CNP, Stopped at 04/20/21 0009    glucose (GLUTOSE) 40 % oral gel 15 g, 15 g, Oral, PRN, GUANAKO Brewster CNP    dextrose 50 % IV solution, 12.5 g, Intravenous, PRN, GUANAKO Brewster CNP    glucagon (rDNA) injection 1 mg, 1 mg, Intramuscular, PRN, GUANAKO Brewster CNP    dextrose 5 % solution, 100 mL/hr, Intravenous, PRN, GUANAKO Brewster CNP    0.9 % sodium chloride infusion, , Intravenous, Continuous, Kaylene Belle, DO    furosemide (LASIX) tablet 20 mg, 20 mg, Oral, Daily, Marion Samuel MD    aspirin EC tablet 81 mg, 81 mg, Oral, Daily, Faraz Wright MD, 81 mg at 04/20/21 7604    dofetilide (TIKOSYN) capsule 125 mcg, 125 mcg, Oral, 2 times per day, Faraz Wright MD, 125 mcg at 04/20/21 2001    apixaban (ELIQUIS) tablet 5 mg, 5 mg, Oral, BID, Faraz Wright MD, 5 mg at 04/20/21 2001    metoprolol succinate (TOPROL XL) extended release tablet 25 mg, 25 mg, Oral, Daily, Faraz Wright MD, 12.5 mg at 04/20/21 0820    pravastatin (PRAVACHOL) tablet 40 mg, 40 mg, Oral, Daily, Faraz Wright MD, 40 mg at 04/20/21 3115    valsartan (DIOVAN) tablet 160 mg, 160 mg, Oral, Daily, Faraz Wright MD, 160 mg at 04/20/21 0820    sodium chloride flush 0.9 % injection 5-40 mL, 5-40 mL, Intravenous, 2 times per day, Faraz Wright MD, 10 mL at 04/20/21 2001    sodium chloride flush 0.9 % injection 5-40 mL, 5-40 mL, Intravenous, PRN, Faraz Wright MD    0.9 % sodium chloride infusion, 25 mL, Intravenous, PRN, Faraz Wright MD    promethazine (PHENERGAN) tablet 12.5 mg, 12.5 mg, Oral, Q6H PRN **OR** ondansetron (ZOFRAN) injection 4 mg, 4 mg, Intravenous, Q6H PRN, Faraz Wright MD    acetaminophen (TYLENOL) tablet 650 mg, 650 mg, Oral, Q6H PRN **OR** acetaminophen (TYLENOL) suppository 650 mg, 650 mg, Rectal, Q6H PRN, Faraz Wright MD    0.9 % sodium chloride infusion, , Intravenous, Continuous, Faraz Wright MD, Last Rate: 100 mL/hr at 04/20/21 2001, New Bag at 04/20/21 2001    pantoprazole (PROTONIX) injection 40 mg, 40 mg, Intravenous, Daily, Faraz Wright MD, 40 mg at 04/20/21 0818    morphine (PF) injection 2 mg, 2 mg, Intravenous, Q4H PRN, GUANAKO Dunn - CNP, 2 mg at 04/20/21 0146      Infusions:    dextrose      sodium chloride      sodium chloride      sodium chloride 100 mL/hr at 04/20/21 2001     PRN Medications: glucose, dextrose, glucagon (rDNA), dextrose, sodium chloride flush, sodium chloride, promethazine **OR** ondansetron, acetaminophen **OR** acetaminophen, morphine  Allergies: Allergies   Allergen Reactions    Hydrocodone Other (See Comments)     NAUSEA AND DIZZINESS    Metformin And Related Other (See Comments)     NAUSEA AND DIZZINESS    Nickel Dermatitis    Omeprazole     Prednisone      Other reaction(s): chest pain & palpitations/H&P    Hydralazine Palpitations and Rash         Objective:     Physical Exam:   BP (!) 158/67   Pulse 86   Temp 98.4 °F (36.9 °C) (Temporal)   Resp 20   Ht 5' 2\" (1.575 m)   Wt 180 lb (81.6 kg)   SpO2 99%   BMI 32.92 kg/m²     HEENT: NCAT  Lungs: CTAB  CV: RRR  Abd: soft, ntd  Ext: dpi    Lab and Imaging Review   Labs:  CBC:   Recent Labs     04/19/21  1159 04/20/21  0517 04/21/21  0630   WBC 11.4* 10.3 8.2   HGB 12.4 11.3* 11.0*   HCT 39.7 35.2* 35.0*   MCV 75.5* 76.0* 76.1*    303 305     BMP:   Recent Labs     04/19/21  1159 04/20/21  0517 04/21/21  0630   * 134* 139   K 4.0 4.1 3.7   CL 97* 101 107   CO2 26 25 24   BUN 16 16 10   CREATININE 0.8 0.8 0.8     LIVER PROFILE:   Recent Labs     04/19/21  1159   AST 17   ALT 11   LIPASE 26.0   PROT 8.8*   BILITOT 0.5   ALKPHOS 97     PT/INR: No results for input(s): INR in the last 72 hours. Invalid input(s): PT    Pre-Procedure Assessment / Plan:  ASA: Class 3 - A patient with severe systemic disease that limits activity but is not incapacitating  Airway: Mallampati: II (soft palate, uvula, fauces visible)  Level of Sedation Plan: Moderate sedation  Post Procedure plan: Return to same level of care      Plan:   1. EGD    I assessed the patient and find that the patient is in satisfactory condition to proceed with the planned procedure and sedation plan. I have explained the risk, benefits, and alternatives to the procedure; the patient understands and agrees to proceed.        Ekaterina Alfaro  4/21/2021

## 2021-04-22 VITALS
DIASTOLIC BLOOD PRESSURE: 98 MMHG | WEIGHT: 180 LBS | HEART RATE: 66 BPM | BODY MASS INDEX: 33.13 KG/M2 | TEMPERATURE: 98.4 F | HEIGHT: 62 IN | RESPIRATION RATE: 18 BRPM | OXYGEN SATURATION: 98 % | SYSTOLIC BLOOD PRESSURE: 172 MMHG

## 2021-04-22 LAB
ANION GAP SERPL CALCULATED.3IONS-SCNC: 10 MMOL/L (ref 3–16)
BASOPHILS ABSOLUTE: 0.1 K/UL (ref 0–0.2)
BASOPHILS RELATIVE PERCENT: 0.5 %
BUN BLDV-MCNC: 7 MG/DL (ref 7–20)
CALCIUM SERPL-MCNC: 8.2 MG/DL (ref 8.3–10.6)
CHLORIDE BLD-SCNC: 105 MMOL/L (ref 99–110)
CO2: 23 MMOL/L (ref 21–32)
CREAT SERPL-MCNC: 0.8 MG/DL (ref 0.6–1.2)
EOSINOPHILS ABSOLUTE: 0.5 K/UL (ref 0–0.6)
EOSINOPHILS RELATIVE PERCENT: 4.8 %
GFR AFRICAN AMERICAN: >60
GFR NON-AFRICAN AMERICAN: >60
GLUCOSE BLD-MCNC: 100 MG/DL (ref 70–99)
GLUCOSE BLD-MCNC: 106 MG/DL (ref 70–99)
GLUCOSE BLD-MCNC: 88 MG/DL (ref 70–99)
GLUCOSE BLD-MCNC: 96 MG/DL (ref 70–99)
HCT VFR BLD CALC: 33.6 % (ref 36–48)
HEMOGLOBIN: 10.4 G/DL (ref 12–16)
LYMPHOCYTES ABSOLUTE: 2.2 K/UL (ref 1–5.1)
LYMPHOCYTES RELATIVE PERCENT: 21.4 %
MCH RBC QN AUTO: 23.9 PG (ref 26–34)
MCHC RBC AUTO-ENTMCNC: 31 G/DL (ref 31–36)
MCV RBC AUTO: 77.1 FL (ref 80–100)
MONOCYTES ABSOLUTE: 1.1 K/UL (ref 0–1.3)
MONOCYTES RELATIVE PERCENT: 10.4 %
NEUTROPHILS ABSOLUTE: 6.4 K/UL (ref 1.7–7.7)
NEUTROPHILS RELATIVE PERCENT: 62.9 %
PDW BLD-RTO: 16.1 % (ref 12.4–15.4)
PERFORMED ON: ABNORMAL
PERFORMED ON: ABNORMAL
PERFORMED ON: NORMAL
PLATELET # BLD: 249 K/UL (ref 135–450)
PMV BLD AUTO: 9.7 FL (ref 5–10.5)
POTASSIUM SERPL-SCNC: 4 MMOL/L (ref 3.5–5.1)
RBC # BLD: 4.36 M/UL (ref 4–5.2)
SODIUM BLD-SCNC: 138 MMOL/L (ref 136–145)
WBC # BLD: 10.2 K/UL (ref 4–11)

## 2021-04-22 PROCEDURE — 6370000000 HC RX 637 (ALT 250 FOR IP): Performed by: INTERNAL MEDICINE

## 2021-04-22 PROCEDURE — 6370000000 HC RX 637 (ALT 250 FOR IP): Performed by: FAMILY MEDICINE

## 2021-04-22 PROCEDURE — 2580000003 HC RX 258: Performed by: INTERNAL MEDICINE

## 2021-04-22 PROCEDURE — 80048 BASIC METABOLIC PNL TOTAL CA: CPT

## 2021-04-22 PROCEDURE — 85025 COMPLETE CBC W/AUTO DIFF WBC: CPT

## 2021-04-22 PROCEDURE — 6360000002 HC RX W HCPCS: Performed by: INTERNAL MEDICINE

## 2021-04-22 PROCEDURE — C9113 INJ PANTOPRAZOLE SODIUM, VIA: HCPCS | Performed by: INTERNAL MEDICINE

## 2021-04-22 RX ORDER — SUCRALFATE ORAL 1 G/10ML
1 SUSPENSION ORAL 4 TIMES DAILY
Qty: 1200 ML | Refills: 3 | Status: SHIPPED | OUTPATIENT
Start: 2021-04-22 | End: 2021-12-04

## 2021-04-22 RX ORDER — PANTOPRAZOLE SODIUM 20 MG/1
40 TABLET, DELAYED RELEASE ORAL 2 TIMES DAILY
Qty: 60 TABLET | Refills: 0 | Status: SHIPPED | OUTPATIENT
Start: 2021-04-22

## 2021-04-22 RX ORDER — METOPROLOL SUCCINATE 100 MG/1
50 TABLET, EXTENDED RELEASE ORAL DAILY
COMMUNITY
End: 2022-07-07 | Stop reason: SDUPTHER

## 2021-04-22 RX ADMIN — DOFETILIDE 125 MCG: 0.12 CAPSULE ORAL at 08:52

## 2021-04-22 RX ADMIN — APIXABAN 5 MG: 5 TABLET, FILM COATED ORAL at 08:52

## 2021-04-22 RX ADMIN — PRAVASTATIN SODIUM 40 MG: 40 TABLET ORAL at 08:51

## 2021-04-22 RX ADMIN — VALSARTAN 160 MG: 80 TABLET, FILM COATED ORAL at 08:51

## 2021-04-22 RX ADMIN — Medication 10 ML: at 08:51

## 2021-04-22 RX ADMIN — PANTOPRAZOLE SODIUM 40 MG: 40 INJECTION, POWDER, FOR SOLUTION INTRAVENOUS at 08:51

## 2021-04-22 RX ADMIN — FUROSEMIDE 20 MG: 20 TABLET ORAL at 08:52

## 2021-04-22 RX ADMIN — METOPROLOL SUCCINATE 25 MG: 25 TABLET, EXTENDED RELEASE ORAL at 08:52

## 2021-04-22 NOTE — PROGRESS NOTES
Perfect serve message sent to Marlon Lopez NP, \"Do you still want continuous normal saline? Pt is now on clears with good PO intake. Supposed to DC today but stayed overnight by pt request.  \", awaiting response.     See new orders

## 2021-04-22 NOTE — PROGRESS NOTES
Pt assessment completed and charted. VSS. Pt a/o. Pt denies pain. Bowel sounds active x4 quadrants. Pt tolerating clear liquid diet well. Bed in lowest position and wheels locked. Call light within reach. Bedside table within reach. Non-skid footwear in place. Pt denies any other needs at this time. Pt calls out appropriately. Will continue to monitor.

## 2021-04-22 NOTE — CARE COORDINATION
CASE MANAGEMENT DISCHARGE SUMMARY      Discharge to: home with family        Transportation: private       Confirmed discharge plan with:     Patient: yes     Family, name and contact number:  here.           RN, name: Jere Leiva RN

## 2021-04-23 NOTE — DISCHARGE SUMMARY
Hospital Medicine Discharge Summary    Patient ID: Cesar Rodriguez Payer      Patient's PCP: Vlad Steele MD    Admit Date: 4/19/2021     Discharge Date: 4/22/2021      Admitting Physician: Dodie Figueroa MD     Discharge Physician: Walker Garcia MD     Discharge Diagnoses: Active Hospital Problems    Diagnosis    Abdominal pain [R10.9]       The patient was seen and examined on day of discharge and this discharge summary is in conjunction with any daily progress note from day of discharge. Hospital Course:     81 y.o. female who presented to [facility] with a complaint. The epigastric pain was 6 in intensity no radiation and associated with some nausea and vomiting. Denies hematemesis abdominal distention MS bleed or melena. No change in mental status cough sputum shortness of breath constipation or diarrhea. She denied urinary complaints. Epigastric pain- IV Protonix, GI evaluation, monitored liver function test, serial troponin telemetry, gentle hydration. Repeat EGD 4/21 - erythematous 6 mm polyp just below the GE junction removed, mild antral erythema. Continue PPI BID for 4 weeks. Follow up with Dr. Matilda Browne.     Atrial fibrillation-on Eliquis, metoprolol and Tikosyn     Diabetes-held p.o. medications and monitored blood sugar with low correction scale insulin      Hyperlipidemia-statin     Hypertension-on metoprolol and Diovan     Chronic diastolic CHF-no evidence of fluid overload       Physical Exam Performed:     BP (!) 172/98   Pulse 66   Temp 98.4 °F (36.9 °C) (Oral)   Resp 18   Ht 5' 2\" (1.575 m)   Wt 180 lb (81.6 kg)   SpO2 98%   BMI 32.92 kg/m²       General appearance:  No apparent distress, appears stated age and cooperative. HEENT:  Normal cephalic, atraumatic without obvious deformity. Pupils equal, round, and reactive to light. Extra ocular muscles intact. Conjunctivae/corneas clear. Neck: Supple, with full range of motion.  No jugular venous Medications:     Discharge Medication List as of 4/22/2021 12:55 PM           Details   sucralfate (CARAFATE) 1 GM/10ML suspension Take 10 mLs by mouth 4 times daily, Disp-1200 mL, R-3Normal              Details   pantoprazole (PROTONIX) 20 MG tablet Take 2 tablets by mouth 2 times daily, Disp-60 tablet, R-0Normal              Details   metoprolol succinate (TOPROL XL) 100 MG extended release tablet Take 50 mg by mouth daily Prescribed as 100mg bid, pt. Takes only 50mg if HR > 60. Historical Med      pravastatin (PRAVACHOL) 80 MG tablet Take 40 mg by mouth dailyHistorical Med      pioglitazone (ACTOS) 15 MG tablet Take 15 mg by mouth dailyHistorical Med      dofetilide (TIKOSYN) 125 MCG capsule Take 1 capsule by mouth every 12 hours, Disp-180 capsule, R-3Normal      furosemide (LASIX) 20 MG tablet Take 1 tablet by mouth daily, Disp-30 tablet, R-11Normal      ELIQUIS 5 MG TABS tablet TAKE ONE TABLET BY MOUTH TWICE A DAY, Disp-60 tablet,R-5Normal      valsartan (DIOVAN) 160 MG tablet Take 1 tablet by mouth daily, Disp-30 tablet, R-3Normal      glyBURIDE (DIABETA) 1.25 MG tablet Take 2.5 mg by mouth daily (with breakfast) Historical Med      aspirin 81 MG tablet Take 81 mg by mouth daily             Time Spent on discharge is more than 30 minutes in the examination, evaluation, counseling and review of medications and discharge plan. Signed:    Storm Saini MD   4/22/2021      Thank you Jamison Aquino MD for the opportunity to be involved in this patient's care. If you have any questions or concerns please feel free to contact me at [phone].

## 2021-04-23 NOTE — ADT AUTH CERT
Abdominal Pain, Undiagnosed - Care Day 3 (4/21/2021) by Danilo Godfrey RN       Review Status Review Entered   Completed 4/23/2021 09:27      Criteria Review      Care Day: 3 Care Date: 4/21/2021 Level of Care: Inpatient Floor    Guideline Day 2    Clinical Status    (X) * Hemodynamic stability    4/23/2021 9:27 AM EDT by Oliver Lopes      98. 4  20  102, 84   225/105, 154/74   95%ra    (X) * Pain absent or managed    ( ) * Etiology or finding requiring inpatient treatment absent    4/23/2021 9:27 AM EDT by Oliver Lopes      Esophagogastroduodenoscopy with polypectomy  today    ( ) * Liquid or advanced diet tolerated    ( ) * Discharge plans and education understood    Activity    (X) * Ambulatory or acceptable for next level of care [D]    Routes    (X) * Oral hydration, medications, and diet    4/23/2021 9:27 AM EDT by Oliver Lopes      eliquis 5mg bid, tikosyn 125mcg bid, toprol xl 25mg qd, diovan 160mg qd  lasix 20mg qd  protonix 40mg iv qd    (X) Liquid or advanced diet    * Milestone   Additional Notes   4/21   Per Hospitalist:   Epigastric pain- IV Protonix, GI evaluation, monitor liver function test, serial troponin telemetry, gentle hydration. Repeat EGD 4/21 - erythematous 6 mm polyp just below the GE junction removed, mild antral erythema. Continue PPI BID for 4 weeks.  Follow up with Dr. Rosy Bhagat.       Atrial fibrillation-on Eliquis, metoprolol and Tikosyn       Diabetes-hold p.o. medications and monitor blood sugar with low correction scale insulin        Hyperlipidemia-statin       Hypertension-on metoprolol and Diovan       Chronic diastolic CHF-no evidence of fluid overload       DVT Prophylaxis: eliquis   Diet: DIET CLEAR LIQUID;   Code Status: Full Code       PT/OT Eval Status: baseline       Dispo - DC tomorrow if tolerating diet

## 2021-05-07 DIAGNOSIS — I48.91 ATRIAL FIBRILLATION WITH RAPID VENTRICULAR RESPONSE (HCC): ICD-10-CM

## 2021-05-07 DIAGNOSIS — I50.32 CHRONIC DIASTOLIC HEART FAILURE (HCC): ICD-10-CM

## 2021-05-10 RX ORDER — APIXABAN 5 MG/1
TABLET, FILM COATED ORAL
Qty: 60 TABLET | Refills: 5 | Status: SHIPPED | OUTPATIENT
Start: 2021-05-10 | End: 2021-06-10

## 2021-05-12 ENCOUNTER — TELEPHONE (OUTPATIENT)
Dept: CARDIOLOGY CLINIC | Age: 86
End: 2021-05-12

## 2021-05-12 NOTE — TELEPHONE ENCOUNTER
Spoke with patient. States cost went up to over $100 a month and can't afford this. Reviewed alternatives - Xarelto or Coumadin. She states she will do whatever you feel would be best.  Asks that she be called so she will know what she will be started on. Of note:  She is currently out of her Eliquis.  (x 1 day)

## 2021-05-25 PROBLEM — I48.91 ATRIAL FIBRILLATION WITH RAPID VENTRICULAR RESPONSE (HCC): Status: RESOLVED | Noted: 2021-03-31 | Resolved: 2021-05-25

## 2021-05-25 NOTE — PROGRESS NOTES
[facility]   Electrophysiology  Note              Date:  May 26, 2021  Patient name: Sandra Dougherty Payer  YOB: 1935    Primary Care physician: Des Rogers MD    HISTORY OF PRESENT ILLNESS: The patient is an 81 y.o.  female with a history of afib, PVCs, HTN, SVT, sinus bradycardia, pulmonary HTN, chronic diastolic CHF, and DM. She is a poor historian regarding medications. She had SVT in 2013, wore an event monitor, and atrial fibrillation was detected. She had recurrent AF in 2014 and had a cryoablation for PAF in 2/2014. Post procedure she had a retroperitoneal bleed and required a urinary stent. Echo in 2/2019 showed an EF of 55-60% and severe pulmonary HTN and she was referred to heart failure team. Had recurrent afib 1/2020 and was started on Eliquis and amiodarone. Amiodarone was stopped for unknown reasons. Most recent echo in 4/2020 showed an EF of 55-60%. She was admitted in 12/2020 with afib and was started on Tikosyn. Due to bradycardia, Toprol was stopped 3/23/2021. On 3/30/2021 she went to the ER with PAF and Toprol was restarted at 25mg po QD. She was admitted 4/2021 with abd pain and had a GE junction polyp removed. Today she is being seen for paroxysmal atrial fibrillation. EKG shows SR with PVCs a HR of 60. She complains of some sharp chest pain/tightness and SOB with stairs that relieves with rest. Denies palpitations and dizziness. Home BP averages 140/70. Past Medical History:   has a past medical history of Atrial fibrillation ([address] Diabetes mellitus ([address] Hydronephrosis, Hyperlipidemia, Hypertension, Intussusception intestine ([address] and SVT (supraventricular tachycardia) ([address] Past Surgical History:   has a past surgical history that includes Hysterectomy; Appendectomy; Tonsillectomy; skin biopsy; ablation of dysrhythmic focus; Dilatation, esophagus; Small intestine surgery; fracture surgery;  Upper gastrointestinal endoscopy (N/A, 4/21/2021); Upper gastrointestinal endoscopy (4/21/2021); and Upper gastrointestinal endoscopy (4/21/2021). Home Medications:    Prior to Admission medications    Medication Sig Start Date End Date Taking? Authorizing Provider   ELIQUIS 5 MG TABS tablet TAKE ONE TABLET BY MOUTH TWICE A DAY 5/10/21  Yes GUANAKO Escalera CNP   metoprolol succinate (TOPROL XL) 100 MG extended release tablet Take 50 mg by mouth daily Prescribed as 100mg bid, pt. Takes only 50mg if HR > 60. Yes Historical Provider, MD   pantoprazole (PROTONIX) 20 MG tablet Take 2 tablets by mouth 2 times daily 4/22/21  Yes Valentine Keller MD   sucralfate (CARAFATE) 1 GM/10ML suspension Take 10 mLs by mouth 4 times daily 4/22/21  Yes Valentine Keller MD   pravastatin (PRAVACHOL) 80 MG tablet Take 40 mg by mouth daily   Yes Historical Provider, MD   pioglitazone (ACTOS) 15 MG tablet Take 15 mg by mouth daily   Yes Historical Provider, MD   dofetilide (TIKOSYN) 125 MCG capsule Take 1 capsule by mouth every 12 hours 2/4/21  Yes Shruthi Ashton MD   furosemide (LASIX) 20 MG tablet Take 1 tablet by mouth daily 1/18/21  Yes Lilliam Navarrete MD   valsartan (DIOVAN) 160 MG tablet Take 1 tablet by mouth daily 6/1/20  Yes Natalee Walden MD   glyBURIDE (DIABETA) 1.25 MG tablet Take 2.5 mg by mouth daily (with breakfast)    Yes Historical Provider, MD   aspirin 81 MG tablet Take 81 mg by mouth daily   Yes Historical Provider, MD       Allergies:  Hydrocodone, Metformin and related, Nickel, Omeprazole, Prednisone, and Hydralazine    Social History:   reports that she has never smoked. She has never used smokeless tobacco. She reports that she does not drink alcohol and does not use drugs. Family History: family history includes Cancer in her sister; Diabetes in her mother; Heart Disease in her brother and father; High Blood Pressure in her brother and father; High Cholesterol in her brother and father.     Review of Systems   All 14-point review of systems are completed and pertinent positives are mentioned in the history of present illness. Other systems are reviewed and are negative. PHYSICAL EXAM:    Vital signs:    BP (!) 158/62   Pulse 60   Ht 5' 2\" (1.575 m)   Wt 187 lb 8 oz (85 kg)   SpO2 98%   BMI 34.29 kg/m²      Constitutional and general appearance: alert, cooperative, no distress and appears stated age  HEENT: PERRL, no cervical lymphadenopathy. No masses palpable. Normal oral mucosa  Respiratory:  · Normal excursion and expansion without use of accessory muscles  · Resp auscultation: Normal breath sounds without dullness or wheezing  Cardiovascular:  · The apical impulse is not displaced  · Heart tones are crisp and normal. Regular S1 and S2.  · Jugular venous pulsation Normal  · The carotid upstroke is normal in amplitude and contour without delay or bruit  · Peripheral pulses are symmetrical and full   Abdomen:  · No masses or tenderness  · Bowel sounds present  Extremities:  ·  No cyanosis or clubbing  ·  No lower extremity edema  ·  Skin: warm and dry  Neurological:  · Alert and oriented  · Moves all extremities well  · No abnormalities of mood, affect, memory, mentation, or behavior are noted    DATA:    ECG 5/26/2021:  SR with PVCs and a HR of 60    Echo 4/28/2020:  Normal left ventricle systolic function with an estimated ejection fraction of 55-60%. Definity contrast administered with no evidence of left ventricular mass or thrombus noted. No regional wall motion abnormalities are seen. Normal left ventricular diastolic filling pressure. The left atrium is mildly dilated. Mild mitral and tricuspid regurgitation. Systolic pulmonary artery pressure (SPAP) estimated at 42 mmHg (right atrial pressure 3 mmHg), consistent with mild pulmonary hypertension. Echo 2/19/2019:  Definity contrast administered. Normal left ventricular systolic function with an estimated ejection fraction of 55-60%.   There is mild septal hypertrophy with normal remaining wall thickness. Normal left ventricular diastolic filling pressure. Mild mitral regurgitation. Mild tricuspid regurgitation. Systolic pulmonary artery pressure (SPAP) estimated at 60 mmHg (RA pressure 3 mmHg), consistent with severe pulmonary hypertension. Mild pulmonic regurgitation present. RAINER 2/11/2014:  Global ejection fraction is normal and estimated from 50 % to 55 %. Echo 8/26/2013:  Overall left ventricular function is normal.   Ejection fraction is visually estimated to be 50-55 %.   There is trivial tricuspid regurgitation with RVSP estimated at 30 mmHg.   Limited 2-D echocardiogram   There is limited visualization of the valvular structures but no obvious major abnormalities noted.  Dorothye Hero is reversal of E/A inflow velocities across the mitral valve suggesting impaired left ventricular relaxation. CARDIOLOGY LABS:   CBC:   No results for input(s): WBC, HGB, HCT, PLT in the last 72 hours. BMP:   No results for input(s): NA, K, CO2, BUN, CREATININE, LABGLOM, GLUCOSE in the last 72 hours. PT/INR: No results for input(s): PROTIME, INR in the last 72 hours. APTT:No results for input(s): APTT in the last 72 hours. FASTING LIPID PANEL:No results found for: HDL, LDLDIRECT, LDLCALC, TRIG  LIVER PROFILE:  No results for input(s): AST, ALT, ALB in the last 72 hours. Assessment:   1. Paroxysmal atrial fibrillation: improved, stable               -s/p cryoablation in 2/2014 (patient had a significant retroperitoneal bleed post procedure)              -recurrence 1/2020, amiodarone recommended, patient stopped 3/2020              -Tikosyn started 12/2020              -[id number] score 6 (age, gender, HTN, DM, CHF)  2. Supraventricular tachycardia: noted in 2013  3. Sinus bradycardia: stable   4. PVCs: new problem    -noted on EKG today   5. HTN: uncontrolled   6. Pulmonary HTN  7. Chronic diastolic CHF: compensated  8. DM  9.  Abd pain: resolved -has been followed by surgery before for SBO versus ileus   10. Chest pain: typical features, new     Plan:   1. Continue Tikosyn, Eliquis, Lasix, and Toprol  2. Increase valsartan to 320mg daily   3. Monitor BP at home and call if consistently out of goal ranges  4. Lexiscan myoview due to chest pain and PVCs  5. Annual CBC and BMP (due 4/2022)  6. Follow up in 6 months unless indicated sooner    MDM: GUANAKO Doan-BATSHEVA.   [address]  [phone]

## 2021-05-26 ENCOUNTER — OFFICE VISIT (OUTPATIENT)
Dept: CARDIOLOGY CLINIC | Age: 86
End: 2021-05-26
Payer: MEDICARE

## 2021-05-26 VITALS
HEART RATE: 60 BPM | OXYGEN SATURATION: 98 % | HEIGHT: 62 IN | SYSTOLIC BLOOD PRESSURE: 158 MMHG | DIASTOLIC BLOOD PRESSURE: 62 MMHG | WEIGHT: 187.5 LBS | BODY MASS INDEX: 34.5 KG/M2

## 2021-05-26 DIAGNOSIS — I49.3 PVC (PREMATURE VENTRICULAR CONTRACTION): ICD-10-CM

## 2021-05-26 DIAGNOSIS — I48.0 PAF (PAROXYSMAL ATRIAL FIBRILLATION) (HCC): Primary | ICD-10-CM

## 2021-05-26 DIAGNOSIS — I50.32 CHRONIC DIASTOLIC HEART FAILURE (HCC): ICD-10-CM

## 2021-05-26 DIAGNOSIS — I10 ESSENTIAL HYPERTENSION: ICD-10-CM

## 2021-05-26 DIAGNOSIS — R07.2 PRECORDIAL PAIN: ICD-10-CM

## 2021-05-26 PROCEDURE — 99215 OFFICE O/P EST HI 40 MIN: CPT | Performed by: NURSE PRACTITIONER

## 2021-05-26 PROCEDURE — 93000 ELECTROCARDIOGRAM COMPLETE: CPT | Performed by: NURSE PRACTITIONER

## 2021-05-26 RX ORDER — VALSARTAN 320 MG/1
320 TABLET ORAL DAILY
Qty: 90 TABLET | Refills: 1
Start: 2021-05-26

## 2021-05-26 NOTE — LETTER
[facility]   Electrophysiology  Note              Date:  May 26, 2021  Patient name: Erika Tiwari  YOB: 1935    Primary Care physician: Annie Bishop MD    HISTORY OF PRESENT ILLNESS: The patient is an 81 y.o.  female with a history of afib, PVCs, HTN, SVT, sinus bradycardia, pulmonary HTN, chronic diastolic CHF, and DM. She is a poor historian regarding medications. She had SVT in 2013, wore an event monitor, and atrial fibrillation was detected. She had recurrent AF in 2014 and had a cryoablation for PAF in 2/2014. Post procedure she had a retroperitoneal bleed and required a urinary stent. Echo in 2/2019 showed an EF of 55-60% and severe pulmonary HTN and she was referred to heart failure team. Had recurrent afib 1/2020 and was started on Eliquis and amiodarone. Amiodarone was stopped for unknown reasons. Most recent echo in 4/2020 showed an EF of 55-60%. She was admitted in 12/2020 with afib and was started on Tikosyn. Due to bradycardia, Toprol was stopped 3/23/2021. On 3/30/2021 she went to the ER with PAF and Toprol was restarted at 25mg po QD. She was admitted 4/2021 with abd pain and had a GE junction polyp removed. Today she is being seen for paroxysmal atrial fibrillation. EKG shows SR with PVCs a HR of 60. She complains of some sharp chest pain/tightness and SOB with stairs that relieves with rest. Denies palpitations and dizziness. Home BP averages 140/70. Past Medical History:   has a past medical history of Atrial fibrillation ([address] Diabetes mellitus ([address] Hydronephrosis, Hyperlipidemia, Hypertension, Intussusception intestine ([address] and SVT (supraventricular tachycardia) ([address] Past Surgical History:   has a past surgical history that includes Hysterectomy; Appendectomy; Tonsillectomy; skin biopsy; ablation of dysrhythmic focus; Dilatation, esophagus; Small intestine surgery; fracture surgery;  Upper gastrointestinal endoscopy (N/A, 4/21/2021); Upper gastrointestinal endoscopy (4/21/2021); and Upper gastrointestinal endoscopy (4/21/2021). Home Medications:    Prior to Admission medications    Medication Sig Start Date End Date Taking? Authorizing Provider   ELIQUIS 5 MG TABS tablet TAKE ONE TABLET BY MOUTH TWICE A DAY 5/10/21  Yes GUANAKO Pitts CNP   metoprolol succinate (TOPROL XL) 100 MG extended release tablet Take 50 mg by mouth daily Prescribed as 100mg bid, pt. Takes only 50mg if HR > 60. Yes Historical Provider, MD   pantoprazole (PROTONIX) 20 MG tablet Take 2 tablets by mouth 2 times daily 4/22/21  Yes Silvana Reeder MD   sucralfate (CARAFATE) 1 GM/10ML suspension Take 10 mLs by mouth 4 times daily 4/22/21  Yes Silvana Reeder MD   pravastatin (PRAVACHOL) 80 MG tablet Take 40 mg by mouth daily   Yes Historical Provider, MD   pioglitazone (ACTOS) 15 MG tablet Take 15 mg by mouth daily   Yes Historical Provider, MD   dofetilide (TIKOSYN) 125 MCG capsule Take 1 capsule by mouth every 12 hours 2/4/21  Yes Susy Martinez MD   furosemide (LASIX) 20 MG tablet Take 1 tablet by mouth daily 1/18/21  Yes Charli Serrano MD   valsartan (DIOVAN) 160 MG tablet Take 1 tablet by mouth daily 6/1/20  Yes Ector Antunez MD   glyBURIDE (DIABETA) 1.25 MG tablet Take 2.5 mg by mouth daily (with breakfast)    Yes Historical Provider, MD   aspirin 81 MG tablet Take 81 mg by mouth daily   Yes Historical Provider, MD       Allergies:  Hydrocodone, Metformin and related, Nickel, Omeprazole, Prednisone, and Hydralazine    Social History:   reports that she has never smoked. She has never used smokeless tobacco. She reports that she does not drink alcohol and does not use drugs. Family History: family history includes Cancer in her sister; Diabetes in her mother; Heart Disease in her brother and father; High Blood Pressure in her brother and father; High Cholesterol in her brother and father.     Review of Systems   All 14-point review of systems are completed and pertinent positives are mentioned in the history of present illness. Other systems are reviewed and are negative. PHYSICAL EXAM:    Vital signs:    BP (!) 158/62   Pulse 60   Ht 5' 2\" (1.575 m)   Wt 187 lb 8 oz (85 kg)   SpO2 98%   BMI 34.29 kg/m²      Constitutional and general appearance: alert, cooperative, no distress and appears stated age  HEENT: PERRL, no cervical lymphadenopathy. No masses palpable. Normal oral mucosa  Respiratory:  · Normal excursion and expansion without use of accessory muscles  · Resp auscultation: Normal breath sounds without dullness or wheezing  Cardiovascular:  · The apical impulse is not displaced  · Heart tones are crisp and normal. Regular S1 and S2.  · Jugular venous pulsation Normal  · The carotid upstroke is normal in amplitude and contour without delay or bruit  · Peripheral pulses are symmetrical and full   Abdomen:  · No masses or tenderness  · Bowel sounds present  Extremities:  ·  No cyanosis or clubbing  ·  No lower extremity edema  ·  Skin: warm and dry  Neurological:  · Alert and oriented  · Moves all extremities well  · No abnormalities of mood, affect, memory, mentation, or behavior are noted    DATA:    ECG 5/26/2021:  SR with PVCs and a HR of 60    Echo 4/28/2020:  Normal left ventricle systolic function with an estimated ejection fraction of 55-60%. Definity contrast administered with no evidence of left ventricular mass or thrombus noted. No regional wall motion abnormalities are seen. Normal left ventricular diastolic filling pressure. The left atrium is mildly dilated. Mild mitral and tricuspid regurgitation. Systolic pulmonary artery pressure (SPAP) estimated at 42 mmHg (right atrial pressure 3 mmHg), consistent with mild pulmonary hypertension. Echo 2/19/2019:  Definity contrast administered. Normal left ventricular systolic function with an estimated ejection fraction of 55-60%.   There is mild septal hypertrophy with normal remaining wall thickness. Normal left ventricular diastolic filling pressure. Mild mitral regurgitation. Mild tricuspid regurgitation. Systolic pulmonary artery pressure (SPAP) estimated at 60 mmHg (RA pressure 3 mmHg), consistent with severe pulmonary hypertension. Mild pulmonic regurgitation present. RAINER 2/11/2014:  Global ejection fraction is normal and estimated from 50 % to 55 %. Echo 8/26/2013:  Overall left ventricular function is normal.   Ejection fraction is visually estimated to be 50-55 %.   There is trivial tricuspid regurgitation with RVSP estimated at 30 mmHg.   Limited 2-D echocardiogram   There is limited visualization of the valvular structures but no obvious major abnormalities noted.  Marinell Curd is reversal of E/A inflow velocities across the mitral valve suggesting impaired left ventricular relaxation. CARDIOLOGY LABS:   CBC:   No results for input(s): WBC, HGB, HCT, PLT in the last 72 hours. BMP:   No results for input(s): NA, K, CO2, BUN, CREATININE, LABGLOM, GLUCOSE in the last 72 hours. PT/INR: No results for input(s): PROTIME, INR in the last 72 hours. APTT:No results for input(s): APTT in the last 72 hours. FASTING LIPID PANEL:No results found for: HDL, LDLDIRECT, LDLCALC, TRIG  LIVER PROFILE:  No results for input(s): AST, ALT, ALB in the last 72 hours. Assessment:   1. Paroxysmal atrial fibrillation: improved, stable               -s/p cryoablation in 2/2014 (patient had a significant retroperitoneal bleed post procedure)              -recurrence 1/2020, amiodarone recommended, patient stopped 3/2020              -Tikosyn started 12/2020              -[id number] score 6 (age, gender, HTN, DM, CHF)  2. Supraventricular tachycardia: noted in 2013  3. Sinus bradycardia: stable   4. PVCs: new problem    -noted on EKG today   5. HTN: uncontrolled   6. Pulmonary HTN  7. Chronic diastolic CHF: compensated  8. DM  9.  Abd pain: resolved -has been followed by surgery before for SBO versus ileus   10. Chest pain: typical features, new     Plan:   1. Continue Tikosyn, Eliquis, Lasix, and Toprol  2. Increase valsartan to 320mg daily   3. Monitor BP at home and call if consistently out of goal ranges  4. Lexiscan myoview due to chest pain and PVCs  5. Annual CBC and BMP (due 4/2022)  6. Follow up in 6 months unless indicated sooner    MDM: high Leona Beal, APRN-CNP.   [facility], [place], operated by [facility]  [phone]

## 2021-05-26 NOTE — PATIENT INSTRUCTIONS
Increase valsartan to 320mg per day   Stress test. Call [phone] to schedule.    Monitor BP at home and call if consistently out of goal ranges   Follow up in 6 months
PRE-OP DIAGNOSIS:  Cervical insufficiency in pregnancy, antepartum 11-Nov-2019 11:50:08  Heidy John

## 2021-06-10 DIAGNOSIS — I50.32 CHRONIC DIASTOLIC HEART FAILURE (HCC): ICD-10-CM

## 2021-06-10 DIAGNOSIS — I48.91 ATRIAL FIBRILLATION WITH RAPID VENTRICULAR RESPONSE (HCC): ICD-10-CM

## 2021-06-23 NOTE — TELEPHONE ENCOUNTER
Pt sts she needs help with dofetilide (TIKOSYN) 125 MCG capsule not Eliquis. Pt trying to get pt assistance with cost of Tikosyn.

## 2021-07-13 ENCOUNTER — TELEPHONE (OUTPATIENT)
Dept: CARDIOLOGY CLINIC | Age: 86
End: 2021-07-13

## 2021-07-13 NOTE — TELEPHONE ENCOUNTER
Pt stopped into office with paperwork for financial assistance for Eliquis and Tikosyn. Gave paperwork to [facility] per patient.

## 2021-07-17 ENCOUNTER — APPOINTMENT (OUTPATIENT)
Dept: CT IMAGING | Age: 86
End: 2021-07-17
Payer: MEDICARE

## 2021-07-17 ENCOUNTER — HOSPITAL ENCOUNTER (EMERGENCY)
Age: 86
Discharge: HOME | End: 2021-07-17
Attending: EMERGENCY MEDICINE
Payer: MEDICARE

## 2021-07-17 VITALS
HEART RATE: 52 BPM | BODY MASS INDEX: 34.04 KG/M2 | OXYGEN SATURATION: 96 % | DIASTOLIC BLOOD PRESSURE: 64 MMHG | TEMPERATURE: 98.1 F | SYSTOLIC BLOOD PRESSURE: 178 MMHG | WEIGHT: 185 LBS | HEIGHT: 62 IN | RESPIRATION RATE: 18 BRPM

## 2021-07-17 DIAGNOSIS — R10.9 ABDOMINAL PAIN, UNSPECIFIED ABDOMINAL LOCATION: Primary | ICD-10-CM

## 2021-07-17 LAB
A/G RATIO: 0.9 (ref 1.1–2.2)
ALBUMIN SERPL-MCNC: 4 G/DL (ref 3.4–5)
ALP BLD-CCNC: 90 U/L (ref 40–129)
ALT SERPL-CCNC: 15 U/L (ref 10–40)
ANION GAP SERPL CALCULATED.3IONS-SCNC: 12 MMOL/L (ref 3–16)
AST SERPL-CCNC: 21 U/L (ref 15–37)
BASE EXCESS VENOUS: -2.1 MMOL/L (ref -3–3)
BASOPHILS ABSOLUTE: 0.1 K/UL (ref 0–0.2)
BASOPHILS RELATIVE PERCENT: 0.5 %
BILIRUB SERPL-MCNC: 0.3 MG/DL (ref 0–1)
BUN BLDV-MCNC: 23 MG/DL (ref 7–20)
CALCIUM SERPL-MCNC: 8.9 MG/DL (ref 8.3–10.6)
CARBOXYHEMOGLOBIN: 2.9 % (ref 0–1.5)
CHLORIDE BLD-SCNC: 99 MMOL/L (ref 99–110)
CO2: 24 MMOL/L (ref 21–32)
CREAT SERPL-MCNC: 1.1 MG/DL (ref 0.6–1.2)
EOSINOPHILS ABSOLUTE: 0.1 K/UL (ref 0–0.6)
EOSINOPHILS RELATIVE PERCENT: 1 %
GFR AFRICAN AMERICAN: 57
GFR NON-AFRICAN AMERICAN: 47
GLOBULIN: 4.3 G/DL
GLUCOSE BLD-MCNC: 195 MG/DL (ref 70–99)
HCO3 VENOUS: 22 MMOL/L (ref 23–29)
HCT VFR BLD CALC: 38.5 % (ref 36–48)
HEMOGLOBIN: 12.2 G/DL (ref 12–16)
LIPASE: 22 U/L (ref 13–60)
LYMPHOCYTES ABSOLUTE: 2.4 K/UL (ref 1–5.1)
LYMPHOCYTES RELATIVE PERCENT: 17.7 %
MCH RBC QN AUTO: 23.7 PG (ref 26–34)
MCHC RBC AUTO-ENTMCNC: 31.8 G/DL (ref 31–36)
MCV RBC AUTO: 74.4 FL (ref 80–100)
METHEMOGLOBIN VENOUS: 0.5 %
MONOCYTES ABSOLUTE: 1.3 K/UL (ref 0–1.3)
MONOCYTES RELATIVE PERCENT: 9.4 %
NEUTROPHILS ABSOLUTE: 9.9 K/UL (ref 1.7–7.7)
NEUTROPHILS RELATIVE PERCENT: 71.4 %
O2 SAT, VEN: 81 %
O2 THERAPY: ABNORMAL
PCO2, VEN: 35.7 MMHG (ref 40–50)
PDW BLD-RTO: 17.7 % (ref 12.4–15.4)
PH VENOUS: 7.41 (ref 7.35–7.45)
PLATELET # BLD: 370 K/UL (ref 135–450)
PMV BLD AUTO: 8.7 FL (ref 5–10.5)
PO2, VEN: 44.1 MMHG (ref 25–40)
POTASSIUM REFLEX MAGNESIUM: 4.1 MMOL/L (ref 3.5–5.1)
RBC # BLD: 5.17 M/UL (ref 4–5.2)
SODIUM BLD-SCNC: 135 MMOL/L (ref 136–145)
SPECIMEN STATUS: NORMAL
TCO2 CALC VENOUS: 23 MMOL/L
TOTAL PROTEIN: 8.3 G/DL (ref 6.4–8.2)
TROPONIN: <0.01 NG/ML
WBC # BLD: 13.9 K/UL (ref 4–11)

## 2021-07-17 PROCEDURE — 99284 EMERGENCY DEPT VISIT MOD MDM: CPT

## 2021-07-17 PROCEDURE — 83690 ASSAY OF LIPASE: CPT

## 2021-07-17 PROCEDURE — 84484 ASSAY OF TROPONIN QUANT: CPT

## 2021-07-17 PROCEDURE — 80053 COMPREHEN METABOLIC PANEL: CPT

## 2021-07-17 PROCEDURE — 74177 CT ABD & PELVIS W/CONTRAST: CPT

## 2021-07-17 PROCEDURE — 93005 ELECTROCARDIOGRAM TRACING: CPT | Performed by: EMERGENCY MEDICINE

## 2021-07-17 PROCEDURE — 6360000004 HC RX CONTRAST MEDICATION: Performed by: EMERGENCY MEDICINE

## 2021-07-17 PROCEDURE — 85025 COMPLETE CBC W/AUTO DIFF WBC: CPT

## 2021-07-17 PROCEDURE — 2580000003 HC RX 258: Performed by: EMERGENCY MEDICINE

## 2021-07-17 PROCEDURE — 96375 TX/PRO/DX INJ NEW DRUG ADDON: CPT

## 2021-07-17 PROCEDURE — 6360000002 HC RX W HCPCS: Performed by: EMERGENCY MEDICINE

## 2021-07-17 PROCEDURE — 82803 BLOOD GASES ANY COMBINATION: CPT

## 2021-07-17 PROCEDURE — 96374 THER/PROPH/DIAG INJ IV PUSH: CPT

## 2021-07-17 RX ORDER — ONDANSETRON 2 MG/ML
4 INJECTION INTRAMUSCULAR; INTRAVENOUS ONCE
Status: COMPLETED | OUTPATIENT
Start: 2021-07-17 | End: 2021-07-17

## 2021-07-17 RX ORDER — MORPHINE SULFATE 4 MG/ML
4 INJECTION, SOLUTION INTRAMUSCULAR; INTRAVENOUS ONCE
Status: COMPLETED | OUTPATIENT
Start: 2021-07-17 | End: 2021-07-17

## 2021-07-17 RX ORDER — SODIUM CHLORIDE, SODIUM LACTATE, POTASSIUM CHLORIDE, CALCIUM CHLORIDE 600; 310; 30; 20 MG/100ML; MG/100ML; MG/100ML; MG/100ML
1000 INJECTION, SOLUTION INTRAVENOUS ONCE
Status: COMPLETED | OUTPATIENT
Start: 2021-07-17 | End: 2021-07-17

## 2021-07-17 RX ADMIN — SODIUM CHLORIDE, POTASSIUM CHLORIDE, SODIUM LACTATE AND CALCIUM CHLORIDE 1000 ML: 600; 310; 30; 20 INJECTION, SOLUTION INTRAVENOUS at 16:00

## 2021-07-17 RX ADMIN — IOPAMIDOL 75 ML: 755 INJECTION, SOLUTION INTRAVENOUS at 18:09

## 2021-07-17 RX ADMIN — ONDANSETRON 4 MG: 2 INJECTION INTRAMUSCULAR; INTRAVENOUS at 16:28

## 2021-07-17 RX ADMIN — IOHEXOL 50 ML: 240 INJECTION, SOLUTION INTRATHECAL; INTRAVASCULAR; INTRAVENOUS; ORAL at 16:28

## 2021-07-17 RX ADMIN — MORPHINE SULFATE 4 MG: 4 INJECTION, SOLUTION INTRAMUSCULAR; INTRAVENOUS at 16:28

## 2021-07-17 ASSESSMENT — PAIN SCALES - GENERAL
PAINLEVEL_OUTOF10: 5
PAINLEVEL_OUTOF10: 2
PAINLEVEL_OUTOF10: 10

## 2021-07-17 NOTE — ED PROVIDER NOTES
CHIEF COMPLAINT  Abdominal Pain (pt to ED with c/o abd pain starting today. pt reports mid abd pain with nausea. hx of SBO and polyps. pt with normal BM this morning)      HISTORY OF PRESENT ILLNESS  Burt Tiwari is a 81 y.o. female with a history of atrial fibrillation on Eliquis,, type 2 diabetes, hyperlipidemia, hypertension, heart failure, small bowel obstruction, who presents emergency department for evaluation of abdominal pain, nausea, emesis. Patient states that she had the onset of nausea, abdominal pain this morning. She states that she has not been able to tolerate p.o. intake. She states that this feels similar to previous episodes of bowel obstruction. She states that her abdomen feels somewhat distended. She states that she last had a normal bowel movement this morning. She denies any fevers. In April 2020 when she had multiple gastric polyps that were removed. No other complaints, modifying factors or associated symptoms. I have reviewed the following from the nursing documentation.     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     Past Surgical History:   Procedure Laterality Date    ABLATION OF DYSRHYTHMIC FOCUS      -2014    APPENDECTOMY      DILATATION, ESOPHAGUS      FRACTURE SURGERY      L ankle    HYSTERECTOMY      SKIN BIOPSY      SMALL INTESTINE SURGERY      TONSILLECTOMY      UPPER GASTROINTESTINAL ENDOSCOPY N/A 4/21/2021    EGD POLYP SNARE performed by Rose Da Silva DO at [address] [facility]  4/21/2021    EGD CONTROL HEMORRHAGE performed by Rose Da Silva DO at [address] [facility]  4/21/2021    EGD BIOPSY performed by Rose Da Silva DO at [address] [place] [address] ENDOSCOPY     Family History   Problem Relation Age of Onset    Diabetes Mother     Heart Disease Father     High Blood Pressure Father     High Cholesterol Father     Cancer Sister     Heart Disease Brother     High Blood Pressure Brother     High Cholesterol Brother      Social History     Socioeconomic History    Marital status:      Spouse name: Not on file    Number of children: Not on file    Years of education: Not on file    Highest education level: Not on file   Occupational History    Not on file   Tobacco Use    Smoking status: Never Smoker    Smokeless tobacco: Never Used   Vaping Use    Vaping Use: Never used   Substance and Sexual Activity    Alcohol use: No    Drug use: No    Sexual activity: Yes     Partners: Male   Other Topics Concern    Not on file   Social History Narrative    Not on file     Social Determinants of Health     Financial Resource Strain:     Difficulty of Paying Living Expenses:    Food Insecurity:     Worried About Running Out of Food in the Last Year:     Ran Out of Food in the Last Year:    Transportation Needs:     Lack of Transportation (Medical):  Lack of Transportation (Non-Medical):    Physical Activity:     Days of Exercise per Week:     Minutes of Exercise per Session:    Stress:     Feeling of Stress :    Social Connections:     Frequency of Communication with Friends and Family:     Frequency of Social Gatherings with Friends and Family:     Attends Sikh Services:     Active Member of Clubs or Organizations:     Attends Club or Organization Meetings:     Marital Status:    Intimate Partner Violence:     Fear of Current or Ex-Partner:     Emotionally Abused:     Physically Abused:     Sexually Abused:      No current facility-administered medications for this encounter.      Current Outpatient Medications   Medication Sig Dispense Refill    apixaban (ELIQUIS) 5 MG TABS tablet Take 1 tablet by mouth 2 times daily 180 tablet 3    valsartan (DIOVAN) 320 MG tablet Take 1 tablet by mouth daily 90 tablet 1    metoprolol succinate (TOPROL XL) 100 MG extended release tablet Take 50 mg by mouth daily Prescribed as 100mg bid, pt. Takes only 50mg if HR > 60.  pantoprazole (PROTONIX) 20 MG tablet Take 2 tablets by mouth 2 times daily 60 tablet 0    sucralfate (CARAFATE) 1 GM/10ML suspension Take 10 mLs by mouth 4 times daily 1200 mL 3    pravastatin (PRAVACHOL) 80 MG tablet Take 40 mg by mouth daily      pioglitazone (ACTOS) 15 MG tablet Take 15 mg by mouth daily      dofetilide (TIKOSYN) 125 MCG capsule Take 1 capsule by mouth every 12 hours 180 capsule 3    furosemide (LASIX) 20 MG tablet Take 1 tablet by mouth daily 30 tablet 11    glyBURIDE (DIABETA) 1.25 MG tablet Take 2.5 mg by mouth daily (with breakfast)       aspirin 81 MG tablet Take 81 mg by mouth daily       Allergies   Allergen Reactions    Hydrocodone Other (See Comments)     NAUSEA AND DIZZINESS    Metformin And Related Other (See Comments)     NAUSEA AND DIZZINESS    Nickel Dermatitis    Omeprazole     Prednisone      Other reaction(s): chest pain & palpitations/H&P    Hydralazine Palpitations and Rash       REVIEW OF SYSTEMS  10 systems reviewed, pertinent positives per HPI otherwise noted to be negative. PHYSICAL EXAM  BP (!) 178/64   Pulse 52   Temp 98.1 °F (36.7 °C) (Oral)   Resp 18   Ht 5' 2\" (1.575 m)   Wt 185 lb (83.9 kg)   SpO2 96%   BMI 33.84 kg/m²   GENERAL APPEARANCE: Awake and alert. Cooperative. No acute distress. HEAD: Normocephalic. Atraumatic. EYES: PERRL. EOM's grossly intact. ENT: Mucous membranes are moist.   NECK: Supple, trachea midline. No significant lymphadenopathy  HEART:  Intact radial pulses 2+ bilaterally. LUNGS: Respirations unlabored without accessory muscle use. Speaking comfortably in full sentences. ABDOMEN: Soft. Non-distended. Mild tenderness in the epigastric region. There is mild distention. There is no tenderness in the right upper quadrant. No guarding or rebound. EXTREMITIES: No peripheral edema. No acute deformities. SKIN: Warm and dry. No acute rashes. NEUROLOGICAL: Alert and oriented X 3. No focal neurological deficits. PSYCHIATRIC: Normal mood and affect. LABS  I have reviewed all labs for this visit.    Results for orders placed or performed during the hospital encounter of 07/17/21   CBC Auto Differential   Result Value Ref Range    WBC 13.9 (H) 4.0 - 11.0 K/uL    RBC 5.17 4.00 - 5.20 M/uL    Hemoglobin 12.2 12.0 - 16.0 g/dL    Hematocrit 38.5 36.0 - 48.0 %    MCV 74.4 (L) 80.0 - 100.0 fL    MCH 23.7 (L) 26.0 - 34.0 pg    MCHC 31.8 31.0 - 36.0 g/dL    RDW 17.7 (H) 12.4 - 15.4 %    Platelets 388 804 - 394 K/uL    MPV 8.7 5.0 - 10.5 fL    Neutrophils % 71.4 %    Lymphocytes % 17.7 %    Monocytes % 9.4 %    Eosinophils % 1.0 %    Basophils % 0.5 %    Neutrophils Absolute 9.9 (H) 1.7 - 7.7 K/uL    Lymphocytes Absolute 2.4 1.0 - 5.1 K/uL    Monocytes Absolute 1.3 0.0 - 1.3 K/uL    Eosinophils Absolute 0.1 0.0 - 0.6 K/uL    Basophils Absolute 0.1 0.0 - 0.2 K/uL   Comprehensive Metabolic Panel w/ Reflex to MG   Result Value Ref Range    Sodium 135 (L) 136 - 145 mmol/L    Potassium reflex Magnesium 4.1 3.5 - 5.1 mmol/L    Chloride 99 99 - 110 mmol/L    CO2 24 21 - 32 mmol/L    Anion Gap 12 3 - 16    Glucose 195 (H) 70 - 99 mg/dL    BUN 23 (H) 7 - 20 mg/dL    CREATININE 1.1 0.6 - 1.2 mg/dL    GFR Non- 47 (A) >60    GFR  57 (A) >60    Calcium 8.9 8.3 - 10.6 mg/dL    Total Protein 8.3 (H) 6.4 - 8.2 g/dL    Albumin 4.0 3.4 - 5.0 g/dL    Albumin/Globulin Ratio 0.9 (L) 1.1 - 2.2    Total Bilirubin 0.3 0.0 - 1.0 mg/dL    Alkaline Phosphatase 90 40 - 129 U/L    ALT 15 10 - 40 U/L    AST 21 15 - 37 U/L    Globulin 4.3 g/dL   Lipase   Result Value Ref Range    Lipase 22.0 13.0 - 60.0 U/L   Blood Gas, Venous   Result Value Ref Range    pH, Cam 7.408 7.350 - 7.450    pCO2, Cam 35.7 (L) 40.0 - 50.0 mmHg    pO2, Cam 44.1 (H) 25 - 40 mmHg    HCO3, Venous 22.0 (L) 23.0 - 29.0 mmol/L    Base Excess, Cam -2.1 -3.0 - 3.0 mmol/L    O2 Sat, Cam 81 Not Established %    Carboxyhemoglobin 2.9 (H) 0.0 - 1.5 %    MetHgb, Cam 0.5 <1.5 %    TC02 (Calc), Cam 23 Not Established mmol/L    O2 Therapy Unknown    Sample possible blood bank testing   Result Value Ref Range    Specimen Status DAMIEN    Troponin   Result Value Ref Range    Troponin <0.01 <0.01 ng/mL   EKG 12 Lead   Result Value Ref Range    Ventricular Rate 63 BPM    Atrial Rate 63 BPM    P-R Interval 180 ms    QRS Duration 88 ms    Q-T Interval 444 ms    QTc Calculation (Bazett) 454 ms    P Axis 79 degrees    R Axis 26 degrees    T Axis 60 degrees    Diagnosis       Normal sinus rhythmLow voltage QRSBorderline ECGWhen compared with ECG of 19-APR-2021 12:11,No significant change was found       EKG  The Ekg interpreted by myself  normal sinus rhythm with a rate of 63  Axis is   Normal  QTc is  within an acceptable range  Intervals and Durations are unremarkable. No specific ST-T wave changes appreciated. T wave flattening in lead aVL, V2  No evidence of acute ischemia. No significant change from prior EKG dated May 26, 2021      RADIOLOGY  X-RAYS:  I have reviewed radiologic plain film image(s). ALL OTHER NON-PLAIN FILM IMAGES SUCH AS CT, ULTRASOUND AND MRI HAVE BEEN READ BY THE RADIOLOGIST. CT ABDOMEN PELVIS W IV CONTRAST Additional Contrast? Oral   Final Result   1. No CT evidence of an acute intra-abdominal or intrapelvic process. 2.  Cholelithiasis without findings of acute cholecystitis. 3. No ureter calculus or hydronephrosis. 4. Appendectomy and hysterectomy. Prior small bowel resection and   reanastomosis right lower quadrant. ED COURSE/MDM  Patient seen and evaluated. Old records reviewed. Labs and imaging reviewed and results discussed with patient.      This is a 81-year-old female with history of previous SBO who presents for evaluation of abdominal pain, nausea. Patient arrives with stable vital signs. She appears mildly uncomfortable on exam.  She has mild distention. Abdominal exam is otherwise unremarkable. We will plan for laboratory evaluation, CT abdomen pelvis with IV and p.o. contrast to evaluate for obstructive process or other acute intra-abdominal process. Patient has been given morphine, Zofran for management of her symptoms while diagnostic work-up is pending. Laboratory evaluation including cardiac labs were negative. CT abdomen pelvis does not reveal acute process. Specifically does not show findings concerning for bowel obstruction. Patient reports that she just recently started a steroid pack. I discussed that sometimes steroids can cause gastritis and to make sure that she eats food with taking her steroids. She declines need for pain or nausea medication at home. Advised her to follow-up with her PCP within the next several days. The patient will be discharged from the emergency department. The patient was counseled on their diagnosis and any medications prescribed. They were advised on the need for PCP followup. They were counseled on the need to return to the emergency department if any of their symptoms were to worsen, change or have any other concerns. Discharged in stable condition. CLINICAL IMPRESSION  1. Abdominal pain, unspecified abdominal location        Blood pressure (!) 178/64, pulse 52, temperature 98.1 °F (36.7 °C), temperature source Oral, resp. rate 18, height 5' 2\" (1.575 m), weight 185 lb (83.9 kg), SpO2 96 %. DISPOSITION  Reva Tiwari was discharged to home in stable condition.       Jez Albright MD  07/17/21 2025

## 2021-07-18 LAB
EKG ATRIAL RATE: 63 BPM
EKG DIAGNOSIS: NORMAL
EKG P AXIS: 79 DEGREES
EKG P-R INTERVAL: 180 MS
EKG Q-T INTERVAL: 444 MS
EKG QRS DURATION: 88 MS
EKG QTC CALCULATION (BAZETT): 454 MS
EKG R AXIS: 26 DEGREES
EKG T AXIS: 60 DEGREES
EKG VENTRICULAR RATE: 63 BPM

## 2021-07-18 PROCEDURE — 93010 ELECTROCARDIOGRAM REPORT: CPT | Performed by: INTERNAL MEDICINE

## 2021-07-28 ENCOUNTER — TELEPHONE (OUTPATIENT)
Dept: CARDIOLOGY CLINIC | Age: 86
End: 2021-07-28

## 2021-07-28 NOTE — TELEPHONE ENCOUNTER
received tiFooPetssyn samples for pt. Invoice given to [employer], called pt to inform samples are ready for pickup. Pt v/u.

## 2021-07-29 ENCOUNTER — TELEPHONE (OUTPATIENT)
Dept: CARDIOLOGY CLINIC | Age: 86
End: 2021-07-29

## 2021-07-29 NOTE — TELEPHONE ENCOUNTER
07/29-Pt came into office to  samples and wanted to LM for SHELLY stating that her AFIB was acting up (beating 200 a minute /100) Pt stated she was just watching TV. Requesting a call for SHELLY or nurse to advise the situation.

## 2021-07-30 NOTE — TELEPHONE ENCOUNTER
Spoke to pt. Advised to go to the ER if HR >150 for more than 15 mins. Advised to go to the ER for SBP > 180. She reports the episode lasted for 90 mins and she had CP with the symptoms.

## 2021-08-18 NOTE — PROGRESS NOTES
[facility]  Advanced CHF/Pulmonary Hypertension   Cardiac Evaluation      Colleen Tiwari  YOB: 1935    Date of Visit:  21      Chief Complaint   Patient presents with    Follow-up    Congestive Heart Failure         History of Present Illness:  Colleen Tiwari is a 81 y.o. female who presents from referral from Rodolfo Rocha CNP for consultation and management of severe pulmonary HTN. She has a history of paroxysmal atrial fibrillation, HTN, SVT, and DM. She had SVT in , wore an event monitor, and atrial fibrillation was detected. She had recurrent atrial fibrillation in  and had a cryoablation for PAF in 2014. Post procedure she had a retroperitoneal bleed and required a urinary stent. She has had no known recurrence of afib post ablation but has had palpitations. She was admitted in 2019 with SBO versus ileus that was medically managed. Her echo from 19 showed her EF was 55-60%. She has mild MR, TR and IN. SPAP 60mmHg consistent with severe pulmonary HTN. She reports about 1 month ago (2019) she had an episode of palpitations at 200 bpm for about 1 hour. She has not had a sleep study. She reports occasional chest twinge. She states the twinge occurs with fast heart rate. She reports her breathing is stable now. She did have SOB with walking frequently but this has subsided. She denies dizziness or syncope. She states she has been on lasix for 2-3 weeks. Her cardiac monitor from 2019 showed no arrhythmias. She was admitted 10/10-10/13/19 with SBO. General surgery was consulted and the SBO resolved without surgery. On 10/22/19 she reported her SBO occurred at the bowel resection site. During her last OV with me on 20, her EKG showed AFib . She was started on eliquis and her metoprolol was increased to 50 mg twice daily. Her echo from 20 showed her EF was 55-60% with mild MR and TR.     At her OV with Tamara Mckeon CNP on 06/03/20 her lasix was adjusted to 40 mg daily for 4 days then reduce back to 20 mg daily. She was admitted 09/13-09/15/20 with partial bowel obstruction. On 03/16/21 she reported she noticed the slower heart beat since starting tikosyn which was started 12/2020. At this OV her metoprolol was decreased to 25 mg twice daily. She was admitted 03/30-03/31/21 with CP and palpitations. Her metoprolol was increased back to 50 mg BID. She was admitted with abdominal pain 04/19-04/22/21. She underwent EGD on 04/21/21 in which a polyp was removed from stomach. At her OV with Mark Zelaya CNP on 05/26/21 her valsartan was increased to 320 mg daily. Today she reports she did not have the stress test. She did not like what she read on the Internet regarding the test. She is not interested in proceeding with the stress test. She is taking tikosyn. She denies CP, SOB, dizziness or syncope. Allergies   Allergen Reactions    Hydrocodone Other (See Comments)     NAUSEA AND DIZZINESS    Metformin And Related Other (See Comments)     NAUSEA AND DIZZINESS    Nickel Dermatitis    Omeprazole     Prednisone      Other reaction(s): chest pain & palpitations/H&P    Hydralazine Palpitations and Rash     Current Outpatient Medications   Medication Sig Dispense Refill    apixaban (ELIQUIS) 5 MG TABS tablet Take 1 tablet by mouth 2 times daily 180 tablet 3    valsartan (DIOVAN) 320 MG tablet Take 1 tablet by mouth daily 90 tablet 1    metoprolol succinate (TOPROL XL) 100 MG extended release tablet Take 50 mg by mouth daily Prescribed as 100mg bid, pt. Takes only 50mg if HR > 60.       pantoprazole (PROTONIX) 20 MG tablet Take 2 tablets by mouth 2 times daily (Patient taking differently: Take 20 mg by mouth daily ) 60 tablet 0    pravastatin (PRAVACHOL) 80 MG tablet Take 40 mg by mouth daily      pioglitazone (ACTOS) 15 MG tablet Take 15 mg by mouth daily      dofetilide (TIKOSYN) 125 MCG capsule Take 1 capsule by mouth every 12 hours 180 capsule 3    furosemide (LASIX) 20 MG tablet Take 1 tablet by mouth daily 30 tablet 11    glyBURIDE (DIABETA) 1.25 MG tablet Take 2.5 mg by mouth 2 times daily (with meals)       aspirin 81 MG tablet Take 81 mg by mouth daily      sucralfate (CARAFATE) 1 GM/10ML suspension Take 10 mLs by mouth 4 times daily (Patient not taking: Reported on 8/19/2021) 1200 mL 3     No current facility-administered medications for this visit.        Past Medical History:   Diagnosis Date    [facility]     Diabetes mellitus ([facility]     Hydronephrosis 2/14/2014    Hyperlipidemia     Hypertension     Intussusception intestine ([facility]     SVT (supraventricular tachycardia) ([facility] 8/26/2013    AVNRT     Past Surgical History:   Procedure Laterality Date    ABLATION OF DYSRHYTHMIC FOCUS      -2014    APPENDECTOMY      DILATATION, ESOPHAGUS      FRACTURE SURGERY      L ankle    HYSTERECTOMY      SKIN BIOPSY      SMALL INTESTINE SURGERY      TONSILLECTOMY      UPPER GASTROINTESTINAL ENDOSCOPY N/A 4/21/2021    EGD POLYP SNARE performed by Carmen Martínez DO at [facility] ENDOSCOPY  4/21/2021    EGD CONTROL HEMORRHAGE performed by Carmen Martínez DO at [address]  4/21/2021    EGD BIOPSY performed by Carmen Martínez DO at [address]     Family History   Problem Relation Age of Onset    Diabetes Mother     Heart Disease Father     High Blood Pressure Father     High Cholesterol Father     Cancer Sister     Heart Disease Brother     High Blood Pressure Brother     High Cholesterol Brother      Social History     Socioeconomic History    Marital status:      Spouse name: Not on file    Number of children: Not on file    Years of education: Not on file    Highest education level: Not on file   Occupational History    Not on file   Tobacco Use    Smoking status: tingling. No change in gait, balance, coordination, mood, affect, memory, mentation, behavior. · Psychiatric: No anxiety, no depression. · Endocrine: No malaise, fatigue or temperature intolerance. No excessive thirst, fluid intake, or urination. No tremor. · Hematologic/Lymphatic: No abnormal bruising or bleeding, blood clots or swollen lymph nodes. · Allergic/Immunologic: No nasal congestion or hives. Physical Examination:    Vitals:    08/19/21 0944   BP: 122/74   Pulse: 62   SpO2: 98%   Weight: 190 lb 8 oz (86.4 kg)   Height: 5' 2\" (1.575 m)     Body mass index is 34.84 kg/m². Wt Readings from Last 3 Encounters:   08/19/21 190 lb 8 oz (86.4 kg)   07/17/21 185 lb (83.9 kg)   05/26/21 187 lb 8 oz (85 kg)     BP Readings from Last 3 Encounters:   08/19/21 122/74   07/17/21 (!) 178/64   05/26/21 (!) 158/62             Constitutional and General Appearance:   WD/WN in NAD,   HEENT:  NC/AT  ROSARIO  No problems with hearing  Skin:  Warm, dry  Respiratory:  · Normal excursion and expansion without use of accessory muscles  · Resp Auscultation: Normal breath sounds without dullness  Cardiovascular:  · The apical impulses not displaced  · Heart tones are crisp and normal  · Cervical veins are not engorged  · The carotid upstroke is normal in amplitude and contour without delay or bruit  · JVP normal  Regular bradycardic rhythm with nl S1 and S2 without m,r,g  · Peripheral pulses are symmetrical and full  · There is no clubbing, cyanosis of the extremities.   · Trace bilateral edema  · Femoral Arteries: 2+ and equal  · Pedal Pulses: 2+ and equal   Neck:  · No thyromegaly  Abdomen:  · No masses or tenderness  · Liver/Spleen: No Abnormalities Noted  Neurological/Psychiatric:  · Alert and oriented in all spheres  · Moves all extremities well  · Exhibits normal gait balance and coordination  · No abnormalities of mood, affect, memory, mentation, or behavior are noted    Echo: 02/19/19   Summary   Definity contrast administered. Normal left ventricular systolic function with an estimated ejection   fraction of 55-60%. There is mild septal hypertrophy with normal remaining wall thickness. Normal left ventricular diastolic filling pressure. Mild mitral regurgitation. Mild tricuspid regurgitation. Systolic pulmonary artery pressure (SPAP) estimated at 60 mmHg (RA pressure   3 mmHg), consistent with severe pulmonary hypertension. Mild pulmonic regurgitation present. Lipids: 03/12/19: , , HDL 45, LDL 60    01/21/20 EKG shows AFIB     Echo: 04/28/20  Summary   Normal left ventricle systolic function with an estimated ejection fraction   of 55-60%. Definity contrast administered with no evidence of left   ventricular mass or thrombus noted. No regional wall motion abnormalities are seen. Normal left ventricular   diastolic filling pressure. The left atrium is mildly dilated. Mild mitral and tricuspid regurgitation. Systolic pulmonary artery pressure (SPAP) estimated at 42 mmHg (right atrial   pressure 3 mmHg), consistent with mild pulmonary hypertension. .Labs were reviewed including labs from other hospital systems through [facility]. Cardiac testing was reviewed including echos, nuclear scans, cardiac catheterization, including from other hospital systems through [facility]. Assessment:    1. Chronic diastolic heart failure ([address]    2. PAF (paroxysmal atrial fibrillation) ([address]    3. Essential hypertension    4. SVT (supraventricular tachycardia) ([address]    5. Bilateral pleural effusion    6. Mixed hyperlipidemia    7. Vitamin D deficiency          Plan:  1. Continue current medications  2. Echo in 6 months  3. CBC, CMP, BNP, lipids, and Vit D in 6 months  4. Follow up in 6 months        Scribe's attestation:   This note was scribed in the presence of Beena Hernandez M.D. By Valentina Burrell RN    The scribe's documentation has been prepared under my direction and personally reviewed by me in its entirety. I confirm that the note above accurately reflects all work, treatment, procedures, and medical decision making performed by me. Time Based Itemization  A total of 30 minutes was spent on today's patient encounter. If applicable, non-patient-facing activities:  ( x)Preparing to see the patient and reviewing records  ( ) Individual interpretation of results  ( ) Discussion or coordination of care with other health care professionals  ( x) Ordering of unique tests, medications, or procedures  ( x) Documentation within the EHR       I appreciate the opportunity of cooperating in the care of this patient.     Mark Armando M.D., [facility]

## 2021-08-19 ENCOUNTER — OFFICE VISIT (OUTPATIENT)
Dept: CARDIOLOGY CLINIC | Age: 86
End: 2021-08-19
Payer: MEDICARE

## 2021-08-19 VITALS
WEIGHT: 190.5 LBS | HEART RATE: 62 BPM | BODY MASS INDEX: 35.06 KG/M2 | DIASTOLIC BLOOD PRESSURE: 74 MMHG | HEIGHT: 62 IN | SYSTOLIC BLOOD PRESSURE: 122 MMHG | OXYGEN SATURATION: 98 %

## 2021-08-19 DIAGNOSIS — I47.1 SVT (SUPRAVENTRICULAR TACHYCARDIA) (HCC): ICD-10-CM

## 2021-08-19 DIAGNOSIS — E55.9 VITAMIN D DEFICIENCY: ICD-10-CM

## 2021-08-19 DIAGNOSIS — I10 ESSENTIAL HYPERTENSION: ICD-10-CM

## 2021-08-19 DIAGNOSIS — I50.32 CHRONIC DIASTOLIC HEART FAILURE (HCC): Primary | ICD-10-CM

## 2021-08-19 DIAGNOSIS — J90 BILATERAL PLEURAL EFFUSION: ICD-10-CM

## 2021-08-19 DIAGNOSIS — E78.2 MIXED HYPERLIPIDEMIA: ICD-10-CM

## 2021-08-19 DIAGNOSIS — I48.0 PAF (PAROXYSMAL ATRIAL FIBRILLATION) (HCC): ICD-10-CM

## 2021-08-19 PROCEDURE — 99214 OFFICE O/P EST MOD 30 MIN: CPT | Performed by: INTERNAL MEDICINE

## 2021-08-19 NOTE — LETTER
[facility]  Advanced CHF/Pulmonary Hypertension   Cardiac Evaluation      Dustin Tiwari  YOB: 1935    Date of Visit:  8/19/21      Chief Complaint   Patient presents with    Follow-up    Congestive Heart Failure         History of Present Illness:  Dustin Tiwari is a 81 y.o. female who presents from referral from [facility] for consultation and management of severe pulmonary HTN. She has a history of paroxysmal atrial fibrillation, HTN, SVT, and DM. She had SVT in 2013, wore an event monitor, and atrial fibrillation was detected. She had recurrent atrial fibrillation in 2014 and had a cryoablation for PAF in 2/2014. Post procedure she had a retroperitoneal bleed and required a urinary stent. She has had no known recurrence of afib post ablation but has had palpitations. She was admitted in 1/2019 with SBO versus ileus that was medically managed. Her echo from 02/19/19 showed her EF was 55-60%. She has mild MR, TR and [place]. SPAP 60mmHg consistent with severe pulmonary HTN. She reports about 1 month ago (02/2019) she had an episode of palpitations at 200 bpm for about 1 hour. She has not had a sleep study. She reports occasional chest twinge. She states the twinge occurs with fast heart rate. She reports her breathing is stable now. She did have SOB with walking frequently but this has subsided. She denies dizziness or syncope. She states she has been on lasix for 2-3 weeks. Her cardiac monitor from 04/2019 showed no arrhythmias. She was admitted 10/10-10/13/19 with SBO. General surgery was consulted and the SBO resolved without surgery. On 10/22/19 she reported her SBO occurred at the bowel resection site. During her last OV with me on 01/21/20, her EKG showed AFib . She was started on eliquis and her metoprolol was increased to 50 mg twice daily. Her echo from 04/28/20 showed her EF was 55-60% with mild MR and TR.     At her OV with Ines Drummond CNP on 06/03/20 her lasix was adjusted to 40 mg daily for 4 days then reduce back to 20 mg daily. She was admitted 09/13-09/15/20 with partial bowel obstruction. On 03/16/21 she reported she noticed the slower heart beat since starting tikosyn which was started 12/2020. At this OV her metoprolol was decreased to 25 mg twice daily. She was admitted 03/30-03/31/21 with CP and palpitations. Her metoprolol was increased back to 50 mg BID. She was admitted with abdominal pain 04/19-04/22/21. She underwent EGD on 04/21/21 in which a polyp was removed from stomach. At her OV with Daniel Healy CNP on 05/26/21 her valsartan was increased to 320 mg daily. Today she reports she did not have the stress test. She did not like what she read on the Internet regarding the test. She is not interested in proceeding with the stress test. She is taking tikosyn. She denies CP, SOB, dizziness or syncope. Allergies   Allergen Reactions    Hydrocodone Other (See Comments)     NAUSEA AND DIZZINESS    Metformin And Related Other (See Comments)     NAUSEA AND DIZZINESS    Nickel Dermatitis    Omeprazole     Prednisone      Other reaction(s): chest pain & palpitations/H&P    Hydralazine Palpitations and Rash     Current Outpatient Medications   Medication Sig Dispense Refill    apixaban (ELIQUIS) 5 MG TABS tablet Take 1 tablet by mouth 2 times daily 180 tablet 3    valsartan (DIOVAN) 320 MG tablet Take 1 tablet by mouth daily 90 tablet 1    metoprolol succinate (TOPROL XL) 100 MG extended release tablet Take 50 mg by mouth daily Prescribed as 100mg bid, pt. Takes only 50mg if HR > 60.       pantoprazole (PROTONIX) 20 MG tablet Take 2 tablets by mouth 2 times daily (Patient taking differently: Take 20 mg by mouth daily ) 60 tablet 0    pravastatin (PRAVACHOL) 80 MG tablet Take 40 mg by mouth daily      pioglitazone (ACTOS) 15 MG tablet Take 15 mg by mouth daily      dofetilide (TIKOSYN) 125 MCG capsule Take 1 capsule by mouth every 12 hours 180 capsule 3    furosemide (LASIX) 20 MG tablet Take 1 tablet by mouth daily 30 tablet 11    glyBURIDE (DIABETA) 1.25 MG tablet Take 2.5 mg by mouth 2 times daily (with meals)       aspirin 81 MG tablet Take 81 mg by mouth daily      sucralfate (CARAFATE) 1 GM/10ML suspension Take 10 mLs by mouth 4 times daily (Patient not taking: Reported on 8/19/2021) 1200 mL 3     No current facility-administered medications for this visit.        Past Medical History:   Diagnosis Date    Atrial fibrillation ([address]     Diabetes mellitus ([facility]     Hydronephrosis 2/14/2014    Hyperlipidemia     Hypertension     Intussusception intestine ([facility]     SVT (supraventricular tachycardia) ([facility] 8/26/2013    AVNRT     Past Surgical History:   Procedure Laterality Date    ABLATION OF DYSRHYTHMIC FOCUS      -2014    APPENDECTOMY      DILATATION, ESOPHAGUS      FRACTURE SURGERY      L ankle    HYSTERECTOMY      SKIN BIOPSY      SMALL INTESTINE SURGERY      TONSILLECTOMY      UPPER GASTROINTESTINAL ENDOSCOPY N/A 4/21/2021    EGD POLYP SNARE performed by Familia Mayfield DO at [facility] ENDOSCOPY  4/21/2021    EGD CONTROL HEMORRHAGE performed by Familia Mayfield DO at [facility]  4/21/2021    EGD BIOPSY performed by Familia Mayfield DO at [address]     Family History   Problem Relation Age of Onset    Diabetes Mother     Heart Disease Father     High Blood Pressure Father     High Cholesterol Father     Cancer Sister     Heart Disease Brother     High Blood Pressure Brother     High Cholesterol Brother      Social History     Socioeconomic History    Marital status:      Spouse name: Not on file    Number of children: Not on file    Years of education: Not on file    Highest education level: Not on file   Occupational History    Not on file   Tobacco Use    Smoking status: Never Smoker    Smokeless tobacco: Never Used   Vaping Use    Vaping Use: Never used   Substance and Sexual Activity    Alcohol use: No    Drug use: No    Sexual activity: Yes     Partners: Male   Other Topics Concern    Not on file   Social History Narrative    Not on file     Social Determinants of Health     Financial Resource Strain:     Difficulty of Paying Living Expenses:    Food Insecurity:     Worried About Running Out of Food in the Last Year:     [address] in the Last Year:    Transportation Needs:     Lack of Transportation (Medical):  Lack of Transportation (Non-Medical):    Physical Activity:     Days of Exercise per Week:     Minutes of Exercise per Session:    Stress:     Feeling of Stress :    Social Connections:     Frequency of Communication with Friends and Family:     Frequency of Social Gatherings with Friends and Family:     Attends Mandaen Services:     Active Member of Clubs or Organizations:     Attends Club or Organization Meetings:     Marital Status:    Intimate Partner Violence:     Fear of Current or Ex-Partner:     Emotionally Abused:     Physically Abused:     Sexually Abused:        Review of Systems:   · Constitutional: there has been no unanticipated weight loss. There's been no change in energy level, sleep pattern, or activity level. · Eyes: No visual changes or diplopia. No scleral icterus. · ENT: No Headaches, hearing loss or vertigo. No mouth sores or sore throat. · Cardiovascular: Reviewed in HPI  · Respiratory: No cough or wheezing, no sputum production. No hematemesis. · Gastrointestinal: No abdominal pain, appetite loss, blood in stools. No change in bowel or bladder habits. · Genitourinary: No dysuria, trouble voiding, or hematuria. · Musculoskeletal:  No gait disturbance, weakness or joint complaints. · Integumentary: No rash or pruritis.   · Neurological: No headache, diplopia, change in muscle strength, numbness or tingling. No change in gait, balance, coordination, mood, affect, memory, mentation, behavior. · Psychiatric: No anxiety, no depression. · Endocrine: No malaise, fatigue or temperature intolerance. No excessive thirst, fluid intake, or urination. No tremor. · Hematologic/Lymphatic: No abnormal bruising or bleeding, blood clots or swollen lymph nodes. · Allergic/Immunologic: No nasal congestion or hives. Physical Examination:    Vitals:    08/19/21 0944   BP: 122/74   Pulse: 62   SpO2: 98%   Weight: 190 lb 8 oz (86.4 kg)   Height: 5' 2\" (1.575 m)     Body mass index is 34.84 kg/m². Wt Readings from Last 3 Encounters:   08/19/21 190 lb 8 oz (86.4 kg)   07/17/21 185 lb (83.9 kg)   05/26/21 187 lb 8 oz (85 kg)     BP Readings from Last 3 Encounters:   08/19/21 122/74   07/17/21 (!) 178/64   05/26/21 (!) 158/62             Constitutional and General Appearance:   WD/WN in NAD,   HEENT:  NC/AT  ROSARIO  No problems with hearing  Skin:  Warm, dry  Respiratory:  · Normal excursion and expansion without use of accessory muscles  · Resp Auscultation: Normal breath sounds without dullness  Cardiovascular:  · The apical impulses not displaced  · Heart tones are crisp and normal  · Cervical veins are not engorged  · The carotid upstroke is normal in amplitude and contour without delay or bruit  · JVP normal  Regular bradycardic rhythm with nl S1 and S2 without m,r,g  · Peripheral pulses are symmetrical and full  · There is no clubbing, cyanosis of the extremities.   · Trace bilateral edema  · Femoral Arteries: 2+ and equal  · Pedal Pulses: 2+ and equal   Neck:  · No thyromegaly  Abdomen:  · No masses or tenderness  · Liver/Spleen: No Abnormalities Noted  Neurological/Psychiatric:  · Alert and oriented in all spheres  · Moves all extremities well  · Exhibits normal gait balance and coordination  · No abnormalities of mood, affect, memory, mentation, or behavior are noted    Echo: 02/19/19   Summary   Definity contrast administered. Normal left ventricular systolic function with an estimated ejection   fraction of 55-60%. There is mild septal hypertrophy with normal remaining wall thickness. Normal left ventricular diastolic filling pressure. Mild mitral regurgitation. Mild tricuspid regurgitation. Systolic pulmonary artery pressure (SPAP) estimated at 60 mmHg (RA pressure   3 mmHg), consistent with severe pulmonary hypertension. Mild pulmonic regurgitation present. Lipids: 03/12/19: , , HDL 45, LDL 60    01/21/20 EKG shows AFIB     Echo: 04/28/20  Summary   Normal left ventricle systolic function with an estimated ejection fraction   of 55-60%. Definity contrast administered with no evidence of left   ventricular mass or thrombus noted. No regional wall motion abnormalities are seen. Normal left ventricular   diastolic filling pressure. The left atrium is mildly dilated. Mild mitral and tricuspid regurgitation. Systolic pulmonary artery pressure (SPAP) estimated at 42 mmHg (right atrial   pressure 3 mmHg), consistent with mild pulmonary hypertension. .Labs were reviewed including labs from other hospital systems through [facility]. Cardiac testing was reviewed including echos, nuclear scans, cardiac catheterization, including from other hospital systems through [facility]. Assessment:    1. Chronic diastolic heart failure ([address]    2. PAF (paroxysmal atrial fibrillation) ([address]    3. Essential hypertension    4. SVT (supraventricular tachycardia) ([address]    5. Bilateral pleural effusion    6. Mixed hyperlipidemia    7. Vitamin D deficiency          Plan:  1. Continue current medications  2. Echo in 6 months  3. CBC, CMP, BNP, lipids, and Vit D in 6 months  4. Follow up in 6 months        Scribe's attestation:   This note was scribed in the presence of Dayanara Salguero M.D. By Janett Armstrong RN    The scribe's documentation has been prepared under my direction and

## 2021-09-10 ENCOUNTER — HOSPITAL ENCOUNTER (EMERGENCY)
Age: 86
Discharge: HOME | End: 2021-09-10
Attending: EMERGENCY MEDICINE
Payer: MEDICARE

## 2021-09-10 ENCOUNTER — APPOINTMENT (OUTPATIENT)
Dept: CT IMAGING | Age: 86
End: 2021-09-10
Payer: MEDICARE

## 2021-09-10 ENCOUNTER — HOSPITAL ENCOUNTER (EMERGENCY)
Age: 86
Discharge: HOME | End: 2021-09-10
Payer: MEDICARE

## 2021-09-10 VITALS
BODY MASS INDEX: 34.78 KG/M2 | RESPIRATION RATE: 17 BRPM | HEART RATE: 66 BPM | DIASTOLIC BLOOD PRESSURE: 65 MMHG | OXYGEN SATURATION: 97 % | WEIGHT: 189 LBS | TEMPERATURE: 98.5 F | SYSTOLIC BLOOD PRESSURE: 182 MMHG | HEIGHT: 62 IN

## 2021-09-10 VITALS
HEART RATE: 64 BPM | OXYGEN SATURATION: 93 % | TEMPERATURE: 97.6 F | RESPIRATION RATE: 16 BRPM | DIASTOLIC BLOOD PRESSURE: 64 MMHG | SYSTOLIC BLOOD PRESSURE: 153 MMHG

## 2021-09-10 DIAGNOSIS — R11.0 NAUSEA: ICD-10-CM

## 2021-09-10 DIAGNOSIS — R10.84 GENERALIZED ABDOMINAL PAIN: Primary | ICD-10-CM

## 2021-09-10 DIAGNOSIS — R10.12 LEFT UPPER QUADRANT ABDOMINAL PAIN: Primary | ICD-10-CM

## 2021-09-10 LAB
A/G RATIO: 0.8 (ref 1.1–2.2)
ALBUMIN SERPL-MCNC: 3.6 G/DL (ref 3.4–5)
ALP BLD-CCNC: 101 U/L (ref 40–129)
ALT SERPL-CCNC: 13 U/L (ref 10–40)
ANION GAP SERPL CALCULATED.3IONS-SCNC: 15 MMOL/L (ref 3–16)
AST SERPL-CCNC: 28 U/L (ref 15–37)
BASOPHILS ABSOLUTE: 0.1 K/UL (ref 0–0.2)
BASOPHILS RELATIVE PERCENT: 0.7 %
BILIRUB SERPL-MCNC: 0.4 MG/DL (ref 0–1)
BUN BLDV-MCNC: 16 MG/DL (ref 7–20)
CALCIUM SERPL-MCNC: 9 MG/DL (ref 8.3–10.6)
CHLORIDE BLD-SCNC: 97 MMOL/L (ref 99–110)
CO2: 22 MMOL/L (ref 21–32)
CREAT SERPL-MCNC: 0.8 MG/DL (ref 0.6–1.2)
EKG ATRIAL RATE: 65 BPM
EKG DIAGNOSIS: NORMAL
EKG P AXIS: 75 DEGREES
EKG P-R INTERVAL: 188 MS
EKG Q-T INTERVAL: 430 MS
EKG QRS DURATION: 92 MS
EKG QTC CALCULATION (BAZETT): 447 MS
EKG R AXIS: -8 DEGREES
EKG T AXIS: 47 DEGREES
EKG VENTRICULAR RATE: 65 BPM
EOSINOPHILS ABSOLUTE: 0.2 K/UL (ref 0–0.6)
EOSINOPHILS RELATIVE PERCENT: 2 %
GFR AFRICAN AMERICAN: >60
GFR NON-AFRICAN AMERICAN: >60
GLOBULIN: 4.5 G/DL
GLUCOSE BLD-MCNC: 211 MG/DL (ref 70–99)
HCT VFR BLD CALC: 38.4 % (ref 36–48)
HEMOGLOBIN: 12.5 G/DL (ref 12–16)
LIPASE: 20 U/L (ref 13–60)
LYMPHOCYTES ABSOLUTE: 1.9 K/UL (ref 1–5.1)
LYMPHOCYTES RELATIVE PERCENT: 18.8 %
MCH RBC QN AUTO: 24.5 PG (ref 26–34)
MCHC RBC AUTO-ENTMCNC: 32.5 G/DL (ref 31–36)
MCV RBC AUTO: 75.4 FL (ref 80–100)
MONOCYTES ABSOLUTE: 0.9 K/UL (ref 0–1.3)
MONOCYTES RELATIVE PERCENT: 9 %
NEUTROPHILS ABSOLUTE: 7.1 K/UL (ref 1.7–7.7)
NEUTROPHILS RELATIVE PERCENT: 69.5 %
PDW BLD-RTO: 17.5 % (ref 12.4–15.4)
PLATELET # BLD: 341 K/UL (ref 135–450)
PMV BLD AUTO: 8.5 FL (ref 5–10.5)
POTASSIUM REFLEX MAGNESIUM: 4.6 MMOL/L (ref 3.5–5.1)
RBC # BLD: 5.09 M/UL (ref 4–5.2)
SODIUM BLD-SCNC: 134 MMOL/L (ref 136–145)
TOTAL PROTEIN: 8.1 G/DL (ref 6.4–8.2)
TROPONIN: <0.01 NG/ML
WBC # BLD: 10.2 K/UL (ref 4–11)

## 2021-09-10 PROCEDURE — 99284 EMERGENCY DEPT VISIT MOD MDM: CPT

## 2021-09-10 PROCEDURE — 96375 TX/PRO/DX INJ NEW DRUG ADDON: CPT

## 2021-09-10 PROCEDURE — 84484 ASSAY OF TROPONIN QUANT: CPT

## 2021-09-10 PROCEDURE — 93010 ELECTROCARDIOGRAM REPORT: CPT | Performed by: INTERNAL MEDICINE

## 2021-09-10 PROCEDURE — 80053 COMPREHEN METABOLIC PANEL: CPT

## 2021-09-10 PROCEDURE — 83690 ASSAY OF LIPASE: CPT

## 2021-09-10 PROCEDURE — 96376 TX/PRO/DX INJ SAME DRUG ADON: CPT

## 2021-09-10 PROCEDURE — 6370000000 HC RX 637 (ALT 250 FOR IP): Performed by: EMERGENCY MEDICINE

## 2021-09-10 PROCEDURE — 6370000000 HC RX 637 (ALT 250 FOR IP): Performed by: PHYSICIAN ASSISTANT

## 2021-09-10 PROCEDURE — 6360000004 HC RX CONTRAST MEDICATION: Performed by: PHYSICIAN ASSISTANT

## 2021-09-10 PROCEDURE — 85025 COMPLETE CBC W/AUTO DIFF WBC: CPT

## 2021-09-10 PROCEDURE — 96374 THER/PROPH/DIAG INJ IV PUSH: CPT

## 2021-09-10 PROCEDURE — 2580000003 HC RX 258: Performed by: PHYSICIAN ASSISTANT

## 2021-09-10 PROCEDURE — 99283 EMERGENCY DEPT VISIT LOW MDM: CPT

## 2021-09-10 PROCEDURE — 74177 CT ABD & PELVIS W/CONTRAST: CPT

## 2021-09-10 PROCEDURE — 6360000002 HC RX W HCPCS: Performed by: PHYSICIAN ASSISTANT

## 2021-09-10 PROCEDURE — 93005 ELECTROCARDIOGRAM TRACING: CPT | Performed by: PHYSICIAN ASSISTANT

## 2021-09-10 RX ORDER — OXYCODONE HYDROCHLORIDE AND ACETAMINOPHEN 5; 325 MG/1; MG/1
1 TABLET ORAL ONCE
Status: COMPLETED | OUTPATIENT
Start: 2021-09-10 | End: 2021-09-10

## 2021-09-10 RX ORDER — DICYCLOMINE HYDROCHLORIDE 10 MG/1
10 CAPSULE ORAL ONCE
Status: COMPLETED | OUTPATIENT
Start: 2021-09-10 | End: 2021-09-10

## 2021-09-10 RX ORDER — DICYCLOMINE HCL 20 MG
20 TABLET ORAL ONCE
Status: COMPLETED | OUTPATIENT
Start: 2021-09-10 | End: 2021-09-10

## 2021-09-10 RX ORDER — METOCLOPRAMIDE 10 MG/1
5 TABLET ORAL ONCE
Status: COMPLETED | OUTPATIENT
Start: 2021-09-10 | End: 2021-09-10

## 2021-09-10 RX ORDER — ONDANSETRON 4 MG/1
8 TABLET, ORALLY DISINTEGRATING ORAL ONCE
Status: COMPLETED | OUTPATIENT
Start: 2021-09-10 | End: 2021-09-10

## 2021-09-10 RX ORDER — ONDANSETRON HYDROCHLORIDE 8 MG/1
8 TABLET, FILM COATED ORAL ONCE
Status: DISCONTINUED | OUTPATIENT
Start: 2021-09-10 | End: 2021-09-10 | Stop reason: SDUPTHER

## 2021-09-10 RX ORDER — OXYCODONE HYDROCHLORIDE AND ACETAMINOPHEN 5; 325 MG/1; MG/1
1 TABLET ORAL 3 TIMES DAILY PRN
Qty: 6 TABLET | Refills: 0 | Status: SHIPPED | OUTPATIENT
Start: 2021-09-10 | End: 2021-09-12

## 2021-09-10 RX ORDER — MORPHINE SULFATE 2 MG/ML
2 INJECTION, SOLUTION INTRAMUSCULAR; INTRAVENOUS ONCE
Status: COMPLETED | OUTPATIENT
Start: 2021-09-10 | End: 2021-09-10

## 2021-09-10 RX ORDER — METOCLOPRAMIDE 10 MG/1
10 TABLET ORAL 4 TIMES DAILY PRN
Qty: 20 TABLET | Refills: 0 | Status: SHIPPED | OUTPATIENT
Start: 2021-09-10 | End: 2021-12-04

## 2021-09-10 RX ORDER — DICYCLOMINE HCL 20 MG
20 TABLET ORAL EVERY 6 HOURS PRN
Qty: 20 TABLET | Refills: 0 | Status: SHIPPED | OUTPATIENT
Start: 2021-09-10 | End: 2022-02-18

## 2021-09-10 RX ORDER — ONDANSETRON 2 MG/ML
4 INJECTION INTRAMUSCULAR; INTRAVENOUS ONCE
Status: COMPLETED | OUTPATIENT
Start: 2021-09-10 | End: 2021-09-10

## 2021-09-10 RX ORDER — 0.9 % SODIUM CHLORIDE 0.9 %
500 INTRAVENOUS SOLUTION INTRAVENOUS ONCE
Status: COMPLETED | OUTPATIENT
Start: 2021-09-10 | End: 2021-09-10

## 2021-09-10 RX ADMIN — ONDANSETRON 8 MG: 4 TABLET, ORALLY DISINTEGRATING ORAL at 18:19

## 2021-09-10 RX ADMIN — DICYCLOMINE HYDROCHLORIDE 20 MG: 20 TABLET ORAL at 15:43

## 2021-09-10 RX ADMIN — IOPAMIDOL 75 ML: 755 INJECTION, SOLUTION INTRAVENOUS at 14:30

## 2021-09-10 RX ADMIN — MORPHINE SULFATE 2 MG: 2 INJECTION, SOLUTION INTRAMUSCULAR; INTRAVENOUS at 13:54

## 2021-09-10 RX ADMIN — OXYCODONE HYDROCHLORIDE AND ACETAMINOPHEN 1 TABLET: 5; 325 TABLET ORAL at 18:54

## 2021-09-10 RX ADMIN — SODIUM CHLORIDE 500 ML: 9 INJECTION, SOLUTION INTRAVENOUS at 13:54

## 2021-09-10 RX ADMIN — DICYCLOMINE HYDROCHLORIDE 10 MG: 10 CAPSULE ORAL at 18:54

## 2021-09-10 RX ADMIN — ONDANSETRON 4 MG: 2 INJECTION INTRAMUSCULAR; INTRAVENOUS at 13:54

## 2021-09-10 RX ADMIN — METOCLOPRAMIDE 5 MG: 10 TABLET ORAL at 15:43

## 2021-09-10 RX ADMIN — MORPHINE SULFATE 2 MG: 2 INJECTION, SOLUTION INTRAMUSCULAR; INTRAVENOUS at 15:43

## 2021-09-10 ASSESSMENT — PAIN SCALES - GENERAL
PAINLEVEL_OUTOF10: 5
PAINLEVEL_OUTOF10: 0
PAINLEVEL_OUTOF10: 3
PAINLEVEL_OUTOF10: 9
PAINLEVEL_OUTOF10: 10
PAINLEVEL_OUTOF10: 3

## 2021-09-10 ASSESSMENT — ENCOUNTER SYMPTOMS
ABDOMINAL PAIN: 1
VOMITING: 0
DIARRHEA: 0
COLOR CHANGE: 0
EYES NEGATIVE: 1
SHORTNESS OF BREATH: 0
COUGH: 0
NAUSEA: 1
BACK PAIN: 0

## 2021-09-10 ASSESSMENT — PAIN DESCRIPTION - DESCRIPTORS: DESCRIPTORS: ACHING

## 2021-09-10 ASSESSMENT — PAIN DESCRIPTION - LOCATION: LOCATION: ABDOMEN

## 2021-09-10 ASSESSMENT — PAIN DESCRIPTION - ORIENTATION: ORIENTATION: MID;UPPER

## 2021-09-10 ASSESSMENT — PAIN DESCRIPTION - PAIN TYPE: TYPE: ACUTE PAIN

## 2021-09-10 NOTE — ED PROVIDER NOTES
I independently performed a history and physical on Will Tiwari. All diagnostic, treatment, and disposition decisions were made by myself in conjunction with the advanced practice provider. For further details of Will Goldman [facility] emergency department encounter, please see HERMINIO Mesa's documentation. Patient presents to the emergency room complaining of abdominal pain. Patient was reportedly seen evaluated earlier in the evening for abdominal pain. She reports that pain was almost completely resolved prior to discharge. Patient proceeded home and states that her medications were not yet at the pharmacy and that she had not been able to pick them up. Patient returns back to the emergency department complaining of recurrence of abdominal pain. She denies fevers, chills, or sweats. No cough, or congestion. No urinary complaints. Physical exam: General: No acute distress. Moderate discomfort. Head: Normocephalic/atraumatic. Heart: Regular rate and rhythm. Normal S1-S2. Abdomen: Soft. Diffuse abdominal tenderness to palpation. No rebound or guarding. Normal bowel sounds. No CVA tenderness. Extremities: No swelling or edema. Patient's labs and images from earlier today were reviewed. Patient was provided symptomatic medications with significant improvement of her pain. Patient was instructed to fill her pain medications as prescribed and follow-up with PCP.     1. Left upper quadrant abdominal pain           Smita High DO  09/10/21 [id number]

## 2021-09-10 NOTE — ED PROVIDER NOTES
Patient seen and evaluated by SHON. EKG: Normal sinus rhythm with a rate of 65. Normal axis peer normal intervals and durations. No ST or T wave changes appreciated. No acute signs of ischemia when compared to previous EKG on 7/17/2021.       Miguel Salter DO  09/10/21 [id number]

## 2021-09-10 NOTE — ED NOTES
Discharge instructions reviewed, medications reviewed. Verbalized understanding.   DIscharged in stable condition, ambulatory at discharge     Perlita Pichardo RN  09/10/21 1195

## 2021-09-10 NOTE — ED PROVIDER NOTES
[address] [facility]  ED  EMERGENCY DEPARTMENT ENCOUNTER        Pt Name: Shannen Tiwari  MRN: [id number]  Evelyngftyesha 1935  Date of evaluation: 9/10/2021  Provider: Nico Dillard PA-C  PCP: Lorenzo Peabody, MD  ED Attending: Chirag Millard DO      This patient was not seen by the attending provider      History provided by the patient    CHIEF COMPLAINT:     Chief Complaint   Patient presents with    Abdominal Pain     mid upper started this morning       HISTORY OF PRESENT ILLNESS:      Shannen Tiwari is a 81 y.o. female who arrives to the ED by private vehicle. Patient reports waking up this morning and experiencing mid to upper abdominal pain. She said some nausea with it. The pain has somewhat wax and wane in severity. She ate a pudding cup and a couple bites of English muffin before she came to the ED due to the symptoms. Patient reports known gallstones. She has remotely undergone hysterectomy and appendectomy. She denies any other abdominal or pelvic surgeries. Patient cannot identify exacerbating or alleviating factors to symptoms. She is established with GI, Dr. Keith Ospina Notes were reviewed     REVIEW OF SYSTEMS:     Review of Systems   Constitutional: Negative for chills and fever. HENT: Negative. Eyes: Negative. Respiratory: Negative for cough and shortness of breath. Cardiovascular: Negative for chest pain. Gastrointestinal: Positive for abdominal pain and nausea. Negative for diarrhea and vomiting. Genitourinary: Negative for difficulty urinating and dysuria. Musculoskeletal: Negative for back pain and neck pain. Skin: Negative for color change. Neurological: Negative for dizziness and headaches. All other systems reviewed and are negative. Except as noted above in the ROS, all other systems were reviewed and negative.          PAST MEDICAL HISTORY:     Past Medical History:   Diagnosis Date    Atrial fibrillation ([facility] [address]     Diabetes mellitus ([facility] [address]     Hydronephrosis 2/14/2014    Hyperlipidemia     Hypertension     Intussusception intestine ([facility] [address]     SVT (supraventricular tachycardia) ([facility] [address] 8/26/2013    AVNRT         SURGICAL HISTORY:      Past Surgical History:   Procedure Laterality Date    ABLATION OF DYSRHYTHMIC FOCUS      -2014    APPENDECTOMY      DILATATION, ESOPHAGUS      FRACTURE SURGERY      L ankle    HYSTERECTOMY      SKIN BIOPSY      SMALL INTESTINE SURGERY      TONSILLECTOMY      UPPER GASTROINTESTINAL ENDOSCOPY N/A 4/21/2021    EGD POLYP SNARE performed by Rock Moreno DO at [address]  4/21/2021    EGD CONTROL HEMORRHAGE performed by Rock Moreno DO at [address]  4/21/2021    EGD BIOPSY performed by Rock Moreno DO at [address]:       Discharge Medication List as of 9/10/2021  3:51 PM      CONTINUE these medications which have NOT CHANGED    Details   apixaban (ELIQUIS) 5 MG TABS tablet Take 1 tablet by mouth 2 times daily, Disp-180 tablet, R-3Print      valsartan (DIOVAN) 320 MG tablet Take 1 tablet by mouth daily, Disp-90 tablet, R-1Adjust Sig      metoprolol succinate (TOPROL XL) 100 MG extended release tablet Take 50 mg by mouth daily Prescribed as 100mg bid, pt. Takes only 50mg if HR > 60. Historical Med      pantoprazole (PROTONIX) 20 MG tablet Take 2 tablets by mouth 2 times daily, Disp-60 tablet, R-0Normal      sucralfate (CARAFATE) 1 GM/10ML suspension Take 10 mLs by mouth 4 times daily, Disp-1200 mL, R-3Normal      pravastatin (PRAVACHOL) 80 MG tablet Take 40 mg by mouth dailyHistorical Med      pioglitazone (ACTOS) 15 MG tablet Take 15 mg by mouth dailyHistorical Med      dofetilide (TIKOSYN) 125 MCG capsule Take 1 capsule by mouth every 12 hours, Disp-180 capsule, R-3Normal      furosemide (LASIX) 20 MG tablet Take 1 tablet by mouth daily, Disp-30 tablet, R-11Normal      glyBURIDE (DIABETA) 1.25 MG tablet Take 2.5 mg by mouth 2 times daily (with meals) Historical Med      aspirin 81 MG tablet Take 81 mg by mouth daily               ALLERGIES:    Hydrocodone, Metformin and related, Nickel, Omeprazole, Prednisone, and Hydralazine    FAMILY HISTORY:       Family History   Problem Relation Age of Onset    Diabetes Mother     Heart Disease Father     High Blood Pressure Father     High Cholesterol Father     Cancer Sister     Heart Disease Brother     High Blood Pressure Brother     High Cholesterol Brother           SOCIAL HISTORY:       Social History     Socioeconomic History    Marital status:      Spouse name: None    Number of children: None    Years of education: None    Highest education level: None   Occupational History    None   Tobacco Use    Smoking status: Never Smoker    Smokeless tobacco: Never Used   Vaping Use    Vaping Use: Never used   Substance and Sexual Activity    Alcohol use: No    Drug use: No    Sexual activity: Yes     Partners: Male   Other Topics Concern    None   Social History Narrative    None     Social Determinants of Health     Financial Resource Strain:     Difficulty of Paying Living Expenses:    Food Insecurity:     Worried About Running Out of Food in the Last Year:     Ran Out of Food in the Last Year:    Transportation Needs:     Lack of Transportation (Medical):      Lack of Transportation (Non-Medical):    Physical Activity:     Days of Exercise per Week:     Minutes of Exercise per Session:    Stress:     Feeling of Stress :    Social Connections:     Frequency of Communication with Friends and Family:     Frequency of Social Gatherings with Friends and Family:     Attends Sabianism Services:     Active Member of Clubs or Organizations:     Attends Club or Organization Meetings:     Marital Status:    Intimate Partner Violence:     Fear of Current or Ex-Partner:     Emotionally Abused:  Physically Abused:     Sexually Abused:        SCREENINGS:    Alexis Coma Scale  Eye Opening: Spontaneous  Best Verbal Response: Oriented  Best Motor Response: Obeys commands  Alexis Coma Scale Score: 15        PHYSICAL EXAM:       ED Triage Vitals [09/10/21 1309]   BP Temp Temp Source Pulse Resp SpO2 Height Weight   (!) 219/72 98.5 °F (36.9 °C) Oral 69 16 97 % 5' 2\" (1.575 m) 189 lb (85.7 kg)       Physical Exam    CONSTITUTIONAL: Awake and alert. Cooperative. Well-developed. Well-nourished. Non-toxic. No acute distress. HENT: Normocephalic. Atraumatic. External ears normal, without discharge. No nasal discharge. Oropharynx clear. Mucous membranes moist.  EYES: Conjunctiva non-injected. No scleral icterus. PERRL. EOM's grossly intact. NECK: Supple. Normal ROM. CARDIOVASCULAR: RRR. No Murmer. Intact distal pulses. PULMONARY/CHEST WALL: Effort normal. No tachypnea. Lungs clear to ausculation. ABDOMEN: Normal BS. Soft. Nondistended. Tenderness to palpate the mid abdomen as well as the epigastric region right upper quadrant. No guarding. No rigidity. /ANORECTAL: Not assessed  MUSKULOSKELETAL: Normal ROM. No acute deformities. No edema. No tenderness to palpate. SKIN: Warm and dry. No rash. NEUROLOGICAL: Alert and oriented x 3. GCS 15. CN II-XII grossly intact. Strength is 5/5 in all extremities and sensation is intact. Normal gait.    PSYCHIATRIC: Normal affect        DIAGNOSTICRESULTS:     LABS:    Results for orders placed or performed during the hospital encounter of 09/10/21   CBC Auto Differential   Result Value Ref Range    WBC 10.2 4.0 - 11.0 K/uL    RBC 5.09 4.00 - 5.20 M/uL    Hemoglobin 12.5 12.0 - 16.0 g/dL    Hematocrit 38.4 36.0 - 48.0 %    MCV 75.4 (L) 80.0 - 100.0 fL    MCH 24.5 (L) 26.0 - 34.0 pg    MCHC 32.5 31.0 - 36.0 g/dL    RDW 17.5 (H) 12.4 - 15.4 %    Platelets 184 975 - 216 K/uL    MPV 8.5 5.0 - 10.5 fL    Neutrophils % 69.5 %    Lymphocytes % 18.8 %    Monocytes % 9.0 % Eosinophils % 2.0 %    Basophils % 0.7 %    Neutrophils Absolute 7.1 1.7 - 7.7 K/uL    Lymphocytes Absolute 1.9 1.0 - 5.1 K/uL    Monocytes Absolute 0.9 0.0 - 1.3 K/uL    Eosinophils Absolute 0.2 0.0 - 0.6 K/uL    Basophils Absolute 0.1 0.0 - 0.2 K/uL   Lipase   Result Value Ref Range    Lipase 20.0 13.0 - 60.0 U/L   Comprehensive Metabolic Panel w/ Reflex to MG   Result Value Ref Range    Sodium 134 (L) 136 - 145 mmol/L    Potassium reflex Magnesium 4.6 3.5 - 5.1 mmol/L    Chloride 97 (L) 99 - 110 mmol/L    CO2 22 21 - 32 mmol/L    Anion Gap 15 3 - 16    Glucose 211 (H) 70 - 99 mg/dL    BUN 16 7 - 20 mg/dL    CREATININE 0.8 0.6 - 1.2 mg/dL    GFR Non-African American >60 >60    GFR African American >60 >60    Calcium 9.0 8.3 - 10.6 mg/dL    Total Protein 8.1 6.4 - 8.2 g/dL    Albumin 3.6 3.4 - 5.0 g/dL    Albumin/Globulin Ratio 0.8 (L) 1.1 - 2.2    Total Bilirubin 0.4 0.0 - 1.0 mg/dL    Alkaline Phosphatase 101 40 - 129 U/L    ALT 13 10 - 40 U/L    AST 28 15 - 37 U/L    Globulin 4.5 g/dL   Troponin   Result Value Ref Range    Troponin <0.01 <0.01 ng/mL   EKG 12 Lead   Result Value Ref Range    Ventricular Rate 65 BPM    Atrial Rate 65 BPM    P-R Interval 188 ms    QRS Duration 92 ms    Q-T Interval 430 ms    QTc Calculation (Bazett) 447 ms    P Axis 75 degrees    R Axis -8 degrees    T Axis 47 degrees    Diagnosis       Normal sinus rhythmNormal ECGWhen compared with ECG of 17-JUL-2021 15:45,No significant change was foundConfirmed by Caren Choe MD, Ervin Butler ([id number]) on 9/10/2021 4:09:02 PM         RADIOLOGY:  All x-ray studies areviewed/reviewed by me.   Formal interpretations per the radiologist are as follows:      CT ABDOMEN PELVIS W IV CONTRAST Additional Contrast? None    Result Date: 9/10/2021  EXAMINATION: CT OF THE ABDOMEN AND PELVIS WITH CONTRAST 9/10/2021 2:22 pm TECHNIQUE: CT of the abdomen and pelvis was performed with the administration of intravenous contrast. Multiplanar reformatted images are provided for review. Dose modulation, iterative reconstruction, and/or weight based adjustment of the mA/kV was utilized to reduce the radiation dose to as low as reasonably achievable. 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 Organs: The liver is homogeneous and normal in attenuation. Several stones are in the gallbladder neck, dependently. The pancreas, spleen and adrenal glands are unremarkable. The kidneys enhance symmetrically. There is no hydronephrosis or suspicious abnormality. GI/Bowel: There is a small sliding hiatal hernia. In the right lower quadrant there is an enteroenteric anastomosis. There is feces like material along both inflow outflow segments of the anastomotic region. The inflow segment is patulous, similar to the previous study. The colon is in a relatively collapsed state. No focal mural thickening or pericolonic edema is seen. Appendix is absent. The mesenteric vasculature enhances in normal fashion. Pelvis: Urinary bladder is unremarkable. Uterus is absent. Peritoneum/Retroperitoneum: There is moderate aortoiliac calcification, without aneurysm. No adenopathy. Bones/Soft Tissues: Multilevel spondylosis of the lumbar spine. No acute osseous abnormality. Abdominal wall is unremarkable. Right lower quadrant enteroenteric anastomosis with feces like material, suggesting stasis. No obstruction is identified.  No acute abdominopelvic abnormality is otherwise identified. Cholelithiasis. EKG: The Ekg interpreted by me in the absence of a cardiologist shows. normal sinus rhythm with a rate of 65        PROCEDURES:   N/A    CRITICAL CARE TIME:       None      CONSULTS:  None      EMERGENCY DEPARTMENT COURSE and DIFFERENTIAL DIAGNOSIS/MDM:   Vitals:    Vitals:    09/10/21 1438 09/10/21 1448 09/10/21 1539 09/10/21 1546   BP: (!) 181/49 (!) 162/44 (!) 173/46 (!) 182/65   Pulse: 61 60 56 66   Resp: 20 20 15 17   Temp:       TempSrc:       SpO2: 97% 97% 97% 97%   Weight:       Height:           Patient was given the following medications:  Medications   morphine (PF) injection 2 mg (2 mg IntraVENous Given 9/10/21 1543)   morphine (PF) injection 2 mg (2 mg IntraVENous Given 9/10/21 1354)   ondansetron (ZOFRAN) injection 4 mg (4 mg IntraVENous Given 9/10/21 1354)   0.9 % sodium chloride bolus (0 mLs IntraVENous Stopped 9/10/21 1504)   iopamidol (ISOVUE-370) 76 % injection 75 mL (75 mLs IntraVENous Given 9/10/21 1430)   metoclopramide (REGLAN) tablet 5 mg (5 mg Oral Given 9/10/21 1543)   dicyclomine (BENTYL) tablet 20 mg (20 mg Oral Given 9/10/21 1543)         Patient was not seen by ED attending  Old records were reviewed. Patient arrives to the ED describing abdominal pain since this morning. She said some nausea. No vomiting. No diarrhea. Patient has some mild tenderness to palpate but she is not guarding. She has no rigidity. She arrives hypertensive but with otherwise normal vital signs. Given her age and her complaints and EKG, labs and ultimately CT are done. EKG shows normal sinus rhythm. Rate 60s. No ischemic changes. CBC normal white count 10.2 with H&H 12.5 and 38.4  CMP reveals hyperglycemia with glucose 211. Otherwise unremarkable. Lipase normal  Troponin negative  CT abdomen and pelvis with IV contrast shows right lower quadrant enteroenteric anastomosis with feces-like material, suggesting stasis. No obstruction is identified.   No acute abdominal pelvic abnormality otherwise seen. Cholelithiasis. Patient received morphine 2 mg IV with Zofran 4 mg IV upon arrival along with a 500 mL bolus of normal saline. She felt much better after this but prior to discharge had some mild recurrence of pain. The nurse gave the second dose of morphine 4 mg IV that I had initially ordered and she is also given Bentyl orally and Reglan orally. Patient is feeling better. I believe she is stable for discharge. She does have good outpatient follow-up with primary care and GI. Patient prescribed Bentyl and Reglan at discharge. Return precautions discussed. I estimate there is LOW risk for ACS, ACUTE APPENDICITIS, PYELONEPHRITIS, BOWEL OBSTRUCTION, CHOLECYSTITIS, DIVERTICULITIS, INCARCERATED HERNIA, PANCREATITIS, PERFORATED BOWEL or ULCER, thus I consider the discharge disposition reasonable. Also, there is no evidence or peritonitis, sepsis, or toxicity. Burt Tiwari and I have discussed the diagnosis and risks, and we agree with discharging home to follow-up with their primary doctor. We also discussed returning to the Emergency Department immediately if new or worsening symptoms occur. We have discussed the symptoms which are most concerning (e.g., bloody stool, fever, changing or worsening pain, vomiting) that necessitate immediate return. FINAL IMPRESSION:      1. Generalized abdominal pain    2.  Nausea          DISPOSITION/PLAN:   DISPOSITION Decision To Discharge      PATIENT REFERRED TO:  Lula Hopper, [address] [address]  [phone]    Schedule an appointment as soon as possible for a visit       Mo Templeton MD  [address] [place] [address], [address] [place] [address]  [phone]    Schedule an appointment as soon as possible for a visit         DISCHARGE MEDICATIONS:  Discharge Medication List as of 9/10/2021  3:51 PM      START taking these medications    Details   dicyclomine (BENTYL) 20 MG tablet Take 1 tablet by mouth every 6 hours as needed (abdominal pain/cramping), Disp-20 tablet, R-0Normal      metoclopramide (REGLAN) 10 MG tablet Take 1 tablet by mouth 4 times daily as needed (nausea, vomiting), Disp-20 tablet, R-0Normal                        (Please note thatportions of this note were completed with a voice recognition program.  Efforts were made to edit the dictations, but occasionally words are mis-transcribed.)    Alcon Ventura PA-C (electronicallysigned)              [place], [place]  09/10/21 0651

## 2021-09-11 NOTE — ED PROVIDER NOTES
[facility] Emergency Department    CHIEF COMPLAINT  Abdominal Pain (was DC from ED 3 hours ago and states the pain medicaiton has wore off and she is hurting agian. Patient states she has not taken anything at home and did not fill her prescriptions yet)      SHARED SERVICE VISIT  Evaluated by SHON. My supervising physician was available for consultation. HISTORY OF PRESENT ILLNESS  Martínez Maloney is a 81 y.o. female who presents to the ED complaining of abdominal pain. The patient was seen earlier in the emergency department and was discharged around 3pm. At time of discharge the patient states she was feeling significantly better however she went to [address] [place] [address] to  her prescriptions and they were not ready, the pain returned therefore she returned to the emergency department. This is not a new or worse pain, she admits that is the same as previous pain. Currently she rates it as a 9/10. She was given morphine, Bentyl, Reglan prior to discharge. She was discharged with prescription for Bentyl and Reglan. She states she is nauseous, has not vomited. No other complaints, modifying factors or associated symptoms. Nursing notes reviewed.    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     Past Surgical History:   Procedure Laterality Date    ABLATION OF DYSRHYTHMIC FOCUS      -2014    APPENDECTOMY      DILATATION, ESOPHAGUS      FRACTURE SURGERY      L ankle    HYSTERECTOMY      SKIN BIOPSY      SMALL INTESTINE SURGERY      TONSILLECTOMY      UPPER GASTROINTESTINAL ENDOSCOPY N/A 4/21/2021    EGD POLYP SNARE performed by Thanh Carrasco DO at [address]  4/21/2021    EGD CONTROL HEMORRHAGE performed by Lynsey Mosquera DO at [address]  4/21/2021    EGD BIOPSY performed by Lynsey Mosquera DO at [address] History   Problem Relation Age of Onset    Diabetes Mother     Heart Disease Father     High Blood Pressure Father     High Cholesterol Father     Cancer Sister     Heart Disease Brother     High Blood Pressure Brother     High Cholesterol Brother      Social History     Socioeconomic History    Marital status:      Spouse name: Not on file    Number of children: Not on file    Years of education: Not on file    Highest education level: Not on file   Occupational History    Not on file   Tobacco Use    Smoking status: Never Smoker    Smokeless tobacco: Never Used   Vaping Use    Vaping Use: Never used   Substance and Sexual Activity    Alcohol use: No    Drug use: No    Sexual activity: Yes     Partners: Male   Other Topics Concern    Not on file   Social History Narrative    Not on file     Social Determinants of Health     Financial Resource Strain:     Difficulty of Paying Living Expenses:    Food Insecurity:     Worried About Running Out of Food in the Last Year:     Ran Out of Food in the Last Year:    Transportation Needs:     Lack of Transportation (Medical):      Lack of Transportation (Non-Medical):    Physical Activity:     Days of Exercise per Week:     Minutes of Exercise per Session:    Stress:     Feeling of Stress :    Social Connections:     Frequency of Communication with Friends and Family:     Frequency of Social Gatherings with Friends and Family:     Attends [employer] Services:     Active Member of Clubs or Organizations:     Attends Club or Organization Meetings:     Marital Status:    Intimate Partner Violence:     Fear of Current or Ex-Partner:     Emotionally Abused:     Physically Abused:     Sexually Abused:      No current facility-administered medications for this encounter. Current Outpatient Medications   Medication Sig Dispense Refill    oxyCODONE-acetaminophen (PERCOCET) 5-325 MG per tablet Take 1 tablet by mouth 3 times daily as needed for Pain for up to 2 days. Intended supply: 3 days. Take lowest dose possible to manage pain 6 tablet 0    dicyclomine (BENTYL) 20 MG tablet Take 1 tablet by mouth every 6 hours as needed (abdominal pain/cramping) 20 tablet 0    metoclopramide (REGLAN) 10 MG tablet Take 1 tablet by mouth 4 times daily as needed (nausea, vomiting) 20 tablet 0    apixaban (ELIQUIS) 5 MG TABS tablet Take 1 tablet by mouth 2 times daily 180 tablet 3    valsartan (DIOVAN) 320 MG tablet Take 1 tablet by mouth daily 90 tablet 1    metoprolol succinate (TOPROL XL) 100 MG extended release tablet Take 50 mg by mouth daily Prescribed as 100mg bid, pt. Takes only 50mg if HR > 60.       pantoprazole (PROTONIX) 20 MG tablet Take 2 tablets by mouth 2 times daily (Patient taking differently: Take 20 mg by mouth daily ) 60 tablet 0    sucralfate (CARAFATE) 1 GM/10ML suspension Take 10 mLs by mouth 4 times daily (Patient not taking: Reported on 8/19/2021) 1200 mL 3    pravastatin (PRAVACHOL) 80 MG tablet Take 40 mg by mouth daily      pioglitazone (ACTOS) 15 MG tablet Take 15 mg by mouth daily      dofetilide (TIKOSYN) 125 MCG capsule Take 1 capsule by mouth every 12 hours 180 capsule 3    furosemide (LASIX) 20 MG tablet Take 1 tablet by mouth daily 30 tablet 11    glyBURIDE (DIABETA) 1.25 MG tablet Take 2.5 mg by mouth 2 times daily (with meals)       aspirin 81 MG tablet Take 81 mg by mouth daily       Allergies   Allergen Reactions    Hydrocodone Other (See Comments)     NAUSEA AND DIZZINESS    Metformin And Related Other (See Comments)     NAUSEA AND DIZZINESS    Nickel Dermatitis    Omeprazole     Prednisone      Other reaction(s): chest pain & palpitations/H&P    Hydralazine Palpitations and Rash       REVIEW OF SYSTEMS  10 systems reviewed, pertinent positives per HPI otherwise noted to be negative    PHYSICAL EXAM  BP (!) 184/70   Pulse 87   Temp 97.6 °F (36.4 °C) (Oral)   Resp 15   SpO2 92%   GENERAL APPEARANCE: Awake and alert. Cooperative. No acute distress. HEAD: Normocephalic. Atraumatic. EYES: PERRL. EOM's grossly intact. ENT: Mucous membranes are moist.   NECK: Supple. HEART: RRR. No murmurs. LUNGS: Respirations unlabored. CTAB. Good air exchange. Speaking comfortably in full sentences. ABDOMEN: Soft. Non-distended. Non-tender. No guarding or rebound. No masses. No organomegaly. EXTREMITIES: No peripheral edema. Moves all extremities equally. All extremities neurovascularly intact. SKIN: Warm and dry. No acute rashes. NEUROLOGICAL: Alert and oriented. CN's 2-12 intact. No gross facial drooping. Strength 5/5, sensation intact. PSYCHIATRIC: Normal mood and affect. RADIOLOGY  CT ABDOMEN PELVIS W IV CONTRAST Additional Contrast? None    Result Date: 9/10/2021  EXAMINATION: CT OF THE ABDOMEN AND PELVIS WITH CONTRAST 9/10/2021 2:22 pm TECHNIQUE: CT of the abdomen and pelvis was performed with the administration of intravenous contrast. Multiplanar reformatted images are provided for review. Dose modulation, iterative reconstruction, and/or weight based adjustment of the mA/kV was utilized to reduce the radiation dose to as low as reasonably achievable.  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 Organs: The liver is homogeneous and normal in attenuation. Several stones are in the gallbladder neck, dependently. The pancreas, spleen and adrenal glands are unremarkable. The kidneys enhance symmetrically. There is no hydronephrosis or suspicious abnormality. GI/Bowel: There is a small sliding hiatal hernia. In the right lower quadrant there is an enteroenteric anastomosis. There is feces like material along both inflow outflow segments of the anastomotic region. The inflow segment is patulous, similar to the previous study. The colon is in a relatively collapsed state. No focal mural thickening or pericolonic edema is seen. Appendix is absent. The mesenteric vasculature enhances in normal fashion. Pelvis: Urinary bladder is unremarkable. Uterus is absent. Peritoneum/Retroperitoneum: There is moderate aortoiliac calcification, without aneurysm. No adenopathy. Bones/Soft Tissues: Multilevel spondylosis of the lumbar spine. No acute osseous abnormality. Abdominal wall is unremarkable. Right lower quadrant enteroenteric anastomosis with feces like material, suggesting stasis. No obstruction is identified. No acute abdominopelvic abnormality is otherwise identified. Cholelithiasis. ED COURSE  Patient was seen and evaluated by myself and attending physician. All diagnostic, treatment, and disposition decisions were made in conjunction with attending physician. Per chart review lab work from [id number] today:   CBC without evidence of leukocytosis or acute anemia. Lipase unremarkable  CMP with hyperglycemia of glucose 211  Troponin negative  EKG interpreted by attending physician found of normal sinus rhythm. She did have a CT abdomen and pelvis with IV contrast which showed right lower quadrant enteroenteric anastomosis with feces like material, suggesting stasis. No obstruction. No other acute intra-abdominal abnormality noted.   Again patient was discharged home with prescriptions for Bentyl and Reglan. She was not discharged with any pain medication, therefore she was given 1st dose of Percocet in the emergency department, along with Zofran and Bentyl. Upon reevaluation patient had improved pain and felt appropriate for discharge. I encouraged her to fill up previous prescriptions and agree to write for a short course of Percocet. She is given strict return precautions and will otherwise follow-up with her primary care physician on Monday. Risk management discussed and shared decision making had with patient and/or surrogate. All questions were answered. Patient will follow up with  PCP for further evaluation/treatment. All questions answered. Patient will return to ED for new/worsening symptoms. MDM    I estimate there is LOW risk for ACUTE APPENDICITIS, BOWEL OBSTRUCTION, CHOLECYSTITIS, DIVERTICULITIS, INCARCERATED HERNIA, PANCREATITIS, or PERFORATED BOWEL or ULCER, thus I consider the discharge disposition reasonable. Also, there is no evidence or peritonitis, sepsis, or toxicity. Ranjan Tiwari and I have discussed the diagnosis and risks, and we agree with discharging home to follow-up with their primary doctor. We also discussed returning to the Emergency Department immediately if new or worsening symptoms occur. We have discussed the symptoms which are most concerning (e.g., bloody stool, fever, changing or worsening pain, vomiting) that necessitate immediate return. FINAL Impression    1. Left upper quadrant abdominal pain        Blood pressure (!) 184/70, pulse 87, temperature 97.6 °F (36.4 °C), temperature source Oral, resp. rate 15, SpO2 92 %. DISPOSITION  Patient was discharged to home in good condition.           [place], [place]  09/10/21 [id number]

## 2021-10-04 ENCOUNTER — TELEPHONE (OUTPATIENT)
Dept: CARDIOLOGY CLINIC | Age: 86
End: 2021-10-04

## 2021-10-04 NOTE — TELEPHONE ENCOUNTER
Patient is enrolled in the [email or web address] pt ass. She is on her last bottle and was told to call the office and we would notify Janet Bustillo to send more.  TY

## 2021-10-12 NOTE — TELEPHONE ENCOUNTER
Pt's Tikosyn script has been called in to [facility]. Pt has been notified. We will call her as soon as it arrives to the office.       TY

## 2021-11-08 ENCOUNTER — TELEPHONE (OUTPATIENT)
Dept: CARDIOLOGY CLINIC | Age: 86
End: 2021-11-08

## 2021-11-15 NOTE — PROGRESS NOTES
[address]   Electrophysiology Outpatient Note              Date:  November 16, 2021  Patient name: Kimberly Tiwari  YOB: 1935    Primary Care physician: Marielle De Jesus MD    HISTORY OF PRESENT ILLNESS: The patient is a 81 y.o.  female with a history of AF, PVC's, HTN, SB, SVT, pulmonary HTN, chronic diastolic CHF an DM. In 2013, she had SVT. She wore a monitor which detected AF. In 2/2014, she underwent cryoablation of AF. Post procedure she had a retroperitoneal bleed and required a urinary stent. In 2/2019, echo showed an EF of 55-60% and severe pulmonary HTN and she was referred to CHF team. In 1/2020, AF was recurrent and amiodarone was started. Amiodarone was stopped for unknown reasons. In 12/2020, Tikosyn was started. Today she is being seen for AF. EKG shows SR with a HR of 60. Patient complains of an episode of AF on 11/3/2021. Episode lasted about 3 hours. Felt fluttering and fullness in throat. Heart rates were elevated but she is unsure how high. Resolved on it's own. Today, she denies chest pain, palpitations, shortness of breath, and dizziness. Past Medical History:   has a past medical history of Atrial fibrillation ([address] Diabetes mellitus ([address] Hydronephrosis, Hyperlipidemia, Hypertension, Intussusception intestine ([address] and SVT (supraventricular tachycardia) ([address] Past Surgical History:   has a past surgical history that includes Hysterectomy; Appendectomy; Tonsillectomy; skin biopsy; ablation of dysrhythmic focus; Dilatation, esophagus; Small intestine surgery; fracture surgery; Upper gastrointestinal endoscopy (N/A, 4/21/2021); Upper gastrointestinal endoscopy (4/21/2021); and Upper gastrointestinal endoscopy (4/21/2021). Home Medications:    Prior to Admission medications    Medication Sig Start Date End Date Taking?  Authorizing Provider   dicyclomine (BENTYL) 20 MG tablet Take 1 tablet by mouth every 6 hours as needed Blood Pressure in her brother and father; High Cholesterol in her brother and father. All 14 point review of systems are completed and pertinent positives are mentioned in the history of present illness. Other systems are reviewed and are negative. PHYSICAL EXAM:    Vital signs:    /80   Pulse 62   Ht 5' 2\" (1.575 m)   Wt 189 lb (85.7 kg)   SpO2 99%   BMI 34.57 kg/m²      Constitutional and general appearance: alert, cooperative, no distress and appears stated age  HEENT: PERRL, no cervical lymphadenopathy. No masses palpable. Normal oral mucosa  Respiratory:  · Normal excursion and expansion without use of accessory muscles  · Resp auscultation: Normal breath sounds without wheezing, rhonchi, and rales  Cardiovascular:  · The apical impulse is not displaced  · Heart tones are crisp and normal. regular S1 and S2.  · Jugular venous pulsation Normal  · The carotid upstroke is normal in amplitude and contour without delay or bruit  · Peripheral pulses are symmetrical and full   Abdomen:  · No masses or tenderness  · Bowel sounds present  Extremities:  ·  No cyanosis or clubbing  ·  No lower extremity edema  ·  Skin: warm and dry  Neurological:  · Alert and oriented  · Moves all extremities well  · No abnormalities of mood, affect, memory, mentation, or behavior are noted    DATA:    ECG 11/16/2021:  SR 60 bpm     Echo 4/28/2020:  Normal left ventricle systolic function with an estimated ejection fraction of 55-60%. Definity contrast administered with no evidence of left ventricular mass or thrombus noted. No regional wall motion abnormalities are seen. Normal left ventricular diastolic filling pressure. The left atrium is mildly dilated. Mild mitral and tricuspid regurgitation. Systolic pulmonary artery pressure (SPAP) estimated at 42 mmHg (right atrial pressure 3 mmHg), consistent with mild pulmonary hypertension.     Echo 2/19/2019:  Definity contrast administered.   Normal left ventricular systolic function with an estimated ejection fraction of 55-60%. There is mild septal hypertrophy with normal remaining wall thickness. Normal left ventricular diastolic filling pressure. Mild mitral regurgitation. Mild tricuspid regurgitation. Systolic pulmonary artery pressure (SPAP) estimated at 60 mmHg (RA pressure 3 mmHg), consistent with severe pulmonary hypertension. Mild pulmonic regurgitation present.     RAINER 2/11/2014:  Global ejection fraction is normal and estimated from 50 % to 55 %.    Echo 8/26/2013:  Overall left ventricular function is normal.   Ejection fraction is visually estimated to be 50-55 %.   There is trivial tricuspid regurgitation with RVSP estimated at 30 mmHg.   Limited 2-D echocardiogram   There is limited visualization of the valvular structures but no obvious major abnormalities noted.  Víctor Rower is reversal of E/A inflow velocities across the mitral valve suggesting impaired left ventricular relaxation. All labs and testing reviewed. CARDIOLOGY LABS:   CBC: No results for input(s): WBC, HGB, HCT, PLT in the last 72 hours. BMP: No results for input(s): NA, K, CO2, BUN, CREATININE, LABGLOM, GLUCOSE in the last 72 hours. PT/INR: No results for input(s): PROTIME, INR in the last 72 hours. APTT:No results for input(s): APTT in the last 72 hours. FASTING LIPID PANEL:No results found for: HDL, LDLDIRECT, LDLCALC, TRIG  LIVER PROFILE:No results for input(s): AST, ALT, ALB in the last 72 hours.     IMPRESSION:    Assessment:   Paroxsymal atrial fibrillation:   -s/p cryoablation 2014   -Tikosyn started 12/2020   -[id number] score 6 (HTN, DM, CHF, age, gender)   PSVT: noted in 6919  Chronic diastolic CHF: compensated  Pulmonary HTN: followed by Dr. Adithya Fernandez   HTN: controlled   HLD   DM       Plan:   Continue Eliquis, valsartan, Toprol, Tikosyn and Lasix   Annual labs due 9/2022  Monitor BP and HR at home and call if consistently out of goal ranges  Echocardiogram in 2/2021 as planned by Dr. Koel Duckworth   Follow up in 6 months      IFTIKHAR Rde, APRN-CNP  [facility]  [phone]

## 2021-11-16 ENCOUNTER — OFFICE VISIT (OUTPATIENT)
Dept: CARDIOLOGY CLINIC | Age: 86
End: 2021-11-16
Payer: MEDICARE

## 2021-11-16 VITALS
WEIGHT: 189 LBS | HEART RATE: 62 BPM | OXYGEN SATURATION: 99 % | BODY MASS INDEX: 34.78 KG/M2 | SYSTOLIC BLOOD PRESSURE: 138 MMHG | HEIGHT: 62 IN | DIASTOLIC BLOOD PRESSURE: 80 MMHG

## 2021-11-16 DIAGNOSIS — I48.0 PAF (PAROXYSMAL ATRIAL FIBRILLATION) (HCC): Primary | ICD-10-CM

## 2021-11-16 DIAGNOSIS — I10 ESSENTIAL HYPERTENSION: ICD-10-CM

## 2021-11-16 DIAGNOSIS — I47.1 PSVT (PAROXYSMAL SUPRAVENTRICULAR TACHYCARDIA) (HCC): ICD-10-CM

## 2021-11-16 DIAGNOSIS — I47.1 SVT (SUPRAVENTRICULAR TACHYCARDIA) (HCC): ICD-10-CM

## 2021-11-16 PROCEDURE — 99214 OFFICE O/P EST MOD 30 MIN: CPT | Performed by: NURSE PRACTITIONER

## 2021-11-16 PROCEDURE — 93000 ELECTROCARDIOGRAM COMPLETE: CPT | Performed by: NURSE PRACTITIONER

## 2021-11-16 NOTE — PATIENT INSTRUCTIONS
Continue Eliquis, valsartan, Toprol, Tikosyn and Lasix     Annual labs due 9/2022    Monitor BP and HR at home and call if consistently out of goal ranges    Echocardiogram in 2/2021 as planned by Dr. Wendy Gonzalez

## 2021-12-01 ENCOUNTER — TELEPHONE (OUTPATIENT)
Dept: CARDIOLOGY CLINIC | Age: 86
End: 2021-12-01

## 2021-12-01 NOTE — TELEPHONE ENCOUNTER
Aliza Lozano letting us know she is sending in her portion of the [employer] paperwork with the financial information. So we can just send the physician section.     TY

## 2021-12-04 ENCOUNTER — APPOINTMENT (OUTPATIENT)
Dept: GENERAL RADIOLOGY | Age: 86
DRG: 310 | End: 2021-12-04
Payer: MEDICARE

## 2021-12-04 ENCOUNTER — HOSPITAL ENCOUNTER (INPATIENT)
Age: 86
LOS: 2 days | Discharge: HOME | DRG: 310 | End: 2021-12-06
Attending: EMERGENCY MEDICINE | Admitting: INTERNAL MEDICINE
Payer: MEDICARE

## 2021-12-04 DIAGNOSIS — I48.91 ATRIAL FIBRILLATION WITH RVR (HCC): ICD-10-CM

## 2021-12-04 DIAGNOSIS — R00.2 PALPITATIONS: Primary | ICD-10-CM

## 2021-12-04 DIAGNOSIS — R79.89 ELEVATED BRAIN NATRIURETIC PEPTIDE (BNP) LEVEL: ICD-10-CM

## 2021-12-04 LAB
A/G RATIO: 0.8 (ref 1.1–2.2)
ALBUMIN SERPL-MCNC: 3.8 G/DL (ref 3.4–5)
ALP BLD-CCNC: 102 U/L (ref 40–129)
ALT SERPL-CCNC: 14 U/L (ref 10–40)
ANION GAP SERPL CALCULATED.3IONS-SCNC: 13 MMOL/L (ref 3–16)
AST SERPL-CCNC: 19 U/L (ref 15–37)
BASOPHILS ABSOLUTE: 0.1 K/UL (ref 0–0.2)
BASOPHILS RELATIVE PERCENT: 0.8 %
BILIRUB SERPL-MCNC: 0.4 MG/DL (ref 0–1)
BUN BLDV-MCNC: 16 MG/DL (ref 7–20)
CALCIUM SERPL-MCNC: 9.2 MG/DL (ref 8.3–10.6)
CHLORIDE BLD-SCNC: 102 MMOL/L (ref 99–110)
CO2: 24 MMOL/L (ref 21–32)
CREAT SERPL-MCNC: 0.7 MG/DL (ref 0.6–1.2)
EKG ATRIAL RATE: 122 BPM
EKG ATRIAL RATE: 61 BPM
EKG DIAGNOSIS: NORMAL
EKG DIAGNOSIS: NORMAL
EKG P AXIS: 70 DEGREES
EKG P-R INTERVAL: 192 MS
EKG Q-T INTERVAL: 312 MS
EKG Q-T INTERVAL: 426 MS
EKG QRS DURATION: 90 MS
EKG QRS DURATION: 90 MS
EKG QTC CALCULATION (BAZETT): 428 MS
EKG QTC CALCULATION (BAZETT): 453 MS
EKG R AXIS: -2 DEGREES
EKG R AXIS: 0 DEGREES
EKG T AXIS: 56 DEGREES
EKG T AXIS: 76 DEGREES
EKG VENTRICULAR RATE: 127 BPM
EKG VENTRICULAR RATE: 61 BPM
EOSINOPHILS ABSOLUTE: 0.3 K/UL (ref 0–0.6)
EOSINOPHILS RELATIVE PERCENT: 4.1 %
GFR AFRICAN AMERICAN: >60
GFR NON-AFRICAN AMERICAN: >60
GLUCOSE BLD-MCNC: 111 MG/DL (ref 70–99)
GLUCOSE BLD-MCNC: 157 MG/DL (ref 70–99)
GLUCOSE BLD-MCNC: 167 MG/DL (ref 70–99)
HCT VFR BLD CALC: 42.7 % (ref 36–48)
HEMOGLOBIN: 13.8 G/DL (ref 12–16)
LYMPHOCYTES ABSOLUTE: 1.7 K/UL (ref 1–5.1)
LYMPHOCYTES RELATIVE PERCENT: 23.2 %
MCH RBC QN AUTO: 25.1 PG (ref 26–34)
MCHC RBC AUTO-ENTMCNC: 32.3 G/DL (ref 31–36)
MCV RBC AUTO: 77.7 FL (ref 80–100)
MONOCYTES ABSOLUTE: 0.7 K/UL (ref 0–1.3)
MONOCYTES RELATIVE PERCENT: 9.3 %
NEUTROPHILS ABSOLUTE: 4.6 K/UL (ref 1.7–7.7)
NEUTROPHILS RELATIVE PERCENT: 62.6 %
PDW BLD-RTO: 18 % (ref 12.4–15.4)
PERFORMED ON: ABNORMAL
PERFORMED ON: ABNORMAL
PLATELET # BLD: 335 K/UL (ref 135–450)
PMV BLD AUTO: 9.1 FL (ref 5–10.5)
POTASSIUM SERPL-SCNC: 4.3 MMOL/L (ref 3.5–5.1)
PRO-BNP: 563 PG/ML (ref 0–449)
RBC # BLD: 5.5 M/UL (ref 4–5.2)
SODIUM BLD-SCNC: 139 MMOL/L (ref 136–145)
TOTAL PROTEIN: 8.4 G/DL (ref 6.4–8.2)
TROPONIN: <0.01 NG/ML
TROPONIN: <0.01 NG/ML
WBC # BLD: 7.4 K/UL (ref 4–11)

## 2021-12-04 PROCEDURE — 85025 COMPLETE CBC W/AUTO DIFF WBC: CPT

## 2021-12-04 PROCEDURE — 6370000000 HC RX 637 (ALT 250 FOR IP): Performed by: INTERNAL MEDICINE

## 2021-12-04 PROCEDURE — G0378 HOSPITAL OBSERVATION PER HR: HCPCS

## 2021-12-04 PROCEDURE — 83880 ASSAY OF NATRIURETIC PEPTIDE: CPT

## 2021-12-04 PROCEDURE — 71046 X-RAY EXAM CHEST 2 VIEWS: CPT

## 2021-12-04 PROCEDURE — 6370000000 HC RX 637 (ALT 250 FOR IP): Performed by: REGISTERED NURSE

## 2021-12-04 PROCEDURE — 99223 1ST HOSP IP/OBS HIGH 75: CPT | Performed by: INTERNAL MEDICINE

## 2021-12-04 PROCEDURE — 83036 HEMOGLOBIN GLYCOSYLATED A1C: CPT

## 2021-12-04 PROCEDURE — 80053 COMPREHEN METABOLIC PANEL: CPT

## 2021-12-04 PROCEDURE — 84484 ASSAY OF TROPONIN QUANT: CPT

## 2021-12-04 PROCEDURE — 93010 ELECTROCARDIOGRAM REPORT: CPT | Performed by: INTERNAL MEDICINE

## 2021-12-04 PROCEDURE — 93005 ELECTROCARDIOGRAM TRACING: CPT | Performed by: EMERGENCY MEDICINE

## 2021-12-04 PROCEDURE — 99285 EMERGENCY DEPT VISIT HI MDM: CPT

## 2021-12-04 PROCEDURE — 1200000000 HC SEMI PRIVATE

## 2021-12-04 RX ORDER — ACETAMINOPHEN 650 MG/1
650 SUPPOSITORY RECTAL EVERY 6 HOURS PRN
Status: DISCONTINUED | OUTPATIENT
Start: 2021-12-04 | End: 2021-12-06 | Stop reason: HOSPADM

## 2021-12-04 RX ORDER — CLONIDINE HYDROCHLORIDE 0.1 MG/1
0.1 TABLET ORAL EVERY 6 HOURS PRN
Status: DISCONTINUED | OUTPATIENT
Start: 2021-12-04 | End: 2021-12-06 | Stop reason: HOSPADM

## 2021-12-04 RX ORDER — VALSARTAN 80 MG/1
320 TABLET ORAL DAILY
Status: DISCONTINUED | OUTPATIENT
Start: 2021-12-04 | End: 2021-12-06 | Stop reason: HOSPADM

## 2021-12-04 RX ORDER — ONDANSETRON 4 MG/1
4 TABLET, ORALLY DISINTEGRATING ORAL EVERY 8 HOURS PRN
Status: DISCONTINUED | OUTPATIENT
Start: 2021-12-04 | End: 2021-12-06 | Stop reason: HOSPADM

## 2021-12-04 RX ORDER — AMLODIPINE BESYLATE 5 MG/1
5 TABLET ORAL DAILY
Status: DISCONTINUED | OUTPATIENT
Start: 2021-12-04 | End: 2021-12-06 | Stop reason: HOSPADM

## 2021-12-04 RX ORDER — METOPROLOL SUCCINATE 50 MG/1
50 TABLET, EXTENDED RELEASE ORAL DAILY
Status: DISCONTINUED | OUTPATIENT
Start: 2021-12-04 | End: 2021-12-06 | Stop reason: HOSPADM

## 2021-12-04 RX ORDER — SODIUM CHLORIDE 9 MG/ML
25 INJECTION, SOLUTION INTRAVENOUS PRN
Status: DISCONTINUED | OUTPATIENT
Start: 2021-12-04 | End: 2021-12-06 | Stop reason: HOSPADM

## 2021-12-04 RX ORDER — DILTIAZEM HYDROCHLORIDE 5 MG/ML
10 INJECTION INTRAVENOUS ONCE
Status: DISCONTINUED | OUTPATIENT
Start: 2021-12-04 | End: 2021-12-04

## 2021-12-04 RX ORDER — ACETAMINOPHEN 325 MG/1
650 TABLET ORAL EVERY 6 HOURS PRN
Status: DISCONTINUED | OUTPATIENT
Start: 2021-12-04 | End: 2021-12-06 | Stop reason: HOSPADM

## 2021-12-04 RX ORDER — ONDANSETRON 2 MG/ML
4 INJECTION INTRAMUSCULAR; INTRAVENOUS EVERY 6 HOURS PRN
Status: DISCONTINUED | OUTPATIENT
Start: 2021-12-04 | End: 2021-12-06 | Stop reason: HOSPADM

## 2021-12-04 RX ORDER — ATORVASTATIN CALCIUM 40 MG/1
40 TABLET, FILM COATED ORAL NIGHTLY
Status: DISCONTINUED | OUTPATIENT
Start: 2021-12-04 | End: 2021-12-06 | Stop reason: HOSPADM

## 2021-12-04 RX ORDER — VALSARTAN 320 MG/1
320 TABLET ORAL DAILY
Status: DISCONTINUED | OUTPATIENT
Start: 2021-12-05 | End: 2021-12-04

## 2021-12-04 RX ORDER — FUROSEMIDE 20 MG/1
20 TABLET ORAL DAILY
Status: DISCONTINUED | OUTPATIENT
Start: 2021-12-04 | End: 2021-12-04

## 2021-12-04 RX ORDER — DOFETILIDE 0.12 MG/1
125 CAPSULE ORAL EVERY 12 HOURS SCHEDULED
Status: DISCONTINUED | OUTPATIENT
Start: 2021-12-04 | End: 2021-12-06 | Stop reason: HOSPADM

## 2021-12-04 RX ORDER — METOPROLOL SUCCINATE 50 MG/1
50 TABLET, EXTENDED RELEASE ORAL DAILY
Status: DISCONTINUED | OUTPATIENT
Start: 2021-12-05 | End: 2021-12-04

## 2021-12-04 RX ORDER — POLYETHYLENE GLYCOL 3350 17 G/17G
17 POWDER, FOR SOLUTION ORAL DAILY PRN
Status: DISCONTINUED | OUTPATIENT
Start: 2021-12-04 | End: 2021-12-06 | Stop reason: HOSPADM

## 2021-12-04 RX ORDER — FUROSEMIDE 10 MG/ML
40 INJECTION INTRAMUSCULAR; INTRAVENOUS ONCE
Status: COMPLETED | OUTPATIENT
Start: 2021-12-05 | End: 2021-12-05

## 2021-12-04 RX ORDER — DEXTROSE MONOHYDRATE 50 MG/ML
100 INJECTION, SOLUTION INTRAVENOUS PRN
Status: DISCONTINUED | OUTPATIENT
Start: 2021-12-04 | End: 2021-12-06 | Stop reason: HOSPADM

## 2021-12-04 RX ORDER — DICYCLOMINE HCL 20 MG
20 TABLET ORAL EVERY 6 HOURS PRN
Status: DISCONTINUED | OUTPATIENT
Start: 2021-12-04 | End: 2021-12-06 | Stop reason: HOSPADM

## 2021-12-04 RX ORDER — SODIUM CHLORIDE 0.9 % (FLUSH) 0.9 %
5-40 SYRINGE (ML) INJECTION EVERY 12 HOURS SCHEDULED
Status: DISCONTINUED | OUTPATIENT
Start: 2021-12-04 | End: 2021-12-06 | Stop reason: HOSPADM

## 2021-12-04 RX ORDER — PRAVASTATIN SODIUM 40 MG
40 TABLET ORAL DAILY
Status: DISCONTINUED | OUTPATIENT
Start: 2021-12-04 | End: 2021-12-04

## 2021-12-04 RX ORDER — DEXTROSE MONOHYDRATE 25 G/50ML
12.5 INJECTION, SOLUTION INTRAVENOUS PRN
Status: DISCONTINUED | OUTPATIENT
Start: 2021-12-04 | End: 2021-12-06 | Stop reason: HOSPADM

## 2021-12-04 RX ORDER — PANTOPRAZOLE SODIUM 20 MG/1
20 TABLET, DELAYED RELEASE ORAL DAILY
Status: DISCONTINUED | OUTPATIENT
Start: 2021-12-04 | End: 2021-12-06 | Stop reason: HOSPADM

## 2021-12-04 RX ORDER — NICOTINE POLACRILEX 4 MG
15 LOZENGE BUCCAL PRN
Status: DISCONTINUED | OUTPATIENT
Start: 2021-12-04 | End: 2021-12-06 | Stop reason: HOSPADM

## 2021-12-04 RX ORDER — SODIUM CHLORIDE 0.9 % (FLUSH) 0.9 %
5-40 SYRINGE (ML) INJECTION PRN
Status: DISCONTINUED | OUTPATIENT
Start: 2021-12-04 | End: 2021-12-06 | Stop reason: HOSPADM

## 2021-12-04 RX ADMIN — APIXABAN 5 MG: 5 TABLET, FILM COATED ORAL at 15:29

## 2021-12-04 RX ADMIN — APIXABAN 5 MG: 5 TABLET, FILM COATED ORAL at 20:15

## 2021-12-04 RX ADMIN — PANTOPRAZOLE SODIUM 20 MG: 20 TABLET, DELAYED RELEASE ORAL at 17:45

## 2021-12-04 RX ADMIN — ATORVASTATIN CALCIUM 40 MG: 40 TABLET, FILM COATED ORAL at 20:15

## 2021-12-04 RX ADMIN — METOPROLOL SUCCINATE 50 MG: 50 TABLET, EXTENDED RELEASE ORAL at 20:15

## 2021-12-04 RX ADMIN — AMLODIPINE BESYLATE 5 MG: 5 TABLET ORAL at 12:54

## 2021-12-04 RX ADMIN — VALSARTAN 320 MG: 80 TABLET, FILM COATED ORAL at 20:14

## 2021-12-04 RX ADMIN — PRAVASTATIN SODIUM 40 MG: 40 TABLET ORAL at 17:45

## 2021-12-04 RX ADMIN — FUROSEMIDE 20 MG: 20 TABLET ORAL at 15:29

## 2021-12-04 ASSESSMENT — ENCOUNTER SYMPTOMS
BLOOD IN STOOL: 0
CHEST TIGHTNESS: 1
CONSTIPATION: 0
DIARRHEA: 0
SORE THROAT: 0
TROUBLE SWALLOWING: 0
VOMITING: 0
ABDOMINAL PAIN: 0
BACK PAIN: 0
SHORTNESS OF BREATH: 0
EYE PAIN: 0
STRIDOR: 0
NAUSEA: 0
WHEEZING: 0
VOICE CHANGE: 0
APNEA: 0
RHINORRHEA: 0
PHOTOPHOBIA: 0
SINUS PRESSURE: 0
EYE DISCHARGE: 0
EYE REDNESS: 0
ABDOMINAL DISTENTION: 0
COLOR CHANGE: 0
COUGH: 0
RECTAL PAIN: 0

## 2021-12-04 NOTE — PROGRESS NOTES
Admitted to room 369 via transport, tele box # [id number] in place. Oriented to room, bed rails, call light and bathroom. Pt aware of plan of day.

## 2021-12-04 NOTE — ED PROVIDER NOTES
Beatriz Tiwari is an 81year old female who has a history of SVT and atrial fibrillation. She sees Dr. Desriee Eldridge and Dr. Carlin Wade, cardiology. She is an Eliquis, Metoprolol 100 mg bid and Tikosyn. This morning at 6am she noticed that her heart was beating rapidly. She also has a \"tight sensation in my throat\" when this happens. She denies chest pain or SOB. No recent changes in medications. BP (!) 181/99   Pulse 52   Temp 98.6 °F (37 °C) (Oral)   Resp 19   Ht 5' 2\" (1.575 m)   SpO2 98%   BMI 34.57 kg/m²     I have reviewed the following from the nursing documentation:      Prior to Admission medications    Medication Sig Start Date End Date Taking? Authorizing Provider   dicyclomine (BENTYL) 20 MG tablet Take 1 tablet by mouth every 6 hours as needed (abdominal pain/cramping) 9/10/21  Yes HERMINIO Marcano   apixaban (ELIQUIS) 5 MG TABS tablet Take 1 tablet by mouth 2 times daily 6/10/21  Yes Maxene Seip, APRN - CNP   valsartan (DIOVAN) 320 MG tablet Take 1 tablet by mouth daily 5/26/21  Yes Maxene Seip, APRN - CNP   metoprolol succinate (TOPROL XL) 100 MG extended release tablet Take 50 mg by mouth daily Prescribed as 100mg bid, pt. Takes only 50mg if HR > 60.    Yes Historical Provider, MD   pantoprazole (PROTONIX) 20 MG tablet Take 2 tablets by mouth 2 times daily  Patient taking differently: Take 20 mg by mouth daily  4/22/21  Yes Lupillo Lucero MD   pravastatin (PRAVACHOL) 80 MG tablet Take 40 mg by mouth daily   Yes Historical Provider, MD   pioglitazone (ACTOS) 15 MG tablet Take 15 mg by mouth daily   Yes Historical Provider, MD   dofetilide (TIKOSYN) 125 MCG capsule Take 1 capsule by mouth every 12 hours 2/4/21  Yes Thor Tucker MD   furosemide (LASIX) 20 MG tablet Take 1 tablet by mouth daily 1/18/21  Yes Desiree Eldridge MD   glyBURIDE (DIABETA) 1.25 MG tablet Take 2.5 mg by mouth 2 times daily (with meals)    Yes Historical Provider, MD       Allergies as of 12/04/2021 - Fully Reviewed 12/04/2021   Allergen Reaction Noted    Hydrocodone Other (See Comments) 08/25/2013    Metformin and related Other (See Comments) 02/11/2014    Nickel Dermatitis 03/30/2021    Omeprazole  02/11/2014    Prednisone  03/30/2021    Hydralazine Palpitations and Rash 01/11/2019       Past Medical History:   Diagnosis Date    [facility]     Diabetes mellitus ([facility]     Hydronephrosis 2/14/2014    Hyperlipidemia     Hypertension     Intussusception intestine ([address]     SVT (supraventricular tachycardia) ([facility] 8/26/2013    AVNRT        Surgical History:   Past Surgical History:   Procedure Laterality Date    ABLATION OF DYSRHYTHMIC FOCUS      -2014    APPENDECTOMY      DILATATION, ESOPHAGUS      FRACTURE SURGERY      L ankle    HYSTERECTOMY      SKIN BIOPSY      SMALL INTESTINE SURGERY      TONSILLECTOMY      UPPER GASTROINTESTINAL ENDOSCOPY N/A 4/21/2021    EGD POLYP SNARE performed by Odalis Peguero DO at [address]  4/21/2021    EGD CONTROL HEMORRHAGE performed by Odalis Peguero DO at [address]  4/21/2021    EGD BIOPSY performed by Odalis Peguero DO at [address] [facility]        Family History:    Family History   Problem Relation Age of Onset    Diabetes Mother     Heart Disease Father     High Blood Pressure Father     High Cholesterol Father     Cancer Sister     Heart Disease Brother     High Blood Pressure Brother     High Cholesterol Brother        Social History     Socioeconomic History    Marital status:      Spouse name: Not on file    Number of children: Not on file    Years of education: Not on file    Highest education level: Not on file   Occupational History    Not on file   Tobacco Use    Smoking status: Never Smoker    Smokeless tobacco: Never Used   Vaping Use    Vaping Use: Never used   Substance and Sexual Activity    Alcohol use: No    Drug use: No    Sexual activity: Yes     Partners: Male   Other Topics Concern    Not on file   Social History Narrative    Not on file     Social Determinants of Health     Financial Resource Strain:     Difficulty of Paying Living Expenses: Not on file   Food Insecurity:     Worried About Running Out of Food in the Last Year: Not on file    Keith of Food in the Last Year: Not on file   Transportation Needs:     Lack of Transportation (Medical): Not on file    Lack of Transportation (Non-Medical): Not on file   Physical Activity:     Days of Exercise per Week: Not on file    Minutes of Exercise per Session: Not on file   Stress:     Feeling of Stress : Not on file   Social Connections:     Frequency of Communication with Friends and Family: Not on file    Frequency of Social Gatherings with Friends and Family: Not on file    Attends Mosque Services: Not on file    Active Member of [address] [place] [address] or Organizations: Not on file    Attends Club or Organization Meetings: Not on file    Marital Status: Not on file   Intimate Partner Violence:     Fear of Current or Ex-Partner: Not on file    Emotionally Abused: Not on file    Physically Abused: Not on file    Sexually Abused: Not on file   Housing Stability:     Unable to Pay for Housing in the Last Year: Not on file    Number of [place] in the Last Year: Not on file    Unstable Housing in the Last Year: Not on file         Review of Systems   Constitutional: Negative for activity change, appetite change, chills, diaphoresis, fatigue, fever and unexpected weight change. HENT: Negative for congestion, ear pain, mouth sores, rhinorrhea, sinus pressure, sore throat, tinnitus, trouble swallowing and voice change. Eyes: Negative for photophobia, pain, discharge, redness and visual disturbance. Respiratory: Positive for chest tightness. Negative for apnea, cough, shortness of breath, wheezing and stridor. Cardiovascular: Positive for palpitations. Negative for chest pain and leg swelling. Gastrointestinal: Negative for abdominal distention, abdominal pain, blood in stool, constipation, diarrhea, nausea, rectal pain and vomiting. Genitourinary: Negative for difficulty urinating, dyspareunia, dysuria, flank pain, frequency, genital sores, menstrual problem, pelvic pain, urgency, vaginal bleeding, vaginal discharge and vaginal pain. Musculoskeletal: Negative for arthralgias, back pain, joint swelling, neck pain and neck stiffness. Skin: Negative for color change and rash. Neurological: Negative for dizziness, tremors, seizures, syncope, facial asymmetry, speech difficulty, weakness, light-headedness, numbness and headaches. Hematological: Negative for adenopathy. Does not bruise/bleed easily. Psychiatric/Behavioral: Negative for agitation, confusion, dysphoric mood, hallucinations, self-injury, sleep disturbance and suicidal ideas. All other systems reviewed and are negative. Physical Exam  Constitutional:       General: She is not in acute distress. Appearance: She is well-developed. HENT:      Head: Normocephalic and atraumatic. Left Ear: External ear normal.      Mouth/Throat:      Pharynx: No oropharyngeal exudate. Eyes:      General:         Right eye: No discharge. Left eye: No discharge. Conjunctiva/sclera: Conjunctivae normal.      Pupils: Pupils are equal, round, and reactive to light. Neck:      Vascular: No JVD. Trachea: No tracheal deviation. Cardiovascular:      Rate and Rhythm: Tachycardia present. Rhythm irregular. Heart sounds: Normal heart sounds. No murmur heard. No friction rub. No gallop. Pulmonary:      Effort: Pulmonary effort is normal. No respiratory distress. Breath sounds: Normal breath sounds. No stridor. No wheezing or rales. Chest:      Chest wall: No tenderness.    Abdominal:      General: Bowel sounds are normal. There is no distension. Palpations: Abdomen is soft. There is no mass. Tenderness: There is no abdominal tenderness. There is no guarding or rebound. Musculoskeletal:         General: No tenderness. Normal range of motion. Cervical back: Normal range of motion. Lymphadenopathy:      Cervical: No cervical adenopathy. Skin:     Findings: No rash. Neurological:      Mental Status: She is alert and oriented to person, place, and time. Cranial Nerves: No cranial nerve deficit. Motor: No abnormal muscle tone. Coordination: Coordination normal.      Deep Tendon Reflexes: Reflexes normal.   Psychiatric:         Behavior: Behavior normal.         Thought Content:  Thought content normal.         Judgment: Judgment normal.          Procedures     MDM   Results for orders placed or performed during the hospital encounter of 12/04/21   CBC auto differential   Result Value Ref Range    WBC 7.4 4.0 - 11.0 K/uL    RBC 5.50 (H) 4.00 - 5.20 M/uL    Hemoglobin 13.8 12.0 - 16.0 g/dL    Hematocrit 42.7 36.0 - 48.0 %    MCV 77.7 (L) 80.0 - 100.0 fL    MCH 25.1 (L) 26.0 - 34.0 pg    MCHC 32.3 31.0 - 36.0 g/dL    RDW 18.0 (H) 12.4 - 15.4 %    Platelets 715 718 - 004 K/uL    MPV 9.1 5.0 - 10.5 fL    Neutrophils % 62.6 %    Lymphocytes % 23.2 %    Monocytes % 9.3 %    Eosinophils % 4.1 %    Basophils % 0.8 %    Neutrophils Absolute 4.6 1.7 - 7.7 K/uL    Lymphocytes Absolute 1.7 1.0 - 5.1 K/uL    Monocytes Absolute 0.7 0.0 - 1.3 K/uL    Eosinophils Absolute 0.3 0.0 - 0.6 K/uL    Basophils Absolute 0.1 0.0 - 0.2 K/uL   Comprehensive metabolic panel   Result Value Ref Range    Sodium 139 136 - 145 mmol/L    Potassium 4.3 3.5 - 5.1 mmol/L    Chloride 102 99 - 110 mmol/L    CO2 24 21 - 32 mmol/L    Anion Gap 13 3 - 16    Glucose 157 (H) 70 - 99 mg/dL    BUN 16 7 - 20 mg/dL    CREATININE 0.7 0.6 - 1.2 mg/dL    GFR Non-African American >60 >60    GFR African American >60 >60    Calcium 9.2 8.3 - 10.6 mg/dL Total Protein 8.4 (H) 6.4 - 8.2 g/dL    Albumin 3.8 3.4 - 5.0 g/dL    Albumin/Globulin Ratio 0.8 (L) 1.1 - 2.2    Total Bilirubin 0.4 0.0 - 1.0 mg/dL    Alkaline Phosphatase 102 40 - 129 U/L    ALT 14 10 - 40 U/L    AST 19 15 - 37 U/L   Troponin   Result Value Ref Range    Troponin <0.01 <0.01 ng/mL   Brain Natriuretic Peptide   Result Value Ref Range    Pro- (H) 0 - 449 pg/mL   EKG 12 Lead   Result Value Ref Range    Ventricular Rate 127 BPM    Atrial Rate 122 BPM    QRS Duration 90 ms    Q-T Interval 312 ms    QTc Calculation (Bazett) 453 ms    R Axis 0 degrees    T Axis 76 degrees    Diagnosis       Atrial fibrillation with rapid ventricular responseNonspecific ST abnormality , probably digitalis effectAbnormal ECGWhen compared with ECG of 10-SEP-2021 13:45,Atrial fibrillation has replaced Sinus rhythmVent. rate has increased BY  62 BPM   EKG 12 Lead   Result Value Ref Range    Ventricular Rate 61 BPM    Atrial Rate 61 BPM    P-R Interval 192 ms    QRS Duration 90 ms    Q-T Interval 426 ms    QTc Calculation (Bazett) 428 ms    P Axis 70 degrees    R Axis -2 degrees    T Axis 56 degrees    Diagnosis       Normal sinus rhythmNormal ECGWhen compared with ECG of 04-DEC-2021 07:30,Sinus rhythm has replaced Atrial fibrillationVent. rate has decreased BY  66 BPM       I spoke with Dr. Calvin Thompson, hospitalist. We thoroughly discussed the history, physical exam, laboratory and imaging studies, as well as, emergency department course. Based upon that discussion, we've decided to admit Bhavesh Tiwari for further observation and evaluation of Bhavesh Tiwari's atrial fibrillation with rapid ventricular response. As I have deemed necessary from their history, physical, and studies, I have considered and evaluated Bhavesh Tiwari for the following diagnoses:        FINAL IMPRESSION  1. Palpitations    2.  Atrial fibrillation with RVR (HCC)    3. Elevated brain natriuretic peptide (BNP) level        Vitals: Blood pressure (!) 179/70, pulse 60, temperature 98.6 °F (37 °C), temperature source Oral, resp. rate 8, height 5' 2\" (1.575 m), SpO2 98 %. Radiology  XR CHEST (2 VW)    Result Date: 12/4/2021  No acute cardiopulmonary disease. EKG Interpretation. EKG #1  The Ekg interpreted by me in the absence of a cardiologist shows. atrial fibrillation with a rate of 127  Axis is   Normal  QTc is  453 ms  Intervals and Durations are unremarkable. No specific ST-T wave changes appreciated. No evidence of acute ischemia. This EKG was compared to EKG dated 11/16/2021 which was NSR    EKG #2  The Ekg interpreted by me in the absence of a cardiologist shows. normal sinus rhythm with a rate of 61  Axis is   Normal  QTc is  within an acceptable range  Intervals and Durations are unremarkable. No specific ST-T wave changes appreciated. No evidence of acute ischemia. No significant change from prior EKG dated 11/16/2021        7:40 am I spoke with the hospital pharmacist regarding the use of Cardizem injection for rapid a fib in a patient taking Tikosyn. He reports it is okay to use but continuous cardiac monitoring and observation for prolonged QTc interval as well as ventricular arrhythmia should be done. 7:45 am Patient spontaneously converted to NSR with heart rate 62, without intervention. 9:30 am Consultation with Dr. Neelima Cortez, cardiology on call for Dr. Maryjane Bethea. He recommends admission for monitoring and he medication adjustment.           Bernarda Cheung MD  12/04/21 [id number]

## 2021-12-04 NOTE — ED NOTES
[id number] - called [facility] per consult  Re: Marie John called back to speak with Dr Marielos Morris  12/04/21 [id number]

## 2021-12-04 NOTE — ED NOTES
IV not drawing, flushes good.   Pt straight sticked for labs     Nadeem Bering, [place]  12/04/21 [id number]

## 2021-12-04 NOTE — H&P
Hospital Medicine History & Physical      PCP: Moss Fabry, MD    Date of Admission: 12/4/2021    Date of Service: Pt seen/examined on 12/4/2021 and Admitted to Inpatient with expected LOS greater than two midnights due to medical therapy. Chief Complaint:  Afib, palpitations     History Of Present Illness:      81 y.o. female with PMH of AFIB, DM2, HLD, HTN who presented to [facility] with complaints of palpitations that started this morning around 9 am. Pt has history of afib and knew she was in afib this morning. Her  drove her to the ED. She was not given any meds in the ED because pt spontaneously converted to NSR. Cardiology was consulted in the ED and asked pt to be admitted per hospitalist. Pt denies dyspnea, chest pain, fever/chills, cough/sputum production, N/V/D. She follows with Cardiology/EP as an outpt. She has been compliant with her meds. She was admitted for further evaluation and management.      Past Medical History:          Diagnosis Date    Atrial fibrillation ([address]     Diabetes mellitus ([address]     Hydronephrosis 2/14/2014    Hyperlipidemia     Hypertension     Intussusception intestine ([address]     SVT (supraventricular tachycardia) ([address] 8/26/2013    AVNRT       Past Surgical History:          Procedure Laterality Date    ABLATION OF DYSRHYTHMIC FOCUS      -2014    APPENDECTOMY      DILATATION, ESOPHAGUS      FRACTURE SURGERY      L ankle    HYSTERECTOMY      SKIN BIOPSY      SMALL INTESTINE SURGERY      TONSILLECTOMY      UPPER GASTROINTESTINAL ENDOSCOPY N/A 4/21/2021    EGD POLYP SNARE performed by Jennifer Reid DO at [address]  4/21/2021    EGD CONTROL HEMORRHAGE performed by Jennifer Reid DO at [address]  4/21/2021    EGD BIOPSY performed by Jennifer Reid DO at [address] [place] [address]       Medications Prior to Admission:      Prior to Admission medications    Medication Sig Start Date End Date Taking? Authorizing Provider   dicyclomine (BENTYL) 20 MG tablet Take 1 tablet by mouth every 6 hours as needed (abdominal pain/cramping) 9/10/21  Yes HERMINIO Logan   apixaban (ELIQUIS) 5 MG TABS tablet Take 1 tablet by mouth 2 times daily 6/10/21  Yes GUANAKO Ambrosio - CNP   valsartan (DIOVAN) 320 MG tablet Take 1 tablet by mouth daily 21  Yes GUANAKO Ambrosio - CNP   metoprolol succinate (TOPROL XL) 100 MG extended release tablet Take 50 mg by mouth daily Prescribed as 100mg bid, pt. Takes only 50mg if HR > 60. Yes Historical Provider, MD   pantoprazole (PROTONIX) 20 MG tablet Take 2 tablets by mouth 2 times daily  Patient taking differently: Take 20 mg by mouth daily  21  Yes Delroy Hardy MD   pravastatin (PRAVACHOL) 80 MG tablet Take 40 mg by mouth daily   Yes Historical Provider, MD   pioglitazone (ACTOS) 15 MG tablet Take 15 mg by mouth daily   Yes Historical Provider, MD   dofetilide (TIKOSYN) 125 MCG capsule Take 1 capsule by mouth every 12 hours 21  Yes Orbie Apgar., MD   furosemide (LASIX) 20 MG tablet Take 1 tablet by mouth daily 21  Yes Sandra Shrestha MD   glyBURIDE (DIABETA) 1.25 MG tablet Take 2.5 mg by mouth 2 times daily (with meals)    Yes Historical Provider, MD       Allergies:  Hydrocodone, Metformin and related, Nickel, Omeprazole, Prednisone, and Hydralazine    Social History:      The patient currently lives at home. TOBACCO:   reports that she has never smoked. She has never used smokeless tobacco.  ETOH:   reports no history of alcohol use. E-Cigarettes/Vaping Use     Questions Responses    E-Cigarette/Vaping Use Never User    Start Date     Passive Exposure     Quit Date     Counseling Given     Comments Unknown            Family History:      Reviewed in detail.  Positive as follows:        Problem Relation Age of Onset    Diabetes Mother     Heart Disease Father     High Blood Pressure Father     High Cholesterol Father     Cancer Sister     Heart Disease Brother     High Blood Pressure Brother     High Cholesterol Brother        REVIEW OF SYSTEMS COMPLETED:   Pertinent positives as noted in the HPI. All other systems reviewed and negative. PHYSICAL EXAM PERFORMED:    BP (!) 194/74   Pulse 60   Temp 98.6 °F (37 °C) (Oral)   Resp 14   Ht 5' 2\" (1.575 m)   Wt 187 lb 6.4 oz (85 kg)   SpO2 99%   BMI 34.28 kg/m²     General appearance:  No apparent distress, appears stated age and cooperative. HEENT:  Normal cephalic, atraumatic without obvious deformity. Pupils equal, round, and reactive to light. Extra ocular muscles intact. Conjunctivae/corneas clear. Neck: Supple, with full range of motion. No jugular venous distention. Trachea midline. Respiratory:  Normal respiratory effort. Clear to auscultation, bilaterally without Rales/Wheezes/Rhonchi. Cardiovascular:  Regular rate and rhythm with normal S1/S2 without murmurs, rubs or gallops. Abdomen: Soft, non-tender, non-distended with normal bowel sounds. Musculoskeletal:  No clubbing, cyanosis or edema bilaterally. Full range of motion without deformity. Skin: Skin color, texture, turgor normal.  No rashes or lesions. Neurologic:  Neurovascularly intact without any focal sensory/motor deficits.  Cranial nerves: II-XII intact, grossly non-focal.  Psychiatric:  Alert and oriented, thought content appropriate, normal insight  Capillary Refill: Brisk,3 seconds, normal  Peripheral Pulses: +2 palpable, equal bilaterally       Labs:     Recent Labs     12/04/21  0840   WBC 7.4   HGB 13.8   HCT 42.7        Recent Labs     12/04/21  0840      K 4.3      CO2 24   BUN 16   CREATININE 0.7   CALCIUM 9.2     Recent Labs     12/04/21  0840   AST 19   ALT 14   BILITOT 0.4   ALKPHOS 102     Recent Labs     12/04/21  0840   TROPONINI <0.01       Urinalysis:      Lab Results   Component Value Date    NITRU Negative 04/19/2021    WBCUA 0-2 04/19/2021    BACTERIA 1+ 12/18/2020    RBCUA 0-2 04/19/2021    BLOODU TRACE-INTACT 04/19/2021    SPECGRAV 1.015 04/19/2021    GLUCOSEU Negative 04/19/2021       Radiology:     XR CHEST (2 VW)   Final Result   No acute cardiopulmonary disease. ASSESSMENT/PLAN:    Active Hospital Problems    Diagnosis Date Noted   Feliciano Paul [facility]) [I48.91] 12/04/2021       Paroxysmal atrial fibrillation with RVR POA:  - Spontaneously converted in the ED without any intervention:  - Resume her home meds including eliquis, tikosyn, metoprolol.   - Cardiology consulted from the ED.   - Monitor pt on telemetry. Hypertension:  - Uncontrolled. Resume her home meds with the addition of amlodipine. - Monitor BP closely. Hyperlipidemia:  - Continue statin. Diabetes mellitus type 2:  - Unclear control, check A1C.   - Hold home regimen, SSI ACHS meanwhile. - Carb-control diet. Obesity:  - With Body mass index is 34.28 kg/m². Complicating assessment and treatment. Placing patient at risk for multiple co-morbidities as well as early death and contributing to the patient's presentation. Counseled on weight loss. DVT Prophylaxis: on eliquis   Diet: ADULT DIET; Regular; 5 carb choices (75 gm/meal)  Code Status: Prior    PT/OT Eval Status: not indicated     Dispo - > 2days        GUANAKO Beebe - CNP    Thank you Dar Baum MD for the opportunity to be involved in this patient's care. If you have any questions or concerns please feel free to contact me at [phone].

## 2021-12-04 NOTE — CONSULTS
[address] [facility]  [phone]      Chief Complaint   Patient presents with    Atrial Fibrillation     pt woke this morning with the feeling of rapid heart rate. denies chest pain,  pt feels like she has \"pressure in her throat, it happens when I have the a-fib\" pt is alert and oriented. + sob. History of Present Illness:  Juan Antonio Hedrick is a 81 y.o. patient who presented to the hospital with complaints of palpitations and discomfort in her throat. I have been asked to provide consultation regarding further management and testing by Dr Xi Choudhury. The patient reports that 5 years ago she could walk without much difficulty. Now she can only walk about 25 feet before she develops chest tightness. This has been occurring over the last 2 years. She states that she stops for about 10 minutes and rests, which improves her symptoms. It is associated with back discomfort and shortness of breath. No associated diaphoresis. She reports that she reported to the hospital when she developed atrial fibrillation with associated discomfort in her neck. She converted to sinus rhythm. She is currently pain free. History of afib ablation, no history of cath. Past Medical History:   has a past medical history of Atrial fibrillation ([address] Diabetes mellitus ([address] Hydronephrosis, Hyperlipidemia, Hypertension, Intussusception intestine ([address] and SVT (supraventricular tachycardia) ([address] Surgical History:   has a past surgical history that includes Hysterectomy; Appendectomy; Tonsillectomy; skin biopsy; ablation of dysrhythmic focus; Dilatation, esophagus; Small intestine surgery; fracture surgery; Upper gastrointestinal endoscopy (N/A, 4/21/2021); Upper gastrointestinal endoscopy (4/21/2021); and Upper gastrointestinal endoscopy (4/21/2021). Social History:   reports that she has never smoked.  She has never used smokeless tobacco. She reports that she does not drink alcohol and does not use drugs. Family History:  Multiple members of her family  of premature CAD    Home Medications:  Were reviewed and are listed in nursing record. and/or listed below  Prior to Admission medications    Medication Sig Start Date End Date Taking? Authorizing Provider   dicyclomine (BENTYL) 20 MG tablet Take 1 tablet by mouth every 6 hours as needed (abdominal pain/cramping) 9/10/21  Yes HERMINIO Somers   apixaban (ELIQUIS) 5 MG TABS tablet Take 1 tablet by mouth 2 times daily 6/10/21  Yes Lionel Collet, APRN - CNP   valsartan (DIOVAN) 320 MG tablet Take 1 tablet by mouth daily 21  Yes Nato Collet, APRN - CNP   metoprolol succinate (TOPROL XL) 100 MG extended release tablet Take 50 mg by mouth daily Prescribed as 100mg bid, pt. Takes only 50mg if HR > 60.    Yes Historical Provider, MD   pantoprazole (PROTONIX) 20 MG tablet Take 2 tablets by mouth 2 times daily  Patient taking differently: Take 20 mg by mouth daily  21  Yes Michaela Nava MD   pravastatin (PRAVACHOL) 80 MG tablet Take 40 mg by mouth daily   Yes Historical Provider, MD   pioglitazone (ACTOS) 15 MG tablet Take 15 mg by mouth daily   Yes Historical Provider, MD   dofetilide (TIKOSYN) 125 MCG capsule Take 1 capsule by mouth every 12 hours 21  Yes Marty Montero MD   furosemide (LASIX) 20 MG tablet Take 1 tablet by mouth daily 21  Yes Tricia Soria MD   glyBURIDE (DIABETA) 1.25 MG tablet Take 2.5 mg by mouth 2 times daily (with meals)    Yes Historical Provider, MD        Current Medications:  Current Facility-Administered Medications   Medication Dose Route Frequency Provider Last Rate Last Admin    apixaban (ELIQUIS) tablet 5 mg  5 mg Oral BID GUANAKO Gentile CNP        dicyclomine (BENTYL) tablet 20 mg  20 mg Oral Q6H PRN GUANAKO Gentile CNP        dofetilide (TIKOSYN) capsule 125 mcg  125 mcg Oral 2 times per day GUANAKO Pickett CNP        furosemide (LASIX) tablet 20 mg 20 mg Oral Daily [facility], APRN - CNP        [START ON 12/5/2021] metoprolol succinate (TOPROL XL) extended release tablet 50 mg  50 mg Oral Daily [facility], APRN - [facility]        pantoprazole (PROTONIX) tablet 20 mg  20 mg Oral Daily Odalys Mardelle [facility], APRN - CNP        pravastatin (PRAVACHOL) tablet 40 mg  40 mg Oral Daily [facility], APRN - CNP        [START ON 12/5/2021] valsartan (DIOVAN) tablet 320 mg  320 mg Oral Daily Odalys Mardelle [place], APRN - CNP        sodium chloride flush 0.9 % injection 5-40 mL  5-40 mL IntraVENous 2 times per day [facility], APRN - [facility]        sodium chloride flush 0.9 % injection 5-40 mL  5-40 mL IntraVENous  [facility], APRN - [facility]        0.9 % sodium chloride infusion  25 mL IntraVENous PRN Odalys Mardelle [place], APRN - CNP        ondansetron (ZOFRAN-ODT) disintegrating tablet 4 mg  4 mg Oral Q8H PRN Odalys Mardelle [place], APRN - CNP        Or    [facility] injection 4 mg  4 mg IntraVENous Q6H PRN Odalys Mardelle [place], APRN - CNP        polyethylene glycol (GLYCOLAX) packet 17 g  17 g Oral Daily PRN Odalys Mardelle [place], APRN - CNP        acetaminophen (TYLENOL) tablet 650 mg  650 mg Oral Q6H PRN Odalys Mardelle [place], APRN - CNP        Or    acetaminophen (TYLENOL) suppository 650 mg  650 mg Rectal Q6H PRN Odalys Mardelle [place], APRN - CNP        amLODIPine (NORVASC) tablet 5 mg  5 mg Oral Daily Natalee Walden MD   5 mg at 12/04/21 1254    glucose (GLUTOSE) 40 % oral gel 15 g  15 g Oral PRN Odalys Mardelle [place], APRN - CNP        dextrose 50 % IV solution  12.5 g IntraVENous  [facility], APRN - CNP        glucagon (rDNA) injection 1 mg  1 mg IntraMUSCular PRN Odalys Mardelle [place], APRN - CNP        dextrose 5 % solution  100 mL/hr IntraVENous PRN Odalys Mardelle [place], APRN - CNP        insulin lispro (HUMALOG) injection vial 0-12 Units  0-12 Units SubCUTAneous TID ADRIENNE Sauceda, APRN - CNP        insulin lispro (HUMALOG) injection vial 0-6 Units  0-6 Units SubCUTAneous Nightly Odalys Nehayden Sacks, GUANAKO - CNP         Current Outpatient Medications   Medication Sig Dispense Refill    dicyclomine (BENTYL) 20 MG tablet Take 1 tablet by mouth every 6 hours as needed (abdominal pain/cramping) 20 tablet 0    apixaban (ELIQUIS) 5 MG TABS tablet Take 1 tablet by mouth 2 times daily 180 tablet 3    valsartan (DIOVAN) 320 MG tablet Take 1 tablet by mouth daily 90 tablet 1    metoprolol succinate (TOPROL XL) 100 MG extended release tablet Take 50 mg by mouth daily Prescribed as 100mg bid, pt. Takes only 50mg if HR > 60.  pantoprazole (PROTONIX) 20 MG tablet Take 2 tablets by mouth 2 times daily (Patient taking differently: Take 20 mg by mouth daily ) 60 tablet 0    pravastatin (PRAVACHOL) 80 MG tablet Take 40 mg by mouth daily      pioglitazone (ACTOS) 15 MG tablet Take 15 mg by mouth daily      dofetilide (TIKOSYN) 125 MCG capsule Take 1 capsule by mouth every 12 hours 180 capsule 3    furosemide (LASIX) 20 MG tablet Take 1 tablet by mouth daily 30 tablet 11    glyBURIDE (DIABETA) 1.25 MG tablet Take 2.5 mg by mouth 2 times daily (with meals)           Allergies:  Hydrocodone, Metformin and related, Nickel, Omeprazole, Prednisone, and Hydralazine     Review of Systems:     · Constitutional: there has been no unanticipated weight loss. There's been no change in energy level, sleep pattern, or activity level. · Eyes: No visual changes or diplopia. No scleral icterus. · ENT: No Headaches, hearing loss or vertigo. No mouth sores or sore throat. · Cardiovascular: Reviewed in HPI. + chest pain   · Respiratory: No cough or wheezing, no sputum production. +shorntess of braeth  · Gastrointestinal: No abdominal pain, appetite loss, blood in stools. No change in bowel or bladder habits. · Genitourinary: No dysuria, trouble voiding, or hematuria.   · Musculoskeletal:  No gait disturbance, weakness or joint complaints. · Integumentary: No rash or pruritis. · Neurological: No headache, diplopia, change in muscle strength, numbness or tingling. No change in gait, balance, coordination, mood, affect, memory, mentation, behavior. · Psychiatric: No anxiety, no depression. · Endocrine: No malaise, fatigue or temperature intolerance. No excessive thirst, fluid intake, or urination. No tremor. · Hematologic/Lymphatic: No abnormal bruising or bleeding, blood clots or swollen lymph nodes. · Allergic/Immunologic: No nasal congestion or hives.   ·     Physical Examination:    Vitals:    12/04/21 1254   BP: (!) 187/68   Pulse: 61   Resp: 14   Temp:    SpO2: 97%    Weight: 187 lb 6.4 oz (85 kg)         General Appearance:  Alert, cooperative, no distress, appears stated age   Head:  Normocephalic, without obvious abnormality, atraumatic   Eyes:  PERRL, conjunctiva/corneas clear       Nose: Nares normal, no drainage or sinus tenderness   Throat: Lips, mucosa, and tongue normal   Neck: Supple, symmetrical, trachea midline, no adenopathy, thyroid: not enlarged, symmetric, no tenderness/mass/nodules, no carotid bruit or JVD       Lungs:   Clear to auscultation bilaterally, respirations unlabored   Chest Wall:  No tenderness or deformity   Heart:  Regular rate and rhythm, S1, S2 normal, no murmur, rub or gallop   Abdomen:   Soft, non-tender, bowel sounds active all four quadrants,  no masses, no organomegaly           Extremities: Extremities normal, atraumatic, no cyanosis or edema   Pulses: 2+ and symmetric   Skin: Skin color, texture, turgor normal, no rashes or lesions   Pysch: Normal mood and affect   Neurologic: Normal gross motor and sensory exam.         Labs  CBC:   Lab Results   Component Value Date    WBC 7.4 12/04/2021    RBC 5.50 12/04/2021    HGB 13.8 12/04/2021    HCT 42.7 12/04/2021    MCV 77.7 12/04/2021    RDW 18.0 12/04/2021     12/04/2021     CMP:    Lab Results   Component Value Date  12/04/2021    K 4.3 12/04/2021    K 4.6 09/10/2021     12/04/2021    CO2 24 12/04/2021    BUN 16 12/04/2021    CREATININE 0.7 12/04/2021    GFRAA >60 12/04/2021    AGRATIO 0.8 12/04/2021    LABGLOM >60 12/04/2021    GLUCOSE 157 12/04/2021    PROT 8.4 12/04/2021    CALCIUM 9.2 12/04/2021    BILITOT 0.4 12/04/2021    ALKPHOS 102 12/04/2021    AST 19 12/04/2021    ALT 14 12/04/2021     PT/INR:  No results found for: PTINR  Lab Results   Component Value Date    TROPONINI <0.01 12/04/2021       EKG:  I have reviewed EKG with the following interpretation:  Impression:  Sinus rhythm     Echo:  Normal left ventricle systolic function with an estimated ejection fraction   of 55-60%. Definity contrast administered with no evidence of left   ventricular mass or thrombus noted. No regional wall motion abnormalities are seen. Normal left ventricular   diastolic filling pressure. The left atrium is mildly dilated. Mild mitral and tricuspid regurgitation. Systolic pulmonary artery pressure (SPAP) estimated at 42 mmHg (right atrial   pressure 3 mmHg), consistent with mild pulmonary hypertension.        Stress: none  Cath: none  MRI/EP/Other: none    Old notes reviewed  Telemetry reviewd  Ekg personally reviewed  Chest xray personally reviewed  Echo, cath, and   Medications and labs reviewed  high complexity/medical decision making due to extensive data review, extensive history review, independent review of data, potential for decompensation   high risk due to acute illness, evaluation of drug-drug interactions, medication management and diagnostic interventions    Telemetry reviewed    Assessment  Patient Active Problem List   Diagnosis    PAF (paroxysmal atrial fibrillation) (HCC)    [facility]    DM2 (diabetes mellitus, type 2) (HCC)    Dyspnea on exertion    Chronic anticoagulation    Angina pectoris ([facility]    Pelvic hematoma in female    Anemia due to blood loss    Hydronephrosis    Ileus (HCC)    Abdominal pain    Enteritis    Partial intestinal obstruction (HCC)    Essential hypertension    Pulmonary hypertension (HCC)    Chronic diastolic heart failure (HCC)    Hyperlipidemia    Hyperkalemia    Partial small bowel obstruction (HCC)    Small bowel obstruction (HCC)    Obesity    Precordial pain    PVC (premature ventricular contraction)    A-fib (HCC)         Plan:    I had the opportunity to review the clinical symptoms and presentation of Clive Tiwari. Assessment/Plan:  1. Paroxsymal atrial fibrillation  - new problem  - [id number]  - history of ablation in 2014  Plan:  - continue apixiban 5 mg bid  - continue dofetilide 125 mcg q12h  - continue metoprolol s 50 mg  - trend troponin     2. Pulmonary Hypertension  - likely group II  - stable  Plan:  - furosemide 40 mg iv  - echo    3. Atypical chest pain  - suspicious for coronary artery disease  - new problem   Plan:  - nuclear stress test on Monday, low threshold for angiogram if abnormal  - continue statin and betablocker, no aspirin given doac    4 Mixed Hyperlipidemia    5. -longstanding Diabetes  A1c 10.4 to 7.1     6. Anemia  - new problem  Plan:  - iron studies        All questions and concerns were addressed to the patient/family. Alternatives to my treatment were discussed. The note was completed using EMR. Every effort was made to ensure accuracy; however, inadvertent computerized transcription errors may be present.   Ge Carmen DO, 12/4/2021 1:53 PM

## 2021-12-05 LAB
A/G RATIO: 0.9 (ref 1.1–2.2)
ALBUMIN SERPL-MCNC: 3.8 G/DL (ref 3.4–5)
ALP BLD-CCNC: 99 U/L (ref 40–129)
ALT SERPL-CCNC: 14 U/L (ref 10–40)
ANION GAP SERPL CALCULATED.3IONS-SCNC: 12 MMOL/L (ref 3–16)
AST SERPL-CCNC: 19 U/L (ref 15–37)
BILIRUB SERPL-MCNC: 0.5 MG/DL (ref 0–1)
BUN BLDV-MCNC: 19 MG/DL (ref 7–20)
CALCIUM SERPL-MCNC: 9 MG/DL (ref 8.3–10.6)
CHLORIDE BLD-SCNC: 101 MMOL/L (ref 99–110)
CO2: 24 MMOL/L (ref 21–32)
CREAT SERPL-MCNC: 0.8 MG/DL (ref 0.6–1.2)
ESTIMATED AVERAGE GLUCOSE: 142.7 MG/DL
FERRITIN: 78.9 NG/ML (ref 15–150)
GFR AFRICAN AMERICAN: >60
GFR NON-AFRICAN AMERICAN: >60
GLUCOSE BLD-MCNC: 113 MG/DL (ref 70–99)
GLUCOSE BLD-MCNC: 150 MG/DL (ref 70–99)
GLUCOSE BLD-MCNC: 160 MG/DL (ref 70–99)
GLUCOSE BLD-MCNC: 160 MG/DL (ref 70–99)
GLUCOSE BLD-MCNC: 169 MG/DL (ref 70–99)
HBA1C MFR BLD: 6.6 %
HCT VFR BLD CALC: 43.3 % (ref 36–48)
HEMOGLOBIN: 13.8 G/DL (ref 12–16)
IRON SATURATION: 21 % (ref 15–50)
IRON: 75 UG/DL (ref 37–145)
MAGNESIUM: 1.9 MG/DL (ref 1.8–2.4)
MCH RBC QN AUTO: 25 PG (ref 26–34)
MCHC RBC AUTO-ENTMCNC: 31.9 G/DL (ref 31–36)
MCV RBC AUTO: 78.4 FL (ref 80–100)
PDW BLD-RTO: 18.1 % (ref 12.4–15.4)
PERFORMED ON: ABNORMAL
PLATELET # BLD: 334 K/UL (ref 135–450)
PMV BLD AUTO: 8.9 FL (ref 5–10.5)
POTASSIUM REFLEX MAGNESIUM: 4.1 MMOL/L (ref 3.5–5.1)
RBC # BLD: 5.53 M/UL (ref 4–5.2)
SODIUM BLD-SCNC: 137 MMOL/L (ref 136–145)
T4 FREE: 1.4 NG/DL (ref 0.9–1.8)
TOTAL IRON BINDING CAPACITY: 352 UG/DL (ref 260–445)
TOTAL PROTEIN: 8.2 G/DL (ref 6.4–8.2)
TSH REFLEX FT4: 4.26 UIU/ML (ref 0.27–4.2)
WBC # BLD: 8.2 K/UL (ref 4–11)

## 2021-12-05 PROCEDURE — 1200000000 HC SEMI PRIVATE

## 2021-12-05 PROCEDURE — 6370000000 HC RX 637 (ALT 250 FOR IP): Performed by: INTERNAL MEDICINE

## 2021-12-05 PROCEDURE — 83540 ASSAY OF IRON: CPT

## 2021-12-05 PROCEDURE — 36415 COLL VENOUS BLD VENIPUNCTURE: CPT

## 2021-12-05 PROCEDURE — 6360000002 HC RX W HCPCS: Performed by: INTERNAL MEDICINE

## 2021-12-05 PROCEDURE — 83550 IRON BINDING TEST: CPT

## 2021-12-05 PROCEDURE — G0378 HOSPITAL OBSERVATION PER HR: HCPCS

## 2021-12-05 PROCEDURE — 82728 ASSAY OF FERRITIN: CPT

## 2021-12-05 PROCEDURE — 2580000003 HC RX 258: Performed by: REGISTERED NURSE

## 2021-12-05 PROCEDURE — 80053 COMPREHEN METABOLIC PANEL: CPT

## 2021-12-05 PROCEDURE — 84439 ASSAY OF FREE THYROXINE: CPT

## 2021-12-05 PROCEDURE — 83735 ASSAY OF MAGNESIUM: CPT

## 2021-12-05 PROCEDURE — 85027 COMPLETE CBC AUTOMATED: CPT

## 2021-12-05 PROCEDURE — 84443 ASSAY THYROID STIM HORMONE: CPT

## 2021-12-05 PROCEDURE — 6370000000 HC RX 637 (ALT 250 FOR IP): Performed by: REGISTERED NURSE

## 2021-12-05 PROCEDURE — 99233 SBSQ HOSP IP/OBS HIGH 50: CPT | Performed by: NURSE PRACTITIONER

## 2021-12-05 PROCEDURE — 96372 THER/PROPH/DIAG INJ SC/IM: CPT

## 2021-12-05 RX ADMIN — APIXABAN 5 MG: 5 TABLET, FILM COATED ORAL at 21:10

## 2021-12-05 RX ADMIN — ATORVASTATIN CALCIUM 40 MG: 40 TABLET, FILM COATED ORAL at 21:10

## 2021-12-05 RX ADMIN — DOFETILIDE 125 MCG: 0.12 CAPSULE ORAL at 07:46

## 2021-12-05 RX ADMIN — FUROSEMIDE 40 MG: 10 INJECTION, SOLUTION INTRAMUSCULAR; INTRAVENOUS at 05:06

## 2021-12-05 RX ADMIN — AMLODIPINE BESYLATE 5 MG: 5 TABLET ORAL at 07:46

## 2021-12-05 RX ADMIN — VALSARTAN 320 MG: 80 TABLET, FILM COATED ORAL at 07:46

## 2021-12-05 RX ADMIN — Medication 10 ML: at 07:45

## 2021-12-05 RX ADMIN — APIXABAN 5 MG: 5 TABLET, FILM COATED ORAL at 07:46

## 2021-12-05 RX ADMIN — INSULIN LISPRO 2 UNITS: 100 INJECTION, SOLUTION INTRAVENOUS; SUBCUTANEOUS at 08:46

## 2021-12-05 RX ADMIN — Medication 10 ML: at 21:11

## 2021-12-05 RX ADMIN — METOPROLOL SUCCINATE 50 MG: 50 TABLET, EXTENDED RELEASE ORAL at 07:46

## 2021-12-05 RX ADMIN — PANTOPRAZOLE SODIUM 20 MG: 20 TABLET, DELAYED RELEASE ORAL at 07:46

## 2021-12-05 RX ADMIN — INSULIN LISPRO 2 UNITS: 100 INJECTION, SOLUTION INTRAVENOUS; SUBCUTANEOUS at 13:31

## 2021-12-05 RX ADMIN — DOFETILIDE 125 MCG: 0.12 CAPSULE ORAL at 21:11

## 2021-12-05 ASSESSMENT — PAIN SCALES - GENERAL
PAINLEVEL_OUTOF10: 0

## 2021-12-05 NOTE — PROGRESS NOTES
4 Eyes Skin Assessment     NAME:  Silvino Tiwari  YOB: 1935  MEDICAL RECORD NUMBER:  [id number]    The patient is being assess for  Admission    I agree that 2 RN's have performed a thorough Head to Toe Skin Assessment on the patient. ALL assessment sites listed below have been assessed. Areas assessed by both nurses:    Head, Face, Ears, Shoulders, Back, Chest, Arms, Elbows, Hands, Sacrum. Buttock, Coccyx, Ischium and Legs. Feet and Heels        Does the Patient have a Wound?  No noted wound(s)       Ino Prevention initiated:  No   Wound Care Orders initiated:  No    Pressure Injury (Stage 3,4, Unstageable, DTI, NWPT, and Complex wounds) if present place consult order under [de-identified] No    New and Established Ostomies if present place consult order under : No      Nurse 1 eSignature: Electronically signed by Nigel Henderson RN on 12/5/21 at 6:42 AM EST      Nurse 2 eSignature: {Esignature:[id number]}

## 2021-12-05 NOTE — PROGRESS NOTES
Hospitalist Progress Note      PCP: Nakul Bautista MD    Date of Admission: 12/4/2021    Chief Complaint: Afib, palpitations     Hospital Course:   81 y.o. female with PMH of AFIB, DM2, HLD, HTN who presented to [facility] with complaints of palpitations that started this morning around 9 am. Pt has history of afib and knew she was in afib this morning. Her  drove her to the ED. She was not given any meds in the ED because pt spontaneously converted to NSR. Cardiology was consulted in the ED and asked pt to be admitted per hospitalist. Had chest pain during this episode but currently denies any pain. Pt denies dyspnea fever/chills, cough/sputum production, N/V/D. She follows with Cardiology/EP as an outpt. She has been compliant with her meds. She was admitted for further evaluation and management. Subjective:   Pt is on RA. Afebrile. VSS. No acute issues overnight. No complaints currently.      Medications:  Reviewed    Infusion Medications    sodium chloride      dextrose       Scheduled Medications    apixaban  5 mg Oral BID    dofetilide  125 mcg Oral 2 times per day    pantoprazole  20 mg Oral Daily    sodium chloride flush  5-40 mL IntraVENous 2 times per day    amLODIPine  5 mg Oral Daily    insulin lispro  0-12 Units SubCUTAneous TID WC    insulin lispro  0-6 Units SubCUTAneous Nightly    metoprolol succinate  50 mg Oral Daily    valsartan  320 mg Oral Daily    atorvastatin  40 mg Oral Nightly     PRN Meds: dicyclomine, sodium chloride flush, sodium chloride, ondansetron **OR** ondansetron, polyethylene glycol, acetaminophen **OR** acetaminophen, glucose, dextrose, glucagon (rDNA), dextrose, cloNIDine, perflutren lipid microspheres      Intake/Output Summary (Last 24 hours) at 12/5/2021 1212  Last data filed at 12/5/2021 0920  Gross per 24 hour   Intake 200 ml   Output 2200 ml   Net -2000 ml       Physical Exam Performed:    /66   Pulse 65   Temp 98.1 °F (36.7 °C) (Oral)   Resp 16   Ht 5' 2\" (1.575 m)   Wt 185 lb 3 oz (84 kg)   SpO2 94%   BMI 33.87 kg/m²     General appearance: No apparent distress, appears stated age and cooperative. HEENT: Pupils equal, round, and reactive to light. Conjunctivae/corneas clear. Neck: Supple, with full range of motion. No jugular venous distention. Trachea midline. Respiratory:  Normal respiratory effort. Clear to auscultation, bilaterally without Rales/Wheezes/Rhonchi. Cardiovascular: Regular rate and rhythm with normal S1/S2 without murmurs, rubs or gallops. Abdomen: Soft, non-tender, non-distended with normal bowel sounds. Musculoskeletal: No clubbing, cyanosis or edema bilaterally. Full range of motion without deformity. Skin: Skin color, texture, turgor normal.  No rashes or lesions. Neurologic:  Neurovascularly intact without any focal sensory/motor deficits. Cranial nerves: II-XII intact, grossly non-focal.  Psychiatric: Alert and oriented, thought content appropriate, normal insight  Capillary Refill: Brisk,3 seconds, normal   Peripheral Pulses: +2 palpable, equal bilaterally       Labs:   Recent Labs     12/04/21  0840 12/05/21  0748   WBC 7.4 8.2   HGB 13.8 13.8   HCT 42.7 43.3    334     Recent Labs     12/04/21  0840 12/05/21  0748    137   K 4.3 4.1    101   CO2 24 24   BUN 16 19   CREATININE 0.7 0.8   CALCIUM 9.2 9.0     Recent Labs     12/04/21  0840 12/05/21  0748   AST 19 19   ALT 14 14   BILITOT 0.4 0.5   ALKPHOS 102 99     Recent Labs     12/04/21  0840 12/04/21  1700   TROPONINI <0.01 <0.01       Urinalysis:      Lab Results   Component Value Date    NITRU Negative 04/19/2021    WBCUA 0-2 04/19/2021    BACTERIA 1+ 12/18/2020    RBCUA 0-2 04/19/2021    BLOODU TRACE-INTACT 04/19/2021    SPECGRAV 1.015 04/19/2021    GLUCOSEU Negative 04/19/2021       Radiology:  XR CHEST (2 VW)   Final Result   No acute cardiopulmonary disease.          NM MYOCARDIAL SPECT REST EXERCISE OR RX    (Results Pending)       Assessment/Plan:    Active Hospital Problems    Diagnosis     A-fib ([facility] [I48.91]        Paroxysmal atrial fibrillation with RVR POA, currently NSR:  - Spontaneously converted in the ED without any intervention.  - She has history of ablation in 2014.  - Continue her home meds including eliquis, tikosyn, metoprolol.   - Cardiology consulted from the ED. Plan for stress test and ECHO on 12/6.  - Monitor pt on telemetry. - Check TSH.      Hypertension:  - Uncontrolled. Continue her home meds with the addition of amlodipine 5 mg.   - Monitor BP closely.      Hyperlipidemia:  - Continue statin.      Diabetes mellitus type 2:  - Controlled, A1C 6.6.  - Hold home regimen, SSI ACHS meanwhile. - Carb-control diet.      Obesity:  - With Body mass index is 34.28 kg/m². Complicating assessment and treatment. Placing patient at risk for multiple co-morbidities as well as early death and contributing to the patient's presentation. Counseled on weight loss. DVT Prophylaxis: on eliquis   Diet: ADULT DIET;  Regular; 5 carb choices (75 gm/meal)  Code Status: Full Code    PT/OT Eval Status: not indicated, pt is ambulatory     Dispo - Possibly tomorrow afternoon 12/6 pending cardiac testing     GUANAKO Lovelace - CNP

## 2021-12-05 NOTE — PROGRESS NOTES
[facility]   Cardiology Progress Note   Date: 12/5/2021  Admit Date: 12/4/2021     Reason for follow up: Atrial Fibrillation RVR    Chief Complaint:   Chief Complaint   Patient presents with    Atrial Fibrillation     pt woke this morning with the feeling of rapid heart rate. denies chest pain,  pt feels like she has \"pressure in her throat, it happens when I have the a-fib\" pt is alert and oriented. + sob. History of Present Illness: History obtained from patient and medical record. Kathie Tiwari is a 81 y.o. female with a past medical history of hypertension, hyperlipidemia, DM, SVT and atrial fibrillation. Hx of ablation. Presented with palpitations and SOB. Interval Hx: Today, she is being seen for follow up  Admits to Lisa . Feels better in SR. No new complaints today. No major events overnight. Denies having chest pain, palpitations, orthopnea, or dizziness. Patient seen and examined. Clinical notes reviewed. Telemetry reviewed. Assessment and Plan:  PAF   - Remains in SR   - [id number] at least 5    ~ Continue apixiban 5 mg bid   - Continue dofetilide 125 mcg bid and metoprolol 50 mg daily    - Stable  SOB/CP   - CP atypical, multiple risk factors for CAD   - Echocardiogram and stress test Monday   - No worsening today  Pulmonary hypertension   - Group II likely   - Continue IV lasix x 1 with 2L out   - echocardiogram  Hypertension   - Controlled  Anemia   - stable    - Echo and stress tomorrow     All pertinent information and plan of care discussed with the rounding/attending cardiologist.    Multiple medical conditions with risk of decompensation. All questions and concerns were addressed to the patient. Alternatives to my treatment were discussed. I have discussed the above stated plan with patient and family and the nurse. The patient and family verbalized understanding and agreed with the plan.     Thank you for allowing to us to participate in the care of [facility]. Problem List:   Patient Active Problem List    Diagnosis Date Noted    A-fib [facility]) 12/04/2021    Precordial pain 05/26/2021    PVC (premature ventricular contraction) 05/26/2021    Obesity 03/31/2021    Small bowel obstruction ([address] 09/13/2020    Partial small bowel obstruction ([address] 05/26/2020    Hyperlipidemia 10/10/2019    Hyperkalemia 10/10/2019    Pulmonary hypertension ([address] 03/26/2019    Chronic diastolic heart failure ([address] 03/26/2019    Essential hypertension 02/07/2019    Partial intestinal obstruction ([address] 01/08/2019    Enteritis     Abdominal pain 07/18/2018    Pelvic hematoma in female 02/14/2014    Anemia due to blood loss 02/14/2014    Hydronephrosis 02/14/2014    Ileus ([address] 02/14/2014    Angina pectoris ([address] 01/25/2014    Chronic anticoagulation 11/25/2013    Dyspnea on exertion 09/04/2013    PAF (paroxysmal atrial fibrillation) ([address] 08/26/2013    SVT (supraventricular tachycardia) ([address] 08/26/2013    DM2 (diabetes mellitus, type 2) ([address] 08/26/2013      Allergies: Allergies   Allergen Reactions    Hydrocodone Other (See Comments)     NAUSEA AND DIZZINESS    Metformin And Related Other (See Comments)     NAUSEA AND DIZZINESS    Nickel Dermatitis    Omeprazole     Prednisone      Other reaction(s): chest pain & palpitations/H&P    Hydralazine Palpitations and Rash       Home Meds:  Prior to Visit Medications    Medication Sig Taking? Authorizing Provider   dicyclomine (BENTYL) 20 MG tablet Take 1 tablet by mouth every 6 hours as needed (abdominal pain/cramping) Yes HERMINIO Jefferson   apixaban (ELIQUIS) 5 MG TABS tablet Take 1 tablet by mouth 2 times daily Yes GUANAKO Gonsales CNP   valsartan (DIOVAN) 320 MG tablet Take 1 tablet by mouth daily Yes GUANAKO Gonsales CNP   metoprolol succinate (TOPROL XL) 100 MG extended release tablet Take 50 mg by mouth daily Prescribed as 100mg bid, pt. Takes only 50mg if HR > 60.  Yes Historical Provider, MD   pantoprazole (PROTONIX) 20 MG tablet Take 2 tablets by mouth 2 times daily  Patient taking differently: Take 20 mg by mouth daily  Yes Adam Melendez MD   pravastatin (PRAVACHOL) 80 MG tablet Take 40 mg by mouth daily Yes Historical Provider, MD   pioglitazone (ACTOS) 15 MG tablet Take 15 mg by mouth daily Yes Historical Provider, MD   dofetilide (TIKOSYN) 125 MCG capsule Take 1 capsule by mouth every 12 hours Yes Lakeisha Moore MD   furosemide (LASIX) 20 MG tablet Take 1 tablet by mouth daily Yes Sang Bellamy MD   glyBURIDE (DIABETA) 1.25 MG tablet Take 2.5 mg by mouth 2 times daily (with meals)  Yes Historical Provider, MD      Scheduled Meds:   apixaban  5 mg Oral BID    dofetilide  125 mcg Oral 2 times per day    pantoprazole  20 mg Oral Daily    sodium chloride flush  5-40 mL IntraVENous 2 times per day    amLODIPine  5 mg Oral Daily    insulin lispro  0-12 Units SubCUTAneous TID WC    insulin lispro  0-6 Units SubCUTAneous Nightly    metoprolol succinate  50 mg Oral Daily    valsartan  320 mg Oral Daily    atorvastatin  40 mg Oral Nightly     Continuous Infusions:   sodium chloride      dextrose       PRN Meds:dicyclomine, sodium chloride flush, sodium chloride, ondansetron **OR** ondansetron, polyethylene glycol, acetaminophen **OR** acetaminophen, glucose, dextrose, glucagon (rDNA), dextrose, cloNIDine, perflutren lipid microspheres     Past Medical History:  Past Medical History:   Diagnosis Date    [facility]rial fibrillation [facility]     Diabetes mellitus ([facility]     Hydronephrosis 2/14/2014    Hyperlipidemia     Hypertension     [facility]     SVT (supraventricular tachycardia) ([facility] 8/26/2013    AVNRT        Past Surgical History:    has a past surgical history that includes Hysterectomy; Appendectomy; Tonsillectomy; skin biopsy; ablation of dysrhythmic focus; Dilatation, esophagus; Small intestine surgery; fracture surgery;  Upper negative for murmurs. Peripheral pulses 2+, capillary refill < 3 seconds. negative elevation of JVP. No edema  · Respiratory: Respirations symmetric and unlabored. Lungs clear to auscultation bilaterally, no wheezing, crackles, or rhonchi  · Gastrointestinal: Abdomen soft and round. Bowel sounds normoactive in all quadrants. · Musculoskeletal: No focal weakness. · Neurologic/Psych: Awake and orientated to person, place and time. Calm affect, appropriate mood    Pertinent labs, diagnostic, device, and imaging results reviewed as a part of this visit    Labs:    BMP:   Recent Labs     21  0840 21  0748    137   K 4.3 4.1    101   CO2 24 24   BUN 16 19   CREATININE 0.7 0.8   MG  --  1.90     Estimated Creatinine Clearance: 52 mL/min (based on SCr of 0.8 mg/dL). CBC:   Recent Labs     21  0840 21  0748   WBC 7.4 8.2   HGB 13.8 13.8   HCT 42.7 43.3   MCV 77.7* 78.4*    334     Thyroid:   Lab Results   Component Value Date    TSH 2.30 2013     Lipids: No results found for: CHOL, HDL, TRIG  LFTS:   Lab Results   Component Value Date    ALT 14 2021    AST 19 2021    ALKPHOS 99 2021    PROT 8.2 2021    AGRATIO 0.9 2021    BILITOT 0.5 2021     Cardiac Enzymes:   Lab Results   Component Value Date    TROPONINI <0.01 2021    TROPONINI <0.01 2021    TROPONINI <0.01 09/10/2021     Coags:   Lab Results   Component Value Date    PROTIME 15.8 2020    INR 1.36 2020     EC2021: Atrial fibrillation  2021 SR    ECHO: 2020    Summary   Normal left ventricle systolic function with an estimated ejection fraction   of 55-60%. Definity contrast administered with no evidence of left   ventricular mass or thrombus noted. No regional wall motion abnormalities are seen. Normal left ventricular   diastolic filling pressure. The left atrium is mildly dilated. Mild mitral and tricuspid regurgitation.    Systolic

## 2021-12-06 ENCOUNTER — APPOINTMENT (OUTPATIENT)
Dept: NUCLEAR MEDICINE | Age: 86
DRG: 310 | End: 2021-12-06
Payer: MEDICARE

## 2021-12-06 VITALS
BODY MASS INDEX: 33.75 KG/M2 | HEART RATE: 67 BPM | RESPIRATION RATE: 16 BRPM | OXYGEN SATURATION: 97 % | WEIGHT: 183.4 LBS | DIASTOLIC BLOOD PRESSURE: 69 MMHG | TEMPERATURE: 97.9 F | HEIGHT: 62 IN | SYSTOLIC BLOOD PRESSURE: 159 MMHG

## 2021-12-06 LAB
GLUCOSE BLD-MCNC: 191 MG/DL (ref 70–99)
LV EF: 58 %
LV EF: 70 %
LVEF MODALITY: NORMAL
LVEF MODALITY: NORMAL
PERFORMED ON: ABNORMAL

## 2021-12-06 PROCEDURE — C8929 TTE W OR WO FOL WCON,DOPPLER: HCPCS

## 2021-12-06 PROCEDURE — 6360000002 HC RX W HCPCS: Performed by: INTERNAL MEDICINE

## 2021-12-06 PROCEDURE — G0378 HOSPITAL OBSERVATION PER HR: HCPCS

## 2021-12-06 PROCEDURE — A9502 TC99M TETROFOSMIN: HCPCS | Performed by: INTERNAL MEDICINE

## 2021-12-06 PROCEDURE — 6370000000 HC RX 637 (ALT 250 FOR IP): Performed by: INTERNAL MEDICINE

## 2021-12-06 PROCEDURE — 3430000000 HC RX DIAGNOSTIC RADIOPHARMACEUTICAL: Performed by: INTERNAL MEDICINE

## 2021-12-06 PROCEDURE — 78452 HT MUSCLE IMAGE SPECT MULT: CPT

## 2021-12-06 PROCEDURE — 6370000000 HC RX 637 (ALT 250 FOR IP): Performed by: REGISTERED NURSE

## 2021-12-06 PROCEDURE — 6360000004 HC RX CONTRAST MEDICATION: Performed by: INTERNAL MEDICINE

## 2021-12-06 PROCEDURE — 93017 CV STRESS TEST TRACING ONLY: CPT

## 2021-12-06 RX ADMIN — DOFETILIDE 125 MCG: 0.12 CAPSULE ORAL at 15:13

## 2021-12-06 RX ADMIN — TETROFOSMIN 32.1 MILLICURIE: 1.38 INJECTION, POWDER, LYOPHILIZED, FOR SOLUTION INTRAVENOUS at 10:50

## 2021-12-06 RX ADMIN — AMLODIPINE BESYLATE 5 MG: 5 TABLET ORAL at 12:57

## 2021-12-06 RX ADMIN — TETROFOSMIN 11 MILLICURIE: 1.38 INJECTION, POWDER, LYOPHILIZED, FOR SOLUTION INTRAVENOUS at 09:45

## 2021-12-06 RX ADMIN — PANTOPRAZOLE SODIUM 20 MG: 20 TABLET, DELAYED RELEASE ORAL at 13:00

## 2021-12-06 RX ADMIN — METOPROLOL SUCCINATE 50 MG: 50 TABLET, EXTENDED RELEASE ORAL at 12:57

## 2021-12-06 RX ADMIN — PERFLUTREN 1.65 MG: 6.52 INJECTION, SUSPENSION INTRAVENOUS at 07:52

## 2021-12-06 RX ADMIN — REGADENOSON 0.4 MG: 0.08 INJECTION, SOLUTION INTRAVENOUS at 10:50

## 2021-12-06 RX ADMIN — VALSARTAN 320 MG: 80 TABLET, FILM COATED ORAL at 12:56

## 2021-12-06 ASSESSMENT — PAIN SCALES - GENERAL
PAINLEVEL_OUTOF10: 0

## 2021-12-06 NOTE — PROGRESS NOTES
Guerrero stress test was completed on this patient as ordered. The patient tolerated the procedure well. Awaiting stress imaging at this time.

## 2021-12-06 NOTE — PLAN OF CARE
Problem: Pain:  Description: Pain management should include both nonpharmacologic and pharmacologic interventions.   Goal: Pain level will decrease  Description: Pain level will decrease  Outcome: Ongoing     Problem: Skin Integrity:  Goal: Will show no infection signs and symptoms  Description: Will show no infection signs and symptoms  Outcome: Ongoing     Problem: SAFETY  Goal: Free from accidental physical injury  Outcome: Ongoing     Problem: DAILY CARE  Goal: Daily care needs are met  Outcome: Ongoing

## 2021-12-07 LAB — TSH REFLEX: 4.26 UIU/ML (ref 0.27–4.2)

## 2021-12-07 NOTE — DISCHARGE SUMMARY
Hospital Medicine Discharge Summary    Patient ID: Roberta Barthel Payer      Patient's PCP: Zoanne Gaucher, MD    Admit Date: 12/4/2021     Discharge Date: 12/6/2021      Admitting Physician: Sherren Kappa, MD     Discharge Physician: Capri Partida MD     Discharge Diagnoses: Active Hospital Problems    Diagnosis     A-fib [facility]) [I48.91]        The patient was seen and examined on day of discharge and this discharge summary is in conjunction with any daily progress note from day of discharge. Hospital Course:   81 y. o. female with PMH of AFIB, DM2, HLD, HTN who presented to [facility] with complaints of palpitations that started this morning around 9 am. Pt has history of afib and knew she was in afib this morning. Her  drove her to the ED. She was not given any meds in the ED because pt spontaneously converted to NSR. Cardiology was consulted in the ED and asked pt to be admitted per hospitalist. Had chest pain during this episode but currently denies any pain. Pt denies dyspnea fever/chills, cough/sputum production, N/V/D. She follows with Cardiology/EP as an outpt. She has been compliant with her meds. She was admitted for further evaluation and management.         Physical Exam Performed:     BP (!) 159/69   Pulse 67   Temp 97.9 °F (36.6 °C) (Oral)   Resp 16   Ht 5' 2\" (1.575 m)   Wt 183 lb 6.4 oz (83.2 kg)   SpO2 97%   BMI 33.54 kg/m²   General appearance: No apparent distress, appears stated age and cooperative. HEENT: Pupils equal, round, and reactive to light. Conjunctivae/corneas clear. Neck: Supple, with full range of motion. No jugular venous distention. Trachea midline. Respiratory:  Normal respiratory effort. Clear to auscultation, bilaterally without Rales/Wheezes/Rhonchi. Cardiovascular: Regular rate and rhythm with normal S1/S2 without murmurs, rubs or gallops. Abdomen: Soft, non-tender, non-distended with normal bowel sounds.   Musculoskeletal: No clubbing, cyanosis or edema bilaterally. Full range of motion without deformity. Skin: Skin color, texture, turgor normal.  No rashes or lesions. Neurologic:  Neurovascularly intact without any focal sensory/motor deficits. Cranial nerves: II-XII intact, grossly non-focal.  Psychiatric: Alert and oriented, thought content appropriate, normal insight  Capillary Refill: Brisk,3 seconds, normal   Peripheral Pulses: +2 palpable, equal bilaterally      Labs: For convenience and continuity at follow-up the following most recent labs are provided:      CBC:    Lab Results   Component Value Date    WBC 8.2 12/05/2021    HGB 13.8 12/05/2021    HCT 43.3 12/05/2021     12/05/2021       Renal:    Lab Results   Component Value Date     12/05/2021    K 4.1 12/05/2021     12/05/2021    CO2 24 12/05/2021    BUN 19 12/05/2021    CREATININE 0.8 12/05/2021    CALCIUM 9.0 12/05/2021    PHOS 2.5 09/15/2020         Significant Diagnostic Studies    Radiology:   NM Cardiac Stress Test Nuclear Imaging   Final Result      XR CHEST (2 VW)   Final Result   No acute cardiopulmonary disease. Assessment/Plan:          Active Hospital Problems     Diagnosis      A-fib (HCC) [I48.91]           Paroxysmal atrial fibrillation with RVR POA, currently NSR:  - Spontaneously converted in the ED without any intervention.  - She has history of ablation in 2014.  - Continue her home meds including eliquis, tikosyn, metoprolol.   - Cardiology consulted from the ED. Plan for stress test and ECHO on 12/6.  -Stress Test 12/6/21: Negative for acute ischemia  -ECHO 12/6/21: EF 55-60%. No RWMA       Essential Hypertension and HLD  - Controlled. Continue her home meds with the addition of amlodipine 5 mg.   - Monitor BP closely. Diabetes mellitus type 2:  - Controlled, A1C 6.6.  - Continue home regimen  - Carb-control diet.      Obesity:  - With Body mass index is 31.22 kg/m². Complicating assessment and treatment.  Placing patient at risk for multiple co-morbidities as well as early death and contributing to the patient's presentation. Counseled on weight loss.          Consults:     IP CONSULT TO CARDIOLOGY    Disposition:  Home    Condition at Discharge: Stable    Discharge Instructions/Follow-up: PCP 1 week, CARDS 3-4 weeks    Code Status:  Prior     Activity: activity as tolerated    Diet: Low concentrated CARB      Discharge Medications:     Discharge Medication List as of 12/6/2021  4:53 PM           Details   dicyclomine (BENTYL) 20 MG tablet Take 1 tablet by mouth every 6 hours as needed (abdominal pain/cramping), Disp-20 tablet, R-0Normal      apixaban (ELIQUIS) 5 MG TABS tablet Take 1 tablet by mouth 2 times daily, Disp-180 tablet, R-3Print      valsartan (DIOVAN) 320 MG tablet Take 1 tablet by mouth daily, Disp-90 tablet, R-1Adjust Sig      metoprolol succinate (TOPROL XL) 100 MG extended release tablet Take 50 mg by mouth daily Prescribed as 100mg bid, pt. Takes only 50mg if HR > 60. Historical Med      pantoprazole (PROTONIX) 20 MG tablet Take 2 tablets by mouth 2 times daily, Disp-60 tablet, R-0Normal      pravastatin (PRAVACHOL) 80 MG tablet Take 40 mg by mouth dailyHistorical Med      pioglitazone (ACTOS) 15 MG tablet Take 15 mg by mouth dailyHistorical Med      dofetilide (TIKOSYN) 125 MCG capsule Take 1 capsule by mouth every 12 hours, Disp-180 capsule, R-3Normal      furosemide (LASIX) 20 MG tablet Take 1 tablet by mouth daily, Disp-30 tablet, R-11Normal      glyBURIDE (DIABETA) 1.25 MG tablet Take 2.5 mg by mouth 2 times daily (with meals) Historical Med             Time Spent on discharge is more than 45 mins in the examination, evaluation, counseling and review of medications and discharge plan. Signed:    Jessy Churchill MD   12/6/2021      Thank you Alin Cummings MD for the opportunity to be involved in this patient's care.  If you have any questions or concerns please feel free to contact me at [phone]. Clear

## 2022-01-05 NOTE — TELEPHONE ENCOUNTER
Pt came in office and dropped off paperwork and signed paper work .  Has been placed on [place] desk, PAST SURGICAL HISTORY:  History of hip replacement

## 2022-01-14 ENCOUNTER — TELEPHONE (OUTPATIENT)
Dept: CARDIOLOGY CLINIC | Age: 87
End: 2022-01-14

## 2022-01-18 NOTE — TELEPHONE ENCOUNTER
Spoke with Servando Ibrahim at [address] [place] [address] and she stated they had received all of the faxes from us but each one had a different quantity, diagnosis, size or something. I asked her why would that be if we needed to complete something on the same form that we faxed them, why would they change the form and then ask us to do something to it. They should send us the same form we sent to them. No real answer was given. I told her that I would have Ms. Sebastian Bruno NP look over the form and complete it and I would fax it back to her. ppw faxed and confirmation received. ppw scanned into media.

## 2022-01-25 ENCOUNTER — TELEPHONE (OUTPATIENT)
Dept: CARDIOLOGY CLINIC | Age: 87
End: 2022-01-25

## 2022-02-15 ENCOUNTER — OFFICE VISIT (OUTPATIENT)
Dept: CARDIOLOGY CLINIC | Age: 87
End: 2022-02-15
Payer: MEDICARE

## 2022-02-15 VITALS
DIASTOLIC BLOOD PRESSURE: 74 MMHG | OXYGEN SATURATION: 99 % | BODY MASS INDEX: 35.33 KG/M2 | HEIGHT: 62 IN | HEART RATE: 66 BPM | WEIGHT: 192 LBS | SYSTOLIC BLOOD PRESSURE: 132 MMHG

## 2022-02-15 DIAGNOSIS — I47.1 SVT (SUPRAVENTRICULAR TACHYCARDIA) (HCC): ICD-10-CM

## 2022-02-15 DIAGNOSIS — I10 ESSENTIAL HYPERTENSION: ICD-10-CM

## 2022-02-15 DIAGNOSIS — I48.91 ATRIAL FIBRILLATION, UNSPECIFIED TYPE (HCC): ICD-10-CM

## 2022-02-15 DIAGNOSIS — E78.2 MIXED HYPERLIPIDEMIA: ICD-10-CM

## 2022-02-15 DIAGNOSIS — I50.32 CHRONIC DIASTOLIC HEART FAILURE (HCC): Primary | ICD-10-CM

## 2022-02-15 DIAGNOSIS — I48.0 PAF (PAROXYSMAL ATRIAL FIBRILLATION) (HCC): ICD-10-CM

## 2022-02-15 PROCEDURE — 93000 ELECTROCARDIOGRAM COMPLETE: CPT | Performed by: INTERNAL MEDICINE

## 2022-02-15 PROCEDURE — 99215 OFFICE O/P EST HI 40 MIN: CPT | Performed by: INTERNAL MEDICINE

## 2022-02-15 NOTE — PROGRESS NOTES
[address]  Advanced CHF/Pulmonary Hypertension   Cardiac Evaluation      Daniel Tiwari  YOB: 1935    Date of Visit:  2/15/22      Chief Complaint   Patient presents with    Follow-up    Congestive Heart Failure         History of Present Illness:  Daniel Tiwari is a 81 y.o. female who presents from referral from Tammie Tinoco CNP for consultation and management of severe pulmonary HTN. She has a history of paroxysmal atrial fibrillation, HTN, SVT, and DM. She had SVT in 2013, wore an event monitor, and atrial fibrillation was detected. She had recurrent atrial fibrillation in 2014 and had a cryoablation for PAF in 2/2014. Post procedure she had a retroperitoneal bleed and required a urinary stent. She has had no known recurrence of afib post ablation but has had palpitations. She was admitted in 1/2019 with SBO versus ileus that was medically managed. Her echo from 02/19/19 showed her EF was 55-60%. She has mild MR, TR and MT. SPAP 60mmHg consistent with severe pulmonary HTN. She reports about 1 month ago (02/2019) she had an episode of palpitations at 200 bpm for about 1 hour. She has not had a sleep study. She reports occasional chest twinge. She states the twinge occurs with fast heart rate. She reports her breathing is stable now. She did have SOB with walking frequently but this has subsided. She denies dizziness or syncope. She states she has been on lasix for 2-3 weeks. Her cardiac monitor from 04/2019 showed no arrhythmias. She was admitted 10/10-10/13/19 with SBO. General surgery was consulted and the SBO resolved without surgery. On 10/22/19 she reported her SBO occurred at the bowel resection site. During her last OV with me on 01/21/20, her EKG showed AFib . She was started on eliquis and her metoprolol was increased to 50 mg twice daily. Her echo from 04/28/20 showed her EF was 55-60% with mild MR and TR.     At her OV with Mahogany Sood CNP on 06/03/20 her lasix was adjusted to 40 mg daily for 4 days then reduce back to 20 mg daily. She was admitted 09/13-09/15/20 with partial bowel obstruction. On 03/16/21 she reported she noticed the slower heart beat since starting tikosyn which was started 12/2020. At this OV her metoprolol was decreased to 25 mg twice daily. She was admitted 03/30-03/31/21 with CP and palpitations. Her metoprolol was increased back to 50 mg BID. She was admitted with abdominal pain 04/19-04/22/21. She underwent EGD on 04/21/21 in which a polyp was removed from stomach. At her OV with Susan Le CNP on 05/26/21 her valsartan was increased to 320 mg daily. On 8/19/2021 she reported she did not have the stress test. She did not like what she read on the Internet regarding the test. She is not interested in proceeding with the stress test. Patient was in the ED on 12/4/2021 with AF with RVR, she spontaneously converted to SR without intervention. She underwent a stress test on 12/4/2021 that demonstrated no evidence of ischemia and an LVEF of >70%. She underwent an echo on 12/6/2021 that demonstrated an LVEF of 55-60%. 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 She is planning a surgery for this. She is taking pravastatin 40 mg nightly. She stopped taking her aspirin. She can tell when she is in AF. She is taking her medications as prescribed. Patient denies current edema, chest pain, sob, palpitations, dizziness or syncope.           Allergies   Allergen Reactions    Hydrocodone Other (See Comments)     NAUSEA AND DIZZINESS    Metformin And Related Other (See Comments)     NAUSEA AND DIZZINESS    Nickel Dermatitis    Omeprazole     Prednisone      Other reaction(s): chest pain & palpitations/H&P    Hydralazine Palpitations and Rash     Current Outpatient Medications   Medication Sig Dispense Refill    dicyclomine (BENTYL) 20 MG tablet Take 1 tablet by mouth every 6 hours as needed (abdominal pain/cramping) 20 tablet 0    apixaban (ELIQUIS) 5 MG TABS tablet Take 1 tablet by mouth 2 times daily 180 tablet 3    valsartan (DIOVAN) 320 MG tablet Take 1 tablet by mouth daily 90 tablet 1    metoprolol succinate (TOPROL XL) 100 MG extended release tablet Take 50 mg by mouth daily Prescribed as 100mg bid, pt. Takes only 50mg if HR > 60.  pantoprazole (PROTONIX) 20 MG tablet Take 2 tablets by mouth 2 times daily (Patient taking differently: Take 20 mg by mouth daily ) 60 tablet 0    pioglitazone (ACTOS) 15 MG tablet Take 15 mg by mouth daily      dofetilide (TIKOSYN) 125 MCG capsule Take 1 capsule by mouth every 12 hours 180 capsule 3    furosemide (LASIX) 20 MG tablet Take 1 tablet by mouth daily 30 tablet 11    glyBURIDE (DIABETA) 1.25 MG tablet Take 2.5 mg by mouth 2 times daily (with meals)       pravastatin (PRAVACHOL) 80 MG tablet Take 40 mg by mouth daily (Patient not taking: Reported on 2/15/2022)       No current facility-administered medications for this visit.        Past Medical History:   Diagnosis Date    Atrial fibrillation ([address]     [facility]     Hydronephrosis 2/14/2014    Hyperlipidemia     Hypertension     Intussusception intestine ([address]     SVT (supraventricular tachycardia) ([address] 8/26/2013    AVNRT     Past Surgical History:   Procedure Laterality Date    ABLATION OF DYSRHYTHMIC FOCUS      -2014    APPENDECTOMY      DILATATION, ESOPHAGUS      FRACTURE SURGERY      L ankle    HYSTERECTOMY      SKIN BIOPSY      SMALL INTESTINE SURGERY      TONSILLECTOMY      UPPER GASTROINTESTINAL ENDOSCOPY N/A 4/21/2021    EGD POLYP SNARE performed by Jenny Tucker DO at [address]  4/21/2021    EGD CONTROL HEMORRHAGE performed by Jenny Tucker DO at [address]  4/21/2021    EGD BIOPSY performed by Jenny Tucker DO at [facility]     Family History   Problem Relation Age of Onset    Diabetes Mother     Heart Disease Father     High Blood Pressure Father     High Cholesterol Father     Cancer Sister     Heart Disease Brother     High Blood Pressure Brother     High Cholesterol Brother      Social History     Socioeconomic History    Marital status:      Spouse name: Not on file    Number of children: Not on file    Years of education: Not on file    Highest education level: Not on file   Occupational History    Not on file   Tobacco Use    Smoking status: Never Smoker    Smokeless tobacco: Never Used   Vaping Use    Vaping Use: Never used   Substance and Sexual Activity    Alcohol use: No    Drug use: No    Sexual activity: Yes     Partners: Male   Other Topics Concern    Not on file   Social History Narrative    Not on file     Social Determinants of Health     Financial Resource Strain:     Difficulty of Paying Living Expenses: Not on file   Food Insecurity:     Worried About Running Out of Food in the Last Year: Not on file    Keith of Food in the Last Year: Not on file   Transportation Needs:     Lack of Transportation (Medical): Not on file    Lack of Transportation (Non-Medical):  Not on file   Physical Activity:     Days of Exercise per Week: Not on file    Minutes of Exercise per Session: Not on file   Stress:     Feeling of Stress : Not on file   Social Connections:     Frequency of Communication with Friends and Family: Not on file    Frequency of Social Gatherings with Friends and Family: Not on file    Attends [employer] Services: Not on file    Active Member of Clubs or Organizations: Not on file    Attends Club or Organization Meetings: Not on file    Marital Status: Not on file   Intimate Partner Violence:     Fear of Current or Ex-Partner: Not on file    Emotionally Abused: Not on file    Physically Abused: Not on file    Sexually Abused: Not on file   Housing Stability:     Unable to Pay for Housing in the Last Year: Not on file    Number of Places Lived in the Last Year: Not on file    Unstable Housing in the Last Year: Not on file       Review of Systems:   · Constitutional: there has been no unanticipated weight loss. There's been no change in energy level, sleep pattern, or activity level. · Eyes: No visual changes or diplopia. No scleral icterus. · ENT: No Headaches, hearing loss or vertigo. No mouth sores or sore throat. · Cardiovascular: Reviewed in HPI  · Respiratory: No cough or wheezing, no sputum production. No hematemesis. · Gastrointestinal: No abdominal pain, appetite loss, blood in stools. No change in bowel or bladder habits. · Genitourinary: No dysuria, trouble voiding, or hematuria. · Musculoskeletal:  No gait disturbance, weakness or joint complaints. · Integumentary: No rash or pruritis. · Neurological: No headache, diplopia, change in muscle strength, numbness or tingling. No change in gait, balance, coordination, mood, affect, memory, mentation, behavior. · Psychiatric: No anxiety, no depression. · Endocrine: No malaise, fatigue or temperature intolerance. No excessive thirst, fluid intake, or urination. No tremor. · Hematologic/Lymphatic: No abnormal bruising or bleeding, blood clots or swollen lymph nodes. · Allergic/Immunologic: No nasal congestion or hives. Physical Examination:    Vitals:    02/15/22 0958   BP: 132/74   Pulse: 66   SpO2: 99%   Weight: 192 lb (87.1 kg)   Height: 5' 2\" (1.575 m)     Body mass index is 35.12 kg/m².      Wt Readings from Last 3 Encounters:   02/15/22 192 lb (87.1 kg)   12/06/21 183 lb 6.4 oz (83.2 kg)   11/16/21 189 lb (85.7 kg)     BP Readings from Last 3 Encounters:   02/15/22 132/74   12/06/21 (!) 159/69   11/16/21 138/80                Constitutional and General Appearance:   WD/WN in NAD,   HEENT:  NC/AT  ROSARIO  No problems with hearing  Skin:  Warm, dry  Respiratory:  · Normal excursion and expansion without use of accessory muscles  · Resp Auscultation: Normal breath sounds without dullness  Cardiovascular:  · The apical impulses not displaced  · Heart tones are crisp and normal  · Cervical veins are not engorged  · The carotid upstroke is normal in amplitude and contour without delay or bruit  · JVP normal  Regular bradycardic rhythm with nl S1 and S2 without m,r,g  · Peripheral pulses are symmetrical and full  · There is no clubbing, cyanosis of the extremities. · Trace bilateral edema  · Femoral Arteries: 2+ and equal  · Pedal Pulses: 2+ and equal   Neck:  · No thyromegaly  Abdomen:  · No masses or tenderness  · Liver/Spleen: No Abnormalities Noted  Neurological/Psychiatric:  · Alert and oriented in all spheres  · Moves all extremities well  · Exhibits normal gait balance and coordination  · No abnormalities of mood, affect, memory, mentation, or behavior are noted      Echo 12/6/2021   Summary   Technically difficult examination. Normal left ventricle systolic function with an estimated ejection fraction   of 55-60%. Definity contrast administered with no evidence of left ventricular mass or   thrombus noted. No regional wall motion abnormalities are seen. Normal left ventricular diastolic filling pressure. Trace aortic and pulmonic regurgitation. The tricuspid valve is normal in structure. Mild mitral and tricuspid regurgitation. Systolic pulmonary artery pressure (SPAP) is normal and estimated at 21 mmHg   (right atrial pressure 3 mmHg). Stress test 12/4/2021  Summary There is normal isotope uptake at stress and rest. There is no evidence of  myocardial ischemia or scar. Normal myocardial wall motion and thickening. Post-stress LVEF is normal at >70%. Echo: 02/19/19   Summary   Definity contrast administered. Normal left ventricular systolic function with an estimated ejection   fraction of 55-60%.    There is mild septal hypertrophy with normal remaining wall thickness. Normal left ventricular diastolic filling pressure. Mild mitral regurgitation. Mild tricuspid regurgitation. Systolic pulmonary artery pressure (SPAP) estimated at 60 mmHg (RA pressure   3 mmHg), consistent with severe pulmonary hypertension. Mild pulmonic regurgitation present. Lipids: 03/12/19: , , HDL 45, LDL 60    01/21/20 EKG shows AFIB     Echo: 04/28/20  Summary   Normal left ventricle systolic function with an estimated ejection fraction   of 55-60%. Definity contrast administered with no evidence of left   ventricular mass or thrombus noted. No regional wall motion abnormalities are seen. Normal left ventricular   diastolic filling pressure. The left atrium is mildly dilated. Mild mitral and tricuspid regurgitation. Systolic pulmonary artery pressure (SPAP) estimated at 42 mmHg (right atrial   pressure 3 mmHg), consistent with mild pulmonary hypertension. .Labs were reviewed including labs from other hospital systems through [facility]. Cardiac testing was reviewed including echos, nuclear scans, cardiac catheterization, including from other hospital systems through [facility]. Assessment:    1. Chronic diastolic heart failure ([address]    2. SVT (supraventricular tachycardia) ([address]    3. PAF (paroxysmal atrial fibrillation) ([address]    4. Essential hypertension    5. Mixed hyperlipidemia    6. Atrial fibrillation, unspecified type ([address]          Plan:  1. ECG today for pre op. 2. Continue current medications as prescribed. 3. Blood work prior to surgery- CBC, CMP, BNP, lipids  4. Follow up in 6 months. Karma Pritchett RN, am scribing for and in the presence of Dr. Kristi Jimenez. 02/15/22 10:19 AM   Luca Spring RN      The scribe's documentation has been prepared under my direction and personally reviewed by me in its entirety.   I confirm that the note above accurately reflects all work, treatment, procedures, and medical decision making performed by me. Time Based Itemization  A total of 40 minutes was spent on today's patient encounter. If applicable, non-patient-facing activities:  ( x)Preparing to see the patient and reviewing records  ( ) Individual interpretation of results  ( ) Discussion or coordination of care with other health care professionals  ( x) Ordering of unique tests, medications, or procedures  ( x) Documentation within the EHR          I appreciate the opportunity of cooperating in the care of this patient.     Essie Leyva M.D., [facility]

## 2022-02-15 NOTE — PATIENT INSTRUCTIONS
Plan:  1. ECG today for pre op. 2. Continue current medications as prescribed. 3. Blood work prior to surgery- CBC, CMP, BNP, lipids  4. Follow up in 6 months.

## 2022-02-15 NOTE — LETTER
[address] Cardiology - [address] [facility] [address] [facility]  Phone: [phone]  Fax: [phone]    Tricia Soria MD        February 15, 2022      [employer] Payer  [phone]  From cardiac standpoint, patient is ok to hold Eliquis 3 days prior to procedure, but needs to resume medication within 1-2 days after procedure. Patient is at increased risk for stroke while off of this medication. Please call with any further questions. Thank you.       Sincerely,        Tricia Soria MD

## 2022-02-18 ENCOUNTER — HOSPITAL ENCOUNTER (EMERGENCY)
Age: 87
Discharge: HOME | End: 2022-02-18
Attending: EMERGENCY MEDICINE
Payer: MEDICARE

## 2022-02-18 ENCOUNTER — APPOINTMENT (OUTPATIENT)
Dept: CT IMAGING | Age: 87
End: 2022-02-18
Payer: MEDICARE

## 2022-02-18 VITALS
WEIGHT: 190 LBS | HEIGHT: 62 IN | TEMPERATURE: 98.4 F | BODY MASS INDEX: 34.96 KG/M2 | OXYGEN SATURATION: 98 % | HEART RATE: 57 BPM | SYSTOLIC BLOOD PRESSURE: 187 MMHG | DIASTOLIC BLOOD PRESSURE: 71 MMHG | RESPIRATION RATE: 19 BRPM

## 2022-02-18 DIAGNOSIS — R03.0 ELEVATED BLOOD PRESSURE READING: ICD-10-CM

## 2022-02-18 DIAGNOSIS — R10.9 ABDOMINAL PAIN, UNSPECIFIED ABDOMINAL LOCATION: Primary | ICD-10-CM

## 2022-02-18 LAB
A/G RATIO: 1.2 (ref 1.1–2.2)
ALBUMIN SERPL-MCNC: 4.2 G/DL (ref 3.4–5)
ALP BLD-CCNC: 105 U/L (ref 40–129)
ALT SERPL-CCNC: 13 U/L (ref 10–40)
ANION GAP SERPL CALCULATED.3IONS-SCNC: 13 MMOL/L (ref 3–16)
AST SERPL-CCNC: 20 U/L (ref 15–37)
BASOPHILS ABSOLUTE: 0 K/UL (ref 0–0.2)
BASOPHILS RELATIVE PERCENT: 0.5 %
BILIRUB SERPL-MCNC: 0.3 MG/DL (ref 0–1)
BILIRUBIN URINE: NEGATIVE
BLOOD, URINE: ABNORMAL
BUN BLDV-MCNC: 16 MG/DL (ref 7–20)
CALCIUM SERPL-MCNC: 8.6 MG/DL (ref 8.3–10.6)
CHLORIDE BLD-SCNC: 100 MMOL/L (ref 99–110)
CLARITY: CLEAR
CO2: 23 MMOL/L (ref 21–32)
COLOR: YELLOW
CREAT SERPL-MCNC: 0.8 MG/DL (ref 0.6–1.2)
EKG ATRIAL RATE: 72 BPM
EKG DIAGNOSIS: NORMAL
EKG P AXIS: 87 DEGREES
EKG P-R INTERVAL: 182 MS
EKG Q-T INTERVAL: 406 MS
EKG QRS DURATION: 90 MS
EKG QTC CALCULATION (BAZETT): 444 MS
EKG R AXIS: 0 DEGREES
EKG T AXIS: 79 DEGREES
EKG VENTRICULAR RATE: 72 BPM
EOSINOPHILS ABSOLUTE: 0.2 K/UL (ref 0–0.6)
EOSINOPHILS RELATIVE PERCENT: 2 %
EPITHELIAL CELLS, UA: NORMAL /HPF (ref 0–5)
GFR AFRICAN AMERICAN: >60
GFR NON-AFRICAN AMERICAN: >60
GLUCOSE BLD-MCNC: 189 MG/DL (ref 70–99)
GLUCOSE URINE: NEGATIVE MG/DL
HCT VFR BLD CALC: 43 % (ref 36–48)
HEMOGLOBIN: 13.8 G/DL (ref 12–16)
KETONES, URINE: NEGATIVE MG/DL
LEUKOCYTE ESTERASE, URINE: NEGATIVE
LIPASE: 23 U/L (ref 13–60)
LYMPHOCYTES ABSOLUTE: 1.9 K/UL (ref 1–5.1)
LYMPHOCYTES RELATIVE PERCENT: 22 %
MCH RBC QN AUTO: 26 PG (ref 26–34)
MCHC RBC AUTO-ENTMCNC: 32.1 G/DL (ref 31–36)
MCV RBC AUTO: 80.8 FL (ref 80–100)
MICROSCOPIC EXAMINATION: YES
MONOCYTES ABSOLUTE: 0.8 K/UL (ref 0–1.3)
MONOCYTES RELATIVE PERCENT: 9.6 %
NEUTROPHILS ABSOLUTE: 5.6 K/UL (ref 1.7–7.7)
NEUTROPHILS RELATIVE PERCENT: 65.9 %
NITRITE, URINE: NEGATIVE
PDW BLD-RTO: 14.9 % (ref 12.4–15.4)
PH UA: 6 (ref 5–8)
PLATELET # BLD: 308 K/UL (ref 135–450)
PMV BLD AUTO: 8.8 FL (ref 5–10.5)
POTASSIUM REFLEX MAGNESIUM: 4.6 MMOL/L (ref 3.5–5.1)
PROTEIN UA: NEGATIVE MG/DL
RBC # BLD: 5.32 M/UL (ref 4–5.2)
RBC UA: NORMAL /HPF (ref 0–4)
SODIUM BLD-SCNC: 136 MMOL/L (ref 136–145)
SPECIFIC GRAVITY UA: 1.01 (ref 1–1.03)
TOTAL PROTEIN: 7.8 G/DL (ref 6.4–8.2)
TROPONIN: <0.01 NG/ML
URINE REFLEX TO CULTURE: ABNORMAL
URINE TYPE: ABNORMAL
UROBILINOGEN, URINE: 0.2 E.U./DL
WBC # BLD: 8.4 K/UL (ref 4–11)
WBC UA: NORMAL /HPF (ref 0–5)

## 2022-02-18 PROCEDURE — 80053 COMPREHEN METABOLIC PANEL: CPT

## 2022-02-18 PROCEDURE — 6360000002 HC RX W HCPCS: Performed by: EMERGENCY MEDICINE

## 2022-02-18 PROCEDURE — 96374 THER/PROPH/DIAG INJ IV PUSH: CPT

## 2022-02-18 PROCEDURE — 99283 EMERGENCY DEPT VISIT LOW MDM: CPT

## 2022-02-18 PROCEDURE — 74177 CT ABD & PELVIS W/CONTRAST: CPT

## 2022-02-18 PROCEDURE — 6360000004 HC RX CONTRAST MEDICATION: Performed by: EMERGENCY MEDICINE

## 2022-02-18 PROCEDURE — 93010 ELECTROCARDIOGRAM REPORT: CPT | Performed by: INTERNAL MEDICINE

## 2022-02-18 PROCEDURE — 84484 ASSAY OF TROPONIN QUANT: CPT

## 2022-02-18 PROCEDURE — 85025 COMPLETE CBC W/AUTO DIFF WBC: CPT

## 2022-02-18 PROCEDURE — 81001 URINALYSIS AUTO W/SCOPE: CPT

## 2022-02-18 PROCEDURE — 83690 ASSAY OF LIPASE: CPT

## 2022-02-18 PROCEDURE — 93005 ELECTROCARDIOGRAM TRACING: CPT | Performed by: EMERGENCY MEDICINE

## 2022-02-18 RX ORDER — ONDANSETRON 2 MG/ML
4 INJECTION INTRAMUSCULAR; INTRAVENOUS ONCE
Status: COMPLETED | OUTPATIENT
Start: 2022-02-18 | End: 2022-02-18

## 2022-02-18 RX ADMIN — IOPAMIDOL 75 ML: 755 INJECTION, SOLUTION INTRAVENOUS at 08:52

## 2022-02-18 RX ADMIN — ONDANSETRON 4 MG: 2 INJECTION INTRAMUSCULAR; INTRAVENOUS at 07:45

## 2022-02-18 NOTE — ED PROVIDER NOTES
Emergency Department Provider Note  Location: [address] [place] [address]  ED  2/18/2022     Patient Identification  Kelly Phillipser is a 81 y.o. female    Chief Complaint  Abdominal Pain (Wed. began with abd pain mid abd, + nausea, denies fever/vomiting. history of bowel obstruction.  )          HPI  (History provided by patient)  Patient is an 67-year-old female who presents with chief complaint of abdominal pain for the past 2 days. Started on Wednesday slow onset mild achy pain mid abdomen that seem to fluctuate but is now constant. She developed nausea but denies vomiting denies any fevers chills vomiting diarrhea. She is passing stool passing flatus. No black bloody or tarry stools. No exacerbating or alleviating factors. History of appendectomy intussusception hysterectomy. I have reviewed the following nursing documentation:  Allergies: Allergies   Allergen Reactions    Hydrocodone Other (See Comments)     NAUSEA AND DIZZINESS    Metformin And Related Other (See Comments)     NAUSEA AND DIZZINESS    Nickel Dermatitis    Omeprazole     Prednisone      Other reaction(s): chest pain & palpitations/H&P    Hydralazine Palpitations and Rash       Past medical history:  has a past medical history of Atrial fibrillation ([address] Diabetes mellitus ([address] Hydronephrosis (2/14/2014), Hyperlipidemia, Hypertension, Intussusception intestine ([address] and SVT (supraventricular tachycardia) ([address] (8/26/2013). Past surgical history:  has a past surgical history that includes Hysterectomy; Appendectomy; Tonsillectomy; skin biopsy; ablation of dysrhythmic focus; Dilatation, esophagus; Small intestine surgery; fracture surgery; Upper gastrointestinal endoscopy (N/A, 4/21/2021); Upper gastrointestinal endoscopy (4/21/2021); and Upper gastrointestinal endoscopy (4/21/2021). Home medications:   Prior to Admission medications    Medication Sig Start Date End Date Taking?  Authorizing Provider   apixaban (ELIQUIS) 5 MG TABS tablet Take 1 tablet by mouth 2 times daily 6/10/21  Yes GUANAKO Issa CNP   valsartan (DIOVAN) 320 MG tablet Take 1 tablet by mouth daily 5/26/21  Yes GUANAKO Issa - CNP   metoprolol succinate (TOPROL XL) 100 MG extended release tablet Take 50 mg by mouth daily Prescribed as 100mg bid, pt. Takes only 50mg if HR > 60. Yes Historical Provider, MD   pantoprazole (PROTONIX) 20 MG tablet Take 2 tablets by mouth 2 times daily  Patient taking differently: Take 20 mg by mouth daily  4/22/21  Yes Jearlean Boeck, MD   pravastatin (PRAVACHOL) 80 MG tablet Take 40 mg by mouth daily   Yes Historical Provider, MD   pioglitazone (ACTOS) 15 MG tablet Take 15 mg by mouth daily   Yes Historical Provider, MD   furosemide (LASIX) 20 MG tablet Take 1 tablet by mouth daily 1/18/21  Yes Stone Goetz MD   glyBURIDE (DIABETA) 1.25 MG tablet Take 2.5 mg by mouth 2 times daily (with meals)    Yes Historical Provider, MD   dofetilide (TIKOSYN) 125 MCG capsule Take 1 capsule by mouth every 12 hours 2/4/21   ÁNGEL Jean MD       Social history:  reports that she has never smoked. She has never used smokeless tobacco. She reports that she does not drink alcohol and does not use drugs. Family history:    Family History   Problem Relation Age of Onset    Diabetes Mother     Heart Disease Father     High Blood Pressure Father     High Cholesterol Father     Cancer Sister     Heart Disease Brother     High Blood Pressure Brother     High Cholesterol Brother          ROS  Review of Systems   Constitutional: Negative for chills and fever. HENT: Negative for congestion and rhinorrhea. Eyes: Negative for photophobia and visual disturbance. Respiratory: Negative for cough, shortness of breath and wheezing. Cardiovascular: Negative for chest pain and palpitations. Gastrointestinal: Positive for abdominal pain and nausea. Negative for abdominal distention, diarrhea and vomiting. Genitourinary: Negative for dysuria and hematuria. Musculoskeletal: Negative for back pain and neck pain. Skin: Negative for rash and wound. Neurological: Negative for syncope and weakness. Psychiatric/Behavioral: Negative for agitation and confusion. Exam  ED Triage Vitals [02/18/22 0718]   BP Temp Temp Source Pulse Resp SpO2 Height Weight   (!) 211/112 98.4 °F (36.9 °C) Oral 79 18 98 % 5' 2\" (1.575 m) 190 lb (86.2 kg)       Physical Exam  Vitals and nursing note reviewed. Constitutional:       General: She is not in acute distress. Appearance: She is well-developed. HENT:      Head: Normocephalic and atraumatic. Nose: Nose normal. No congestion. Eyes:      Pupils: Pupils are equal, round, and reactive to light. Cardiovascular:      Rate and Rhythm: Normal rate and regular rhythm. Heart sounds: No murmur heard. Pulmonary:      Effort: Pulmonary effort is normal.      Breath sounds: Normal breath sounds. Abdominal:      General: There is no distension. Palpations: Abdomen is soft. Tenderness: There is abdominal tenderness. Comments: Minimally tender periumbilically there is no guarding there is no CVA tenderness   Musculoskeletal:         General: No deformity. Normal range of motion. Cervical back: Normal range of motion and neck supple. Skin:     General: Skin is warm. Findings: No rash. Neurological:      Mental Status: She is alert and oriented to person, place, and time. Motor: No abnormal muscle tone.       Coordination: Coordination normal.   Psychiatric:         Mood and Affect: Mood normal.         Behavior: Behavior normal.           ED Course    ED Medication Orders (From admission, onward)    Start Ordered     Status Ordering Provider    02/18/22 7923 02/18/22 0839  iopamidol (ISOVUE-370) 76 % injection 75 mL  IMG ONCE PRN         Last MAR action: Given - by Yolette Carpenter on 02/18/22 at [facility], [facility] 02/18/22 0730 02/18/22 0726  ondansetron (ZOFRAN) injection 4 mg  ONCE         Last MAR action: Given - by Brayan Cantrell on 02/18/22 at [address] [facility], MARNI L          EKG  Normal sinus rhythm with occasional PVCs rate 75 normal axis normal intervals no evidence of conduction abnormalities no diagnostic ischemic changes noted,       Radiology  CT ABDOMEN PELVIS W IV CONTRAST Additional Contrast? None    Result Date: 2/18/2022  EXAMINATION: CT OF THE ABDOMEN AND PELVIS WITH CONTRAST 2/18/2022 8:45 am TECHNIQUE: CT of the abdomen and pelvis was performed with the administration of intravenous contrast. Multiplanar reformatted images are provided for review. Dose modulation, iterative reconstruction, and/or weight based adjustment of the mA/kV was utilized to reduce the radiation dose to as low as reasonably achievable. COMPARISON: 09/10/2021 HISTORY: ORDERING SYSTEM PROVIDED HISTORY: abd pain TECHNOLOGIST PROVIDED HISTORY: Additional Contrast?->None Reason for exam:->abd pain Decision Support Exception - unselect if not a suspected or confirmed emergency medical condition->Emergency Medical Condition (MA) Reason for Exam: abd pain x3 days- worse this am; nausea Relevant Medical/Surgical History: hysterectomy FINDINGS: Lower Chest: Calcified granuloma within the right lower lobe. Basilar atelectasis. Postoperative change at the esophageal hiatus. Organs: Calcified granulomas within the liver and spleen. Cholelithiasis. Patulous wall the gallbladder fundus, similar to prior. No pancreatic ductal dilatation or stranding. Adrenal glands are within normal limits. Mild right pelviectasis and hydroureter again noted. No ureteral calculus. Bilateral renal cortical scarring. Calcification of the abdominal aorta and iliac arteries. GI/Bowel: Loops of small and large bowel are nondilated. Appendix is absent. Right lower quadrant enteric anastomosis. Induration of mesenteric fat, similar to prior.  Pelvis: Uterus is absent. Pelvic phleboliths. Prolapse bladder inferiorly, similar to prior. No inguinal adenopathy. Peritoneum/Retroperitoneum: No free air. Bones/Soft Tissues: Degenerative change of the lumbar spine. Cholelithiasis. 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 Bilateral renal cortical scarring, as well as mild right hydronephrosis and hydroureter, without obstructing calculus. Sequela of granulomatous disease.  RECOMMENDATIONS: Unavailable         Labs  Results for orders placed or performed during the hospital encounter of 02/18/22   CBC with Auto Differential   Result Value Ref Range    WBC 8.4 4.0 - 11.0 K/uL    RBC 5.32 (H) 4.00 - 5.20 M/uL    Hemoglobin 13.8 12.0 - 16.0 g/dL    Hematocrit 43.0 36.0 - 48.0 %    MCV 80.8 80.0 - 100.0 fL    MCH 26.0 26.0 - 34.0 pg    MCHC 32.1 31.0 - 36.0 g/dL    RDW 14.9 12.4 - 15.4 %    Platelets 340 509 - 084 K/uL    MPV 8.8 5.0 - 10.5 fL    Neutrophils % 65.9 %    Lymphocytes % 22.0 %    Monocytes % 9.6 %    Eosinophils % 2.0 %    Basophils % 0.5 %    Neutrophils Absolute 5.6 1.7 - 7.7 K/uL    Lymphocytes Absolute 1.9 1.0 - 5.1 K/uL    Monocytes Absolute 0.8 0.0 - 1.3 K/uL    Eosinophils Absolute 0.2 0.0 - 0.6 K/uL    Basophils Absolute 0.0 0.0 - 0.2 K/uL   Comprehensive Metabolic Panel w/ Reflex to MG   Result Value Ref Range    Sodium 136 136 - 145 mmol/L    Potassium reflex Magnesium 4.6 3.5 - 5.1 mmol/L    Chloride 100 99 - 110 mmol/L    CO2 23 21 - 32 mmol/L    Anion Gap 13 3 - 16    Glucose 189 (H) 70 - 99 mg/dL    BUN 16 7 - 20 mg/dL    CREATININE 0.8 0.6 - 1.2 mg/dL    GFR Non-African American >60 >60    GFR African American >60 >60    Calcium 8.6 8.3 - 10.6 mg/dL    Total Protein 7.8 6.4 - 8.2 g/dL    Albumin 4.2 3.4 - 5.0 g/dL    Albumin/Globulin Ratio 1.2 1.1 - 2.2    Total Bilirubin 0.3 0.0 - 1.0 mg/dL    Alkaline Phosphatase 105 40 - 129 U/L    ALT 13 10 - 40 U/L    AST 20 15 - 37 U/L   Lipase   Result Value Ref Range    Lipase 23.0 13.0 - 60.0 U/L   Troponin   Result Value Ref Range    Troponin <0.01 <0.01 ng/mL   Urinalysis with Reflex to Culture    Specimen: Urine   Result Value Ref Range    Color, UA Yellow Straw/Yellow    Clarity, UA Clear Clear    Glucose, Ur Negative Negative mg/dL    Bilirubin Urine Negative Negative    Ketones, Urine Negative Negative mg/dL    Specific Gravity, UA 1.015 1.005 - 1.030    Blood, Urine TRACE-INTACT (A) Negative    pH, UA 6.0 5.0 - 8.0    Protein, UA Negative Negative mg/dL    Urobilinogen, Urine 0.2 <2.0 E.U./dL    Nitrite, Urine Negative Negative    Leukocyte Esterase, Urine Negative Negative    Microscopic Examination YES     Urine Type NotGiven     Urine Reflex to Culture Not Indicated    Microscopic Urinalysis   Result Value Ref Range    WBC, UA 0-2 0 - 5 /HPF    RBC, UA None seen 0 - 4 /HPF    Epithelial Cells, UA 0-1 0 - 5 /HPF   EKG 12 Lead   Result Value Ref Range    Ventricular Rate 72 BPM    Atrial Rate 72 BPM    P-R Interval 182 ms    QRS Duration 90 ms    Q-T Interval 406 ms    QTc Calculation (Bazett) 444 ms    P Axis 87 degrees    R Axis 0 degrees    T Axis 79 degrees    Diagnosis       Sinus rhythm with occasional Premature ventricular complexesOtherwise normal ECGWhen compared with ECG of 04-DEC-2021 07:54,Premature ventricular complexes are now PresentConfirmed by Verona Becker MD, Jeronimo Lacey ([id number]) on 2/18/2022 4:14:43 PM         [facility]  Patient seen and evaluated. Relevant records reviewed. 28-year-old female presents with mild abdominal pain for the past 2 to 3 days. She is well-appearing on exam her vitals are notable for elevated blood pressure however she has not taken her blood pressure medications. Otherwise within normal limits. Her exam is notable for mild tenderness of the abdomen without guarding or rebound. Given her age and history felt CT was appropriate she is sent for CT abdomen pelvis which does not show any obvious acute process.   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 I discussed these findings with the patient. Patient states that her pain is completely resolved since she arrived in the emergency department. I doubt vascular process surgical process or any cardiopulmonary process or sepsis at this point. I completed a structured, evidence-based clinical evaluation to screen for acute emergencies for the above complaints. Available evidence indicate that the patient is low risk and this is consistent with my clinical intuition. At this point I do not feel the patient requires further work up and it is reasonable to discharge the patient. The risk of further workup or hospitalization is likely higher than the likelihood of the patient having a dangerous emergent condition/outcome. It is, therefore, in the patients best interest not to do additional emergent testing. I had a discussion with the patient and/or their surrogate regarding diagnosis, diagnostic testing results, treatment/ plan of care, and follow up. Incidental findings of labs/imaging also discussed. There was shared decision-making between myself as well as the patient and/or their surrogate and we are all in agreement with discharge home. There was an opportunity for questions and all questions were answered to the best of my ability and to the satisfaction of the patient and/or patient family. Patient agreed to follow up with GI for further evaluation/treatment. The patient was given strict return precautions as we discussed symptoms that would necessitate return to the ED. Patient will return to ED for new/worsening symptoms. The patient verbalized their understanding and agreement with the above plan. Please refer to AVS for further details regarding discharge instructions. Clinical Impression:  1. Abdominal pain, unspecified abdominal location    2.  Elevated blood pressure reading          Disposition:  Discharge to home in good condition. Blood pressure (!) 187/71, pulse 57, temperature 98.4 °F (36.9 °C), temperature source Oral, resp. rate 19, height 5' 2\" (1.575 m), weight 190 lb (86.2 kg), SpO2 98 %. Patient was given scripts for the following medications. I counseled patient how to take these medications. Discharge Medication List as of 2/18/2022 10:18 AM          Disposition referral (if applicable):  Rashard Parker MD  [address] [facility] [id number]  Deneen Joseph MD  [address]    Schedule an appointment as soon as possible for a visit           Total critical care time is 0 minutes, which excludes separately billable procedures and updating family. Time spent is specifically for management of the presenting complaint and symptoms initially, direct bedside care, reevaluation, review of records, and consultation. There was a high probability of clinically significant life-threatening deterioration in the patient's condition, which required my urgent intervention. This chart was generated in part by using Dragon Dictation system and may contain errors related to that system including errors in grammar, punctuation, and spelling, as well as words and phrases that may be inappropriate. If there are any questions or concerns please feel free to contact the dictating provider for clarification.      Shaista Bonilla MD  [address] Hernandez MD  02/19/22 0800

## 2022-02-19 ASSESSMENT — ENCOUNTER SYMPTOMS
BACK PAIN: 0
DIARRHEA: 0
RHINORRHEA: 0
ABDOMINAL DISTENTION: 0
NAUSEA: 1
PHOTOPHOBIA: 0
WHEEZING: 0
ABDOMINAL PAIN: 1
SHORTNESS OF BREATH: 0
COUGH: 0
VOMITING: 0

## 2022-04-06 ENCOUNTER — TELEPHONE (OUTPATIENT)
Dept: CARDIOLOGY CLINIC | Age: 87
End: 2022-04-06

## 2022-04-06 NOTE — TELEPHONE ENCOUNTER
Ordered pt's tikosyn. Spoke with pt, informed pt that I have ordered her tikosyn and will call her when it arrives.

## 2022-04-06 NOTE — TELEPHONE ENCOUNTER
Pt stated she was told to call when she is on her last bottle of Tikosyn so financial assistance paperwork can be started. Pt stated the paper work she received gave an ID # [id number] and the phone # is [phone]. Pt was unsure if staff would need that information or not.

## 2022-04-13 NOTE — TELEPHONE ENCOUNTER
Óscar received by office. Called pt and informed it is ready for pickup. 1 packing slip given to Stewart Greenberg, the other will be signed by pt when she comes for pickup.

## 2022-04-14 ENCOUNTER — HOSPITAL ENCOUNTER (EMERGENCY)
Age: 87
Discharge: HOME | End: 2022-04-15
Attending: EMERGENCY MEDICINE
Payer: MEDICARE

## 2022-04-14 ENCOUNTER — ANCILLARY PROCEDURE (OUTPATIENT)
Dept: EMERGENCY DEPT | Age: 87
End: 2022-04-14
Payer: MEDICARE

## 2022-04-14 ENCOUNTER — APPOINTMENT (OUTPATIENT)
Dept: GENERAL RADIOLOGY | Age: 87
End: 2022-04-14
Payer: MEDICARE

## 2022-04-14 DIAGNOSIS — I48.91 ATRIAL FIBRILLATION, UNSPECIFIED TYPE (HCC): Primary | ICD-10-CM

## 2022-04-14 LAB
A/G RATIO: 1.1 (ref 1.1–2.2)
ALBUMIN SERPL-MCNC: 4 G/DL (ref 3.4–5)
ALP BLD-CCNC: 111 U/L (ref 40–129)
ALT SERPL-CCNC: 16 U/L (ref 10–40)
ANION GAP SERPL CALCULATED.3IONS-SCNC: 13 MMOL/L (ref 3–16)
AST SERPL-CCNC: 19 U/L (ref 15–37)
BASOPHILS ABSOLUTE: 0.1 K/UL (ref 0–0.2)
BASOPHILS RELATIVE PERCENT: 0.8 %
BILIRUB SERPL-MCNC: <0.2 MG/DL (ref 0–1)
BUN BLDV-MCNC: 12 MG/DL (ref 7–20)
CALCIUM SERPL-MCNC: 8.6 MG/DL (ref 8.3–10.6)
CHLORIDE BLD-SCNC: 101 MMOL/L (ref 99–110)
CO2: 24 MMOL/L (ref 21–32)
CREAT SERPL-MCNC: 0.8 MG/DL (ref 0.6–1.2)
EOSINOPHILS ABSOLUTE: 0.4 K/UL (ref 0–0.6)
EOSINOPHILS RELATIVE PERCENT: 3.4 %
GFR AFRICAN AMERICAN: >60
GFR NON-AFRICAN AMERICAN: >60
GLUCOSE BLD-MCNC: 272 MG/DL (ref 70–99)
HCT VFR BLD CALC: 43 % (ref 36–48)
HEMOGLOBIN: 14.2 G/DL (ref 12–16)
LYMPHOCYTES ABSOLUTE: 3.2 K/UL (ref 1–5.1)
LYMPHOCYTES RELATIVE PERCENT: 29.5 %
MCH RBC QN AUTO: 25.6 PG (ref 26–34)
MCHC RBC AUTO-ENTMCNC: 33 G/DL (ref 31–36)
MCV RBC AUTO: 77.7 FL (ref 80–100)
MONOCYTES ABSOLUTE: 1.2 K/UL (ref 0–1.3)
MONOCYTES RELATIVE PERCENT: 11.2 %
NEUTROPHILS ABSOLUTE: 5.9 K/UL (ref 1.7–7.7)
NEUTROPHILS RELATIVE PERCENT: 55.1 %
PDW BLD-RTO: 16.1 % (ref 12.4–15.4)
PLATELET # BLD: 351 K/UL (ref 135–450)
PMV BLD AUTO: 8.8 FL (ref 5–10.5)
POTASSIUM SERPL-SCNC: 4.3 MMOL/L (ref 3.5–5.1)
PRO-BNP: 431 PG/ML (ref 0–449)
RBC # BLD: 5.54 M/UL (ref 4–5.2)
SODIUM BLD-SCNC: 138 MMOL/L (ref 136–145)
TOTAL PROTEIN: 7.6 G/DL (ref 6.4–8.2)
TROPONIN: <0.01 NG/ML
WBC # BLD: 10.8 K/UL (ref 4–11)

## 2022-04-14 PROCEDURE — 80053 COMPREHEN METABOLIC PANEL: CPT

## 2022-04-14 PROCEDURE — 99284 EMERGENCY DEPT VISIT MOD MDM: CPT

## 2022-04-14 PROCEDURE — 83880 ASSAY OF NATRIURETIC PEPTIDE: CPT

## 2022-04-14 PROCEDURE — 84484 ASSAY OF TROPONIN QUANT: CPT

## 2022-04-14 PROCEDURE — 85025 COMPLETE CBC W/AUTO DIFF WBC: CPT

## 2022-04-14 PROCEDURE — 93005 ELECTROCARDIOGRAM TRACING: CPT | Performed by: EMERGENCY MEDICINE

## 2022-04-14 PROCEDURE — 93308 TTE F-UP OR LMTD: CPT

## 2022-04-14 PROCEDURE — 71045 X-RAY EXAM CHEST 1 VIEW: CPT

## 2022-04-14 RX ORDER — DILTIAZEM HYDROCHLORIDE 5 MG/ML
10 INJECTION INTRAVENOUS ONCE
Status: DISCONTINUED | OUTPATIENT
Start: 2022-04-14 | End: 2022-04-15

## 2022-04-15 VITALS
DIASTOLIC BLOOD PRESSURE: 77 MMHG | HEART RATE: 59 BPM | SYSTOLIC BLOOD PRESSURE: 189 MMHG | TEMPERATURE: 98 F | OXYGEN SATURATION: 97 % | RESPIRATION RATE: 28 BRPM

## 2022-04-15 LAB
EKG ATRIAL RATE: 141 BPM
EKG ATRIAL RATE: 63 BPM
EKG ATRIAL RATE: 64 BPM
EKG ATRIAL RATE: 93 BPM
EKG DIAGNOSIS: NORMAL
EKG P AXIS: 67 DEGREES
EKG P AXIS: 73 DEGREES
EKG P-R INTERVAL: 178 MS
EKG P-R INTERVAL: 184 MS
EKG Q-T INTERVAL: 316 MS
EKG Q-T INTERVAL: 334 MS
EKG Q-T INTERVAL: 420 MS
EKG Q-T INTERVAL: 440 MS
EKG QRS DURATION: 86 MS
EKG QRS DURATION: 88 MS
EKG QRS DURATION: 88 MS
EKG QRS DURATION: 94 MS
EKG QTC CALCULATION (BAZETT): 433 MS
EKG QTC CALCULATION (BAZETT): 450 MS
EKG QTC CALCULATION (BAZETT): 470 MS
EKG QTC CALCULATION (BAZETT): 497 MS
EKG R AXIS: 11 DEGREES
EKG R AXIS: 14 DEGREES
EKG R AXIS: 18 DEGREES
EKG R AXIS: 30 DEGREES
EKG T AXIS: 54 DEGREES
EKG T AXIS: 65 DEGREES
EKG T AXIS: 84 DEGREES
EKG T AXIS: 84 DEGREES
EKG VENTRICULAR RATE: 133 BPM
EKG VENTRICULAR RATE: 133 BPM
EKG VENTRICULAR RATE: 63 BPM
EKG VENTRICULAR RATE: 64 BPM

## 2022-04-15 PROCEDURE — 93010 ELECTROCARDIOGRAM REPORT: CPT | Performed by: INTERNAL MEDICINE

## 2022-04-15 PROCEDURE — 93005 ELECTROCARDIOGRAM TRACING: CPT | Performed by: EMERGENCY MEDICINE

## 2022-04-15 NOTE — ED NOTES
Patient had a 13 beat run of v tach. Staff presented to bedside. Patient denied feeling any fluttering, pain or SOB. Dr. Inman Ice at bedside to speak to patient and her . Patient requests to go home. Denies any pain or SOB.       Amirah Jimenez RN  04/15/22 0730

## 2022-04-15 NOTE — ED PROVIDER NOTES
Brother     High Cholesterol Brother      Social History     Socioeconomic History    Marital status:      Spouse name: Not on file    Number of children: Not on file    Years of education: Not on file    Highest education level: Not on file   Occupational History    Not on file   Tobacco Use    Smoking status: Never Smoker    Smokeless tobacco: Never Used   Vaping Use    Vaping Use: Never used   Substance and Sexual Activity    Alcohol use: No    Drug use: No    Sexual activity: Yes     Partners: Male   Other Topics Concern    Not on file   Social History Narrative    Not on file     Social Determinants of Health     Financial Resource Strain:     Difficulty of Paying Living Expenses: Not on file   Food Insecurity:     Worried About Running Out of Food in the Last Year: Not on file    Keith of Food in the Last Year: Not on file   Transportation Needs:     Lack of Transportation (Medical): Not on file    Lack of Transportation (Non-Medical): Not on file   Physical Activity:     Days of Exercise per Week: Not on file    Minutes of Exercise per Session: Not on file   Stress:     Feeling of Stress : Not on file   Social Connections:     Frequency of Communication with Friends and Family: Not on file    Frequency of Social Gatherings with Friends and Family: Not on file    Attends Worship Services: Not on file    Active Member of [address] [place] [address] [facility] or Organizations: Not on file    Attends Club or Organization Meetings: Not on file    Marital Status: Not on file   Intimate Partner Violence:     Fear of Current or Ex-Partner: Not on file    Emotionally Abused: Not on file    Physically Abused: Not on file    Sexually Abused: Not on file   Housing Stability:     Unable to Pay for Housing in the Last Year: Not on file    Number of [place] in the Last Year: Not on file    Unstable Housing in the Last Year: Not on file     No current facility-administered medications for this encounter. Current Outpatient Medications   Medication Sig Dispense Refill    apixaban (ELIQUIS) 5 MG TABS tablet Take 1 tablet by mouth 2 times daily 180 tablet 3    valsartan (DIOVAN) 320 MG tablet Take 1 tablet by mouth daily 90 tablet 1    metoprolol succinate (TOPROL XL) 100 MG extended release tablet Take 50 mg by mouth daily Prescribed as 100mg bid, pt. Takes only 50mg if HR > 60.  pantoprazole (PROTONIX) 20 MG tablet Take 2 tablets by mouth 2 times daily (Patient taking differently: Take 20 mg by mouth daily ) 60 tablet 0    pravastatin (PRAVACHOL) 80 MG tablet Take 40 mg by mouth daily      pioglitazone (ACTOS) 15 MG tablet Take 15 mg by mouth daily      dofetilide (TIKOSYN) 125 MCG capsule Take 1 capsule by mouth every 12 hours 180 capsule 3    furosemide (LASIX) 20 MG tablet Take 1 tablet by mouth daily 30 tablet 11    glyBURIDE (DIABETA) 1.25 MG tablet Take 2.5 mg by mouth 2 times daily (with meals)        Allergies   Allergen Reactions    Hydrocodone Other (See Comments)     NAUSEA AND DIZZINESS    Metformin And Related Other (See Comments)     NAUSEA AND DIZZINESS    Nickel Dermatitis    Omeprazole     Prednisone      Other reaction(s): chest pain & palpitations/H&P    Hydralazine Palpitations and Rash       REVIEW OF SYSTEMS  Positive and pertinent negatives as per HPI. All other systems were reviewed and are negative. PHYSICAL EXAM  BP (!) 189/77   Pulse 59   Temp 98 °F (36.7 °C) (Oral)   Resp 28   SpO2 97%   GENERAL APPEARANCE: Awake and alert. Cooperative. HEAD: Normocephalic. Atraumatic. HEART: Tachycardic, irregular. No harsh murmurs. Intact radial pulses 2+ bilaterally. LUNGS: Respirations unlabored without accessory muscle use. CTAB. Good air exchange. No wheezes, rales, or rhonchi. Speaking comfortably in full sentences. ABDOMEN: Soft. Non-distended. Non-tender. No guarding or rebound. EXTREMITIES: No peripheral edema. No acute deformities. SKIN: Warm and dry.  No acute rashes. NEUROLOGICAL: Alert and oriented X 3. No focal deficit    LABS  I have reviewed all labs for this visit.    Results for orders placed or performed during the hospital encounter of 04/14/22   CBC with Auto Differential   Result Value Ref Range    WBC 10.8 4.0 - 11.0 K/uL    RBC 5.54 (H) 4.00 - 5.20 M/uL    Hemoglobin 14.2 12.0 - 16.0 g/dL    Hematocrit 43.0 36.0 - 48.0 %    MCV 77.7 (L) 80.0 - 100.0 fL    MCH 25.6 (L) 26.0 - 34.0 pg    MCHC 33.0 31.0 - 36.0 g/dL    RDW 16.1 (H) 12.4 - 15.4 %    Platelets 758 574 - 614 K/uL    MPV 8.8 5.0 - 10.5 fL    Neutrophils % 55.1 %    Lymphocytes % 29.5 %    Monocytes % 11.2 %    Eosinophils % 3.4 %    Basophils % 0.8 %    Neutrophils Absolute 5.9 1.7 - 7.7 K/uL    Lymphocytes Absolute 3.2 1.0 - 5.1 K/uL    Monocytes Absolute 1.2 0.0 - 1.3 K/uL    Eosinophils Absolute 0.4 0.0 - 0.6 K/uL    Basophils Absolute 0.1 0.0 - 0.2 K/uL   Brain Natriuretic Peptide   Result Value Ref Range    Pro- 0 - 449 pg/mL   Comprehensive Metabolic Panel   Result Value Ref Range    Sodium 138 136 - 145 mmol/L    Potassium 4.3 3.5 - 5.1 mmol/L    Chloride 101 99 - 110 mmol/L    CO2 24 21 - 32 mmol/L    Anion Gap 13 3 - 16    Glucose 272 (H) 70 - 99 mg/dL    BUN 12 7 - 20 mg/dL    CREATININE 0.8 0.6 - 1.2 mg/dL    GFR Non-African American >60 >60    GFR African American >60 >60    Calcium 8.6 8.3 - 10.6 mg/dL    Total Protein 7.6 6.4 - 8.2 g/dL    Albumin 4.0 3.4 - 5.0 g/dL    Albumin/Globulin Ratio 1.1 1.1 - 2.2    Total Bilirubin <0.2 0.0 - 1.0 mg/dL    Alkaline Phosphatase 111 40 - 129 U/L    ALT 16 10 - 40 U/L    AST 19 15 - 37 U/L   Troponin   Result Value Ref Range    Troponin <0.01 <0.01 ng/mL   Troponin   Result Value Ref Range    Troponin <0.01 <0.01 ng/mL   EKG 12 Lead   Result Value Ref Range    Ventricular Rate 133 BPM    Atrial Rate 93 BPM    QRS Duration 94 ms    Q-T Interval 316 ms    QTc Calculation (Bazett) 470 ms    R Axis 18 degrees    T Axis 84 degrees Diagnosis       Undetermined rhythmLow voltage QRSNonspecific ST abnormalityAbnormal ECGWhen compared with ECG of 18-FEB-2022 07:56,Current undetermined rhythm precludes rhythm comparison, needs review   EKG 12 Lead   Result Value Ref Range    Ventricular Rate 63 BPM    Atrial Rate 63 BPM    P-R Interval 184 ms    QRS Duration 88 ms    Q-T Interval 440 ms    QTc Calculation (Bazett) 450 ms    P Axis 67 degrees    R Axis 11 degrees    T Axis 54 degrees    Diagnosis       Normal sinus rhythmNormal ECGWhen compared with ECG of 14-APR-2022 23:18,No significant change was found       EKG  The Ekg interpreted by myself in the emergency department in the absence of a cardiologist.  atrial fibrillation with a rate of 133  Axis is   Normal  QTc is  within an acceptable range  Intervals and Durations are unremarkable. No specific ST-T wave changes appreciated. No evidence of acute ischemia. No significant change from prior EKG dated February 18, 2022, she was in sinus rhythm at this time      RADIOLOGY  X-RAYS:  I have reviewed radiologic plain film image(s). ALL OTHER NON-PLAIN FILM IMAGES SUCH AS CT, ULTRASOUND AND MRI HAVE BEEN READ BY THE RADIOLOGIST. XR CHEST PORTABLE   Final Result   Stable chronic changes with no acute abnormality seen. POC [address]   Final Result             POC US [address]    Result Date: 4/14/2022  POCUS_Cardiac:     Exam Information:          Exam type:  Clinically indicated     Indication(s) for Exam:          The exam was performed with the following indications[de-identified]  Concern for effusion, Tachycardia or arrhythmia     Views Obtained & Images Saved for These Views:           The pericardial sac, myocardium, 4 chambers, and IVC were identified using the following views[de-identified]  ALL OF THE VIEWS ABOVE WERE OBTAINED     Findings:          Pericardial Effusion:  No pericardial effusion         Cardiac Activity:  Cardiac activity normal         LV function:  Normal (> 50% EF)         RV diameter:  Normal         IVC collapsibility:  Indeterminate     Interpretation:          Normal limited cardiac ultrasound  Electronically signed by Vianey Dunbar on Thursday, April 14, 2022 at 9:54 PM : Attending:        Critical Care: Total critical care time is 35 minutes, which excludes separately billable procedures and updating family. Time spent is specifically for management of the presenting complaint and symptoms initially, direct bedside care, reevaluation, review of records, and consultation. There was a high probability of clinically significant life-threatening deterioration in the patient's condition, which required my urgent intervention. ED COURSE/MDM  Patient seen and evaluated. Old records reviewed. Labs and imaging reviewed and results discussed with patient. 50-year-old female presenting for tachycardia, shortness of breath. Patient appears to be in A. fib with RVR and symptomatic from this with chest pain, shortness of breath. Patient is otherwise hemodynamically stable. She has been compliant with her anticoagulation and I do not suspect PE as a precipitating cause of A. fib with RVR. She is on Tikosyn and metoprolol for A. fib at home. We obtain basic labs, cardiac panel, chest x-ray. She does not appear volume overloaded on exam.     Troponin x2 was negative. BNP was 431. No significant electrolyte abnormalities. Chest x-ray without focal process. Patient was about to be initiated on diltiazem for A. fib with RVR however she converted to normal sinus rhythm. EKG performed at this time reveals normal sinus rhythm with a rate of 64 bpm.  There is no ischemic changes. Patient feels much improved. As patient was getting ready to be discharged, she potentially had arrhythmia that is seen on telemetry.   Unclear whether this was true arrhythmia versus artifact. Patient was monitored after this however she did not have any additional arrhythmia. She states that she had no symptoms during this period of time. I did have a discussion with the patient and  that because I cannot completely rule out that this was an arrhythmia, recommended hospitalization for continued monitoring versus discharge home. Patient and  prefer to be discharged home and will return if she develops any worsening or changing symptoms. The patient will be discharged from the emergency department. The patient was counseled on their diagnosis and any medications prescribed. They were advised on the need for PCP followup. They were counseled on the need to return to the emergency department if any of their symptoms were to worsen, change or have any other concerns. Discharged in stable condition. CLINICAL IMPRESSION  1. Atrial fibrillation, unspecified type (HCC)        Blood pressure (!) 189/77, pulse 59, temperature 98 °F (36.7 °C), temperature source Oral, resp. rate 28, SpO2 97 %. DISPOSITION  [facility] was discharged to home in stable condition. This chart was generated in part by using Dragon Dictation system and may contain errors related to that system including errors in grammar, punctuation, and spelling, as well as words and phrases that may be inappropriate. If there are any questions or concerns please feel free to contact the dictating provider for clarification.      Kristie Nicholson MD  04/15/22 7910

## 2022-07-07 RX ORDER — METOPROLOL SUCCINATE 100 MG/1
50 TABLET, EXTENDED RELEASE ORAL DAILY
Qty: 180 TABLET | Refills: 3 | Status: SHIPPED | OUTPATIENT
Start: 2022-07-07

## 2022-07-07 NOTE — TELEPHONE ENCOUNTER
Fadia Juan from [address] [place] [address] stated that they have questions about the dosage. Fadia Juan would like clarification on the dosage of the Metoprolol Succinate 100mg. Please advise. [phone].

## 2022-07-08 ENCOUNTER — TELEPHONE (OUTPATIENT)
Dept: CARDIOLOGY CLINIC | Age: 87
End: 2022-07-08

## 2022-07-08 NOTE — TELEPHONE ENCOUNTER
Medication Refill    Medication needing refilled: dofetilide (TIKOSYN      Dosage of the medication: 125 mcg. How are you taking this medication (QD, BID, TID, QID, PRN): 1 cap every 12 hrs    30 or 90 day supply called in: 90    When will you run out of your medication: 20 days    Which Pharmacy are we sending the medication to?:      Pt stated that she picks up this prescription here at [facility].

## 2022-07-08 NOTE — TELEPHONE ENCOUNTER
Medication has been ordered. Spoke with pt, informed pt that medication has been ordered and will contact her once it has arrived at office.

## 2022-07-14 ENCOUNTER — APPOINTMENT (OUTPATIENT)
Dept: GENERAL RADIOLOGY | Age: 87
End: 2022-07-14
Payer: MEDICARE

## 2022-07-14 ENCOUNTER — HOSPITAL ENCOUNTER (EMERGENCY)
Age: 87
Discharge: HOME | End: 2022-07-14
Attending: EMERGENCY MEDICINE
Payer: MEDICARE

## 2022-07-14 VITALS
HEIGHT: 62 IN | HEART RATE: 59 BPM | DIASTOLIC BLOOD PRESSURE: 62 MMHG | RESPIRATION RATE: 21 BRPM | WEIGHT: 185 LBS | SYSTOLIC BLOOD PRESSURE: 164 MMHG | BODY MASS INDEX: 34.04 KG/M2 | OXYGEN SATURATION: 94 % | TEMPERATURE: 98.3 F

## 2022-07-14 DIAGNOSIS — I48.91 ATRIAL FIBRILLATION WITH RVR (HCC): Primary | ICD-10-CM

## 2022-07-14 DIAGNOSIS — I10 UNCONTROLLED HYPERTENSION: ICD-10-CM

## 2022-07-14 LAB
A/G RATIO: 1 (ref 1.1–2.2)
ALBUMIN SERPL-MCNC: 4.3 G/DL (ref 3.4–5)
ALP BLD-CCNC: 113 U/L (ref 40–129)
ALT SERPL-CCNC: 13 U/L (ref 10–40)
ANION GAP SERPL CALCULATED.3IONS-SCNC: 11 MMOL/L (ref 3–16)
AST SERPL-CCNC: 21 U/L (ref 15–37)
BASE EXCESS VENOUS: 0.5 MMOL/L (ref -3–3)
BASOPHILS ABSOLUTE: 0.1 K/UL (ref 0–0.2)
BASOPHILS RELATIVE PERCENT: 0.6 %
BILIRUB SERPL-MCNC: 0.5 MG/DL (ref 0–1)
BUN BLDV-MCNC: 17 MG/DL (ref 7–20)
CALCIUM SERPL-MCNC: 9.3 MG/DL (ref 8.3–10.6)
CARBOXYHEMOGLOBIN: 1.1 % (ref 0–1.5)
CHLORIDE BLD-SCNC: 100 MMOL/L (ref 99–110)
CO2: 26 MMOL/L (ref 21–32)
CREAT SERPL-MCNC: 0.8 MG/DL (ref 0.6–1.2)
EOSINOPHILS ABSOLUTE: 0.2 K/UL (ref 0–0.6)
EOSINOPHILS RELATIVE PERCENT: 2.6 %
GFR AFRICAN AMERICAN: >60
GFR NON-AFRICAN AMERICAN: >60
GLUCOSE BLD-MCNC: 222 MG/DL (ref 70–99)
HCO3 VENOUS: 27.1 MMOL/L (ref 23–29)
HCT VFR BLD CALC: 44 % (ref 36–48)
HEMOGLOBIN: 14.1 G/DL (ref 12–16)
LYMPHOCYTES ABSOLUTE: 1.8 K/UL (ref 1–5.1)
LYMPHOCYTES RELATIVE PERCENT: 19.5 %
MAGNESIUM: 2 MG/DL (ref 1.8–2.4)
MCH RBC QN AUTO: 26 PG (ref 26–34)
MCHC RBC AUTO-ENTMCNC: 32.1 G/DL (ref 31–36)
MCV RBC AUTO: 81.1 FL (ref 80–100)
METHEMOGLOBIN VENOUS: 0.5 %
MONOCYTES ABSOLUTE: 0.7 K/UL (ref 0–1.3)
MONOCYTES RELATIVE PERCENT: 8.2 %
NEUTROPHILS ABSOLUTE: 6.3 K/UL (ref 1.7–7.7)
NEUTROPHILS RELATIVE PERCENT: 69.1 %
O2 SAT, VEN: 71 %
O2 THERAPY: ABNORMAL
PCO2, VEN: 50.7 MMHG (ref 40–50)
PDW BLD-RTO: 16.4 % (ref 12.4–15.4)
PH VENOUS: 7.35 (ref 7.35–7.45)
PLATELET # BLD: 330 K/UL (ref 135–450)
PMV BLD AUTO: 8.9 FL (ref 5–10.5)
PO2, VEN: 36.5 MMHG (ref 25–40)
POTASSIUM SERPL-SCNC: 4.3 MMOL/L (ref 3.5–5.1)
PRO-BNP: 518 PG/ML (ref 0–449)
RBC # BLD: 5.43 M/UL (ref 4–5.2)
SODIUM BLD-SCNC: 137 MMOL/L (ref 136–145)
TCO2 CALC VENOUS: 29 MMOL/L
TOTAL PROTEIN: 8.5 G/DL (ref 6.4–8.2)
TROPONIN: <0.01 NG/ML
WBC # BLD: 9.1 K/UL (ref 4–11)

## 2022-07-14 PROCEDURE — 93005 ELECTROCARDIOGRAM TRACING: CPT | Performed by: EMERGENCY MEDICINE

## 2022-07-14 PROCEDURE — 85025 COMPLETE CBC W/AUTO DIFF WBC: CPT

## 2022-07-14 PROCEDURE — 82803 BLOOD GASES ANY COMBINATION: CPT

## 2022-07-14 PROCEDURE — 96374 THER/PROPH/DIAG INJ IV PUSH: CPT

## 2022-07-14 PROCEDURE — 6370000000 HC RX 637 (ALT 250 FOR IP): Performed by: EMERGENCY MEDICINE

## 2022-07-14 PROCEDURE — 83880 ASSAY OF NATRIURETIC PEPTIDE: CPT

## 2022-07-14 PROCEDURE — 71045 X-RAY EXAM CHEST 1 VIEW: CPT

## 2022-07-14 PROCEDURE — 2500000003 HC RX 250 WO HCPCS: Performed by: EMERGENCY MEDICINE

## 2022-07-14 PROCEDURE — 99285 EMERGENCY DEPT VISIT HI MDM: CPT

## 2022-07-14 PROCEDURE — 83735 ASSAY OF MAGNESIUM: CPT

## 2022-07-14 PROCEDURE — 80053 COMPREHEN METABOLIC PANEL: CPT

## 2022-07-14 PROCEDURE — 84484 ASSAY OF TROPONIN QUANT: CPT

## 2022-07-14 RX ORDER — ASPIRIN 81 MG/1
162 TABLET, CHEWABLE ORAL ONCE
Status: COMPLETED | OUTPATIENT
Start: 2022-07-14 | End: 2022-07-14

## 2022-07-14 RX ORDER — METOPROLOL TARTRATE 5 MG/5ML
5 INJECTION INTRAVENOUS ONCE
Status: COMPLETED | OUTPATIENT
Start: 2022-07-14 | End: 2022-07-14

## 2022-07-14 RX ADMIN — ASPIRIN 162 MG: 81 TABLET, CHEWABLE ORAL at 17:13

## 2022-07-14 RX ADMIN — METOPROLOL TARTRATE 5 MG: 1 INJECTION, SOLUTION INTRAVENOUS at 17:14

## 2022-07-14 ASSESSMENT — PAIN - FUNCTIONAL ASSESSMENT: PAIN_FUNCTIONAL_ASSESSMENT: NONE - DENIES PAIN

## 2022-07-15 ENCOUNTER — TELEPHONE (OUTPATIENT)
Dept: CARDIOLOGY CLINIC | Age: 87
End: 2022-07-15

## 2022-07-15 LAB
EKG ATRIAL RATE: 138 BPM
EKG ATRIAL RATE: 61 BPM
EKG DIAGNOSIS: NORMAL
EKG DIAGNOSIS: NORMAL
EKG P AXIS: 69 DEGREES
EKG P-R INTERVAL: 134 MS
EKG P-R INTERVAL: 192 MS
EKG Q-T INTERVAL: 298 MS
EKG Q-T INTERVAL: 438 MS
EKG QRS DURATION: 86 MS
EKG QRS DURATION: 88 MS
EKG QTC CALCULATION (BAZETT): 440 MS
EKG QTC CALCULATION (BAZETT): 451 MS
EKG R AXIS: -15 DEGREES
EKG R AXIS: 0 DEGREES
EKG T AXIS: 108 DEGREES
EKG T AXIS: 58 DEGREES
EKG VENTRICULAR RATE: 138 BPM
EKG VENTRICULAR RATE: 61 BPM

## 2022-07-15 PROCEDURE — 93010 ELECTROCARDIOGRAM REPORT: CPT | Performed by: INTERNAL MEDICINE

## 2022-07-15 NOTE — TELEPHONE ENCOUNTER
Spoke with patient. She is feeling better today. She has appointment with [facility] next week and can further discuss at that time.  She V/U.     ABDULKADIR SINGLETARY

## 2022-07-15 NOTE — TELEPHONE ENCOUNTER
Pt calling in to make [address] [place],[address] aware that she was in the ED yesterday (07/13/22) due to AFIB again.

## 2022-07-15 NOTE — ED PROVIDER NOTES
CHIEF COMPLAINT  Atrial Fibrillation (Not new diagnosis. Patient normally takes meds at 9am took them at 2pm today and started with palpitations right before taking medicines )      HISTORY OF PRESENT ILLNESS  Adriana Irvin is a 81 y.o. female with a history of atrial fibrillation, diabetes, hypertension, CHF who presents to the ED complaining of palpitations. Patient reports being under significant stress lately. Took her blood pressure/arrhythmia medication much later than usual today. Around 2 PM she experienced sensation of heart racing and skipping beats. Around the same time she had mild nonexertional substernal chest discomfort radiating to her throat. Chest discomfort resolved prior to arrival.  She denies dyspnea, diaphoresis, near syncope, nausea, vomiting, fever, chills, abdominal pain. No other complaints, modifying factors or associated symptoms. I have reviewed the following from the nursing documentation.     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     Past Surgical History:   Procedure Laterality Date    ABLATION OF DYSRHYTHMIC FOCUS      -2014    APPENDECTOMY      DILATATION, ESOPHAGUS      FRACTURE SURGERY      L ankle    HYSTERECTOMY      SKIN BIOPSY      SMALL INTESTINE SURGERY      TONSILLECTOMY      UPPER GASTROINTESTINAL ENDOSCOPY N/A 4/21/2021    EGD POLYP SNARE performed by Kodi Baez DO at [address]  4/21/2021    EGD CONTROL HEMORRHAGE performed by Kodi Baez DO at [address] ENDOSCOPY  4/21/2021    EGD BIOPSY performed by Kodi Baez DO at [address]     Family History   Problem Relation Age of Onset    Diabetes Mother    Melanie Coral Heart Disease Father     High Blood Pressure Father     High Cholesterol Father     Cancer Sister     Heart Disease Brother     High Blood Pressure Brother     High Cholesterol Brother      Social History     Socioeconomic History    Marital status:      Spouse name: Not on file    Number of children: Not on file    Years of education: Not on file    Highest education level: Not on file   Occupational History    Not on file   Tobacco Use    Smoking status: Never Smoker    Smokeless tobacco: Never Used   Vaping Use    Vaping Use: Never used   Substance and Sexual Activity    Alcohol use: No    Drug use: No    Sexual activity: Yes     Partners: Male   Other Topics Concern    Not on file   Social History Narrative    Not on file     Social Determinants of Health     Financial Resource Strain:     Difficulty of Paying Living Expenses: Not on file   Food Insecurity:     Worried About Running Out of Food in the Last Year: Not on file    Keith of Food in the Last Year: Not on file   Transportation Needs:     Lack of Transportation (Medical): Not on file    Lack of Transportation (Non-Medical):  Not on file   Physical Activity:     Days of Exercise per Week: Not on file    Minutes of Exercise per Session: Not on file   Stress:     Feeling of Stress : Not on file   Social Connections:     Frequency of Communication with Friends and Family: Not on file    Frequency of Social Gatherings with Friends and Family: Not on file    Attends Shinto Services: Not on file    Active Member of Clubs or Organizations: Not on file    Attends Club or Organization Meetings: Not on file    Marital Status: Not on file   Intimate Partner Violence:     Fear of Current or Ex-Partner: Not on file    Emotionally Abused: Not on file    Physically Abused: Not on file    Sexually Abused: Not on file   Housing Stability:     Unable to Pay for Housing in the Last Year: Not on file    Number of Places Lived in the Last Year: Not on file    Unstable Housing in the Last Year: Not on file     No current facility-administered medications for this encounter. Current Outpatient Medications   Medication Sig Dispense Refill    metoprolol succinate (TOPROL XL) 100 MG extended release tablet Take 0.5 tablets by mouth daily Prescribed as 100mg bid, pt. Takes only 50mg if HR > 60. 180 tablet 3    apixaban (ELIQUIS) 5 MG TABS tablet Take 1 tablet by mouth 2 times daily 180 tablet 3    valsartan (DIOVAN) 320 MG tablet Take 1 tablet by mouth daily 90 tablet 1    pantoprazole (PROTONIX) 20 MG tablet Take 2 tablets by mouth 2 times daily (Patient taking differently: Take 20 mg by mouth daily ) 60 tablet 0    pravastatin (PRAVACHOL) 80 MG tablet Take 40 mg by mouth daily      pioglitazone (ACTOS) 15 MG tablet Take 15 mg by mouth daily      dofetilide (TIKOSYN) 125 MCG capsule Take 1 capsule by mouth every 12 hours 180 capsule 3    furosemide (LASIX) 20 MG tablet Take 1 tablet by mouth daily 30 tablet 11    glyBURIDE (DIABETA) 1.25 MG tablet Take 2.5 mg by mouth 2 times daily (with meals)        Allergies   Allergen Reactions    Hydrocodone Other (See Comments)     NAUSEA AND DIZZINESS    Metformin And Related Other (See Comments)     NAUSEA AND DIZZINESS    Nickel Dermatitis    Omeprazole     Prednisone      Other reaction(s): chest pain & palpitations/H&P    Hydralazine Palpitations and Rash       REVIEW OF SYSTEMS  10 systems reviewed, pertinent positives per HPI otherwise noted to be negative. PHYSICAL EXAM  BP (!) 164/62   Pulse 59   Temp 98.3 °F (36.8 °C) (Oral)   Resp 21   Ht 5' 2\" (1.575 m)   Wt 185 lb (83.9 kg)   SpO2 94%   BMI 33.84 kg/m²   GENERAL APPEARANCE: Awake and alert. Cooperative. No acute distress. Overweight. HEAD: Normocephalic. Atraumatic. EYES: PERRL. EOM's grossly intact. ENT: Mucous membranes are moist.   NECK: Supple, trachea midline.    HEART: Irregularly irregular, rate 130. Normal S1, S2. No murmurs, rubs or gallops. LUNGS: Respirations unlabored. CTAB. Good air exchange. No wheezes, rales, or rhonchi. Speaking comfortably in full sentences. ABDOMEN: Soft. Non-distended. Non-tender. No guarding or rebound. Normal Bowel sounds. EXTREMITIES: No peripheral edema. MAEE. No acute deformities. SKIN: Warm and dry. No acute rashes. NEUROLOGICAL: Alert and oriented X 3. CN II-XII intact. No gross facial drooping. Strength 5/5, sensation intact. No pronator drift. Normal coordination. PSYCHIATRIC: Slightly anxious. LABS  I have reviewed all labs for this visit.    Results for orders placed or performed during the hospital encounter of 07/14/22   CBC with Auto Differential   Result Value Ref Range    WBC 9.1 4.0 - 11.0 K/uL    RBC 5.43 (H) 4.00 - 5.20 M/uL    Hemoglobin 14.1 12.0 - 16.0 g/dL    Hematocrit 44.0 36.0 - 48.0 %    MCV 81.1 80.0 - 100.0 fL    MCH 26.0 26.0 - 34.0 pg    MCHC 32.1 31.0 - 36.0 g/dL    RDW 16.4 (H) 12.4 - 15.4 %    Platelets 343 215 - 298 K/uL    MPV 8.9 5.0 - 10.5 fL    Neutrophils % 69.1 %    Lymphocytes % 19.5 %    Monocytes % 8.2 %    Eosinophils % 2.6 %    Basophils % 0.6 %    Neutrophils Absolute 6.3 1.7 - 7.7 K/uL    Lymphocytes Absolute 1.8 1.0 - 5.1 K/uL    Monocytes Absolute 0.7 0.0 - 1.3 K/uL    Eosinophils Absolute 0.2 0.0 - 0.6 K/uL    Basophils Absolute 0.1 0.0 - 0.2 K/uL   Comprehensive Metabolic Panel   Result Value Ref Range    Sodium 137 136 - 145 mmol/L    Potassium 4.3 3.5 - 5.1 mmol/L    Chloride 100 99 - 110 mmol/L    CO2 26 21 - 32 mmol/L    Anion Gap 11 3 - 16    Glucose 222 (H) 70 - 99 mg/dL    BUN 17 7 - 20 mg/dL    CREATININE 0.8 0.6 - 1.2 mg/dL    GFR Non-African American >60 >60    GFR African American >60 >60    Calcium 9.3 8.3 - 10.6 mg/dL    Total Protein 8.5 (H) 6.4 - 8.2 g/dL    Albumin 4.3 3.4 - 5.0 g/dL    Albumin/Globulin Ratio 1.0 (L) 1.1 - 2.2    Total Bilirubin 0.5 0.0 - 1.0 mg/dL    Alkaline Phosphatase 113 40 - SUCH AS CT, ULTRASOUND AND MRI HAVE BEEN READ BY THE RADIOLOGIST. XR CHEST PORTABLE   Final Result   No acute cardiopulmonary disease. Rechecks: Physical assessment performed. Resting comfortably, asymptomatic, eager to go home. ED COURSE/MDM  Patient seen and evaluated. Old records reviewed. Labs and imaging reviewed and results discussed with patient. Patient with heart palpitations and substernal chest discomfort starting around 2 PM this afternoon. States she has been under a great deal of stress and also took her medications later than usual.  Was found to be in A. fib with mild RVR with elevated blood pressure. Patient received diltiazem and converted to normal sinus rhythm. Blood pressure improved on reevaluation. Chest discomfort resolved prior to arrival.  Work-up including cardiac enzymes unremarkable. Plan for close outpatient follow-up versus return with any concerns. Discharge Medication List as of 7/14/2022  7:35 PM          CLINICAL IMPRESSION  1. Atrial fibrillation with RVR ([address]    2. Uncontrolled hypertension        Blood pressure (!) 164/62, pulse 59, temperature 98.3 °F (36.8 °C), temperature source Oral, resp. rate 21, height 5' 2\" (1.575 m), weight 185 lb (83.9 kg), SpO2 94 %. DISPOSITION  Peña Tiwari was discharged to home in stable condition.         Marisela Aguero MD  07/14/22 1209

## 2022-07-20 NOTE — PROGRESS NOTES
[facility]   Electrophysiology Outpatient Note              Date:  July 21, 2022  Patient name: Saul Tiwari  YOB: 1935    Primary Care physician: Deb Frost MD    HISTORY OF PRESENT ILLNESS: The patient is a 81 y.o.  female with a history of AF, PVC's, HTN, SB, SVT, pulmonary HTN, chronic diastolic CHF an DM. In 2013, she had SVT. She wore a monitor which detected AF. In 2/2014, she underwent cryoablation of AF. Post procedure she had a retroperitoneal bleed and required a urinary stent. In 2/2019, echo showed an EF of 55-60% and severe pulmonary HTN and she was referred to CHF team. In 1/2020, AF was recurrent and amiodarone was started. Amiodarone was stopped for unknown reasons. In 12/2020, Tikosyn was started. In 12/2021, echo showed an EF of 55 to 60% and a normal SPAP. Today she is being seen for AF. On 7/14/2022, patient was evaluated in the ER with symptomatic rapid atrial fibrillation. She was given some IV metoprolol and spontaneously converted to sinus rhythm within several hours. Reports that she had missed several doses of Tikosyn prior to this day due to her  being in the hospital.  She also complains of ongoing palpitations. Denies shortness of breath or dizziness or chest pain. Past Medical History:   has a past medical history of Atrial fibrillation ([address] Diabetes mellitus ([address] Hydronephrosis, Hyperlipidemia, Hypertension, Intussusception intestine ([address] and SVT (supraventricular tachycardia) ([address] Past Surgical History:   has a past surgical history that includes Hysterectomy; Appendectomy; Tonsillectomy; skin biopsy; ablation of dysrhythmic focus; Dilatation, esophagus; Small intestine surgery; fracture surgery; Upper gastrointestinal endoscopy (N/A, 4/21/2021); Upper gastrointestinal endoscopy (4/21/2021); and Upper gastrointestinal endoscopy (4/21/2021).      Home Medications:    Prior to Admission medications Medication Sig Start Date End Date Taking? Authorizing Provider   metoprolol succinate (TOPROL XL) 100 MG extended release tablet Take 0.5 tablets by mouth daily Prescribed as 100mg bid, pt. Takes only 50mg if HR > 60. 7/7/22  Yes GUANAKO Erickson CNP   apixaban (ELIQUIS) 5 MG TABS tablet Take 1 tablet by mouth 2 times daily 6/10/21  Yes GUANAKO Castillo CNP   valsartan (DIOVAN) 320 MG tablet Take 1 tablet by mouth daily 5/26/21  Yes GUANAKO Castillo - CNP   pantoprazole (PROTONIX) 20 MG tablet Take 2 tablets by mouth 2 times daily  Patient taking differently: Take 20 mg by mouth in the morning. 4/22/21  Yes Emma Deleon MD   pravastatin (PRAVACHOL) 80 MG tablet Take 40 mg by mouth daily   Yes Historical Provider, MD   pioglitazone (ACTOS) 15 MG tablet Take 15 mg by mouth daily   Yes Historical Provider, MD   dofetilide (TIKOSYN) 125 MCG capsule Take 1 capsule by mouth every 12 hours 2/4/21  Yes Otto Connor MD   furosemide (LASIX) 20 MG tablet Take 1 tablet by mouth daily 1/18/21  Yes Oli Burrows MD   glyBURIDE (DIABETA) 1.25 MG tablet Take 2.5 mg by mouth 2 times daily (with meals)    Yes Historical Provider, MD       Allergies:  Hydrocodone, Metformin and related, Nickel, Omeprazole, Prednisone, and Hydralazine    Social History:   reports that she has never smoked. She has never used smokeless tobacco. She reports that she does not drink alcohol and does not use drugs. Family History: family history includes Cancer in her sister; Diabetes in her mother; Heart Disease in her brother and father; High Blood Pressure in her brother and father; High Cholesterol in her brother and father. All 14 point review of systems are completed and pertinent positives are mentioned in the history of present illness. Other systems are reviewed and are negative.      PHYSICAL EXAM:    Vital signs:    BP (!) 146/78   Pulse 59   Ht 5' 2\" (1.575 m)   Wt 191 lb (86.6 kg)   SpO2 97%   BMI 34.93 kg/m²      Constitutional and general appearance: alert, cooperative, no distress and appears stated age  HEENT: PERRL, no cervical lymphadenopathy. No masses palpable. Normal oral mucosa  Respiratory:  Normal excursion and expansion without use of accessory muscles  Resp auscultation: Normal breath sounds without wheezing, rhonchi, and rales  Cardiovascular: The apical impulse is not displaced  Heart tones are crisp and normal. regular S1 and S2.  Jugular venous pulsation Normal  The carotid upstroke is normal in amplitude and contour without delay or bruit  Peripheral pulses are symmetrical and full   Abdomen:  No masses or tenderness  Bowel sounds present  Extremities:   No cyanosis or clubbing   No lower extremity edema   Skin: warm and dry  Neurological:  Alert and oriented  Moves all extremities well  No abnormalities of mood, affect, memory, mentation, or behavior are noted    DATA:    ECG 11/16/2021:  SR 60 bpm     Echo 4/28/2020:  Normal left ventricle systolic function with an estimated ejection fraction of 55-60%. Definity contrast administered with no evidence of left ventricular mass or thrombus noted. No regional wall motion abnormalities are seen. Normal left ventricular diastolic filling pressure. The left atrium is mildly dilated. Mild mitral and tricuspid regurgitation. Systolic pulmonary artery pressure (SPAP) estimated at 42 mmHg (right atrial pressure 3 mmHg), consistent with mild pulmonary hypertension. Echo 2/19/2019:  Definity contrast administered. Normal left ventricular systolic function with an estimated ejection fraction of 55-60%. There is mild septal hypertrophy with normal remaining wall thickness. Normal left ventricular diastolic filling pressure. Mild mitral regurgitation. Mild tricuspid regurgitation. Systolic pulmonary artery pressure (SPAP) estimated at 60 mmHg (RA pressure 3 mmHg), consistent with severe pulmonary hypertension.   Mild pulmonic regurgitation

## 2022-07-21 ENCOUNTER — TELEPHONE (OUTPATIENT)
Dept: CARDIOLOGY CLINIC | Age: 87
End: 2022-07-21

## 2022-07-21 ENCOUNTER — OFFICE VISIT (OUTPATIENT)
Dept: CARDIOLOGY CLINIC | Age: 87
End: 2022-07-21
Payer: MEDICARE

## 2022-07-21 VITALS
OXYGEN SATURATION: 97 % | SYSTOLIC BLOOD PRESSURE: 146 MMHG | WEIGHT: 191 LBS | BODY MASS INDEX: 35.15 KG/M2 | HEART RATE: 59 BPM | HEIGHT: 62 IN | DIASTOLIC BLOOD PRESSURE: 78 MMHG

## 2022-07-21 DIAGNOSIS — I10 ESSENTIAL HYPERTENSION: ICD-10-CM

## 2022-07-21 DIAGNOSIS — R00.2 PALPITATION: ICD-10-CM

## 2022-07-21 DIAGNOSIS — I48.0 PAROXYSMAL ATRIAL FIBRILLATION (HCC): Primary | ICD-10-CM

## 2022-07-21 PROCEDURE — 1123F ACP DISCUSS/DSCN MKR DOCD: CPT | Performed by: NURSE PRACTITIONER

## 2022-07-21 PROCEDURE — 99215 OFFICE O/P EST HI 40 MIN: CPT | Performed by: NURSE PRACTITIONER

## 2022-07-21 NOTE — PATIENT INSTRUCTIONS
Start as needed diltiazem 30 mg every 6 hours for breakthrough atrial fib    Two week event monitor for complaints of palpitations    Refrain from missing doses of Tikosyn     Follow up in 3 months

## 2022-07-21 NOTE — TELEPHONE ENCOUNTER
Monitor placed by Karson Beck, [address]  Length of monitor 14 days  Monitor ordered by HU BRINK Ventura  Serial number [id number]  Activation successful prior to pt leaving office?  Yes

## 2022-08-12 PROCEDURE — 93228 REMOTE 30 DAY ECG REV/REPORT: CPT | Performed by: INTERNAL MEDICINE

## 2022-08-16 DIAGNOSIS — I48.0 PAROXYSMAL ATRIAL FIBRILLATION (HCC): Primary | ICD-10-CM

## 2022-08-18 ENCOUNTER — TELEPHONE (OUTPATIENT)
Dept: CARDIOLOGY CLINIC | Age: 87
End: 2022-08-18

## 2022-08-18 NOTE — TELEPHONE ENCOUNTER
Spoke with the patient and relayed monitor results. Instructed patient to keep her upcoming follow up appointments.  She V/U.

## 2022-08-18 NOTE — TELEPHONE ENCOUNTER
----- Message from GUANAKO Blank CNP sent at 8/17/2022  5:13 PM EDT -----  Please notify patient of event monitor results. No changes at this time.

## 2022-08-19 NOTE — PROGRESS NOTES
[facility]  Advanced CHF/Pulmonary Hypertension   Cardiac Evaluation      Ky Simmonds Payer  YOB: 1935    Date of Visit:  8/23/22      Chief Complaint   Patient presents with    Follow-up    Congestive Heart Failure           History of Present Illness:  Ky Simmonds Payer is a 81 y.o. female who presents for follow up. Originally referred from Scotrun Juan Diego CNP for consultation and management of severe pulmonary HTN. She has a history of paroxysmal atrial fibrillation, HTN, SVT, and DM. She had SVT in 2013, wore an event monitor, and atrial fibrillation was detected. She had recurrent atrial fibrillation in 2014 and had a cryoablation for PAF in 2/2014. Post procedure she had a retroperitoneal bleed and required a urinary stent. She has had no known recurrence of afib post ablation but has had palpitations. She was admitted in 1/2019 with SBO versus ileus that was medically managed. Her echo from 02/19/19 showed her EF was 55-60%. She has mild MR, TR and OR. SPAP 60mmHg consistent with severe pulmonary HTN. She reports about 1 month ago (02/2019) she had an episode of palpitations at 200 bpm for about 1 hour. She has not had a sleep study. She reports occasional chest twinge. She states the twinge occurs with fast heart rate. She reports her breathing is stable now. She did have SOB with walking frequently but this has subsided. She denies dizziness or syncope. She states she has been on lasix for 2-3 weeks. Her cardiac monitor from 04/2019 showed no arrhythmias. During her last OV with me on 01/21/20, her EKG showed AFib . She was started on eliquis and her metoprolol was increased to 50 mg twice daily. Her echo from 04/28/20 showed her EF was 55-60% with mild MR and TR. At her OV with Pastora Alexander CNP on 06/03/20 her lasix was adjusted to 40 mg daily for 4 days then reduce back to 20 mg daily.    On 03/16/21 she reported she noticed the slower heart beat since starting tikosyn which was started 12/2020. At this OV her metoprolol was decreased to 25 mg twice daily. She was admitted 03/30-03/31/21 with CP and palpitations. Her metoprolol was increased back to 50 mg BID. She was admitted with abdominal pain 04/19-04/22/21. She underwent EGD on 04/21/21 in which a polyp was removed from stomach. At her OV with Inessa Betancourt CNP on 05/26/21 her valsartan was increased to 320 mg daily. On 8/19/2021 she reported she did not have the stress test. She did not like what she read on the Internet regarding the test. She is not interested in proceeding with the stress test. Patient was in the ED on 12/4/2021 with AF with RVR, she spontaneously converted to SR without intervention. She underwent a stress test on 12/4/2021 that demonstrated no evidence of ischemia and an LVEF of >70%. She underwent an echo on 12/6/2021 that demonstrated an LVEF of 55-60%. 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 She is planning a surgery for this. She stopped taking her aspirin. Today, 8/23/2022, she says she is feeling well. He eye has healed since the surgery. She says she was started on diltiazem. She has noticed this helps a lot with her palpitations. Patient is taking all cardiac medications as prescribed and tolerates them well. Patient denies current edema, chest pain, sob, palpitations, dizziness or syncope.         Allergies   Allergen Reactions    Hydrocodone Other (See Comments)     NAUSEA AND DIZZINESS    Metformin And Related Other (See Comments)     NAUSEA AND DIZZINESS    Nickel Dermatitis    Omeprazole     Prednisone      Other reaction(s): chest pain & palpitations/H&P    Hydralazine Palpitations and Rash     Current Outpatient Medications   Medication Sig Dispense Refill    dilTIAZem (CARDIZEM) 30 MG tablet Take 1 tablet by mouth every 6 hours as needed (for breakthrough atrial fibrillation) 120 tablet 1    metoprolol succinate (TOPROL XL) 100 MG extended release tablet Take 0.5 tablets by mouth daily Prescribed as 100mg bid, pt. Takes only 50mg if HR > 60. 180 tablet 3    apixaban (ELIQUIS) 5 MG TABS tablet Take 1 tablet by mouth 2 times daily 180 tablet 3    valsartan (DIOVAN) 320 MG tablet Take 1 tablet by mouth daily 90 tablet 1    pantoprazole (PROTONIX) 20 MG tablet Take 2 tablets by mouth 2 times daily (Patient taking differently: Take 20 mg by mouth daily) 60 tablet 0    pravastatin (PRAVACHOL) 80 MG tablet Take 40 mg by mouth daily      pioglitazone (ACTOS) 15 MG tablet Take 15 mg by mouth daily      dofetilide (TIKOSYN) 125 MCG capsule Take 1 capsule by mouth every 12 hours 180 capsule 3    furosemide (LASIX) 20 MG tablet Take 1 tablet by mouth daily (Patient taking differently: Take 20 mg by mouth See Admin Instructions Taking once a week) 30 tablet 11    glyBURIDE (DIABETA) 1.25 MG tablet Take 2.5 mg by mouth 2 times daily (with meals)        No current facility-administered medications for this visit.        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     Past Surgical History:   Procedure Laterality Date    ABLATION OF DYSRHYTHMIC FOCUS      -2014    APPENDECTOMY      DILATATION, ESOPHAGUS      FRACTURE SURGERY      L ankle    HYSTERECTOMY (CERVIX STATUS UNKNOWN)      SKIN BIOPSY      SMALL INTESTINE SURGERY      TONSILLECTOMY      UPPER GASTROINTESTINAL ENDOSCOPY N/A 4/21/2021    EGD POLYP SNARE performed by Mabel Adams DO at [facility] [facility]  4/21/2021    EGD CONTROL HEMORRHAGE performed by Mabel Adams DO at [facility]  4/21/2021    EGD BIOPSY performed by Mabel Adams DO at [address] Family History   Problem Relation Age of Onset    Diabetes Mother     Heart Disease Father     High Blood Pressure Father     High Cholesterol Father     Cancer Sister     Heart Disease Brother     High Blood Pressure Brother     High Cholesterol Brother      Social History     Socioeconomic History    Marital status:      Spouse name: Not on file    Number of children: Not on file    Years of education: Not on file    Highest education level: Not on file   Occupational History    Not on file   Tobacco Use    Smoking status: Never    Smokeless tobacco: Never   Vaping Use    Vaping Use: Never used   Substance and Sexual Activity    Alcohol use: No    Drug use: No    Sexual activity: Yes     Partners: Male   Other Topics Concern    Not on file   Social History Narrative    Not on file     Social Determinants of Health     Financial Resource Strain: Not on file   Food Insecurity: Not on file   Transportation Needs: Not on file   Physical Activity: Not on file   Stress: Not on file   Social Connections: Not on file   Intimate Partner Violence: Not on file   Housing Stability: Not on file       Review of Systems:   Constitutional: there has been no unanticipated weight loss. There's been no change in energy level, sleep pattern, or activity level. Eyes: No visual changes or diplopia. No scleral icterus. ENT: No Headaches, hearing loss or vertigo. No mouth sores or sore throat. Cardiovascular: Reviewed in HPI  Respiratory: No cough or wheezing, no sputum production. No hematemesis. Gastrointestinal: No abdominal pain, appetite loss, blood in stools. No change in bowel or bladder habits. Genitourinary: No dysuria, trouble voiding, or hematuria. Musculoskeletal:  No gait disturbance, weakness or joint complaints. Integumentary: No rash or pruritis. Neurological: No headache, diplopia, change in muscle strength, numbness or tingling.  No change in gait, balance, coordination, mood, affect, memory, mentation, behavior. Psychiatric: No anxiety, no depression. Endocrine: No malaise, fatigue or temperature intolerance. No excessive thirst, fluid intake, or urination. No tremor. Hematologic/Lymphatic: No abnormal bruising or bleeding, blood clots or swollen lymph nodes. Allergic/Immunologic: No nasal congestion or hives. Physical Examination:    Vitals:    08/23/22 0911   BP: 126/74   Pulse: 57   SpO2: 98%   Weight: 192 lb (87.1 kg)   Height: 5' 2\" (1.575 m)       Body mass index is 35.12 kg/m². Wt Readings from Last 3 Encounters:   08/23/22 192 lb (87.1 kg)   07/21/22 191 lb (86.6 kg)   07/14/22 185 lb (83.9 kg)     BP Readings from Last 3 Encounters:   08/23/22 126/74   07/21/22 (!) 146/78   07/14/22 (!) 164/62                Constitutional and General Appearance:   WD/WN in NAD,   HEENT:  NC/AT  ROSARIO  No problems with hearing  Skin:  Warm, dry  Respiratory:  Normal excursion and expansion without use of accessory muscles  Resp Auscultation: Normal breath sounds without dullness  Cardiovascular: The apical impulses not displaced  Heart tones are crisp and normal  Cervical veins are not engorged  The carotid upstroke is normal in amplitude and contour without delay or bruit  JVP normal  Regular bradycardic rhythm with nl S1 and S2 without m,r,g  Peripheral pulses are symmetrical and full  There is no clubbing, cyanosis of the extremities. Trace bilateral edema  Femoral Arteries: 2+ and equal  Pedal Pulses: 2+ and equal   Neck:  No thyromegaly  Abdomen:  No masses or tenderness  Liver/Spleen: No Abnormalities Noted  Neurological/Psychiatric:  Alert and oriented in all spheres  Moves all extremities well  Exhibits normal gait balance and coordination  No abnormalities of mood, affect, memory, mentation, or behavior are noted      Echo: 02/19/19   Summary   Definity contrast administered. Normal left ventricular systolic function with an estimated ejection   fraction of 55-60%.    There is mild septal hypertrophy with normal remaining wall thickness. Normal left ventricular diastolic filling pressure. Mild mitral regurgitation. Mild tricuspid regurgitation. Systolic pulmonary artery pressure (SPAP) estimated at 60 mmHg (RA pressure   3 mmHg), consistent with severe pulmonary hypertension. Mild pulmonic regurgitation present. Lipids: 03/12/19: , , HDL 45, LDL 60    01/21/20 EKG shows AFIB     Echo: 04/28/20  Summary   Normal left ventricle systolic function with an estimated ejection fraction   of 55-60%. Definity contrast administered with no evidence of left   ventricular mass or thrombus noted. No regional wall motion abnormalities are seen. Normal left ventricular   diastolic filling pressure. The left atrium is mildly dilated. Mild mitral and tricuspid regurgitation. Systolic pulmonary artery pressure (SPAP) estimated at 42 mmHg (right atrial   pressure 3 mmHg), consistent with mild pulmonary hypertension. Stress test 12/4/2021  Summary There is normal isotope uptake at stress and rest. There is no evidence of  myocardial ischemia or scar. Normal myocardial wall motion and thickening. Post-stress LVEF is normal at >70%. Echo 12/6/2021   Summary   Technically difficult examination. Normal left ventricle systolic function with an estimated ejection fraction   of 55-60%. Definity contrast administered with no evidence of left ventricular mass or   thrombus noted. No regional wall motion abnormalities are seen. Normal left ventricular diastolic filling pressure. Trace aortic and pulmonic regurgitation. The tricuspid valve is normal in structure. Mild mitral and tricuspid regurgitation. Systolic pulmonary artery pressure (SPAP) is normal and estimated at 21 mmHg   (right atrial pressure 3 mmHg). CT Abdomen Pelvis 2/18/2022  Calcification of the abdominal aorta and iliac arteries.      Echocardiogram Transthoracic 4/14/2022  Findings: Pericardial Effusion:  No pericardial effusion           Cardiac Activity:  Cardiac activity normal      LV function:  Normal (> 50% EF)        RV diameter:  Normal          IVC collapsibility:  Indeterminate       Interpretation: Normal limited cardiac ultrasound     Cardiac Monitor 7/21/2022-8/4/2022  SR, Frequent PAC, brief PAT, PAF 1% AF burden, pauses     . Labs were reviewed including labs from other hospital systems through [facility]. Cardiac testing was reviewed including echos, nuclear scans, cardiac catheterization, including from other hospital systems through [facility]. Assessment:    1. Chronic diastolic heart failure (HCC)    2. Paroxysmal atrial fibrillation ([address]    3. Essential hypertension    4. Mixed hyperlipidemia            Plan:  1. Discussed echocardiogram test results that look good  2. Continue to follow with your family doctor  3. Follow up with me in 6 months      Time Based Itemization  A total of 30 minutes was spent on today's patient encounter. If applicable, non-patient-facing activities:  ( x)Preparing to see the patient and reviewing records  ( ) Individual interpretation of results  ( ) Discussion or coordination of care with other health care professionals  ( x) Ordering of unique tests, medications, or procedures  ( x) Documentation within the EHR      This note was scribed in the presence of Oli Burrows MD by Lily Jade RN    The scribe's documentation has been prepared under my direction and personally reviewed by me in its entirety. I confirm that the note above accurately reflects all work, treatment, procedures, and medical decision making performed by me. I appreciate the opportunity of cooperating in the care of this patient.     Roni Rico M.D., [facility]

## 2022-08-23 ENCOUNTER — OFFICE VISIT (OUTPATIENT)
Dept: CARDIOLOGY CLINIC | Age: 87
End: 2022-08-23
Payer: MEDICARE

## 2022-08-23 VITALS
WEIGHT: 192 LBS | HEART RATE: 57 BPM | SYSTOLIC BLOOD PRESSURE: 126 MMHG | DIASTOLIC BLOOD PRESSURE: 74 MMHG | BODY MASS INDEX: 35.33 KG/M2 | HEIGHT: 62 IN | OXYGEN SATURATION: 98 %

## 2022-08-23 DIAGNOSIS — E78.2 MIXED HYPERLIPIDEMIA: ICD-10-CM

## 2022-08-23 DIAGNOSIS — I48.0 PAROXYSMAL ATRIAL FIBRILLATION (HCC): ICD-10-CM

## 2022-08-23 DIAGNOSIS — I50.32 CHRONIC DIASTOLIC HEART FAILURE (HCC): Primary | ICD-10-CM

## 2022-08-23 DIAGNOSIS — I10 ESSENTIAL HYPERTENSION: ICD-10-CM

## 2022-08-23 PROCEDURE — 1123F ACP DISCUSS/DSCN MKR DOCD: CPT | Performed by: INTERNAL MEDICINE

## 2022-08-23 PROCEDURE — 99214 OFFICE O/P EST MOD 30 MIN: CPT | Performed by: INTERNAL MEDICINE

## 2022-10-10 ENCOUNTER — TELEPHONE (OUTPATIENT)
Dept: CARDIOLOGY CLINIC | Age: 87
End: 2022-10-10

## 2022-10-10 NOTE — TELEPHONE ENCOUNTER
10/10 pt called I to inform us that she is almost out of samples and would like more. dofetilide (TIKOSYN) 125 MCG capsule    Please advise.

## 2022-10-14 ENCOUNTER — TELEPHONE (OUTPATIENT)
Dept: CARDIOLOGY CLINIC | Age: 87
End: 2022-10-14

## 2022-11-02 ENCOUNTER — TELEPHONE (OUTPATIENT)
Dept: CARDIOLOGY CLINIC | Age: 87
End: 2022-11-02

## 2022-11-02 NOTE — TELEPHONE ENCOUNTER
Pt presented herself in office today to drop off patient assistance paperwork for Eliquis. Paperwork has been placed in [employer] mail bin up front. Pt is also wondering if she needs to fill out a patient assistance form for Tikosyn? Please advise.

## 2022-11-07 ENCOUNTER — APPOINTMENT (OUTPATIENT)
Dept: CT IMAGING | Age: 87
DRG: 389 | End: 2022-11-07
Payer: MEDICARE

## 2022-11-07 ENCOUNTER — APPOINTMENT (OUTPATIENT)
Dept: GENERAL RADIOLOGY | Age: 87
DRG: 389 | End: 2022-11-07
Payer: MEDICARE

## 2022-11-07 ENCOUNTER — HOSPITAL ENCOUNTER (INPATIENT)
Age: 87
LOS: 4 days | Discharge: HOME | DRG: 389 | End: 2022-11-11
Attending: EMERGENCY MEDICINE | Admitting: HOSPITALIST
Payer: MEDICARE

## 2022-11-07 DIAGNOSIS — K56.600 PARTIAL SMALL BOWEL OBSTRUCTION (HCC): Primary | ICD-10-CM

## 2022-11-07 LAB
A/G RATIO: 1.1 (ref 1.1–2.2)
ALBUMIN SERPL-MCNC: 4.1 G/DL (ref 3.4–5)
ALP BLD-CCNC: 99 U/L (ref 40–129)
ALT SERPL-CCNC: 14 U/L (ref 10–40)
ANION GAP SERPL CALCULATED.3IONS-SCNC: 12 MMOL/L (ref 3–16)
AST SERPL-CCNC: 21 U/L (ref 15–37)
BASOPHILS ABSOLUTE: 0.1 K/UL (ref 0–0.2)
BASOPHILS RELATIVE PERCENT: 0.7 %
BILIRUB SERPL-MCNC: 0.5 MG/DL (ref 0–1)
BUN BLDV-MCNC: 15 MG/DL (ref 7–20)
CALCIUM SERPL-MCNC: 9.1 MG/DL (ref 8.3–10.6)
CHLORIDE BLD-SCNC: 100 MMOL/L (ref 99–110)
CO2: 24 MMOL/L (ref 21–32)
CREAT SERPL-MCNC: 0.8 MG/DL (ref 0.6–1.2)
EKG ATRIAL RATE: 59 BPM
EKG DIAGNOSIS: NORMAL
EKG P AXIS: 46 DEGREES
EKG P-R INTERVAL: 186 MS
EKG Q-T INTERVAL: 420 MS
EKG QRS DURATION: 88 MS
EKG QTC CALCULATION (BAZETT): 415 MS
EKG R AXIS: -3 DEGREES
EKG T AXIS: 42 DEGREES
EKG VENTRICULAR RATE: 59 BPM
EOSINOPHILS ABSOLUTE: 0.2 K/UL (ref 0–0.6)
EOSINOPHILS RELATIVE PERCENT: 1.9 %
GFR SERPL CREATININE-BSD FRML MDRD: >60 ML/MIN/{1.73_M2}
GLUCOSE BLD-MCNC: 168 MG/DL (ref 70–99)
GLUCOSE BLD-MCNC: 97 MG/DL (ref 70–99)
HCT VFR BLD CALC: 44.5 % (ref 36–48)
HEMOGLOBIN: 14.4 G/DL (ref 12–16)
LACTIC ACID, SEPSIS: 1.5 MMOL/L (ref 0.4–1.9)
LACTIC ACID, SEPSIS: 1.8 MMOL/L (ref 0.4–1.9)
LIPASE: 18 U/L (ref 13–60)
LYMPHOCYTES ABSOLUTE: 2.2 K/UL (ref 1–5.1)
LYMPHOCYTES RELATIVE PERCENT: 19.4 %
MCH RBC QN AUTO: 27.3 PG (ref 26–34)
MCHC RBC AUTO-ENTMCNC: 32.5 G/DL (ref 31–36)
MCV RBC AUTO: 84.1 FL (ref 80–100)
MONOCYTES ABSOLUTE: 1.1 K/UL (ref 0–1.3)
MONOCYTES RELATIVE PERCENT: 9.5 %
NEUTROPHILS ABSOLUTE: 7.8 K/UL (ref 1.7–7.7)
NEUTROPHILS RELATIVE PERCENT: 68.5 %
PDW BLD-RTO: 14.9 % (ref 12.4–15.4)
PERFORMED ON: ABNORMAL
PLATELET # BLD: 325 K/UL (ref 135–450)
PMV BLD AUTO: 9.3 FL (ref 5–10.5)
POTASSIUM REFLEX MAGNESIUM: 4.5 MMOL/L (ref 3.5–5.1)
RBC # BLD: 5.29 M/UL (ref 4–5.2)
SODIUM BLD-SCNC: 136 MMOL/L (ref 136–145)
TOTAL PROTEIN: 7.8 G/DL (ref 6.4–8.2)
TROPONIN: <0.01 NG/ML
WBC # BLD: 11.3 K/UL (ref 4–11)

## 2022-11-07 PROCEDURE — 36415 COLL VENOUS BLD VENIPUNCTURE: CPT

## 2022-11-07 PROCEDURE — 85025 COMPLETE CBC W/AUTO DIFF WBC: CPT

## 2022-11-07 PROCEDURE — 2500000003 HC RX 250 WO HCPCS: Performed by: HOSPITALIST

## 2022-11-07 PROCEDURE — 6360000002 HC RX W HCPCS: Performed by: EMERGENCY MEDICINE

## 2022-11-07 PROCEDURE — 84484 ASSAY OF TROPONIN QUANT: CPT

## 2022-11-07 PROCEDURE — 2060000000 HC ICU INTERMEDIATE R&B

## 2022-11-07 PROCEDURE — 3E0G76Z INTRODUCTION OF NUTRITIONAL SUBSTANCE INTO UPPER GI, VIA NATURAL OR ARTIFICIAL OPENING: ICD-10-PCS | Performed by: FAMILY MEDICINE

## 2022-11-07 PROCEDURE — 6360000004 HC RX CONTRAST MEDICATION: Performed by: EMERGENCY MEDICINE

## 2022-11-07 PROCEDURE — 99222 1ST HOSP IP/OBS MODERATE 55: CPT | Performed by: SURGERY

## 2022-11-07 PROCEDURE — 0DH67UZ INSERTION OF FEEDING DEVICE INTO STOMACH, VIA NATURAL OR ARTIFICIAL OPENING: ICD-10-PCS | Performed by: FAMILY MEDICINE

## 2022-11-07 PROCEDURE — 93005 ELECTROCARDIOGRAM TRACING: CPT | Performed by: EMERGENCY MEDICINE

## 2022-11-07 PROCEDURE — 83605 ASSAY OF LACTIC ACID: CPT

## 2022-11-07 PROCEDURE — 80053 COMPREHEN METABOLIC PANEL: CPT

## 2022-11-07 PROCEDURE — 74177 CT ABD & PELVIS W/CONTRAST: CPT

## 2022-11-07 PROCEDURE — 96375 TX/PRO/DX INJ NEW DRUG ADDON: CPT

## 2022-11-07 PROCEDURE — 96361 HYDRATE IV INFUSION ADD-ON: CPT

## 2022-11-07 PROCEDURE — 96374 THER/PROPH/DIAG INJ IV PUSH: CPT

## 2022-11-07 PROCEDURE — 96376 TX/PRO/DX INJ SAME DRUG ADON: CPT

## 2022-11-07 PROCEDURE — 99285 EMERGENCY DEPT VISIT HI MDM: CPT

## 2022-11-07 PROCEDURE — 83690 ASSAY OF LIPASE: CPT

## 2022-11-07 PROCEDURE — 74018 RADEX ABDOMEN 1 VIEW: CPT

## 2022-11-07 PROCEDURE — 1200000000 HC SEMI PRIVATE

## 2022-11-07 PROCEDURE — 2580000003 HC RX 258: Performed by: EMERGENCY MEDICINE

## 2022-11-07 PROCEDURE — 93010 ELECTROCARDIOGRAM REPORT: CPT | Performed by: INTERNAL MEDICINE

## 2022-11-07 PROCEDURE — 2580000003 HC RX 258: Performed by: HOSPITALIST

## 2022-11-07 PROCEDURE — 2500000003 HC RX 250 WO HCPCS: Performed by: EMERGENCY MEDICINE

## 2022-11-07 RX ORDER — SODIUM CHLORIDE 9 MG/ML
INJECTION, SOLUTION INTRAVENOUS CONTINUOUS
Status: ACTIVE | OUTPATIENT
Start: 2022-11-07 | End: 2022-11-08

## 2022-11-07 RX ORDER — METOCLOPRAMIDE HYDROCHLORIDE 5 MG/ML
5 INJECTION INTRAMUSCULAR; INTRAVENOUS ONCE
Status: COMPLETED | OUTPATIENT
Start: 2022-11-07 | End: 2022-11-07

## 2022-11-07 RX ORDER — ONDANSETRON 2 MG/ML
4 INJECTION INTRAMUSCULAR; INTRAVENOUS ONCE
Status: COMPLETED | OUTPATIENT
Start: 2022-11-07 | End: 2022-11-07

## 2022-11-07 RX ORDER — MAGNESIUM SULFATE IN WATER 40 MG/ML
2000 INJECTION, SOLUTION INTRAVENOUS PRN
Status: DISCONTINUED | OUTPATIENT
Start: 2022-11-07 | End: 2022-11-11 | Stop reason: HOSPADM

## 2022-11-07 RX ORDER — DILTIAZEM HYDROCHLORIDE 5 MG/ML
10 INJECTION INTRAVENOUS ONCE
Status: COMPLETED | OUTPATIENT
Start: 2022-11-07 | End: 2022-11-07

## 2022-11-07 RX ORDER — INSULIN LISPRO 100 [IU]/ML
0-8 INJECTION, SOLUTION INTRAVENOUS; SUBCUTANEOUS EVERY 4 HOURS
Status: DISCONTINUED | OUTPATIENT
Start: 2022-11-07 | End: 2022-11-09

## 2022-11-07 RX ORDER — SODIUM CHLORIDE 9 MG/ML
INJECTION, SOLUTION INTRAVENOUS PRN
Status: DISCONTINUED | OUTPATIENT
Start: 2022-11-07 | End: 2022-11-11 | Stop reason: HOSPADM

## 2022-11-07 RX ORDER — ACETAMINOPHEN 325 MG/1
650 TABLET ORAL EVERY 6 HOURS PRN
Status: DISCONTINUED | OUTPATIENT
Start: 2022-11-07 | End: 2022-11-11 | Stop reason: HOSPADM

## 2022-11-07 RX ORDER — ONDANSETRON 4 MG/1
4 TABLET, ORALLY DISINTEGRATING ORAL EVERY 8 HOURS PRN
Status: DISCONTINUED | OUTPATIENT
Start: 2022-11-07 | End: 2022-11-11 | Stop reason: HOSPADM

## 2022-11-07 RX ORDER — LABETALOL HYDROCHLORIDE 5 MG/ML
10 INJECTION, SOLUTION INTRAVENOUS EVERY 4 HOURS PRN
Status: DISCONTINUED | OUTPATIENT
Start: 2022-11-07 | End: 2022-11-08

## 2022-11-07 RX ORDER — METOPROLOL TARTRATE 5 MG/5ML
5 INJECTION INTRAVENOUS ONCE
Status: COMPLETED | OUTPATIENT
Start: 2022-11-07 | End: 2022-11-07

## 2022-11-07 RX ORDER — DOFETILIDE 0.12 MG/1
125 CAPSULE ORAL EVERY 12 HOURS SCHEDULED
Status: DISCONTINUED | OUTPATIENT
Start: 2022-11-07 | End: 2022-11-11 | Stop reason: HOSPADM

## 2022-11-07 RX ORDER — SODIUM CHLORIDE 0.9 % (FLUSH) 0.9 %
10 SYRINGE (ML) INJECTION EVERY 12 HOURS SCHEDULED
Status: DISCONTINUED | OUTPATIENT
Start: 2022-11-07 | End: 2022-11-11 | Stop reason: HOSPADM

## 2022-11-07 RX ORDER — PANTOPRAZOLE SODIUM 40 MG/10ML
40 INJECTION, POWDER, LYOPHILIZED, FOR SOLUTION INTRAVENOUS DAILY
Status: DISCONTINUED | OUTPATIENT
Start: 2022-11-08 | End: 2022-11-10

## 2022-11-07 RX ORDER — INSULIN GLARGINE 100 [IU]/ML
10 INJECTION, SOLUTION SUBCUTANEOUS NIGHTLY
Status: DISCONTINUED | OUTPATIENT
Start: 2022-11-08 | End: 2022-11-11 | Stop reason: HOSPADM

## 2022-11-07 RX ORDER — ONDANSETRON 2 MG/ML
4 INJECTION INTRAMUSCULAR; INTRAVENOUS EVERY 6 HOURS PRN
Status: DISCONTINUED | OUTPATIENT
Start: 2022-11-07 | End: 2022-11-11 | Stop reason: HOSPADM

## 2022-11-07 RX ORDER — POTASSIUM CHLORIDE 20 MEQ/1
40 TABLET, EXTENDED RELEASE ORAL PRN
Status: DISCONTINUED | OUTPATIENT
Start: 2022-11-07 | End: 2022-11-11 | Stop reason: HOSPADM

## 2022-11-07 RX ORDER — ACETAMINOPHEN 650 MG/1
650 SUPPOSITORY RECTAL EVERY 6 HOURS PRN
Status: DISCONTINUED | OUTPATIENT
Start: 2022-11-07 | End: 2022-11-11 | Stop reason: HOSPADM

## 2022-11-07 RX ORDER — DIPHENHYDRAMINE HYDROCHLORIDE 50 MG/ML
12.5 INJECTION INTRAMUSCULAR; INTRAVENOUS ONCE
Status: COMPLETED | OUTPATIENT
Start: 2022-11-07 | End: 2022-11-07

## 2022-11-07 RX ORDER — POTASSIUM CHLORIDE 7.45 MG/ML
10 INJECTION INTRAVENOUS PRN
Status: DISCONTINUED | OUTPATIENT
Start: 2022-11-07 | End: 2022-11-11 | Stop reason: HOSPADM

## 2022-11-07 RX ORDER — 0.9 % SODIUM CHLORIDE 0.9 %
1000 INTRAVENOUS SOLUTION INTRAVENOUS ONCE
Status: COMPLETED | OUTPATIENT
Start: 2022-11-07 | End: 2022-11-07

## 2022-11-07 RX ORDER — DEXTROSE MONOHYDRATE 100 MG/ML
INJECTION, SOLUTION INTRAVENOUS CONTINUOUS PRN
Status: DISCONTINUED | OUTPATIENT
Start: 2022-11-07 | End: 2022-11-11 | Stop reason: HOSPADM

## 2022-11-07 RX ORDER — SODIUM CHLORIDE 0.9 % (FLUSH) 0.9 %
10 SYRINGE (ML) INJECTION PRN
Status: DISCONTINUED | OUTPATIENT
Start: 2022-11-07 | End: 2022-11-11 | Stop reason: HOSPADM

## 2022-11-07 RX ORDER — ENOXAPARIN SODIUM 100 MG/ML
40 INJECTION SUBCUTANEOUS DAILY
Status: DISCONTINUED | OUTPATIENT
Start: 2022-11-08 | End: 2022-11-08

## 2022-11-07 RX ORDER — SENNA PLUS 8.6 MG/1
1 TABLET ORAL DAILY PRN
Status: DISCONTINUED | OUTPATIENT
Start: 2022-11-07 | End: 2022-11-11 | Stop reason: HOSPADM

## 2022-11-07 RX ADMIN — METOPROLOL TARTRATE 5 MG: 5 INJECTION, SOLUTION INTRAVENOUS at 15:37

## 2022-11-07 RX ADMIN — IOPAMIDOL 75 ML: 755 INJECTION, SOLUTION INTRAVENOUS at 15:10

## 2022-11-07 RX ADMIN — DILTIAZEM HYDROCHLORIDE 10 MG: 5 INJECTION, SOLUTION INTRAVENOUS at 22:30

## 2022-11-07 RX ADMIN — HYDROMORPHONE HYDROCHLORIDE 0.5 MG: 1 INJECTION, SOLUTION INTRAMUSCULAR; INTRAVENOUS; SUBCUTANEOUS at 12:53

## 2022-11-07 RX ADMIN — ONDANSETRON 4 MG: 2 INJECTION INTRAMUSCULAR; INTRAVENOUS at 12:53

## 2022-11-07 RX ADMIN — ONDANSETRON 4 MG: 2 INJECTION INTRAMUSCULAR; INTRAVENOUS at 14:29

## 2022-11-07 RX ADMIN — METOCLOPRAMIDE 5 MG: 5 INJECTION, SOLUTION INTRAMUSCULAR; INTRAVENOUS at 15:00

## 2022-11-07 RX ADMIN — SODIUM CHLORIDE 1000 ML: 9 INJECTION, SOLUTION INTRAVENOUS at 12:53

## 2022-11-07 RX ADMIN — Medication 10 ML: at 21:29

## 2022-11-07 RX ADMIN — SODIUM CHLORIDE: 9 INJECTION, SOLUTION INTRAVENOUS at 22:36

## 2022-11-07 RX ADMIN — DIPHENHYDRAMINE HYDROCHLORIDE 12.5 MG: 50 INJECTION, SOLUTION INTRAMUSCULAR; INTRAVENOUS at 15:00

## 2022-11-07 ASSESSMENT — PAIN DESCRIPTION - LOCATION: LOCATION: ABDOMEN

## 2022-11-07 ASSESSMENT — PAIN DESCRIPTION - ORIENTATION: ORIENTATION: MID;UPPER

## 2022-11-07 ASSESSMENT — PAIN SCALES - GENERAL
PAINLEVEL_OUTOF10: 9
PAINLEVEL_OUTOF10: 8
PAINLEVEL_OUTOF10: 0

## 2022-11-07 ASSESSMENT — PAIN - FUNCTIONAL ASSESSMENT: PAIN_FUNCTIONAL_ASSESSMENT: 0-10

## 2022-11-07 ASSESSMENT — PAIN DESCRIPTION - DESCRIPTORS: DESCRIPTORS: SHARP;SHOOTING;STABBING

## 2022-11-07 NOTE — CONSULTS
1552 - called general surgery   Re: partial SBO  1604 - Dr Eris Colby at bedside to see pt at this time

## 2022-11-07 NOTE — CONSULTS
Department of General Surgery Consult    PATIENT NAME: Chris Golden Payer   YOB: 1935    ADMISSION DATE: 11/7/2022 12:24 PM      TODAY'S DATE: 11/7/2022    Reason for Consult: Small bowel obstruction    Chief Complaint: Abdominal pain    Requesting Physician: Bianca Rowan    HISTORY OF PRESENT ILLNESS:              The patient is a 81 y.o. female who presents with abdominal pain that started earlier today. She has had issues with this before, so came to the ER promptly. She states she developed nausea and vomiting once she got here. She was given. A dose of Dilaudid, which completely alleviated her symptoms. She denies any further pain or nausea    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       Past Surgical History:        Procedure Laterality Date    ABLATION OF DYSRHYTHMIC FOCUS      -2014    APPENDECTOMY      DILATATION, ESOPHAGUS      FRACTURE SURGERY      L ankle    HYSTERECTOMY (CERVIX STATUS UNKNOWN)      SKIN BIOPSY      SMALL INTESTINE SURGERY      TONSILLECTOMY      UPPER GASTROINTESTINAL ENDOSCOPY N/A 4/21/2021    EGD POLYP SNARE performed by Mira Martinez DO at [address] [place] [address]  4/21/2021    EGD CONTROL HEMORRHAGE performed by Mira Martinez DO at [address] [place] [address]  4/21/2021    EGD BIOPSY performed by Mira Martinez DO at [address] [place] [address]       Current Medications:   No current facility-administered medications for this encounter. Prior to Admission medications    Medication Sig Start Date End Date Taking?  Authorizing Provider   dilTIAZem (CARDIZEM) 30 MG tablet Take 1 tablet by mouth every 6 hours as needed (for breakthrough atrial fibrillation) 7/21/22   Марина GUANAKO Shore CNP   metoprolol succinate (TOPROL XL) 100 MG extended release tablet Take 0.5 tablets by mouth daily Prescribed as 100mg bid, pt. Takes only 50mg if HR > 60. 7/7/22   GUANAKO Erickson CNP   apixaban (ELIQUIS) 5 MG TABS tablet Take 1 tablet by mouth 2 times daily 6/10/21   [facility]   valsartan (DIOVAN) 320 MG tablet Take 1 tablet by mouth daily 5/26/21   [facility]   pantoprazole (PROTONIX) 20 MG tablet Take 2 tablets by mouth 2 times daily  Patient taking differently: Take 20 mg by mouth daily 4/22/21   Jamaal Weston MD   pravastatin (PRAVACHOL) 80 MG tablet Take 40 mg by mouth daily    Historical Provider, MD   pioglitazone (ACTOS) 15 MG tablet Take 15 mg by mouth daily    Historical Provider, MD   dofetilide (TIKOSYN) 125 MCG capsule Take 1 capsule by mouth every 12 hours 2/4/21   Juan Carlos Torre MD   furosemide (LASIX) 20 MG tablet Take 1 tablet by mouth daily  Patient taking differently: Take 20 mg by mouth See Admin Instructions Taking once a week 1/18/21   Isabella Nuenz MD   glyBURIDE (DIABETA) 1.25 MG tablet Take 2.5 mg by mouth 2 times daily (with meals)     Historical Provider, MD        Allergies:  Hydrocodone, Metformin and related, Nickel, Omeprazole, Prednisone, and Hydralazine    Social History:   TOBACCO:   reports that she has never smoked. She has never used smokeless tobacco.  ETOH:   reports no history of alcohol use. DRUGS:   reports no history of drug use.       Family History:        Problem Relation Age of Onset    Diabetes Mother     Heart Disease Father     High Blood Pressure Father     High Cholesterol Father     Cancer Sister     Heart Disease Brother     High Blood Pressure Brother     High Cholesterol Brother        REVIEW OF SYSTEMS:  CONSTITUTIONAL:  negative  HEENT:  negative  RESPIRATORY:  negative  CARDIOVASCULAR:  negative  GASTROINTESTINAL:  negative except for nausea, vomiting, and abdominal pain  GENITOURINARY:  negative  HEMATOLOGIC/LYMPHATIC:  negative  NEUROLOGICAL:  Negative  * All other ROS reviewed and negative. PHYSICAL EXAM:  VITALS:  BP (!) 182/59   Pulse 61   Temp 97.9 °F (36.6 °C) (Oral)   Resp 16   Ht 5' 2\" (1.575 m)   Wt 179 lb (81.2 kg)   SpO2 97%   BMI 32.74 kg/m²   24HR INTAKE/OUTPUT:    No intake/output data recorded. No intake/output data recorded. CONSTITUTIONAL:  alert, no apparent distress and mildly obese  EYES:  PERRL, sclera clear  ENT:  Normocephalic,atraumatic, without obvious abnormality  NECK:  supple, symmetrical, trachea midline  LUNGS: Resp effort easy and unlabored, clear to auscultation  CARDIOVASCULAR:  NO JVD, regular rate and rhythm and no murmur noted  ABDOMEN:  normal bowel sounds, soft, non-distended, non-tender, voluntary guarding absent, no masses palpated and hernia absent  MUSCULOSKELETAL: No clubbing or cyanosis, 1+ pitting edema lower extremities  NEUROLOGIC:  Mental Status Exam:  Level of Alertness:   awake  PSYCHIATRIC:   person, place, time  SKIN:  no bruising or bleeding, normal skin color, texture, turgor, and no redness, warmth, or swelling    DATA:    CBC:   Recent Labs     11/07/22  1242   WBC 11.3*   HGB 14.4   HCT 44.5        BMP:    Recent Labs     11/07/22  1242      K 4.5      CO2 24   BUN 15   CREATININE 0.8   GLUCOSE 97     Hepatic:   Recent Labs     11/07/22  1242   AST 21   ALT 14   BILITOT 0.5   ALKPHOS 99     Mag:    No results for input(s): MG in the last 72 hours. Phos:   No results for input(s): PHOS in the last 72 hours. INR: No results for input(s): INR in the last 72 hours. Radiology Review: Images personally reviewed by me. CT shows mildly dilated loop of bowel with apparent rapid transition to decompressed bowel at her anastomosis      IMPRESSION/RECOMMENDATIONS:    Likely partial small bowel obstruction. Admit for IV fluid hydration, bowel rest, and serial abdominal x-rays.   In the past this has resolved with nonoperative management, and I suspect this episode will again.   Is not better in the next day or 2 consider small bowel follow-through to evaluate the anastomosis, although this has been negative in the past    Electronically signed by Mabel Bradshaw MD     [id number]

## 2022-11-07 NOTE — ED PROVIDER NOTES
CHIEF COMPLAINT  Abdominal Pain (Pt reporting mid epigastric abdominal pain with nausea that started this morning. Pt reporting x 2 normal BM today. ) and Nausea      HISTORY OF PRESENT ILLNESS  Chace Serrano is a 81 y.o. female with a history of atrial fibrillation, diabetes, hypertension, dyslipidemia, on Eliquis who presents to the ED complaining of abdominal pain, nausea, vomiting. Onset of symptoms started yesterday but was relatively mild. Today she has severe pain in the epigastric region with worsening nausea and a few episodes of dry heaves. Attempted to treat her symptoms with Pepto-Bismol prior to arrival without improvement. Denies fever, chills, chest pain, dyspnea, cough, dysuria, hematuria, hematochezia, melena. Denies recent illness or trauma. No other complaints, modifying factors or associated symptoms. I have reviewed the following from the nursing documentation.     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     Past Surgical History:   Procedure Laterality Date    ABLATION OF DYSRHYTHMIC FOCUS      -2014    APPENDECTOMY      DILATATION, ESOPHAGUS      FRACTURE SURGERY      L ankle    HYSTERECTOMY (CERVIX STATUS UNKNOWN)      SKIN BIOPSY      SMALL INTESTINE SURGERY      TONSILLECTOMY      UPPER GASTROINTESTINAL ENDOSCOPY N/A 4/21/2021    EGD POLYP SNARE performed by Mehdi Bell DO at [address]  4/21/2021    EGD CONTROL HEMORRHAGE performed by Mehdi Bell DO at [address]  4/21/2021    EGD BIOPSY performed by Mehdi Bell DO at [address]     Family History   Problem Relation Age of Onset    Diabetes Mother     Heart Disease Father     High Blood Pressure Father     High Cholesterol Father     Cancer Sister     Heart Disease Brother     High Blood Pressure Brother     High Cholesterol Brother      Social History     Socioeconomic History    Marital status:      Spouse name: Not on file    Number of children: Not on file    Years of education: Not on file    Highest education level: Not on file   Occupational History    Not on file   Tobacco Use    Smoking status: Never    Smokeless tobacco: Never   Vaping Use    Vaping Use: Never used   Substance and Sexual Activity    Alcohol use: No    Drug use: No    Sexual activity: Yes     Partners: Male   Other Topics Concern    Not on file   Social History Narrative    Not on file     Social Determinants of Health     Financial Resource Strain: Not on file   Food Insecurity: Not on file   Transportation Needs: Not on file   Physical Activity: Not on file   Stress: Not on file   Social Connections: Not on file   Intimate Partner Violence: Not on file   Housing Stability: Not on file     No current facility-administered medications for this encounter. Current Outpatient Medications   Medication Sig Dispense Refill    dilTIAZem (CARDIZEM) 30 MG tablet Take 1 tablet by mouth every 6 hours as needed (for breakthrough atrial fibrillation) 120 tablet 1    metoprolol succinate (TOPROL XL) 100 MG extended release tablet Take 0.5 tablets by mouth daily Prescribed as 100mg bid, pt.  Takes only 50mg if HR > 60. 180 tablet 3    apixaban (ELIQUIS) 5 MG TABS tablet Take 1 tablet by mouth 2 times daily 180 tablet 3    valsartan (DIOVAN) 320 MG tablet Take 1 tablet by mouth daily 90 tablet 1    pantoprazole (PROTONIX) 20 MG tablet Take 2 tablets by mouth 2 times daily (Patient taking differently: Take 20 mg by mouth daily) 60 tablet 0    pravastatin (PRAVACHOL) 80 MG tablet Take 40 mg by mouth daily      pioglitazone (ACTOS) 15 MG tablet Take 15 mg by mouth daily      dofetilide (TIKOSYN) 125 MCG capsule Take 1 capsule by mouth every 12 hours 180 capsule 3    furosemide (LASIX) 20 MG tablet Take 1 tablet by mouth daily (Patient taking differently: Take 20 mg by mouth See Admin Instructions Taking once a week) 30 tablet 11    glyBURIDE (DIABETA) 1.25 MG tablet Take 2.5 mg by mouth 2 times daily (with meals)        Allergies   Allergen Reactions    Hydrocodone Other (See Comments)     NAUSEA AND DIZZINESS    Metformin And Related Other (See Comments)     NAUSEA AND DIZZINESS    Nickel Dermatitis    Omeprazole     Prednisone      Other reaction(s): chest pain & palpitations/H&P    Hydralazine Palpitations and Rash       REVIEW OF SYSTEMS  10 systems reviewed, pertinent positives per HPI otherwise noted to be negative. PHYSICAL EXAM  BP (!) 182/59   Pulse 61   Temp 97.9 °F (36.6 °C) (Oral)   Resp 16   Ht 5' 2\" (1.575 m)   Wt 179 lb (81.2 kg)   SpO2 97%   BMI 32.74 kg/m²   GENERAL APPEARANCE: Awake and alert. Cooperative. Appears to be in pain, nauseated. HEAD: Normocephalic. Atraumatic. EYES: PERRL. EOM's grossly intact. ENT: Mucous membranes are moist.   NECK: Supple, trachea midline. HEART: RRR. Normal S1, S2. No murmurs, rubs or gallops. LUNGS: Respirations unlabored. CTAB. Good air exchange. No wheezes, rales, or rhonchi. Speaking comfortably in full sentences. ABDOMEN: Soft. Non-distended. Moderate generalized tenderness with more severe epigastric tenderness. No guarding or rebound. Normal Bowel sounds. EXTREMITIES: No peripheral edema. MAEE. No acute deformities. SKIN: Warm and dry. No acute rashes. NEUROLOGICAL: Alert and oriented X 3. CN II-XII intact. No gross facial drooping. Strength 5/5 in all extremities. No expressive aphasia or dysarthria. PSYCHIATRIC: Normal mood and affect. LABS  I have reviewed all labs for this visit.    Results for orders placed or performed during the hospital encounter of 11/07/22   Troponin   Result Value Ref Range    Troponin <0.01 <0.01 ng/mL   Lipase   Result Value Ref Range    Lipase 18.0 13.0 - 60.0 U/L   CBC with Auto Differential   Result Value Ref Range    WBC 11.3 (H) 4.0 - 11.0 K/uL    RBC 5.29 (H) 4.00 - 5.20 M/uL    Hemoglobin 14.4 12.0 - 16.0 g/dL    Hematocrit 44.5 36.0 - 48.0 %    MCV 84.1 80.0 - 100.0 fL    MCH 27.3 26.0 - 34.0 pg    MCHC 32.5 31.0 - 36.0 g/dL    RDW 14.9 12.4 - 15.4 %    Platelets 414 482 - 486 K/uL    MPV 9.3 5.0 - 10.5 fL    Neutrophils % 68.5 %    Lymphocytes % 19.4 %    Monocytes % 9.5 %    Eosinophils % 1.9 %    Basophils % 0.7 %    Neutrophils Absolute 7.8 (H) 1.7 - 7.7 K/uL    Lymphocytes Absolute 2.2 1.0 - 5.1 K/uL    Monocytes Absolute 1.1 0.0 - 1.3 K/uL    Eosinophils Absolute 0.2 0.0 - 0.6 K/uL    Basophils Absolute 0.1 0.0 - 0.2 K/uL   CMP w/ Reflex to MG   Result Value Ref Range    Sodium 136 136 - 145 mmol/L    Potassium reflex Magnesium 4.5 3.5 - 5.1 mmol/L    Chloride 100 99 - 110 mmol/L    CO2 24 21 - 32 mmol/L    Anion Gap 12 3 - 16    Glucose 97 70 - 99 mg/dL    BUN 15 7 - 20 mg/dL    Creatinine 0.8 0.6 - 1.2 mg/dL    Est, Glom Filt Rate >60 >60    Calcium 9.1 8.3 - 10.6 mg/dL    Total Protein 7.8 6.4 - 8.2 g/dL    Albumin 4.1 3.4 - 5.0 g/dL    Albumin/Globulin Ratio 1.1 1.1 - 2.2    Total Bilirubin 0.5 0.0 - 1.0 mg/dL    Alkaline Phosphatase 99 40 - 129 U/L    ALT 14 10 - 40 U/L    AST 21 15 - 37 U/L   EKG 12 Lead   Result Value Ref Range    Ventricular Rate 59 BPM    Atrial Rate 59 BPM    P-R Interval 186 ms    QRS Duration 88 ms    Q-T Interval 420 ms    QTc Calculation (Bazett) 415 ms    P Axis 46 degrees    R Axis -3 degrees    T Axis 42 degrees    Diagnosis       Sinus bradycardiaLow voltage QRSBorderline ECGWhen compared with ECG of 14-JUL-2022 18:52,No significant change was found           RADIOLOGY  X-RAYS:  I have reviewed radiologic plain film image(s). ALL OTHER NON-PLAIN FILM IMAGES SUCH AS CT, ULTRASOUND AND MRI HAVE BEEN READ BY THE RADIOLOGIST.   CT ABDOMEN PELVIS W IV CONTRAST Additional Contrast? None Final Result   Mildly dilated fluid-filled loops of small bowel within the mid to right   lower abdomen, with a sharp zone of transition at an anastomotic staple line   within the right lower quadrant, with mild fecalization of contents proximal   to that zone of transition. The findings suggest low-grade or developing small bowel obstruction. Ileus   in the setting of enteritis would be considered as well, but is felt to be   less likely. Rechecks: Physical assessment performed. Still appears to be feeling unwell but overall significantly improved compared to presentation. ED COURSE/MDM  Patient seen and evaluated. Old records reviewed. Labs and imaging reviewed and results discussed with patient. Patient with extensive past medical history and prior abdominal surgery who presents with abdominal pain with associated nausea and vomiting. Symptoms worsening over the past 24 hours. Work-up demonstrates a partial SBO. Plan for hospitalist admission with general surgery on consult. New Prescriptions    No medications on file       CLINICAL IMPRESSION  1. Partial small bowel obstruction (HCC)        Blood pressure (!) 182/59, pulse 61, temperature 97.9 °F (36.6 °C), temperature source Oral, resp. rate 16, height 5' 2\" (1.575 m), weight 179 lb (81.2 kg), SpO2 97 %. DISPOSITION  Iris Tiwari was dadmitted in stable condition.         Martir Hoffman MD  11/07/22 [id number]

## 2022-11-07 NOTE — H&P
HOSPITALISTS HISTORY AND PHYSICAL    11/7/2022 4:41 PM    Patient Information:  Shahbaz Escobedo is a 81 y.o. female [id number]  PCP:  Guido Marlow MD (Tel: [phone] )    Chief complaint:    Chief Complaint   Patient presents with    Abdominal Pain     Pt reporting mid epigastric abdominal pain with nausea that started this morning. Pt reporting x 2 normal BM today. Nausea        History of Present Illness:  Jerzy Tiwari is a 81 y.o. female who presented to the ED to be evaluated for acute epigastric pain and associated nausea ongoing since earlier this morning. She reports that she began nonbloody nonbilious vomiting several hours earlier, which is now turned feculent in nature. Patient reports that her symptoms are identical to previous episodes of recurrent SBO. Patient with remote history of multiple intra-abdominal surgeries, therefore adhesions are considered the likely culprit for her recurrent SBO's. She reports that she had 2 BMs, normal in caliber and consistency, prior to ED arrival.    Upon arrival to the ED EKG was obtained revealing sinus bradycardia (rate 59) with low voltage QRS. CT of the abdomen pelvis revealed mildly dilated fluid-filled loops of small bowel within the mid to lower abdomen, with sharp zone of transition at anastomotic staple line in RLQ. Notable labs include: Left shifted leukocytosis 11.3 and borderline hyperglycemia 168. Patient received IV Dilaudid, Zofran, and Reglan in attempt to alleviate her GI symptoms. ED provider also administered 1 L NS bolus prior to request for admission. General surgery consult placed, with recommendations to admit to hospitalist service overnight, with surgical consult to occur in the a.m.     History obtained from patient and review of Epic chart     REVIEW OF SYSTEMS:   Constitutional: Negative for fever,chills, and generalized weakness  ENT: Negative for headache, rhinorrhea, and sore throat. Respiratory: Negative for shortness of breath, wheezing, and cough  Cardiovascular: Negative for chest pain, palpitations, peripheral edema, orthopnea or PND  Gastrointestinal: Acute epigastric abdominal pain, currently vomiting feculent material; no hematemesis, hematochezia, or melena  Genitourinary: Negative for dysuria, frequency, retention; no incontinence  Hematologic/Lymphatic: Negative for bleeding tendency/excessive bruising  Musculoskeletal: Negative for myalgias and arthalgias; able to ambulate without difficulty  Neurologic: Negative for LOC, seizure activity, paresthesias, dysarthria, vertigo, and gait disturbance  Skin: Negative for itching,rash, decubitus  Psychiatric: Negative for depression,anxiety, and agitation; no hallucinations; denies SI/HI  Endocrine: Negative for polyuria/polydipsia/polyphagia; no heat/cold intolerance    Past Medical History:   has a past medical history of Atrial fibrillation ([facility] [address] Diabetes mellitus ([facility] [address] Hydronephrosis, Hyperlipidemia, Hypertension, Intussusception intestine ([facility] [address] and SVT (supraventricular tachycardia) ([facility] [address] Past Surgical History:   has a past surgical history that includes Hysterectomy; Appendectomy; Tonsillectomy; skin biopsy; ablation of dysrhythmic focus; Dilatation, esophagus; Small intestine surgery; fracture surgery; Upper gastrointestinal endoscopy (N/A, 4/21/2021); Upper gastrointestinal endoscopy (4/21/2021); and Upper gastrointestinal endoscopy (4/21/2021). Medications:  No current facility-administered medications on file prior to encounter.      Current Outpatient Medications on File Prior to Encounter   Medication Sig Dispense Refill    dilTIAZem (CARDIZEM) 30 MG tablet Take 1 tablet by mouth every 6 hours as needed (for breakthrough atrial fibrillation) 120 tablet 1    metoprolol succinate (TOPROL XL) 100 MG extended release tablet Take 0.5 tablets by mouth daily Prescribed as 100mg bid, pt. Takes only 50mg if HR > 60. 180 tablet 3    apixaban (ELIQUIS) 5 MG TABS tablet Take 1 tablet by mouth 2 times daily 180 tablet 3    valsartan (DIOVAN) 320 MG tablet Take 1 tablet by mouth daily 90 tablet 1    pantoprazole (PROTONIX) 20 MG tablet Take 2 tablets by mouth 2 times daily (Patient taking differently: Take 20 mg by mouth daily) 60 tablet 0    pravastatin (PRAVACHOL) 80 MG tablet Take 40 mg by mouth daily      pioglitazone (ACTOS) 15 MG tablet Take 15 mg by mouth daily      dofetilide (TIKOSYN) 125 MCG capsule Take 1 capsule by mouth every 12 hours 180 capsule 3    furosemide (LASIX) 20 MG tablet Take 1 tablet by mouth daily (Patient taking differently: Take 20 mg by mouth See Admin Instructions Taking once a week) 30 tablet 11    glyBURIDE (DIABETA) 1.25 MG tablet Take 2.5 mg by mouth 2 times daily (with meals)          Allergies: Allergies   Allergen Reactions    Hydrocodone Other (See Comments)     NAUSEA AND DIZZINESS    Metformin And Related Other (See Comments)     NAUSEA AND DIZZINESS    Nickel Dermatitis    Omeprazole     Prednisone      Other reaction(s): chest pain & palpitations/H&P    Hydralazine Palpitations and Rash        Social History:   reports that she has never smoked. She has never used smokeless tobacco. She reports that she does not drink alcohol and does not use drugs. Family History:  family history includes Cancer in her sister; Diabetes in her mother; Heart Disease in her brother and father; High Blood Pressure in her brother and father; High Cholesterol in her brother and father.      Physical Exam:  BP (!) 182/59   Pulse 61   Temp 97.9 °F (36.6 °C) (Oral)   Resp 16   Ht 5' 2\" (1.575 m)   Wt 179 lb (81.2 kg)   SpO2 97%   BMI 32.74 kg/m²     General appearance: Pleasant elderly female resting in bed, appears acutely uncomfortable  Eyes: Sclera clear without conjunctival injection; PERRLA; EOMI  ENT: Mucous membranes dry without thrush; normal dentition  Neck: Supple without meningismus; no goiter; no carotid bruit bilaterally  Cardiovascular: Regular rhythm without ectopy; normal S1-S2 with no murmurs; no peripheral edema; no JVD  Respiratory: No tachypnea; CTAB with adequate air exchange, no wheeze, rhonchi or rales; I:E intact  Gastrointestinal: Vomiting feculent material into emesis basin upon arrival to bedside; abdomen diffusely tender with distention; bowel sounds hypoactive; no masses/organomegaly appreciated  Musculoskeletal: FROM spine and extremities x4; no gross deformity  Neurology: A&O x3; cranial nerves 2-12 grossly intact; motor 5/5  BUE/BLE; no seizure activity  Psychiatry: Well-groomed with good eye contact; appropriate affect; no visual/auditory hallucination  Skin: Warm, dry, normal turgor, no rash  PV: 2/4 radial and dorsalis pedis bilaterally; brisk capillary refill    Labs:  CBC:   Lab Results   Component Value Date/Time    WBC 11.3 11/07/2022 12:42 PM    RBC 5.29 11/07/2022 12:42 PM    HGB 14.4 11/07/2022 12:42 PM    HCT 44.5 11/07/2022 12:42 PM    MCV 84.1 11/07/2022 12:42 PM    MCH 27.3 11/07/2022 12:42 PM    MCHC 32.5 11/07/2022 12:42 PM    RDW 14.9 11/07/2022 12:42 PM     11/07/2022 12:42 PM    MPV 9.3 11/07/2022 12:42 PM     BMP:    Lab Results   Component Value Date/Time     11/07/2022 12:42 PM    K 4.5 11/07/2022 12:42 PM     11/07/2022 12:42 PM    CO2 24 11/07/2022 12:42 PM    BUN 15 11/07/2022 12:42 PM    CREATININE 0.8 11/07/2022 12:42 PM    CALCIUM 9.1 11/07/2022 12:42 PM    GFRAA >60 07/14/2022 05:10 PM    LABGLOM >60 11/07/2022 12:42 PM    GLUCOSE 97 11/07/2022 12:42 PM     CT ABDOMEN PELVIS W IV CONTRAST Additional Contrast? None   Final Result   Mildly dilated fluid-filled loops of small bowel within the mid to right   lower abdomen, with a sharp zone of transition at an anastomotic staple line   within the right lower quadrant, with mild fecalization of contents proximal   to that zone of transition. The findings suggest low-grade or developing small bowel obstruction. Ileus   in the setting of enteritis would be considered as well, but is felt to be   less likely. EKG:      I visualized CXR images and EKG strips personally and agree with documented interpretation    Discussed case  with ED provider    Problem List:  Active Problems:    Partial small bowel obstruction (HCC)    PAF (paroxysmal atrial fibrillation) (HCC)    DM2 (diabetes mellitus, type 2) (HCC)    Chronic anticoagulation    Essential hypertension    Chronic diastolic heart failure ([facility] [address]  Resolved Problems:    * No resolved hospital problems.  *        Consults:  IP CONSULT TO GENERAL SURGERY  IP CONSULT TO HOSPITALIST  IP CONSULT TO GENERAL SURGERY      Assessment/Plan:     Recurrent partial SBO  -Patient will remain NPO overnight with consult placed to General Surgery  -If patient begins to vomit, will plan to place an NG tube to wall suction  -IV Dilaudid available PRN pain control  -Recommend serial abdominal exams and plain films to assess progression of obstruction    PAF  -Patient to remain on telemetry monitoring during inpatient stay; EKG currently reveals NSR  -PTA she is prescribed Cardizem and Lopressor for rate controlled, the latter of which will be administered IV with BP/pulse parameters placed  -Home dosage of Eliquis placed on temporary hold as patient is NPO    HTN with diastolic CHF  -Patient admitted to telemetry floor for continuous monitoring during stay  -EKG obtained in ED reviewed personally and found to be without evidence of LAD or acute ischemia  - All oral antihypertensives and diuretics on hold due to NPO status  - IV labetalol and Lasix will be administered PRN (     Type II DM  -Patient currently n.p.o.  -Oral hypoglycemic agents placed on hold  -POC glucose checks every 4 hours overnight with medium dose Humalog SSI every 4 hours as needed      DVT prophylaxis-Lovenox 40 mg subcu daily  Code status-full code  Diet-strict NPO  IV access-PIV established in ED      Admit as inpatient. I anticipate hospitalization spanning more than two midnights for investigation and treatment of the above medically necessary diagnoses. Comment: Please note this report has been produced using speech recognition software and may contain errors related to that system including errors in grammar, punctuation, and spelling, as well as words and phrases that may be inappropriate. If there are any questions or concerns please feel free to contact the dictating provider for clarification.          Neela Mckeon MD    11/7/2022 4:41 PM

## 2022-11-08 ENCOUNTER — APPOINTMENT (OUTPATIENT)
Dept: GENERAL RADIOLOGY | Age: 87
DRG: 389 | End: 2022-11-08
Payer: MEDICARE

## 2022-11-08 LAB
A/G RATIO: 1.2 (ref 1.1–2.2)
ALBUMIN SERPL-MCNC: 3.7 G/DL (ref 3.4–5)
ALP BLD-CCNC: 80 U/L (ref 40–129)
ALT SERPL-CCNC: 10 U/L (ref 10–40)
ANION GAP SERPL CALCULATED.3IONS-SCNC: 12 MMOL/L (ref 3–16)
AST SERPL-CCNC: 15 U/L (ref 15–37)
BASOPHILS ABSOLUTE: 0 K/UL (ref 0–0.2)
BASOPHILS RELATIVE PERCENT: 0.3 %
BILIRUB SERPL-MCNC: 0.5 MG/DL (ref 0–1)
BUN BLDV-MCNC: 12 MG/DL (ref 7–20)
CALCIUM SERPL-MCNC: 8 MG/DL (ref 8.3–10.6)
CHLORIDE BLD-SCNC: 105 MMOL/L (ref 99–110)
CO2: 23 MMOL/L (ref 21–32)
CREAT SERPL-MCNC: 0.8 MG/DL (ref 0.6–1.2)
EOSINOPHILS ABSOLUTE: 0.1 K/UL (ref 0–0.6)
EOSINOPHILS RELATIVE PERCENT: 1.1 %
ESTIMATED AVERAGE GLUCOSE: 114 MG/DL
GFR SERPL CREATININE-BSD FRML MDRD: >60 ML/MIN/{1.73_M2}
GLUCOSE BLD-MCNC: 121 MG/DL (ref 70–99)
GLUCOSE BLD-MCNC: 122 MG/DL (ref 70–99)
GLUCOSE BLD-MCNC: 69 MG/DL (ref 70–99)
GLUCOSE BLD-MCNC: 70 MG/DL (ref 70–99)
GLUCOSE BLD-MCNC: 71 MG/DL (ref 70–99)
GLUCOSE BLD-MCNC: 73 MG/DL (ref 70–99)
GLUCOSE BLD-MCNC: 75 MG/DL (ref 70–99)
GLUCOSE BLD-MCNC: 78 MG/DL (ref 70–99)
GLUCOSE BLD-MCNC: 96 MG/DL (ref 70–99)
GLUCOSE BLD-MCNC: 99 MG/DL (ref 70–99)
HBA1C MFR BLD: 5.6 %
HCT VFR BLD CALC: 41.3 % (ref 36–48)
HEMOGLOBIN: 13.4 G/DL (ref 12–16)
LYMPHOCYTES ABSOLUTE: 1.5 K/UL (ref 1–5.1)
LYMPHOCYTES RELATIVE PERCENT: 15.3 %
MCH RBC QN AUTO: 27 PG (ref 26–34)
MCHC RBC AUTO-ENTMCNC: 32.4 G/DL (ref 31–36)
MCV RBC AUTO: 83.2 FL (ref 80–100)
MONOCYTES ABSOLUTE: 1 K/UL (ref 0–1.3)
MONOCYTES RELATIVE PERCENT: 10.4 %
NEUTROPHILS ABSOLUTE: 7.4 K/UL (ref 1.7–7.7)
NEUTROPHILS RELATIVE PERCENT: 72.9 %
PDW BLD-RTO: 15.1 % (ref 12.4–15.4)
PERFORMED ON: ABNORMAL
PERFORMED ON: NORMAL
PLATELET # BLD: 298 K/UL (ref 135–450)
PMV BLD AUTO: 9.2 FL (ref 5–10.5)
POTASSIUM REFLEX MAGNESIUM: 4.2 MMOL/L (ref 3.5–5.1)
RBC # BLD: 4.96 M/UL (ref 4–5.2)
SODIUM BLD-SCNC: 140 MMOL/L (ref 136–145)
TOTAL PROTEIN: 6.7 G/DL (ref 6.4–8.2)
WBC # BLD: 10.1 K/UL (ref 4–11)

## 2022-11-08 PROCEDURE — 80053 COMPREHEN METABOLIC PANEL: CPT

## 2022-11-08 PROCEDURE — 83036 HEMOGLOBIN GLYCOSYLATED A1C: CPT

## 2022-11-08 PROCEDURE — 99231 SBSQ HOSP IP/OBS SF/LOW 25: CPT | Performed by: SURGERY

## 2022-11-08 PROCEDURE — 2580000003 HC RX 258: Performed by: HOSPITALIST

## 2022-11-08 PROCEDURE — 1200000000 HC SEMI PRIVATE

## 2022-11-08 PROCEDURE — 2500000003 HC RX 250 WO HCPCS: Performed by: INTERNAL MEDICINE

## 2022-11-08 PROCEDURE — C9113 INJ PANTOPRAZOLE SODIUM, VIA: HCPCS | Performed by: HOSPITALIST

## 2022-11-08 PROCEDURE — 85025 COMPLETE CBC W/AUTO DIFF WBC: CPT

## 2022-11-08 PROCEDURE — 6370000000 HC RX 637 (ALT 250 FOR IP)

## 2022-11-08 PROCEDURE — 74022 RADEX COMPL AQT ABD SERIES: CPT

## 2022-11-08 PROCEDURE — 6360000002 HC RX W HCPCS: Performed by: HOSPITALIST

## 2022-11-08 PROCEDURE — 36415 COLL VENOUS BLD VENIPUNCTURE: CPT

## 2022-11-08 RX ORDER — ENOXAPARIN SODIUM 100 MG/ML
1 INJECTION SUBCUTANEOUS DAILY
Status: DISCONTINUED | OUTPATIENT
Start: 2022-11-09 | End: 2022-11-09

## 2022-11-08 RX ORDER — METOPROLOL TARTRATE 5 MG/5ML
5 INJECTION INTRAVENOUS EVERY 6 HOURS
Status: DISCONTINUED | OUTPATIENT
Start: 2022-11-08 | End: 2022-11-10

## 2022-11-08 RX ADMIN — METOPROLOL TARTRATE 5 MG: 5 INJECTION INTRAVENOUS at 10:25

## 2022-11-08 RX ADMIN — PANTOPRAZOLE SODIUM 40 MG: 40 INJECTION, POWDER, FOR SOLUTION INTRAVENOUS at 10:25

## 2022-11-08 RX ADMIN — Medication 10 ML: at 10:25

## 2022-11-08 RX ADMIN — Medication: at 16:12

## 2022-11-08 RX ADMIN — METOPROLOL TARTRATE 5 MG: 5 INJECTION INTRAVENOUS at 16:12

## 2022-11-08 RX ADMIN — DEXTROSE MONOHYDRATE 125 ML: 100 INJECTION, SOLUTION INTRAVENOUS at 06:50

## 2022-11-08 RX ADMIN — Medication 10 ML: at 16:12

## 2022-11-08 RX ADMIN — Medication 10 ML: at 20:50

## 2022-11-08 RX ADMIN — ENOXAPARIN SODIUM 40 MG: 100 INJECTION SUBCUTANEOUS at 09:50

## 2022-11-08 RX ADMIN — METOPROLOL TARTRATE 5 MG: 5 INJECTION INTRAVENOUS at 20:50

## 2022-11-08 ASSESSMENT — PAIN SCALES - GENERAL
PAINLEVEL_OUTOF10: 0
PAINLEVEL_OUTOF10: 0

## 2022-11-08 NOTE — PROGRESS NOTES
Hospitalist Progress Note      PCP: Camelia Gosnalez MD    Date of Admission: 11/7/2022    Chief Complaint: Abdominal pain, nausea    Hospital Course: H&P reviewed. Patient admitted with partial SBO    Subjective:     NG tube placed yesterday. Patient reports abdominal pain improved with no nausea, vomiting. Yet to pass flatus or  BM      Medications:  Reviewed    Infusion Medications    dextrose      sodium chloride       Scheduled Medications    pantoprazole  40 mg IntraVENous Daily    dofetilide  125 mcg Oral 2 times per day    sodium chloride flush  10 mL IntraVENous 2 times per day    enoxaparin  40 mg SubCUTAneous Daily    insulin lispro  0-8 Units SubCUTAneous Q4H    insulin glargine  10 Units SubCUTAneous Nightly     PRN Meds: labetalol, glucose, dextrose bolus **OR** dextrose bolus, glucagon (rDNA), dextrose, sodium chloride flush, sodium chloride, potassium chloride **OR** potassium alternative oral replacement **OR** potassium chloride, magnesium sulfate, senna, acetaminophen **OR** acetaminophen, ondansetron **OR** ondansetron, HYDROmorphone **OR** HYDROmorphone      Intake/Output Summary (Last 24 hours) at 11/8/2022 0932  Last data filed at 11/8/2022 0930  Gross per 24 hour   Intake 0 ml   Output 650 ml   Net -650 ml       Physical Exam Performed:    BP (!) 179/69   Pulse 65   Temp 98 °F (36.7 °C) (Oral)   Resp 18   Ht 5' 2\" (1.575 m)   Wt 188 lb 0.8 oz (85.3 kg)   SpO2 95%   BMI 34.40 kg/m²     General appearance: No apparent distress, appears stated age and cooperative. HEENT: Pupils equal, round, Conjunctivae/corneas clear. Has NG tube in place  Neck: Supple, with full range of motion. No jugular venous distention. Trachea midline. Respiratory:  Normal respiratory effort. Clear to auscultation, bilaterally without Rales/Wheezes/Rhonchi. Cardiovascular: Regular rate and rhythm with normal S1/S2 without murmurs, rubs or gallops.   Abdomen: Soft, non-tender, mildly distended with no Partial small bowel obstruction (HCC) [K56.600]     Chronic diastolic heart failure (HCC) [I50.32]     Essential hypertension [I10]     Chronic anticoagulation [Z79.01]     DM2 (diabetes mellitus, type 2) ([facility] [address] [E11.9]     PAF [facility]oxysmal [facility] fibrillation) [facility] [I48.0]        Recurrent partial SBO  - Gen surg consult. Recs appreciated. Continue NPO, NG to LIWS. AXR pending           PAF  - home PO diltazem, Eliquis and BB held as NPO. Start scheduled IV metoprolol with hold parameters. D/c PRN labetolol. Can resume oral tikosyn as able. Will increase Lovenox to full dose for anticoagulation  as Eliquis held since patient is NPO. HTN with diastolic CHF  -- not controlled. All oral antihypertensives and diuretics on hold due to NPO status  - started scheduled IV metoprolol      Type II DM  - continue lantus and SSI. Home oral meds on hold         DVT Prophylaxis: Lovenox (increased to full dose)   Diet: Diet NPO  Code Status: Full Code      Dispo -pending clinical course and okay with Gen  surg.  Pt still NPO           Appropriate for A1 Discharge Unit: Honey Hernandez MD

## 2022-11-08 NOTE — PROGRESS NOTES
[facility] SURGERY    PATIENT NAME: Mandeep Lott Payer     TODAY'S DATE: 11/8/2022    CHIEF COMPLAINT: abd pain    INTERVAL HISTORY/HPI:    Pt with improved pain, no nausea, no flatus or bms. REVIEW OF SYSTEMS:  Pertinent positives and negatives as per interval history section    OBJECTIVE:  VITALS:  BP (!) 150/71   Pulse 64   Temp 98 °F (36.7 °C) (Oral)   Resp 16   Ht 5' 2\" (1.575 m)   Wt 188 lb 0.8 oz (85.3 kg)   SpO2 95%   BMI 34.40 kg/m²     INTAKE/OUTPUT:    I/O last 3 completed shifts: In: 0   Out: 100 [Emesis/NG output:100]  No intake/output data recorded. CONSTITUTIONAL:  awake and alert  LUNGS:  Respirations easy and unlabored, clear to auscultation  CARD:  regular rate   ABDOMEN:  hypoactive bowel sounds, soft, non-distended, minimal tender     Data:  CBC:   Recent Labs     11/07/22  1242 11/08/22  0524   WBC 11.3* 10.1   HGB 14.4 13.4   HCT 44.5 41.3    298     BMP:    Recent Labs     11/07/22  1242 11/08/22  0524    140   K 4.5 4.2    105   CO2 24 23   BUN 15 12   CREATININE 0.8 0.8   GLUCOSE 97 78     Hepatic:   Recent Labs     11/07/22  1242 11/08/22  0524   AST 21 15   ALT 14 10   BILITOT 0.5 0.5   ALKPHOS 99 80     Mag:    No results for input(s): MG in the last 72 hours. Phos:   No results for input(s): PHOS in the last 72 hours. INR: No results for input(s): INR in the last 72 hours. Radiology Review:  *Imaging personally reviewed by me.    NA      ASSESSMENT AND PLAN:  82 yo with sbo   Symptoms improved with ng  Axr today     Elect[place]     [id number]

## 2022-11-08 NOTE — CARE COORDINATION
CASE MANAGEMENT INITIAL ASSESSMENT      Reviewed chart and completed assessment with patient: Pt  Family present: Pt's  Mychal Goins   Explained Case Management role/services. Yes    Primary contact information:Pt's  [address] :   Primary Decision Maker: iviservonda Zachariah Vladislav Abraham - [phone]    Secondary Decision Maker: Henri Gonzales Child - [phone]        Admit date/status:11/7/22  Diagnosis:SBO   Is this a Readmission?:  No      Insurance:BCBS   Precert required for SNF: Yes       3 night stay required: No    Living arrangements, Adls, care needs, prior to admission: Pt lives at home with her  and is independent still drives, cooks and cleans     Durable Medical Equipment at home:  Walker__Cane__RTS__ BSC__Shower Chair__  02__ HHN__ CPAP__  BiPap__  Hospital Bed__ W/C___ Other_None____    Services in the home and/or outpatient, prior to admission:None    Current PCP: Scott Leavitt MD                 Transportation needs: Family will transport      Dialysis Facility (if applicable)na    Name:  Address:  Dialysis Schedule:  Phone:  Fax:    PT/OT recs: Not ordered     Hospital Exemption Notification (HEN):NA    Barriers to discharge:None     Plan/comments:11/8/22 Pt's from home, IPTA, pt denies any needs, here for SBO, currently has an NG tube, no surgery, no needs currently anticipated.       ECOC on chart for MD signature

## 2022-11-08 NOTE — PROGRESS NOTES
4 Eyes Skin Assessment     The patient is being assess for   Admission    I agree that 2 RN's have performed a thorough Head to Toe Skin Assessment on the patient. ALL assessment sites listed below have been assessed. Areas assessed for pressure by both nurses:   [x]   Head, Face, and Ears   [x]   Shoulders, Back, and Chest, Abdomen  [x]   Arms, Elbows, and Hands   [x]   Coccyx, Sacrum, and Ischium  [x]   Legs, Feet, and Heels                     **SHARE this note so that the co-signing nurse is able to place an eSignature**    Co-signer eSignature: Electronically signed by Clary Lucero RN on 11/7/22 at 10:06 PM EST    Does the Patient have Skin Breakdown related to pressure?   No              Ino Prevention initiated:  NA   Wound Care Orders initiated:  NA      [facility] nurse consulted for Pressure Injury (Stage 3,4, Unstageable, DTI, NWPT, Complex wounds)and New or Established Ostomies:  NA      Primary Nurse eSignature: Electronically signed by Ari Schilling on 11/7/22 at 9:25 PM EST

## 2022-11-09 ENCOUNTER — APPOINTMENT (OUTPATIENT)
Dept: GENERAL RADIOLOGY | Age: 87
DRG: 389 | End: 2022-11-09
Payer: MEDICARE

## 2022-11-09 LAB
GLUCOSE BLD-MCNC: 105 MG/DL (ref 70–99)
GLUCOSE BLD-MCNC: 121 MG/DL (ref 70–99)
GLUCOSE BLD-MCNC: 122 MG/DL (ref 70–99)
GLUCOSE BLD-MCNC: 77 MG/DL (ref 70–99)
GLUCOSE BLD-MCNC: 77 MG/DL (ref 70–99)
GLUCOSE BLD-MCNC: 82 MG/DL (ref 70–99)
PERFORMED ON: ABNORMAL
PERFORMED ON: NORMAL

## 2022-11-09 PROCEDURE — 1200000000 HC SEMI PRIVATE

## 2022-11-09 PROCEDURE — 99232 SBSQ HOSP IP/OBS MODERATE 35: CPT | Performed by: SURGERY

## 2022-11-09 PROCEDURE — 2580000003 HC RX 258: Performed by: HOSPITALIST

## 2022-11-09 PROCEDURE — 6370000000 HC RX 637 (ALT 250 FOR IP): Performed by: INTERNAL MEDICINE

## 2022-11-09 PROCEDURE — 6360000002 HC RX W HCPCS: Performed by: HOSPITALIST

## 2022-11-09 PROCEDURE — 2580000003 HC RX 258: Performed by: INTERNAL MEDICINE

## 2022-11-09 PROCEDURE — 2500000003 HC RX 250 WO HCPCS: Performed by: INTERNAL MEDICINE

## 2022-11-09 PROCEDURE — C9113 INJ PANTOPRAZOLE SODIUM, VIA: HCPCS | Performed by: HOSPITALIST

## 2022-11-09 PROCEDURE — 74022 RADEX COMPL AQT ABD SERIES: CPT

## 2022-11-09 PROCEDURE — 6360000002 HC RX W HCPCS: Performed by: INTERNAL MEDICINE

## 2022-11-09 PROCEDURE — APPSS30 APP SPLIT SHARED TIME 16-30 MINUTES: Performed by: CLINICAL NURSE SPECIALIST

## 2022-11-09 RX ORDER — CLONIDINE 0.3 MG/24H
1 PATCH, EXTENDED RELEASE TRANSDERMAL WEEKLY
Status: DISCONTINUED | OUTPATIENT
Start: 2022-11-09 | End: 2022-11-09

## 2022-11-09 RX ORDER — DEXTROSE MONOHYDRATE 100 MG/ML
INJECTION, SOLUTION INTRAVENOUS CONTINUOUS PRN
Status: DISCONTINUED | OUTPATIENT
Start: 2022-11-09 | End: 2022-11-09 | Stop reason: SDUPTHER

## 2022-11-09 RX ORDER — DEXTROSE AND SODIUM CHLORIDE 5; .9 G/100ML; G/100ML
INJECTION, SOLUTION INTRAVENOUS CONTINUOUS
Status: DISCONTINUED | OUTPATIENT
Start: 2022-11-09 | End: 2022-11-11

## 2022-11-09 RX ORDER — ENOXAPARIN SODIUM 100 MG/ML
1 INJECTION SUBCUTANEOUS 2 TIMES DAILY
Status: DISCONTINUED | OUTPATIENT
Start: 2022-11-09 | End: 2022-11-10

## 2022-11-09 RX ORDER — CLONIDINE 0.2 MG/24H
1 PATCH, EXTENDED RELEASE TRANSDERMAL WEEKLY
Status: DISCONTINUED | OUTPATIENT
Start: 2022-11-09 | End: 2022-11-11

## 2022-11-09 RX ORDER — INSULIN LISPRO 100 [IU]/ML
0-4 INJECTION, SOLUTION INTRAVENOUS; SUBCUTANEOUS EVERY 4 HOURS
Status: DISCONTINUED | OUTPATIENT
Start: 2022-11-09 | End: 2022-11-11 | Stop reason: HOSPADM

## 2022-11-09 RX ADMIN — PANTOPRAZOLE SODIUM 40 MG: 40 INJECTION, POWDER, FOR SOLUTION INTRAVENOUS at 08:47

## 2022-11-09 RX ADMIN — ENOXAPARIN SODIUM 90 MG: 100 INJECTION SUBCUTANEOUS at 21:19

## 2022-11-09 RX ADMIN — ENOXAPARIN SODIUM 90 MG: 100 INJECTION SUBCUTANEOUS at 08:47

## 2022-11-09 RX ADMIN — Medication 10 ML: at 21:21

## 2022-11-09 RX ADMIN — DEXTROSE AND SODIUM CHLORIDE: 5; 900 INJECTION, SOLUTION INTRAVENOUS at 21:26

## 2022-11-09 RX ADMIN — DEXTROSE AND SODIUM CHLORIDE: 5; 900 INJECTION, SOLUTION INTRAVENOUS at 08:50

## 2022-11-09 RX ADMIN — METOPROLOL TARTRATE 5 MG: 5 INJECTION INTRAVENOUS at 09:00

## 2022-11-09 RX ADMIN — Medication 10 ML: at 08:47

## 2022-11-09 RX ADMIN — METOPROLOL TARTRATE 5 MG: 5 INJECTION INTRAVENOUS at 21:19

## 2022-11-09 RX ADMIN — METOPROLOL TARTRATE 5 MG: 5 INJECTION INTRAVENOUS at 03:49

## 2022-11-09 RX ADMIN — METOPROLOL TARTRATE 5 MG: 5 INJECTION INTRAVENOUS at 16:24

## 2022-11-09 NOTE — PROGRESS NOTES
[facility]    PATIENT NAME: Jorge Alberto Ann Payer     TODAY'S DATE: 11/9/2022    CHIEF COMPLAINT: none    INTERVAL HISTORY/HPI:    Pt reports she is doing well. She denies abd pain, not passing flatus yet, but states she feels rumbling and she is very hungry. REVIEW OF SYSTEMS:  Pertinent positives and negatives as per interval history section    OBJECTIVE:  VITALS:  /79   Pulse 65   Temp 98.3 °F (36.8 °C)   Resp 17   Ht 5' 2\" (1.575 m)   Wt 182 lb 15.7 oz (83 kg)   SpO2 96%   BMI 33.47 kg/m²     INTAKE/OUTPUT:    I/O last 3 completed shifts: In: 600.4 [I.V.:600.4]  Out: 1230 [Urine:830; Emesis/NG output:400]  I/O this shift:  In: 10 [I.V.:10]  Out: -     CONSTITUTIONAL:  awake and alert  LUNGS:  Respirations easy and unlabored, no crackles or wheezes  CARD:  regular rate   ABDOMEN: + bowel sounds, soft, non-distended, non-tender    Data:  CBC:   Recent Labs     11/07/22  1242 11/08/22  0524   WBC 11.3* 10.1   HGB 14.4 13.4   HCT 44.5 41.3    298       BMP:    Recent Labs     11/07/22  1242 11/08/22  0524    140   K 4.5 4.2    105   CO2 24 23   BUN 15 12   CREATININE 0.8 0.8   GLUCOSE 97 78       Hepatic:   Recent Labs     11/07/22  1242 11/08/22  0524   AST 21 15   ALT 14 10   BILITOT 0.5 0.5   ALKPHOS 99 80       Mag:    No results for input(s): MG in the last 72 hours. Phos:   No results for input(s): PHOS in the last 72 hours. INR: No results for input(s): INR in the last 72 hours. Radiology Review:  *Imaging personally reviewed by me. AXR pending      ASSESSMENT AND PLAN:  80 yo with sbo  Seems to be clinically improving with no pain, tenderness or distention. Will repeat AXR today and if continued improvement may trial clamping ngt. Electronically signed by GUANAKO Resendiz - CNP     [id number]    Patient seen and agree with above and more than half of the total time was spent by me on the encounter. Feels better without pain or nausea.  No flatus or bms. AXR improved. Start ng clamping.     Rowdy Hendrix MD

## 2022-11-09 NOTE — PROGRESS NOTES
Hospitalist Progress Note      PCP: Kait Evans MD    Date of Admission: 11/7/2022    Chief Complaint: Abdominal pain, nausea    Hospital Course: H&P reviewed. Patient admitted with partial SBO    Subjective:     Pt denies any N/V/ abd pain. Yet to have BM or pass flatus      Medications:  Reviewed    Infusion Medications    dextrose 5 % and 0.9 % NaCl      dextrose      sodium chloride 50 mL/hr at 11/08/22 1022     Scheduled Medications    metoprolol  5 mg IntraVENous Q6H    enoxaparin  1 mg/kg SubCUTAneous Daily    pantoprazole  40 mg IntraVENous Daily    dofetilide  125 mcg Oral 2 times per day    sodium chloride flush  10 mL IntraVENous 2 times per day    insulin lispro  0-8 Units SubCUTAneous Q4H    [Held by provider] insulin glargine  10 Units SubCUTAneous Nightly     PRN Meds: glucose, dextrose bolus **OR** dextrose bolus, glucagon (rDNA), dextrose, sodium chloride flush, sodium chloride, potassium chloride **OR** potassium alternative oral replacement **OR** potassium chloride, magnesium sulfate, senna, acetaminophen **OR** acetaminophen, ondansetron **OR** ondansetron, HYDROmorphone **OR** HYDROmorphone      Intake/Output Summary (Last 24 hours) at 11/9/2022 0821  Last data filed at 11/9/2022 0533  Gross per 24 hour   Intake 600.44 ml   Output 1130 ml   Net -529.56 ml         Physical Exam Performed:    BP (!) 184/74   Pulse 65   Temp 98 °F (36.7 °C) (Oral)   Resp 16   Ht 5' 2\" (1.575 m)   Wt 182 lb 15.7 oz (83 kg)   SpO2 92%   BMI 33.47 kg/m²     General appearance: No apparent distress, appears stated age and cooperative. HEENT: Pupils equal, round, Conjunctivae/corneas clear. Has NG tube in place  Neck: Supple, with full range of motion. No jugular venous distention. Trachea midline. Respiratory:  Normal respiratory effort. Clear to auscultation, bilaterally without Rales/Wheezes/Rhonchi.   Cardiovascular: Regular rate and rhythm with normal S1/S2 without murmurs, rubs or gallops. Abdomen: Soft, non-tender, mildly distended with no rebound or guarding, hypoactive bowel sounds   Musculoskeletal: No clubbing, cyanosis or edema bilaterally. Skin: Warm and dry  Neurologic: Alert, speech clear with no overt facial droop  Psychiatric: Alert and oriented        Labs:   Recent Labs     11/07/22 1242 11/08/22 0524   WBC 11.3* 10.1   HGB 14.4 13.4   HCT 44.5 41.3    298       Recent Labs     11/07/22 1242 11/08/22 0524    140   K 4.5 4.2    105   CO2 24 23   BUN 15 12   CREATININE 0.8 0.8   CALCIUM 9.1 8.0*       Recent Labs     11/07/22 1242 11/08/22 0524   AST 21 15   ALT 14 10   BILITOT 0.5 0.5   ALKPHOS 99 80       No results for input(s): INR in the last 72 hours. Recent Labs     11/07/22 1242   TROPONINI <0.01         Urinalysis:      Lab Results   Component Value Date/Time    NITRU Negative 02/18/2022 07:49 AM    WBCUA 0-2 02/18/2022 07:49 AM    BACTERIA 1+ 12/18/2020 10:24 AM    RBCUA None seen 02/18/2022 07:49 AM    BLOODU TRACE-INTACT 02/18/2022 07:49 AM    SPECGRAV 1.015 02/18/2022 07:49 AM    GLUCOSEU Negative 02/18/2022 07:49 AM       Radiology:  XR ACUTE ABD SERIES CHEST 1 VW   Final Result   Nonspecific, nonobstructed bowel gas pattern with stable enteric tube. No   significant finding in the chest.         XR ABDOMEN FOR NG/OG/NE TUBE PLACEMENT   Final Result   NG tube placement with the tip at the distal aspect of the stomach. CT ABDOMEN PELVIS W IV CONTRAST Additional Contrast? None   Final Result   Mildly dilated fluid-filled loops of small bowel within the mid to right   lower abdomen, with a sharp zone of transition at an anastomotic staple line   within the right lower quadrant, with mild fecalization of contents proximal   to that zone of transition. The findings suggest low-grade or developing small bowel obstruction. Ileus   in the setting of enteritis would be considered as well, but is felt to be   less likely. Assessment/Plan:    Active Hospital Problems    Diagnosis     Partial small bowel obstruction (HCC) [K56.600]     Chronic diastolic heart failure (HCC) [I50.32]     Essential hypertension [I10]     Chronic anticoagulation [Z79.01]     DM2 (diabetes mellitus, type 2) ([facility] [address] [E11.9]     PAF (paroxysmal atrial fibrillation) ([facility] [address] [I48.0]        Recurrent partial SBO  - Gen surg consulted. Conservative management ongoing. Mgt per gen surg. AXR reviewed. Pt  remains NPO. Continue  NG to LIWS. IVF added          PAF  - home PO diltazem, Eliquis and BB held as NPO . Continue scheduled IV metoprolol. Can resume oral tikosyn as able when no longer NPO. Continue full dose lovenox for anticoagulation as eliquis held since patient is NPO. HTN with diastolic CHF  -- not controlled. All oral antihypertensives and diuretics on hold due to NPO status  - on IV metoprolol. BP high. Add clonidine patch      Type II DM  -  hold lantus due to borderline hypoglycemia likely due to NPO status. Start IV dextrose with normal saline. SSI switched to low  dose. Hypoglycemia protocol           DVT Prophylaxis: Lovenox   Diet: Diet NPO  Code Status: Full Code      Dispo -pending clinical course and okay with Gen  surg.           Appropriate for A1 Discharge Unit: No      Martinez Zuniga MD

## 2022-11-09 NOTE — PROGRESS NOTES
Patient leaving unit for abd xray. [facility] in [facility] notified.  ALISA cole

## 2022-11-09 NOTE — PROGRESS NOTES
Physician Progress Note      PATIENT:               Abraham HA  CSN #:                  [id number]  :                       1935  ADMIT DATE:       2022 12:24 PM  [address] [facility] DATE:  RESPONDING  PROVIDER #:        Fariba Samuels MD          QUERY TEXT:    Patient admitted with SBO, noted to have paroxysmal atrial fibrillation and is   maintained on Eliquis. If possible, please document in progress notes and   discharge summary if you are evaluating and/or treating any of the following: The medical record reflects the following:  Risk Factors: SBO, CHF, HTN, diabetes  Clinical Indicators: Per progress note  \" Chronic anticoagulation. PAF,   Continue full dose lovenox for anticoagulation as eliquis held since patient   is NPO\". Treatment: Typically on Eliquis, lovenox, serial labs, supportive care. Thank you,  [email or web address]  Options provided:  -- Secondary hypercoagulable state in a patient with atrial fibrillation  -- Other - I will add my own diagnosis  -- Disagree - Not applicable / Not valid  -- Disagree - Clinically unable to determine / Unknown  -- Refer to Clinical Documentation Reviewer    PROVIDER RESPONSE TEXT:    This patient has secondary hypercoagulable state in a patient with atrial   fibrillation.     Query created by: Adonis Thornton on 2022 12:48 PM      Electronically signed by:  Fariba Samuels MD 2022 6:32 PM

## 2022-11-09 NOTE — PROGRESS NOTES
Several doses of scheduled po Tikosyn have been held, d/t NPO status. Patient currently SR on monitor.

## 2022-11-10 LAB
ANION GAP SERPL CALCULATED.3IONS-SCNC: 10 MMOL/L (ref 3–16)
BUN BLDV-MCNC: 7 MG/DL (ref 7–20)
CALCIUM SERPL-MCNC: 8.4 MG/DL (ref 8.3–10.6)
CHLORIDE BLD-SCNC: 102 MMOL/L (ref 99–110)
CO2: 24 MMOL/L (ref 21–32)
CREAT SERPL-MCNC: 0.7 MG/DL (ref 0.6–1.2)
GFR SERPL CREATININE-BSD FRML MDRD: >60 ML/MIN/{1.73_M2}
GLUCOSE BLD-MCNC: 124 MG/DL (ref 70–99)
GLUCOSE BLD-MCNC: 131 MG/DL (ref 70–99)
GLUCOSE BLD-MCNC: 132 MG/DL (ref 70–99)
GLUCOSE BLD-MCNC: 138 MG/DL (ref 70–99)
GLUCOSE BLD-MCNC: 152 MG/DL (ref 70–99)
GLUCOSE BLD-MCNC: 163 MG/DL (ref 70–99)
GLUCOSE BLD-MCNC: 166 MG/DL (ref 70–99)
GLUCOSE BLD-MCNC: 172 MG/DL (ref 70–99)
PERFORMED ON: ABNORMAL
POTASSIUM SERPL-SCNC: 3.7 MMOL/L (ref 3.5–5.1)
SODIUM BLD-SCNC: 136 MMOL/L (ref 136–145)

## 2022-11-10 PROCEDURE — 6360000002 HC RX W HCPCS: Performed by: HOSPITALIST

## 2022-11-10 PROCEDURE — 36415 COLL VENOUS BLD VENIPUNCTURE: CPT

## 2022-11-10 PROCEDURE — 6370000000 HC RX 637 (ALT 250 FOR IP): Performed by: HOSPITALIST

## 2022-11-10 PROCEDURE — 2580000003 HC RX 258: Performed by: INTERNAL MEDICINE

## 2022-11-10 PROCEDURE — C9113 INJ PANTOPRAZOLE SODIUM, VIA: HCPCS | Performed by: HOSPITALIST

## 2022-11-10 PROCEDURE — 1200000000 HC SEMI PRIVATE

## 2022-11-10 PROCEDURE — 80048 BASIC METABOLIC PNL TOTAL CA: CPT

## 2022-11-10 PROCEDURE — 99232 SBSQ HOSP IP/OBS MODERATE 35: CPT | Performed by: SURGERY

## 2022-11-10 PROCEDURE — 2500000003 HC RX 250 WO HCPCS: Performed by: INTERNAL MEDICINE

## 2022-11-10 PROCEDURE — 6370000000 HC RX 637 (ALT 250 FOR IP): Performed by: INTERNAL MEDICINE

## 2022-11-10 PROCEDURE — 2580000003 HC RX 258: Performed by: HOSPITALIST

## 2022-11-10 PROCEDURE — 93005 ELECTROCARDIOGRAM TRACING: CPT | Performed by: INTERNAL MEDICINE

## 2022-11-10 PROCEDURE — 6360000002 HC RX W HCPCS: Performed by: INTERNAL MEDICINE

## 2022-11-10 RX ORDER — METOPROLOL SUCCINATE 50 MG/1
50 TABLET, EXTENDED RELEASE ORAL DAILY
Status: DISCONTINUED | OUTPATIENT
Start: 2022-11-10 | End: 2022-11-11 | Stop reason: HOSPADM

## 2022-11-10 RX ORDER — PRAVASTATIN SODIUM 40 MG
40 TABLET ORAL NIGHTLY
Status: DISCONTINUED | OUTPATIENT
Start: 2022-11-10 | End: 2022-11-11 | Stop reason: HOSPADM

## 2022-11-10 RX ORDER — PANTOPRAZOLE SODIUM 40 MG/1
40 TABLET, DELAYED RELEASE ORAL
Status: DISCONTINUED | OUTPATIENT
Start: 2022-11-10 | End: 2022-11-11 | Stop reason: HOSPADM

## 2022-11-10 RX ORDER — METOPROLOL TARTRATE 5 MG/5ML
2.5 INJECTION INTRAVENOUS EVERY 6 HOURS PRN
Status: DISCONTINUED | OUTPATIENT
Start: 2022-11-10 | End: 2022-11-11 | Stop reason: HOSPADM

## 2022-11-10 RX ORDER — DILTIAZEM HYDROCHLORIDE 60 MG/1
30 TABLET, FILM COATED ORAL EVERY 6 HOURS SCHEDULED
Status: DISCONTINUED | OUTPATIENT
Start: 2022-11-10 | End: 2022-11-11 | Stop reason: HOSPADM

## 2022-11-10 RX ORDER — VALSARTAN 80 MG/1
320 TABLET ORAL DAILY
Status: DISCONTINUED | OUTPATIENT
Start: 2022-11-10 | End: 2022-11-11 | Stop reason: HOSPADM

## 2022-11-10 RX ADMIN — ENOXAPARIN SODIUM 90 MG: 100 INJECTION SUBCUTANEOUS at 09:25

## 2022-11-10 RX ADMIN — DOFETILIDE 125 MCG: 0.12 CAPSULE ORAL at 11:52

## 2022-11-10 RX ADMIN — METOPROLOL TARTRATE 5 MG: 5 INJECTION INTRAVENOUS at 04:07

## 2022-11-10 RX ADMIN — METOPROLOL TARTRATE 5 MG: 5 INJECTION INTRAVENOUS at 09:25

## 2022-11-10 RX ADMIN — METOPROLOL SUCCINATE 50 MG: 50 TABLET, EXTENDED RELEASE ORAL at 11:48

## 2022-11-10 RX ADMIN — DEXTROSE AND SODIUM CHLORIDE: 5; 900 INJECTION, SOLUTION INTRAVENOUS at 05:57

## 2022-11-10 RX ADMIN — DILTIAZEM HYDROCHLORIDE 30 MG: 60 TABLET, FILM COATED ORAL at 23:35

## 2022-11-10 RX ADMIN — PRAVASTATIN SODIUM 40 MG: 40 TABLET ORAL at 20:55

## 2022-11-10 RX ADMIN — Medication 10 ML: at 07:59

## 2022-11-10 RX ADMIN — PANTOPRAZOLE SODIUM 40 MG: 40 INJECTION, POWDER, FOR SOLUTION INTRAVENOUS at 07:58

## 2022-11-10 RX ADMIN — DEXTROSE AND SODIUM CHLORIDE: 5; 900 INJECTION, SOLUTION INTRAVENOUS at 16:03

## 2022-11-10 RX ADMIN — Medication 10 ML: at 20:55

## 2022-11-10 RX ADMIN — PANTOPRAZOLE SODIUM 40 MG: 40 TABLET, DELAYED RELEASE ORAL at 16:04

## 2022-11-10 RX ADMIN — VALSARTAN 320 MG: 80 TABLET, FILM COATED ORAL at 11:48

## 2022-11-10 RX ADMIN — DILTIAZEM HYDROCHLORIDE 30 MG: 60 TABLET, FILM COATED ORAL at 11:48

## 2022-11-10 RX ADMIN — DILTIAZEM HYDROCHLORIDE 30 MG: 60 TABLET, FILM COATED ORAL at 16:07

## 2022-11-10 RX ADMIN — METOPROLOL TARTRATE 2.5 MG: 5 INJECTION, SOLUTION INTRAVENOUS at 18:01

## 2022-11-10 RX ADMIN — APIXABAN 5 MG: 5 TABLET, FILM COATED ORAL at 20:54

## 2022-11-10 RX ADMIN — DOFETILIDE 125 MCG: 0.12 CAPSULE ORAL at 20:54

## 2022-11-10 NOTE — PLAN OF CARE
Patient's EF (Ejection Fraction) is greater than 40%    Heart Failure Medications:  Diuretics[de-identified] None    (One of the following REQUIRED for EF </= 40%/SYSTOLIC FAILURE but MAY be used in EF% >40%/DIASTOLIC FAILURE)        ACE[de-identified] None        ARB[de-identified] Valsartan         ARNI[de-identified] None    (Beta Blockers)  NON- Evidenced Based Beta Blocker (for EF% >40%/DIASTOLIC FAILURE): None    Evidenced Based Beta Blocker::(REQUIRED for EF% <40%/SYSTOLIC FAILURE) Metoprolol SUCCinate- Toprol XL  . .................................................................................................................................................. Patient's weights and intake/output reviewed: Yes    Patient's Last Weight: 175 lbs obtained by standing scale. Difference of 7 lbs less than last documented weight. Intake/Output Summary (Last 24 hours) at 11/10/2022 1838  Last data filed at 11/10/2022 1427  Gross per 24 hour   Intake 900 ml   Output 1250 ml   Net -350 ml       Education Booklet Provided: yes    Comorbidities Reviewed Yes    Patient has a past medical history of Atrial fibrillation ([address] Diabetes mellitus ([address] Hydronephrosis, Hyperlipidemia, Hypertension, Intussusception intestine ([address] and SVT (supraventricular tachycardia) ([address] >>For CHF and Comorbidity documentation on Education Time and Topics, please see Education Tab    Progressive Mobility Assessment:  What is this patient's Current Level of Mobility?: Ambulatory-Up Ad Alecia  How was this patient Mobilized today?: Up to Chair,  Up to Toilet/Shower, and Up in Room, ambulated 45 ft                 With Whom? Nurse                 Level of Difficulty/Assistance: Independent     Pt resting in bed at this time on room air. Pt denies shortness of breath. Pt without lower extremity edema.      Patient and/or Family's stated Goal of Care this Admission: increase activity tolerance, better understand heart failure and disease management, be more comfortable, and reduce lower extremity edema prior to discharge        :

## 2022-11-10 NOTE — PLAN OF CARE
Problem: Discharge Planning  Goal: Discharge to home or other facility with appropriate resources  Outcome: Progressing     Problem: Pain  Goal: Verbalizes/displays adequate comfort level or baseline comfort level  Outcome: Progressing     Problem: Safety - Adult  Goal: Free from fall injury  Outcome: Progressing     Problem: ABCDS Injury Assessment  Goal: Absence of physical injury  Outcome: Progressing     Problem: Chronic Conditions and Co-morbidities  Goal: Patient's chronic conditions and co-morbidity symptoms are monitored and maintained or improved  Outcome: Progressing     Problem: Skin/Tissue Integrity  Goal: Absence of new skin breakdown  Description: 1. Monitor for areas of redness and/or skin breakdown  2. Assess vascular access sites hourly  3. Every 4-6 hours minimum:  Change oxygen saturation probe site  4. Every 4-6 hours:  If on nasal continuous positive airway pressure, respiratory therapy assess nares and determine need for appliance change or resting period. Outcome: Progressing  Note: Pt able to turn self. Remained free from new skin breakdown this shift.

## 2022-11-10 NOTE — CARE COORDINATION
Case Management Follow up      Chart reviewed and case discussed during huddle and rounds. Pt is admitted day # 3. Unit C-4. Diagnosis and currently status as per MD progress: SBO- NG tube now out. Clear liquids, ADAT. Having flatus. Services following:IM, Gen. Surg    Anticipated Discharge date:pending improvement    Expected Plan for Discharge:home with no needs. From home with spouse. IPTA. Likely home with no needs. Pre cert needed: n/a at this time    Potential Barriers:none    RN CM will continue to follow Pt clinical course for DCP needs.

## 2022-11-10 NOTE — PROGRESS NOTES
[facility]    PATIENT NAME: Kraig Tiwari     TODAY'S DATE: 11/10/2022    CHIEF COMPLAINT: none    INTERVAL HISTORY/HPI:    Pt having flatus, no abd pain, no nausea or emesis     REVIEW OF SYSTEMS:  Pertinent positives and negatives as per interval history section    OBJECTIVE:  VITALS:  BP (!) 182/72   Pulse 73   Temp 97.9 °F (36.6 °C) (Oral)   Resp 16   Ht 5' 2\" (1.575 m)   Wt 175 lb 14.4 oz (79.8 kg)   SpO2 96%   BMI 32.17 kg/m²     INTAKE/OUTPUT:    I/O last 3 completed shifts: In: 310 [I.V.:310]  Out: 1400 [Urine:1300; Emesis/NG output:100]  No intake/output data recorded. CONSTITUTIONAL:  awake and alert  LUNGS:  Respirations easy and unlabored, no crackles or wheezes  CARD:  regular rate   ABDOMEN: + bowel sounds, soft, non-distended, non-tender    Data:  CBC:   Recent Labs     11/07/22  1242 11/08/22  0524   WBC 11.3* 10.1   HGB 14.4 13.4   HCT 44.5 41.3    298       BMP:    Recent Labs     11/07/22  1242 11/08/22  0524    140   K 4.5 4.2    105   CO2 24 23   BUN 15 12   CREATININE 0.8 0.8   GLUCOSE 97 78       Hepatic:   Recent Labs     11/07/22  1242 11/08/22  0524   AST 21 15   ALT 14 10   BILITOT 0.5 0.5   ALKPHOS 99 80       Mag:    No results for input(s): MG in the last 72 hours. Phos:   No results for input(s): PHOS in the last 72 hours. INR: No results for input(s): INR in the last 72 hours. Radiology Review:  *Imaging personally reviewed by me.    NA      ASSESSMENT AND PLAN:  82 yo with sbo  Tolerated ng clamping  Removed ng today and start clear liquid diet       Electronically signed by Marline Fisher MD

## 2022-11-10 NOTE — PROGRESS NOTES
Hospitalist Progress Note      PCP: Miesha Shrestha MD    Date of Admission: 11/7/2022    Chief Complaint: Abdominal pain, nausea    Hospital Course: H&P reviewed. Patient admitted with partial SBO    Subjective:     NG tube today. Pt denies any abd pain, N/V. Passing flatus, no BM so far      Medications:  Reviewed    Infusion Medications    dextrose 5 % and 0.9 % NaCl 100 mL/hr at 11/10/22 0557    dextrose      sodium chloride 50 mL/hr at 11/08/22 1022     Scheduled Medications    apixaban  5 mg Oral BID    dilTIAZem  30 mg Oral 4 times per day    pravastatin  40 mg Oral Nightly    valsartan  320 mg Oral Daily    pantoprazole  40 mg Oral BID AC    metoprolol succinate  50 mg Oral Daily    insulin lispro  0-4 Units SubCUTAneous Q4H    cloNIDine  1 patch TransDERmal Weekly    pantoprazole  40 mg IntraVENous Daily    dofetilide  125 mcg Oral 2 times per day    sodium chloride flush  10 mL IntraVENous 2 times per day    [Held by provider] insulin glargine  10 Units SubCUTAneous Nightly     PRN Meds: glucose, dextrose bolus **OR** dextrose bolus, glucagon (rDNA), dextrose, sodium chloride flush, sodium chloride, potassium chloride **OR** potassium alternative oral replacement **OR** potassium chloride, magnesium sulfate, senna, acetaminophen **OR** acetaminophen, ondansetron **OR** ondansetron, HYDROmorphone **OR** HYDROmorphone      Intake/Output Summary (Last 24 hours) at 11/10/2022 1118  Last data filed at 11/10/2022 0553  Gross per 24 hour   Intake 300 ml   Output 1050 ml   Net -750 ml         Physical Exam Performed:    BP (!) 182/72   Pulse 73   Temp 97.9 °F (36.6 °C) (Oral)   Resp 16   Ht 5' 2\" (1.575 m)   Wt 175 lb 14.4 oz (79.8 kg)   SpO2 96%   BMI 32.17 kg/m²     General appearance: No apparent distress, appears stated age and cooperative. HEENT: Pupils equal, round, Conjunctivae/corneas clear. Has NG tube in place  Neck: Supple, with full range of motion. No jugular venous distention.  Trachea midline. Respiratory:  Normal respiratory effort. Clear to auscultation, bilaterally without Rales/Wheezes/Rhonchi. Cardiovascular: Regular rate and rhythm with normal S1/S2 without murmurs, rubs or gallops. Abdomen: Soft, non-tender, mildly distended with no rebound or guarding, + bowel sounds   Musculoskeletal: No clubbing, cyanosis or edema bilaterally. Skin: Warm and dry  Neurologic: Alert, speech clear with no overt facial droop  Psychiatric: Alert and oriented        Labs:   Recent Labs     11/07/22  1242 11/08/22  0524   WBC 11.3* 10.1   HGB 14.4 13.4   HCT 44.5 41.3    298       Recent Labs     11/07/22  1242 11/08/22  0524 11/10/22  0832    140 136   K 4.5 4.2 3.7    105 102   CO2 24 23 24   BUN 15 12 7   CREATININE 0.8 0.8 0.7   CALCIUM 9.1 8.0* 8.4       Recent Labs     11/07/22  1242 11/08/22  0524   AST 21 15   ALT 14 10   BILITOT 0.5 0.5   ALKPHOS 99 80       No results for input(s): INR in the last 72 hours. Recent Labs     11/07/22 1242   TROPONINI <0.01         Urinalysis:      Lab Results   Component Value Date/Time    NITRU Negative 02/18/2022 07:49 AM    WBCUA 0-2 02/18/2022 07:49 AM    BACTERIA 1+ 12/18/2020 10:24 AM    RBCUA None seen 02/18/2022 07:49 AM    BLOODU TRACE-INTACT 02/18/2022 07:49 AM    SPECGRAV 1.015 02/18/2022 07:49 AM    GLUCOSEU Negative 02/18/2022 07:49 AM       Radiology:  XR ACUTE ABD SERIES CHEST 1 VW   Final Result   No significant change from yesterday. No evidence for bowel obstruction. Negative chest.         XR ACUTE ABD SERIES CHEST 1 VW   Final Result   Nonspecific, nonobstructed bowel gas pattern with stable enteric tube. No   significant finding in the chest.         XR ABDOMEN FOR NG/OG/NE TUBE PLACEMENT   Final Result   NG tube placement with the tip at the distal aspect of the stomach.          CT ABDOMEN PELVIS W IV CONTRAST Additional Contrast? None   Final Result   Mildly dilated fluid-filled loops of small bowel within the mid to right   lower abdomen, with a sharp zone of transition at an anastomotic staple line   within the right lower quadrant, with mild fecalization of contents proximal   to that zone of transition. The findings suggest low-grade or developing small bowel obstruction. Ileus   in the setting of enteritis would be considered as well, but is felt to be   less likely. Assessment/Plan:    Active Hospital Problems    Diagnosis     Partial small bowel obstruction (HCC) [K56.600]     Chronic diastolic heart failure (HCC) [I50.32]     Essential hypertension [I10]     Chronic anticoagulation [Z79.01]     DM2 (diabetes mellitus, type 2) ([facility] [address] [E11.9]     PAF (paroxysmal atrial fibrillation) ([facility] [address] [I48.0]        Recurrent partial SBO  - Gen surg consulted. Conservative management ongoing. NG tube removed and started on clear liquid diet which she seems to be be tolerating. Diet advancement per Gen surg         PAF  -  resume home PO diltazem, Eliquis, tokosyn and BB  as no longer NPO . D/c scheduled IV metoprolol, full dose lovenox. HTN with diastolic CHF  -- not controlled. Home  oral antihypertensives resumed. Continue clonidine patch. Consider resuming lasix when solid diet resumed. Monitor vol status closely     Type II DM  -  Continue SSI           DVT Prophylaxis: Lovenox d/keron, resume eliquis as no longer NPO  Diet: ADULT DIET; Clear Liquid  Code Status: Full Code      Dispo -pending clinical course and okay with Gen  surg.           Appropriate for A1 Discharge Unit: Honey Samuels MD

## 2022-11-10 NOTE — FLOWSHEET NOTE
11/09/22 2115   Vital Signs   Temp 98 °F (36.7 °C)   Temp Source Oral   Heart Rate 62   Heart Rate Source Monitor   Resp 16   BP (!) 196/77   MAP (Calculated) 117   MAP (mmHg) 116   BP Location Left upper arm   BP Method Automatic   Patient Position Semi fowlers   Level of Consciousness 0   MEWS Score 1   Pain Assessment   Pain Assessment None - Denies Pain   Oxygen Therapy   SpO2 94 %   O2 Device None (Room air)

## 2022-11-11 VITALS
WEIGHT: 175.88 LBS | OXYGEN SATURATION: 95 % | TEMPERATURE: 97.8 F | HEART RATE: 55 BPM | DIASTOLIC BLOOD PRESSURE: 68 MMHG | BODY MASS INDEX: 32.37 KG/M2 | SYSTOLIC BLOOD PRESSURE: 148 MMHG | HEIGHT: 62 IN | RESPIRATION RATE: 18 BRPM

## 2022-11-11 LAB
GLUCOSE BLD-MCNC: 106 MG/DL (ref 70–99)
GLUCOSE BLD-MCNC: 107 MG/DL (ref 70–99)
GLUCOSE BLD-MCNC: 131 MG/DL (ref 70–99)
PERFORMED ON: ABNORMAL

## 2022-11-11 PROCEDURE — 6370000000 HC RX 637 (ALT 250 FOR IP): Performed by: INTERNAL MEDICINE

## 2022-11-11 PROCEDURE — 6370000000 HC RX 637 (ALT 250 FOR IP): Performed by: HOSPITALIST

## 2022-11-11 PROCEDURE — 99232 SBSQ HOSP IP/OBS MODERATE 35: CPT | Performed by: SURGERY

## 2022-11-11 RX ORDER — CLONIDINE HYDROCHLORIDE 0.1 MG/1
0.2 TABLET ORAL 2 TIMES DAILY
Status: CANCELLED | OUTPATIENT
Start: 2022-11-11

## 2022-11-11 RX ORDER — CLONIDINE HYDROCHLORIDE 0.1 MG/1
0.2 TABLET ORAL 3 TIMES DAILY
Status: DISCONTINUED | OUTPATIENT
Start: 2022-11-11 | End: 2022-11-11

## 2022-11-11 RX ORDER — CLONIDINE HYDROCHLORIDE 0.3 MG/1
0.3 TABLET ORAL 2 TIMES DAILY
Qty: 60 TABLET | Refills: 0 | Status: SHIPPED | OUTPATIENT
Start: 2022-11-11

## 2022-11-11 RX ORDER — CLONIDINE HYDROCHLORIDE 0.1 MG/1
0.3 TABLET ORAL 2 TIMES DAILY
Status: DISCONTINUED | OUTPATIENT
Start: 2022-11-11 | End: 2022-11-11 | Stop reason: HOSPADM

## 2022-11-11 RX ADMIN — METOPROLOL SUCCINATE 50 MG: 50 TABLET, EXTENDED RELEASE ORAL at 08:14

## 2022-11-11 RX ADMIN — CLONIDINE HYDROCHLORIDE 0.3 MG: 0.1 TABLET ORAL at 12:42

## 2022-11-11 RX ADMIN — DILTIAZEM HYDROCHLORIDE 30 MG: 60 TABLET, FILM COATED ORAL at 12:02

## 2022-11-11 RX ADMIN — DOFETILIDE 125 MCG: 0.12 CAPSULE ORAL at 08:14

## 2022-11-11 RX ADMIN — PANTOPRAZOLE SODIUM 40 MG: 40 TABLET, DELAYED RELEASE ORAL at 06:27

## 2022-11-11 RX ADMIN — DILTIAZEM HYDROCHLORIDE 30 MG: 60 TABLET, FILM COATED ORAL at 06:27

## 2022-11-11 RX ADMIN — VALSARTAN 320 MG: 80 TABLET, FILM COATED ORAL at 08:14

## 2022-11-11 RX ADMIN — APIXABAN 5 MG: 5 TABLET, FILM COATED ORAL at 08:14

## 2022-11-11 NOTE — PROGRESS NOTES
Patient discharged home with . AVS discussed with pt and pt stated no further questions at this time. IV and tele removed. Belongings with pt.

## 2022-11-11 NOTE — PLAN OF CARE

## 2022-11-11 NOTE — PROGRESS NOTES
[facility]    PATIENT NAME: Kraig Tiwari     TODAY'S DATE: 11/11/2022    CHIEF COMPLAINT: none    INTERVAL HISTORY/HPI:    Pt having flatus, no abd pain, no nausea, +bms     REVIEW OF SYSTEMS:  Pertinent positives and negatives as per interval history section    OBJECTIVE:  VITALS:  BP (!) 173/69   Pulse 60   Temp 98.1 °F (36.7 °C) (Oral)   Resp 16   Ht 5' 2\" (1.575 m)   Wt 175 lb 14 oz (79.8 kg)   SpO2 96%   BMI 32.17 kg/m²     INTAKE/OUTPUT:    I/O last 3 completed shifts: In: 900 [P.O.:600; I.V.:300]  Out: 1250 [Urine:1250]  No intake/output data recorded. CONSTITUTIONAL:  awake and alert  LUNGS:  Respirations easy and unlabored, no crackles or wheezes  CARD:  regular rate   ABDOMEN: + bowel sounds, soft, non-distended, non-tender    Data:  CBC:   No results for input(s): WBC, HGB, HCT, PLT in the last 72 hours. BMP:    Recent Labs     11/10/22  0832      K 3.7      CO2 24   BUN 7   CREATININE 0.7   GLUCOSE 166*       Hepatic:   No results for input(s): AST, ALT, ALB, BILITOT, ALKPHOS in the last 72 hours. Mag:    No results for input(s): MG in the last 72 hours. Phos:   No results for input(s): PHOS in the last 72 hours. INR: No results for input(s): INR in the last 72 hours. Radiology Review:  *Imaging personally reviewed by me.    NA      ASSESSMENT AND PLAN:  80 yo with sbo  Low fiber diet  If tolerates diet then ok to discharge this afternoon     Electronically signed by Marline Fisher MD

## 2022-11-11 NOTE — DISCHARGE INSTR - COC
Continuity of Care Form    Patient Name: Lashell Cannon Payer   :  1935  MRN:  [id number]    Admit date:  2022  Discharge date:  ***    Code Status Order: Full Code   Advance Directives:     Admitting Physician:  Fidelia Oneill MD  PCP: Praful Moreau MD    Discharging Nurse: [facility] Unit/Room#: 7950/0009-79  Discharging Unit Phone Number: ***    Emergency Contact:   Extended Emergency Contact Information  Primary Emergency Contact: vonda rabago  Address: [facility] of [address] Phone: [phone]  Mobile Phone: [phone]  Relation: Spouse  Secondary Emergency Contact: [facility] of [address] [facility] Phone: [phone]  Relation: Child    Past Surgical History:  Past Surgical History:   Procedure Laterality Date    ABLATION OF DYSRHYTHMIC FOCUS      -    APPENDECTOMY      DILATATION, ESOPHAGUS      FRACTURE SURGERY      L ankle    HYSTERECTOMY (CERVIX STATUS UNKNOWN)      SKIN BIOPSY      SMALL INTESTINE SURGERY      TONSILLECTOMY      UPPER GASTROINTESTINAL ENDOSCOPY N/A 2021    EGD POLYP SNARE performed by Rosendo Walton DO at [address] [place] [address]  2021    EGD CONTROL HEMORRHAGE performed by Rosendo Walton DO at [address] [place] [address]  2021    EGD BIOPSY performed by Rosendo Walton DO at [address] [place] [address]       Immunization History:   Immunization History   Administered Date(s) Administered    COVID-19,  [facility] border, Primary or Immunocompromised, (age 12y+), IM, 100 mcg/0.5mL 2021, 2021       Active Problems:  Patient Active Problem List   Diagnosis Code    PAF (paroxysmal atrial fibrillation) (HCC) I48.0    SVT (supraventricular tachycardia) (HCC) I47.1    DM2 (diabetes mellitus, type 2) ([facility] E11.9    Dyspnea on exertion R06.09    Chronic anticoagulation Z79.01    Angina pectoris ([facility] I20.9 Pelvic hematoma in female N94.89    Anemia due to blood loss D50.0    Hydronephrosis N13.30    Ileus (HCC) K56.7    Abdominal pain R10.9    Enteritis K52.9    Partial intestinal obstruction (HCC) K56.600    Essential hypertension I10    Pulmonary hypertension (HCC) I27.20    Chronic diastolic heart failure (HCC) I50.32    Hyperlipidemia E78.5    Hyperkalemia E87.5    Partial small bowel obstruction (HCC) K56.600    Small bowel obstruction (HCC) K56.609    Obesity E66.9    Precordial pain R07.2    PVC (premature ventricular contraction) I49.3    A-fib (HCC) I48.91       Isolation/Infection:   Isolation            No Isolation          Patient Infection Status       None to display            Nurse Assessment:  Last Vital Signs: BP (!) 148/68   Pulse 55   Temp 97.8 °F (36.6 °C) (Oral)   Resp 18   Ht 5' 2\" (1.575 m)   Wt 175 lb 14 oz (79.8 kg)   SpO2 95%   BMI 32.17 kg/m²     Last documented pain score (0-10 scale): Pain Level: 0  Last Weight:   Wt Readings from Last 1 Encounters:   11/11/22 175 lb 14 oz (79.8 kg)     Mental Status:  {IP PT MENTAL STATUS:20030}    IV Access:  { EARNEST IV ACCESS:955474383}    Nursing Mobility/ADLs:  Walking   {P DME OZVO:069149420}  Transfer  {P DME YRFV:087605617}  Bathing  {P DME YSBD:819348255}  Dressing  {P DME QSYJ:904854556}  Toileting  {P DME TXAU:764414737}  Feeding  {P DME FRIW:407957192}  Med Admin  {P DME USIU:815774798}  Med Delivery   { EARNEST MED Delivery:327047679}    Wound Care Documentation and Therapy:        Elimination:  Continence: Bowel: {YES / DN:52115}  Bladder: {YES / WW:91578}  Urinary Catheter: {Urinary Catheter:302250871}   Colostomy/Ileostomy/Ileal Conduit: {YES / :28924}       Date of Last BM: ***    Intake/Output Summary (Last 24 hours) at 11/11/2022 1404  Last data filed at 11/10/2022 1427  Gross per 24 hour   Intake --   Output 800 ml   Net -800 ml     I/O last 3 completed shifts: In: 900 [P.O.:600;  I.V.:300]  Out: 1250 [ECYMU:5389]    Safety Concerns:     [address] [facility] Safety Concerns:473389080}    Impairments/Disabilities:      [address] [facility] Impairments/Disabilities:110789108}    Nutrition Therapy:  Current Nutrition Therapy:   [address] [facility] Diet List:594514605}    Routes of Feeding: {CHP DME Other Feedings:102115870}  Liquids: {Slp liquid thickness:38525}  Daily Fluid Restriction: {CHP DME Yes amt example:486382820}  Last Modified Barium Swallow with Video (Video Swallowing Test): {Done Not Done XJSH:444293748}    Treatments at the Time of Hospital Discharge:   Respiratory Treatments: ***  Oxygen Therapy:  {Therapy; copd oxygen:30948}  Ventilator:    { CC Vent SQWC:565655921}    Rehab Therapies: {THERAPEUTIC INTERVENTION:7046819802}  Weight Bearing Status/Restrictions: [address] [facility] Weight Bearin}  Other Medical Equipment (for information only, NOT a DME order):  {EQUIPMENT:533431779}  Other Treatments: ***    Patient's personal belongings (please select all that are sent with patient):  {CHP DME Belongings:659303616}    RN SIGNATURE:  {Esignature:627936076}    CASE MANAGEMENT/SOCIAL WORK SECTION    Inpatient Status Date: ***    Readmission Risk Assessment Score:  Readmission Risk              Risk of Unplanned Readmission:  17           Discharging to Facility/ Agency   Name:   Address:  Phone:  Fax:    Dialysis Facility (if applicable)   Name:  Address:  Dialysis Schedule:  Phone:  Fax:    / signature: {Esignature:001682869}    PHYSICIAN SECTION    Prognosis: {Prognosis:6509177718}    Condition at Discharge: [address] Patient Condition:937939814}    Rehab Potential (if transferring to Rehab): {Prognosis:0219276012}    Recommended Labs or Other Treatments After Discharge: ***    Physician Certification: I certify the above information and transfer of Alyx Tiwari  is necessary for the continuing treatment of the diagnosis listed and that she requires {Admit to Appropriate Level of Care:89613} for {GREATER/LESS:209664488} 30 days.      Update Admission H&P: {CHP DME Changes in RQIXW:385309477}    PHYSICIAN SIGNATURE:  {Esignature:447433145}

## 2022-11-11 NOTE — DISCHARGE INSTRUCTIONS
Low Fiber Diet Instructions    When you are on a low residue fiber diet, you will eat low-fiber foods that are easy for your body to digest. Eating these foods will slow down your bowel movements. This diet includes foods you are used to eating, like cooked vegetables, fruits, white breads, and meats. It does NOT include foods that make your bowels work more, like beans and legumes, whole grains, many raw vegetables and fruits, and nuts and seeds. You may need to be on a low residue fiber diet if you have diverticulitis, Crohns disease, ulcerative colitis, or bowel inflammation. Sometimes people are put on this diet after certain kinds of surgery, such as an ileostomy or colostomy    Your goal is to eat less than 10 to 15 grams of fiber each day. Recommended Foods by Food Group:  Milk products: You may have up to 2 cups total of smooth milk products a day. This includes yogurt, cottage cheese, milk, pudding, or creamy soup, or 1.5 ounces of hard cheese. Avoid milk products with nuts, seeds, fruit, or vegetables added to them  Breads and grains: You may have refined white breads, dry cereals (Special K, puffed rice, Corn Flakes, Rice Krispies), white pasta, and crackers. Make sure these foods have less than a half (0.5) a gram of fiber per serving. Do NOT eat whole-grain breads, crackers and cereals, whole-wheat pasta, and brown rice. Vegetables:    You can eat these vegetables if they are well-cooked or canned (without seeds): green beans, asparagus, carrots, green peas, potato without skin, spinach or other greens  Avoid: corn, dried bean and peas, fried vegetables, strong vegetables such as broccoli, brussels sprouts, cabbage, cauliflower  Fruits:   You may east/drink all fruit juices and canned fruits without seeds or peels  Fruits should be eaten that are ripe and peeled, such as peaches, nectarines, melon, banana  Fruits to avoid are canned or raw pineapple, fresh figs, berries, all dried fruits, fruit seeds, and prunes and prune juice  Proteins: You may eat cooked meat (including mera), fish, poultry, eggs, and smooth peanut butter. Make sure your meats are tender and soft, not chewy with gristle. Avoid deli meats, crunchy peanut butter, nuts, beans, tofu, and peas. Avoid hamburger, steak, or any other heavy red meat. Follow up with your PCP in 1-2 weeks .    Monitor your blood pressure at home

## 2022-11-11 NOTE — CARE COORDINATION
Chart reviewed and case discussed during huddle. Medical team regulating BP, adjusting meds. If tolerating diet then poss. Dc today. No needs anticipated. Will sign off. Please consult if needs arise.

## 2022-11-11 NOTE — FLOWSHEET NOTE
11/10/22 2053   Vital Signs   Temp 97.5 °F (36.4 °C)   Temp Source Oral   Heart Rate 61   Heart Rate Source Monitor   Resp 16   BP (!) 179/65   MAP (Calculated) 103   BP Location Left upper arm   BP Method Automatic   Patient Position Semi fowlers   Level of Consciousness 0   MEWS Score 1   Pain Assessment   Pain Assessment None - Denies Pain   Opioid-Induced Sedation   POSS Score 1   RASS   Prasad Agitation Sedation Scale (RASS) 0   Oxygen Therapy   SpO2 95 %   Pulse Oximetry Type Intermittent   Pulse Oximeter Device Mode Intermittent   O2 Device None (Room air)   Patient Observation   Observations Resting in bed   Patient A/O VSS no distress noted. All needs met at this time.

## 2022-11-11 NOTE — DISCHARGE SUMMARY
dry  Neurologic: Alert, speech clear with no overt facial droop  Psychiatric: Alert and oriented    Labs: For convenience and continuity at follow-up the following most recent labs are provided:      CBC:    Lab Results   Component Value Date/Time    WBC 10.1 11/08/2022 05:24 AM    HGB 13.4 11/08/2022 05:24 AM    HCT 41.3 11/08/2022 05:24 AM     11/08/2022 05:24 AM       Renal:    Lab Results   Component Value Date/Time     11/10/2022 08:32 AM    K 3.7 11/10/2022 08:32 AM    K 4.2 11/08/2022 05:24 AM     11/10/2022 08:32 AM    CO2 24 11/10/2022 08:32 AM    BUN 7 11/10/2022 08:32 AM    CREATININE 0.7 11/10/2022 08:32 AM    CALCIUM 8.4 11/10/2022 08:32 AM    PHOS 2.5 09/15/2020 06:36 AM         Significant Diagnostic Studies    Radiology:   XR ACUTE ABD SERIES CHEST 1 VW   Final Result   No significant change from yesterday. No evidence for bowel obstruction. Negative chest.         XR ACUTE ABD SERIES CHEST 1 VW   Final Result   Nonspecific, nonobstructed bowel gas pattern with stable enteric tube. No   significant finding in the chest.         XR ABDOMEN FOR NG/OG/NE TUBE PLACEMENT   Final Result   NG tube placement with the tip at the distal aspect of the stomach. CT ABDOMEN PELVIS W IV CONTRAST Additional Contrast? None   Final Result   Mildly dilated fluid-filled loops of small bowel within the mid to right   lower abdomen, with a sharp zone of transition at an anastomotic staple line   within the right lower quadrant, with mild fecalization of contents proximal   to that zone of transition. The findings suggest low-grade or developing small bowel obstruction. Ileus   in the setting of enteritis would be considered as well, but is felt to be   less likely.                 Consults:     IP CONSULT TO GENERAL SURGERY  IP CONSULT TO HOSPITALIST  IP CONSULT TO GENERAL SURGERY    Disposition: Home    Condition at Discharge: Stable    Discharge Instructions/Follow-up: Follow-up with PCP in 1 week. Monitor BP at home    Code Status:  Full Code     Activity: activity as tolerated    Diet:   Low fiber diet      Discharge Medications:     Current Discharge Medication List             Details   cloNIDine (CATAPRES) 0.3 MG tablet Take 1 tablet by mouth 2 times daily  Qty: 60 tablet, Refills: 0                Details   dilTIAZem (CARDIZEM) 30 MG tablet Take 1 tablet by mouth every 6 hours as needed (for breakthrough atrial fibrillation)  Qty: 120 tablet, Refills: 1      metoprolol succinate (TOPROL XL) 100 MG extended release tablet Take 0.5 tablets by mouth daily Prescribed as 100mg bid, pt. Takes only 50mg if HR > 60. Qty: 180 tablet, Refills: 3      apixaban (ELIQUIS) 5 MG TABS tablet Take 1 tablet by mouth 2 times daily  Qty: 180 tablet, Refills: 3    Associated Diagnoses: Atrial fibrillation with rapid ventricular response ([facility] [address] Chronic diastolic heart failure (HCC)      valsartan (DIOVAN) 320 MG tablet Take 1 tablet by mouth daily  Qty: 90 tablet, Refills: 1      pantoprazole (PROTONIX) 20 MG tablet Take 2 tablets by mouth 2 times daily  Qty: 60 tablet, Refills: 0      pravastatin (PRAVACHOL) 80 MG tablet Take 40 mg by mouth daily      pioglitazone (ACTOS) 15 MG tablet Take 15 mg by mouth daily      dofetilide (TIKOSYN) 125 MCG capsule Take 1 capsule by mouth every 12 hours  Qty: 180 capsule, Refills: 3      glyBURIDE (DIABETA) 1.25 MG tablet Take 2.5 mg by mouth 2 times daily (with meals)              Time Spent on discharge is more than 30 minutes in the examination, evaluation, counseling and review of medications and discharge plan. Signed:    Millie Rodriguez MD   11/11/2022      Thank you Praful Moreau MD for the opportunity to be involved in this patient's care. If you have any questions or concerns, please feel free to contact me at [phone].

## 2022-11-12 LAB
EKG ATRIAL RATE: 59 BPM
EKG DIAGNOSIS: NORMAL
EKG P AXIS: 61 DEGREES
EKG P-R INTERVAL: 192 MS
EKG Q-T INTERVAL: 454 MS
EKG QRS DURATION: 92 MS
EKG QTC CALCULATION (BAZETT): 449 MS
EKG R AXIS: -5 DEGREES
EKG T AXIS: 49 DEGREES
EKG VENTRICULAR RATE: 59 BPM

## 2022-11-12 PROCEDURE — 93010 ELECTROCARDIOGRAM REPORT: CPT | Performed by: INTERNAL MEDICINE

## 2022-11-14 ENCOUNTER — FOLLOWUP TELEPHONE ENCOUNTER (OUTPATIENT)
Dept: TELEMETRY | Age: 87
End: 2022-11-14

## 2022-11-14 NOTE — TELEPHONE ENCOUNTER
Care Transitions Initial Follow Up Call    Call within 2 business days of discharge: Yes     Patient: Marcos Coto Payer Patient : 1935 MRN: [id number]    [unfilled]    RARS: Readmission Risk Score: 10.4       Spoke with: patient    Discharge department/facility: home    Non-face-to-face services provided:  Scheduled appointment with PCP-22    Spoke to patient for hospital follow up. Denies any needs. Medications reviewed. Follow Up  No future appointments.     Nanette Remy RN

## 2022-12-07 ENCOUNTER — TELEPHONE (OUTPATIENT)
Dept: CARDIOLOGY CLINIC | Age: 87
End: 2022-12-07

## 2022-12-07 NOTE — TELEPHONE ENCOUNTER
Spoke with pt informed pt that that Felipe Foreman is still doing [employer] patient assistance. Pt v/u.

## 2022-12-07 NOTE — TELEPHONE ENCOUNTER
Pt stated that she received a letter in the mail that she was no longer going to have assistance for [facility] after December. Please advise.

## 2022-12-07 NOTE — TELEPHONE ENCOUNTER
Pt stated that she got a letter from pt assistance for Eliquis and they have advised her that she has 10 days from 11/30 to complete the ppw. Pt stated that the letter advised her that the md needed to sign and date for the prescription of Eliquis or the application would be disregarded. Pt stated that if we no longer have the ppw she can drop off the ppw that she received. Please advise.

## 2022-12-28 ENCOUNTER — TELEPHONE (OUTPATIENT)
Dept: CARDIOLOGY CLINIC | Age: 87
End: 2022-12-28

## 2022-12-28 NOTE — TELEPHONE ENCOUNTER
12/28 Pt presented herself in office today to drop off paperwork for [address] Paperwork was placed in [employer]'s file in the front office. Pt would like to know when paperwork is complete. I advised pt that paperwork can take 7-10 business days to be completed.

## 2022-12-28 NOTE — TELEPHONE ENCOUNTER
Pt called asking if she needs to re-enroll for patient assistance for [employer] for 2023?  Please advise

## 2023-01-14 ENCOUNTER — APPOINTMENT (OUTPATIENT)
Dept: CT IMAGING | Age: 88
End: 2023-01-14
Payer: MEDICARE

## 2023-01-14 ENCOUNTER — HOSPITAL ENCOUNTER (EMERGENCY)
Age: 88
Discharge: HOME | End: 2023-01-14
Attending: STUDENT IN AN ORGANIZED HEALTH CARE EDUCATION/TRAINING PROGRAM
Payer: MEDICARE

## 2023-01-14 VITALS
OXYGEN SATURATION: 94 % | BODY MASS INDEX: 32.39 KG/M2 | HEIGHT: 62 IN | RESPIRATION RATE: 13 BRPM | DIASTOLIC BLOOD PRESSURE: 116 MMHG | SYSTOLIC BLOOD PRESSURE: 139 MMHG | WEIGHT: 176 LBS | HEART RATE: 66 BPM | TEMPERATURE: 97.7 F

## 2023-01-14 DIAGNOSIS — R10.13 ACUTE EPIGASTRIC PAIN: Primary | ICD-10-CM

## 2023-01-14 DIAGNOSIS — K80.20 CALCULUS OF GALLBLADDER WITHOUT CHOLECYSTITIS WITHOUT OBSTRUCTION: ICD-10-CM

## 2023-01-14 LAB
A/G RATIO: 0.9 (ref 1.1–2.2)
ALBUMIN SERPL-MCNC: 4.3 G/DL (ref 3.4–5)
ALP BLD-CCNC: 105 U/L (ref 40–129)
ALT SERPL-CCNC: 10 U/L (ref 10–40)
ANION GAP SERPL CALCULATED.3IONS-SCNC: 12 MMOL/L (ref 3–16)
AST SERPL-CCNC: 17 U/L (ref 15–37)
BASOPHILS ABSOLUTE: 0.1 K/UL (ref 0–0.2)
BASOPHILS RELATIVE PERCENT: 0.9 %
BILIRUB SERPL-MCNC: 0.6 MG/DL (ref 0–1)
BUN BLDV-MCNC: 13 MG/DL (ref 7–20)
CALCIUM SERPL-MCNC: 9.7 MG/DL (ref 8.3–10.6)
CHLORIDE BLD-SCNC: 98 MMOL/L (ref 99–110)
CO2: 26 MMOL/L (ref 21–32)
CREAT SERPL-MCNC: 0.9 MG/DL (ref 0.6–1.2)
EKG ATRIAL RATE: 72 BPM
EKG DIAGNOSIS: NORMAL
EKG P AXIS: 97 DEGREES
EKG P-R INTERVAL: 168 MS
EKG Q-T INTERVAL: 406 MS
EKG QRS DURATION: 90 MS
EKG QTC CALCULATION (BAZETT): 444 MS
EKG R AXIS: -7 DEGREES
EKG T AXIS: 63 DEGREES
EKG VENTRICULAR RATE: 72 BPM
EOSINOPHILS ABSOLUTE: 0.3 K/UL (ref 0–0.6)
EOSINOPHILS RELATIVE PERCENT: 3.2 %
GFR SERPL CREATININE-BSD FRML MDRD: >60 ML/MIN/{1.73_M2}
GLUCOSE BLD-MCNC: 120 MG/DL (ref 70–99)
HCT VFR BLD CALC: 45.6 % (ref 36–48)
HEMOGLOBIN: 15.1 G/DL (ref 12–16)
LACTIC ACID: 1 MMOL/L (ref 0.4–2)
LIPASE: 17 U/L (ref 13–60)
LYMPHOCYTES ABSOLUTE: 2.7 K/UL (ref 1–5.1)
LYMPHOCYTES RELATIVE PERCENT: 27.7 %
MCH RBC QN AUTO: 27.4 PG (ref 26–34)
MCHC RBC AUTO-ENTMCNC: 33.1 G/DL (ref 31–36)
MCV RBC AUTO: 82.8 FL (ref 80–100)
MONOCYTES ABSOLUTE: 1.3 K/UL (ref 0–1.3)
MONOCYTES RELATIVE PERCENT: 13.1 %
NEUTROPHILS ABSOLUTE: 5.4 K/UL (ref 1.7–7.7)
NEUTROPHILS RELATIVE PERCENT: 55.1 %
PDW BLD-RTO: 14.6 % (ref 12.4–15.4)
PLATELET # BLD: 341 K/UL (ref 135–450)
PMV BLD AUTO: 9.4 FL (ref 5–10.5)
POTASSIUM REFLEX MAGNESIUM: 3.9 MMOL/L (ref 3.5–5.1)
RBC # BLD: 5.5 M/UL (ref 4–5.2)
SODIUM BLD-SCNC: 136 MMOL/L (ref 136–145)
TOTAL PROTEIN: 9 G/DL (ref 6.4–8.2)
TROPONIN: <0.01 NG/ML
WBC # BLD: 9.8 K/UL (ref 4–11)

## 2023-01-14 PROCEDURE — 83605 ASSAY OF LACTIC ACID: CPT

## 2023-01-14 PROCEDURE — 6360000004 HC RX CONTRAST MEDICATION: Performed by: STUDENT IN AN ORGANIZED HEALTH CARE EDUCATION/TRAINING PROGRAM

## 2023-01-14 PROCEDURE — 96374 THER/PROPH/DIAG INJ IV PUSH: CPT

## 2023-01-14 PROCEDURE — 80053 COMPREHEN METABOLIC PANEL: CPT

## 2023-01-14 PROCEDURE — 96375 TX/PRO/DX INJ NEW DRUG ADDON: CPT

## 2023-01-14 PROCEDURE — 74177 CT ABD & PELVIS W/CONTRAST: CPT

## 2023-01-14 PROCEDURE — 85025 COMPLETE CBC W/AUTO DIFF WBC: CPT

## 2023-01-14 PROCEDURE — 99285 EMERGENCY DEPT VISIT HI MDM: CPT

## 2023-01-14 PROCEDURE — 93005 ELECTROCARDIOGRAM TRACING: CPT | Performed by: STUDENT IN AN ORGANIZED HEALTH CARE EDUCATION/TRAINING PROGRAM

## 2023-01-14 PROCEDURE — 84484 ASSAY OF TROPONIN QUANT: CPT

## 2023-01-14 PROCEDURE — 83690 ASSAY OF LIPASE: CPT

## 2023-01-14 PROCEDURE — 93010 ELECTROCARDIOGRAM REPORT: CPT | Performed by: INTERNAL MEDICINE

## 2023-01-14 PROCEDURE — 6360000002 HC RX W HCPCS: Performed by: STUDENT IN AN ORGANIZED HEALTH CARE EDUCATION/TRAINING PROGRAM

## 2023-01-14 RX ORDER — ONDANSETRON 4 MG/1
4 TABLET, ORALLY DISINTEGRATING ORAL 3 TIMES DAILY PRN
Qty: 21 TABLET | Refills: 0 | Status: SHIPPED | OUTPATIENT
Start: 2023-01-14

## 2023-01-14 RX ORDER — MORPHINE SULFATE 4 MG/ML
4 INJECTION, SOLUTION INTRAMUSCULAR; INTRAVENOUS ONCE
Status: COMPLETED | OUTPATIENT
Start: 2023-01-14 | End: 2023-01-14

## 2023-01-14 RX ORDER — ONDANSETRON 2 MG/ML
8 INJECTION INTRAMUSCULAR; INTRAVENOUS ONCE
Status: COMPLETED | OUTPATIENT
Start: 2023-01-14 | End: 2023-01-14

## 2023-01-14 RX ADMIN — IOPAMIDOL 75 ML: 755 INJECTION, SOLUTION INTRAVENOUS at 04:18

## 2023-01-14 RX ADMIN — MORPHINE SULFATE 4 MG: 4 INJECTION, SOLUTION INTRAMUSCULAR; INTRAVENOUS at 03:40

## 2023-01-14 RX ADMIN — ONDANSETRON 8 MG: 2 INJECTION INTRAMUSCULAR; INTRAVENOUS at 03:40

## 2023-01-14 ASSESSMENT — PAIN - FUNCTIONAL ASSESSMENT: PAIN_FUNCTIONAL_ASSESSMENT: 0-10

## 2023-01-14 ASSESSMENT — PAIN DESCRIPTION - ORIENTATION: ORIENTATION: MID

## 2023-01-14 ASSESSMENT — PAIN SCALES - GENERAL
PAINLEVEL_OUTOF10: 2
PAINLEVEL_OUTOF10: 9

## 2023-01-14 ASSESSMENT — PAIN DESCRIPTION - LOCATION: LOCATION: ABDOMEN

## 2023-01-14 NOTE — ED PROVIDER NOTES
[facility]  ED  EMERGENCY DEPARTMENT ENCOUNTER      Pt Name: Maria A Tiwari  MRN: [id number]  Evelyngftyesha 1935  Date of evaluation: 1/14/2023  Provider: Marilu Christensen MD    CHIEF COMPLAINT       Chief Complaint   Patient presents with    Abdominal Pain     Patient has been having pain above umbilicus for approximately 2 days, with it getting steadily worse. She had a bowel movement this morning and has been urinating normally. Was admitted in November for bowel obstruction. HISTORY OF PRESENT ILLNESS   (Location/Symptom, Timing/Onset,Context/Setting, Quality, Duration, Modifying Factors, Severity)  Note limiting factors. Maria A Tiwari is a 81 y.o. female who presents to the ED with a chief complaint of ***      NursingNotes were reviewed. REVIEW OF SYSTEMS    (2-9 systems for level 4, 10 or more for level 5)     *** system ROS otherwise negative unless mentioned in the HPI.     PAST MEDICAL HISTORY     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         SURGICALHISTORY       Past Surgical History:   Procedure Laterality Date    ABLATION OF DYSRHYTHMIC FOCUS      -2014    APPENDECTOMY      DILATATION, ESOPHAGUS      FRACTURE SURGERY      L ankle    HYSTERECTOMY (CERVIX STATUS UNKNOWN)      SKIN BIOPSY      SMALL INTESTINE SURGERY      TONSILLECTOMY      UPPER GASTROINTESTINAL ENDOSCOPY N/A 4/21/2021    EGD POLYP SNARE performed by Kelli Sanchez DO at [facility]  4/21/2021    EGD CONTROL HEMORRHAGE performed by Kelil Sanchez DO at [facility]  4/21/2021    EGD BIOPSY performed by Kelli Sanchez DO at [address]       Discharge Medication List as of 1/14/2023  5:12 AM        CONTINUE these medications which have NOT CHANGED    Details   cloNIDine (CATAPRES) 0.3 MG tablet Take 1 tablet by mouth 2 times daily, Disp-60 tablet, R-0Normal      dilTIAZem (CARDIZEM) 30 MG tablet Take 1 tablet by mouth every 6 hours as needed (for breakthrough atrial fibrillation), Disp-120 tablet, R-1Normal      metoprolol succinate (TOPROL XL) 100 MG extended release tablet Take 0.5 tablets by mouth daily Prescribed as 100mg bid, pt.  Takes only 50mg if HR > 60., Disp-180 tablet, R-3Normal      apixaban (ELIQUIS) 5 MG TABS tablet Take 1 tablet by mouth 2 times daily, Disp-180 tablet, R-3Print      valsartan (DIOVAN) 320 MG tablet Take 1 tablet by mouth daily, Disp-90 tablet, R-1Adjust Sig      pantoprazole (PROTONIX) 20 MG tablet Take 2 tablets by mouth 2 times daily, Disp-60 tablet, R-0Normal      pravastatin (PRAVACHOL) 80 MG tablet Take 40 mg by mouth dailyHistorical Med      pioglitazone (ACTOS) 15 MG tablet Take 15 mg by mouth dailyHistorical Med      dofetilide (TIKOSYN) 125 MCG capsule Take 1 capsule by mouth every 12 hours, Disp-180 capsule, R-3Normal      glyBURIDE (DIABETA) 1.25 MG tablet Take 2.5 mg by mouth 2 times daily (with meals) Historical Med             ALLERGIES     Hydrocodone, Metformin and related, Nickel, Omeprazole, Prednisone, and Hydralazine    FAMILY HISTORY       Family History   Problem Relation Age of Onset    Diabetes Mother     Heart Disease Father     High Blood Pressure Father     High Cholesterol Father     Cancer Sister     Heart Disease Brother     High Blood Pressure Brother     High Cholesterol Brother           SOCIAL HISTORY       Social History     Socioeconomic History    Marital status:      Spouse name: None    Number of children: None    Years of education: None    Highest education level: None   Tobacco Use    Smoking status: Never    Smokeless tobacco: Never   Vaping Use    Vaping Use: Never used   Substance and Sexual Activity    Alcohol use: No    Drug use: No    Sexual activity: Yes     Partners: Male       SCREENINGS    Rob Coma Scale  Eye Opening: Spontaneous  Best Verbal Response: Oriented  Best Motor Response: Obeys commands  [place] Coma Scale Score: 15        PHYSICAL EXAM    (up to 7 for level 4, 8 or more for level 5)     ED Triage Vitals [01/14/23 0318]   BP Temp Temp Source Heart Rate Resp SpO2 Height Weight   (!) 208/91 97.7 °F (36.5 °C) Oral 74 20 97 % 5' 2\" (1.575 m) 176 lb (79.8 kg)       General: Alert and oriented appropriately for age, No acute distress. Eye: Normal conjunctiva. Sclera anicteric. HENT: Oral mucosa is moist.  Respiratory: Respirations even and non-labored. Cardiovascular: Normal rate, Regular rhythm. Gastrointestinal: Soft, Non-tender, Non-distended. : deferred. Musculoskeletal: No swelling. Integumentary: Warm, Dry. Neurologic: Alert and appropriate for age. No focal deficits. Psychiatric: Cooperative. DIAGNOSTIC RESULTS     EKG: All EKG's are interpreted by the Emergency Department Physician who either signs or Co-signsthis chart in the absence of a cardiologist.      ***    RADIOLOGY:   Non-plain filmimages such as CT, Ultrasound and MRI are read by the radiologist. Plain radiographic images are visualized and preliminarily interpreted by the emergency physician with the below findings:      Interpretation per the Radiologist below, if available at the time ofthis note:    CT ABDOMEN PELVIS W IV CONTRAST Additional Contrast? None   Final Result   No acute abdominal or pelvic abnormality. Cholelithiasis.                ED BEDSIDE ULTRASOUND:   Performed by ED Physician - none    LABS:  Labs Reviewed   CBC WITH AUTO DIFFERENTIAL - Abnormal; Notable for the following components:       Result Value    RBC 5.50 (*)     All other components within normal limits   COMPREHENSIVE METABOLIC PANEL W/ REFLEX TO MG FOR LOW K - Abnormal; Notable for the following components:    Chloride 98 (*)     Glucose 120 (*)     Total Protein 9.0 (*)     Albumin/Globulin Ratio 0.9 (*)     All other components within normal limits   LIPASE   LACTIC ACID   TROPONIN   URINALYSIS WITH REFLEX TO CULTURE       All other labs were within normal range or not returned as of this dictation. EMERGENCY DEPARTMENT COURSE and DIFFERENTIAL DIAGNOSIS/MDM:   Vitals:    Vitals:    01/14/23 0404 01/14/23 0424 01/14/23 0444 01/14/23 0504   BP: (!) 150/64 (!) 188/73 (!) 186/68 (!) 139/116   Pulse: 59 62 59 66   Resp: 14 17 14 13   Temp:       TempSrc:       SpO2: 95% 98% 95% 94%   Weight:       Height:             Medical decision making:  ***    Medications   morphine sulfate (PF) injection 4 mg (4 mg IntraVENous Given 1/14/23 0340)   ondansetron (ZOFRAN) injection 8 mg (8 mg IntraVENous Given 1/14/23 0340)   iopamidol (ISOVUE-370) 76 % injection 75 mL (75 mLs IntraVENous Given 1/14/23 0418)       DDX-***  Social Determinants (Poverty, Education, uninsured) -***  Prior notes reviewed - ***  Name and route all medications-***  Charting interpretations all performed by me - ***  Diagnosis descriptions-***  Disposition-***    CRITICAL CARE TIME   I personally saw the patient and independently provided *** minutes of non-concurrent critical care out of the total shared critical care time provided, excluding separately reportable procedures. There was a high probability of clinically significant/life threatening deterioration in the patient's condition which required my urgent intervention. Is this patient to be included in the SEP-1 Core Measure due to severe sepsis or septic shock? {[id number]:88581}     CONSULTS:  None    PROCEDURES:  Unless otherwise noted below, none         FINAL IMPRESSION      1. Acute epigastric pain    2.  Calculus of gallbladder without cholecystitis without obstruction          DISPOSITION/PLAN   DISPOSITION Decision To Discharge 01/14/2023 04:57:40 AM      PATIENT REFERRED TO:  Emi Berry MD  [address] [facility] [address]    On 1/16/2023      [facility]  ED  [address] [facility] [address]  [address] [facility]    If symptoms worsen    Jose Zhang MD  [address] [facility] [address], [facility]  [phone]    In 2 days        DISCHARGE MEDICATIONS:  Discharge Medication List as of 1/14/2023  5:12 AM        START taking these medications    Details   ondansetron (ZOFRAN-ODT) 4 MG disintegrating tablet Take 1 tablet by mouth 3 times daily as needed for Nausea or Vomiting, Disp-21 tablet, R-0Normal                (Please note that portions of this note were completed with a voice recognition program.Efforts were made to edit the dictations but occasionally words are mis-transcribed.)    Arvind Paredes MD (electronically signed)  Attending Emergency Physician peritonitic features. + nausea without emesis. No CP, EKG obtained to risk stratify for ACS given age, but not described as anginal, not exertional, no CP at all and pt with significant abd ttp on exam.  Prompts c/f SBO, pancreatitis, bilary colic, PUD, GERD, gastritis. EKG normal as well. Unlikely ACS. Labs including CBC, BMP, LFTs, lipase, LA, troponin. CT a/p. Meds as above. IVFs. CT demonstrates cholelithiasis, no other acute findings. Labs unremarkable. LA and troponin normal.  No lipase elevation, unlikely pancreatitis, no transaminitis or leukocytosis and improved abd exam after medications, no longer with significant ttp in the epigastrium or RUQ. Likely biliary colic, unlikely cholecystitis. As pt feeling better, she is given PO challenge which she tolerates, does not bring about recurrence of pain. AS no e/o SBO on CT or other process requiring admission and pt is feeling better, she is stable for and amenable to d/c home with outpt surgical follow up. I estimate there is LOW risk for (including but not limited to) ACUTE APPENDICITIS, BOWEL OBSTRUCTION, ACUTE CHOLECYSTITIS, RUPTURED DIVERTICULITIS, INCARCERATED or STRANGULATED HERNIA, HEMMORHAGIC PANCREATITIS, or PERFORATED BOWEL/ULCER, thus I consider the discharge disposition reasonable. Samuel Negrete Payer (or their surrogate) and I have discussed the diagnosis and risks, and we agree with discharging home with close follow-up. We also discussed returning to the Emergency Department immediately if new or worsening symptoms occur. We have discussed the symptoms which are most concerning that necessitate immediate return. Disposition Considerations (tests considered but not done, Shared Decision Making, Pt Expectation of Test or Tx.): \  Appropriate for outpatient management home with outpatient follow up. I am the Primary Clinician of Record. FINAL IMPRESSION      1. Acute epigastric pain    2.  Calculus of gallbladder without cholecystitis without obstruction          DISPOSITION/PLAN     DISPOSITION Decision To Discharge 01/14/2023 04:57:40 AM      PATIENT REFERRED TO:  Sharmaine Mejía MD  [address] [facility] [address]    On 1/16/2023      [place]  ED  [address] [facility] [address]  [address] [place] [address]  [phone]    If symptoms worsen    Narciso Paul MD  [address] [facility] [address], [address],[facility] Floor  [phone]    In 2 days        DISCHARGE MEDICATIONS:  Discharge Medication List as of 1/14/2023  5:12 AM        START taking these medications    Details   ondansetron (ZOFRAN-ODT) 4 MG disintegrating tablet Take 1 tablet by mouth 3 times daily as needed for Nausea or Vomiting, Disp-21 tablet, R-0Normal             DISCONTINUED MEDICATIONS:  Discharge Medication List as of 1/14/2023  5:12 AM                 (Please note that portions of this note were completed with a voice recognition program.  Efforts were made to edit the dictations but occasionally words are mis-transcribed.)    Roseline Pryor MD (electronically signed)           Roseline Pryor MD  01/19/23 9814

## 2023-01-18 ENCOUNTER — TELEPHONE (OUTPATIENT)
Dept: CARDIOLOGY CLINIC | Age: 88
End: 2023-01-18

## 2023-01-18 NOTE — TELEPHONE ENCOUNTER
I contacted pt today to inform that her tikosyn has been recevied in office and ready for pickup. The pt v/u.

## 2023-01-24 ENCOUNTER — TELEPHONE (OUTPATIENT)
Dept: SURGERY | Age: 88
End: 2023-01-24

## 2023-01-24 ENCOUNTER — INITIAL CONSULT (OUTPATIENT)
Dept: SURGERY | Age: 88
End: 2023-01-24
Payer: MEDICARE

## 2023-01-24 VITALS
HEIGHT: 62 IN | BODY MASS INDEX: 32.94 KG/M2 | WEIGHT: 179 LBS | SYSTOLIC BLOOD PRESSURE: 144 MMHG | DIASTOLIC BLOOD PRESSURE: 78 MMHG

## 2023-01-24 DIAGNOSIS — K80.20 GALLSTONES: Primary | ICD-10-CM

## 2023-01-24 PROCEDURE — 99214 OFFICE O/P EST MOD 30 MIN: CPT | Performed by: SURGERY

## 2023-01-24 PROCEDURE — 1123F ACP DISCUSS/DSCN MKR DOCD: CPT | Performed by: SURGERY

## 2023-01-24 NOTE — PROGRESS NOTES
[facility] SURGERY    CHIEF COMPLAINT: Abdominal pain    SUBJECTIVE:   Patient presents for evaluation of abdominal pain. She reports the pain was a sharp stabbing pain in the epigastrium. She had associated nausea and vomiting. She thought she was having a bowel obstruction, as the pain felt similar. The pain did not radiate. This came on after eating dinner. As her pain persisted, she went to the emergency room. Her pain promptly resolved with treatment and has not recurred. Allergies   Allergen Reactions    Hydrocodone Other (See Comments)     NAUSEA AND DIZZINESS    Metformin And Related Other (See Comments)     NAUSEA AND DIZZINESS    Nickel Dermatitis    Omeprazole     Prednisone      Other reaction(s): chest pain & palpitations/H&P    Hydralazine Palpitations and Rash     Outpatient Medications Marked as Taking for the 1/24/23 encounter (Initial consult) with Prieto Grant MD   Medication Sig Dispense Refill    apixaban (ELIQUIS) 5 MG TABS tablet Take 1 tablet by mouth 2 times daily 60 tablet 0    ondansetron (ZOFRAN-ODT) 4 MG disintegrating tablet Take 1 tablet by mouth 3 times daily as needed for Nausea or Vomiting 21 tablet 0    dilTIAZem (CARDIZEM) 30 MG tablet Take 1 tablet by mouth every 6 hours as needed (for breakthrough atrial fibrillation) 120 tablet 1    metoprolol succinate (TOPROL XL) 100 MG extended release tablet Take 0.5 tablets by mouth daily Prescribed as 100mg bid, pt.  Takes only 50mg if HR > 60. 180 tablet 3    valsartan (DIOVAN) 320 MG tablet Take 1 tablet by mouth daily 90 tablet 1    pantoprazole (PROTONIX) 20 MG tablet Take 2 tablets by mouth 2 times daily 60 tablet 0    pravastatin (PRAVACHOL) 80 MG tablet Take 40 mg by mouth daily      pioglitazone (ACTOS) 15 MG tablet Take 15 mg by mouth daily      dofetilide (TIKOSYN) 125 MCG capsule Take 1 capsule by mouth every 12 hours 180 capsule 3    glyBURIDE (DIABETA) 1.25 MG tablet Take 2.5 mg by mouth 2 times daily (with meals)          Past Medical History:   Diagnosis Date    Atrial fibrillation ([facility]     Diabetes mellitus ([facility]     Hydronephrosis 2/14/2014    Hyperlipidemia     Hypertension     Intussusception intestine ([address]     SVT (supraventricular tachycardia) ([facility] 8/26/2013    AVNRT     Past Surgical History:   Procedure Laterality Date    ABLATION OF DYSRHYTHMIC FOCUS      -2014    APPENDECTOMY      DILATATION, ESOPHAGUS      FRACTURE SURGERY      L ankle    HYSTERECTOMY (CERVIX STATUS UNKNOWN)      SKIN BIOPSY      SMALL INTESTINE SURGERY      TONSILLECTOMY      UPPER GASTROINTESTINAL ENDOSCOPY N/A 4/21/2021    EGD POLYP SNARE performed by Ross Rivers, DO at [address]  4/21/2021    EGD CONTROL HEMORRHAGE performed by Ross Rivers, DO at [facility] [address]  4/21/2021    EGD BIOPSY performed by Ross Rivers, DO at [address]     Family History   Problem Relation Age of Onset    Diabetes Mother     Heart Disease Father     High Blood Pressure Father     High Cholesterol Father     Cancer Sister     Heart Disease Brother     High Blood Pressure Brother     High Cholesterol Brother      Social History     Socioeconomic History    Marital status:      Spouse name: Not on file    Number of children: Not on file    Years of education: Not on file    Highest education level: Not on file   Occupational History    Not on file   Tobacco Use    Smoking status: Never    Smokeless tobacco: Never   Vaping Use    Vaping Use: Never used   Substance and Sexual Activity    Alcohol use: No    Drug use: No    Sexual activity: Yes     Partners: Male   Other Topics Concern    Not on file   Social History Narrative    Not on file     Social Determinants of Health     Financial Resource Strain: Not on file   Food Insecurity: Not on file   Transportation Needs: Not on file   Physical Activity: Not on file   Stress: Not on file Social Connections: Not on file   Intimate Partner Violence: Not on file   Housing Stability: Not on file          Vitals:    01/24/23 0832   BP: (!) 144/78   Site: Left Upper Arm   Position: Sitting   Cuff Size: Large Adult   Weight: 179 lb (81.2 kg)   Height: 5' 2\" (1.575 m)     Body mass index is 32.74 kg/m². ROS:  As per HPI, otherwise reviewed and negative    PHYSICAL EXAM:     Constitutional:  Well developed, well nourished, no acute distress, non-toxic appearance   Respiratory:  No respiratory distress, normal breath sounds, no rales, no wheezing   Cardiovascular:  Normal rate, normal rhythm, no murmurs.   GI: Bowel sounds positive, soft, nontender  Integument: Warm and dry  Neurologic:  Alert & oriented x 3, normal motor function, normal sensory function, no focal deficits noted   Psychiatric:  Speech and behavior appropriate             DATA:  CBC with Differential:    Lab Results   Component Value Date/Time    WBC 9.8 01/14/2023 03:30 AM    RBC 5.50 01/14/2023 03:30 AM    HGB 15.1 01/14/2023 03:30 AM    HCT 45.6 01/14/2023 03:30 AM     01/14/2023 03:30 AM    MCV 82.8 01/14/2023 03:30 AM    MCH 27.4 01/14/2023 03:30 AM    MCHC 33.1 01/14/2023 03:30 AM    RDW 14.6 01/14/2023 03:30 AM    BANDSPCT 17 02/19/2014 06:54 AM    LYMPHOPCT 27.7 01/14/2023 03:30 AM    MONOPCT 13.1 01/14/2023 03:30 AM    BASOPCT 0.9 01/14/2023 03:30 AM    MONOSABS 1.3 01/14/2023 03:30 AM    LYMPHSABS 2.7 01/14/2023 03:30 AM    EOSABS 0.3 01/14/2023 03:30 AM    BASOSABS 0.1 01/14/2023 03:30 AM     CMP:    Lab Results   Component Value Date/Time     01/14/2023 03:30 AM    K 3.9 01/14/2023 03:30 AM    CL 98 01/14/2023 03:30 AM    CO2 26 01/14/2023 03:30 AM    BUN 13 01/14/2023 03:30 AM    CREATININE 0.9 01/14/2023 03:30 AM    GFRAA >60 07/14/2022 05:10 PM    AGRATIO 0.9 01/14/2023 03:30 AM    LABGLOM >60 01/14/2023 03:30 AM    GLUCOSE 120 01/14/2023 03:30 AM    PROT 9.0 01/14/2023 03:30 AM    LABALBU 4.3 01/14/2023 03:30 AM    CALCIUM 9.7 01/14/2023 03:30 AM    BILITOT 0.6 01/14/2023 03:30 AM    ALKPHOS 105 01/14/2023 03:30 AM    AST 17 01/14/2023 03:30 AM    ALT 10 01/14/2023 03:30 AM     Radiology Review: CT images reviewed and show gallstones without secondary signs of cholecystitis. There is no sign of bowel obstruction    ASSESSMENT:   1. Gallstones           PLAN: Based on her history and the imaging findings, I suspect she had an episode of biliary colic. She has never had similar symptoms in the past and has not had any since. We did discuss surgical intervention and I talked to her about a robotic cholecystectomy, possible open procedure. I explained the procedure including risks, benefits, and alternatives. Questions were answered and the patient would like to hold off for now as she has just had 1 episode. She will follow a low-fat diet and see how she does.   If she has recurrent symptoms she will call to arrange for surgery

## 2023-01-24 NOTE — TELEPHONE ENCOUNTER
PT called requesting pain medication, states she is having another gallstone attack, and states she does not want to go to the ER, wants to have surgery.   0118 Garrick Yeung

## 2023-01-25 ENCOUNTER — APPOINTMENT (OUTPATIENT)
Dept: CT IMAGING | Age: 88
End: 2023-01-25
Payer: MEDICARE

## 2023-01-25 ENCOUNTER — HOSPITAL ENCOUNTER (INPATIENT)
Age: 88
LOS: 1 days | Discharge: HOME | End: 2023-01-26
Attending: STUDENT IN AN ORGANIZED HEALTH CARE EDUCATION/TRAINING PROGRAM | Admitting: INTERNAL MEDICINE
Payer: MEDICARE

## 2023-01-25 DIAGNOSIS — K80.20 CALCULUS OF GALLBLADDER WITHOUT CHOLECYSTITIS WITHOUT OBSTRUCTION: ICD-10-CM

## 2023-01-25 DIAGNOSIS — K56.600 PARTIAL SMALL BOWEL OBSTRUCTION (HCC): ICD-10-CM

## 2023-01-25 DIAGNOSIS — R10.11 ABDOMINAL PAIN, ACUTE, RIGHT UPPER QUADRANT: Primary | ICD-10-CM

## 2023-01-25 LAB
ALBUMIN SERPL-MCNC: 4 G/DL (ref 3.4–5)
ALP BLD-CCNC: 87 U/L (ref 40–129)
ALT SERPL-CCNC: 13 U/L (ref 10–40)
ANION GAP SERPL CALCULATED.3IONS-SCNC: 11 MMOL/L (ref 3–16)
AST SERPL-CCNC: 20 U/L (ref 15–37)
BASOPHILS ABSOLUTE: 0.1 K/UL (ref 0–0.2)
BASOPHILS RELATIVE PERCENT: 0.9 %
BILIRUB SERPL-MCNC: 0.4 MG/DL (ref 0–1)
BILIRUBIN DIRECT: <0.2 MG/DL (ref 0–0.3)
BILIRUBIN, INDIRECT: NORMAL MG/DL (ref 0–1)
BUN BLDV-MCNC: 13 MG/DL (ref 7–20)
CALCIUM SERPL-MCNC: 9.1 MG/DL (ref 8.3–10.6)
CHLORIDE BLD-SCNC: 100 MMOL/L (ref 99–110)
CO2: 23 MMOL/L (ref 21–32)
CREAT SERPL-MCNC: 0.8 MG/DL (ref 0.6–1.2)
EOSINOPHILS ABSOLUTE: 0.4 K/UL (ref 0–0.6)
EOSINOPHILS RELATIVE PERCENT: 4.1 %
GFR SERPL CREATININE-BSD FRML MDRD: >60 ML/MIN/{1.73_M2}
GLUCOSE BLD-MCNC: 100 MG/DL (ref 70–99)
GLUCOSE BLD-MCNC: 182 MG/DL (ref 70–99)
GLUCOSE BLD-MCNC: 244 MG/DL (ref 70–99)
GLUCOSE BLD-MCNC: 75 MG/DL (ref 70–99)
GLUCOSE BLD-MCNC: 95 MG/DL (ref 70–99)
GLUCOSE BLD-MCNC: 96 MG/DL (ref 70–99)
HCT VFR BLD CALC: 43.9 % (ref 36–48)
HEMOGLOBIN: 13.8 G/DL (ref 12–16)
LIPASE: 18 U/L (ref 13–60)
LYMPHOCYTES ABSOLUTE: 2.7 K/UL (ref 1–5.1)
LYMPHOCYTES RELATIVE PERCENT: 29.3 %
MCH RBC QN AUTO: 26.2 PG (ref 26–34)
MCHC RBC AUTO-ENTMCNC: 31.6 G/DL (ref 31–36)
MCV RBC AUTO: 83 FL (ref 80–100)
MONOCYTES ABSOLUTE: 1 K/UL (ref 0–1.3)
MONOCYTES RELATIVE PERCENT: 10.7 %
NEUTROPHILS ABSOLUTE: 5 K/UL (ref 1.7–7.7)
NEUTROPHILS RELATIVE PERCENT: 55 %
PDW BLD-RTO: 14.4 % (ref 12.4–15.4)
PERFORMED ON: ABNORMAL
PERFORMED ON: NORMAL
PERFORMED ON: NORMAL
PLATELET # BLD: 341 K/UL (ref 135–450)
PMV BLD AUTO: 9.4 FL (ref 5–10.5)
POTASSIUM REFLEX MAGNESIUM: 4.4 MMOL/L (ref 3.5–5.1)
RBC # BLD: 5.28 M/UL (ref 4–5.2)
SODIUM BLD-SCNC: 134 MMOL/L (ref 136–145)
TOTAL PROTEIN: 8.2 G/DL (ref 6.4–8.2)
WBC # BLD: 9.1 K/UL (ref 4–11)

## 2023-01-25 PROCEDURE — 96374 THER/PROPH/DIAG INJ IV PUSH: CPT

## 2023-01-25 PROCEDURE — 80076 HEPATIC FUNCTION PANEL: CPT

## 2023-01-25 PROCEDURE — 6360000002 HC RX W HCPCS: Performed by: STUDENT IN AN ORGANIZED HEALTH CARE EDUCATION/TRAINING PROGRAM

## 2023-01-25 PROCEDURE — 2500000003 HC RX 250 WO HCPCS: Performed by: NURSE PRACTITIONER

## 2023-01-25 PROCEDURE — 1200000000 HC SEMI PRIVATE

## 2023-01-25 PROCEDURE — 6370000000 HC RX 637 (ALT 250 FOR IP): Performed by: NURSE PRACTITIONER

## 2023-01-25 PROCEDURE — 99222 1ST HOSP IP/OBS MODERATE 55: CPT | Performed by: SURGERY

## 2023-01-25 PROCEDURE — 74177 CT ABD & PELVIS W/CONTRAST: CPT

## 2023-01-25 PROCEDURE — 2580000003 HC RX 258: Performed by: NURSE PRACTITIONER

## 2023-01-25 PROCEDURE — 96375 TX/PRO/DX INJ NEW DRUG ADDON: CPT

## 2023-01-25 PROCEDURE — 6360000004 HC RX CONTRAST MEDICATION: Performed by: STUDENT IN AN ORGANIZED HEALTH CARE EDUCATION/TRAINING PROGRAM

## 2023-01-25 PROCEDURE — 6360000002 HC RX W HCPCS: Performed by: NURSE PRACTITIONER

## 2023-01-25 PROCEDURE — 85025 COMPLETE CBC W/AUTO DIFF WBC: CPT

## 2023-01-25 PROCEDURE — 80048 BASIC METABOLIC PNL TOTAL CA: CPT

## 2023-01-25 PROCEDURE — 99285 EMERGENCY DEPT VISIT HI MDM: CPT

## 2023-01-25 PROCEDURE — 83690 ASSAY OF LIPASE: CPT

## 2023-01-25 RX ORDER — SODIUM CHLORIDE 0.9 % (FLUSH) 0.9 %
5-40 SYRINGE (ML) INJECTION EVERY 12 HOURS SCHEDULED
Status: DISCONTINUED | OUTPATIENT
Start: 2023-01-25 | End: 2023-01-26 | Stop reason: HOSPADM

## 2023-01-25 RX ORDER — POLYETHYLENE GLYCOL 3350 17 G/17G
17 POWDER, FOR SOLUTION ORAL DAILY PRN
Status: DISCONTINUED | OUTPATIENT
Start: 2023-01-25 | End: 2023-01-26 | Stop reason: HOSPADM

## 2023-01-25 RX ORDER — MORPHINE SULFATE 4 MG/ML
4 INJECTION, SOLUTION INTRAMUSCULAR; INTRAVENOUS ONCE
Status: COMPLETED | OUTPATIENT
Start: 2023-01-25 | End: 2023-01-25

## 2023-01-25 RX ORDER — INSULIN LISPRO 100 [IU]/ML
0-4 INJECTION, SOLUTION INTRAVENOUS; SUBCUTANEOUS EVERY 4 HOURS
Status: DISCONTINUED | OUTPATIENT
Start: 2023-01-25 | End: 2023-01-26 | Stop reason: HOSPADM

## 2023-01-25 RX ORDER — ACETAMINOPHEN 325 MG/1
650 TABLET ORAL EVERY 6 HOURS PRN
Status: DISCONTINUED | OUTPATIENT
Start: 2023-01-25 | End: 2023-01-26 | Stop reason: HOSPADM

## 2023-01-25 RX ORDER — SODIUM CHLORIDE 9 MG/ML
INJECTION, SOLUTION INTRAVENOUS PRN
Status: DISCONTINUED | OUTPATIENT
Start: 2023-01-25 | End: 2023-01-26 | Stop reason: HOSPADM

## 2023-01-25 RX ORDER — DEXTROSE AND SODIUM CHLORIDE 5; .9 G/100ML; G/100ML
INJECTION, SOLUTION INTRAVENOUS CONTINUOUS
Status: DISCONTINUED | OUTPATIENT
Start: 2023-01-25 | End: 2023-01-26 | Stop reason: HOSPADM

## 2023-01-25 RX ORDER — DOFETILIDE 0.12 MG/1
125 CAPSULE ORAL EVERY 12 HOURS SCHEDULED
Status: DISCONTINUED | OUTPATIENT
Start: 2023-01-25 | End: 2023-01-26 | Stop reason: HOSPADM

## 2023-01-25 RX ORDER — FAMOTIDINE 10 MG/ML
20 INJECTION, SOLUTION INTRAVENOUS 2 TIMES DAILY
Status: DISCONTINUED | OUTPATIENT
Start: 2023-01-25 | End: 2023-01-26 | Stop reason: HOSPADM

## 2023-01-25 RX ORDER — ACETAMINOPHEN 650 MG/1
650 SUPPOSITORY RECTAL EVERY 6 HOURS PRN
Status: DISCONTINUED | OUTPATIENT
Start: 2023-01-25 | End: 2023-01-26 | Stop reason: HOSPADM

## 2023-01-25 RX ORDER — PROCHLORPERAZINE EDISYLATE 5 MG/ML
10 INJECTION INTRAMUSCULAR; INTRAVENOUS EVERY 6 HOURS PRN
Status: DISCONTINUED | OUTPATIENT
Start: 2023-01-25 | End: 2023-01-26 | Stop reason: HOSPADM

## 2023-01-25 RX ORDER — SODIUM CHLORIDE 9 MG/ML
INJECTION, SOLUTION INTRAVENOUS CONTINUOUS
Status: DISCONTINUED | OUTPATIENT
Start: 2023-01-25 | End: 2023-01-25

## 2023-01-25 RX ORDER — VALSARTAN 80 MG/1
320 TABLET ORAL DAILY
Status: DISCONTINUED | OUTPATIENT
Start: 2023-01-25 | End: 2023-01-26 | Stop reason: HOSPADM

## 2023-01-25 RX ORDER — ONDANSETRON 2 MG/ML
8 INJECTION INTRAMUSCULAR; INTRAVENOUS ONCE
Status: COMPLETED | OUTPATIENT
Start: 2023-01-25 | End: 2023-01-25

## 2023-01-25 RX ORDER — FUROSEMIDE 20 MG/1
20 TABLET ORAL DAILY
COMMUNITY

## 2023-01-25 RX ORDER — DEXTROSE MONOHYDRATE 100 MG/ML
INJECTION, SOLUTION INTRAVENOUS CONTINUOUS PRN
Status: DISCONTINUED | OUTPATIENT
Start: 2023-01-25 | End: 2023-01-26 | Stop reason: HOSPADM

## 2023-01-25 RX ORDER — METOPROLOL SUCCINATE 50 MG/1
50 TABLET, EXTENDED RELEASE ORAL DAILY
Status: DISCONTINUED | OUTPATIENT
Start: 2023-01-25 | End: 2023-01-26 | Stop reason: HOSPADM

## 2023-01-25 RX ORDER — SODIUM CHLORIDE 0.9 % (FLUSH) 0.9 %
5-40 SYRINGE (ML) INJECTION PRN
Status: DISCONTINUED | OUTPATIENT
Start: 2023-01-25 | End: 2023-01-26 | Stop reason: HOSPADM

## 2023-01-25 RX ORDER — FENTANYL CITRATE 50 UG/ML
25 INJECTION, SOLUTION INTRAMUSCULAR; INTRAVENOUS
Status: COMPLETED | OUTPATIENT
Start: 2023-01-25 | End: 2023-01-25

## 2023-01-25 RX ADMIN — SODIUM CHLORIDE: 9 INJECTION, SOLUTION INTRAVENOUS at 09:10

## 2023-01-25 RX ADMIN — FENTANYL CITRATE 25 MCG: 50 INJECTION, SOLUTION INTRAMUSCULAR; INTRAVENOUS at 09:18

## 2023-01-25 RX ADMIN — VALSARTAN 320 MG: 80 TABLET ORAL at 11:56

## 2023-01-25 RX ADMIN — HYDROMORPHONE HYDROCHLORIDE 1 MG: 1 INJECTION, SOLUTION INTRAMUSCULAR; INTRAVENOUS; SUBCUTANEOUS at 05:29

## 2023-01-25 RX ADMIN — FAMOTIDINE 20 MG: 10 INJECTION, SOLUTION INTRAVENOUS at 11:57

## 2023-01-25 RX ADMIN — IOPAMIDOL 75 ML: 755 INJECTION, SOLUTION INTRAVENOUS at 01:52

## 2023-01-25 RX ADMIN — DEXTROSE AND SODIUM CHLORIDE: 5; 900 INJECTION, SOLUTION INTRAVENOUS at 21:05

## 2023-01-25 RX ADMIN — MORPHINE SULFATE 4 MG: 4 INJECTION, SOLUTION INTRAMUSCULAR; INTRAVENOUS at 02:08

## 2023-01-25 RX ADMIN — FAMOTIDINE 20 MG: 10 INJECTION, SOLUTION INTRAVENOUS at 20:19

## 2023-01-25 RX ADMIN — ONDANSETRON 8 MG: 2 INJECTION INTRAMUSCULAR; INTRAVENOUS at 02:07

## 2023-01-25 RX ADMIN — DOFETILIDE 125 MCG: 0.12 CAPSULE ORAL at 12:18

## 2023-01-25 RX ADMIN — INSULIN LISPRO 1 UNITS: 100 INJECTION, SOLUTION INTRAVENOUS; SUBCUTANEOUS at 09:15

## 2023-01-25 RX ADMIN — METOPROLOL SUCCINATE 50 MG: 50 TABLET, EXTENDED RELEASE ORAL at 11:58

## 2023-01-25 RX ADMIN — SODIUM CHLORIDE, PRESERVATIVE FREE 10 ML: 5 INJECTION INTRAVENOUS at 09:20

## 2023-01-25 RX ADMIN — SODIUM CHLORIDE, PRESERVATIVE FREE 10 ML: 5 INJECTION INTRAVENOUS at 20:19

## 2023-01-25 RX ADMIN — FENTANYL CITRATE 25 MCG: 50 INJECTION, SOLUTION INTRAMUSCULAR; INTRAVENOUS at 12:18

## 2023-01-25 ASSESSMENT — PAIN DESCRIPTION - ORIENTATION
ORIENTATION: MID
ORIENTATION: MID

## 2023-01-25 ASSESSMENT — PAIN DESCRIPTION - LOCATION
LOCATION: ABDOMEN
LOCATION: ABDOMEN

## 2023-01-25 ASSESSMENT — PAIN SCALES - GENERAL
PAINLEVEL_OUTOF10: 1
PAINLEVEL_OUTOF10: 1
PAINLEVEL_OUTOF10: 10
PAINLEVEL_OUTOF10: 2
PAINLEVEL_OUTOF10: 4
PAINLEVEL_OUTOF10: 7
PAINLEVEL_OUTOF10: 5

## 2023-01-25 ASSESSMENT — PAIN - FUNCTIONAL ASSESSMENT: PAIN_FUNCTIONAL_ASSESSMENT: 0-10

## 2023-01-25 ASSESSMENT — PAIN DESCRIPTION - DESCRIPTORS: DESCRIPTORS: ACHING;STABBING

## 2023-01-25 NOTE — ED NOTES
Patient resting in bed with eyes closed. Patient states she is feeling much better at this time with little pain. Patient placed on 2L O2 as oxygen saturation dropped to 88% on RA while patient asleep. No current needs. Call light within reach.       Cindy Fontana RN  01/25/23 8151

## 2023-01-25 NOTE — ED PROVIDER NOTES
[facility]  ED  EMERGENCY DEPARTMENT ENCOUNTER        Pt Name: Kimberly Tiwari  MRN: [id number]  Monalisa 1935  Date of evaluation: 1/25/2023  Provider: Xavier Castellanos MD  PCP: Tariq Sauceda MD  Note Started: 5:32 AM EST 1/25/23    CHIEF COMPLAINT       Chief Complaint   Patient presents with    Abdominal Pain     Saw Gen Surg today for gallstones. Pain started after visit       HISTORY OF PRESENT ILLNESS: 1 or more Elements     History from : Patient    Limitations to history : None    Neptali Nguyen is a 81 y.o. female who presents with RUQ pain x 1 day. Started after visit with gen surgery as an outpt, pt with h/o gallstones, partial SBO. Associated with nausea, no vomiting. Denies fevers. No CP. Symptoms not otherwise alleviated or exacerbated by other factors. Nursing Notes were all reviewed and agreed with or any disagreements were addressed in the HPI. REVIEW OF SYSTEMS :      Positives and Pertinent negatives as per HPI. ROS otherwise unremarkable.     SURGICAL HISTORY     Past Surgical History:   Procedure Laterality Date    ABLATION OF DYSRHYTHMIC FOCUS      -2014    APPENDECTOMY      DILATATION, ESOPHAGUS      FRACTURE SURGERY      L ankle    HYSTERECTOMY (CERVIX STATUS UNKNOWN)      SKIN BIOPSY      SMALL INTESTINE SURGERY      TONSILLECTOMY      UPPER GASTROINTESTINAL ENDOSCOPY N/A 4/21/2021    EGD POLYP SNARE performed by Mabel Adams DO at [address] [place] [address]  4/21/2021    EGD CONTROL HEMORRHAGE performed by Mabel Adams DO at [facility]  4/21/2021    EGD BIOPSY performed by Mabel Adams DO at [facility]       Previous Medications    APIXABAN (ELIQUIS) 5 MG TABS TABLET    Take 1 tablet by mouth 2 times daily    CLONIDINE (CATAPRES) 0.3 MG TABLET    Take 1 tablet by mouth 2 times daily    DILTIAZEM (CARDIZEM) 30 MG TABLET    Take 1 tablet by mouth every 6 hours as needed (for breakthrough atrial fibrillation)    DOFETILIDE (TIKOSYN) 125 MCG CAPSULE    Take 1 capsule by mouth every 12 hours    GLYBURIDE (DIABETA) 1.25 MG TABLET    Take 2.5 mg by mouth 2 times daily (with meals)     METOPROLOL SUCCINATE (TOPROL XL) 100 MG EXTENDED RELEASE TABLET    Take 0.5 tablets by mouth daily Prescribed as 100mg bid, pt. Takes only 50mg if HR > 60. ONDANSETRON (ZOFRAN-ODT) 4 MG DISINTEGRATING TABLET    Take 1 tablet by mouth 3 times daily as needed for Nausea or Vomiting    PANTOPRAZOLE (PROTONIX) 20 MG TABLET    Take 2 tablets by mouth 2 times daily    PIOGLITAZONE (ACTOS) 15 MG TABLET    Take 15 mg by mouth daily    PRAVASTATIN (PRAVACHOL) 80 MG TABLET    Take 40 mg by mouth daily    VALSARTAN (DIOVAN) 320 MG TABLET    Take 1 tablet by mouth daily       ALLERGIES     Hydrocodone, Metformin and related, Nickel, Omeprazole, Prednisone, and Hydralazine    FAMILYHISTORY       Family History   Problem Relation Age of Onset    Diabetes Mother     Heart Disease Father     High Blood Pressure Father     High Cholesterol Father     Cancer Sister     Heart Disease Brother     High Blood Pressure Brother     High Cholesterol Brother         SOCIAL HISTORY       Social History     Tobacco Use    Smoking status: Never    Smokeless tobacco: Never   Vaping Use    Vaping Use: Never used   Substance Use Topics    Alcohol use: No    Drug use: No       SCREENINGS                         CIWA Assessment  BP: (!) 174/59  Heart Rate: 54           PHYSICAL EXAM  1 or more Elements     ED Triage Vitals   BP Temp Temp Source Heart Rate Resp SpO2 Height Weight   01/25/23 0052 01/25/23 0052 01/25/23 0052 01/25/23 0052 01/25/23 0052 01/25/23 0052 01/25/23 0227 01/25/23 0227   (!) 221/79 98 °F (36.7 °C) Oral 65 16 97 % 5' 2\" (1.575 m) 180 lb (81.6 kg)       Physical Exam    General: Alert, No acute distress. Eye: Normal conjunctiva. Sclera anicteric.   HENT: Oral mucosa is moist.  Respiratory: Respirations even and non-labored. Cardiovascular: Normal rate, Regular rhythm. Gastrointestinal: Non-distended. RUQ ttp.  + Tobias's  Musculoskeletal: No deformities  Integumentary: Warm, Dry. Neurologic:  No focal deficits. DIAGNOSTIC RESULTS   LABS:    Labs Reviewed   CBC WITH AUTO DIFFERENTIAL - Abnormal; Notable for the following components:       Result Value    RBC 5.28 (*)     All other components within normal limits   BASIC METABOLIC PANEL W/ REFLEX TO MG FOR LOW K - Abnormal; Notable for the following components:    Sodium 134 (*)     All other components within normal limits   HEPATIC FUNCTION PANEL   LIPASE       When ordered only abnormal lab results are displayed. All other labs were within normal range or not returned as of this dictation. Interpretation per the Radiologist below, if available at the time of this note:    CT ABDOMEN PELVIS W IV CONTRAST Additional Contrast? None   Preliminary Result   Partial small bowel obstruction. In the right side of lower abdomen there   are several loops of small bowel moderately distended with intraluminal stool   like material, causing partial obstruction. These dilated loops of small   bowel demonstrate postoperative changes with surgical staples. Almost   similar finding was also demonstrated on previous CT scan on 01/14/2023. Proximal to this level there is mild to moderate dilation of small bowel with   multiple fluid levels. In the distal small bowel there is no abnormal   distension or dilation. Appendix not identified and there is no secondary sign of acute appendicitis. Cholelithiasis. Prominent stones in the upper portion of gallbladder   redemonstrated. No acute process in liver, spleen, pancreas, kidneys or bladder. No   obstructive uropathy. CT ABDOMEN PELVIS W IV CONTRAST Additional Contrast? None    Result Date: 1/25/2023  Partial small bowel obstruction.   In the right side of lower abdomen there are several loops of small bowel moderately distended with intraluminal stool like material, causing partial obstruction. These dilated loops of small bowel demonstrate postoperative changes with surgical staples. Almost similar finding was also demonstrated on previous CT scan on 01/14/2023. Proximal to this level there is mild to moderate dilation of small bowel with multiple fluid levels. In the distal small bowel there is no abnormal distension or dilation. Appendix not identified and there is no secondary sign of acute appendicitis. Cholelithiasis. Prominent stones in the upper portion of gallbladder redemonstrated. No acute process in liver, spleen, pancreas, kidneys or bladder. No obstructive uropathy. PAST MEDICAL HISTORY      has a past medical history of Atrial fibrillation ([facility] Diabetes mellitus ([facility] Hydronephrosis (2/14/2014), Hyperlipidemia, Hypertension, Intussusception intestine ([address] and SVT (supraventricular tachycardia) ([facility] [facility] (8/26/2013). EMERGENCY DEPARTMENT COURSE and DIFFERENTIAL DIAGNOSIS/MDM:   Vitals:    Vitals:    01/25/23 0228 01/25/23 0248 01/25/23 0308 01/25/23 0328   BP: (!) 191/66 (!) 186/63 (!) 220/84 (!) 174/59   Pulse: 60 68 66 54   Resp:       Temp:       TempSrc:       SpO2: 96% 96% 98% 93%   Weight:       Height:           Patient was given the following medications:  Medications   fentaNYL (SUBLIMAZE) injection 25 mcg (has no administration in time range)   morphine sulfate (PF) injection 4 mg (4 mg IntraVENous Given 1/25/23 0208)   ondansetron (ZOFRAN) injection 8 mg (8 mg IntraVENous Given 1/25/23 0207)   iopamidol (ISOVUE-370) 76 % injection 75 mL (75 mLs IntraVENous Given 1/25/23 0152)   HYDROmorphone (DILAUDID) injection 1 mg (1 mg IntraVENous Given 1/25/23 4407)             Is this patient to be included in the SEP-1 Core Measure due to severe sepsis or septic shock?    No   Exclusion criteria - the patient is NOT to be included for SEP-1 Core Measure due to:  2+ SIRS criteria are not met    Chronic Conditions: Diabetes, hypertension, hyperlipidemia, recurrent partial small bowel obstructions. CONSULTS: (Who and What was discussed)  IP CONSULT TO GENERAL SURGERY  IP CONSULT TO HOSPITALIST    Discussion with Other Profesionals : Consultant Dr. Yahir Barrow with general surgery who agrees with plan for admission for observation, pain control, possible operative intervention. D/w hospitalist, Dr. Gary Roy for admission. Social Determinants : None    Records Reviewed : outpt gen surgery notes where primary impression for pt's pain was biliary colic    CC/HPI Summary, DDx, ED Course, and Reassessment: 80 yo F with RUQ pain and ttp. History of biliary colic, history of recurrent partial small bowel obstructions, significantly tender in the right upper quadrant and epigastric area on exam.  She is hemodynamically stable, afebrile, otherwise nontoxic-appearing. Concern for biliary colic, cholecystitis, pancreatitis, partial small bowel obstruction. CBC, metabolic panel, LFTs, lipase obtained, CT abdomen pelvis with IV contrast obtained as well to assess for these above etiologies. Labs largely unremarkable, no evidence of transaminitis or increased bilirubinemia to suggest choledocholithiasis. CT does demonstrate cholelithiasis and evidence of small bowel obstruction. Consulted general surgery, spoke with Dr. Yahir Barrow who agreed to follow the patient during admission for evaluation for symptom improvement. Patient required multiple rounds of IV narcotic medication for pain control. Admitted for possible partial bowel obstruction versus cholelithiasis. Went up in stable condition. I am the Primary Clinician of Record. FINAL IMPRESSION      1. Abdominal pain, acute, right upper quadrant    2. Calculus of gallbladder without cholecystitis without obstruction    3.  Partial small bowel obstruction ([address] DISPOSITION/PLAN     DISPOSITION Admitted 01/25/2023 03:18:58 AM             (Please note that portions of this note were completed with a voice recognition program.  Efforts were made to edit the dictations but occasionally words are mis-transcribed.)    Shania Chance MD (electronically signed)            Shania Chance MD  01/29/23 0021

## 2023-01-25 NOTE — CONSULTS
Department of General Surgery Consult    PATIENT NAME: Lashell Cannon Payer   YOB: 1935    ADMISSION DATE: 1/25/2023 12:41 AM      TODAY'S DATE: 1/25/2023    Reason for Consult:  SBO/Gallstones    Chief Complaint: Abdominal pain    Requesting Physician:  Kay Churchill    HISTORY OF PRESENT ILLNESS:              The patient is a 81 y.o. female who presents with abdominal pain. I just saw her yesterday morning for gallstones/biliary colic. She was feeling fine at that time. Yesterday afternoon she began having abdominal pain. The pain is a sharp crampy pain across her entire upper abdomen. She denies nausea or vomiting, No fever or chills. 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       Past Surgical History:        Procedure Laterality Date    ABLATION OF DYSRHYTHMIC FOCUS      -2014    APPENDECTOMY      DILATATION, ESOPHAGUS      FRACTURE SURGERY      L ankle    HYSTERECTOMY (CERVIX STATUS UNKNOWN)      SKIN BIOPSY      SMALL INTESTINE SURGERY      TONSILLECTOMY      UPPER GASTROINTESTINAL ENDOSCOPY N/A 4/21/2021    EGD POLYP SNARE performed by Rosendo Walton DO at [address] [place] [address]  4/21/2021    EGD CONTROL HEMORRHAGE performed by Rosendo Walton DO at [address] [place] [address]  4/21/2021    EGD BIOPSY performed by Rosendo Walton DO at [address] [place] [address]       Current Medications:   No current facility-administered medications for this encounter. Prior to Admission medications    Medication Sig Start Date End Date Taking?  Authorizing Provider   apixaban (ELIQUIS) 5 MG TABS tablet Take 1 tablet by mouth 2 times daily 1/16/23   GUANAKO Erickson - CNP   ondansetron (ZOFRAN-ODT) 4 MG disintegrating tablet Take 1 tablet by mouth 3 times daily as needed for Nausea or Vomiting 1/14/23   Shania Chance MD   cloNIDine (CATAPRES) 0.3 MG tablet Take 1 tablet by mouth 2 times daily  Patient not taking: Reported on 1/24/2023 11/11/22   Kamilla Porter MD   dilTIAZem (CARDIZEM) 30 MG tablet Take 1 tablet by mouth every 6 hours as needed (for breakthrough atrial fibrillation) 7/21/22   GUANAKO Erickson CNP   metoprolol succinate (TOPROL XL) 100 MG extended release tablet Take 0.5 tablets by mouth daily Prescribed as 100mg bid, pt. Takes only 50mg if HR > 60. 7/7/22   GUANAKO Erickson CNP   valsartan (DIOVAN) 320 MG tablet Take 1 tablet by mouth daily 5/26/21   GUANAKO Rodriguez CNP   pantoprazole (PROTONIX) 20 MG tablet Take 2 tablets by mouth 2 times daily 4/22/21   Dasha Diaz MD   pravastatin (PRAVACHOL) 80 MG tablet Take 40 mg by mouth daily    Historical Provider, MD   pioglitazone (ACTOS) 15 MG tablet Take 15 mg by mouth daily    Historical Provider, MD   dofetilide (TIKOSYN) 125 MCG capsule Take 1 capsule by mouth every 12 hours 2/4/21   Steven Nicholas MD   glyBURIDE (DIABETA) 1.25 MG tablet Take 2.5 mg by mouth 2 times daily (with meals)     Historical Provider, MD        Allergies:  Hydrocodone, Metformin and related, Nickel, Omeprazole, Prednisone, and Hydralazine    Social History:   TOBACCO:   reports that she has never smoked. She has never used smokeless tobacco.  ETOH:   reports no history of alcohol use. DRUGS:   reports no history of drug use.       Family History:        Problem Relation Age of Onset    Diabetes Mother     Heart Disease Father     High Blood Pressure Father     High Cholesterol Father     Cancer Sister     Heart Disease Brother     High Blood Pressure Brother     High Cholesterol Brother        REVIEW OF SYSTEMS:  CONSTITUTIONAL:  negative  HEENT:  negative  RESPIRATORY:  negative  CARDIOVASCULAR:  negative  GASTROINTESTINAL:  negative except for abdominal pain  GENITOURINARY:  negative  HEMATOLOGIC/LYMPHATIC:  negative  NEUROLOGICAL:  Negative  * All other ROS reviewed and negative. PHYSICAL EXAM:  VITALS:  BP (!) 226/75   Pulse 62   Temp 98 °F (36.7 °C) (Oral)   Resp 18   Ht 5' 2\" (1.575 m)   Wt 180 lb (81.6 kg)   SpO2 99%   BMI 32.92 kg/m²   24HR INTAKE/OUTPUT:    No intake/output data recorded. No intake/output data recorded. CONSTITUTIONAL:  alert, no apparent distress and mildly obese  EYES:  PERRL, sclera clear  ENT:  Normocephalic,atraumatic, without obvious abnormality  NECK:  supple, symmetrical, trachea midline  LUNGS: Resp effort easy and unlabored, clear to auscultation  CARDIOVASCULAR:  NO JVD, regular rate and rhythm and no murmur noted  ABDOMEN:  scars noted hypogastric vertical, normal bowel sounds, soft, minimally distended, tenderness noted in the epigastric region, in the right upper quadrant, and in the left upper quadrant, voluntary guarding absent, no masses palpated and hernia absent  MUSCULOSKELETAL: No clubbing or cyanosis, 1+ pitting edema lower extremities  NEUROLOGIC:  Mental Status Exam:  Level of Alertness:   awake  PSYCHIATRIC:   person, place, time  SKIN:  no bruising or bleeding, normal skin color, texture, turgor, and no redness, warmth, or swelling    DATA:    CBC:   Recent Labs     01/25/23 0055   WBC 9.1   HGB 13.8   HCT 43.9        BMP:    Recent Labs     01/25/23 0055   *   K 4.4      CO2 23   BUN 13   CREATININE 0.8   GLUCOSE 96     Hepatic:   Recent Labs     01/25/23 0055   AST 20   ALT 13   BILITOT 0.4   ALKPHOS 87     Mag:    No results for input(s): MG in the last 72 hours. Phos:   No results for input(s): PHOS in the last 72 hours. INR: No results for input(s): INR in the last 72 hours. Radiology Review: Images personally reviewed by me. CT shows gallstones and PSBO      IMPRESSION/RECOMMENDATIONS:    PSBO and gallstones.  Which is causing her discomfort is unclear. Admit for IVF hydration, bowel rest, parenteral narcotics prn.  If her obstruction does not resolve or worsens, she may need NG decompression    Electronically signed by William Winn MD     [id number]

## 2023-01-25 NOTE — ED NOTES
Patient came out of room stating the call light didn't work and she was in a lot of pain. Crying at the doorway, released fentanyl to give.      Marylu Reeder RN  01/25/23 0089

## 2023-01-25 NOTE — ED NOTES
Patient continues to rest comfortably in bed, no current needs. Call light within reach.       Margie Crespo RN  01/25/23 1488

## 2023-01-25 NOTE — ED NOTES
Ms. Karmen Adam is a 81 y.o. female who had concerns including Abdominal Pain (Saw Gen Surg today for gallstones. Pain started after visit). Chief Complaint   Patient presents with    Abdominal Pain     Saw Gen Surg today for gallstones. Pain started after visit       She is being admitted for:    Small bowel obstruction [facility])    Her ED problem list included:    1. Abdominal pain, acute, right upper quadrant    2. Calculus of gallbladder without cholecystitis without obstruction    3.  Partial small bowel obstruction (HCC)        Past Medical History:   Diagnosis Date    Atrial fibrillation ([address]     [facility]     Hydronephrosis 2/14/2014    Hyperlipidemia     Hypertension     Intussusception intestine ([address]     SVT (supraventricular tachycardia) ([facility] [facility] 8/26/2013    AVNRT       Past Surgical History:   Procedure Laterality Date    ABLATION OF DYSRHYTHMIC FOCUS      -2014    APPENDECTOMY      DILATATION, ESOPHAGUS      FRACTURE SURGERY      L ankle    HYSTERECTOMY (CERVIX STATUS UNKNOWN)      SKIN BIOPSY      SMALL INTESTINE SURGERY      TONSILLECTOMY      UPPER GASTROINTESTINAL ENDOSCOPY N/A 4/21/2021    EGD POLYP SNARE performed by Ally Ulrich DO at [address] [place] [address]  4/21/2021    EGD CONTROL HEMORRHAGE performed by Ally Ulrich DO at [address] [place] [address]  4/21/2021    EGD BIOPSY performed by Ally Ulrich DO at [address] [place] [address]       Her recent abnormal labs were:    Labs Reviewed   CBC WITH AUTO DIFFERENTIAL - Abnormal; Notable for the following components:       Result Value    RBC 5.28 (*)     All other components within normal limits   BASIC METABOLIC PANEL W/ REFLEX TO MG FOR LOW K - Abnormal; Notable for the following components:    Sodium 134 (*)     All other components within normal limits   HEPATIC FUNCTION PANEL   LIPASE       Her vital signs for the encounter were:    Patient Vitals for the past 24 hrs:   BP Temp Temp src Pulse Resp SpO2 Height Weight   01/25/23 0532 (!) 184/92 -- -- 81 22 97 % -- --   01/25/23 0408 (!) 174/54 -- -- 68 20 96 % -- --   01/25/23 0348 (!) 185/60 -- -- 74 16 95 % -- --   01/25/23 0328 (!) 174/59 -- -- 54 18 93 % -- --   01/25/23 0308 (!) 220/84 -- -- 66 18 98 % -- --   01/25/23 0248 (!) 186/63 -- -- 68 14 96 % -- --   01/25/23 0228 (!) 191/66 -- -- 60 14 96 % -- --   01/25/23 0227 -- -- -- -- -- -- 5' 2\" (1.575 m) 180 lb (81.6 kg)   01/25/23 0208 (!) 226/75 -- -- 62 18 99 % -- --   01/25/23 0052 (!) 221/79 98 °F (36.7 °C) Oral 65 16 97 % -- --        She has the following lines:    Peripheral IV 01/25/23 Right; Anterior Hand (Active)       She has received the following medications:    Medications   fentaNYL (SUBLIMAZE) injection 25 mcg (has no administration in time range)   morphine sulfate (PF) injection 4 mg (4 mg IntraVENous Given 1/25/23 0208)   ondansetron (ZOFRAN) injection 8 mg (8 mg IntraVENous Given 1/25/23 0207)   iopamidol (ISOVUE-370) 76 % injection 75 mL (75 mLs IntraVENous Given 1/25/23 0152)   HYDROmorphone (DILAUDID) injection 1 mg (1 mg IntraVENous Given 1/25/23 0529)       She had the following images with impressions:    CT ABDOMEN PELVIS W IV CONTRAST Additional Contrast? None    Result Date: 1/25/2023  Partial small bowel obstruction. In the right side of lower abdomen there are several loops of small bowel moderately distended with intraluminal stool like material, causing partial obstruction. These dilated loops of small bowel demonstrate postoperative changes with surgical staples. Almost similar finding was also demonstrated on previous CT scan on 01/14/2023. Proximal to this level there is mild to moderate dilation of small bowel with multiple fluid levels. In the distal small bowel there is no abnormal distension or dilation. Appendix not identified and there is no secondary sign of acute appendicitis. Cholelithiasis. Prominent stones in the upper portion of gallbladder redemonstrated. No acute process in liver, spleen, pancreas, kidneys or bladder. No obstructive uropathy. CT ABDOMEN PELVIS W IV CONTRAST Additional Contrast? None    Result Date: 1/14/2023  EXAMINATION: CT OF THE ABDOMEN AND PELVIS WITH CONTRAST 1/14/2023 4:10 am TECHNIQUE: CT of the abdomen and pelvis was performed with the administration of intravenous contrast. Multiplanar reformatted images are provided for review. Automated exposure control, iterative reconstruction, and/or weight based adjustment of the mA/kV was utilized to reduce the radiation dose to as low as reasonably achievable. COMPARISON: None. HISTORY: ORDERING SYSTEM PROVIDED HISTORY: abdominal pain TECHNOLOGIST PROVIDED HISTORY: Reason for exam:->abdominal pain Additional Contrast?->None Decision Support Exception - unselect if not a suspected or confirmed emergency medical condition->Emergency Medical Condition (MA) Reason for Exam: BUQ pain with N/V, hx bowel obstruction FINDINGS: Lower Chest: The lung bases are without consolidation effusion. The visualized cardiac structures are unremarkable. Organs: The liver and spleen are normal size and overall attenuation. There are hepatic and splenic granulomas. There are stones in the gallbladder. The pancreas and adrenal glands are unremarkable. The kidneys are without obstructive uropathy. The urinary bladder is unremarkable. GI/Bowel: The stomach is contracted and otherwise unremarkable. Loops of small bowel are normal in caliber without evidence for obstruction. A bowel anastomosis is appreciated in the right lower quadrant. Colon contains air and fecal residue. There is no free air or free fluid. Pelvis: The uterus has been surgically removed. Phleboliths are seen in the pelvis. Peritoneum/Retroperitoneum: The psoas muscles are symmetric. The abdominal aorta is normal in caliber. The inferior vena cava is unremarkable.   There is no retroperitoneal or mesenteric adenopathy. Bones/Soft Tissues: The extra-abdominal soft tissues are unremarkable. There is no acute osseous abnormality. No acute abdominal or pelvic abnormality. Cholelithiasis.                  Michelet Whitaker RN  01/25/23 5968

## 2023-01-25 NOTE — H&P
Hospital Medicine History & Physical      PCP: Daily Odom MD    Date of Admission: 1/25/2023    Chief Complaint: Abdominal pain      History Of Present Illness:      81 y.o. female who presented to [facility] with abdominal pain. Patient complains of sharp pain in the abdomen. It was constant yesterday. Pain was severe in nature. Patient is passing gas. Last bowel movement was yesterday. Patient has experienced multiple episodes of small bowel obstruction in the past.  Patient had small bowel resection surgery once. Patient has been seen by surgery Dr. Alex Soto for gallstones   In the office. Past Medical History:          Diagnosis Date    Atrial fibrillation ([address]     Diabetes mellitus ([address]     Hydronephrosis 2/14/2014    Hyperlipidemia     Hypertension     Intussusception intestine ([address]     SVT (supraventricular tachycardia) ([address] 8/26/2013    AVNRT       Past Surgical History:          Procedure Laterality Date    ABLATION OF DYSRHYTHMIC FOCUS      -2014    APPENDECTOMY      DILATATION, ESOPHAGUS      FRACTURE SURGERY      L ankle    HYSTERECTOMY (CERVIX STATUS UNKNOWN)      SKIN BIOPSY      SMALL INTESTINE SURGERY      TONSILLECTOMY      UPPER GASTROINTESTINAL ENDOSCOPY N/A 4/21/2021    EGD POLYP SNARE performed by Alexia Urrutia DO at [address] [place] [address]  4/21/2021    EGD CONTROL HEMORRHAGE performed by Alexia Urrutia DO at [address] [place] [address]  4/21/2021    EGD BIOPSY performed by Alexia Urrutia DO at [address] [place] [address]       Medications Prior to Admission:      Prior to Admission medications    Medication Sig Start Date End Date Taking?  Authorizing Provider   apixaban (ELIQUIS) 5 MG TABS tablet Take 1 tablet by mouth 2 times daily 1/16/23   GUANAKO Erickson - CNP   ondansetron (ZOFRAN-ODT) 4 MG disintegrating tablet Take 1 tablet by mouth 3 times daily as needed for Nausea or Vomiting 1/14/23 Leny Orta MD   cloNIDine (CATAPRES) 0.3 MG tablet Take 1 tablet by mouth 2 times daily  Patient not taking: Reported on 1/24/2023 11/11/22   Govind Porter MD   dilTIAZem (CARDIZEM) 30 MG tablet Take 1 tablet by mouth every 6 hours as needed (for breakthrough atrial fibrillation) 7/21/22   GUANAKO Erickson CNP   metoprolol succinate (TOPROL XL) 100 MG extended release tablet Take 0.5 tablets by mouth daily Prescribed as 100mg bid, pt. Takes only 50mg if HR > 60. 7/7/22   GUANAKO Erickson CNP   valsartan (DIOVAN) 320 MG tablet Take 1 tablet by mouth daily 5/26/21   GUANAKO Akbar CNP   pantoprazole (PROTONIX) 20 MG tablet Take 2 tablets by mouth 2 times daily 4/22/21   Steven Gross MD   pravastatin (PRAVACHOL) 80 MG tablet Take 40 mg by mouth daily    Historical Provider, MD   pioglitazone (ACTOS) 15 MG tablet Take 15 mg by mouth daily    Historical Provider, MD   dofetilide (TIKOSYN) 125 MCG capsule Take 1 capsule by mouth every 12 hours 2/4/21   Alisia Willis MD   glyBURIDE (DIABETA) 1.25 MG tablet Take 2.5 mg by mouth 2 times daily (with meals)     Historical Provider, MD       Allergies:  Hydrocodone, Metformin and related, Nickel, Omeprazole, Prednisone, and Hydralazine    Social History:      TOBACCO:   reports that she has never smoked. She has never used smokeless tobacco.  ETOH:   reports no history of alcohol use. E-cigarette/Vaping       Questions Responses    E-cigarette/Vaping Use Never User    Start Date     Passive Exposure     Quit Date     Counseling Given     Comments Unknown              Family History:     Reviewed and negative in regards to presenting illness/complaint.         Problem Relation Age of Onset    Diabetes Mother     Heart Disease Father     High Blood Pressure Father     High Cholesterol Father     Cancer Sister     Heart Disease Brother     High Blood Pressure Brother     High Cholesterol Brother        REVIEW OF SYSTEMS COMPLETED: Pertinent positives as noted in the HPI. All other systems reviewed and negative. PHYSICAL EXAM PERFORMED:    BP (!) 179/61   Pulse 53   Temp 98 °F (36.7 °C) (Oral)   Resp 17   Ht 5' 2\" (1.575 m)   Wt 180 lb (81.6 kg)   SpO2 96%   BMI 32.92 kg/m²     General appearance:  No apparent distress, appears stated age and cooperative. HEENT:  Normal cephalic, atraumatic without obvious deformity. Pupils equal, round, and reactive to light. Extra ocular muscles intact. Conjunctivae/corneas clear. Neck: Supple, with full range of motion. No jugular venous distention. Trachea midline. Respiratory:  Normal respiratory effort. Clear to auscultation, bilaterally without Rales/Wheezes/Rhonchi. Cardiovascular:  Regular rate and rhythm with normal S1/S2 without murmurs, rubs or gallops. Abdomen: Soft, non-tender, non-distended with normal bowel sounds. Musculoskeletal:  No clubbing, cyanosis or edema bilaterally. Full range of motion without deformity. Skin: Skin color, texture, turgor normal.  No rashes or lesions. Neurologic:  Neurovascularly intact without any focal sensory/motor deficits. Cranial nerves: II-XII intact, grossly non-focal.  Psychiatric:  Alert and oriented, thought content appropriate, normal insight  Capillary Refill: Brisk,3 seconds, normal  Peripheral Pulses: +2 palpable, equal bilaterally       Labs:     Recent Labs     01/25/23  0055   WBC 9.1   HGB 13.8   HCT 43.9        Recent Labs     01/25/23  0055   *   K 4.4      CO2 23   BUN 13   CREATININE 0.8   CALCIUM 9.1     Recent Labs     01/25/23  0055   AST 20   ALT 13   BILIDIR <0.2   BILITOT 0.4   ALKPHOS 87     No results for input(s): INR in the last 72 hours. No results for input(s): Osmond Nicholas in the last 72 hours.     Urinalysis:      Lab Results   Component Value Date/Time    NITRU Negative 02/18/2022 07:49 AM    WBCUA 0-2 02/18/2022 07:49 AM    BACTERIA 1+ 12/18/2020 10:24 AM    RBCUA None seen 02/18/2022 07:49 AM    BLOODU TRACE-INTACT 02/18/2022 07:49 AM    SPECGRAV 1.015 02/18/2022 07:49 AM    GLUCOSEU Negative 02/18/2022 07:49 AM       Radiology:     CXR: I have reviewed the CXR   EKG:  I have reviewed the EKG     CT ABDOMEN PELVIS W IV CONTRAST Additional Contrast? None   Preliminary Result   Partial small bowel obstruction. In the right side of lower abdomen there   are several loops of small bowel moderately distended with intraluminal stool   like material, causing partial obstruction. These dilated loops of small   bowel demonstrate postoperative changes with surgical staples. Almost   similar finding was also demonstrated on previous CT scan on 01/14/2023. Proximal to this level there is mild to moderate dilation of small bowel with   multiple fluid levels. In the distal small bowel there is no abnormal   distension or dilation. Appendix not identified and there is no secondary sign of acute appendicitis. Cholelithiasis. Prominent stones in the upper portion of gallbladder   redemonstrated. No acute process in liver, spleen, pancreas, kidneys or bladder. No   obstructive uropathy. Consults:    IP CONSULT TO GENERAL SURGERY  IP CONSULT TO HOSPITALIST    ASSESSMENT:    Active Hospital Problems    Diagnosis Date Noted    Small bowel obstruction ([facility] [V37.111] 09/13/2020     -Partial small bowel obstruction  - Gallstones  - History of intussusception s/p bowel resection  - Atrial fibrillation rate controlled  - Diabetes mellitus type 2  - Essential hypertension    PLAN:    Admit patient to MedSurg unit  Gentle hydration  Monitor and correct electrolytes  Serial abdominal exam  Surgical consultation.   Monitor telemetry monitoring  Monitor blood sugar, insulin aspart sliding scale    DVT Prophylaxis: Heparin subcu  Diet: Diet NPO Exceptions are: Ice Chips, Sips of Water with Meds  Code Status: Full Code    PT/OT Eval Status: Yes    Dispo - Pending clinical improvement Agueda Alanis MD    Thank you Kwan Coughlin MD for the opportunity to be involved in this patient's care. If you have any questions or concerns please feel free to contact me at [phone].

## 2023-01-25 NOTE — ED NOTES
Patient ambulatory to restroom with steady gait. Patient incontinent of urine during ambulation, clean underwear provided. Complete bed changed preformed. Patient provided with clean warm blankets.       Agata Olivas RN  01/25/23 [id number]

## 2023-01-25 NOTE — CARE COORDINATION
Case Management Assessment  Initial Evaluation    Date/Time of Evaluation: 1/25/2023 10:39 AM  Assessment Completed by: BAHMAN Spears    If patient is discharged prior to next notation, then this note serves as note for discharge by case management. Patient Name: Manuel Toscano Payer                   YOB: 1935  Diagnosis: Small bowel obstruction [facility]) [V81.682]                   Date / Time: 1/25/2023 12:41 AM    Patient Admission Status: Inpatient   Readmission Risk (Low < 19, Mod (19-27), High > 27): Readmission Risk Score: 15    Current PCP: Adonis Oneill MD  PCP verified by CM? (P) Yes    Chart Reviewed: Yes      History Provided by: (P) Patient  Patient Orientation: (P) Alert and Oriented    Patient Cognition: (P) Alert    Hospitalization in the last 30 days (Readmission):  No    If yes, Readmission Assessment in  Navigator will be completed.     Advance Directives:      Code Status: Full Code   Patient's Primary Decision Maker is: (P) Legal Next of Kin    Primary Decision Maker: vonda rabago - Spouse - [phone]    Secondary Decision Maker: Vanessa Pacheco Child - [phone]    Discharge Planning:    Patient lives with: (P) Spouse/Significant Other Type of Home: (P) House  Primary Care Giver: (P) Self  Patient Support Systems include: (P) Spouse/Significant Other, Children, Family Members   Current Financial resources: (P) None  Current community resources:    Current services prior to admission: (P) None            Current DME:              Type of Home Care services:       ADLS  Prior functional level: (P) Independent in ADLs/IADLs  Current functional level: (P) Independent in ADLs/IADLs    PT AM-PAC:   /24  OT AM-PAC:   /24    Family can provide assistance at DC: (P) Yes  Would you like Case Management to discuss the discharge plan with any other family members/significant others, and if so, who? (P) No  Plans to Return to Present Housing: (P) Yes  Other Identified Issues/Barriers to RETURNING to current housing: none noted  Potential Assistance needed at discharge: (P) N/A            Potential DME:    Patient expects to discharge to:    Plan for transportation at discharge:      Financial    Payor: Hernandez Arredondo / Plan: Gomez Dickey ESSENTIAL/PLUS / Product Type: *No Product type* /     Does insurance require precert for SNF: Yes    Potential assistance Purchasing Medications: (P) No  Meds-to-Beds request:        [facility] [id number] [facility] [place] [address]  [address]  [place]  Phone: [phone] Fax: [phone]      Notes:    Factors facilitating achievement of predicted outcomes: Family support, Motivated, Cooperative, and Pleasant    Barriers to discharge: Medical complications    Additional Case Management Notes: Referred to patient for d/c planning. Spoke to patient. Patient is an 81year old female admitted for abd. Pain. Patient usually lives at home with . Patient is independent in ADLs. Patient drives and reports she is active. Patient currently denies d/c needs. The Plan for Transition of Care is related to the following treatment goals of Small bowel obstruction ([facility] [X74.941]    IF APPLICABLE: The Patient and/or patient representative Sasha Zuniga and her family were provided with a choice of provider and agrees with the discharge plan. Freedom of choice list with basic dialogue that supports the patient's individualized plan of care/goals and shares the quality data associated with the providers was provided to:     Patient Representative Name:       The Patient and/or Patient Representative Agree with the Discharge Plan?       BAHMAN Vail, JOSUE  Case Management Department  Ph: [phone] Fax: [phone]

## 2023-01-26 VITALS
HEART RATE: 61 BPM | OXYGEN SATURATION: 96 % | DIASTOLIC BLOOD PRESSURE: 71 MMHG | WEIGHT: 180 LBS | HEIGHT: 62 IN | RESPIRATION RATE: 17 BRPM | TEMPERATURE: 97.8 F | SYSTOLIC BLOOD PRESSURE: 171 MMHG | BODY MASS INDEX: 33.13 KG/M2

## 2023-01-26 PROBLEM — R10.11 ABDOMINAL PAIN, ACUTE, RIGHT UPPER QUADRANT: Status: ACTIVE | Noted: 2018-07-18

## 2023-01-26 LAB
A/G RATIO: 1 (ref 1.1–2.2)
ALBUMIN SERPL-MCNC: 3.7 G/DL (ref 3.4–5)
ALP BLD-CCNC: 75 U/L (ref 40–129)
ALT SERPL-CCNC: 10 U/L (ref 10–40)
ANION GAP SERPL CALCULATED.3IONS-SCNC: 8 MMOL/L (ref 3–16)
AST SERPL-CCNC: 13 U/L (ref 15–37)
BASOPHILS ABSOLUTE: 0 K/UL (ref 0–0.2)
BASOPHILS RELATIVE PERCENT: 0.4 %
BILIRUB SERPL-MCNC: 0.6 MG/DL (ref 0–1)
BUN BLDV-MCNC: 13 MG/DL (ref 7–20)
CALCIUM SERPL-MCNC: 8.5 MG/DL (ref 8.3–10.6)
CHLORIDE BLD-SCNC: 104 MMOL/L (ref 99–110)
CO2: 25 MMOL/L (ref 21–32)
CREAT SERPL-MCNC: 0.8 MG/DL (ref 0.6–1.2)
EOSINOPHILS ABSOLUTE: 0.1 K/UL (ref 0–0.6)
EOSINOPHILS RELATIVE PERCENT: 1.8 %
GFR SERPL CREATININE-BSD FRML MDRD: >60 ML/MIN/{1.73_M2}
GLUCOSE BLD-MCNC: 111 MG/DL (ref 70–99)
GLUCOSE BLD-MCNC: 145 MG/DL (ref 70–99)
GLUCOSE BLD-MCNC: 169 MG/DL (ref 70–99)
GLUCOSE BLD-MCNC: 88 MG/DL (ref 70–99)
GLUCOSE BLD-MCNC: 89 MG/DL (ref 70–99)
HCT VFR BLD CALC: 41.2 % (ref 36–48)
HEMOGLOBIN: 13 G/DL (ref 12–16)
LYMPHOCYTES ABSOLUTE: 1.4 K/UL (ref 1–5.1)
LYMPHOCYTES RELATIVE PERCENT: 20.2 %
MCH RBC QN AUTO: 26.5 PG (ref 26–34)
MCHC RBC AUTO-ENTMCNC: 31.5 G/DL (ref 31–36)
MCV RBC AUTO: 84.2 FL (ref 80–100)
MONOCYTES ABSOLUTE: 0.8 K/UL (ref 0–1.3)
MONOCYTES RELATIVE PERCENT: 10.6 %
NEUTROPHILS ABSOLUTE: 4.8 K/UL (ref 1.7–7.7)
NEUTROPHILS RELATIVE PERCENT: 67 %
PDW BLD-RTO: 14.9 % (ref 12.4–15.4)
PERFORMED ON: ABNORMAL
PERFORMED ON: ABNORMAL
PERFORMED ON: NORMAL
PERFORMED ON: NORMAL
PLATELET # BLD: 299 K/UL (ref 135–450)
PMV BLD AUTO: 8.9 FL (ref 5–10.5)
POTASSIUM REFLEX MAGNESIUM: 4.3 MMOL/L (ref 3.5–5.1)
RBC # BLD: 4.89 M/UL (ref 4–5.2)
SODIUM BLD-SCNC: 137 MMOL/L (ref 136–145)
TOTAL PROTEIN: 7.3 G/DL (ref 6.4–8.2)
WBC # BLD: 7.2 K/UL (ref 4–11)

## 2023-01-26 PROCEDURE — 2500000003 HC RX 250 WO HCPCS: Performed by: INTERNAL MEDICINE

## 2023-01-26 PROCEDURE — 36415 COLL VENOUS BLD VENIPUNCTURE: CPT

## 2023-01-26 PROCEDURE — 80053 COMPREHEN METABOLIC PANEL: CPT

## 2023-01-26 PROCEDURE — 99232 SBSQ HOSP IP/OBS MODERATE 35: CPT | Performed by: SURGERY

## 2023-01-26 PROCEDURE — 85025 COMPLETE CBC W/AUTO DIFF WBC: CPT

## 2023-01-26 PROCEDURE — 6370000000 HC RX 637 (ALT 250 FOR IP): Performed by: NURSE PRACTITIONER

## 2023-01-26 RX ORDER — LABETALOL HYDROCHLORIDE 5 MG/ML
10 INJECTION, SOLUTION INTRAVENOUS ONCE
Status: COMPLETED | OUTPATIENT
Start: 2023-01-26 | End: 2023-01-26

## 2023-01-26 RX ADMIN — LABETALOL HYDROCHLORIDE 10 MG: 5 INJECTION INTRAVENOUS at 05:19

## 2023-01-26 RX ADMIN — METOPROLOL SUCCINATE 50 MG: 50 TABLET, EXTENDED RELEASE ORAL at 05:37

## 2023-01-26 RX ADMIN — DOFETILIDE 125 MCG: 0.12 CAPSULE ORAL at 09:47

## 2023-01-26 RX ADMIN — VALSARTAN 320 MG: 80 TABLET ORAL at 09:47

## 2023-01-26 NOTE — PLAN OF CARE
Problem: Discharge Planning  Goal: Discharge to home or other facility with appropriate resources  1/26/2023 1437 by Yashira Mckeon RN  Outcome: Adequate for Discharge  1/26/2023 1050 by Yashira Mckeon RN  Outcome: Progressing     Problem: Pain  Goal: Verbalizes/displays adequate comfort level or baseline comfort level  1/26/2023 1437 by Yashira Mckeon RN  Outcome: Adequate for Discharge  1/26/2023 1050 by Yashira Mckeon RN  Outcome: Progressing  Flowsheets (Taken 1/26/2023 0730)  Verbalizes/displays adequate comfort level or baseline comfort level: Encourage patient to monitor pain and request assistance     Problem: Safety - Adult  Goal: Free from fall injury  1/26/2023 1437 by Yashira Mckeon RN  Outcome: Adequate for Discharge  1/26/2023 1050 by Yashira Mckeon RN  Outcome: Progressing     Problem: ABCDS Injury Assessment  Goal: Absence of physical injury  1/26/2023 1437 by Yashiar Mckeon RN  Outcome: Adequate for Discharge  1/26/2023 1050 by Yashira Mckeon RN  Outcome: Progressing     Problem: Chronic Conditions and Co-morbidities  Goal: Patient's chronic conditions and co-morbidity symptoms are monitored and maintained or improved  Outcome: Adequate for Discharge

## 2023-01-26 NOTE — DISCHARGE SUMMARY
Hospital Medicine Discharge Summary    Patient ID: Shoshana Tiwari      Patient's PCP: Nereida Arreguin MD    Admit Date: 1/25/2023     Discharge Date:   1/26/23    Admitting Provider: Shaylee Puckett MD     Discharge Provider: Mayo Tucker MD     Discharge Diagnoses: Active Hospital Problems    Diagnosis     Small bowel obstruction (HCC) [K56.609]     Abdominal pain, acute, right upper quadrant [R10.11]        The patient was seen and examined on day of discharge and this discharge summary is in conjunction with any daily progress note from day of discharge. Hospital Course:     81 y.o. female who presented to [facility] with abdominal pain. Patient complains of sharp pain in the abdomen. It was constant yesterday. Pain was severe in nature. Patient is passing gas. Last bowel movement was yesterday. Patient has experienced multiple episodes of small bowel obstruction in the past.  Patient had small bowel resection surgery once. Patient has been seen by surgery Dr. Ирина Lo for gallstones   In the office.     -Partial small bowel obstruction  - Gallstones  - History of intussusception s/p bowel resection    CT Abdomen   Partial small bowel obstruction. In the right side of lower abdomen there   are several loops of small bowel moderately distended with intraluminal stool   like material, causing partial obstruction. These dilated loops of small   bowel demonstrate postoperative changes with surgical staples. Almost   similar finding was also demonstrated on previous CT scan on 01/14/2023. Proximal to this level there is mild to moderate dilation of small bowel with   multiple fluid levels. In the distal small bowel there is no abnormal   distension or dilation. Appendix not identified and there is no secondary sign of acute appendicitis. Cholelithiasis. Prominent stones in the upper portion of gallbladder   redemonstrated. Pt was initially NPO.  Started on IV fluid. Seen by General surgeon. Pt started on full liquid diet. Plan for outpatient surgery. - Atrial fibrillation RVR episode on 1/26/23 early am   HR controlled now. On metoprolol. Physical Exam Performed:     BP (!) 148/97   Pulse 67   Temp 98 °F (36.7 °C) (Oral)   Resp 18   Ht 5' 2\" (1.575 m)   Wt 180 lb (81.6 kg)   SpO2 94%   BMI 32.92 kg/m²       General appearance:  No apparent distress, appears stated age and cooperative. HEENT:  Normal cephalic, atraumatic without obvious deformity. Pupils equal, round, and reactive to light. Extra ocular muscles intact. Conjunctivae/corneas clear. Neck: Supple, with full range of motion. No jugular venous distention. Trachea midline. Respiratory:  Normal respiratory effort. Clear to auscultation, bilaterally without Rales/Wheezes/Rhonchi. Cardiovascular:  Regular rate and rhythm with normal S1/S2 without murmurs, rubs or gallops. Abdomen: Soft, non-tender, non-distended with normal bowel sounds. Musculoskeletal:  No clubbing, cyanosis or edema bilaterally. Full range of motion without deformity. Skin: Skin color, texture, turgor normal.  No rashes or lesions. Neurologic:  Neurovascularly intact without any focal sensory/motor deficits. Cranial nerves: II-XII intact, grossly non-focal.  Psychiatric:  Alert and oriented, thought content appropriate, normal insight  Capillary Refill: Brisk,< 3 seconds   Peripheral Pulses: +2 palpable, equal bilaterally       Labs:  For convenience and continuity at follow-up the following most recent labs are provided:      CBC:    Lab Results   Component Value Date/Time    WBC 7.2 01/26/2023 06:49 AM    HGB 13.0 01/26/2023 06:49 AM    HCT 41.2 01/26/2023 06:49 AM     01/26/2023 06:49 AM       Renal:    Lab Results   Component Value Date/Time     01/26/2023 06:49 AM    K 4.3 01/26/2023 06:49 AM     01/26/2023 06:49 AM    CO2 25 01/26/2023 06:49 AM    BUN 13 01/26/2023 06:49 AM    CREATININE 0.8 01/26/2023 06:49 AM    CALCIUM 8.5 01/26/2023 06:49 AM    PHOS 2.5 09/15/2020 06:36 AM         Significant Diagnostic Studies    Radiology:   CT ABDOMEN PELVIS W IV CONTRAST Additional Contrast? None   Final Result   Partial small bowel obstruction. In the right side of lower abdomen there   are several loops of small bowel moderately distended with intraluminal stool   like material, causing partial obstruction. These dilated loops of small   bowel demonstrate postoperative changes with surgical staples. Almost   similar finding was also demonstrated on previous CT scan on 01/14/2023. Proximal to this level there is mild to moderate dilation of small bowel with   multiple fluid levels. In the distal small bowel there is no abnormal   distension or dilation. Appendix not identified and there is no secondary sign of acute appendicitis. Cholelithiasis. Prominent stones in the upper portion of gallbladder   redemonstrated. No acute process in liver, spleen, pancreas, kidneys or bladder. No   obstructive uropathy. Consults:     IP CONSULT TO GENERAL SURGERY  IP CONSULT TO HOSPITALIST    Disposition:       Condition at Discharge: Stable    Discharge Instructions/Follow-up:      Code Status:  Full Code     Activity: activity as tolerated    Diet: cardiac diet      Discharge Medications:     Current Discharge Medication List             Details   furosemide (LASIX) 20 MG tablet Take 20 mg by mouth daily      apixaban (ELIQUIS) 5 MG TABS tablet Take 1 tablet by mouth 2 times daily  Qty: 60 tablet, Refills: 0    Associated Diagnoses: Atrial fibrillation with rapid ventricular response ([address]  Chronic diastolic heart failure (HCC)      ondansetron (ZOFRAN-ODT) 4 MG disintegrating tablet Take 1 tablet by mouth 3 times daily as needed for Nausea or Vomiting  Qty: 21 tablet, Refills: 0      metoprolol succinate (TOPROL XL) 100 MG extended release tablet Take 0.5 tablets by mouth daily Prescribed as 100mg bid, pt. Takes only 50mg if HR > 60. Qty: 180 tablet, Refills: 3      valsartan (DIOVAN) 320 MG tablet Take 1 tablet by mouth daily  Qty: 90 tablet, Refills: 1      pantoprazole (PROTONIX) 20 MG tablet Take 2 tablets by mouth 2 times daily  Qty: 60 tablet, Refills: 0      pravastatin (PRAVACHOL) 80 MG tablet Take 40 mg by mouth daily      pioglitazone (ACTOS) 15 MG tablet Take 15 mg by mouth daily      dofetilide (TIKOSYN) 125 MCG capsule Take 1 capsule by mouth every 12 hours  Qty: 180 capsule, Refills: 3      glyBURIDE (DIABETA) 1.25 MG tablet Take 2.5 mg by mouth 2 times daily (with meals)              Time Spent on discharge:  in the examination, evaluation, counseling and review of medications and discharge plan. Signed: Harpreet Carolina MD   1/26/2023      Thank you Krunal Escobar MD for the opportunity to be involved in this patient's care. If you have any questions or concerns, please feel free to contact me at [phone].

## 2023-01-26 NOTE — PROGRESS NOTES
At [id number] RN found pt sitting on bedside crying and very anxious rubbing her chest saying it burns from her stomach to her throat. She stated that Dayron Chong has been NPO for 3 days and feels empty and is starving to death\". , /125. MD ordered 10mg labetalol and was ok to give metoprolol early. Tele also ordered. VSS after meds were given.

## 2023-01-26 NOTE — PROGRESS NOTES
[facility]    PATIENT NAME: Harry Tiwari     TODAY'S DATE: 1/26/2023    CHIEF COMPLAINT: none    INTERVAL HISTORY/HPI:    Pt without pain or nausea, having flatus, no fevers. REVIEW OF SYSTEMS:  Pertinent positives and negatives as per interval history section    OBJECTIVE:  VITALS:  BP (!) 148/97   Pulse 67   Temp 98 °F (36.7 °C) (Oral)   Resp 18   Ht 5' 2\" (1.575 m)   Wt 180 lb (81.6 kg)   SpO2 94%   BMI 32.92 kg/m²     INTAKE/OUTPUT:    I/O last 3 completed shifts:  In: -   Out: 240 [Urine:240]  No intake/output data recorded. CONSTITUTIONAL:  awake and alert  LUNGS:  Respirations easy and unlabored, no crackles or wheezing  CARD:  regular rate and rhythm  ABDOMEN:  normal bowel sounds, soft, non-distended, non-tender     Data:  CBC:   Recent Labs     01/25/23 0055 01/26/23  0649   WBC 9.1 7.2   HGB 13.8 13.0   HCT 43.9 41.2    299     BMP:    Recent Labs     01/25/23 0055 01/26/23  0649   * 137   K 4.4 4.3    104   CO2 23 25   BUN 13 13   CREATININE 0.8 0.8   GLUCOSE 96 169*     Hepatic:   Recent Labs     01/25/23 0055 01/26/23  0649   AST 20 13*   ALT 13 10   BILITOT 0.4 0.6   ALKPHOS 87 75     Mag:    No results for input(s): MG in the last 72 hours. Phos:   No results for input(s): PHOS in the last 72 hours. INR: No results for input(s): INR in the last 72 hours. Radiology Review:  *Imaging personally reviewed by me. NA      ASSESSMENT AND PLAN:  80 yo with gallstones and psbo  Symptoms resolved  Full liquid diet  If tolerates then ok to discharge this afternoon  Will consider cholecystectomy in future. At that time could examine small bowel to see if an issue as well.      Electronically signed by Gregory Morin, [address]

## 2023-01-26 NOTE — PLAN OF CARE
Problem: Discharge Planning  Goal: Discharge to home or other facility with appropriate resources  Outcome: Progressing     Problem: Pain  Goal: Verbalizes/displays adequate comfort level or baseline comfort level  Outcome: Progressing  Flowsheets (Taken 1/26/2023 0730)  Verbalizes/displays adequate comfort level or baseline comfort level: Encourage patient to monitor pain and request assistance     Problem: Safety - Adult  Goal: Free from fall injury  Outcome: Progressing     Problem: ABCDS Injury Assessment  Goal: Absence of physical injury  Outcome: Progressing

## 2023-01-27 ENCOUNTER — TELEPHONE (OUTPATIENT)
Dept: SURGERY | Age: 88
End: 2023-01-27

## 2023-01-27 NOTE — TELEPHONE ENCOUNTER
----- Message from Vickey Schulz MD sent at 1/26/2023 11:45 AM EST -----  Needs scheduled for robot marcio please  ----- Message -----  From: Librado Sadler MD  Sent: 1/26/2023  10:07 AM EST  To: Vickey Schulz MD    Chente Madison going home today. Not sure if its gallstones or psbo causing symptoms but seems to describe RUQ pain to back. Talked about possibly doing cholecystectomy and examining small bowel at same time. Also discussed cholecystectomy and if symptoms recur after that then reassess bowel.

## 2023-01-27 NOTE — TELEPHONE ENCOUNTER
I called and spoke to pt scheduled for Robot Nely, 02/03/23 advised 8:45 arrival time for 10:45 surgery, NPO, hold Eliquis for 3 days prior to surgery, I reviewed pt's chart, medications and allergies, she will have a . Pt understands and agrees. We also discussed if her pain returns then she needs to come to ED.

## 2023-01-30 ENCOUNTER — TELEPHONE (OUTPATIENT)
Dept: CARDIOLOGY CLINIC | Age: 88
End: 2023-01-30

## 2023-01-30 NOTE — TELEPHONE ENCOUNTER
I'm not sure where patient is.   I see ER note to admit but patient not in hospital.  Can we call her and see where she is at?

## 2023-01-30 NOTE — TELEPHONE ENCOUNTER
LMOVM for pt to return call back. I need to know heart rate at rest and heart rate with movement. Also need to know if pt is having CP or SOB?

## 2023-01-30 NOTE — TELEPHONE ENCOUNTER
Pt returning call. Sts her resting HR is about 62. Pt not sure what HR is with movement, she guess around 62. If she is having A-fib her HR is over 155, that's what the hospital got. Pt does have terrible CP when in A-fib. Little SOB.  TY

## 2023-01-30 NOTE — TELEPHONE ENCOUNTER
Pt was in hospital for bowel obstruction and was released on Friday. Pt is scheduled to have       Procedure Laterality Anesthesia   ROBOTIC CHOLECYSTECTOMY, POSSIBLE OPEN PROCEDURE        This Friday. Pt is having episodes of A-Fib, which are close together. Pt wants to see cardio before surgery to make sure all is ok.

## 2023-01-31 NOTE — PROGRESS NOTES
PRE OP INSTRUCTION SHEET   1. Do not eat or drink anything after 12 midnight  prior to surgery. This includes no water, chewing gum or mints. 2. Take the following pills will a small sip of water (see MAR)                                        3. Aspirin, Ibuprofen, Advil, Naproxen, Vitamin E, fish oil and other Anti-inflammatory products should be stopped for 5 days before surgery or as directed by your physician. 4. Check with your Doctor regarding stopping Plavix, Coumadin, Lovenox, Fragmin or other blood thinners   5. Do not smoke, and do not drink any alcoholic beverages 24 hours prior to surgery. This includes NA Beer. 6. You may brush your teeth and gargle the morning of surgery. DO NOT SWALLOW WATER   7. You MUST make arrangements for a responsible adult to take you home after your surgery. You will not be allowed to leave alone or drive yourself home. It is strongly suggested someone stay with you the first 24 hrs. Your surgery will be cancelled if you do not have a ride home. 8. A parent/legal guardian must accompany a child scheduled for surgery and plan to stay at the hospital until the child is discharged. Please do not bring other children with you. 9. Please wear simple, loose fitting clothing to the hospital.  Gume Harris not bring valuables (money, credit cards, checkbooks, etc.) Do not wear any makeup (including no eye makeup) or nail polish on your fingers or toes. 10. DO NOT wear any jewelry or piercings on day of surgery. All body piercing jewelry must be removed. 11. If you have dentures,glasses, or contacts they will be removed before going to the OR; we will provide you a container. 12. Please see your family doctor/and cardiologist for a history & physical and/or concerning medications. Bring any test results/reports from your physician's office. Have history and labs faxed to [phone].  Remember to bring Blood Bank bracelet on the day of surgery. 14. If you have a Living Will and Durable Power of  for Healthcare, please bring in a copy. 13. Notify your Surgeon if you develop any illness between now and surgery  time, cough, cold, fever, sore throat, nausea, vomiting, etc.  Please notify your surgeon if you experience dizziness, shortness of breath or blurred vision between now & the time of your surgery   16. DO NOT shave your operative site 96 hours prior to surgery. For face & neck surgery, men may use an electric razor 48 hours prior to surgery. 17. Shower with _x__Antibacterial soap (x_chlorhexidine for total joint  Pt's) shower two times before surgery.(the morning of and the night before. 18. To provide excellent care visitors will be limited to one in the room at any given time.   Please call pre admission testing if you any further questions [phone] or [phone]

## 2023-01-31 NOTE — TELEPHONE ENCOUNTER
Марина Shore, APRN - CNP  You 22 minutes ago (4:45 PM)     AM  I can see tomorrow. I am really overbooked on Thursday and off on Friday.        Spoke with pt, pt scheduled for 10:15 am 02/01/2023

## 2023-02-01 ENCOUNTER — OFFICE VISIT (OUTPATIENT)
Dept: CARDIOLOGY CLINIC | Age: 88
End: 2023-02-01
Payer: MEDICARE

## 2023-02-01 VITALS
OXYGEN SATURATION: 100 % | SYSTOLIC BLOOD PRESSURE: 138 MMHG | HEART RATE: 62 BPM | HEIGHT: 62 IN | WEIGHT: 175 LBS | BODY MASS INDEX: 32.2 KG/M2 | DIASTOLIC BLOOD PRESSURE: 76 MMHG

## 2023-02-01 DIAGNOSIS — I48.0 PAF (PAROXYSMAL ATRIAL FIBRILLATION) (HCC): Primary | ICD-10-CM

## 2023-02-01 DIAGNOSIS — I10 ESSENTIAL HYPERTENSION: ICD-10-CM

## 2023-02-01 PROCEDURE — 99214 OFFICE O/P EST MOD 30 MIN: CPT | Performed by: NURSE PRACTITIONER

## 2023-02-01 PROCEDURE — 93000 ELECTROCARDIOGRAM COMPLETE: CPT | Performed by: NURSE PRACTITIONER

## 2023-02-01 PROCEDURE — 1123F ACP DISCUSS/DSCN MKR DOCD: CPT | Performed by: NURSE PRACTITIONER

## 2023-02-01 NOTE — PROGRESS NOTES
[facility]   Electrophysiology Outpatient Note              Date:  February 1, 2023  Patient name: Lidia Velez Payer  YOB: 1935    Primary Care physician: Robert Martinez MD    HISTORY OF PRESENT ILLNESS: The patient is a 81 y.o.  female with a history of AF, PVC's, HTN, SB, SVT, pulmonary HTN, chronic diastolic CHF an DM. In 2013, she had SVT. She wore a monitor which detected AF. In 2/2014, she underwent cryoablation of AF. Post procedure she had a retroperitoneal bleed and required a urinary stent. In 2/2019, echo showed an EF of 55-60% and severe pulmonary HTN and she was referred to CHF team. In 1/2020, AF was recurrent and amiodarone was started. Amiodarone was stopped for unknown reasons. In 12/2020, Tikosyn was started. In 12/2021, echo showed an EF of 55 to 60% and a normal SPAP. In 7/14/2022, patient was evaluated in the ER with symptomatic rapid atrial fibrillation. She was given some IV metoprolol and spontaneously converted to sinus rhythm within several hours. Reports that she had missed several doses of Tikosyn prior to this day due to her  being in the hospital.  In 1/2023, she was admitted to the hospital for abdominal pain. Work-up revealed partial small bowel obstruction and gallstones. Chart review reveals that she had an episode of rapid atrial fibrillation on 1/26/2023. An EKG was not performed at this time. She is scheduled for a robotic cholecystectomy in 2 days with Dr. Sarah Lucas. Today she is being seen for AF. Patient reports that when she was admitted last week, she was not on  telemetry monitoring. Her Tikosyn was not given as scheduled and she reports she had an episode of rapid AF. She also reports that she has had an episode since being discharged from the hospital despite taking her medication as scheduled. She could feel her heart beating fast and felt some chest tightness with that. EKG today shows SR.      Past Medical History: has a past medical history of Atrial fibrillation ([facility] Diabetes mellitus ([facility] Hydronephrosis, Hyperlipidemia, Hypertension, Intussusception intestine ([facility] and SV[facility] Past Surgical History:   has a past surgical history that includes Hysterectomy; Appendectomy; Tonsillectomy; skin biopsy; ablation of dysrhythmic focus; Dilatation, esophagus; Small intestine surgery; fracture surgery; Upper gastrointestinal endoscopy (N/A, 4/21/2021); Upper gastrointestinal endoscopy (4/21/2021); and Upper gastrointestinal endoscopy (4/21/2021). Home Medications:    Prior to Admission medications    Medication Sig Start Date End Date Taking? Authorizing Provider   furosemide (LASIX) 20 MG tablet Take 20 mg by mouth daily Patient states she will take it when she is home   Yes Historical Provider, MD   apixaban (ELIQUIS) 5 MG TABS tablet Take 1 tablet by mouth 2 times daily 1/16/23  Yes GUANAKO Erickson CNP   metoprolol succinate (TOPROL XL) 100 MG extended release tablet Take 0.5 tablets by mouth daily Prescribed as 100mg bid, pt. Takes only 50mg if HR > 60. 7/7/22  Yes GUANAKO Erickson CNP   valsartan (DIOVAN) 320 MG tablet Take 1 tablet by mouth daily 5/26/21  Yes GUANAKO Vee CNP   pantoprazole (PROTONIX) 20 MG tablet Take 2 tablets by mouth 2 times daily 4/22/21  Yes Dejuan Gonzalez MD   pravastatin (PRAVACHOL) 80 MG tablet Take 40 mg by mouth daily   Yes Historical Provider, MD   pioglitazone (ACTOS) 15 MG tablet Take 15 mg by mouth daily   Yes Historical Provider, MD   dofetilide (TIKOSYN) 125 MCG capsule Take 1 capsule by mouth every 12 hours 2/4/21  Yes Siria Cohen MD   glyBURIDE (DIABETA) 1.25 MG tablet Take 2.5 mg by mouth 2 times daily (with meals)    Yes Historical Provider, MD       Allergies:  Hydrocodone, Metformin and related, Nickel, Omeprazole, Prednisone, and Hydralazine    Social History:   reports that she has never smoked.  She has never used smokeless tobacco. She reports that she does not drink alcohol and does not use drugs. Family History: family history includes Cancer in her sister; Diabetes in her mother; Heart Disease in her brother and father; High Blood Pressure in her brother and father; High Cholesterol in her brother and father. All 14 point review of systems are completed and pertinent positives are mentioned in the history of present illness. Other systems are reviewed and are negative. PHYSICAL EXAM:    Vital signs:    /76   Pulse 62   Ht 5' 2\" (1.575 m)   Wt 175 lb (79.4 kg)   SpO2 100%   BMI 32.01 kg/m²      Constitutional and general appearance: alert, cooperative, no distress and appears stated age  HEENT: PERRL, no cervical lymphadenopathy. No masses palpable. Normal oral mucosa  Respiratory:  Normal excursion and expansion without use of accessory muscles  Resp auscultation: Normal breath sounds without wheezing, rhonchi, and rales  Cardiovascular: The apical impulse is not displaced  Heart tones are crisp and normal. regular S1 and S2.  Jugular venous pulsation Normal  The carotid upstroke is normal in amplitude and contour without delay or bruit  Peripheral pulses are symmetrical and full   Abdomen:  No masses or tenderness  Bowel sounds present  Extremities:   No cyanosis or clubbing   No lower extremity edema   Skin: warm and dry  Neurological:  Alert and oriented  Moves all extremities well  No abnormalities of mood, affect, memory, mentation, or behavior are noted    DATA:    ECG 11/16/2021:  SR 60 bpm     Echo 4/28/2020:  Normal left ventricle systolic function with an estimated ejection fraction of 55-60%. Definity contrast administered with no evidence of left ventricular mass or thrombus noted. No regional wall motion abnormalities are seen. Normal left ventricular diastolic filling pressure. The left atrium is mildly dilated. Mild mitral and tricuspid regurgitation.   Systolic pulmonary artery pressure (SPAP) estimated at 42 mmHg (right atrial pressure 3 mmHg), consistent with mild pulmonary hypertension. Echo 2/19/2019:  Definity contrast administered. Normal left ventricular systolic function with an estimated ejection fraction of 55-60%. There is mild septal hypertrophy with normal remaining wall thickness. Normal left ventricular diastolic filling pressure. Mild mitral regurgitation. Mild tricuspid regurgitation. Systolic pulmonary artery pressure (SPAP) estimated at 60 mmHg (RA pressure 3 mmHg), consistent with severe pulmonary hypertension. Mild pulmonic regurgitation present. RAINER 2/11/2014:  Global ejection fraction is normal and estimated from 50 % to 55 %. Echo 8/26/2013:  Overall left ventricular function is normal.   Ejection fraction is visually estimated to be 50-55 %. There is trivial tricuspid regurgitation with RVSP estimated at 30 mmHg. Limited 2-D echocardiogram   There is limited visualization of the valvular structures but no obvious major abnormalities noted. There is reversal of E/A inflow velocities across the mitral valve suggesting impaired left ventricular relaxation. All labs and testing reviewed. CARDIOLOGY LABS:   CBC: No results for input(s): WBC, HGB, HCT, PLT in the last 72 hours. BMP: No results for input(s): NA, K, CO2, BUN, CREATININE, LABGLOM, GLUCOSE in the last 72 hours. PT/INR: No results for input(s): PROTIME, INR in the last 72 hours. APTT:No results for input(s): APTT in the last 72 hours. FASTING LIPID PANEL:No results found for: HDL, LDLDIRECT, LDLCALC, TRIG  LIVER PROFILE:No results for input(s): AST, ALT, ALB in the last 72 hours.     IMPRESSION:    Assessment:   Paroxsymal atrial fibrillation: stable    -s/p cryoablation 2014   -Tikosyn started 12/2020   -[id number] score 6 (HTN, DM, CHF, age, gender)   PSVT: noted in 9741  Chronic diastolic CHF: compensated  Pulmonary HTN: followed by Dr. Felix Bowen   HTN: controlled HLD   DM       Plan:   Continue Eliquis, valsartan, Toprol, Tikosyn and Lasix   Continue Cardizem 30 mg PO q 6 hours as needed for breakthrough atrial arrhythmias  Explained to patient that it would not be unexpected to see breakthrough AF when recovering from surgery. If AF is recurrent once recovered, can discuss alternative options. Okay to move forward with surgery.   Follow up in 3 months      MDM moderate     Марина Shore, APRN-CNP  [facility]  [phone]

## 2023-02-01 NOTE — PATIENT INSTRUCTIONS
No changes today     Okay for surgery on 2/3/2023. You have have some atrial fibrillation post operatively.     Follow up in 3 months

## 2023-02-01 NOTE — LETTER
[address] [address]  Phone: [phone]  Fax: [phone]    GUANAKO Ferraro CNP      Chasity Phillipspamela   1935 February 1, 2023      To whom it may concern,     Patient may proceed with planned procedure. Would recommend resuming [facility] as soon as possible after procedure. Please call the office if further questions arise.        Sincerely,        GUANAKO Cassidy CNP

## 2023-02-02 ENCOUNTER — ANESTHESIA EVENT (OUTPATIENT)
Dept: OPERATING ROOM | Age: 88
End: 2023-02-02
Payer: MEDICARE

## 2023-02-03 ENCOUNTER — HOSPITAL ENCOUNTER (OUTPATIENT)
Age: 88
Setting detail: OBSERVATION
Discharge: HOME | End: 2023-02-04
Attending: SURGERY | Admitting: SURGERY
Payer: MEDICARE

## 2023-02-03 ENCOUNTER — ANESTHESIA (OUTPATIENT)
Dept: OPERATING ROOM | Age: 88
End: 2023-02-03
Payer: MEDICARE

## 2023-02-03 DIAGNOSIS — K80.20 CALCULUS OF GALLBLADDER WITHOUT CHOLECYSTITIS WITHOUT OBSTRUCTION: ICD-10-CM

## 2023-02-03 LAB
EKG ATRIAL RATE: 55 BPM
EKG DIAGNOSIS: NORMAL
EKG P AXIS: 74 DEGREES
EKG P-R INTERVAL: 186 MS
EKG Q-T INTERVAL: 468 MS
EKG QRS DURATION: 88 MS
EKG QTC CALCULATION (BAZETT): 447 MS
EKG R AXIS: -39 DEGREES
EKG T AXIS: 45 DEGREES
EKG VENTRICULAR RATE: 55 BPM
GLUCOSE BLD-MCNC: 107 MG/DL (ref 70–99)
GLUCOSE BLD-MCNC: 160 MG/DL (ref 70–99)
GLUCOSE BLD-MCNC: 199 MG/DL (ref 70–99)
GLUCOSE BLD-MCNC: 226 MG/DL (ref 70–99)
GLUCOSE BLD-MCNC: 264 MG/DL (ref 70–99)
PERFORMED ON: ABNORMAL

## 2023-02-03 PROCEDURE — 88304 TISSUE EXAM BY PATHOLOGIST: CPT

## 2023-02-03 PROCEDURE — 7100000011 HC PHASE II RECOVERY - ADDTL 15 MIN: Performed by: SURGERY

## 2023-02-03 PROCEDURE — 7100000001 HC PACU RECOVERY - ADDTL 15 MIN: Performed by: SURGERY

## 2023-02-03 PROCEDURE — 6360000002 HC RX W HCPCS: Performed by: ANESTHESIOLOGY

## 2023-02-03 PROCEDURE — 2500000003 HC RX 250 WO HCPCS: Performed by: NURSE ANESTHETIST, CERTIFIED REGISTERED

## 2023-02-03 PROCEDURE — 7100000000 HC PACU RECOVERY - FIRST 15 MIN: Performed by: SURGERY

## 2023-02-03 PROCEDURE — A4217 STERILE WATER/SALINE, 500 ML: HCPCS | Performed by: SURGERY

## 2023-02-03 PROCEDURE — 2709999900 HC NON-CHARGEABLE SUPPLY: Performed by: SURGERY

## 2023-02-03 PROCEDURE — 2500000003 HC RX 250 WO HCPCS: Performed by: SURGERY

## 2023-02-03 PROCEDURE — 3600000019 HC SURGERY ROBOT ADDTL 15MIN: Performed by: SURGERY

## 2023-02-03 PROCEDURE — 6360000002 HC RX W HCPCS: Performed by: NURSE ANESTHETIST, CERTIFIED REGISTERED

## 2023-02-03 PROCEDURE — 3600000009 HC SURGERY ROBOT BASE: Performed by: SURGERY

## 2023-02-03 PROCEDURE — 2580000003 HC RX 258: Performed by: SURGERY

## 2023-02-03 PROCEDURE — 47562 LAPAROSCOPIC CHOLECYSTECTOMY: CPT | Performed by: SURGERY

## 2023-02-03 PROCEDURE — 6360000002 HC RX W HCPCS: Performed by: SURGERY

## 2023-02-03 PROCEDURE — 7100000010 HC PHASE II RECOVERY - FIRST 15 MIN: Performed by: SURGERY

## 2023-02-03 PROCEDURE — 2580000003 HC RX 258: Performed by: ANESTHESIOLOGY

## 2023-02-03 PROCEDURE — S2900 ROBOTIC SURGICAL SYSTEM: HCPCS | Performed by: SURGERY

## 2023-02-03 PROCEDURE — 93005 ELECTROCARDIOGRAM TRACING: CPT | Performed by: ANESTHESIOLOGY

## 2023-02-03 PROCEDURE — 96372 THER/PROPH/DIAG INJ SC/IM: CPT

## 2023-02-03 PROCEDURE — 3700000000 HC ANESTHESIA ATTENDED CARE: Performed by: SURGERY

## 2023-02-03 PROCEDURE — 2500000003 HC RX 250 WO HCPCS: Performed by: ANESTHESIOLOGY

## 2023-02-03 PROCEDURE — 6370000000 HC RX 637 (ALT 250 FOR IP): Performed by: ANESTHESIOLOGY

## 2023-02-03 PROCEDURE — 3700000001 HC ADD 15 MINUTES (ANESTHESIA): Performed by: SURGERY

## 2023-02-03 PROCEDURE — 6370000000 HC RX 637 (ALT 250 FOR IP): Performed by: SURGERY

## 2023-02-03 PROCEDURE — 96361 HYDRATE IV INFUSION ADD-ON: CPT

## 2023-02-03 PROCEDURE — 96374 THER/PROPH/DIAG INJ IV PUSH: CPT

## 2023-02-03 PROCEDURE — 96375 TX/PRO/DX INJ NEW DRUG ADDON: CPT

## 2023-02-03 PROCEDURE — G0378 HOSPITAL OBSERVATION PER HR: HCPCS

## 2023-02-03 PROCEDURE — C1889 IMPLANT/INSERT DEVICE, NOC: HCPCS | Performed by: SURGERY

## 2023-02-03 PROCEDURE — 93010 ELECTROCARDIOGRAM REPORT: CPT | Performed by: INTERNAL MEDICINE

## 2023-02-03 DEVICE — CLIP INT L POLYMER LOK LIG HEM O LOK: Type: IMPLANTABLE DEVICE | Site: ABDOMEN | Status: FUNCTIONAL

## 2023-02-03 RX ORDER — SODIUM CHLORIDE 0.9 % (FLUSH) 0.9 %
5-40 SYRINGE (ML) INJECTION PRN
Status: DISCONTINUED | OUTPATIENT
Start: 2023-02-03 | End: 2023-02-03 | Stop reason: HOSPADM

## 2023-02-03 RX ORDER — TRAMADOL HYDROCHLORIDE 50 MG/1
100 TABLET ORAL EVERY 6 HOURS PRN
Status: DISCONTINUED | OUTPATIENT
Start: 2023-02-03 | End: 2023-02-04 | Stop reason: HOSPADM

## 2023-02-03 RX ORDER — ESMOLOL HYDROCHLORIDE 10 MG/ML
INJECTION INTRAVENOUS PRN
Status: DISCONTINUED | OUTPATIENT
Start: 2023-02-03 | End: 2023-02-03 | Stop reason: SDUPTHER

## 2023-02-03 RX ORDER — ONDANSETRON 2 MG/ML
INJECTION INTRAMUSCULAR; INTRAVENOUS PRN
Status: DISCONTINUED | OUTPATIENT
Start: 2023-02-03 | End: 2023-02-03 | Stop reason: SDUPTHER

## 2023-02-03 RX ORDER — FUROSEMIDE 20 MG/1
20 TABLET ORAL DAILY
Status: DISCONTINUED | OUTPATIENT
Start: 2023-02-04 | End: 2023-02-04 | Stop reason: HOSPADM

## 2023-02-03 RX ORDER — MAGNESIUM HYDROXIDE 1200 MG/15ML
LIQUID ORAL CONTINUOUS PRN
Status: COMPLETED | OUTPATIENT
Start: 2023-02-03 | End: 2023-02-03

## 2023-02-03 RX ORDER — MEPERIDINE HYDROCHLORIDE 25 MG/ML
12.5 INJECTION INTRAMUSCULAR; INTRAVENOUS; SUBCUTANEOUS EVERY 5 MIN PRN
Status: DISCONTINUED | OUTPATIENT
Start: 2023-02-03 | End: 2023-02-03 | Stop reason: HOSPADM

## 2023-02-03 RX ORDER — SODIUM CHLORIDE, SODIUM LACTATE, POTASSIUM CHLORIDE, CALCIUM CHLORIDE 600; 310; 30; 20 MG/100ML; MG/100ML; MG/100ML; MG/100ML
INJECTION, SOLUTION INTRAVENOUS CONTINUOUS
Status: DISCONTINUED | OUTPATIENT
Start: 2023-02-03 | End: 2023-02-03 | Stop reason: HOSPADM

## 2023-02-03 RX ORDER — OXYCODONE HYDROCHLORIDE 5 MG/1
5 TABLET ORAL PRN
Status: DISCONTINUED | OUTPATIENT
Start: 2023-02-03 | End: 2023-02-03 | Stop reason: HOSPADM

## 2023-02-03 RX ORDER — SODIUM CHLORIDE 9 MG/ML
INJECTION, SOLUTION INTRAVENOUS PRN
Status: DISCONTINUED | OUTPATIENT
Start: 2023-02-03 | End: 2023-02-03 | Stop reason: HOSPADM

## 2023-02-03 RX ORDER — LIDOCAINE HYDROCHLORIDE 20 MG/ML
INJECTION, SOLUTION INFILTRATION; PERINEURAL PRN
Status: DISCONTINUED | OUTPATIENT
Start: 2023-02-03 | End: 2023-02-03 | Stop reason: SDUPTHER

## 2023-02-03 RX ORDER — MAGNESIUM HYDROXIDE/ALUMINUM HYDROXICE/SIMETHICONE 120; 1200; 1200 MG/30ML; MG/30ML; MG/30ML
30 SUSPENSION ORAL ONCE
Status: COMPLETED | OUTPATIENT
Start: 2023-02-03 | End: 2023-02-03

## 2023-02-03 RX ORDER — PANTOPRAZOLE SODIUM 20 MG/1
20 TABLET, DELAYED RELEASE ORAL 2 TIMES DAILY
Status: DISCONTINUED | OUTPATIENT
Start: 2023-02-03 | End: 2023-02-04 | Stop reason: HOSPADM

## 2023-02-03 RX ORDER — DOFETILIDE 0.12 MG/1
125 CAPSULE ORAL EVERY 12 HOURS SCHEDULED
Status: DISCONTINUED | OUTPATIENT
Start: 2023-02-03 | End: 2023-02-04 | Stop reason: HOSPADM

## 2023-02-03 RX ORDER — PROPOFOL 10 MG/ML
INJECTION, EMULSION INTRAVENOUS PRN
Status: DISCONTINUED | OUTPATIENT
Start: 2023-02-03 | End: 2023-02-03 | Stop reason: SDUPTHER

## 2023-02-03 RX ORDER — OXYCODONE HYDROCHLORIDE AND ACETAMINOPHEN 5; 325 MG/1; MG/1
1 TABLET ORAL EVERY 6 HOURS PRN
Qty: 20 TABLET | Refills: 0 | Status: SHIPPED | OUTPATIENT
Start: 2023-02-03 | End: 2023-02-08

## 2023-02-03 RX ORDER — FAMOTIDINE 10 MG/ML
20 INJECTION, SOLUTION INTRAVENOUS ONCE
Status: COMPLETED | OUTPATIENT
Start: 2023-02-03 | End: 2023-02-03

## 2023-02-03 RX ORDER — ONDANSETRON 2 MG/ML
4 INJECTION INTRAMUSCULAR; INTRAVENOUS EVERY 4 HOURS PRN
Status: DISCONTINUED | OUTPATIENT
Start: 2023-02-03 | End: 2023-02-04 | Stop reason: HOSPADM

## 2023-02-03 RX ORDER — DIPHENHYDRAMINE HYDROCHLORIDE 50 MG/ML
12.5 INJECTION INTRAMUSCULAR; INTRAVENOUS EVERY 6 HOURS PRN
Status: DISCONTINUED | OUTPATIENT
Start: 2023-02-03 | End: 2023-02-04 | Stop reason: HOSPADM

## 2023-02-03 RX ORDER — KETOROLAC TROMETHAMINE 30 MG/ML
15 INJECTION, SOLUTION INTRAMUSCULAR; INTRAVENOUS EVERY 6 HOURS PRN
Status: DISCONTINUED | OUTPATIENT
Start: 2023-02-03 | End: 2023-02-04 | Stop reason: HOSPADM

## 2023-02-03 RX ORDER — SODIUM CHLORIDE 9 MG/ML
INJECTION, SOLUTION INTRAVENOUS CONTINUOUS
Status: DISCONTINUED | OUTPATIENT
Start: 2023-02-03 | End: 2023-02-04 | Stop reason: HOSPADM

## 2023-02-03 RX ORDER — HEPARIN SODIUM 5000 [USP'U]/ML
5000 INJECTION, SOLUTION INTRAVENOUS; SUBCUTANEOUS EVERY 8 HOURS SCHEDULED
Status: DISCONTINUED | OUTPATIENT
Start: 2023-02-03 | End: 2023-02-04 | Stop reason: HOSPADM

## 2023-02-03 RX ORDER — ONDANSETRON 2 MG/ML
4 INJECTION INTRAMUSCULAR; INTRAVENOUS
Status: COMPLETED | OUTPATIENT
Start: 2023-02-03 | End: 2023-02-03

## 2023-02-03 RX ORDER — ONDANSETRON 4 MG/1
4 TABLET, ORALLY DISINTEGRATING ORAL ONCE
Status: COMPLETED | OUTPATIENT
Start: 2023-02-03 | End: 2023-02-03

## 2023-02-03 RX ORDER — SODIUM CHLORIDE 9 MG/ML
25 INJECTION, SOLUTION INTRAVENOUS PRN
Status: DISCONTINUED | OUTPATIENT
Start: 2023-02-03 | End: 2023-02-03 | Stop reason: HOSPADM

## 2023-02-03 RX ORDER — SCOLOPAMINE TRANSDERMAL SYSTEM 1 MG/1
1 PATCH, EXTENDED RELEASE TRANSDERMAL ONCE
Status: DISCONTINUED | OUTPATIENT
Start: 2023-02-03 | End: 2023-02-04 | Stop reason: HOSPADM

## 2023-02-03 RX ORDER — HEPARIN SODIUM 5000 [USP'U]/ML
5000 INJECTION, SOLUTION INTRAVENOUS; SUBCUTANEOUS ONCE
Status: COMPLETED | OUTPATIENT
Start: 2023-02-03 | End: 2023-02-03

## 2023-02-03 RX ORDER — INDOCYANINE GREEN AND WATER 25 MG
5 KIT INJECTION ONCE
Status: COMPLETED | OUTPATIENT
Start: 2023-02-03 | End: 2023-02-03

## 2023-02-03 RX ORDER — SODIUM CHLORIDE 0.9 % (FLUSH) 0.9 %
5-40 SYRINGE (ML) INJECTION EVERY 12 HOURS SCHEDULED
Status: DISCONTINUED | OUTPATIENT
Start: 2023-02-03 | End: 2023-02-03 | Stop reason: HOSPADM

## 2023-02-03 RX ORDER — DEXTROSE MONOHYDRATE 100 MG/ML
INJECTION, SOLUTION INTRAVENOUS CONTINUOUS PRN
Status: DISCONTINUED | OUTPATIENT
Start: 2023-02-03 | End: 2023-02-04 | Stop reason: HOSPADM

## 2023-02-03 RX ORDER — BUPIVACAINE HYDROCHLORIDE 5 MG/ML
INJECTION, SOLUTION EPIDURAL; INTRACAUDAL PRN
Status: DISCONTINUED | OUTPATIENT
Start: 2023-02-03 | End: 2023-02-03 | Stop reason: ALTCHOICE

## 2023-02-03 RX ORDER — METOPROLOL SUCCINATE 50 MG/1
50 TABLET, EXTENDED RELEASE ORAL DAILY
Status: DISCONTINUED | OUTPATIENT
Start: 2023-02-04 | End: 2023-02-04 | Stop reason: HOSPADM

## 2023-02-03 RX ORDER — PIOGLITAZONEHYDROCHLORIDE 30 MG/1
15 TABLET ORAL DAILY
Status: DISCONTINUED | OUTPATIENT
Start: 2023-02-03 | End: 2023-02-04 | Stop reason: HOSPADM

## 2023-02-03 RX ORDER — ROCURONIUM BROMIDE 10 MG/ML
INJECTION, SOLUTION INTRAVENOUS PRN
Status: DISCONTINUED | OUTPATIENT
Start: 2023-02-03 | End: 2023-02-03 | Stop reason: SDUPTHER

## 2023-02-03 RX ORDER — OXYCODONE HYDROCHLORIDE 5 MG/1
10 TABLET ORAL PRN
Status: DISCONTINUED | OUTPATIENT
Start: 2023-02-03 | End: 2023-02-03 | Stop reason: HOSPADM

## 2023-02-03 RX ORDER — DEXAMETHASONE SODIUM PHOSPHATE 4 MG/ML
INJECTION, SOLUTION INTRA-ARTICULAR; INTRALESIONAL; INTRAMUSCULAR; INTRAVENOUS; SOFT TISSUE PRN
Status: DISCONTINUED | OUTPATIENT
Start: 2023-02-03 | End: 2023-02-03 | Stop reason: SDUPTHER

## 2023-02-03 RX ORDER — HYDRALAZINE HYDROCHLORIDE 20 MG/ML
INJECTION INTRAMUSCULAR; INTRAVENOUS PRN
Status: DISCONTINUED | OUTPATIENT
Start: 2023-02-03 | End: 2023-02-03 | Stop reason: SDUPTHER

## 2023-02-03 RX ORDER — FENTANYL CITRATE 50 UG/ML
INJECTION, SOLUTION INTRAMUSCULAR; INTRAVENOUS PRN
Status: DISCONTINUED | OUTPATIENT
Start: 2023-02-03 | End: 2023-02-03 | Stop reason: SDUPTHER

## 2023-02-03 RX ORDER — GLIPIZIDE 5 MG/1
1.25 TABLET ORAL
Status: DISCONTINUED | OUTPATIENT
Start: 2023-02-03 | End: 2023-02-04 | Stop reason: HOSPADM

## 2023-02-03 RX ORDER — ACETAMINOPHEN 325 MG/1
650 TABLET ORAL EVERY 4 HOURS PRN
Status: DISCONTINUED | OUTPATIENT
Start: 2023-02-03 | End: 2023-02-04 | Stop reason: HOSPADM

## 2023-02-03 RX ORDER — TRAMADOL HYDROCHLORIDE 50 MG/1
50 TABLET ORAL EVERY 6 HOURS PRN
Status: DISCONTINUED | OUTPATIENT
Start: 2023-02-03 | End: 2023-02-04 | Stop reason: HOSPADM

## 2023-02-03 RX ORDER — VALSARTAN 80 MG/1
320 TABLET ORAL DAILY
Status: DISCONTINUED | OUTPATIENT
Start: 2023-02-03 | End: 2023-02-04 | Stop reason: HOSPADM

## 2023-02-03 RX ADMIN — HEPARIN SODIUM 5000 UNITS: 5000 INJECTION, SOLUTION INTRAVENOUS; SUBCUTANEOUS at 23:39

## 2023-02-03 RX ADMIN — HYDROMORPHONE HYDROCHLORIDE 0.5 MG: 1 INJECTION, SOLUTION INTRAMUSCULAR; INTRAVENOUS; SUBCUTANEOUS at 11:41

## 2023-02-03 RX ADMIN — ALUMINUM HYDROXIDE, MAGNESIUM HYDROXIDE, AND SIMETHICONE 30 ML: 200; 200; 20 SUSPENSION ORAL at 12:42

## 2023-02-03 RX ADMIN — PROPOFOL 30 MG: 10 INJECTION, EMULSION INTRAVENOUS at 10:29

## 2023-02-03 RX ADMIN — ONDANSETRON HYDROCHLORIDE 4 MG: 2 INJECTION, SOLUTION INTRAMUSCULAR; INTRAVENOUS at 10:30

## 2023-02-03 RX ADMIN — HYDROMORPHONE HYDROCHLORIDE 0.5 MG: 1 INJECTION, SOLUTION INTRAMUSCULAR; INTRAVENOUS; SUBCUTANEOUS at 11:27

## 2023-02-03 RX ADMIN — HYDROMORPHONE HYDROCHLORIDE 0.5 MG: 1 INJECTION, SOLUTION INTRAMUSCULAR; INTRAVENOUS; SUBCUTANEOUS at 11:35

## 2023-02-03 RX ADMIN — FENTANYL CITRATE 50 MCG: 50 INJECTION INTRAMUSCULAR; INTRAVENOUS at 10:29

## 2023-02-03 RX ADMIN — SODIUM CHLORIDE, POTASSIUM CHLORIDE, SODIUM LACTATE AND CALCIUM CHLORIDE: 600; 310; 30; 20 INJECTION, SOLUTION INTRAVENOUS at 09:05

## 2023-02-03 RX ADMIN — VALSARTAN 320 MG: 80 TABLET, FILM COATED ORAL at 15:57

## 2023-02-03 RX ADMIN — PROPOFOL 100 MG: 10 INJECTION, EMULSION INTRAVENOUS at 10:19

## 2023-02-03 RX ADMIN — GLIPIZIDE 1.25 MG: 5 TABLET ORAL at 15:57

## 2023-02-03 RX ADMIN — ONDANSETRON 4 MG: 4 TABLET, ORALLY DISINTEGRATING ORAL at 12:27

## 2023-02-03 RX ADMIN — SODIUM CHLORIDE: 9 INJECTION, SOLUTION INTRAVENOUS at 16:02

## 2023-02-03 RX ADMIN — DEXAMETHASONE SODIUM PHOSPHATE 4 MG: 4 INJECTION, SOLUTION INTRAMUSCULAR; INTRAVENOUS at 10:30

## 2023-02-03 RX ADMIN — FENTANYL CITRATE 50 MCG: 50 INJECTION INTRAMUSCULAR; INTRAVENOUS at 10:19

## 2023-02-03 RX ADMIN — CEFAZOLIN 2000 MG: 2 INJECTION, POWDER, FOR SOLUTION INTRAMUSCULAR; INTRAVENOUS at 10:30

## 2023-02-03 RX ADMIN — LIDOCAINE HYDROCHLORIDE 5 MG: 20 INJECTION, SOLUTION INFILTRATION; PERINEURAL at 10:19

## 2023-02-03 RX ADMIN — PROPOFOL 40 MG: 10 INJECTION, EMULSION INTRAVENOUS at 10:48

## 2023-02-03 RX ADMIN — PIOGLITAZONE 15 MG: 30 TABLET ORAL at 15:58

## 2023-02-03 RX ADMIN — KETOROLAC TROMETHAMINE 15 MG: 30 INJECTION, SOLUTION INTRAMUSCULAR; INTRAVENOUS at 18:07

## 2023-02-03 RX ADMIN — TRAMADOL HYDROCHLORIDE 100 MG: 50 TABLET ORAL at 16:40

## 2023-02-03 RX ADMIN — HYDRALAZINE HYDROCHLORIDE 5 MG: 20 INJECTION INTRAMUSCULAR; INTRAVENOUS at 11:04

## 2023-02-03 RX ADMIN — FAMOTIDINE 20 MG: 10 INJECTION, SOLUTION INTRAVENOUS at 09:03

## 2023-02-03 RX ADMIN — INDOCYANINE GREEN AND WATER 5 MG: KIT at 09:04

## 2023-02-03 RX ADMIN — SUGAMMADEX 315 MG: 100 INJECTION, SOLUTION INTRAVENOUS at 11:02

## 2023-02-03 RX ADMIN — PANTOPRAZOLE SODIUM 20 MG: 20 TABLET, DELAYED RELEASE ORAL at 23:39

## 2023-02-03 RX ADMIN — ESMOLOL HYDROCHLORIDE 10 MG: 10 INJECTION, SOLUTION INTRAVENOUS at 10:29

## 2023-02-03 RX ADMIN — ONDANSETRON HYDROCHLORIDE 4 MG: 2 INJECTION, SOLUTION INTRAMUSCULAR; INTRAVENOUS at 12:04

## 2023-02-03 RX ADMIN — HEPARIN SODIUM 5000 UNITS: 5000 INJECTION INTRAVENOUS; SUBCUTANEOUS at 08:56

## 2023-02-03 RX ADMIN — HYDRALAZINE HYDROCHLORIDE 5 MG: 20 INJECTION INTRAMUSCULAR; INTRAVENOUS at 10:48

## 2023-02-03 RX ADMIN — ROCURONIUM BROMIDE 30 MG: 10 INJECTION, SOLUTION INTRAVENOUS at 10:19

## 2023-02-03 ASSESSMENT — PAIN DESCRIPTION - LOCATION
LOCATION: ABDOMEN
LOCATION: CHEST
LOCATION: CHEST;NECK

## 2023-02-03 ASSESSMENT — ENCOUNTER SYMPTOMS: SHORTNESS OF BREATH: 1

## 2023-02-03 ASSESSMENT — LIFESTYLE VARIABLES
SMOKING_STATUS: 0
HOW MANY STANDARD DRINKS CONTAINING ALCOHOL DO YOU HAVE ON A TYPICAL DAY: PATIENT DOES NOT DRINK
HOW OFTEN DO YOU HAVE A DRINK CONTAINING ALCOHOL: NEVER

## 2023-02-03 ASSESSMENT — PAIN SCALES - GENERAL
PAINLEVEL_OUTOF10: 8
PAINLEVEL_OUTOF10: 10
PAINLEVEL_OUTOF10: 7
PAINLEVEL_OUTOF10: 7
PAINLEVEL_OUTOF10: 2
PAINLEVEL_OUTOF10: 9
PAINLEVEL_OUTOF10: 3
PAINLEVEL_OUTOF10: 1
PAINLEVEL_OUTOF10: 4

## 2023-02-03 ASSESSMENT — PAIN DESCRIPTION - DESCRIPTORS
DESCRIPTORS: DISCOMFORT
DESCRIPTORS: DISCOMFORT
DESCRIPTORS: ACHING
DESCRIPTORS: ACHING
DESCRIPTORS: PRESSURE
DESCRIPTORS: DISCOMFORT

## 2023-02-03 ASSESSMENT — PAIN - FUNCTIONAL ASSESSMENT: PAIN_FUNCTIONAL_ASSESSMENT: 0-10

## 2023-02-03 ASSESSMENT — PAIN DESCRIPTION - ORIENTATION
ORIENTATION: MID

## 2023-02-03 NOTE — BRIEF OP NOTE
Brief Postoperative Note      Patient: Ob Areas Payer  YOB: 1935  MRN: [id number]    Date of Procedure: 2/3/2023    Pre-Op Diagnosis: Calculus of gallbladder without cholecystitis without obstruction [K80.20]    Post-Op Diagnosis: Same       Procedure(s):  ROBOTIC CHOLECYSTECTOMY    Surgeon(s):  Ashley Shea MD    Assistant:  Surgical Assistant: Caitlyn Handley    Anesthesia: General    Estimated Blood Loss (mL): Minimal    Complications: None    Specimens:   ID Type Source Tests Collected by Time Destination   A : gallbladder and contents Tissue Gallbladder SURGICAL PATHOLOGY Ashley Shea MD 2/3/2023 1100        Implants:  Implant Name Type Inv.  Item Serial No.  Lot No. LRB No. Used Action   CLIP INT L POLYMER DESIREE LIG HEM O DESIREE - [id number]  CLIP INT L POLYMER DESIREE LIG HEM O DESIREE  [address] [id number] N/A 1 Implanted         Drains: * No LDAs found *    Findings: as above    Electronically signed by Patsy Malik MD on 2/3/2023 at 11:11 AM

## 2023-02-03 NOTE — PROGRESS NOTES
Pt now c/o of abd pain. Dr. Srinivasan Le to bedside assessing pt. Per MD pt OK to d/c home. Bedside report to Марина SALGUERO. Will transfer care.

## 2023-02-03 NOTE — PROGRESS NOTES
Pt c/o n/v and over all not feeling well. Dr Deshaun Holguin at bedside. Per Dr Deshaun Holguin spoke with Dr Sarah Lucas. Dr Sarah Lucas will come see patient.

## 2023-02-03 NOTE — ANESTHESIA POSTPROCEDURE EVALUATION
Department of Anesthesiology  Postprocedure Note    Patient: Lidia Tiwari  MRN: [id number]  YOB: 1935  Date of evaluation: 2/3/2023      Procedure Summary     Date: 02/03/23 Room / Location: [facility]    Anesthesia Start: 0211 Anesthesia Stop: 1120    Procedure: ROBOTIC CHOLECYSTECTOMY (Abdomen) Diagnosis:       Calculus of gallbladder without cholecystitis without obstruction      (Calculus of gallbladder without cholecystitis without obstruction [K80.20])    Surgeons: Simon Carvajal MD Responsible Provider: Shayna Doty MD    Anesthesia Type: general ASA Status: 3          Anesthesia Type: No value filed.     Jaswinder Phase I: Jaswinder Score: 10    Jaswinder Phase II: Jaswinder Score: 10      Anesthesia Post Evaluation    Comments: Postoperative Anesthesia Note    Name:    Lidia Tiwari  MRN:      [id number]    Patient Vitals in the past 12 hrs:  02/03/23 1315, BP:(!) 169/61  02/03/23 1238, BP:(!) 154/54, Temp:97.5 °F (36.4 °C), Temp src:Infrared, Pulse:63, Resp:18, SpO2:95 %  02/03/23 1205, Pulse:69  02/03/23 1200, BP:(!) 165/53, Temp:97.5 °F (36.4 °C), Temp src:Infrared, Pulse:59, Resp:12, SpO2:94 %  02/03/23 1150, Pulse:59, Resp:17, SpO2:90 %  02/03/23 1146, Pulse:68  02/03/23 1145, BP:(!) 158/60, Pulse:63, Resp:15, SpO2:96 %  02/03/23 1140, Pulse:65, Resp:18, SpO2:97 %  02/03/23 1135, BP:(!) 163/67, Pulse:65, Resp:18, SpO2:97 %  02/03/23 1130, BP:(!) 148/58, Pulse:64, Resp:19, SpO2:96 %  02/03/23 1125, BP:(!) 154/52, Pulse:65, Resp:20, SpO2:96 %  02/03/23 1120, BP:(!) 171/59, Temp:97.5 °F (36.4 °C), Temp src:Infrared, Pulse:65, Resp:27, SpO2:97 %  02/03/23 1116, Pulse:62  02/03/23 0847, BP:(!) 193/68, Temp:97.8 °F (36.6 °C), Temp src:Temporal, Pulse:64, Resp:14, SpO2:98 %, Height:5' 2\" (1.575 m), Weight:173 lb (78.5 kg)     LABS:    CBC  Lab Results       Component                Value               Date/Time                  WBC 7.2                 01/26/2023 06:49 AM        HGB                      13.0                01/26/2023 06:49 AM        HCT                      41.2                01/26/2023 06:49 AM        PLT                      299                 01/26/2023 06:49 AM   RENAL  Lab Results       Component                Value               Date/Time                  NA                       137                 01/26/2023 06:49 AM        K                        4.3                 01/26/2023 06:49 AM        CL                       104                 01/26/2023 06:49 AM        CO2                      25                  01/26/2023 06:49 AM        BUN                      13                  01/26/2023 06:49 AM        CREATININE               0.8                 01/26/2023 06:49 AM        GLUCOSE                  169 (H)             01/26/2023 06:49 AM   COAGS  Lab Results       Component                Value               Date/Time                  PROTIME                  15.8 (H)            12/18/2020 10:13 AM        INR                      1.36 (H)            12/18/2020 10:13 AM        APTT                     30.6                07/18/2018 01:50 PM     Intake & Output:  @94LIWH@    Nausea & Vomiting:  No    Level of Consciousness:  Awake    Pain Assessment:  Adequate analgesia    Anesthesia Complications:  No apparent anesthetic complications, patient with nausea unable to resolve and will be admitted    SUMMARY      Vital signs stable  OK to discharge from Stage I post anesthesia care.   Care transferred from Anesthesiology department on discharge from perioperative area

## 2023-02-03 NOTE — H&P
I have reviewed the progress note serving as history and physical dated January/24/2023 and examined the patient and find no relevant changes.    I have reviewed with the patient and/or family the risks, benefits, and alternatives to the procedure.

## 2023-02-03 NOTE — ANESTHESIA PRE PROCEDURE
Department of Anesthesiology  Preprocedure Note       Name:  Yennifer Sampson Payer   Age:  81 y.o.  :  1935                                          MRN:  [id number]         Date:  2/3/2023      Surgeon: Jorge Singh):  Niki Waters MD    Procedure: Procedure(s):  ROBOTIC CHOLECYSTECTOMY, POSSIBLE OPEN PROCEDURE    Medications prior to admission:   Prior to Admission medications    Medication Sig Start Date End Date Taking? Authorizing Provider   furosemide (LASIX) 20 MG tablet Take 20 mg by mouth daily Patient states she will take it when she is home    Historical Provider, MD   apixaban (ELIQUIS) 5 MG TABS tablet Take 1 tablet by mouth 2 times daily 23   GUANAKO Erickson CNP   metoprolol succinate (TOPROL XL) 100 MG extended release tablet Take 0.5 tablets by mouth daily Prescribed as 100mg bid, pt.  Takes only 50mg if HR > 60. 22   GUANAKO Erickson CNP   valsartan (DIOVAN) 320 MG tablet Take 1 tablet by mouth daily 21   WyGUANAKO Plummer CNP   pantoprazole (PROTONIX) 20 MG tablet Take 2 tablets by mouth 2 times daily 21   Dayron Felder MD   pravastatin (PRAVACHOL) 80 MG tablet Take 40 mg by mouth daily    Historical Provider, MD   pioglitazone (ACTOS) 15 MG tablet Take 15 mg by mouth daily    Historical Provider, MD   dofetilide (TIKOSYN) 125 MCG capsule Take 1 capsule by mouth every 12 hours 21   Marlene Leyva MD   glyBURIDE (DIABETA) 1.25 MG tablet Take 2.5 mg by mouth 2 times daily (with meals)     Historical Provider, MD       Current medications:    Current Facility-Administered Medications   Medication Dose Route Frequency Provider Last Rate Last Admin    ceFAZolin (ANCEF) 2,000 mg in sodium chloride 0.9 % 50 mL IVPB (mini-bag)  2,000 mg IntraVENous Once Niki Waters MD        heparin (porcine) injection 5,000 Units  5,000 Units SubCUTAneous Once Niki Waters MD        indocyanine green (IC-GREEN) syringe 5 mg  5 mg IntraVENous Once Peña Roland MD           Allergies:     Allergies   Allergen Reactions    Hydrocodone Other (See Comments)     NAUSEA AND DIZZINESS    Metformin And Related Other (See Comments)     NAUSEA AND DIZZINESS    Nickel Dermatitis    Omeprazole     Prednisone      Other reaction(s): chest pain & palpitations/H&P    Hydralazine Palpitations and Rash       Problem List:    Patient Active Problem List   Diagnosis Code    PAF (paroxysmal atrial fibrillation) ([facility]) I48.0    SVT (supraventricular tachycardia) ([facility]) I47.1    DM2 (diabetes mellitus, type 2) ([facility]) E11.9    Dyspnea on exertion R06.09    Chronic anticoagulation Z79.01    Angina pectoris ([facility]) I20.9    Pelvic hematoma in female N80.80    Anemia due to blood loss D50.0    Hydronephrosis N13.30    Ileus ([facility]) K56.7    Abdominal pain, acute, right upper quadrant R10.11    Enteritis K52.9    Partial intestinal obstruction ([facility]) K56.600    Essential hypertension I10    Pulmonary hypertension ([facility]) I27.20    Chronic diastolic heart failure ([facility]) I50.32    Hyperlipidemia E78.5    Hyperkalemia E87.5    Partial small bowel obstruction ([facility]) K56.600    Small bowel obstruction ([facility]) K56.609    Obesity E66.9    Precordial pain R07.2    PVC (premature ventricular contraction) I49.3    A-fib ([facility]) I48.91       Past Medical History:        Diagnosis Date    [facility]     Diabetes mellitus [facility]     Hydronephrosis 2/14/2014    Hyperlipidemia     Hypertension     Intussusception intestine ([facility])     SVT (supraventricular tachycardia) ([facility] 8/26/2013    AVNRT       Past Surgical History:        Procedure Laterality Date    ABLATION OF DYSRHYTHMIC FOCUS      -2014    APPENDECTOMY      DILATATION, ESOPHAGUS      FRACTURE SURGERY      L ankle    HYSTERECTOMY (CERVIX STATUS UNKNOWN)      SKIN BIOPSY      SMALL INTESTINE SURGERY      TONSILLECTOMY      UPPER GASTROINTESTINAL ENDOSCOPY N/A 4/21/2021    EGD POLYP SNARE performed by Ramón Padilla DO at [address]  4/21/2021    EGD CONTROL HEMORRHAGE performed by Ramón Padilla DO at [address]  4/21/2021    EGD BIOPSY performed by Ramón Padilla DO at [address] History:    Social History     Tobacco Use    Smoking status: Never    Smokeless tobacco: Never   Substance Use Topics    Alcohol use: No                                Counseling given: Not Answered      Vital Signs (Current):   Vitals:    01/31/23 1503 02/03/23 0847   BP:  (!) 193/68   Pulse:  64   Resp:  14   Temp:  97.8 °F (36.6 °C)   TempSrc:  Temporal   SpO2:  98%   Weight: 173 lb (78.5 kg) 173 lb (78.5 kg)   Height: 5' 2\" (1.575 m) 5' 2\" (1.575 m)                                              BP Readings from Last 3 Encounters:   02/03/23 (!) 193/68   02/01/23 138/76   01/26/23 (!) 171/71       NPO Status: Time of last liquid consumption: 0600 (sip with meds)                        Time of last solid consumption: 2100                        Date of last liquid consumption: 02/03/23                        Date of last solid food consumption: 02/02/23    BMI:   Wt Readings from Last 3 Encounters:   02/03/23 173 lb (78.5 kg)   02/01/23 175 lb (79.4 kg)   01/25/23 180 lb (81.6 kg)     Body mass index is 31.64 kg/m².     CBC:   Lab Results   Component Value Date/Time    WBC 7.2 01/26/2023 06:49 AM    RBC 4.89 01/26/2023 06:49 AM    HGB 13.0 01/26/2023 06:49 AM    HCT 41.2 01/26/2023 06:49 AM    MCV 84.2 01/26/2023 06:49 AM    RDW 14.9 01/26/2023 06:49 AM     01/26/2023 06:49 AM       CMP:   Lab Results   Component Value Date/Time     01/26/2023 06:49 AM    K 4.3 01/26/2023 06:49 AM     01/26/2023 06:49 AM    CO2 25 01/26/2023 06:49 AM    BUN 13 01/26/2023 06:49 AM    CREATININE 0.8 01/26/2023 06:49 AM    GFRAA >60 07/14/2022 05:10 PM    AGRATIO 1.0 01/26/2023 06:49 AM LABGLOM >60 01/26/2023 06:49 AM    GLUCOSE 169 01/26/2023 06:49 AM    PROT 7.3 01/26/2023 06:49 AM    CALCIUM 8.5 01/26/2023 06:49 AM    BILITOT 0.6 01/26/2023 06:49 AM    ALKPHOS 75 01/26/2023 06:49 AM    AST 13 01/26/2023 06:49 AM    ALT 10 01/26/2023 06:49 AM       POC Tests: No results for input(s): POCGLU, POCNA, POCK, POCCL, POCBUN, POCHEMO, POCHCT in the last 72 hours. Coags:   Lab Results   Component Value Date/Time    PROTIME 15.8 12/18/2020 10:13 AM    INR 1.36 12/18/2020 10:13 AM    APTT 30.6 07/18/2018 01:50 PM       HCG (If Applicable): No results found for: PREGTESTUR, PREGSERUM, HCG, HCGQUANT     ABGs: No results found for: PHART, PO2ART, CJY5KRM, YLC7JRU, BEART, D5LKOPNN     Type & Screen (If Applicable):  No results found for: LABABO, LABRH    Drug/Infectious Status (If Applicable):  No results found for: HIV, HEPCAB    COVID-19 Screening (If Applicable): No results found for: COVID19        Anesthesia Evaluation  Patient summary reviewed no history of anesthetic complications:   Airway: Mallampati: III  TM distance: <3 FB   Neck ROM: limited  Mouth opening: > = 3 FB   Dental:    (+) upper dentures      Pulmonary: breath sounds clear to auscultation  (+) shortness of breath (W/ AF):      (-) COPD, asthma, recent URI, sleep apnea and not a current smoker          Patient did not smoke on day of surgery.                  Cardiovascular:    (+) hypertension:, dysrhythmias (HX ABLATION, 2014): atrial fibrillation and SVT, CHF: diastolic, pulmonary hypertension (BY ECHO):, hyperlipidemia    (-) pacemaker, past MI, CAD, CABG/stent and  angina    ECG reviewed  Rhythm: irregular  Rate: normal  Echocardiogram reviewed  Stress test reviewed  Cleared by cardiology     Beta Blocker:  Dose within 24 Hrs         Neuro/Psych:      (-) seizures, TIA, CVA and psychiatric history           GI/Hepatic/Renal:   (+) GERD: well controlled,      (-) liver disease, no renal disease, bowel prep and no morbid obesity Endo/Other:    (+) DiabetesType II DM, , blood dyscrasia (ELIQUIS HELD 3D): arthritis:., malignancy/cancer (SKIN, EYELID). (-) hypothyroidism, hyperthyroidism               Abdominal:       Abdomen: soft. Vascular: negative vascular ROS. Other Findings:           Anesthesia Plan      general     ASA 3       Induction: intravenous. MIPS: Postoperative opioids intended and Prophylactic antiemetics administered. Anesthetic plan and risks discussed with patient. Plan discussed with CRNA. This pre-anesthesia assessment may be used as a history and physical.    DOS STAFF ADDENDUM:    Pt seen and examined, chart reviewed (including anesthesia, drug and allergy history). No interval changes to history and physical examination. Anesthetic plan, risks, benefits, alternatives, and personnel involved discussed with patient. Patient verbalized an understanding and agrees to proceed.       Bro Shine MD  February 3, 2023  8:58 AM      Bro Shine MD   2/3/2023

## 2023-02-03 NOTE — PROGRESS NOTES
BP on admit 193/68, Dr Bertha Pacheco notified, will recheck. BP rechecked now and 198/78 pulse 56. Dr. Bertha Pacheco notified again, no new orders.

## 2023-02-03 NOTE — PROGRESS NOTES
Pt reassessed SR 65, SPO2 96% on RA, resp e/e unlabored. ABD dressing x4 CDI, abd soft. Pt c/o of intermittent n/a. Pt  Samm Henning to bedside.

## 2023-02-03 NOTE — DISCHARGE INSTRUCTIONS
[facility] AND [place]    Blaine Retana. Raf Koroma M.D. [facility] [address]                [address] M[address] [facility], [facility] [address]         [place], [address] [facility] Deven Gore M.D                         [phone]        [facility] Lavell Cochran M.D. [facility]       POST-OPERATIVE INSTRUCTIONS FOR GALLBLADDER SURGERY    Call the office to schedule your post-operative appointment with your surgeon for two (2) weeks. You may resume taking your Eliquis on Saturday    You will have either white steri-strips and a water occlusive dressing or surgical glue closing your incisions. If you have clear bandages over your incisions, you may remove them in 3-4 days. Leave the steri-stips in place. These will peel away in 7-10 days. You may shower. Wash incisions gently, and pat them dry. Do not rub your incisions. General guidelines for activity:   Avoid strenuous activity or lifting anything heavier than 20 pounds. It is OK to be up  walking around, and walking up and down stairs. Do what is comfortable: stop and rest when you feel tired. Drink plenty of fluids and stay on a bland diet for 2-3 days after surgery. Do NOT drive while taking your narcotic pain medicine. Watch for signs of infection:  Excessive warmth or bright redness around your incisions  Leakage cloudy fluid from you incisions  Fever over 101.5  During the laparoscopic procedure that you had, gas is pumped into the abdominal cavity. You may feel abdominal, shoulder, or rib pain for a few days due to this gas. You will have pain medicine ordered.  Take as directed    If you experience constipation:  Increase your water intake  Increase your activity, walking is best.  A stool softener or mild laxative may be necessary if you still have not had a bowel movement ; call the office for further instructions. Please take note: IF you do not take all of your narcotic pain medication, we ask that you dispose of these responsibly. Drug drop off boxes are located in the [facility] and [facility] emergency departments. These Med Safe return cabinets are available 24/7 in the emergency department [facility] of office staff may NOT accept any medications to drop off in the cabinet.

## 2023-02-03 NOTE — PROGRESS NOTES
Bedside report to [address] [place] [address], both's RN's assessed pt abd. Pt placed on tele, [facility] called and verified tele on and working, verified. Pt transported to [facility] via stretcher accompanied by transport, will transfer care.

## 2023-02-03 NOTE — PROGRESS NOTES
Pt c/o of chest/neck pain/pressure. Dr. Tawanna Whitman updated. See new orders. Pt placed on bedside monitor. NSR 62 SPO2 95%. RN to bedside.

## 2023-02-03 NOTE — PROGRESS NOTES
Pt to PACU placed on bedside monitor. NSR 69, of note per report pt in and out afib during procedure (pt has hx of afib). Resp e/e unlabored, SPO2 97% on RA. ABD soft, dressing x4 CDI. RN @ bedside. Phase I (PACU)  1. Patient is identified using name and the date of birth. 2.  The patient is free from signs and symptoms of chemical, electrical, laser, radiation, positioning, or transfer/transport injury. 3.  The patient receives appropriate medication(s), safely administered during the Perioperative period. 4.  The patient has wound/tissue perfusion consistent with or improved from baseline levels established preoperatively. 5.  The patient is at or returning to normothermia at the conclusion of the immediate postoperative period. 6.  The patient's fluid, electrolyte, and acid base balances are consistent with or improved from baseline levels established preoperatively. 7.  The patient's pulmonary function is consistent with or improved from baseline levels established preoperatively. 8.  The patient's cardiovascular status is consistent with or improved from baseline levels established preoperatively. 9.  The patient/caregiver participates in decisions affecting his or her Perioperative plan of care. 10. The patient's care is consistent with the individualized Perioperative plan of care. 11. The patient's right to privacy is maintained. 12. The patient is the recipient of competent and ethical care within legal standards of practice. 13.  The patient's value system, lifestyle, ethnicity, and culture are considered, respected, and incorporated in the Perioperative plan of care. 14.  The patient demonstrates and/or reports adequate pain control throughout the the Perioperative period. 15. The patient's neurological status is consistent with or improved from baseline levels established preoperatively.   16.  The patient/caregiver demonstrates knowledge of the expected responses to the operative or invasive procedure. 17.  Patient/Caregiver has reduced anxiety. Interventions- Familiarize with environment and equipment.   18.  Other:  19.Other:

## 2023-02-03 NOTE — OP NOTE
[address]                 [address] [address]                                OPERATIVE REPORT    PATIENT NAME: Ronaldo Hester                :        1935  MED REC NO:   [id number]                          ROOM:  ACCOUNT NO:   [de-identified]                           ADMIT DATE: 2023  PROVIDER:     Jessie Mena. MD Jaquan    DATE OF PROCEDURE:  2023    PREOPERATIVE DIAGNOSIS:  Gallstones. POSTOPERATIVE DIAGNOSIS:  Gallstones. PROCEDURE:  Robotic cholecystectomy. ANESTHESIA:  General.    SURGEON:  Juvenal Mcgregor MD    ESTIMATED BLOOD LOSS:  Minimal.    INDICATIONS:  The patient is an 81-year-old woman who presented with  abdominal pain. She was found to have gallstones and is therefore  brought for cholecystectomy. OPERATIVE SUMMARY:  After preoperative evaluation, the patient was  brought in the operating suite and placed in a comfortable supine  position on the operating room table. Monitoring equipment was attached  and general anesthesia was induced. Her abdomen was sterilely prepped  and draped and a small incision was made at the superior aspect of the  umbilicus. This was dissected down to the fascia, and a suture of  0-Ethibond was placed on either side of the midline. The midline fascia  was opened and the peritoneal cavity was entered directly. A  figure-of-eight suture was placed across the defect for later closure. A Shane trocar was inserted, and the abdomen was insufflated to a  pressure of 15 mmHg. The remaining ports were placed under direct  internal visualization. She was placed in reverse Trendelenburg and  rotated to the left and the robot was docked. The gallbladder was grasped and elevated cephalad over the liver edge. The infundibular structures were dissected out, and anatomic  identification was facilitated with use of Firefly imaging. The cystic  duct was triply clipped and divided. The cystic artery was likewise  clipped and divided.  The gallbladder was then dissected free off the  liver bed using electrocautery and set aside.  The gallbladder fossa was  examined.  There was no evidence of bleeding.  There was no bile leakage  either by direct visualization or Firefly imaging.  The clips were  intact on the proximal structures, the robot was therefore undocked, the  trocars were withdrawn, and the gallbladder was removed via the  umbilical fascial defect in the Endopouch.    The umbilical fascial defect was then closed with the previously placed  figure-of-eight suture of 0-Ethibond.  Wounds were injected with  Marcaine, and the skin incisions closed with subcuticular sutures of 4-0  Vicryl followed by Benzoin, Steri-Strips, and dry sterile dressings.   All sponge, needle, and instrument counts were correct at the end of the  case.  The patient tolerated the procedure well.  She was taken to the  recovery area in stable condition.        LETICIA WHYTE MD    D: 02/03/2023 11:52:41       T: 02/03/2023 11:56:10     [email or web address]  Job#: [id number]     Doc#: [id number]    CC:  _____ _____

## 2023-02-03 NOTE — PROGRESS NOTES
While getting dressed pt c/o on new onset dizziness. Pt then c/o n/a. Dr. Melani Burrows updated. See new order.

## 2023-02-04 VITALS
OXYGEN SATURATION: 96 % | RESPIRATION RATE: 16 BRPM | HEART RATE: 62 BPM | DIASTOLIC BLOOD PRESSURE: 60 MMHG | WEIGHT: 173 LBS | BODY MASS INDEX: 31.83 KG/M2 | SYSTOLIC BLOOD PRESSURE: 157 MMHG | TEMPERATURE: 98.1 F | HEIGHT: 62 IN

## 2023-02-04 LAB
GLUCOSE BLD-MCNC: 90 MG/DL (ref 70–99)
PERFORMED ON: NORMAL

## 2023-02-04 PROCEDURE — 6370000000 HC RX 637 (ALT 250 FOR IP): Performed by: SURGERY

## 2023-02-04 PROCEDURE — 99024 POSTOP FOLLOW-UP VISIT: CPT | Performed by: SURGERY

## 2023-02-04 PROCEDURE — 6360000002 HC RX W HCPCS: Performed by: SURGERY

## 2023-02-04 PROCEDURE — G0378 HOSPITAL OBSERVATION PER HR: HCPCS

## 2023-02-04 RX ADMIN — PANTOPRAZOLE SODIUM 20 MG: 20 TABLET, DELAYED RELEASE ORAL at 08:45

## 2023-02-04 RX ADMIN — GLIPIZIDE 1.25 MG: 5 TABLET ORAL at 08:44

## 2023-02-04 RX ADMIN — METOPROLOL SUCCINATE 50 MG: 50 TABLET, EXTENDED RELEASE ORAL at 08:44

## 2023-02-04 RX ADMIN — PIOGLITAZONE 15 MG: 30 TABLET ORAL at 08:44

## 2023-02-04 RX ADMIN — FUROSEMIDE 20 MG: 20 TABLET ORAL at 08:43

## 2023-02-04 RX ADMIN — KETOROLAC TROMETHAMINE 15 MG: 30 INJECTION, SOLUTION INTRAMUSCULAR; INTRAVENOUS at 03:05

## 2023-02-04 RX ADMIN — TRAMADOL HYDROCHLORIDE 50 MG: 50 TABLET ORAL at 10:26

## 2023-02-04 RX ADMIN — VALSARTAN 320 MG: 80 TABLET, FILM COATED ORAL at 08:43

## 2023-02-04 RX ADMIN — HEPARIN SODIUM 5000 UNITS: 5000 INJECTION, SOLUTION INTRAVENOUS; SUBCUTANEOUS at 06:01

## 2023-02-04 ASSESSMENT — PAIN SCALES - GENERAL
PAINLEVEL_OUTOF10: 7
PAINLEVEL_OUTOF10: 4
PAINLEVEL_OUTOF10: 7

## 2023-02-04 ASSESSMENT — PAIN DESCRIPTION - LOCATION: LOCATION: ABDOMEN;BACK

## 2023-02-04 NOTE — PROGRESS NOTES
[facility] GENERAL SURGERY    Surgery Progress Note           POD # 1    PATIENT NAME: Zonia Tiwari     TODAY'S DATE: 2/4/2023    INTERVAL HISTORY:    Pt  doing well, minimal pain, eating well, up in chair. OBJECTIVE:   VITALS:  BP (!) 157/60   Pulse 55   Temp 98.1 °F (36.7 °C) (Oral)   Resp 16   Ht 5' 2\" (1.575 m)   Wt 173 lb (78.5 kg)   SpO2 96%   BMI 31.64 kg/m²     INTAKE/OUTPUT:    I/O last 3 completed shifts: In: 48 [IV Piggyback:50]  Out: -   No intake/output data recorded. CONSTITUTIONAL:  awake and alert  LUNGS:     ABDOMEN:    , soft, non-distended,     INCISION: clean, dry    Data:  CBC: No results for input(s): WBC, HGB, HCT, PLT in the last 72 hours. BMP:  No results for input(s): NA, K, CL, CO2, BUN, CREATININE, GLUCOSE in the last 72 hours. Hepatic: No results for input(s): AST, ALT, ALB, BILITOT, ALKPHOS in the last 72 hours. Mag:    No results for input(s): MG in the last 72 hours. Phos:   No results for input(s): PHOS in the last 72 hours. INR: No results for input(s): INR in the last 72 hours.       Radiology Review:       ASSESSMENT AND PLAN:  81 y.o. female status post cholecystectomy, doing well   - d/c home   - f/u Dr Susannah Butts in 2-3 weeks or as needed         Electronically signed by Charis Kohler MD

## 2023-02-04 NOTE — PLAN OF CARE
Problem: Chronic Conditions and Co-morbidities  Goal: Patient's chronic conditions and co-morbidity symptoms are monitored and maintained or improved  2/4/2023 0819 by Mayank Hilton RN  Outcome: Completed     Problem: Discharge Planning  Goal: Discharge to home or other facility with appropriate resources  2/4/2023 0819 by Mayank Hilton RN  Outcome: Completed     Problem: Pain  Goal: Verbalizes/displays adequate comfort level or baseline comfort level  2/4/2023 0819 by Mayank Hilton RN  Outcome: Completed

## 2023-02-04 NOTE — DISCHARGE SUMMARY
Surgery Discharge Summary    Patient Identification  Silvio Dodson Payer is a 81 y.o. female. :  1935  Admit Date:  2/3/2023    Discharge date:   2023                                  Disposition: home    Discharge Diagnoses:   Principal Problem:    Gallstones  Active Problems:    Calculus of gallbladder without cholecystitis without obstruction  Resolved Problems:    * No resolved hospital problems. *      Discharge condition: good    Discharge Medications:     Current Discharge Medication List        CONTINUE these medications which have NOT CHANGED    Details   furosemide (LASIX) 20 MG tablet Take 20 mg by mouth daily Patient states she will take it when she is home      apixaban (ELIQUIS) 5 MG TABS tablet Take 1 tablet by mouth 2 times daily  Qty: 60 tablet, Refills: 0    Associated Diagnoses: Atrial fibrillation with rapid ventricular response ([address] Chronic diastolic heart failure (H[facility])      metoprolol succinate (TOPROL XL) 100 MG extended release tablet Take 0.5 tablets by mouth daily Prescribed as 100mg bid, pt. Takes only 50mg if HR > 60. Qty: 180 tablet, Refills: 3      valsartan (DIOVAN) 320 MG tablet Take 1 tablet by mouth daily  Qty: 90 tablet, Refills: 1      pantoprazole (PROTONIX) 20 MG tablet Take 2 tablets by mouth 2 times daily  Qty: 60 tablet, Refills: 0      pravastatin (PRAVACHOL) 80 MG tablet Take 40 mg by mouth daily      pioglitazone (ACTOS) 15 MG tablet Take 15 mg by mouth daily      dofetilide (TIKOSYN) 125 MCG capsule Take 1 capsule by mouth every 12 hours  Qty: 180 capsule, Refills: 3      glyBURIDE (DIABETA) 1.25 MG tablet Take 2.5 mg by mouth 2 times daily (with meals)                 Current Discharge Medication List        START taking these medications    Details   oxyCODONE-acetaminophen (ENDOCET) 5-325 MG per tablet Take 1 tablet by mouth every 6 hours as needed for Pain for up to 5 days. Intended supply: 5 days.  Take lowest dose possible to manage pain Max Daily Amount: 4 tablets  Qty: 20 tablet, Refills: 0    Comments: Reduce doses taken as pain becomes manageable  Associated Diagnoses: Calculus of gallbladder without cholecystitis without obstruction               Most Recent Labs:    CBC: No results for input(s): WBC, HGB, HCT, PLT in the last 72 hours. BMP:  No results for input(s): NA, K, CL, CO2, BUN, CREATININE, GLUCOSE in the last 72 hours. Hepatic: No results for input(s): AST, ALT, ALB, BILITOT, ALKPHOS in the last 72 hours. PT/INR:  No results for input(s): INR in the last 72 hours. Consults: none    Surgery: robotic cholecystectomy    Patient Instructions: Activity: no heavy lifting, pushing, pulling for 2 weeks, no driving for 1 weeks or while on analgesics  Diet: As tolerated  Follow-up with Dr Ab Sauer in 2 weeks. The patient and/or family/patient representatives, were provided education regarding discharge instructions, ongoing treatment and follow-up. Details of information given to the patient may be found in the discharge instructions located in the EMR. HPI and Hospital Course:   Admitted and taken for elective cholecystectomy. Unremarkable postop course. Discharged home in stable condition.       Tessa Hand MD    [address] [facility] Surgery

## 2023-02-04 NOTE — PROGRESS NOTES
Physician Progress Note      PATIENT:               Loren Ryan  CSN #:                  [id number]  :                       1935  ADMIT DATE:       2023 12:41 AM  DISCH DATE:        2023 3:06 PM  RESPONDING  PROVIDER #:        Mireya Alatorre MD          QUERY TEXT:    Patient admitted with gallstones, noted to have atrial fibrillation and is   maintained on Eliquis. If possible, please document in progress notes and   discharge summary if you are evaluating and/or treating any of the following: The medical record reflects the following:  Risk Factors: advanced age, female, HTN, DM2  Clinical Indicators: increased risk of clots due to afib  Treatment: Eliquis, telemetry, medical management, supportive care    Thank you,  Gretchen Prcie RN, [email or web address]  Options provided:  -- Secondary hypercoagulable state in a patient with atrial fibrillation  -- Other - I will add my own diagnosis  -- Disagree - Not applicable / Not valid  -- Disagree - Clinically unable to determine / Unknown  -- Refer to Clinical Documentation Reviewer    PROVIDER RESPONSE TEXT:    This patient has secondary hypercoagulable state in a patient with atrial   fibrillation. Query created by: Jossue Tijerina on 2023 2:19 PM      Electronically signed by:   Mireya Alatorre MD 2023 1:04 PM

## 2023-02-04 NOTE — PLAN OF CARE
Problem: Chronic Conditions and Co-morbidities  Goal: Patient's chronic conditions and co-morbidity symptoms are monitored and maintained or improved  2/4/2023 0237 by General Elias RN  Outcome: Progressing  2/3/2023 1758 by Amrit Ren RN  Outcome: Progressing     Problem: Discharge Planning  Goal: Discharge to home or other facility with appropriate resources  2/4/2023 0237 by General Elias RN  Outcome: Progressing  Flowsheets (Taken 2/3/2023 1816 by Amrit Ren RN)  Discharge to home or other facility with appropriate resources: Identify barriers to discharge with patient and caregiver  2/3/2023 1758 by Amrit Ren RN  Outcome: Progressing     Problem: Pain  Goal: Verbalizes/displays adequate comfort level or baseline comfort level  2/4/2023 0237 by General Elias RN  Outcome: Progressing  2/3/2023 1758 by Amrit Ren RN  Outcome: Progressing

## 2023-02-04 NOTE — PROGRESS NOTES
Patient admitted to room 208 from OR. Side rails up x2. Patient Has an order for telemetry. Bed is locked and in lowest position. Call light placed in patient reach. Patient explained the routine of the hospital, including but not limited to lab work, vital signs, hourly rounding, etc. Care plans and education updated, CHG wipes not performed at time of admission. BP (!) 180/73   Pulse 60   Temp 97.1 °F (36.2 °C) (Oral)   Resp 16   Ht 5' 2\" (1.575 m)   Wt 173 lb (78.5 kg)   SpO2 96%   BMI 31.64 kg/m²     Most recent set of vitals as shown. Patient has an LDA that does require CHG wipes, including possible a surgery incision, stewart catheter, or a central line. 4 Eyes Skin Assessment     The patient is being assess for   Admission    I agree that 2 RN's have performed a thorough Head to Toe Skin Assessment on the patient. ALL assessment sites listed below have been assessed. Areas assessed for pressure by both nurses:   [x]   Head, Face, and Ears   [x]   Shoulders, Back, and Chest, Abdomen  [x]   Arms, Elbows, and Hands   [x]   Coccyx, Sacrum, and Ischium  [x]   Legs, Feet, and Heels    Incisions for cholecystectomy. No other significant issues            **SHARE this note so that the co-signing nurse is able to place an eSignature**    Co-signer eSignature: Electronically signed by Abby Carballo RN on 8/0/43 at 7:28 PM EST             Ino Prevention initiated:  No   Wound Care Orders initiated:  No      [facility] nurse consulted for Pressure Injury (Stage 3,4, Unstageable, DTI, NWPT, Complex wounds)and New or Established Ostomies:  No      Primary Nurse eSignature: Electronically signed by An Vernon RN on 2/3/23 at 7:15 PM EST      Bedside Mobility Assessment Tool (BMAT):     Assessment Level 1- Sit and Shake    1. From a semi-reclined position, ask patient to sit up and rotate to a seated position at the side of the bed. Can use the bedrail.     2. Ask patient to reach out and grab your hand and shake making sure patient reaches across his/her midline. Pass- Patient is able to come to a seated position, maintain core strength. Maintains seated balance while reaching across midline. Move on to Assessment Level 2. Assessment Level 2- Stretch and Point   1. With patient in seated position at the side of the bed, have patient place both feet on the floor (or stool) with knees no higher than hips. 2. Ask patient to stretch one leg and straighten the knee, then bend the ankle/flex and point the toes. If appropriate, repeat with the other leg. Pass- Patient is able to demonstrate appropriate quad strength on intended weight bearing limb(s). Move onto Assessment Level 3. Assessment Level 3- Stand   1. Ask patient to elevate off the bed or chair (seated to standing) using an assistive device (cane, bedrail). 2. Patient should be able to raise buttocks off be and hold for a count of five. May repeat once. Pass- Patient maintains standing stability for at least 5 seconds, proceed to assessment level 4. Assessment Level 4- Walk   1. Ask patient to march in place at bedside. 2. Then ask patient to advance step and return each foot. Some medical conditions may render a patient from stepping backwards, use your best clinical judgement. Pass- Patient demonstrates balance while shifting weight and ability to step, takes independent steps, does not use assistive device patient is MOBILITY LEVEL 4. Mobility Level- 4    Patient is able to demonstrate the ability to move from a reclining position to an upright position within the recliner.

## 2023-02-04 NOTE — FLOWSHEET NOTE
02/03/23 2330   Vital Signs   Temp 97 °F (36.1 °C)   Temp Source Oral   Heart Rate 64   Heart Rate Source Monitor   Resp 16   BP (!) 149/65   MAP (Calculated) 93   BP Location Left upper arm   BP Method Automatic   Patient Position Semi fowlers   Level of Consciousness 0   MEWS Score 1   Pain Assessment   Pain Assessment 0-10   Pain Level 1   Oxygen Therapy   SpO2 94 %   O2 Device None (Room air)   HS assessment completed. No complaints of pain or discomfort voiced. No signs of symptoms of distress noted. Patient tolerated night medications well. Respirations easy and even. Bed in lowest position, bed alarm in place and functioning properly, bed rails x2 up,  Call light within reach. Bedside Mobility Assessment Tool (BMAT):     Assessment Level 1- Sit and Shake    1. From a semi-reclined position, ask patient to sit up and rotate to a seated position at the side of the bed. Can use the bedrail. 2. Ask patient to reach out and grab your hand and shake making sure patient reaches across his/her midline. Pass- Patient is able to come to a seated position, maintain core strength. Maintains seated balance while reaching across midline. Move on to Assessment Level 2. Assessment Level 2- Stretch and Point   1. With patient in seated position at the side of the bed, have patient place both feet on the floor (or stool) with knees no higher than hips. 2. Ask patient to stretch one leg and straighten the knee, then bend the ankle/flex and point the toes. If appropriate, repeat with the other leg. Pass- Patient is able to demonstrate appropriate quad strength on intended weight bearing limb(s). Move onto Assessment Level 3. Assessment Level 3- Stand   1. Ask patient to elevate off the bed or chair (seated to standing) using an assistive device (cane, bedrail). 2. Patient should be able to raise buttocks off be and hold for a count of five. May repeat once.    Pass- Patient maintains standing stability for at least 5 seconds, proceed to assessment level 4. Assessment Level 4- Walk   1. Ask patient to march in place at bedside. 2. Then ask patient to advance step and return each foot. Some medical conditions may render a patient from stepping backwards, use your best clinical judgement. Pass- Patient demonstrates balance while shifting weight and ability to step, takes independent steps, does not use assistive device patient is MOBILITY LEVEL 4.       Mobility Level- 4

## 2023-02-13 DIAGNOSIS — I50.32 CHRONIC DIASTOLIC HEART FAILURE (HCC): ICD-10-CM

## 2023-02-13 DIAGNOSIS — I48.91 ATRIAL FIBRILLATION WITH RAPID VENTRICULAR RESPONSE (HCC): ICD-10-CM

## 2023-02-13 RX ORDER — APIXABAN 5 MG/1
TABLET, FILM COATED ORAL
Qty: 60 TABLET | Refills: 6 | Status: SHIPPED | OUTPATIENT
Start: 2023-02-13

## 2023-02-14 ENCOUNTER — APPOINTMENT (OUTPATIENT)
Dept: CT IMAGING | Age: 88
End: 2023-02-14
Payer: MEDICARE

## 2023-02-14 ENCOUNTER — HOSPITAL ENCOUNTER (INPATIENT)
Age: 88
LOS: 3 days | Discharge: HOME | End: 2023-02-17
Attending: EMERGENCY MEDICINE
Payer: MEDICARE

## 2023-02-14 ENCOUNTER — APPOINTMENT (OUTPATIENT)
Dept: GENERAL RADIOLOGY | Age: 88
End: 2023-02-14
Payer: MEDICARE

## 2023-02-14 ENCOUNTER — TELEPHONE (OUTPATIENT)
Dept: OTHER | Facility: CLINIC | Age: 88
End: 2023-02-14

## 2023-02-14 DIAGNOSIS — K52.9 COLITIS: Primary | ICD-10-CM

## 2023-02-14 DIAGNOSIS — Z78.9 PATIENT IS FULL CODE: ICD-10-CM

## 2023-02-14 DIAGNOSIS — R10.13 INTRACTABLE EPIGASTRIC ABDOMINAL PAIN: ICD-10-CM

## 2023-02-14 DIAGNOSIS — R11.0 NAUSEA: ICD-10-CM

## 2023-02-14 DIAGNOSIS — Z71.89 GOALS OF CARE, COUNSELING/DISCUSSION: ICD-10-CM

## 2023-02-14 PROBLEM — R10.9 ABDOMINAL PAIN: Status: ACTIVE | Noted: 2023-02-14

## 2023-02-14 LAB
ALBUMIN SERPL-MCNC: 3.7 G/DL (ref 3.4–5)
ALP BLD-CCNC: 99 U/L (ref 40–129)
ALT SERPL-CCNC: 16 U/L (ref 10–40)
ANION GAP SERPL CALCULATED.3IONS-SCNC: 11 MMOL/L (ref 3–16)
ANION GAP SERPL CALCULATED.3IONS-SCNC: 13 MMOL/L (ref 3–16)
AST SERPL-CCNC: 38 U/L (ref 15–37)
BACTERIA: ABNORMAL /HPF
BASOPHILS ABSOLUTE: 0.1 K/UL (ref 0–0.2)
BASOPHILS RELATIVE PERCENT: 0.7 %
BILIRUB SERPL-MCNC: 0.5 MG/DL (ref 0–1)
BILIRUBIN DIRECT: <0.2 MG/DL (ref 0–0.3)
BILIRUBIN URINE: NEGATIVE
BILIRUBIN, INDIRECT: ABNORMAL MG/DL (ref 0–1)
BLOOD, URINE: NEGATIVE
BUN BLDV-MCNC: 13 MG/DL (ref 7–20)
BUN BLDV-MCNC: 14 MG/DL (ref 7–20)
CALCIUM SERPL-MCNC: 8.7 MG/DL (ref 8.3–10.6)
CALCIUM SERPL-MCNC: 9.1 MG/DL (ref 8.3–10.6)
CHLORIDE BLD-SCNC: 101 MMOL/L (ref 99–110)
CHLORIDE BLD-SCNC: 99 MMOL/L (ref 99–110)
CLARITY: CLEAR
CO2: 22 MMOL/L (ref 21–32)
CO2: 24 MMOL/L (ref 21–32)
COLOR: YELLOW
CREAT SERPL-MCNC: 0.8 MG/DL (ref 0.6–1.2)
CREAT SERPL-MCNC: 0.8 MG/DL (ref 0.6–1.2)
EKG ATRIAL RATE: 57 BPM
EKG DIAGNOSIS: NORMAL
EKG P AXIS: 75 DEGREES
EKG P-R INTERVAL: 184 MS
EKG Q-T INTERVAL: 428 MS
EKG QRS DURATION: 84 MS
EKG QTC CALCULATION (BAZETT): 416 MS
EKG R AXIS: -9 DEGREES
EKG T AXIS: 52 DEGREES
EKG VENTRICULAR RATE: 57 BPM
EOSINOPHILS ABSOLUTE: 0.2 K/UL (ref 0–0.6)
EOSINOPHILS RELATIVE PERCENT: 2.1 %
EPITHELIAL CELLS, UA: ABNORMAL /HPF (ref 0–5)
GFR SERPL CREATININE-BSD FRML MDRD: >60 ML/MIN/{1.73_M2}
GFR SERPL CREATININE-BSD FRML MDRD: >60 ML/MIN/{1.73_M2}
GLUCOSE BLD-MCNC: 142 MG/DL (ref 70–99)
GLUCOSE BLD-MCNC: 159 MG/DL (ref 70–99)
GLUCOSE BLD-MCNC: 164 MG/DL (ref 70–99)
GLUCOSE BLD-MCNC: 182 MG/DL (ref 70–99)
GLUCOSE URINE: NEGATIVE MG/DL
HCT VFR BLD CALC: 45.8 % (ref 36–48)
HEMOGLOBIN: 15 G/DL (ref 12–16)
KETONES, URINE: NEGATIVE MG/DL
LACTIC ACID, SEPSIS: <0.2 MMOL/L (ref 0.4–1.9)
LEUKOCYTE ESTERASE, URINE: ABNORMAL
LIPASE: 24 U/L (ref 13–60)
LYMPHOCYTES ABSOLUTE: 2.3 K/UL (ref 1–5.1)
LYMPHOCYTES RELATIVE PERCENT: 23 %
MCH RBC QN AUTO: 27.1 PG (ref 26–34)
MCHC RBC AUTO-ENTMCNC: 32.7 G/DL (ref 31–36)
MCV RBC AUTO: 82.9 FL (ref 80–100)
MICROSCOPIC EXAMINATION: YES
MONOCYTES ABSOLUTE: 1 K/UL (ref 0–1.3)
MONOCYTES RELATIVE PERCENT: 9.9 %
NEUTROPHILS ABSOLUTE: 6.3 K/UL (ref 1.7–7.7)
NEUTROPHILS RELATIVE PERCENT: 64.3 %
NITRITE, URINE: NEGATIVE
PDW BLD-RTO: 14.8 % (ref 12.4–15.4)
PERFORMED ON: ABNORMAL
PERFORMED ON: ABNORMAL
PH UA: 5.5 (ref 5–8)
PLATELET # BLD: 324 K/UL (ref 135–450)
PMV BLD AUTO: 9.2 FL (ref 5–10.5)
POTASSIUM SERPL-SCNC: 4.3 MMOL/L (ref 3.5–5.1)
POTASSIUM SERPL-SCNC: 5.9 MMOL/L (ref 3.5–5.1)
PROTEIN UA: NEGATIVE MG/DL
RBC # BLD: 5.53 M/UL (ref 4–5.2)
RBC UA: ABNORMAL /HPF (ref 0–4)
SODIUM BLD-SCNC: 134 MMOL/L (ref 136–145)
SODIUM BLD-SCNC: 136 MMOL/L (ref 136–145)
SPECIFIC GRAVITY UA: 1.01 (ref 1–1.03)
TOTAL PROTEIN: 8.5 G/DL (ref 6.4–8.2)
TROPONIN: <0.01 NG/ML
URINE REFLEX TO CULTURE: ABNORMAL
URINE TYPE: ABNORMAL
UROBILINOGEN, URINE: 0.2 E.U./DL
WBC # BLD: 9.8 K/UL (ref 4–11)
WBC UA: ABNORMAL /HPF (ref 0–5)

## 2023-02-14 PROCEDURE — 96375 TX/PRO/DX INJ NEW DRUG ADDON: CPT

## 2023-02-14 PROCEDURE — C9113 INJ PANTOPRAZOLE SODIUM, VIA: HCPCS | Performed by: INTERNAL MEDICINE

## 2023-02-14 PROCEDURE — 96376 TX/PRO/DX INJ SAME DRUG ADON: CPT

## 2023-02-14 PROCEDURE — 2580000003 HC RX 258: Performed by: NURSE PRACTITIONER

## 2023-02-14 PROCEDURE — 6370000000 HC RX 637 (ALT 250 FOR IP): Performed by: INTERNAL MEDICINE

## 2023-02-14 PROCEDURE — 83036 HEMOGLOBIN GLYCOSYLATED A1C: CPT

## 2023-02-14 PROCEDURE — 74177 CT ABD & PELVIS W/CONTRAST: CPT

## 2023-02-14 PROCEDURE — 93010 ELECTROCARDIOGRAM REPORT: CPT | Performed by: INTERNAL MEDICINE

## 2023-02-14 PROCEDURE — 96374 THER/PROPH/DIAG INJ IV PUSH: CPT

## 2023-02-14 PROCEDURE — 6360000004 HC RX CONTRAST MEDICATION: Performed by: NURSE PRACTITIONER

## 2023-02-14 PROCEDURE — 85025 COMPLETE CBC W/AUTO DIFF WBC: CPT

## 2023-02-14 PROCEDURE — 93005 ELECTROCARDIOGRAM TRACING: CPT | Performed by: NURSE PRACTITIONER

## 2023-02-14 PROCEDURE — 1200000000 HC SEMI PRIVATE

## 2023-02-14 PROCEDURE — 81001 URINALYSIS AUTO W/SCOPE: CPT

## 2023-02-14 PROCEDURE — 80048 BASIC METABOLIC PNL TOTAL CA: CPT

## 2023-02-14 PROCEDURE — 80076 HEPATIC FUNCTION PANEL: CPT

## 2023-02-14 PROCEDURE — 71046 X-RAY EXAM CHEST 2 VIEWS: CPT

## 2023-02-14 PROCEDURE — 83690 ASSAY OF LIPASE: CPT

## 2023-02-14 PROCEDURE — 84484 ASSAY OF TROPONIN QUANT: CPT

## 2023-02-14 PROCEDURE — 2580000003 HC RX 258: Performed by: INTERNAL MEDICINE

## 2023-02-14 PROCEDURE — 6360000002 HC RX W HCPCS: Performed by: INTERNAL MEDICINE

## 2023-02-14 PROCEDURE — 87040 BLOOD CULTURE FOR BACTERIA: CPT

## 2023-02-14 PROCEDURE — 2500000003 HC RX 250 WO HCPCS: Performed by: NURSE PRACTITIONER

## 2023-02-14 PROCEDURE — 99285 EMERGENCY DEPT VISIT HI MDM: CPT

## 2023-02-14 PROCEDURE — 83605 ASSAY OF LACTIC ACID: CPT

## 2023-02-14 PROCEDURE — 6360000002 HC RX W HCPCS: Performed by: NURSE PRACTITIONER

## 2023-02-14 RX ORDER — SODIUM CHLORIDE 9 MG/ML
INJECTION, SOLUTION INTRAVENOUS PRN
Status: DISCONTINUED | OUTPATIENT
Start: 2023-02-14 | End: 2023-02-17 | Stop reason: HOSPADM

## 2023-02-14 RX ORDER — FUROSEMIDE 20 MG/1
20 TABLET ORAL DAILY
Status: DISCONTINUED | OUTPATIENT
Start: 2023-02-14 | End: 2023-02-17 | Stop reason: HOSPADM

## 2023-02-14 RX ORDER — ACETAMINOPHEN 325 MG/1
650 TABLET ORAL EVERY 6 HOURS PRN
Status: DISCONTINUED | OUTPATIENT
Start: 2023-02-14 | End: 2023-02-17 | Stop reason: HOSPADM

## 2023-02-14 RX ORDER — PRAVASTATIN SODIUM 40 MG
40 TABLET ORAL DAILY
Status: DISCONTINUED | OUTPATIENT
Start: 2023-02-14 | End: 2023-02-17 | Stop reason: HOSPADM

## 2023-02-14 RX ORDER — ONDANSETRON 2 MG/ML
4 INJECTION INTRAMUSCULAR; INTRAVENOUS EVERY 6 HOURS PRN
Status: DISCONTINUED | OUTPATIENT
Start: 2023-02-14 | End: 2023-02-14 | Stop reason: SDUPTHER

## 2023-02-14 RX ORDER — LABETALOL HYDROCHLORIDE 5 MG/ML
10 INJECTION, SOLUTION INTRAVENOUS EVERY 6 HOURS PRN
Status: DISCONTINUED | OUTPATIENT
Start: 2023-02-14 | End: 2023-02-17 | Stop reason: HOSPADM

## 2023-02-14 RX ORDER — POLYETHYLENE GLYCOL 3350 17 G/17G
17 POWDER, FOR SOLUTION ORAL DAILY PRN
Status: DISCONTINUED | OUTPATIENT
Start: 2023-02-14 | End: 2023-02-17 | Stop reason: HOSPADM

## 2023-02-14 RX ORDER — SODIUM CHLORIDE 9 MG/ML
INJECTION, SOLUTION INTRAVENOUS CONTINUOUS
Status: DISCONTINUED | OUTPATIENT
Start: 2023-02-14 | End: 2023-02-17 | Stop reason: HOSPADM

## 2023-02-14 RX ORDER — INSULIN LISPRO 100 [IU]/ML
0-4 INJECTION, SOLUTION INTRAVENOUS; SUBCUTANEOUS NIGHTLY
Status: DISCONTINUED | OUTPATIENT
Start: 2023-02-14 | End: 2023-02-17 | Stop reason: HOSPADM

## 2023-02-14 RX ORDER — SODIUM CHLORIDE 0.9 % (FLUSH) 0.9 %
5-40 SYRINGE (ML) INJECTION EVERY 12 HOURS SCHEDULED
Status: DISCONTINUED | OUTPATIENT
Start: 2023-02-14 | End: 2023-02-17 | Stop reason: HOSPADM

## 2023-02-14 RX ORDER — VALSARTAN 80 MG/1
320 TABLET ORAL DAILY
Status: DISCONTINUED | OUTPATIENT
Start: 2023-02-14 | End: 2023-02-17 | Stop reason: HOSPADM

## 2023-02-14 RX ORDER — MORPHINE SULFATE 4 MG/ML
4 INJECTION, SOLUTION INTRAMUSCULAR; INTRAVENOUS ONCE
Status: COMPLETED | OUTPATIENT
Start: 2023-02-14 | End: 2023-02-14

## 2023-02-14 RX ORDER — PANTOPRAZOLE SODIUM 40 MG/10ML
40 INJECTION, POWDER, LYOPHILIZED, FOR SOLUTION INTRAVENOUS DAILY
Status: DISCONTINUED | OUTPATIENT
Start: 2023-02-14 | End: 2023-02-17 | Stop reason: HOSPADM

## 2023-02-14 RX ORDER — MORPHINE SULFATE 2 MG/ML
2 INJECTION, SOLUTION INTRAMUSCULAR; INTRAVENOUS EVERY 4 HOURS PRN
Status: DISCONTINUED | OUTPATIENT
Start: 2023-02-14 | End: 2023-02-15

## 2023-02-14 RX ORDER — INSULIN LISPRO 100 [IU]/ML
0-4 INJECTION, SOLUTION INTRAVENOUS; SUBCUTANEOUS
Status: DISCONTINUED | OUTPATIENT
Start: 2023-02-14 | End: 2023-02-17 | Stop reason: HOSPADM

## 2023-02-14 RX ORDER — DEXTROSE MONOHYDRATE 100 MG/ML
INJECTION, SOLUTION INTRAVENOUS CONTINUOUS PRN
Status: DISCONTINUED | OUTPATIENT
Start: 2023-02-14 | End: 2023-02-17 | Stop reason: HOSPADM

## 2023-02-14 RX ORDER — ONDANSETRON 2 MG/ML
4 INJECTION INTRAMUSCULAR; INTRAVENOUS EVERY 6 HOURS PRN
Status: DISCONTINUED | OUTPATIENT
Start: 2023-02-14 | End: 2023-02-17 | Stop reason: HOSPADM

## 2023-02-14 RX ORDER — PROMETHAZINE HYDROCHLORIDE 25 MG/ML
25 INJECTION, SOLUTION INTRAMUSCULAR; INTRAVENOUS EVERY 6 HOURS PRN
Status: DISCONTINUED | OUTPATIENT
Start: 2023-02-14 | End: 2023-02-17 | Stop reason: HOSPADM

## 2023-02-14 RX ORDER — 0.9 % SODIUM CHLORIDE 0.9 %
1000 INTRAVENOUS SOLUTION INTRAVENOUS ONCE
Status: COMPLETED | OUTPATIENT
Start: 2023-02-14 | End: 2023-02-14

## 2023-02-14 RX ORDER — METRONIDAZOLE 500 MG/100ML
500 INJECTION, SOLUTION INTRAVENOUS EVERY 8 HOURS
Status: DISCONTINUED | OUTPATIENT
Start: 2023-02-15 | End: 2023-02-17 | Stop reason: HOSPADM

## 2023-02-14 RX ORDER — DOFETILIDE 0.12 MG/1
125 CAPSULE ORAL EVERY 12 HOURS SCHEDULED
Status: DISCONTINUED | OUTPATIENT
Start: 2023-02-14 | End: 2023-02-17 | Stop reason: HOSPADM

## 2023-02-14 RX ORDER — METOPROLOL SUCCINATE 50 MG/1
50 TABLET, EXTENDED RELEASE ORAL DAILY
Status: DISCONTINUED | OUTPATIENT
Start: 2023-02-14 | End: 2023-02-17 | Stop reason: HOSPADM

## 2023-02-14 RX ORDER — PROCHLORPERAZINE EDISYLATE 5 MG/ML
5 INJECTION INTRAMUSCULAR; INTRAVENOUS EVERY 6 HOURS PRN
Status: DISCONTINUED | OUTPATIENT
Start: 2023-02-14 | End: 2023-02-14

## 2023-02-14 RX ORDER — ONDANSETRON 4 MG/1
4 TABLET, ORALLY DISINTEGRATING ORAL EVERY 8 HOURS PRN
Status: DISCONTINUED | OUTPATIENT
Start: 2023-02-14 | End: 2023-02-17 | Stop reason: HOSPADM

## 2023-02-14 RX ORDER — SODIUM CHLORIDE 0.9 % (FLUSH) 0.9 %
5-40 SYRINGE (ML) INJECTION PRN
Status: DISCONTINUED | OUTPATIENT
Start: 2023-02-14 | End: 2023-02-17 | Stop reason: HOSPADM

## 2023-02-14 RX ORDER — CIPROFLOXACIN 2 MG/ML
400 INJECTION, SOLUTION INTRAVENOUS ONCE
Status: COMPLETED | OUTPATIENT
Start: 2023-02-14 | End: 2023-02-14

## 2023-02-14 RX ORDER — METRONIDAZOLE 500 MG/100ML
500 INJECTION, SOLUTION INTRAVENOUS ONCE
Status: COMPLETED | OUTPATIENT
Start: 2023-02-14 | End: 2023-02-14

## 2023-02-14 RX ORDER — CIPROFLOXACIN 2 MG/ML
400 INJECTION, SOLUTION INTRAVENOUS EVERY 12 HOURS
Status: DISCONTINUED | OUTPATIENT
Start: 2023-02-15 | End: 2023-02-15

## 2023-02-14 RX ORDER — KETOROLAC TROMETHAMINE 30 MG/ML
15 INJECTION, SOLUTION INTRAMUSCULAR; INTRAVENOUS ONCE
Status: COMPLETED | OUTPATIENT
Start: 2023-02-14 | End: 2023-02-14

## 2023-02-14 RX ORDER — ACETAMINOPHEN 650 MG/1
650 SUPPOSITORY RECTAL EVERY 6 HOURS PRN
Status: DISCONTINUED | OUTPATIENT
Start: 2023-02-14 | End: 2023-02-17 | Stop reason: HOSPADM

## 2023-02-14 RX ORDER — ONDANSETRON 2 MG/ML
4 INJECTION INTRAMUSCULAR; INTRAVENOUS ONCE
Status: COMPLETED | OUTPATIENT
Start: 2023-02-14 | End: 2023-02-14

## 2023-02-14 RX ADMIN — IOPAMIDOL 75 ML: 755 INJECTION, SOLUTION INTRAVENOUS at 13:04

## 2023-02-14 RX ADMIN — SODIUM CHLORIDE 1000 ML: 9 INJECTION, SOLUTION INTRAVENOUS at 14:31

## 2023-02-14 RX ADMIN — APIXABAN 5 MG: 5 TABLET, FILM COATED ORAL at 21:32

## 2023-02-14 RX ADMIN — PANTOPRAZOLE SODIUM 40 MG: 40 INJECTION, POWDER, FOR SOLUTION INTRAVENOUS at 18:36

## 2023-02-14 RX ADMIN — MORPHINE SULFATE 4 MG: 4 INJECTION, SOLUTION INTRAMUSCULAR; INTRAVENOUS at 12:41

## 2023-02-14 RX ADMIN — SODIUM CHLORIDE: 9 INJECTION, SOLUTION INTRAVENOUS at 18:40

## 2023-02-14 RX ADMIN — DOFETILIDE 125 MCG: 0.12 CAPSULE ORAL at 20:56

## 2023-02-14 RX ADMIN — METRONIDAZOLE 500 MG: 500 INJECTION, SOLUTION INTRAVENOUS at 15:54

## 2023-02-14 RX ADMIN — MORPHINE SULFATE 4 MG: 4 INJECTION, SOLUTION INTRAMUSCULAR; INTRAVENOUS at 14:30

## 2023-02-14 RX ADMIN — SODIUM CHLORIDE, PRESERVATIVE FREE 10 ML: 5 INJECTION INTRAVENOUS at 21:32

## 2023-02-14 RX ADMIN — KETOROLAC TROMETHAMINE 15 MG: 30 INJECTION, SOLUTION INTRAMUSCULAR at 15:15

## 2023-02-14 RX ADMIN — CIPROFLOXACIN 400 MG: 2 INJECTION, SOLUTION INTRAVENOUS at 14:50

## 2023-02-14 RX ADMIN — ONDANSETRON 4 MG: 2 INJECTION INTRAMUSCULAR; INTRAVENOUS at 11:55

## 2023-02-14 RX ADMIN — MORPHINE SULFATE 2 MG: 2 INJECTION, SOLUTION INTRAMUSCULAR; INTRAVENOUS at 18:12

## 2023-02-14 RX ADMIN — ONDANSETRON 4 MG: 2 INJECTION INTRAMUSCULAR; INTRAVENOUS at 17:56

## 2023-02-14 ASSESSMENT — PAIN SCALES - GENERAL
PAINLEVEL_OUTOF10: 2
PAINLEVEL_OUTOF10: 9
PAINLEVEL_OUTOF10: 10
PAINLEVEL_OUTOF10: 2

## 2023-02-14 ASSESSMENT — PAIN DESCRIPTION - DESCRIPTORS: DESCRIPTORS: SHARP

## 2023-02-14 ASSESSMENT — PAIN DESCRIPTION - PAIN TYPE: TYPE: ACUTE PAIN

## 2023-02-14 ASSESSMENT — PAIN DESCRIPTION - LOCATION: LOCATION: ABDOMEN

## 2023-02-14 ASSESSMENT — PAIN - FUNCTIONAL ASSESSMENT: PAIN_FUNCTIONAL_ASSESSMENT: 0-10

## 2023-02-14 NOTE — PROGRESS NOTES
4 Eyes Skin Assessment     The patient is being assess for   Admission    I agree that 2 RN's have performed a thorough Head to Toe Skin Assessment on the patient. ALL assessment sites listed below have been assessed. Areas assessed for pressure by both nurses:   [x]   Head, Face, and Ears   [x]   Shoulders, Back, and Chest, Abdomen  [x]   Arms, Elbows, and Hands   [x]   Coccyx, Sacrum, and Ischium  [x]   Legs, Feet, and Heels        Skin Assessed Under all Medical Devices by both nurses:  NA               All Mepilex Borders were peeled back and area peeked at by both nurses:  No: NA  Please list where Mepilex Borders are located:  no mepilex found             **SHARE this note so that the co-signing nurse is able to place an eSignature**    Co-signer eSignature: Electronically signed by Criselda Dominguez RN on 2/17/23 at 12:13 PM EST    Does the Patient have Skin Breakdown related to pressure?   No            Ino Prevention initiated:  No   Wound Care Orders initiated:  No      [facility] nurse consulted for Pressure Injury (Stage 3,4, Unstageable, DTI, NWPT, Complex wounds)and New or Established Ostomies:  No      Primary Nurse eSignature: Electronically signed by Dionicio Salinas RN on 2/14/23 at 6:59 PM EST

## 2023-02-14 NOTE — ED PROVIDER NOTES
[address] [place] [address]  ED  [address] [address]  Dept: [phone]  Dept Fax: [phone]  Loc: [address] [place] [address]    Chief Complaint   Patient presents with    Abdominal Pain     Patient c/o abdominal pain with nausea for the past 24 hours. Pt recently had her gallbladder removed early February. Reports her bowels are loose and she has nausea. Nausea       HPI    Desmond Tiwari is a 81 y.o. female with a past medical history of atrial fibrillation taking Eliquis, diabetes, hyperlipidemia, hypertension, bowel obstruction and intussusception who presents to the emergency department with her spouse with an abrupt onset of 10/10 epigastric pain that started about 1 hour prior to ED arrival.  She was feeling nauseated yesterday and the nausea has become more intense today. No vomiting. She has had some loose stools today. She has a history of bowel obstruction and intussusception and she states that she is here frequently for this. She also reports that she had her gallbladder removed 2/3/2023 on about a week ago by Dr. Kassidy Rodriguez. She has not had any problems from this surgery. She denies lightheadedness, dizziness, body aches, fever, chills, urinary symptoms. REVIEW OF SYSTEMS    GI: see HPI, no vomiting, + loose stools diarrhea, no hematochezia  Cardiac: No chest pain, shortness of breath, palpitations or syncope  Pulmonary: No difficulty breathing or new cough  General: No fevers  : No dysuria, No hematuria  See HPI for further details. All other systems reviewed and are negative.     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     Past Surgical History:   Procedure Laterality Date    ABLATION OF DYSRHYTHMIC FOCUS      -2014    APPENDECTOMY      CHOLECYSTECTOMY, LAPAROSCOPIC N/A 2/3/2023    ROBOTIC CHOLECYSTECTOMY performed by Prieto Grant MD at [address], [address]      L ankle    HYSTERECTOMY (CERVIX STATUS UNKNOWN)      SKIN BIOPSY      SMALL INTESTINE SURGERY      TONSILLECTOMY      UPPER GASTROINTESTINAL ENDOSCOPY N/A 4/21/2021    EGD POLYP SNARE performed by Kodi Baez DO at [facility]  4/21/2021    EGD CONTROL HEMORRHAGE performed by Kodi Baez DO at [facility]  4/21/2021    EGD BIOPSY performed by Kodi Baez DO at [facility]  (may include discharge medications prescribed in the ED)  Current Outpatient Rx   Medication Sig Dispense Refill    ELIQUIS 5 MG TABS tablet TAKE ONE TABLET BY MOUTH TWICE A DAY 60 tablet 6    furosemide (LASIX) 20 MG tablet Take 20 mg by mouth daily Patient states she will take it when she is home      metoprolol succinate (TOPROL XL) 100 MG extended release tablet Take 0.5 tablets by mouth daily Prescribed as 100mg bid, pt.  Takes only 50mg if HR > 60. 180 tablet 3    valsartan (DIOVAN) 320 MG tablet Take 1 tablet by mouth daily 90 tablet 1    pantoprazole (PROTONIX) 20 MG tablet Take 2 tablets by mouth 2 times daily 60 tablet 0    pravastatin (PRAVACHOL) 80 MG tablet Take 40 mg by mouth daily      pioglitazone (ACTOS) 15 MG tablet Take 15 mg by mouth daily      dofetilide (TIKOSYN) 125 MCG capsule Take 1 capsule by mouth every 12 hours 180 capsule 3    glyBURIDE (DIABETA) 1.25 MG tablet Take 2.5 mg by mouth 2 times daily (with meals)          ALLERGIES    Allergies   Allergen Reactions    Hydrocodone Other (See Comments)     NAUSEA AND DIZZINESS    Metformin And Related Other (See Comments)     NAUSEA AND DIZZINESS    Nickel Dermatitis Omeprazole     Prednisone      Other reaction(s): chest pain & palpitations/H&P    Hydralazine Palpitations and Rash       SOCIAL AND FAMILY HISTORY    Social History     Socioeconomic History    Marital status:      Spouse name: None    Number of children: None    Years of education: None    Highest education level: None   Tobacco Use    Smoking status: Never    Smokeless tobacco: Never   Vaping Use    Vaping Use: Never used   Substance and Sexual Activity    Alcohol use: No    Drug use: No    Sexual activity: Yes     Partners: Male     Family History   Problem Relation Age of Onset    Diabetes Mother     Heart Disease Father     High Blood Pressure Father     High Cholesterol Father     Cancer Sister     Heart Disease Brother     High Blood Pressure Brother     High Cholesterol Brother        PHYSICAL EXAM    VITAL SIGNS: BP (!) 198/86   Pulse 69   Temp 98.2 °F (36.8 °C)   Resp 23   Ht 5' 2\" (1.575 m)   Wt 170 lb (77.1 kg)   SpO2 94%   BMI 31.09 kg/m²   Constitutional:  Well developed, well nourished, no acute distress, but appears uncomfortable  Eyes:  Sclera nonicteric, conjunctiva moist  HENT:  Atraumatic, nose normal  Neck: no JVD  Respiratory:  No retractions, no accessory muscle use, normal breath sounds   Cardiovascular: Tachycardic rate with a regular rhythm. S1-S2. No murmur  GI: Abdomen is soft and round. Mildly distended. She has pain with palpation to the epigastric area. She yells with palpation. There is no rebound. Subjective guarding. Healing laparoscopic incisions without signs of infection. No surrounding erythema.   Normoactive bowel sounds throughout auscultation  Musculoskeletal:  No edema, no acute deformities  Vascular: Radial and DP pulses 2+ and equal bilaterally  Integument: No rashes, skin dry  Neurologic:  Alert & oriented, no slurred speech  Psychiatric: Cooperative, pleasant affect     EKG      I have reviewed and interpreted the EKG with the following findings:  Ventricular rate 57 bpm, AL interval 184 ms, QRS duration 84 ms, QTc 460 ms  No ST elevation or depression. No abnormal T wave inversions noted. Interpretation is a sinus bradycardia with PACs    Today's tracing is compared to February 3, 2023 with no significant changes noted    RADIOLOGY/PROCEDURES    CT ABDOMEN PELVIS W IV CONTRAST Additional Contrast? None   Final Result   1. Diffuse pattern of mild mucosal thickening of the colon, new from prior   exam.  Underlying colitis may be suspected with infectious etiologies favored   given the rapid change since the prior study. 2. Fecalization of the distal small bowel which may be associated with above. No pattern of bowel obstruction. XR CHEST (2 VW)   Final Result   No acute process. Labs Reviewed   CBC WITH AUTO DIFFERENTIAL - Abnormal; Notable for the following components:       Result Value    RBC 5.53 (*)     All other components within normal limits   BASIC METABOLIC PANEL - Abnormal; Notable for the following components:    Sodium 134 (*)     Potassium 5.9 (*)     Glucose 159 (*)     All other components within normal limits   HEPATIC FUNCTION PANEL - Abnormal; Notable for the following components:     Total Protein 8.5 (*)     AST 38 (*)     All other components within normal limits   URINALYSIS WITH REFLEX TO CULTURE - Abnormal; Notable for the following components:    Leukocyte Esterase, Urine TRACE (*)     All other components within normal limits   LACTATE, SEPSIS - Abnormal; Notable for the following components:    Lactic Acid, Sepsis <0.2 (*)     All other components within normal limits   MICROSCOPIC URINALYSIS - Abnormal; Notable for the following components:    Bacteria, UA Rare (*)     All other components within normal limits   BASIC METABOLIC PANEL - Abnormal; Notable for the following components:    Glucose 142 (*)     All other components within normal limits   CULTURE, BLOOD 1   CULTURE, BLOOD 2   LIPASE TROPONIN   LACTATE, SEPSIS       ED COURSE & MEDICAL DECISION MAKING    Pertinent Labs & Imaging studies reviewed and interpreted. (See chart for details)     See chart for details of medications given during the ED stay. Vitals:    02/14/23 1255 02/14/23 1333 02/14/23 1422 02/14/23 1455   BP: (!) 189/78 (!) 174/62  (!) 198/86   Pulse: 64 68  69   Resp: 24 12 20 23   Temp:  98.2 °F (36.8 °C)     TempSrc:       SpO2: 98% 98% 96% 94%   Weight:       Height:         Medications   ciprofloxacin (CIPRO) IVPB 400 mg (400 mg IntraVENous New Bag 2/14/23 1450)   metronidazole (FLAGYL) 500 mg in 0.9% NaCl 100 mL IVPB premix (has no administration in time range)   0.9 % sodium chloride bolus (1,000 mLs IntraVENous New Bag 2/14/23 1431)   ketorolac (TORADOL) injection 15 mg (has no administration in time range)   ondansetron (ZOFRAN) injection 4 mg (4 mg IntraVENous Given 2/14/23 1155)   morphine sulfate (PF) injection 4 mg (4 mg IntraVENous Given 2/14/23 1241)   iopamidol (ISOVUE-370) 76 % injection 75 mL (75 mLs IntraVENous Given 2/14/23 1304)   morphine sulfate (PF) injection 4 mg (4 mg IntraVENous Given 2/14/23 1430)     Patient was seen and evaluated by myself and my attending physician Dr. Ami Hodgson admission    Differential diagnosis includes but is not limited to ischemic bowel, peptic ulcer disease, GERD, pancreatitis, hepatitis, colitis, thrombosis, bowel perforation, other    This is an uncomfortable appearing female who developed an abrupt onset of epigastric pain about 1 hour prior to ED arrival.  The pain is waxing and waning. She has had this before and feels like she has another bowel obstruction. She had a cholecystectomy on February 3 and she has been doing well with this. She has severe nausea. No vomiting. She reports some loose stools today.     White blood cell count 9.8    H&H 15 and 45.8    Potassium on chemistry panel is 5.9 however this is with hemolysis so I asked for it to be redrawn    Repeat potassium is 4.3    Lactic acid < 0.2    Troponin < 0.01    No acute changes on EKG    Lipase 24    I have reviewed the imaging studies and agree with the radiologist interpretation  CT ABDOMEN PELVIS W IV CONTRAST Additional Contrast? None   Final Result   1. Diffuse pattern of mild mucosal thickening of the colon, new from prior   exam.  Underlying colitis may be suspected with infectious etiologies favored   given the rapid change since the prior study. 2. Fecalization of the distal small bowel which may be associated with above. No pattern of bowel obstruction. XR CHEST (2 VW)   Final Result   No acute process. Urinalysis is unremarkable for infection    Chronic conditions affecting care:  has a past medical history of Atrial fibrillation ([address] Diabetes mellitus ([address] Hydronephrosis, Hyperlipidemia, Hypertension, Intussusception intestine ([address] and SVT (supraventricular tachycardia) ([address] On Eliquis    Social Determinants of Health: none    Goals of Care Discussed: Patient agrees with the plan of care and with the admission    Code Status Discussed: Full code    She frequently has episodes of bowel obstruction. She had a cholecystectomy on the third of this month. She has an abrupt onset of waxing and waning epigastric pain with severe nausea. She has required a couple of rounds of morphine. She will be kept n.p.o. She was given a liter of IV crystalloid bolus. She will be started on 500 mg of IV Flagyl and 400 mg of IV Cipro for colitis. Perfect serve to the hospitalist service for admission. 1515 patient is asking for more pain medicine. She has had a total of 8 mg of IV morphine. We will try 15 mg of IV Toradol for now. CRITICAL CARE NOTE:  There was a high probability of clinically significant life-threatening deterioration of the patient's condition requiring my urgent intervention.     I personally saw the patient and independently provided 35 minutes of non-concurrent critical care out of the total shared critical care time provided. This includes multiple reevaluations, vital sign monitoring, pulse oximetry monitoring, telemetry monitoring, clinical response to the IV medications, reviewing the nursing notes, consultation time, dictation/documentation time, and interpretation of the labwork. (This time excludes time spent performing procedures). FINAL IMPRESSION    1. Colitis    2. Nausea    3. Intractable epigastric abdominal pain    4. Goals of care, counseling/discussion    5.  Patient is full code        PLAN  Admission    (Please note that this note was completed with a voice recognition program.  Every attempt was made to edit the dictations, but inevitably there remain words that are mis-transcribed.)          Dianna Smith, GUANAKO - [facility]  02/14/23 [address] GUANA[address] - CNP  02/14/23 3004

## 2023-02-14 NOTE — ED NOTES
Patient identified as a positive fall risk on the ED triage fall screening. Patient placed in fall precautions which includes:  yellow fall risk bracelet on wrist and yellow socks on feet. Patient instructed on importance of not getting out of bed or ambulating without assistance for safety. Pt verbalized understanding.        Ruth Andrade RN  02/14/23 1003

## 2023-02-14 NOTE — TELEPHONE ENCOUNTER
Writer contacted ED provider to inform of 30 day readmission risk. ED provider informed writer of readmission.     Call Back: If you need to call back to inform of disposition you can contact me at [phone]

## 2023-02-14 NOTE — PLAN OF CARE
Problem: Discharge Planning  Goal: Discharge to home or other facility with appropriate resources  Outcome: Progressing     Problem: Pain  Goal: Verbalizes/displays adequate comfort level or baseline comfort level  Outcome: Progressing     Problem: ABCDS Injury Assessment  Goal: Absence of physical injury  Outcome: Progressing     Patient still extremely nauseous, doctor was messaged asking for another nausea medication.

## 2023-02-14 NOTE — ED PROVIDER NOTES
I independently evaluated and obtained a history and physical on [facility]. All diagnostic, treatment, and disposition assistants were made to myself in conjunction the advanced practice provider. For further details of this patient's emergency department encounter, please see the advanced practice provider's documentation. History: This is an 78-year-old female presents emergency department complaint abdominal pain that started earlier today. Its in her upper abdomen. It was quite significant when it started. It is improved some but still present. No fever or chills. Physician Exam: Pleasant female who is uncomfortable but in no acute distress. She has upper abdominal pain in her epigastric and left upper quadrant region. No rebound or guarding. I personally saw the patient and performed a substantive portion of the visit including all aspects of the medical decision making. MDM:     DDX: Pancreatitis, colitis, bowel obstruction, atypical presentation of myocardial ischemia, other    Patient's EKG is without STEMI. Troponin is normal.  At this time I do not suspect cardiac ischemia. The patient's urinalysis without infection. Lipase is normal and CT does not show any evidence of pancreatitis. AST is mildly elevated but the patient's total bilirubin is unremarkable so at this time I do not suspect choledocholithiasis. The patient's potassium was 5.9. BUN and creatinine are normal.  Lactic acid is less than 0.2. No white blood cell count. CT scan of the patient shows colitis. Given the patient's age and pain and evidence of colitis recommend admission for IV pain medicine, IV antibiotics.       The Ekg interpreted by me shows  sinus bradycardia, rate=57 with occasional PAC   Axis is   Left axis  QTc is  normal  Low voltage   ST Segments: normal  No significant change from prior EKG dated February 3, 2023         Saray Polanco MD  02/14/23 [id number]

## 2023-02-14 NOTE — ED NOTES
Attempted to give report, C5 stated they \"just found out\"     Abilio Márquez, NOEMY  02/14/23 0237

## 2023-02-14 NOTE — H&P
Hospital Medicine History & Physical      PCP: Enriqueta Dumont MD    Date of Admission: 2/14/2023    Date of Service: Pt seen/examined on  2/14/2023 and Admitted to Inpatient with expected LOS greater than two midnights due to medical therapy. Chief Complaint:   Abdominal pain nausea and vomiting since this morning      History Of Present Illness:  (    81 y.o. female who presented to [facility] with above complaint. She has a history of laparoscopic cholecystectomy 10 days ago and awaiting to see the surgeon next week. She developed above symptoms this morning. She did not eat anything different last does not have change in mental status fever abdominal distention hematemesis melena or NV bleed. She has a history of small bowel obstruction. Past Medical History:          Diagnosis Date    Atrial fibrillation ([address]     Diabetes mellitus ([address]     Hydronephrosis 2/14/2014    Hyperlipidemia     Hypertension     Intussusception intestine ([address]     SVT (supraventricular tachycardia) ([address] 8/26/2013    AVNRT       Past Surgical History:          Procedure Laterality Date    ABLATION OF DYSRHYTHMIC FOCUS      -2014    APPENDECTOMY      CHOLECYSTECTOMY, LAPAROSCOPIC N/A 2/3/2023    ROBOTIC CHOLECYSTECTOMY performed by Vi Lantigua MD at [address], [address]      L ankle    HYSTERECTOMY (CERVIX STATUS UNKNOWN)      SKIN BIOPSY      SMALL INTESTINE SURGERY      TONSILLECTOMY      UPPER GASTROINTESTINAL ENDOSCOPY N/A 4/21/2021    EGD POLYP SNARE performed by Maxine Blevins DO at [address] [place] [address]  4/21/2021    EGD CONTROL HEMORRHAGE performed by Maxine Blevins DO at [address] [place] [address]  4/21/2021    EGD BIOPSY performed by Maxine Blevins DO at [address] [place] [address]       Medications Prior to Admission:      Prior to Admission medications    Medication Sig Start Date End Date Taking? Authorizing Provider   ELIQUIS 5 MG TABS tablet TAKE ONE TABLET BY MOUTH TWICE A DAY 2/13/23   GUANAKO Erickson CNP   furosemide (LASIX) 20 MG tablet Take 20 mg by mouth daily Patient states she will take it when she is home    Historical Provider, MD   metoprolol succinate (TOPROL XL) 100 MG extended release tablet Take 0.5 tablets by mouth daily Prescribed as 100mg bid, pt. Takes only 50mg if HR > 60. 7/7/22   GUANAKO Erickson CNP   valsartan (DIOVAN) 320 MG tablet Take 1 tablet by mouth daily 5/26/21   [facility]   pantoprazole (PROTONIX) 20 MG tablet Take 2 tablets by mouth 2 times daily 4/22/21   Nikolai Yang MD   pravastatin (PRAVACHOL) 80 MG tablet Take 40 mg by mouth daily    Historical Provider, MD   pioglitazone (ACTOS) 15 MG tablet Take 15 mg by mouth daily    Historical Provider, MD   dofetilide (TIKOSYN) 125 MCG capsule Take 1 capsule by mouth every 12 hours 2/4/21   Bailey Merida., MD   glyBURIDE (DIABETA) 1.25 MG tablet Take 2.5 mg by mouth 2 times daily (with meals)     Historical Provider, MD       Allergies:  Hydrocodone, Metformin and related, Nickel, Omeprazole, Prednisone, and Hydralazine    Social History:      The patient currently lives at home    TOBACCO:   reports that she has never smoked. She has never used smokeless tobacco.  ETOH:   reports no history of alcohol use. E-cigarette/Vaping       Questions Responses    E-cigarette/Vaping Use Never User    Start Date     Passive Exposure     Quit Date     Counseling Given     Comments Unknown              Family History:    Reviewed and negative in regards to presenting illness/complaint.         Problem Relation Age of Onset    Diabetes Mother     Heart Disease Father     High Blood Pressure Father     High Cholesterol Father     Cancer Sister     Heart Disease Brother     High Blood Pressure Brother     High Cholesterol Brother        REVIEW OF SYSTEMS COMPLETED:   Pertinent positives as noted in the HPI. All other systems reviewed and negative. PHYSICAL EXAM PERFORMED:    BP (!) 199/80   Pulse 72   Temp 98.2 °F (36.8 °C)   Resp 22   Ht 5' 2\" (1.575 m)   Wt 170 lb (77.1 kg)   SpO2 96%   BMI 31.09 kg/m²     General appearance:  No apparent distress, appears stated age and cooperative. HEENT:  Normal cephalic, atraumatic without obvious deformity. Pupils equal, round, and reactive to light. Extra ocular muscles intact. Conjunctivae/corneas clear. Neck: Supple, with full range of motion. No jugular venous distention. Trachea midline. Respiratory:  Normal respiratory effort. Clear to auscultation, bilaterally without Rales/Wheezes/Rhonchi. Cardiovascular:  Regular rate and rhythm with normal S1/S2 without murmurs, rubs or gallops. Abdomen: Soft, mildly -tender, non-distended with normal bowel sounds. Musculoskeletal:  No clubbing, cyanosis or edema bilaterally. Full range of motion without deformity. Skin: Skin color, texture, turgor normal.  No rashes or lesions. Neurologic:  Neurovascularly intact without any focal sensory/motor deficits. Cranial nerves: II-XII intact, grossly non-focal.  Psychiatric:  Alert and oriented, thought content appropriate, normal insight  Capillary Refill: Brisk,3 seconds, normal  Peripheral Pulses: +2 palpable, equal bilaterally       Labs:     Recent Labs     02/14/23  1148   WBC 9.8   HGB 15.0   HCT 45.8        Recent Labs     02/14/23  1148     134*   K 4.3  5.9*     99   CO2 22  24   BUN 13  14   CREATININE 0.8  0.8   CALCIUM 8.7  9.1     Recent Labs     02/14/23  1148   AST 38*   ALT 16   BILIDIR <0.2   BILITOT 0.5   ALKPHOS 99     No results for input(s): INR in the last 72 hours.   Recent Labs     02/14/23  1148   TROPONINI <0.01       Urinalysis:      Lab Results   Component Value Date/Time    NITRU Negative 02/14/2023 11:48 AM    WBCUA 3-5 02/14/2023 11:48 AM    BACTERIA Rare 02/14/2023 11:48 AM    RBCUA 0-2 02/14/2023 11:48 AM BLOODU Negative 02/14/2023 11:48 AM    SPECGRAV 1.010 02/14/2023 11:48 AM    GLUCOSEU Negative 02/14/2023 11:48 AM       Radiology:     CXR: I have reviewed the CXR with the following interpretation:   EKG:  I have reviewed the EKG with the following interpretation: Sinus bradycardia 57/min premature ventricular contractions    CT ABDOMEN PELVIS W IV CONTRAST Additional Contrast? None   Final Result   1. Diffuse pattern of mild mucosal thickening of the colon, new from prior   exam.  Underlying colitis may be suspected with infectious etiologies favored   given the rapid change since the prior study. 2. Fecalization of the distal small bowel which may be associated with above. No pattern of bowel obstruction. XR CHEST (2 VW)   Final Result   No acute process. Consults:    IP CONSULT TO HOSPITALIST    ASSESSMENT:    Active Hospital Problems    Diagnosis Date Noted    Abdominal pain [R10.9] 02/14/2023     Priority: Medium         PLAN:    Abdominal pain nausea vomiting-possible gastroenterocolitis-admit, n.p.o., antiemetics IV Protonix started on Cipro and Flagyl, continue, given the history of recent laparoscopic cholecystectomy get surgery input    Atrial fibrillation-continue home medication on Eliquis, Tikosyn and beta-blocker    Diabetes-hold home medications monitor blood sugar with low correction scale insulin hemoglobin A1c    Hypertension-continue home medication monitor closely on valsartan and metoprolol Lasix    Hyperlipidemia-statin      DVT Prophylaxis: On Eliquis  Diet: Diet NPO  Code Status: Full Code    PT/OT Eval Status:     Dispo -admitted Mis Dean MD    Thank you Kwan Coughlin MD for the opportunity to be involved in this patient's care. If you have any questions or concerns please feel free to contact me at [phone].

## 2023-02-15 LAB
ALBUMIN SERPL-MCNC: 3.4 G/DL (ref 3.4–5)
ALP BLD-CCNC: 96 U/L (ref 40–129)
ALT SERPL-CCNC: 21 U/L (ref 10–40)
ANION GAP SERPL CALCULATED.3IONS-SCNC: 10 MMOL/L (ref 3–16)
AST SERPL-CCNC: 25 U/L (ref 15–37)
BASOPHILS ABSOLUTE: 0 K/UL (ref 0–0.2)
BASOPHILS RELATIVE PERCENT: 0.2 %
BILIRUB SERPL-MCNC: 0.5 MG/DL (ref 0–1)
BILIRUBIN DIRECT: <0.2 MG/DL (ref 0–0.3)
BILIRUBIN, INDIRECT: ABNORMAL MG/DL (ref 0–1)
BUN BLDV-MCNC: 19 MG/DL (ref 7–20)
CALCIUM SERPL-MCNC: 7.9 MG/DL (ref 8.3–10.6)
CHLORIDE BLD-SCNC: 104 MMOL/L (ref 99–110)
CO2: 23 MMOL/L (ref 21–32)
CREAT SERPL-MCNC: 0.9 MG/DL (ref 0.6–1.2)
EOSINOPHILS ABSOLUTE: 0 K/UL (ref 0–0.6)
EOSINOPHILS RELATIVE PERCENT: 0.3 %
ESTIMATED AVERAGE GLUCOSE: 114 MG/DL
GFR SERPL CREATININE-BSD FRML MDRD: >60 ML/MIN/{1.73_M2}
GLUCOSE BLD-MCNC: 102 MG/DL (ref 70–99)
GLUCOSE BLD-MCNC: 162 MG/DL (ref 70–99)
GLUCOSE BLD-MCNC: 93 MG/DL (ref 70–99)
GLUCOSE BLD-MCNC: 94 MG/DL (ref 70–99)
GLUCOSE BLD-MCNC: 98 MG/DL (ref 70–99)
HBA1C MFR BLD: 5.6 %
HCT VFR BLD CALC: 40.9 % (ref 36–48)
HEMOGLOBIN: 13.4 G/DL (ref 12–16)
LYMPHOCYTES ABSOLUTE: 0.9 K/UL (ref 1–5.1)
LYMPHOCYTES RELATIVE PERCENT: 8.4 %
MCH RBC QN AUTO: 26.9 PG (ref 26–34)
MCHC RBC AUTO-ENTMCNC: 32.7 G/DL (ref 31–36)
MCV RBC AUTO: 82.4 FL (ref 80–100)
MONOCYTES ABSOLUTE: 1 K/UL (ref 0–1.3)
MONOCYTES RELATIVE PERCENT: 9 %
NEUTROPHILS ABSOLUTE: 8.9 K/UL (ref 1.7–7.7)
NEUTROPHILS RELATIVE PERCENT: 82.1 %
PDW BLD-RTO: 14.6 % (ref 12.4–15.4)
PERFORMED ON: ABNORMAL
PERFORMED ON: NORMAL
PLATELET # BLD: 311 K/UL (ref 135–450)
PMV BLD AUTO: 8.9 FL (ref 5–10.5)
POTASSIUM REFLEX MAGNESIUM: 4.7 MMOL/L (ref 3.5–5.1)
RBC # BLD: 4.96 M/UL (ref 4–5.2)
SODIUM BLD-SCNC: 137 MMOL/L (ref 136–145)
TOTAL PROTEIN: 6.1 G/DL (ref 6.4–8.2)
WBC # BLD: 10.9 K/UL (ref 4–11)

## 2023-02-15 PROCEDURE — 6360000002 HC RX W HCPCS: Performed by: HOSPITALIST

## 2023-02-15 PROCEDURE — 2500000003 HC RX 250 WO HCPCS: Performed by: INTERNAL MEDICINE

## 2023-02-15 PROCEDURE — 1200000000 HC SEMI PRIVATE

## 2023-02-15 PROCEDURE — 6370000000 HC RX 637 (ALT 250 FOR IP): Performed by: INTERNAL MEDICINE

## 2023-02-15 PROCEDURE — 6360000002 HC RX W HCPCS: Performed by: NURSE PRACTITIONER

## 2023-02-15 PROCEDURE — 85025 COMPLETE CBC W/AUTO DIFF WBC: CPT

## 2023-02-15 PROCEDURE — APPSS30 APP SPLIT SHARED TIME 16-30 MINUTES: Performed by: CLINICAL NURSE SPECIALIST

## 2023-02-15 PROCEDURE — APPNB60 APP NON BILLABLE TIME 46-60 MINS: Performed by: CLINICAL NURSE SPECIALIST

## 2023-02-15 PROCEDURE — C9113 INJ PANTOPRAZOLE SODIUM, VIA: HCPCS | Performed by: INTERNAL MEDICINE

## 2023-02-15 PROCEDURE — 2580000003 HC RX 258: Performed by: INTERNAL MEDICINE

## 2023-02-15 PROCEDURE — 36415 COLL VENOUS BLD VENIPUNCTURE: CPT

## 2023-02-15 PROCEDURE — 80048 BASIC METABOLIC PNL TOTAL CA: CPT

## 2023-02-15 PROCEDURE — 2580000003 HC RX 258: Performed by: HOSPITALIST

## 2023-02-15 PROCEDURE — 99222 1ST HOSP IP/OBS MODERATE 55: CPT | Performed by: SURGERY

## 2023-02-15 PROCEDURE — 6360000002 HC RX W HCPCS: Performed by: INTERNAL MEDICINE

## 2023-02-15 PROCEDURE — 80076 HEPATIC FUNCTION PANEL: CPT

## 2023-02-15 RX ORDER — DIPHENHYDRAMINE HYDROCHLORIDE 50 MG/ML
25 INJECTION INTRAMUSCULAR; INTRAVENOUS ONCE
Status: DISCONTINUED | OUTPATIENT
Start: 2023-02-15 | End: 2023-02-17 | Stop reason: HOSPADM

## 2023-02-15 RX ORDER — FENTANYL CITRATE 50 UG/ML
25 INJECTION, SOLUTION INTRAMUSCULAR; INTRAVENOUS
Status: COMPLETED | OUTPATIENT
Start: 2023-02-15 | End: 2023-02-16

## 2023-02-15 RX ADMIN — CIPROFLOXACIN 400 MG: 2 INJECTION, SOLUTION INTRAVENOUS at 03:25

## 2023-02-15 RX ADMIN — ONDANSETRON 4 MG: 2 INJECTION INTRAMUSCULAR; INTRAVENOUS at 20:18

## 2023-02-15 RX ADMIN — METRONIDAZOLE 500 MG: 500 INJECTION, SOLUTION INTRAVENOUS at 21:30

## 2023-02-15 RX ADMIN — DOFETILIDE 125 MCG: 0.12 CAPSULE ORAL at 09:17

## 2023-02-15 RX ADMIN — CEFTRIAXONE SODIUM 1000 MG: 1 INJECTION, POWDER, FOR SOLUTION INTRAMUSCULAR; INTRAVENOUS at 16:09

## 2023-02-15 RX ADMIN — APIXABAN 5 MG: 5 TABLET, FILM COATED ORAL at 21:25

## 2023-02-15 RX ADMIN — FUROSEMIDE 20 MG: 20 TABLET ORAL at 09:17

## 2023-02-15 RX ADMIN — METOPROLOL SUCCINATE 50 MG: 50 TABLET, EXTENDED RELEASE ORAL at 09:17

## 2023-02-15 RX ADMIN — FENTANYL CITRATE 25 MCG: 0.05 INJECTION, SOLUTION INTRAMUSCULAR; INTRAVENOUS at 21:06

## 2023-02-15 RX ADMIN — VALSARTAN 320 MG: 80 TABLET ORAL at 09:17

## 2023-02-15 RX ADMIN — METRONIDAZOLE 500 MG: 500 INJECTION, SOLUTION INTRAVENOUS at 12:25

## 2023-02-15 RX ADMIN — METRONIDAZOLE 500 MG: 500 INJECTION, SOLUTION INTRAVENOUS at 04:32

## 2023-02-15 RX ADMIN — SODIUM CHLORIDE, PRESERVATIVE FREE 10 ML: 5 INJECTION INTRAVENOUS at 21:25

## 2023-02-15 RX ADMIN — APIXABAN 5 MG: 5 TABLET, FILM COATED ORAL at 09:17

## 2023-02-15 RX ADMIN — PANTOPRAZOLE SODIUM 40 MG: 40 INJECTION, POWDER, FOR SOLUTION INTRAVENOUS at 09:19

## 2023-02-15 RX ADMIN — ONDANSETRON 4 MG: 4 TABLET, ORALLY DISINTEGRATING ORAL at 02:34

## 2023-02-15 RX ADMIN — DOFETILIDE 125 MCG: 0.12 CAPSULE ORAL at 21:26

## 2023-02-15 RX ADMIN — MORPHINE SULFATE 2 MG: 2 INJECTION, SOLUTION INTRAMUSCULAR; INTRAVENOUS at 20:18

## 2023-02-15 RX ADMIN — PRAVASTATIN SODIUM 40 MG: 40 TABLET ORAL at 09:17

## 2023-02-15 RX ADMIN — SODIUM CHLORIDE: 9 INJECTION, SOLUTION INTRAVENOUS at 03:24

## 2023-02-15 ASSESSMENT — PAIN SCALES - GENERAL
PAINLEVEL_OUTOF10: 10
PAINLEVEL_OUTOF10: 9
PAINLEVEL_OUTOF10: 4
PAINLEVEL_OUTOF10: 8

## 2023-02-15 ASSESSMENT — PAIN DESCRIPTION - LOCATION
LOCATION: ABDOMEN
LOCATION: ABDOMEN

## 2023-02-15 ASSESSMENT — PAIN DESCRIPTION - ORIENTATION: ORIENTATION: MID

## 2023-02-15 ASSESSMENT — PAIN DESCRIPTION - DESCRIPTORS: DESCRIPTORS: SHARP

## 2023-02-15 ASSESSMENT — PAIN DESCRIPTION - PAIN TYPE: TYPE: ACUTE PAIN

## 2023-02-15 NOTE — CARE COORDINATION
Case Management Assessment  Initial Evaluation    Date/Time of Evaluation: 2/15/2023 10:52 AM  Assessment Completed by: Josefina Levy RN    If patient is discharged prior to next notation, then this note serves as note for discharge by case management. Patient Name: Brendan Velazquez Payer                   YOB: 1935  Diagnosis: Colitis [K52.9]  Nausea [R11.0]  Abdominal pain [R10.9]  Goals of care, counseling/discussion [Z71.89]  Patient is full code [Z78.9]  Intractable epigastric abdominal pain [R10.13]                   Date / Time: 2/14/2023 11:23 AM    Patient Admission Status: Inpatient   Readmission Risk (Low < 19, Mod (19-27), High > 27): Readmission Risk Score: 16    Current PCP: Abelino Benjamin MD  PCP verified by ? Yes    Chart Reviewed: Yes      History Provided by: Patient  Patient Orientation: Alert and Oriented    Patient Cognition: Alert    Hospitalization in the last 30 days (Readmission):  Yes    If yes, Readmission Assessment in  Navigator will be completed. Advance Directives:      Code Status: Full Code   Patient's Primary Decision Maker is: Legal Next of Kin    Primary Decision Maker: vonda rabago - Spouse - [phone]    Secondary Decision Maker: Clydia Or Child - [phone]    Discharge Planning:    Patient lives with: Spouse/Significant Other Type of Home: House  Primary Care Giver: Self  Patient Support Systems include: Spouse/Significant Other   Current Financial resources: None  Current community resources: None  Current services prior to admission: None            Current DME:              Type of Home Care services:  None    ADLS  Prior functional level: Independent in ADLs/IADLs  Current functional level: Independent in ADLs/IADLs    PT AM-PAC:   /24  OT AM-PAC:   /24    Family can provide assistance at DC: Yes  Would you like Case Management to discuss the discharge plan with any other family members/significant others, and if so, who?  No  Plans to Return to Present Housing: Yes  Other Identified Issues/Barriers to RETURNING to current housing: none  Potential Assistance needed at discharge: N/A            Potential DME:    Patient expects to discharge to: [address] [place] [address] for transportation at discharge:      Financial    Payor: Cynthia Lang / Plan: Kait Bose ESSENTIAL/PLUS / Product Type: *No Product type* /     Does insurance require precert for SNF: Yes    Potential assistance Purchasing Medications: No  Meds-to-Beds request: [facility] [id number] [place] [address]  [address]  [place]  Phone: [phone] Fax: [phone]      Notes:    Factors facilitating achievement of predicted outcomes: Family support, Motivated, Cooperative, and Pleasant    Barriers to discharge: none    Additional Case Management Notes: Patient is IPTA, and is denying any DCP needs. The Plan for Transition of Care is related to the following treatment goals of Colitis [K52.9]  Nausea [R11.0]  Abdominal pain [R10.9]  Goals of care, counseling/discussion [Z71.89]  Patient is full code [Z78.9]  Intractable epigastric abdominal pain [T90.82]    IF APPLICABLE: The Patient and/or patient representative Rosanne Aviles and her family were provided with a choice of provider and agrees with the discharge plan. Freedom of choice list with basic dialogue that supports the patient's individualized plan of care/goals and shares the quality data associated with the providers was provided to: Patient   Patient Representative Name:       The Patient and/or Patient Representative Agree with the Discharge Plan?  Yes    Calvin Dorsey RN  Case Management Department  Ph: [phone] Fax: [phone]

## 2023-02-15 NOTE — CONSULTS
Department of General Surgery Consult    PATIENT NAME: Colin Tiwari   YOB: 1935    ADMISSION DATE: 2/14/2023 11:23 AM      TODAY'S DATE: 2/15/2023    Reason for Consult:  colitis    Chief Complaint: abd pain    Requesting Physician:  Nikolay Donaldson    HISTORY OF PRESENT ILLNESS:              The patient is a 81 y.o. female who presented last night with complaints of abdominal pain and associated nausea and vomiting. She denies fever or chills, no diarrhea. She reports that this morning she feels much better. She underwent lap marcio with Dr. Edgard Rogel on 2/3/23 and states she had been feeling well.     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       Past Surgical History:        Procedure Laterality Date    ABLATION OF DYSRHYTHMIC FOCUS      -2014    APPENDECTOMY      CHOLECYSTECTOMY, LAPAROSCOPIC N/A 2/3/2023    ROBOTIC CHOLECYSTECTOMY performed by Dell Perez MD at [address], [address]      L ankle    HYSTERECTOMY (CERVIX STATUS UNKNOWN)      SKIN BIOPSY      SMALL INTESTINE SURGERY      TONSILLECTOMY      UPPER GASTROINTESTINAL ENDOSCOPY N/A 4/21/2021    EGD POLYP SNARE performed by Valentine Ray DO at [facility]  4/21/2021    EGD CONTROL HEMORRHAGE performed by Valentine Ray DO at [address] [facility]  4/21/2021    EGD BIOPSY performed by Valentine Ray DO at [address]       Current Medications:   Current Facility-Administered Medications: diphenhydrAMINE (BENADRYL) injection 25 mg, 25 mg, IntraVENous, Once  dofetilide (TIKOSYN) capsule 125 mcg, 125 mcg, Oral, 2 times per day  apixaban (ELIQUIS) tablet 5 mg, 5 mg, Oral, BID  furosemide (LASIX) tablet 20 mg, 20 mg, Oral, Daily  metoprolol succinate (TOPROL XL) extended release tablet 50 mg, 50 mg, Oral, Daily  pravastatin (PRAVACHOL) tablet 40 mg, 40 mg, Oral, Daily  valsartan (DIOVAN) tablet 320 mg, 320 mg, Oral, Daily  sodium chloride flush 0.9 % injection 5-40 mL, 5-40 mL, IntraVENous, 2 times per day  sodium chloride flush 0.9 % injection 5-40 mL, 5-40 mL, IntraVENous, PRN  0.9 % sodium chloride infusion, , IntraVENous, PRN  ondansetron (ZOFRAN-ODT) disintegrating tablet 4 mg, 4 mg, Oral, Q8H PRN **OR** ondansetron (ZOFRAN) injection 4 mg, 4 mg, IntraVENous, Q6H PRN  polyethylene glycol (GLYCOLAX) packet 17 g, 17 g, Oral, Daily PRN  acetaminophen (TYLENOL) tablet 650 mg, 650 mg, Oral, Q6H PRN **OR** acetaminophen (TYLENOL) suppository 650 mg, 650 mg, Rectal, Q6H PRN  0.9 % sodium chloride infusion, , IntraVENous, Continuous  pantoprazole (PROTONIX) injection 40 mg, 40 mg, IntraVENous, Daily  ciprofloxacin (CIPRO) IVPB 400 mg, 400 mg, IntraVENous, Q12H  metronidazole (FLAGYL) 500 mg in 0.9% NaCl 100 mL IVPB premix, 500 mg, IntraVENous, Q8H  insulin lispro (HUMALOG) injection vial 0-4 Units, 0-4 Units, SubCUTAneous, TID WC  insulin lispro (HUMALOG) injection vial 0-4 Units, 0-4 Units, SubCUTAneous, Nightly  glucose chewable tablet 16 g, 4 tablet, Oral, PRN  dextrose bolus 10% 125 mL, 125 mL, IntraVENous, PRN **OR** dextrose bolus 10% 250 mL, 250 mL, IntraVENous, PRN  glucagon (rDNA) injection 1 mg, 1 mg, SubCUTAneous, PRN  dextrose 10 % infusion, , IntraVENous, Continuous PRN  morphine (PF) injection 2 mg, 2 mg, IntraVENous, Q4H PRN  labetalol (NORMODYNE;TRANDATE) injection 10 mg, 10 mg, IntraVENous, Q6H PRN  promethazine (PHENERGAN) injection 25 mg, 25 mg, IntraMUSCular, Q6H PRN  Prior to Admission medications    Medication Sig Start Date End Date Taking?  Authorizing Provider   ELIQUIS 5 MG TABS tablet TAKE ONE TABLET BY MOUTH TWICE A DAY 2/13/23   Марина Shore, APRN - CNP furosemide (LASIX) 20 MG tablet Take 20 mg by mouth daily Patient states she will take it when she is home    Historical Provider, MD   metoprolol succinate (TOPROL XL) 100 MG extended release tablet Take 0.5 tablets by mouth daily Prescribed as 100mg bid, pt. Takes only 50mg if HR > 60. 7/7/22   Марина Shore APRN - CNP   valsartan (DIOVAN) 320 MG tablet Take 1 tablet by mouth daily 5/26/21   GUANAKO Miguel - CNP   pantoprazole (PROTONIX) 20 MG tablet Take 2 tablets by mouth 2 times daily 4/22/21   Rosalba Mcmullen MD   pravastatin (PRAVACHOL) 80 MG tablet Take 40 mg by mouth daily    Historical Provider, MD   pioglitazone (ACTOS) 15 MG tablet Take 15 mg by mouth daily    Historical Provider, MD   dofetilide (TIKOSYN) 125 MCG capsule Take 1 capsule by mouth every 12 hours 2/4/21   Vonda Lazaro MD   glyBURIDE (DIABETA) 1.25 MG tablet Take 2.5 mg by mouth 2 times daily (with meals)     Historical Provider, MD        Allergies:  Hydrocodone, Metformin and related, Nickel, Omeprazole, Prednisone, and Hydralazine    Social History:   TOBACCO:   reports that she has never smoked. She has never used smokeless tobacco.  ETOH:   reports no history of alcohol use. DRUGS:   reports no history of drug use. Family History:        Problem Relation Age of Onset    Diabetes Mother     Heart Disease Father     High Blood Pressure Father     High Cholesterol Father     Cancer Sister     Heart Disease Brother     High Blood Pressure Brother     High Cholesterol Brother        REVIEW OF SYSTEMS:  CONSTITUTIONAL:  negative  HEENT:  negative  RESPIRATORY:  negative  CARDIOVASCULAR:  negative  GASTROINTESTINAL:  negative except for nausea, vomiting, and abdominal pain  GENITOURINARY:  negative  HEMATOLOGIC/LYMPHATIC:  negative  NEUROLOGICAL:  Negative  * All other ROS reviewed and negative.        PHYSICAL EXAM:  VITALS:  /65   Pulse 63   Temp 98.2 °F (36.8 °C) (Oral)   Resp 18   Ht 5' 2\" (1.575 m)   Wt 170 lb (77.1 kg)   SpO2 96%   BMI 31.09 kg/m²   24HR INTAKE/OUTPUT:    No intake/output data recorded. No intake/output data recorded. CONSTITUTIONAL:  alert, no apparent distress and mildly obese  EYES:  PERRL, sclera clear  ENT:  Normocephalic,atraumatic, without obvious abnormality  NECK:  supple, symmetrical, trachea midline  LUNGS: Resp effort easy and unlabored, no crackles or wheezing  CARDIOVASCULAR:  NO JVD, regular rate and rhythm   ABDOMEN:  lap incisions healing well, normal bowel sounds, soft, non-distended, non-tender,   MUSCULOSKELETAL: No clubbing or cyanosis, 0+ pitting edema lower extremities  NEUROLOGIC:  Mental Status Exam:  Level of Alertness:   awake  PSYCHIATRIC:   person, place, time  SKIN:  normal skin color, texture, turgor    DATA:    CBC:   Recent Labs     02/14/23  1148 02/15/23  0536   WBC 9.8 10.9   HGB 15.0 13.4   HCT 45.8 40.9    311     BMP:    Recent Labs     02/14/23  1148 02/15/23  0536     134* 137   K 4.3  5.9* 4.7     99 104   CO2 22  24 23   BUN 13  14 19   CREATININE 0.8  0.8 0.9   GLUCOSE 142*  159* 162*     Hepatic:   Recent Labs     02/14/23  1148 02/15/23  0536   AST 38* 25   ALT 16 21   BILITOT 0.5 0.5   ALKPHOS 99 96     Mag:    No results for input(s): MG in the last 72 hours. Phos:   No results for input(s): PHOS in the last 72 hours. INR: No results for input(s): INR in the last 72 hours. Radiology Review: Images personally reviewed by me. EXAMINATION:   CT OF THE ABDOMEN AND PELVIS WITH CONTRAST 2/14/2023 12:55 pm     TECHNIQUE:   CT of the abdomen and pelvis was performed with the administration of   intravenous contrast. Multiplanar reformatted images are provided for review. Automated exposure control, iterative reconstruction, and/or weight based   adjustment of the mA/kV was utilized to reduce the radiation dose to as low   as reasonably achievable.      COMPARISON:   01/25/2023 CT     HISTORY:   ORDERING SYSTEM PROVIDED HISTORY: epigastric pain   TECHNOLOGIST PROVIDED HISTORY:   Reason for exam:->epigastric pain   Additional Contrast?->None   Decision Support Exception - unselect if not a suspected or confirmed   emergency medical condition->Emergency Medical Condition (MA)   Reason for Exam: epigastric pain today, nausea   Relevant Medical/Surgical History: gb, bowel resection, appy, hysterectomy     FINDINGS:   Lower Chest:  The lung bases are clear. The base of the heart is normal.     Organs: Calcified granulomata in the liver and spleen. The gallbladder is   absent. Biliary ducts and pancreas normal.  Kidneys and adrenal glands are   normal.     GI/Bowel: The stomach and duodenum are normal.  Nondistended fluid-filled   loops of small bowel with fecalization of the distal ileum. This is likely   due to a prior partial bowel resection with an anastomosis at the level of   the cecum. Mild mucosal thickening is observed diffusely within the colon,   new in comparison to the recent CT. No focal lesion. No surrounding   inflammatory change. Pelvis: The bladder is unremarkable. The uterus is absent. Peritoneum/Retroperitoneum: The aorta tapers normally. No lymph node   enlargement. Bones/Soft Tissues: No significant skeletal abnormalities. Impression:     1. Diffuse pattern of mild mucosal thickening of the colon, new from prior   exam.  Underlying colitis may be suspected with infectious etiologies favored   given the rapid change since the prior study. 2. Fecalization of the distal small bowel which may be associated with above. No pattern of bowel obstruction. IMPRESSION/RECOMMENDATIONS:    Possible colitis of uncertain etiology possibly infectious. Continue with supportive care. Pt seems to be recovering well from cholecystectomy 12 days ago, no fluid or other findings to suggest bile leak, no retained CBD stones.      Electronically signed by GUANAKO Gamboa CNP [facility]  [id number]    Patient seen and agree with above and more than half of the total time was spent by me on the encounter. Acute onset of nausea, emesis and abd pain. No diarrhea. Doing well postop from cholecystectomy up until then. Imaging reviewed and no signs of postop infection, biloma or other complication. No evidence of sbo. Once symptoms improved then discharge home.     Amilcar Godinez MD

## 2023-02-15 NOTE — PROGRESS NOTES
Hospitalist Progress Note      PCP: Mariama Mustafa MD    Date of Admission: 2/14/2023    Chief Complaint:   Hospital Course:     Subjective:     Patient feels much better. .  No nausea, vomiting, abdominal pain and diarrhea this morning. .. Wanting to start on a diet      Medications:  Reviewed    Infusion Medications    sodium chloride      sodium chloride 75 mL/hr at 02/15/23 0324    dextrose       Scheduled Medications    diphenhydrAMINE  25 mg IntraVENous Once    dofetilide  125 mcg Oral 2 times per day    apixaban  5 mg Oral BID    furosemide  20 mg Oral Daily    metoprolol succinate  50 mg Oral Daily    pravastatin  40 mg Oral Daily    valsartan  320 mg Oral Daily    sodium chloride flush  5-40 mL IntraVENous 2 times per day    pantoprazole  40 mg IntraVENous Daily    ciprofloxacin  400 mg IntraVENous Q12H    metroNIDAZOLE  500 mg IntraVENous Q8H    insulin lispro  0-4 Units SubCUTAneous TID WC    insulin lispro  0-4 Units SubCUTAneous Nightly     PRN Meds: sodium chloride flush, sodium chloride, ondansetron **OR** ondansetron, polyethylene glycol, acetaminophen **OR** acetaminophen, glucose, dextrose bolus **OR** dextrose bolus, glucagon (rDNA), dextrose, morphine, labetalol, promethazine    No intake or output data in the 24 hours ending 02/15/23 0803    Physical Exam Performed:    /63   Pulse 55   Temp 97.6 °F (36.4 °C) (Oral)   Resp 18   Ht 5' 2\" (1.575 m)   Wt 170 lb (77.1 kg)   SpO2 93%   BMI 31.09 kg/m²     General appearance: No apparent distress, appears stated age and cooperative. HEENT: Pupils equal, round, and reactive to light. Conjunctivae/corneas clear. Neck: Supple, with full range of motion. No jugular venous distention. Trachea midline. Respiratory:  Normal respiratory effort. Clear to auscultation, bilaterally without Rales/Wheezes/Rhonchi. Cardiovascular: Regular rate and rhythm with normal S1/S2 without murmurs, rubs or gallops.   Abdomen: Soft, non-tender, non-distended with normal bowel sounds. Musculoskeletal: No clubbing, cyanosis or edema bilaterally. Full range of motion without deformity. Skin: Skin color, texture, turgor normal.  No rashes or lesions. Neurologic:  Neurovascularly intact without any focal sensory/motor deficits. Cranial nerves: II-XII intact, grossly non-focal.  Psychiatric: Alert and oriented, thought content appropriate, normal insight  Capillary Refill: Brisk, 3 seconds, normal   Peripheral Pulses: +2 palpable, equal bilaterally       Labs:   Recent Labs     02/14/23  1148 02/15/23  0536   WBC 9.8 10.9   HGB 15.0 13.4   HCT 45.8 40.9    311     Recent Labs     02/14/23  1148 02/15/23  0536     134* 137   K 4.3  5.9* 4.7     99 104   CO2 22  24 23   BUN 13  14 19   CREATININE 0.8  0.8 0.9   CALCIUM 8.7  9.1 7.9*     Recent Labs     02/14/23  1148 02/15/23  0536   AST 38* 25   ALT 16 21   BILIDIR <0.2 <0.2   BILITOT 0.5 0.5   ALKPHOS 99 96     No results for input(s): INR in the last 72 hours. Recent Labs     02/14/23  1148   TROPONINI <0.01       Urinalysis:      Lab Results   Component Value Date/Time    NITRU Negative 02/14/2023 11:48 AM    WBCUA 3-5 02/14/2023 11:48 AM    BACTERIA Rare 02/14/2023 11:48 AM    RBCUA 0-2 02/14/2023 11:48 AM    BLOODU Negative 02/14/2023 11:48 AM    SPECGRAV 1.010 02/14/2023 11:48 AM    GLUCOSEU Negative 02/14/2023 11:48 AM       Radiology:  CT ABDOMEN PELVIS W IV CONTRAST Additional Contrast? None   Final Result   1. Diffuse pattern of mild mucosal thickening of the colon, new from prior   exam.  Underlying colitis may be suspected with infectious etiologies favored   given the rapid change since the prior study. 2. Fecalization of the distal small bowel which may be associated with above. No pattern of bowel obstruction. XR CHEST (2 VW)   Final Result   No acute process.              IP CONSULT TO HOSPITALIST  IP CONSULT TO GENERAL SURGERY    Assessment/Plan:    -possible Colitis, presumed infectious . Darinel Lawler Presented with nausea and vomiting with abdominal pain. .  CT showed diffuse mild mucosal thickening, possible colitis. .  Patient is currently asymptomatic. .  On Cipro and Flagyl-changed cipro to rocephin dt apparent local reaction to cipro overnight  Start clears and advance as tolerated  Patient is status post laparoscopic cholecystectomy 10 days ago. .  General surgery consult pending  Outpatient colonoscopy recommended--will need follow-up with GI    -Paroxysmal fibrillation. [place] Hien Eliquis Tikosyn and beta-blocker    -Diabetes mellitus type 2    -Essential hypertension. [place] H[place] valsartan, metoprolol and Lasix    -Hyperlipidemia-continue statin        DVT Prophylaxis: Eliquis  Diet: Diet NPO  Code Status: Full Code  PT/OT Eval Status:     Dispo -Home within 24 hours once able to tolerate regular diet. .  General surgery consult pending          Ellen Starr MD

## 2023-02-16 LAB
ANION GAP SERPL CALCULATED.3IONS-SCNC: 12 MMOL/L (ref 3–16)
BASOPHILS ABSOLUTE: 0.1 K/UL (ref 0–0.2)
BASOPHILS RELATIVE PERCENT: 0.6 %
BUN BLDV-MCNC: 11 MG/DL (ref 7–20)
CALCIUM SERPL-MCNC: 8 MG/DL (ref 8.3–10.6)
CHLORIDE BLD-SCNC: 104 MMOL/L (ref 99–110)
CO2: 21 MMOL/L (ref 21–32)
CREAT SERPL-MCNC: 0.8 MG/DL (ref 0.6–1.2)
EOSINOPHILS ABSOLUTE: 0.2 K/UL (ref 0–0.6)
EOSINOPHILS RELATIVE PERCENT: 2.2 %
GFR SERPL CREATININE-BSD FRML MDRD: >60 ML/MIN/{1.73_M2}
GLUCOSE BLD-MCNC: 102 MG/DL (ref 70–99)
GLUCOSE BLD-MCNC: 109 MG/DL (ref 70–99)
GLUCOSE BLD-MCNC: 115 MG/DL (ref 70–99)
GLUCOSE BLD-MCNC: 128 MG/DL (ref 70–99)
GLUCOSE BLD-MCNC: 151 MG/DL (ref 70–99)
HCT VFR BLD CALC: 39.4 % (ref 36–48)
HEMOGLOBIN: 12.6 G/DL (ref 12–16)
LYMPHOCYTES ABSOLUTE: 1.8 K/UL (ref 1–5.1)
LYMPHOCYTES RELATIVE PERCENT: 20.4 %
MCH RBC QN AUTO: 26.3 PG (ref 26–34)
MCHC RBC AUTO-ENTMCNC: 31.9 G/DL (ref 31–36)
MCV RBC AUTO: 82.4 FL (ref 80–100)
MONOCYTES ABSOLUTE: 1 K/UL (ref 0–1.3)
MONOCYTES RELATIVE PERCENT: 11.3 %
NEUTROPHILS ABSOLUTE: 5.7 K/UL (ref 1.7–7.7)
NEUTROPHILS RELATIVE PERCENT: 65.5 %
PDW BLD-RTO: 14.6 % (ref 12.4–15.4)
PERFORMED ON: ABNORMAL
PLATELET # BLD: 312 K/UL (ref 135–450)
PMV BLD AUTO: 9.4 FL (ref 5–10.5)
POTASSIUM SERPL-SCNC: 3.8 MMOL/L (ref 3.5–5.1)
RBC # BLD: 4.78 M/UL (ref 4–5.2)
SODIUM BLD-SCNC: 137 MMOL/L (ref 136–145)
WBC # BLD: 8.6 K/UL (ref 4–11)

## 2023-02-16 PROCEDURE — 2580000003 HC RX 258: Performed by: INTERNAL MEDICINE

## 2023-02-16 PROCEDURE — 6360000002 HC RX W HCPCS: Performed by: NURSE PRACTITIONER

## 2023-02-16 PROCEDURE — 6370000000 HC RX 637 (ALT 250 FOR IP): Performed by: INTERNAL MEDICINE

## 2023-02-16 PROCEDURE — 87449 NOS EACH ORGANISM AG IA: CPT

## 2023-02-16 PROCEDURE — 2580000003 HC RX 258: Performed by: HOSPITALIST

## 2023-02-16 PROCEDURE — 6370000000 HC RX 637 (ALT 250 FOR IP): Performed by: NURSE PRACTITIONER

## 2023-02-16 PROCEDURE — 2500000003 HC RX 250 WO HCPCS: Performed by: INTERNAL MEDICINE

## 2023-02-16 PROCEDURE — 85025 COMPLETE CBC W/AUTO DIFF WBC: CPT

## 2023-02-16 PROCEDURE — 6360000002 HC RX W HCPCS: Performed by: HOSPITALIST

## 2023-02-16 PROCEDURE — C9113 INJ PANTOPRAZOLE SODIUM, VIA: HCPCS | Performed by: INTERNAL MEDICINE

## 2023-02-16 PROCEDURE — 6360000002 HC RX W HCPCS: Performed by: INTERNAL MEDICINE

## 2023-02-16 PROCEDURE — 80048 BASIC METABOLIC PNL TOTAL CA: CPT

## 2023-02-16 PROCEDURE — 1200000000 HC SEMI PRIVATE

## 2023-02-16 PROCEDURE — 87324 CLOSTRIDIUM AG IA: CPT

## 2023-02-16 PROCEDURE — 36415 COLL VENOUS BLD VENIPUNCTURE: CPT

## 2023-02-16 RX ORDER — SIMETHICONE 80 MG
80 TABLET,CHEWABLE ORAL EVERY 6 HOURS PRN
Status: DISCONTINUED | OUTPATIENT
Start: 2023-02-16 | End: 2023-02-17 | Stop reason: HOSPADM

## 2023-02-16 RX ADMIN — METOPROLOL SUCCINATE 50 MG: 50 TABLET, EXTENDED RELEASE ORAL at 09:30

## 2023-02-16 RX ADMIN — ACETAMINOPHEN 650 MG: 325 TABLET ORAL at 12:19

## 2023-02-16 RX ADMIN — SIMETHICONE 80 MG: 80 TABLET, CHEWABLE ORAL at 04:19

## 2023-02-16 RX ADMIN — METRONIDAZOLE 500 MG: 500 INJECTION, SOLUTION INTRAVENOUS at 13:01

## 2023-02-16 RX ADMIN — FENTANYL CITRATE 25 MCG: 0.05 INJECTION, SOLUTION INTRAMUSCULAR; INTRAVENOUS at 02:36

## 2023-02-16 RX ADMIN — FUROSEMIDE 20 MG: 20 TABLET ORAL at 09:30

## 2023-02-16 RX ADMIN — METRONIDAZOLE 500 MG: 500 INJECTION, SOLUTION INTRAVENOUS at 04:19

## 2023-02-16 RX ADMIN — PRAVASTATIN SODIUM 40 MG: 40 TABLET ORAL at 09:31

## 2023-02-16 RX ADMIN — METRONIDAZOLE 500 MG: 500 INJECTION, SOLUTION INTRAVENOUS at 21:16

## 2023-02-16 RX ADMIN — SODIUM CHLORIDE, PRESERVATIVE FREE 10 ML: 5 INJECTION INTRAVENOUS at 21:19

## 2023-02-16 RX ADMIN — SODIUM CHLORIDE: 9 INJECTION, SOLUTION INTRAVENOUS at 12:59

## 2023-02-16 RX ADMIN — VALSARTAN 320 MG: 80 TABLET ORAL at 09:30

## 2023-02-16 RX ADMIN — APIXABAN 5 MG: 5 TABLET, FILM COATED ORAL at 21:13

## 2023-02-16 RX ADMIN — SODIUM CHLORIDE, PRESERVATIVE FREE 10 ML: 5 INJECTION INTRAVENOUS at 09:31

## 2023-02-16 RX ADMIN — PANTOPRAZOLE SODIUM 40 MG: 40 INJECTION, POWDER, FOR SOLUTION INTRAVENOUS at 09:31

## 2023-02-16 RX ADMIN — DOFETILIDE 125 MCG: 0.12 CAPSULE ORAL at 21:13

## 2023-02-16 RX ADMIN — SIMETHICONE 80 MG: 80 TABLET, CHEWABLE ORAL at 12:19

## 2023-02-16 RX ADMIN — APIXABAN 5 MG: 5 TABLET, FILM COATED ORAL at 09:30

## 2023-02-16 RX ADMIN — FENTANYL CITRATE 25 MCG: 0.05 INJECTION, SOLUTION INTRAMUSCULAR; INTRAVENOUS at 00:11

## 2023-02-16 RX ADMIN — CEFTRIAXONE SODIUM 1000 MG: 1 INJECTION, POWDER, FOR SOLUTION INTRAMUSCULAR; INTRAVENOUS at 14:48

## 2023-02-16 RX ADMIN — DOFETILIDE 125 MCG: 0.12 CAPSULE ORAL at 09:30

## 2023-02-16 ASSESSMENT — PAIN SCALES - GENERAL
PAINLEVEL_OUTOF10: 0
PAINLEVEL_OUTOF10: 2
PAINLEVEL_OUTOF10: 3
PAINLEVEL_OUTOF10: 2
PAINLEVEL_OUTOF10: 0
PAINLEVEL_OUTOF10: 8
PAINLEVEL_OUTOF10: 10
PAINLEVEL_OUTOF10: 0
PAINLEVEL_OUTOF10: 0

## 2023-02-16 ASSESSMENT — PAIN DESCRIPTION - ORIENTATION
ORIENTATION: RIGHT;UPPER
ORIENTATION: UPPER;MID
ORIENTATION: UPPER

## 2023-02-16 ASSESSMENT — PAIN - FUNCTIONAL ASSESSMENT: PAIN_FUNCTIONAL_ASSESSMENT: ACTIVITIES ARE NOT PREVENTED

## 2023-02-16 ASSESSMENT — PAIN DESCRIPTION - PAIN TYPE: TYPE: ACUTE PAIN

## 2023-02-16 ASSESSMENT — PAIN DESCRIPTION - LOCATION
LOCATION: ABDOMEN

## 2023-02-16 ASSESSMENT — PAIN DESCRIPTION - DESCRIPTORS
DESCRIPTORS: ACHING;CRAMPING
DESCRIPTORS: SHARP
DESCRIPTORS: TENDER

## 2023-02-16 NOTE — PROGRESS NOTES
Removed restraints. PT is currently resting with eyes closes and hands still on lap. Will continue to monitor.

## 2023-02-16 NOTE — PROGRESS NOTES
Hospitalist Progress Note      PCP: Nereida Arreguin MD    Date of Admission: 2/14/2023    Chief Complaint: N/V    Subjective:     Reports onset of large watery stools last night and throughout the day today. Abdominal pain minimal. Nausea improved. Tolerating some liquids. Medications:  Reviewed    Infusion Medications    sodium chloride      sodium chloride 75 mL/hr at 02/15/23 0324    dextrose       Scheduled Medications    diphenhydrAMINE  25 mg IntraVENous Once    cefTRIAXone (ROCEPHIN) IV  1,000 mg IntraVENous Q24H    dofetilide  125 mcg Oral 2 times per day    apixaban  5 mg Oral BID    furosemide  20 mg Oral Daily    metoprolol succinate  50 mg Oral Daily    pravastatin  40 mg Oral Daily    valsartan  320 mg Oral Daily    sodium chloride flush  5-40 mL IntraVENous 2 times per day    pantoprazole  40 mg IntraVENous Daily    metroNIDAZOLE  500 mg IntraVENous Q8H    insulin lispro  0-4 Units SubCUTAneous TID WC    insulin lispro  0-4 Units SubCUTAneous Nightly     PRN Meds: simethicone, sodium chloride flush, sodium chloride, ondansetron **OR** ondansetron, polyethylene glycol, acetaminophen **OR** acetaminophen, glucose, dextrose bolus **OR** dextrose bolus, glucagon (rDNA), dextrose, labetalol, promethazine      Intake/Output Summary (Last 24 hours) at 2/16/2023 1046  Last data filed at 2/16/2023 0930  Gross per 24 hour   Intake 490 ml   Output --   Net 490 ml       Physical Exam Performed:    BP (!) 163/68   Pulse 57   Temp 98.1 °F (36.7 °C) (Oral)   Resp 16   Ht 5' 2\" (1.575 m)   Wt 170 lb (77.1 kg)   SpO2 95%   BMI 31.09 kg/m²     General appearance: No apparent distress, appears stated age and cooperative. HEENT: Pupils equal, round, and reactive to light. Conjunctivae/corneas clear. Neck: Supple, with full range of motion. No jugular venous distention. Trachea midline. Respiratory:  Normal respiratory effort.  Clear to auscultation, bilaterally without Rales/Wheezes/Rhonchi. Cardiovascular: Regular rate and rhythm with normal S1/S2 without murmurs, rubs or gallops. Abdomen: Soft, non-tender, non-distended with normal bowel sounds. Musculoskeletal: No clubbing, cyanosis or edema bilaterally. Full range of motion without deformity. Skin: Skin color, texture, turgor normal.  No rashes or lesions. Neurologic:  Neurovascularly intact without any focal sensory/motor deficits. Cranial nerves: II-XII intact, grossly non-focal.  Psychiatric: Alert and oriented, thought content appropriate, normal insight  Capillary Refill: Brisk, 3 seconds, normal   Peripheral Pulses: +2 palpable, equal bilaterally       Labs:   Recent Labs     02/14/23  1148 02/15/23  0536 02/16/23  0609   WBC 9.8 10.9 8.6   HGB 15.0 13.4 12.6   HCT 45.8 40.9 39.4    311 312       Recent Labs     02/14/23  1148 02/15/23  0536 02/16/23  0609     134* 137 137   K 4.3  5.9* 4.7 3.8     99 104 104   CO2 22  24 23 21   BUN 13  14 19 11   CREATININE 0.8  0.8 0.9 0.8   CALCIUM 8.7  9.1 7.9* 8.0*       Recent Labs     02/14/23  1148 02/15/23  0536   AST 38* 25   ALT 16 21   BILIDIR <0.2 <0.2   BILITOT 0.5 0.5   ALKPHOS 99 96       No results for input(s): INR in the last 72 hours. Recent Labs     02/14/23  1148   TROPONINI <0.01         Urinalysis:      Lab Results   Component Value Date/Time    NITRU Negative 02/14/2023 11:48 AM    WBCUA 3-5 02/14/2023 11:48 AM    BACTERIA Rare 02/14/2023 11:48 AM    RBCUA 0-2 02/14/2023 11:48 AM    BLOODU Negative 02/14/2023 11:48 AM    SPECGRAV 1.010 02/14/2023 11:48 AM    GLUCOSEU Negative 02/14/2023 11:48 AM       Radiology:  CT ABDOMEN PELVIS W IV CONTRAST Additional Contrast? None   Final Result   1. Diffuse pattern of mild mucosal thickening of the colon, new from prior   exam.  Underlying colitis may be suspected with infectious etiologies favored   given the rapid change since the prior study.    2. Fecalization of the distal small bowel which may be associated with above. No pattern of bowel obstruction. XR CHEST (2 VW)   Final Result   No acute process. IP CONSULT TO HOSPITALIST  IP CONSULT TO GENERAL SURGERY    Assessment/Plan:    Colitis, presumed infectious . [facility] Presented with nausea and vomiting with abdominal pain. .  CT showed diffuse mild mucosal thickening, possible colitis. Recent Cholecystectomy but current issues unrelated, GenSurg following. She denied having any diarrhea initially, however, developed large watery diarrhea on 2/15 PM.   Need to rule out C.diff  Currently on Flagyl and Rocephin (changed cipro to rocephin dt apparent local reaction to cipro)  Can continue empiric Abx for now, however, if C.diff positive will need to change. Continue clears and advance as tolerated    -Paroxysmal fibrillation. Swathi Adria Eliquis Tikosyn and beta-blocker    -Diabetes mellitus type 2    -Essential hypertension. Swathi Adria valsartan, metoprolol and Lasix    -Hyperlipidemia-continue statin    DVT Prophylaxis: Eliquis  Diet: ADULT DIET;  Full Liquid  Code Status: Full Code  PT/OT Eval Status:     Dispo -? 2-3 days    Dimitry Sarmiento MD

## 2023-02-16 NOTE — PLAN OF CARE
Problem: Discharge Planning  Goal: Discharge to home or other facility with appropriate resources  Outcome: Progressing  Flowsheets (Taken 2/16/2023 1035)  Discharge to home or other facility with appropriate resources: Identify barriers to discharge with patient and caregiver     Problem: Pain  Goal: Verbalizes/displays adequate comfort level or baseline comfort level  Outcome: Progressing     Problem: ABCDS Injury Assessment  Goal: Absence of physical injury  Outcome: Progressing     Problem: Chronic Conditions and Co-morbidities  Goal: Patient's chronic conditions and co-morbidity symptoms are monitored and maintained or improved  Outcome: Progressing  Flowsheets (Taken 2/16/2023 1035)  Care Plan - Patient's Chronic Conditions and Co-Morbidity Symptoms are Monitored and Maintained or Improved: Monitor and assess patient's chronic conditions and comorbid symptoms for stability, deterioration, or improvement

## 2023-02-17 VITALS
OXYGEN SATURATION: 97 % | WEIGHT: 170 LBS | TEMPERATURE: 98.1 F | DIASTOLIC BLOOD PRESSURE: 77 MMHG | BODY MASS INDEX: 31.28 KG/M2 | HEIGHT: 62 IN | RESPIRATION RATE: 16 BRPM | HEART RATE: 63 BPM | SYSTOLIC BLOOD PRESSURE: 162 MMHG

## 2023-02-17 LAB
ANION GAP SERPL CALCULATED.3IONS-SCNC: 11 MMOL/L (ref 3–16)
BUN BLDV-MCNC: 9 MG/DL (ref 7–20)
C DIFF TOXIN/ANTIGEN: NORMAL
CALCIUM SERPL-MCNC: 8.4 MG/DL (ref 8.3–10.6)
CHLORIDE BLD-SCNC: 104 MMOL/L (ref 99–110)
CO2: 23 MMOL/L (ref 21–32)
CREAT SERPL-MCNC: 0.8 MG/DL (ref 0.6–1.2)
GFR SERPL CREATININE-BSD FRML MDRD: >60 ML/MIN/{1.73_M2}
GLUCOSE BLD-MCNC: 102 MG/DL (ref 70–99)
GLUCOSE BLD-MCNC: 106 MG/DL (ref 70–99)
GLUCOSE BLD-MCNC: 88 MG/DL (ref 70–99)
HCT VFR BLD CALC: 40.9 % (ref 36–48)
HEMOGLOBIN: 13.3 G/DL (ref 12–16)
MCH RBC QN AUTO: 26.9 PG (ref 26–34)
MCHC RBC AUTO-ENTMCNC: 32.4 G/DL (ref 31–36)
MCV RBC AUTO: 82.9 FL (ref 80–100)
PDW BLD-RTO: 14.7 % (ref 12.4–15.4)
PERFORMED ON: ABNORMAL
PERFORMED ON: NORMAL
PLATELET # BLD: 302 K/UL (ref 135–450)
PMV BLD AUTO: 9 FL (ref 5–10.5)
POTASSIUM REFLEX MAGNESIUM: 4 MMOL/L (ref 3.5–5.1)
RBC # BLD: 4.93 M/UL (ref 4–5.2)
SODIUM BLD-SCNC: 138 MMOL/L (ref 136–145)
WBC # BLD: 7.5 K/UL (ref 4–11)

## 2023-02-17 PROCEDURE — 6370000000 HC RX 637 (ALT 250 FOR IP): Performed by: INTERNAL MEDICINE

## 2023-02-17 PROCEDURE — 80048 BASIC METABOLIC PNL TOTAL CA: CPT

## 2023-02-17 PROCEDURE — 85027 COMPLETE CBC AUTOMATED: CPT

## 2023-02-17 PROCEDURE — 36415 COLL VENOUS BLD VENIPUNCTURE: CPT

## 2023-02-17 PROCEDURE — 2500000003 HC RX 250 WO HCPCS: Performed by: INTERNAL MEDICINE

## 2023-02-17 RX ORDER — AMOXICILLIN AND CLAVULANATE POTASSIUM 875; 125 MG/1; MG/1
1 TABLET, FILM COATED ORAL EVERY 8 HOURS
Qty: 9 TABLET | Refills: 0 | Status: SHIPPED | OUTPATIENT
Start: 2023-02-17 | End: 2023-02-20

## 2023-02-17 RX ORDER — GREEN TEA/HOODIA GORDONII 315-12.5MG
1 CAPSULE ORAL DAILY
Qty: 30 TABLET | Refills: 0 | Status: SHIPPED | OUTPATIENT
Start: 2023-02-17 | End: 2023-03-19

## 2023-02-17 RX ADMIN — DOFETILIDE 125 MCG: 0.12 CAPSULE ORAL at 08:41

## 2023-02-17 RX ADMIN — VALSARTAN 320 MG: 80 TABLET ORAL at 08:41

## 2023-02-17 RX ADMIN — METOPROLOL SUCCINATE 50 MG: 50 TABLET, EXTENDED RELEASE ORAL at 08:47

## 2023-02-17 RX ADMIN — APIXABAN 5 MG: 5 TABLET, FILM COATED ORAL at 08:40

## 2023-02-17 RX ADMIN — FUROSEMIDE 20 MG: 20 TABLET ORAL at 08:41

## 2023-02-17 RX ADMIN — PRAVASTATIN SODIUM 40 MG: 40 TABLET ORAL at 08:40

## 2023-02-17 RX ADMIN — METRONIDAZOLE 500 MG: 500 INJECTION, SOLUTION INTRAVENOUS at 04:07

## 2023-02-17 ASSESSMENT — PAIN SCALES - GENERAL: PAINLEVEL_OUTOF10: 0

## 2023-02-17 NOTE — PROGRESS NOTES
PT discharged home with . All belongings sent with pt. Discharge medications, follow-ups, and instructions explained. PT denies any further questions at this time.

## 2023-02-17 NOTE — CARE COORDINATION
Writer reviewed chart day 3. Patient is IPTA and declines any DCP needs. Clear liquid diet now, will need advanced and be able to tolerate diet.      Yasmeen Coppola RN

## 2023-02-17 NOTE — PLAN OF CARE
Problem: Discharge Planning  Goal: Discharge to home or other facility with appropriate resources  Outcome: Progressing  Flowsheets (Taken 2/17/2023 0835)  Discharge to home or other facility with appropriate resources: Identify barriers to discharge with patient and caregiver     Problem: Pain  Goal: Verbalizes/displays adequate comfort level or baseline comfort level  Outcome: Progressing  Flowsheets (Taken 2/17/2023 0830)  Verbalizes/displays adequate comfort level or baseline comfort level: Encourage patient to monitor pain and request assistance

## 2023-02-17 NOTE — DISCHARGE SUMMARY
Hospital Medicine Discharge Summary    Patient ID: Jb Tiwari      Patient's PCP: Kymberly Fuentes MD    Admit Date: 2/14/2023     Discharge Date:   2/17/2023    Admitting Provider: No admitting provider for patient encounter. Discharge Provider: Wagner Munoz DO     Discharge Diagnoses: Active Hospital Problems    Diagnosis     Abdominal pain [R10.9]      Priority: Medium    Colitis [K52.9]      Hospital Course:     Patient presented with nausea and vomiting     Colitis, bacterial infectious   CT showed diffuse mild mucosal thickening, possible colitis. Recent Cholecystectomy but current issues unrelated, GenSurg following. She denied having any diarrhea initially, however, developed large watery diarrhea on 2/15 PM.   Currently on Flagyl and Rocephin (changed cipro to rocephin dt apparent local reaction to cipro)  As patient's diarrhea has resolved and no abdominal pain we will transition to Augmentin for an additional 3 days     -Paroxysmal fibrillation. Secondary hypercoag state. Stable-on Eliquis Tikosyn and beta-blocker     -Diabetes mellitus type 2     -Essential hypertension. Isabella Parrish valsartan, metoprolol and Lasix     -Hyperlipidemia-continue statin  The patient was seen and examined on day of discharge and this discharge summary is in conjunction with any daily progress note from day of discharge. Physical Exam Performed:     BP (!) 162/77   Pulse 63   Temp 98.1 °F (36.7 °C) (Oral)   Resp 16   Ht 5' 2\" (1.575 m)   Wt 170 lb (77.1 kg)   SpO2 97%   BMI 31.09 kg/m²       General appearance:  No apparent distress, appears stated age and cooperative. HEENT:  Normal cephalic, atraumatic without obvious deformity. Pupils equal, round, and reactive to light. Extra ocular muscles intact. Conjunctivae/corneas clear. Neck: Supple, with full range of motion. No jugular venous distention. Trachea midline. Respiratory:  Normal respiratory effort.  Clear to auscultation, bilaterally without Rales/Wheezes/Rhonchi. Cardiovascular:  Regular rate and rhythm with normal S1/S2 without murmurs, rubs or gallops. Abdomen: Soft, non-tender, non-distended with normal bowel sounds. Musculoskeletal:  No clubbing, cyanosis or edema bilaterally. Full range of motion without deformity. Skin: Skin color, texture, turgor normal.  No rashes or lesions. Neurologic:  Neurovascularly intact without any focal sensory/motor deficits. Cranial nerves: II-XII intact, grossly non-focal.  Psychiatric:  Alert and oriented, thought content appropriate, normal insight  Capillary Refill: Brisk,< 3 seconds   Peripheral Pulses: +2 palpable, equal bilaterally       Labs: For convenience and continuity at follow-up the following most recent labs are provided:      CBC:    Lab Results   Component Value Date/Time    WBC 7.5 02/17/2023 06:04 AM    HGB 13.3 02/17/2023 06:04 AM    HCT 40.9 02/17/2023 06:04 AM     02/17/2023 06:04 AM       Renal:    Lab Results   Component Value Date/Time     02/17/2023 06:03 AM    K 4.0 02/17/2023 06:03 AM     02/17/2023 06:03 AM    CO2 23 02/17/2023 06:03 AM    BUN 9 02/17/2023 06:03 AM    CREATININE 0.8 02/17/2023 06:03 AM    CALCIUM 8.4 02/17/2023 06:03 AM    PHOS 2.5 09/15/2020 06:36 AM         Significant Diagnostic Studies    Radiology:   CT ABDOMEN PELVIS W IV CONTRAST Additional Contrast? None   Final Result   1. Diffuse pattern of mild mucosal thickening of the colon, new from prior   exam.  Underlying colitis may be suspected with infectious etiologies favored   given the rapid change since the prior study. 2. Fecalization of the distal small bowel which may be associated with above. No pattern of bowel obstruction. XR CHEST (2 VW)   Final Result   No acute process.                 Consults:     IP CONSULT TO HOSPITALIST  IP CONSULT TO GENERAL SURGERY    Disposition:  Home     Condition at Discharge: Stable    Discharge Instructions/Follow-up: PCP in 1 week. Has f/u with surgery on tuesday    Code Status:  Full Code     Activity: activity as tolerated    Diet: regular diet      Discharge Medications:     Current Discharge Medication List             Details   amoxicillin-clavulanate (AUGMENTIN) 875-125 MG per tablet Take 1 tablet by mouth every 8 (eight) hours for 3 days  Qty: 9 tablet, Refills: 0      Probiotic Acidophilus (FLORANEX) TABS Take 1 tablet by mouth daily  Qty: 30 tablet, Refills: 0                Details   ELIQUIS 5 MG TABS tablet TAKE ONE TABLET BY MOUTH TWICE A DAY  Qty: 60 tablet, Refills: 6    Associated Diagnoses: Atrial fibrillation with rapid ventricular response ([address] Chronic diastolic heart failure (HCC)      furosemide (LASIX) 20 MG tablet Take 20 mg by mouth daily Patient states she will take it when she is home      metoprolol succinate (TOPROL XL) 100 MG extended release tablet Take 0.5 tablets by mouth daily Prescribed as 100mg bid, pt. Takes only 50mg if HR > 60. Qty: 180 tablet, Refills: 3      valsartan (DIOVAN) 320 MG tablet Take 1 tablet by mouth daily  Qty: 90 tablet, Refills: 1      pantoprazole (PROTONIX) 20 MG tablet Take 2 tablets by mouth 2 times daily  Qty: 60 tablet, Refills: 0      pravastatin (PRAVACHOL) 80 MG tablet Take 40 mg by mouth daily      pioglitazone (ACTOS) 15 MG tablet Take 15 mg by mouth daily      dofetilide (TIKOSYN) 125 MCG capsule Take 1 capsule by mouth every 12 hours  Qty: 180 capsule, Refills: 3      glyBURIDE (DIABETA) 1.25 MG tablet Take 2.5 mg by mouth 2 times daily (with meals)              Time Spent on discharge: 32 minutes in the examination, evaluation, counseling and review of medications and discharge plan. Signed:    Linh Anne DO   2/17/2023      Thank you George Negrete MD for the opportunity to be involved in this patient's care. If you have any questions or concerns, please feel free to contact me at [phone].

## 2023-02-17 NOTE — PROGRESS NOTES
Physician Progress Note      PATIENT:               Abraham HA  CSN #:                  [id number]  :                       1935  ADMIT DATE:       2023 11:23 AM  DISCH DATE:  Karen Paz  PROVIDER #:        Ginger Fields MD          QUERY TEXT:    Pt admitted with \"Abdominal pain nausea vomiting-possible   gastroenterocolitis\", and has \"infectious colitis\" documented. If possible,   please document the type of colitis in the medical record. The medical record reflects the following:  Risk Factors: lap marcio 10 days ago  Clinical Indicators: WBC 10.9 -Surgery consult-\"Possible colitis of uncertain   etiology possibly infectious\"   PN-  possible Colitis, presumed infectious   . Sg Antony Presented with nausea and vomiting with abdominal pain. .  CT showed diffuse   mild mucosal thickening, possible colitis. .  Treatment: On Cipro and Flagyl-changed cipro to rocephin dt apparent local   reaction to cipro overnight  Start clears and advance as tolerated, serial labs, supportive care    Thank-You, Yani Butler RN, BSN, CCDS  Options provided:  -- Colitis, presumed (infectious) Bacterial Colitis  -- Colitis due to other etiology, Please document other etiology. -- Other - I will add my own diagnosis  -- Disagree - Not applicable / Not valid  -- Disagree - Clinically unable to determine / Unknown  -- Refer to Clinical Documentation Reviewer    PROVIDER RESPONSE TEXT:    This patient has Colitis, presumed (infectious) Bacterial Colitis. Query created by: Ainsley Ngo on 2023 2:47 PM      QUERY TEXT:    Patient admitted with \"possible Colitis, presumed infectious\", noted to have   paroxysmal atrial fibrillation and is maintained on Eliquis. If possible,   please document in progress notes and discharge summary if you are evaluating   and/or treating any of the following: The medical record reflects the following:  Risk Factors: afib  Clinical Indicators: PN- Paroxysmal fibrillation. [place] Julio C Eliquis Tikosyn   and beta-blocker  Treatment: continue  Eliquis Tikosyn and beta-blocker    Thank-You, Yani Butler RN, BSN, CCDS  Options provided:  -- Secondary hypercoagulable state in a patient with atrial fibrillation  -- Other - I will add my own diagnosis  -- Disagree - Not applicable / Not valid  -- Disagree - Clinically unable to determine / Unknown  -- Refer to Clinical Documentation Reviewer    PROVIDER RESPONSE TEXT:    Provider disagreed with this query. Query created by: Cachorro Ernandez on 2/16/2023 2:53 PM      Electronically signed by:   Shawn Hayes MD 2/17/2023 12:59 AM

## 2023-02-17 NOTE — PROGRESS NOTES
Advanced pt diet to regular. Pt consumed turkey sandwich without complication or pain. Tolerated diet well.

## 2023-02-18 LAB
BLOOD CULTURE, ROUTINE: NORMAL
CULTURE, BLOOD 2: NORMAL

## 2023-02-21 ENCOUNTER — OFFICE VISIT (OUTPATIENT)
Dept: SURGERY | Age: 88
End: 2023-02-21

## 2023-02-21 VITALS
WEIGHT: 174 LBS | BODY MASS INDEX: 32.02 KG/M2 | DIASTOLIC BLOOD PRESSURE: 80 MMHG | HEIGHT: 62 IN | SYSTOLIC BLOOD PRESSURE: 132 MMHG

## 2023-02-21 DIAGNOSIS — Z98.890 POST-OPERATIVE STATE: Primary | ICD-10-CM

## 2023-02-21 PROCEDURE — 99024 POSTOP FOLLOW-UP VISIT: CPT | Performed by: SURGERY

## 2023-02-21 NOTE — PROGRESS NOTES
[facility] GENERAL SURGERY      S:   Patient presents s/p robotic cholecystectomy. She reports getting colitis about 10 days after surgery. She is better now. O:   Comfortable         Incision sites healing well. A:   S/P robotic cholecystectomy    P:   Follow up as needed.

## 2023-04-18 ENCOUNTER — TELEPHONE (OUTPATIENT)
Dept: CARDIOLOGY CLINIC | Age: 88
End: 2023-04-18

## 2023-05-12 ENCOUNTER — OFFICE VISIT (OUTPATIENT)
Dept: CARDIOLOGY CLINIC | Age: 88
End: 2023-05-12

## 2023-05-12 VITALS
WEIGHT: 176.6 LBS | HEIGHT: 62 IN | SYSTOLIC BLOOD PRESSURE: 114 MMHG | BODY MASS INDEX: 32.5 KG/M2 | DIASTOLIC BLOOD PRESSURE: 70 MMHG | HEART RATE: 52 BPM | OXYGEN SATURATION: 98 %

## 2023-05-12 DIAGNOSIS — I48.0 PAF (PAROXYSMAL ATRIAL FIBRILLATION) (HCC): Primary | ICD-10-CM

## 2023-05-12 DIAGNOSIS — I49.3 PVC (PREMATURE VENTRICULAR CONTRACTION): ICD-10-CM

## 2023-05-12 DIAGNOSIS — I47.1 SVT (SUPRAVENTRICULAR TACHYCARDIA) (HCC): ICD-10-CM

## 2023-05-12 NOTE — PATIENT INSTRUCTIONS
Stop metoprolol due to low heart rate and dizziness     Continue Tikosyn. If atrial fib episodes are happening more frequently or are persistent, let me know because we can change medications. Continue as need diltiazem for break through atrial fibrillation     Follow up in 6 months or sooner if needed.

## 2023-05-12 NOTE — PROGRESS NOTES
[address]   Electrophysiology Outpatient Note              Date:  May 12, 2023  Patient name: Ruben Cooks Payer  YOB: 1935    Primary Care physician: Kinsey Aguilar MD    HISTORY OF PRESENT ILLNESS: The patient is a 81 y.o.  female with a history of AF, PVC's, HTN, SB, SVT, pulmonary HTN, chronic diastolic CHF an DM. In 2013, she had SVT. She wore a monitor which detected AF. In 2/2014, she underwent cryoablation of AF. Post procedure she had a retroperitoneal bleed and required a urinary stent. In 2/2019, echo showed an EF of 55-60% and severe pulmonary HTN and she was referred to CHF team. In 1/2020, AF was recurrent and amiodarone was started. Amiodarone was stopped for unknown reasons. In 12/2020, Tikosyn was started. In 12/2021, echo showed an EF of 55 to 60% and a normal SPAP. In 7/14/2022, patient was evaluated in the ER with symptomatic rapid atrial fibrillation. She was given some IV metoprolol and spontaneously converted to sinus rhythm within several hours. Reports that she had missed several doses of Tikosyn prior to this day due to her  being in the hospital.  In 1/2023, she was admitted to the hospital for abdominal pain. Work-up revealed partial small bowel obstruction and gallstones. Chart review reveals that she had an episode of rapid atrial fibrillation on 1/26/2023. An EKG was not performed at this time. Today she is being seen for AF. EKG shows SB with a HR of 52 and and QTc of 466. Patient reports she had a episode of AF about two weeks ago. It lasted for approximately four hours. She took PRN Cardizem. She has noticed a slower heart rate lately. She reports dizziness and lightheadedness from this. She is taking metoprolol in the morning. Denies chest pain or shortness of breath.      Past Medical History:   has a past medical history of Atrial fibrillation ([address] Diabetes mellitus ([facility] [address] Hydronephrosis, Hyperlipidemia, Hypertension,

## 2023-05-26 ENCOUNTER — HOSPITAL ENCOUNTER (INPATIENT)
Age: 88
LOS: 1 days | Discharge: HOME | DRG: 313 | End: 2023-05-27
Attending: EMERGENCY MEDICINE | Admitting: INTERNAL MEDICINE
Payer: MEDICARE

## 2023-05-26 ENCOUNTER — APPOINTMENT (OUTPATIENT)
Dept: GENERAL RADIOLOGY | Age: 88
DRG: 313 | End: 2023-05-26
Payer: MEDICARE

## 2023-05-26 ENCOUNTER — TELEPHONE (OUTPATIENT)
Dept: CARDIOLOGY CLINIC | Age: 88
End: 2023-05-26

## 2023-05-26 DIAGNOSIS — R00.2 PALPITATIONS: ICD-10-CM

## 2023-05-26 DIAGNOSIS — R07.9 CHEST PAIN, UNSPECIFIED TYPE: Primary | ICD-10-CM

## 2023-05-26 LAB
ALBUMIN SERPL-MCNC: 3.9 G/DL (ref 3.4–5)
ALBUMIN/GLOB SERPL: 0.8 {RATIO} (ref 1.1–2.2)
ALP SERPL-CCNC: 92 U/L (ref 40–129)
ALT SERPL-CCNC: 13 U/L (ref 10–40)
ANION GAP SERPL CALCULATED.3IONS-SCNC: 11 MMOL/L (ref 3–16)
AST SERPL-CCNC: 17 U/L (ref 15–37)
BASOPHILS # BLD: 0.1 K/UL (ref 0–0.2)
BASOPHILS NFR BLD: 0.9 %
BILIRUB SERPL-MCNC: 0.3 MG/DL (ref 0–1)
BUN SERPL-MCNC: 20 MG/DL (ref 7–20)
CALCIUM SERPL-MCNC: 8.7 MG/DL (ref 8.3–10.6)
CHLORIDE SERPL-SCNC: 101 MMOL/L (ref 99–110)
CO2 SERPL-SCNC: 25 MMOL/L (ref 21–32)
CREAT SERPL-MCNC: 0.9 MG/DL (ref 0.6–1.2)
DEPRECATED RDW RBC AUTO: 15.5 % (ref 12.4–15.4)
EKG ATRIAL RATE: 75 BPM
EKG DIAGNOSIS: NORMAL
EKG P AXIS: 79 DEGREES
EKG P-R INTERVAL: 180 MS
EKG Q-T INTERVAL: 398 MS
EKG QRS DURATION: 86 MS
EKG QTC CALCULATION (BAZETT): 444 MS
EKG R AXIS: -15 DEGREES
EKG T AXIS: 60 DEGREES
EKG VENTRICULAR RATE: 75 BPM
EOSINOPHIL # BLD: 0.2 K/UL (ref 0–0.6)
EOSINOPHIL NFR BLD: 3.1 %
GFR SERPLBLD CREATININE-BSD FMLA CKD-EPI: >60 ML/MIN/{1.73_M2}
GLUCOSE BLD-MCNC: 129 MG/DL (ref 70–99)
GLUCOSE BLD-MCNC: 186 MG/DL (ref 70–99)
GLUCOSE BLD-MCNC: 81 MG/DL (ref 70–99)
GLUCOSE SERPL-MCNC: 105 MG/DL (ref 70–99)
HCT VFR BLD AUTO: 46.4 % (ref 36–48)
HGB BLD-MCNC: 14.8 G/DL (ref 12–16)
LYMPHOCYTES # BLD: 2.3 K/UL (ref 1–5.1)
LYMPHOCYTES NFR BLD: 30.5 %
MCH RBC QN AUTO: 26.6 PG (ref 26–34)
MCHC RBC AUTO-ENTMCNC: 31.8 G/DL (ref 31–36)
MCV RBC AUTO: 83.4 FL (ref 80–100)
MONOCYTES # BLD: 0.7 K/UL (ref 0–1.3)
MONOCYTES NFR BLD: 9.2 %
NEUTROPHILS # BLD: 4.2 K/UL (ref 1.7–7.7)
NEUTROPHILS NFR BLD: 56.3 %
PERFORMED ON: ABNORMAL
PERFORMED ON: ABNORMAL
PERFORMED ON: NORMAL
PLATELET # BLD AUTO: 291 K/UL (ref 135–450)
PMV BLD AUTO: 9.9 FL (ref 5–10.5)
POTASSIUM SERPL-SCNC: 3.8 MMOL/L (ref 3.5–5.1)
PROT SERPL-MCNC: 8.5 G/DL (ref 6.4–8.2)
RBC # BLD AUTO: 5.56 M/UL (ref 4–5.2)
REASON FOR REJECTION: NORMAL
REJECTED TEST: NORMAL
SODIUM SERPL-SCNC: 137 MMOL/L (ref 136–145)
TROPONIN, HIGH SENSITIVITY: 11 NG/L (ref 0–14)
TROPONIN, HIGH SENSITIVITY: 14 NG/L (ref 0–14)
WBC # BLD AUTO: 7.5 K/UL (ref 4–11)

## 2023-05-26 PROCEDURE — 84484 ASSAY OF TROPONIN QUANT: CPT

## 2023-05-26 PROCEDURE — 6370000000 HC RX 637 (ALT 250 FOR IP)

## 2023-05-26 PROCEDURE — 6370000000 HC RX 637 (ALT 250 FOR IP): Performed by: INTERNAL MEDICINE

## 2023-05-26 PROCEDURE — 36415 COLL VENOUS BLD VENIPUNCTURE: CPT

## 2023-05-26 PROCEDURE — 71046 X-RAY EXAM CHEST 2 VIEWS: CPT

## 2023-05-26 PROCEDURE — 1200000000 HC SEMI PRIVATE

## 2023-05-26 PROCEDURE — 93005 ELECTROCARDIOGRAM TRACING: CPT | Performed by: INTERNAL MEDICINE

## 2023-05-26 PROCEDURE — 80053 COMPREHEN METABOLIC PANEL: CPT

## 2023-05-26 PROCEDURE — 99285 EMERGENCY DEPT VISIT HI MDM: CPT

## 2023-05-26 PROCEDURE — 6370000000 HC RX 637 (ALT 250 FOR IP): Performed by: EMERGENCY MEDICINE

## 2023-05-26 PROCEDURE — 93005 ELECTROCARDIOGRAM TRACING: CPT | Performed by: EMERGENCY MEDICINE

## 2023-05-26 PROCEDURE — 83036 HEMOGLOBIN GLYCOSYLATED A1C: CPT

## 2023-05-26 PROCEDURE — 2500000003 HC RX 250 WO HCPCS: Performed by: INTERNAL MEDICINE

## 2023-05-26 PROCEDURE — 2580000003 HC RX 258: Performed by: INTERNAL MEDICINE

## 2023-05-26 PROCEDURE — 93010 ELECTROCARDIOGRAM REPORT: CPT | Performed by: INTERNAL MEDICINE

## 2023-05-26 PROCEDURE — 85025 COMPLETE CBC W/AUTO DIFF WBC: CPT

## 2023-05-26 RX ORDER — DILTIAZEM HYDROCHLORIDE 60 MG/1
30 TABLET, FILM COATED ORAL EVERY 6 HOURS SCHEDULED
Status: DISCONTINUED | OUTPATIENT
Start: 2023-05-26 | End: 2023-05-26

## 2023-05-26 RX ORDER — DEXTROSE MONOHYDRATE 100 MG/ML
INJECTION, SOLUTION INTRAVENOUS CONTINUOUS PRN
Status: DISCONTINUED | OUTPATIENT
Start: 2023-05-26 | End: 2023-05-27 | Stop reason: HOSPADM

## 2023-05-26 RX ORDER — INSULIN LISPRO 100 [IU]/ML
0-4 INJECTION, SOLUTION INTRAVENOUS; SUBCUTANEOUS NIGHTLY
Status: DISCONTINUED | OUTPATIENT
Start: 2023-05-26 | End: 2023-05-27 | Stop reason: HOSPADM

## 2023-05-26 RX ORDER — VALSARTAN 80 MG/1
320 TABLET ORAL DAILY
Status: DISCONTINUED | OUTPATIENT
Start: 2023-05-26 | End: 2023-05-26 | Stop reason: SDUPTHER

## 2023-05-26 RX ORDER — ACETAMINOPHEN 650 MG/1
650 SUPPOSITORY RECTAL EVERY 6 HOURS PRN
Status: DISCONTINUED | OUTPATIENT
Start: 2023-05-26 | End: 2023-05-27 | Stop reason: HOSPADM

## 2023-05-26 RX ORDER — FUROSEMIDE 20 MG/1
20 TABLET ORAL DAILY
Status: DISCONTINUED | OUTPATIENT
Start: 2023-05-27 | End: 2023-05-27 | Stop reason: HOSPADM

## 2023-05-26 RX ORDER — ACETAMINOPHEN 325 MG/1
650 TABLET ORAL EVERY 6 HOURS PRN
Status: DISCONTINUED | OUTPATIENT
Start: 2023-05-26 | End: 2023-05-27 | Stop reason: HOSPADM

## 2023-05-26 RX ORDER — PROMETHAZINE HYDROCHLORIDE 25 MG/ML
12.5 INJECTION, SOLUTION INTRAMUSCULAR; INTRAVENOUS ONCE
Status: DISCONTINUED | OUTPATIENT
Start: 2023-05-26 | End: 2023-05-26

## 2023-05-26 RX ORDER — ASPIRIN 81 MG/1
324 TABLET, CHEWABLE ORAL ONCE
Status: COMPLETED | OUTPATIENT
Start: 2023-05-26 | End: 2023-05-26

## 2023-05-26 RX ORDER — SODIUM CHLORIDE 9 MG/ML
INJECTION, SOLUTION INTRAVENOUS PRN
Status: DISCONTINUED | OUTPATIENT
Start: 2023-05-26 | End: 2023-05-27 | Stop reason: HOSPADM

## 2023-05-26 RX ORDER — PRAVASTATIN SODIUM 40 MG
40 TABLET ORAL DAILY
Status: DISCONTINUED | OUTPATIENT
Start: 2023-05-27 | End: 2023-05-27 | Stop reason: HOSPADM

## 2023-05-26 RX ORDER — VALSARTAN 80 MG/1
320 TABLET ORAL DAILY
Status: DISCONTINUED | OUTPATIENT
Start: 2023-05-27 | End: 2023-05-27 | Stop reason: HOSPADM

## 2023-05-26 RX ORDER — DOFETILIDE 0.12 MG/1
125 CAPSULE ORAL EVERY 12 HOURS SCHEDULED
Status: DISCONTINUED | OUTPATIENT
Start: 2023-05-26 | End: 2023-05-27 | Stop reason: HOSPADM

## 2023-05-26 RX ORDER — POLYETHYLENE GLYCOL 3350 17 G/17G
17 POWDER, FOR SOLUTION ORAL DAILY PRN
Status: DISCONTINUED | OUTPATIENT
Start: 2023-05-26 | End: 2023-05-27 | Stop reason: HOSPADM

## 2023-05-26 RX ORDER — SODIUM CHLORIDE 0.9 % (FLUSH) 0.9 %
5-40 SYRINGE (ML) INJECTION EVERY 12 HOURS SCHEDULED
Status: DISCONTINUED | OUTPATIENT
Start: 2023-05-26 | End: 2023-05-27 | Stop reason: HOSPADM

## 2023-05-26 RX ORDER — DILTIAZEM HYDROCHLORIDE 5 MG/ML
10 INJECTION INTRAVENOUS ONCE
Status: DISCONTINUED | OUTPATIENT
Start: 2023-05-26 | End: 2023-05-26

## 2023-05-26 RX ORDER — PANTOPRAZOLE SODIUM 40 MG/1
40 TABLET, DELAYED RELEASE ORAL 2 TIMES DAILY
Status: DISCONTINUED | OUTPATIENT
Start: 2023-05-26 | End: 2023-05-27 | Stop reason: HOSPADM

## 2023-05-26 RX ORDER — SODIUM CHLORIDE 0.9 % (FLUSH) 0.9 %
5-40 SYRINGE (ML) INJECTION PRN
Status: DISCONTINUED | OUTPATIENT
Start: 2023-05-26 | End: 2023-05-27 | Stop reason: HOSPADM

## 2023-05-26 RX ORDER — DILTIAZEM HYDROCHLORIDE 5 MG/ML
10 INJECTION INTRAVENOUS ONCE
Status: COMPLETED | OUTPATIENT
Start: 2023-05-26 | End: 2023-05-26

## 2023-05-26 RX ORDER — INSULIN LISPRO 100 [IU]/ML
0-4 INJECTION, SOLUTION INTRAVENOUS; SUBCUTANEOUS
Status: DISCONTINUED | OUTPATIENT
Start: 2023-05-26 | End: 2023-05-27 | Stop reason: HOSPADM

## 2023-05-26 RX ORDER — ONDANSETRON 4 MG/1
4 TABLET, ORALLY DISINTEGRATING ORAL EVERY 8 HOURS PRN
Status: DISCONTINUED | OUTPATIENT
Start: 2023-05-26 | End: 2023-05-27 | Stop reason: HOSPADM

## 2023-05-26 RX ORDER — ONDANSETRON 2 MG/ML
4 INJECTION INTRAMUSCULAR; INTRAVENOUS EVERY 6 HOURS PRN
Status: DISCONTINUED | OUTPATIENT
Start: 2023-05-26 | End: 2023-05-27 | Stop reason: HOSPADM

## 2023-05-26 RX ADMIN — SODIUM CHLORIDE 5 MG/HR: 900 INJECTION, SOLUTION INTRAVENOUS at 18:46

## 2023-05-26 RX ADMIN — APIXABAN 5 MG: 5 TABLET, FILM COATED ORAL at 21:17

## 2023-05-26 RX ADMIN — PANTOPRAZOLE SODIUM 40 MG: 40 TABLET, DELAYED RELEASE ORAL at 21:17

## 2023-05-26 RX ADMIN — Medication 10 ML: at 21:18

## 2023-05-26 RX ADMIN — DOFETILIDE 125 MCG: 0.12 CAPSULE ORAL at 21:17

## 2023-05-26 RX ADMIN — ASPIRIN 324 MG: 81 TABLET, CHEWABLE ORAL at 11:39

## 2023-05-26 RX ADMIN — VALSARTAN 320 MG: 80 TABLET ORAL at 10:32

## 2023-05-26 RX ADMIN — DILTIAZEM HYDROCHLORIDE 10 MG: 5 INJECTION INTRAVENOUS at 18:37

## 2023-05-26 ASSESSMENT — PAIN DESCRIPTION - LOCATION: LOCATION: CHEST

## 2023-05-26 ASSESSMENT — PAIN DESCRIPTION - PAIN TYPE: TYPE: ACUTE PAIN

## 2023-05-26 ASSESSMENT — PAIN SCALES - GENERAL
PAINLEVEL_OUTOF10: 1
PAINLEVEL_OUTOF10: 0

## 2023-05-26 ASSESSMENT — PAIN - FUNCTIONAL ASSESSMENT: PAIN_FUNCTIONAL_ASSESSMENT: 0-10

## 2023-05-26 ASSESSMENT — HEART SCORE: ECG: 1

## 2023-05-26 NOTE — ED PROVIDER NOTES
[facility] Emergency Department      CHIEF COMPLAINT  Hypertension and Palpitations (Pt reporting she feels like her heart is \"pounding out of my chest\" states she checked her BP and reports it was 200/74. Called her PCP and was told to go to the ED )      HISTORY OF PRESENT ILLNESS  Jimmy Trujillo is a 81 y.o. female with a history of A-fib, PVCs, hypertension, sinus bradycardia, SVT, pulmonary hypertension, HFpEF, and diabetes who presents with hypertension and palpitations. Patient reports she was seen by her cardiologist on 5/12 and since then has had 2 episodes of A-fib, most recent a few days ago. She is also experiencing sensation of heart pounding out of her chest, which is accompanied by pain, headache, shortness of breath, and cold sweat. She was experiencing 1 of these episodes this morning so called her cardiologist office. They had her check her vitals and BP was found to be 200/74 so they recommended she come to ED. She is not currently having any palpitations or pounding sensation, but does endorse some shortness of breath with walking. She denies any visual changes with headache but does report pressure sensation behind her eyes. She denies any history of heart attacks or strokes. No other complaints, modifying factors or associated symptoms. History obtained from patient    I have reviewed the following from the nursing documentation.     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     Past Surgical History:   Procedure Laterality Date    ABLATION OF DYSRHYTHMIC FOCUS      -2014    APPENDECTOMY      CHOLECYSTECTOMY, LAPAROSCOPIC N/A 2/3/2023    ROBOTIC CHOLECYSTECTOMY performed by Delroy Shin MD at [address] [place] [address], [address] [place] [address] Sw

## 2023-05-26 NOTE — H&P
Hospital Medicine History & Physical      Date of Admission: 5/26/2023    Date of Service:  Pt seen/examined on 27 May     [x]Admitted to Inpatient with expected LOS greater than two midnights due to medical therapy. []Placed in Observation status. Chief Admission Complaint:  Palpitations    Presenting Admission History:      81 y.o. female w/ hx known Afib as well as SVT and possible CHF on antiarrythmic tx  presented to [facility] with symptomatic palpitations w/ associated mild substernal CP and associated SOB, more frequent w/ exertion. She spoke w/ her Cardiologist on morning of admission and took her BP w/ a reading of SBP w/ a recurrent episode who suggested f/u w/ ED      Assessment/Plan:      Current Principal Problem:  Chest pain    Chest pain - Concerning to ED for ACS, etiology clinically unable to determine. Initial enzymes/EKG negative. Follow serial enzymes, reviewed and documented, and monitor on tele - currently w/out evidence of ischemia/arrythmia. Stress test NOT YET ordered pending Cardiology recs. Viviana Lopez HTN - w/out known CAD and no evidence of active signs/sxs of ischemia/failure. Currently controlled on home meds w/ vitals reviewed and documented as above. HyperLipidemia - controlled on home Statin. Continue, w/ f/u and med adjustment w/ PCP     Afib - chronic paroxysmal of unspecified and clinically unable to determine etiology. Normally rate controlled on CCBlocker/Tikosyn - continued. Anticoagulated at baseline on home Eliquis due to secondary hypercoaguable state due to Afib - continued. Monitored on tele. DM2 - controlled on home oral antiGlycemics/Insulin - held/continued. Follow FSBS/SSI low regimen. Last HbA1c 5.6% dated Feb 2023. Anticipate resuming/continuing home regimen at discharge. Obesity -  With Body mass index is 32.3 kg/m². Complicating assessment and treatment.  Placing patient at risk for multiple co-morbidities as well as early death and

## 2023-05-26 NOTE — ED PROVIDER NOTES
Attending Note:    The patient was seen and examined by the resident physician. I also completed a face-to-face examination. HPI: Alina Tiwari is a 81 y.o. female who presents to the emergency department with complaint of midsternal sharp burning chest pain that radiates up into her throat. She states that it occurs \"when I walk too far\". It is relieved with rest.  Symptoms began approximately 1-1/2 weeks ago. She states that she has also had 3 episodes of \"A-fib\" over the last week in which she felt like her heart was racing and beating irregularly. It lasted for a few minutes and then resolved. No associated syncope. This last occurred yesterday evening. She denies any current chest pain. She denies any associated shortness of breath or diaphoresis. She denies any melena or hematochezia. No leg pain or swelling. She did travel to [facility] last week on vacation. She denies any personal or family history of thromboembolic disease. She does report a family history of coronary artery disease in her brother and father both who  in their 45s of heart attacks. She reports that she had a stress test a couple of years ago. She denies any personal history of coronary artery disease. Medical history is significant for SVT, pulmonary hypertension, CHF, A-fib, hypertension, hyperlipidemia, type 2 diabetes. She does not smoke. Physical Exam:     Constitutional: No apparent distress. Well-appearing. Appears comfortable. Head: No visible evidence of trauma. Normocephalic. Eyes: Pupils equal and reactive. No photophobia. Conjunctiva normal.    HENT: Oral mucosa moist.  Airway patent. Neck:  Soft and supple. Nontender. Heart:  Regular rate and rhythm. No murmur. Lungs:  Clear to auscultation. No wheezes, rales, or ronchi. No conversational dyspnea or accessory muscle use. Abdomen:  Soft, non-distended. Nontender. No guarding rigidity or rebound.    Musculoskeletal:

## 2023-05-27 VITALS
SYSTOLIC BLOOD PRESSURE: 166 MMHG | DIASTOLIC BLOOD PRESSURE: 63 MMHG | TEMPERATURE: 97.7 F | WEIGHT: 172.2 LBS | HEIGHT: 62 IN | BODY MASS INDEX: 31.69 KG/M2 | RESPIRATION RATE: 20 BRPM | HEART RATE: 66 BPM | OXYGEN SATURATION: 98 %

## 2023-05-27 LAB
ALBUMIN SERPL-MCNC: 3.2 G/DL (ref 3.4–5)
ANION GAP SERPL CALCULATED.3IONS-SCNC: 11 MMOL/L (ref 3–16)
BUN SERPL-MCNC: 22 MG/DL (ref 7–20)
CALCIUM SERPL-MCNC: 8.6 MG/DL (ref 8.3–10.6)
CHLORIDE SERPL-SCNC: 105 MMOL/L (ref 99–110)
CO2 SERPL-SCNC: 24 MMOL/L (ref 21–32)
CREAT SERPL-MCNC: 1 MG/DL (ref 0.6–1.2)
DEPRECATED RDW RBC AUTO: 15.1 % (ref 12.4–15.4)
EKG ATRIAL RATE: 133 BPM
EKG ATRIAL RATE: 63 BPM
EKG DIAGNOSIS: NORMAL
EKG DIAGNOSIS: NORMAL
EKG P AXIS: 66 DEGREES
EKG P-R INTERVAL: 170 MS
EKG Q-T INTERVAL: 310 MS
EKG Q-T INTERVAL: 434 MS
EKG QRS DURATION: 86 MS
EKG QRS DURATION: 90 MS
EKG QTC CALCULATION (BAZETT): 444 MS
EKG QTC CALCULATION (BAZETT): 473 MS
EKG R AXIS: -8 DEGREES
EKG R AXIS: 9 DEGREES
EKG T AXIS: 112 DEGREES
EKG T AXIS: 61 DEGREES
EKG VENTRICULAR RATE: 140 BPM
EKG VENTRICULAR RATE: 63 BPM
EST. AVERAGE GLUCOSE BLD GHB EST-MCNC: 116.9 MG/DL
GFR SERPLBLD CREATININE-BSD FMLA CKD-EPI: 55 ML/MIN/{1.73_M2}
GLUCOSE BLD-MCNC: 126 MG/DL (ref 70–99)
GLUCOSE BLD-MCNC: 260 MG/DL (ref 70–99)
GLUCOSE SERPL-MCNC: 120 MG/DL (ref 70–99)
HBA1C MFR BLD: 5.7 %
HCT VFR BLD AUTO: 40.3 % (ref 36–48)
HGB BLD-MCNC: 13 G/DL (ref 12–16)
MAGNESIUM SERPL-MCNC: 2.1 MG/DL (ref 1.8–2.4)
MCH RBC QN AUTO: 26.9 PG (ref 26–34)
MCHC RBC AUTO-ENTMCNC: 32.2 G/DL (ref 31–36)
MCV RBC AUTO: 83.6 FL (ref 80–100)
PERFORMED ON: ABNORMAL
PERFORMED ON: ABNORMAL
PHOSPHATE SERPL-MCNC: 3.3 MG/DL (ref 2.5–4.9)
PLATELET # BLD AUTO: 303 K/UL (ref 135–450)
PMV BLD AUTO: 9.5 FL (ref 5–10.5)
POTASSIUM SERPL-SCNC: 4 MMOL/L (ref 3.5–5.1)
RBC # BLD AUTO: 4.82 M/UL (ref 4–5.2)
SODIUM SERPL-SCNC: 140 MMOL/L (ref 136–145)
WBC # BLD AUTO: 8 K/UL (ref 4–11)

## 2023-05-27 PROCEDURE — 99222 1ST HOSP IP/OBS MODERATE 55: CPT | Performed by: INTERNAL MEDICINE

## 2023-05-27 PROCEDURE — 93010 ELECTROCARDIOGRAM REPORT: CPT | Performed by: INTERNAL MEDICINE

## 2023-05-27 PROCEDURE — 83735 ASSAY OF MAGNESIUM: CPT

## 2023-05-27 PROCEDURE — 2580000003 HC RX 258: Performed by: INTERNAL MEDICINE

## 2023-05-27 PROCEDURE — 6370000000 HC RX 637 (ALT 250 FOR IP): Performed by: INTERNAL MEDICINE

## 2023-05-27 PROCEDURE — 93005 ELECTROCARDIOGRAM TRACING: CPT | Performed by: INTERNAL MEDICINE

## 2023-05-27 PROCEDURE — 85027 COMPLETE CBC AUTOMATED: CPT

## 2023-05-27 PROCEDURE — 80069 RENAL FUNCTION PANEL: CPT

## 2023-05-27 RX ORDER — METOPROLOL SUCCINATE 25 MG/1
25 TABLET, EXTENDED RELEASE ORAL DAILY PRN
Qty: 30 TABLET | Refills: 3 | Status: SHIPPED | OUTPATIENT
Start: 2023-05-27 | End: 2023-06-26

## 2023-05-27 RX ORDER — METOPROLOL SUCCINATE 25 MG/1
25 TABLET, EXTENDED RELEASE ORAL DAILY PRN
Status: DISCONTINUED | OUTPATIENT
Start: 2023-05-27 | End: 2023-05-27 | Stop reason: HOSPADM

## 2023-05-27 RX ADMIN — PANTOPRAZOLE SODIUM 40 MG: 40 TABLET, DELAYED RELEASE ORAL at 09:18

## 2023-05-27 RX ADMIN — METOPROLOL SUCCINATE 25 MG: 25 TABLET, EXTENDED RELEASE ORAL at 10:52

## 2023-05-27 RX ADMIN — FUROSEMIDE 20 MG: 20 TABLET ORAL at 09:19

## 2023-05-27 RX ADMIN — DOFETILIDE 125 MCG: 0.12 CAPSULE ORAL at 09:18

## 2023-05-27 RX ADMIN — Medication 10 ML: at 09:19

## 2023-05-27 RX ADMIN — PRAVASTATIN SODIUM 40 MG: 40 TABLET ORAL at 09:18

## 2023-05-27 RX ADMIN — VALSARTAN 320 MG: 80 TABLET, FILM COATED ORAL at 09:19

## 2023-05-27 RX ADMIN — APIXABAN 5 MG: 5 TABLET, FILM COATED ORAL at 09:18

## 2023-05-27 ASSESSMENT — PAIN SCALES - GENERAL
PAINLEVEL_OUTOF10: 0
PAINLEVEL_OUTOF10: 0

## 2023-05-27 NOTE — PLAN OF CARE
Problem: Pain  Goal: Verbalizes/displays adequate comfort level or baseline comfort level  5/27/2023 1108 by Kathia Wallace RN  Outcome: Progressing  Note: Pt will be satisfied with pain control. Pt uses numeric pain rating scale with reassessments after pain med administration. Will continue to monitor progression throughout shift. Problem: Safety - Adult  Goal: Free from fall injury  5/27/2023 1108 by Kathia Wallace RN  Outcome: Progressing  Note: Pt will remain free from falls throughout hospital stay. Fall precautions in place, bed in lowest position with wheels locked and side rails 2/4 up. Room door open and hourly rounding completed. Will continue to monitor throughout shift. Problem: Chronic Conditions and Co-morbidities  Goal: Patient's chronic conditions and co-morbidity symptoms are monitored and maintained or improved  Outcome: Progressing  Note: Pt will have accuchecks before meals and at bedtime with sliding scale insulin in place for coverage. Will continue to monitor for signs and symptoms of hypoglycemia and hyperglycemia throughout shift.

## 2023-05-27 NOTE — PROGRESS NOTES
Pt d/c'd home. Removed peripheral IV and stopped bleeding. Catheter intact. Pt tolerated well. No redness noted at site. Notified [facility] and removed tele box. Reviewed d/c instructions, home meds, and  f/u information utilizing teach-back method. Scripts for metoprolol given to patient. Patient verbalized understanding.

## 2023-05-27 NOTE — CONSULTS
[address] [facility]   Cardiovascular Evaluation    PATIENT: Red Wilson Payer  DATE: 2023  MRN: [id number]  CSN: [id number]  : 1935    Primary Care Doctor/Referring provider: Kelli Romero MD, Nikolay Enrique MD     Reason for evaluation/Chief complaint:   Hypertension and Palpitations (Pt reporting she feels like her heart is \"pounding out of my chest\" states she checked her BP and reports it was 200/74. Called her PCP and was told to go to the ED )      Subjective:    History of present illness on initial date of evaluation:   Thanh Rodriguez is a 81 y.o. patient who presents evaluation of palpitations. Patient has extensive history of paroxysmal atrial fibrillation for which she follows our cardiovascular practice. Patient has had episodes of bradycardia for which she had been taken off beta-blocker. She states since discontinued medication she had increasing episodes of these palpitations. She had a severe yesterday contacted our office which instructed her to seek evaluation. Patient seen and evaluated in emergency room she is found have atrial fibrillation with rapid response. Patient started antidepressant therapy. Fortunately, she converted into sinus rhythm this morning. Cardiology's been asked to see the patient for further recommendations and management. She currently denies any palpitations or chest pain.         Patient Active Problem List   Diagnosis    PAF (paroxysmal atrial fibrillation) (HCC)    SVT (supraventricular tachycardia) (HCC)    DM2 (diabetes mellitus, type 2) (HCC)    Dyspnea on exertion    Chronic anticoagulation    Angina pectoris (HCC)    Pelvic hematoma in female    Anemia due to blood loss    Hydronephrosis    Ileus (HCC)    Abdominal pain, acute, right upper quadrant    Colitis    Partial intestinal obstruction (HCC)    Essential hypertension    Pulmonary hypertension ([address]    Chronic diastolic heart failure (HCC)    Hyperlipidemia

## 2023-05-30 ENCOUNTER — TELEPHONE (OUTPATIENT)
Dept: CARDIOLOGY CLINIC | Age: 88
End: 2023-05-30

## 2023-06-05 ENCOUNTER — TELEPHONE (OUTPATIENT)
Dept: CARDIOLOGY CLINIC | Age: 88
End: 2023-06-05

## 2023-06-05 ENCOUNTER — OFFICE VISIT (OUTPATIENT)
Dept: CARDIOLOGY CLINIC | Age: 88
End: 2023-06-05
Payer: MEDICARE

## 2023-06-05 ENCOUNTER — HOSPITAL ENCOUNTER (OUTPATIENT)
Age: 88
Discharge: HOME | End: 2023-06-05
Payer: MEDICARE

## 2023-06-05 VITALS
BODY MASS INDEX: 32.28 KG/M2 | DIASTOLIC BLOOD PRESSURE: 80 MMHG | SYSTOLIC BLOOD PRESSURE: 134 MMHG | TEMPERATURE: 98.6 F | WEIGHT: 175.4 LBS | OXYGEN SATURATION: 95 % | HEART RATE: 57 BPM | HEIGHT: 62 IN

## 2023-06-05 DIAGNOSIS — R00.1 SINUS BRADYCARDIA: ICD-10-CM

## 2023-06-05 DIAGNOSIS — I48.0 PAROXYSMAL ATRIAL FIBRILLATION (HCC): Primary | ICD-10-CM

## 2023-06-05 DIAGNOSIS — I48.91 ATRIAL FIBRILLATION, UNSPECIFIED TYPE (HCC): ICD-10-CM

## 2023-06-05 DIAGNOSIS — R94.31 ABNORMAL ELECTROCARDIOGRAM (ECG) (EKG): ICD-10-CM

## 2023-06-05 PROCEDURE — 99214 OFFICE O/P EST MOD 30 MIN: CPT | Performed by: NURSE PRACTITIONER

## 2023-06-05 PROCEDURE — 84443 ASSAY THYROID STIM HORMONE: CPT

## 2023-06-05 PROCEDURE — 1123F ACP DISCUSS/DSCN MKR DOCD: CPT | Performed by: NURSE PRACTITIONER

## 2023-06-05 PROCEDURE — 93000 ELECTROCARDIOGRAM COMPLETE: CPT | Performed by: NURSE PRACTITIONER

## 2023-06-05 PROCEDURE — 93270 REMOTE 30 DAY ECG REV/REPORT: CPT | Performed by: INTERNAL MEDICINE

## 2023-06-05 PROCEDURE — 36415 COLL VENOUS BLD VENIPUNCTURE: CPT

## 2023-06-05 RX ORDER — AMIODARONE HYDROCHLORIDE 200 MG/1
200 TABLET ORAL DAILY
Qty: 30 TABLET | Refills: 3 | Status: SHIPPED | OUTPATIENT
Start: 2023-06-05

## 2023-06-05 NOTE — TELEPHONE ENCOUNTER
Monitor placed by [employer]   Monitor company [employer]   Length of monitor 14 day  Monitor ordered by [facility]  Serial number [id number] ID [id number]  Activation successful prior to pt leaving office?  Yes  ]

## 2023-06-05 NOTE — PATIENT INSTRUCTIONS
Stop Tikosyn. Take metoprolol only as needed for breakthrough atrial fibrillation    Two week heart monitor     Update exercise stress test. Please call [phone].     Start amiodarone 200 mg once a day     Update blood work     Follow up in 3 months with Dr. Delmy Thomas

## 2023-06-05 NOTE — TELEPHONE ENCOUNTER
----- Message from Jhony Waters MD sent at 6/5/2023  1:49 PM EDT -----  Can we get her on NP schedule or place on my schedule for 06/16/2023? Thank you.  ----- Message -----  From: Domi Carey MD  Sent: 5/27/2023  11:23 AM EDT  To: Gayle Howard RN, Jhony Waters MD, #    Patient admitted over the weekend after being directed to the ER. She had stopped her beta-blocker. I restart lower dose. Would be best to see her next week with EP to f/u on care.     VSP

## 2023-06-05 NOTE — PROGRESS NOTES
gender)   Sinus bradycardia: unclear the extent of which she is symptomatic    PSVT: noted in 2237  Chronic diastolic CHF: compensated  Pulmonary HTN: followed by Dr. Jes Noriega   HTN: controlled   HLD   DM       Plan:   Continue Eliquis, valsartan, and Lasix   Only use Toprol as needed for breakthrough episodes of AF   Stop Tikosyn given frequent episodes of breakthrough AF. Will start amiodarone 200 mg PO daily. Patient is aware she may have AF while the amiodarone builds up in her system. Update baseline TSH   Exercise stress test for chronotropic competence   Two week event monitor  Follow up in 3 months with Dr. John Hernandez. Pending treadmill and monitor results, patient may be a candidate for permanent pacing.      IFTIKHAR Shore, APRN-CNP  [facility]  [phone]

## 2023-06-05 NOTE — TELEPHONE ENCOUNTER
KXA  PT called and stated he got part of Lashanda's after visit summary plan. PT may need called or mailed a copy of after visit summary. Plan:   Continue Eliquis, valsartan, and Lasix   Can use Toprol as needed for breakthrough episodes of AF   Stop Tikosyn given frequent episodes of breakthrough AF. Will start amiodarone 200 mg PO daily. Patient is aware she may have AF while the amiodarone builds up in her system.    Update TSH   Exercise stress test for chronotropic competence   Two week event monitor      Follow up in 6 months      IFTIKHAR Shore, APRN-CNP  [address]  [phone]

## 2023-06-06 LAB — TSH SERPL DL<=0.005 MIU/L-ACNC: 2.83 UIU/ML (ref 0.27–4.2)

## 2023-06-20 ENCOUNTER — HOSPITAL ENCOUNTER (OUTPATIENT)
Dept: NON INVASIVE DIAGNOSTICS | Age: 88
Discharge: HOME | End: 2023-06-20
Payer: MEDICARE

## 2023-06-20 ENCOUNTER — TELEPHONE (OUTPATIENT)
Dept: CARDIOLOGY CLINIC | Age: 88
End: 2023-06-20

## 2023-06-20 DIAGNOSIS — R94.31 ABNORMAL ELECTROCARDIOGRAM (ECG) (EKG): ICD-10-CM

## 2023-06-20 DIAGNOSIS — I48.91 ATRIAL FIBRILLATION, UNSPECIFIED TYPE (HCC): ICD-10-CM

## 2023-06-20 PROCEDURE — 93017 CV STRESS TEST TRACING ONLY: CPT

## 2023-06-20 NOTE — PROGRESS NOTES
Patient's BP came down to 175/67 and plain GXT stress test completed, patient was unable to walk for 5 minutes but did reach target heart rate. Requested treadmill stopped at 3 minutes and 27 seconds due to shortness of breath, fatigue and 1/10 chest pain and left sided neck pain. All symptoms resolved with rest. Patient did have elevated BP with exercise, ending /73, patient states BP is always elevated, denies symptoms and refuses to report to ER. Patient states she is able to take BP at home, educated to recheck once she gets home and if still above 953 systolic then call doctor office or report to ER. Patient's  is driving her home today. Discharge instructions given to pt. Pt verbalizes understanding to discharge instructions.

## 2023-06-20 NOTE — PROGRESS NOTES
Patient's BP elevated, 216/61, retake 203/62, pt asymptomatic, states just nervous about test. Patient states she took her Amiodarone 200mg and Valsartan 320mg PO this morning. Called ordering provider and spoke with nurse, Gamaliel العراقي, who states she discussed situation with Isrrael Adkins CNP who stated that she ordered the test for chronotropic competence and if we are able to let patient relax and get SBP <200 then she is okay with us starting test and okay if test has to be ended due to elevated BP, states she just wants to see what her heart rate does.

## 2023-06-20 NOTE — TELEPHONE ENCOUNTER
Received call from [facility] with [address]. Patient presented today for stress test with elevated BP of 212/61 and recheck of 203/62. Patient did take BP medications this AM, amiodarone and valsartan however Jessica states patient is very anxious for test.     [facility] stated their protocol says they cannot stress anyone with SBP >200. She is willing to let patient sit and relax and check her, but is concerned that with exercise SBP will rise again and test will have to be terminated prior to reaching patient's target HR. She wanted to check with [facility] to see if she would still like to proceed with stress test even if they have to terminate before target HR achieved. Discussed case with [facility]-testing ordered due to assess for chronotropic competence. Proceed with stress testing if SBP recheck drops below 200 with understanding that testing may need terminated early should BP rise again during testing.  Called and discussed with [facility] and she gave V/U.

## 2023-06-20 NOTE — RESULT ENCOUNTER NOTE
Please let patient know she had a normal response to exercise and her heart rate increased appropriately.

## 2023-06-21 ENCOUNTER — TELEPHONE (OUTPATIENT)
Dept: CARDIOLOGY CLINIC | Age: 88
End: 2023-06-21

## 2023-06-21 NOTE — TELEPHONE ENCOUNTER
Created telephone encounter. Spoke with Fátima Dillon relayed message per [facility] regarding stress test. Pt verbalized understanding.

## 2023-06-21 NOTE — TELEPHONE ENCOUNTER
----- Message from GUANAKO Velasco CNP sent at 6/20/2023  4:31 PM EDT -----  Please let patient know she had a normal response to exercise and her heart rate increased appropriately.

## 2023-06-23 ENCOUNTER — HOSPITAL ENCOUNTER (INPATIENT)
Age: 88
LOS: 2 days | Discharge: HOME | DRG: 389 | End: 2023-06-25
Attending: EMERGENCY MEDICINE | Admitting: INTERNAL MEDICINE
Payer: MEDICARE

## 2023-06-23 ENCOUNTER — APPOINTMENT (OUTPATIENT)
Dept: GENERAL RADIOLOGY | Age: 88
DRG: 389 | End: 2023-06-23
Payer: MEDICARE

## 2023-06-23 ENCOUNTER — APPOINTMENT (OUTPATIENT)
Dept: CT IMAGING | Age: 88
DRG: 389 | End: 2023-06-23
Payer: MEDICARE

## 2023-06-23 DIAGNOSIS — K56.7 ILEUS (HCC): ICD-10-CM

## 2023-06-23 DIAGNOSIS — R10.84 GENERALIZED ABDOMINAL PAIN: Primary | ICD-10-CM

## 2023-06-23 PROBLEM — I50.9 CONGESTIVE HEART FAILURE (HCC): Status: ACTIVE | Noted: 2023-06-23

## 2023-06-23 PROBLEM — D68.69 SECONDARY HYPERCOAGULABLE STATE (HCC): Status: ACTIVE | Noted: 2023-06-23

## 2023-06-23 LAB
ALBUMIN SERPL-MCNC: 3.8 G/DL (ref 3.4–5)
ALBUMIN/GLOB SERPL: 0.9 {RATIO} (ref 1.1–2.2)
ALP SERPL-CCNC: 85 U/L (ref 40–129)
ALT SERPL-CCNC: 12 U/L (ref 10–40)
ANION GAP SERPL CALCULATED.3IONS-SCNC: 14 MMOL/L (ref 3–16)
AST SERPL-CCNC: 18 U/L (ref 15–37)
BASOPHILS # BLD: 0.3 K/UL (ref 0–0.2)
BASOPHILS NFR BLD: 3.5 %
BILIRUB SERPL-MCNC: 0.5 MG/DL (ref 0–1)
BILIRUB UR QL STRIP.AUTO: NEGATIVE
BUN SERPL-MCNC: 18 MG/DL (ref 7–20)
CALCIUM SERPL-MCNC: 9.3 MG/DL (ref 8.3–10.6)
CHLORIDE SERPL-SCNC: 102 MMOL/L (ref 99–110)
CLARITY UR: CLEAR
CO2 SERPL-SCNC: 23 MMOL/L (ref 21–32)
COLOR UR: YELLOW
CREAT SERPL-MCNC: 0.9 MG/DL (ref 0.6–1.2)
DEPRECATED RDW RBC AUTO: 15.4 % (ref 12.4–15.4)
EKG ATRIAL RATE: 81 BPM
EKG DIAGNOSIS: NORMAL
EKG P AXIS: 72 DEGREES
EKG P-R INTERVAL: 270 MS
EKG Q-T INTERVAL: 382 MS
EKG QRS DURATION: 84 MS
EKG QTC CALCULATION (BAZETT): 443 MS
EKG R AXIS: 62 DEGREES
EKG T AXIS: 71 DEGREES
EKG VENTRICULAR RATE: 81 BPM
EOSINOPHIL # BLD: 0.1 K/UL (ref 0–0.6)
EOSINOPHIL NFR BLD: 1.4 %
GFR SERPLBLD CREATININE-BSD FMLA CKD-EPI: >60 ML/MIN/{1.73_M2}
GLUCOSE BLD-MCNC: 120 MG/DL (ref 70–99)
GLUCOSE BLD-MCNC: 123 MG/DL (ref 70–99)
GLUCOSE BLD-MCNC: 125 MG/DL (ref 70–99)
GLUCOSE SERPL-MCNC: 158 MG/DL (ref 70–99)
GLUCOSE UR STRIP.AUTO-MCNC: NEGATIVE MG/DL
HCT VFR BLD AUTO: 42.1 % (ref 36–48)
HGB BLD-MCNC: 14 G/DL (ref 12–16)
HGB UR QL STRIP.AUTO: NEGATIVE
KETONES UR STRIP.AUTO-MCNC: NEGATIVE MG/DL
LACTATE BLDV-SCNC: 0.9 MMOL/L (ref 0.4–1.9)
LACTATE BLDV-SCNC: 1 MMOL/L (ref 0.4–1.9)
LEUKOCYTE ESTERASE UR QL STRIP.AUTO: NEGATIVE
LIPASE SERPL-CCNC: 21 U/L (ref 13–60)
LYMPHOCYTES # BLD: 1.5 K/UL (ref 1–5.1)
LYMPHOCYTES NFR BLD: 15.8 %
MCH RBC QN AUTO: 27.1 PG (ref 26–34)
MCHC RBC AUTO-ENTMCNC: 33.3 G/DL (ref 31–36)
MCV RBC AUTO: 81.3 FL (ref 80–100)
MONOCYTES # BLD: 0.6 K/UL (ref 0–1.3)
MONOCYTES NFR BLD: 6.2 %
NEUTROPHILS # BLD: 6.8 K/UL (ref 1.7–7.7)
NEUTROPHILS NFR BLD: 73.1 %
NITRITE UR QL STRIP.AUTO: NEGATIVE
PERFORMED ON: ABNORMAL
PH UR STRIP.AUTO: 7 [PH] (ref 5–8)
PLATELET # BLD AUTO: 262 K/UL (ref 135–450)
PMV BLD AUTO: 9.4 FL (ref 5–10.5)
POTASSIUM SERPL-SCNC: 4.4 MMOL/L (ref 3.5–5.1)
PROT SERPL-MCNC: 7.9 G/DL (ref 6.4–8.2)
PROT UR STRIP.AUTO-MCNC: NEGATIVE MG/DL
RBC # BLD AUTO: 5.18 M/UL (ref 4–5.2)
SODIUM SERPL-SCNC: 139 MMOL/L (ref 136–145)
SP GR UR STRIP.AUTO: <=1.005 (ref 1–1.03)
TROPONIN, HIGH SENSITIVITY: 12 NG/L (ref 0–14)
TROPONIN, HIGH SENSITIVITY: 13 NG/L (ref 0–14)
UA COMPLETE W REFLEX CULTURE PNL UR: NORMAL
UA DIPSTICK W REFLEX MICRO PNL UR: NORMAL
URN SPEC COLLECT METH UR: NORMAL
UROBILINOGEN UR STRIP-ACNC: 0.2 E.U./DL
WBC # BLD AUTO: 9.3 K/UL (ref 4–11)

## 2023-06-23 PROCEDURE — 96375 TX/PRO/DX INJ NEW DRUG ADDON: CPT

## 2023-06-23 PROCEDURE — 6370000000 HC RX 637 (ALT 250 FOR IP)

## 2023-06-23 PROCEDURE — 36415 COLL VENOUS BLD VENIPUNCTURE: CPT

## 2023-06-23 PROCEDURE — 80053 COMPREHEN METABOLIC PANEL: CPT

## 2023-06-23 PROCEDURE — 1200000000 HC SEMI PRIVATE

## 2023-06-23 PROCEDURE — 81003 URINALYSIS AUTO W/O SCOPE: CPT

## 2023-06-23 PROCEDURE — 6360000002 HC RX W HCPCS: Performed by: INTERNAL MEDICINE

## 2023-06-23 PROCEDURE — 96374 THER/PROPH/DIAG INJ IV PUSH: CPT

## 2023-06-23 PROCEDURE — 93010 ELECTROCARDIOGRAM REPORT: CPT | Performed by: INTERNAL MEDICINE

## 2023-06-23 PROCEDURE — 96376 TX/PRO/DX INJ SAME DRUG ADON: CPT

## 2023-06-23 PROCEDURE — 2580000003 HC RX 258: Performed by: INTERNAL MEDICINE

## 2023-06-23 PROCEDURE — 85025 COMPLETE CBC W/AUTO DIFF WBC: CPT

## 2023-06-23 PROCEDURE — APPSS30 APP SPLIT SHARED TIME 16-30 MINUTES

## 2023-06-23 PROCEDURE — 83690 ASSAY OF LIPASE: CPT

## 2023-06-23 PROCEDURE — 6360000004 HC RX CONTRAST MEDICATION: Performed by: EMERGENCY MEDICINE

## 2023-06-23 PROCEDURE — 99223 1ST HOSP IP/OBS HIGH 75: CPT

## 2023-06-23 PROCEDURE — 74018 RADEX ABDOMEN 1 VIEW: CPT

## 2023-06-23 PROCEDURE — 6360000002 HC RX W HCPCS

## 2023-06-23 PROCEDURE — 93005 ELECTROCARDIOGRAM TRACING: CPT | Performed by: EMERGENCY MEDICINE

## 2023-06-23 PROCEDURE — 99285 EMERGENCY DEPT VISIT HI MDM: CPT

## 2023-06-23 PROCEDURE — 83605 ASSAY OF LACTIC ACID: CPT

## 2023-06-23 PROCEDURE — 84484 ASSAY OF TROPONIN QUANT: CPT

## 2023-06-23 PROCEDURE — 6360000002 HC RX W HCPCS: Performed by: EMERGENCY MEDICINE

## 2023-06-23 PROCEDURE — 74177 CT ABD & PELVIS W/CONTRAST: CPT

## 2023-06-23 PROCEDURE — 99222 1ST HOSP IP/OBS MODERATE 55: CPT | Performed by: SURGERY

## 2023-06-23 RX ORDER — DIPHENHYDRAMINE HYDROCHLORIDE 50 MG/ML
12.5 INJECTION INTRAMUSCULAR; INTRAVENOUS ONCE
Status: COMPLETED | OUTPATIENT
Start: 2023-06-23 | End: 2023-06-23

## 2023-06-23 RX ORDER — SODIUM CHLORIDE 0.9 % (FLUSH) 0.9 %
5-40 SYRINGE (ML) INJECTION EVERY 12 HOURS SCHEDULED
Status: DISCONTINUED | OUTPATIENT
Start: 2023-06-23 | End: 2023-06-25 | Stop reason: HOSPADM

## 2023-06-23 RX ORDER — OXYMETAZOLINE HYDROCHLORIDE 0.05 G/100ML
2 SPRAY NASAL ONCE
Status: COMPLETED | OUTPATIENT
Start: 2023-06-23 | End: 2023-06-23

## 2023-06-23 RX ORDER — METOPROLOL SUCCINATE 25 MG/1
25 TABLET, EXTENDED RELEASE ORAL DAILY PRN
Status: DISCONTINUED | OUTPATIENT
Start: 2023-06-23 | End: 2023-06-25 | Stop reason: HOSPADM

## 2023-06-23 RX ORDER — ONDANSETRON 2 MG/ML
4 INJECTION INTRAMUSCULAR; INTRAVENOUS ONCE
Status: COMPLETED | OUTPATIENT
Start: 2023-06-23 | End: 2023-06-23

## 2023-06-23 RX ORDER — MORPHINE SULFATE 4 MG/ML
4 INJECTION, SOLUTION INTRAMUSCULAR; INTRAVENOUS ONCE
Status: COMPLETED | OUTPATIENT
Start: 2023-06-23 | End: 2023-06-23

## 2023-06-23 RX ORDER — ACETAMINOPHEN 325 MG/1
650 TABLET ORAL EVERY 6 HOURS PRN
Status: DISCONTINUED | OUTPATIENT
Start: 2023-06-23 | End: 2023-06-25 | Stop reason: HOSPADM

## 2023-06-23 RX ORDER — PANTOPRAZOLE SODIUM 40 MG/1
40 TABLET, DELAYED RELEASE ORAL 2 TIMES DAILY
Status: DISCONTINUED | OUTPATIENT
Start: 2023-06-23 | End: 2023-06-25 | Stop reason: HOSPADM

## 2023-06-23 RX ORDER — VALSARTAN 80 MG/1
320 TABLET ORAL DAILY
Status: DISCONTINUED | OUTPATIENT
Start: 2023-06-23 | End: 2023-06-25 | Stop reason: HOSPADM

## 2023-06-23 RX ORDER — LIDOCAINE HYDROCHLORIDE 20 MG/ML
JELLY TOPICAL ONCE
Status: DISCONTINUED | OUTPATIENT
Start: 2023-06-23 | End: 2023-06-23

## 2023-06-23 RX ORDER — ONDANSETRON 2 MG/ML
4 INJECTION INTRAMUSCULAR; INTRAVENOUS EVERY 6 HOURS PRN
Status: DISCONTINUED | OUTPATIENT
Start: 2023-06-23 | End: 2023-06-25 | Stop reason: HOSPADM

## 2023-06-23 RX ORDER — ONDANSETRON 4 MG/1
4 TABLET, ORALLY DISINTEGRATING ORAL EVERY 8 HOURS PRN
Status: DISCONTINUED | OUTPATIENT
Start: 2023-06-23 | End: 2023-06-25 | Stop reason: HOSPADM

## 2023-06-23 RX ORDER — LIDOCAINE HYDROCHLORIDE 20 MG/ML
JELLY TOPICAL ONCE
Status: COMPLETED | OUTPATIENT
Start: 2023-06-23 | End: 2023-06-23

## 2023-06-23 RX ORDER — SODIUM CHLORIDE 9 MG/ML
INJECTION, SOLUTION INTRAVENOUS PRN
Status: DISCONTINUED | OUTPATIENT
Start: 2023-06-23 | End: 2023-06-25 | Stop reason: HOSPADM

## 2023-06-23 RX ORDER — ENOXAPARIN SODIUM 100 MG/ML
40 INJECTION SUBCUTANEOUS DAILY
Status: DISCONTINUED | OUTPATIENT
Start: 2023-06-23 | End: 2023-06-25 | Stop reason: HOSPADM

## 2023-06-23 RX ORDER — ACETAMINOPHEN 650 MG/1
650 SUPPOSITORY RECTAL EVERY 6 HOURS PRN
Status: DISCONTINUED | OUTPATIENT
Start: 2023-06-23 | End: 2023-06-25 | Stop reason: HOSPADM

## 2023-06-23 RX ORDER — ONDANSETRON 2 MG/ML
INJECTION INTRAMUSCULAR; INTRAVENOUS
Status: COMPLETED
Start: 2023-06-23 | End: 2023-06-23

## 2023-06-23 RX ORDER — POLYETHYLENE GLYCOL 3350 17 G/17G
17 POWDER, FOR SOLUTION ORAL DAILY PRN
Status: DISCONTINUED | OUTPATIENT
Start: 2023-06-23 | End: 2023-06-25 | Stop reason: HOSPADM

## 2023-06-23 RX ORDER — SODIUM CHLORIDE 0.9 % (FLUSH) 0.9 %
5-40 SYRINGE (ML) INJECTION PRN
Status: DISCONTINUED | OUTPATIENT
Start: 2023-06-23 | End: 2023-06-25 | Stop reason: HOSPADM

## 2023-06-23 RX ORDER — SODIUM CHLORIDE 9 MG/ML
INJECTION, SOLUTION INTRAVENOUS CONTINUOUS
Status: DISCONTINUED | OUTPATIENT
Start: 2023-06-23 | End: 2023-06-25 | Stop reason: HOSPADM

## 2023-06-23 RX ORDER — MORPHINE SULFATE 2 MG/ML
2 INJECTION, SOLUTION INTRAMUSCULAR; INTRAVENOUS EVERY 4 HOURS PRN
Status: DISCONTINUED | OUTPATIENT
Start: 2023-06-23 | End: 2023-06-25 | Stop reason: HOSPADM

## 2023-06-23 RX ORDER — AMIODARONE HYDROCHLORIDE 200 MG/1
200 TABLET ORAL DAILY
Status: DISCONTINUED | OUTPATIENT
Start: 2023-06-23 | End: 2023-06-25 | Stop reason: HOSPADM

## 2023-06-23 RX ADMIN — IOPAMIDOL 75 ML: 755 INJECTION, SOLUTION INTRAVENOUS at 04:03

## 2023-06-23 RX ADMIN — ENOXAPARIN SODIUM 40 MG: 100 INJECTION SUBCUTANEOUS at 10:12

## 2023-06-23 RX ADMIN — MORPHINE SULFATE 4 MG: 4 INJECTION, SOLUTION INTRAMUSCULAR; INTRAVENOUS at 03:20

## 2023-06-23 RX ADMIN — Medication 10 ML: at 11:17

## 2023-06-23 RX ADMIN — ONDANSETRON 4 MG: 2 INJECTION INTRAMUSCULAR; INTRAVENOUS at 03:18

## 2023-06-23 RX ADMIN — MORPHINE SULFATE 2 MG: 2 INJECTION, SOLUTION INTRAMUSCULAR; INTRAVENOUS at 16:00

## 2023-06-23 RX ADMIN — PHENOL 1 SPRAY: 1.5 LIQUID ORAL at 20:04

## 2023-06-23 RX ADMIN — LIDOCAINE HYDROCHLORIDE: 20 JELLY TOPICAL at 10:14

## 2023-06-23 RX ADMIN — ONDANSETRON 4 MG: 2 INJECTION INTRAMUSCULAR; INTRAVENOUS at 16:00

## 2023-06-23 RX ADMIN — SODIUM CHLORIDE: 9 INJECTION, SOLUTION INTRAVENOUS at 17:11

## 2023-06-23 RX ADMIN — OXYMETAZOLINE HCL 2 SPRAY: 0.05 SPRAY NASAL at 10:06

## 2023-06-23 RX ADMIN — MORPHINE SULFATE 4 MG: 4 INJECTION, SOLUTION INTRAMUSCULAR; INTRAVENOUS at 06:51

## 2023-06-23 RX ADMIN — MORPHINE SULFATE 2 MG: 2 INJECTION, SOLUTION INTRAMUSCULAR; INTRAVENOUS at 10:05

## 2023-06-23 RX ADMIN — ONDANSETRON 4 MG: 2 INJECTION INTRAMUSCULAR; INTRAVENOUS at 06:58

## 2023-06-23 RX ADMIN — SODIUM CHLORIDE: 9 INJECTION, SOLUTION INTRAVENOUS at 10:09

## 2023-06-23 RX ADMIN — DIPHENHYDRAMINE HYDROCHLORIDE 12.5 MG: 50 INJECTION, SOLUTION INTRAMUSCULAR; INTRAVENOUS at 03:17

## 2023-06-23 RX ADMIN — ONDANSETRON 4 MG: 2 INJECTION INTRAMUSCULAR; INTRAVENOUS at 10:05

## 2023-06-23 ASSESSMENT — PAIN DESCRIPTION - FREQUENCY: FREQUENCY: INTERMITTENT

## 2023-06-23 ASSESSMENT — PAIN - FUNCTIONAL ASSESSMENT
PAIN_FUNCTIONAL_ASSESSMENT: ACTIVITIES ARE NOT PREVENTED
PAIN_FUNCTIONAL_ASSESSMENT: ACTIVITIES ARE NOT PREVENTED
PAIN_FUNCTIONAL_ASSESSMENT: 0-10
PAIN_FUNCTIONAL_ASSESSMENT: 0-10
PAIN_FUNCTIONAL_ASSESSMENT: ACTIVITIES ARE NOT PREVENTED
PAIN_FUNCTIONAL_ASSESSMENT: 0-10

## 2023-06-23 ASSESSMENT — PAIN SCALES - GENERAL
PAINLEVEL_OUTOF10: 10
PAINLEVEL_OUTOF10: 10
PAINLEVEL_OUTOF10: 3
PAINLEVEL_OUTOF10: 2
PAINLEVEL_OUTOF10: 10
PAINLEVEL_OUTOF10: 0
PAINLEVEL_OUTOF10: 9
PAINLEVEL_OUTOF10: 3

## 2023-06-23 ASSESSMENT — PAIN DESCRIPTION - LOCATION
LOCATION: ABDOMEN
LOCATION: THROAT
LOCATION: ABDOMEN

## 2023-06-23 ASSESSMENT — PAIN DESCRIPTION - PAIN TYPE: TYPE: ACUTE PAIN

## 2023-06-23 ASSESSMENT — PAIN DESCRIPTION - DESCRIPTORS: DESCRIPTORS: TENDER

## 2023-06-23 ASSESSMENT — LIFESTYLE VARIABLES
HOW MANY STANDARD DRINKS CONTAINING ALCOHOL DO YOU HAVE ON A TYPICAL DAY: PATIENT DOES NOT DRINK
HOW OFTEN DO YOU HAVE A DRINK CONTAINING ALCOHOL: NEVER

## 2023-06-24 ENCOUNTER — APPOINTMENT (OUTPATIENT)
Dept: GENERAL RADIOLOGY | Age: 88
DRG: 389 | End: 2023-06-24
Payer: MEDICARE

## 2023-06-24 LAB
ANION GAP SERPL CALCULATED.3IONS-SCNC: 10 MMOL/L (ref 3–16)
BASOPHILS # BLD: 0 K/UL (ref 0–0.2)
BASOPHILS NFR BLD: 0.3 %
BUN SERPL-MCNC: 15 MG/DL (ref 7–20)
CALCIUM SERPL-MCNC: 8.2 MG/DL (ref 8.3–10.6)
CHLORIDE SERPL-SCNC: 105 MMOL/L (ref 99–110)
CO2 SERPL-SCNC: 23 MMOL/L (ref 21–32)
CREAT SERPL-MCNC: 0.7 MG/DL (ref 0.6–1.2)
DEPRECATED RDW RBC AUTO: 15.3 % (ref 12.4–15.4)
EOSINOPHIL # BLD: 0.1 K/UL (ref 0–0.6)
EOSINOPHIL NFR BLD: 1 %
GFR SERPLBLD CREATININE-BSD FMLA CKD-EPI: >60 ML/MIN/{1.73_M2}
GLUCOSE BLD-MCNC: 103 MG/DL (ref 70–99)
GLUCOSE BLD-MCNC: 112 MG/DL (ref 70–99)
GLUCOSE BLD-MCNC: 114 MG/DL (ref 70–99)
GLUCOSE SERPL-MCNC: 123 MG/DL (ref 70–99)
HCT VFR BLD AUTO: 38.4 % (ref 36–48)
HGB BLD-MCNC: 12.4 G/DL (ref 12–16)
LYMPHOCYTES # BLD: 1.5 K/UL (ref 1–5.1)
LYMPHOCYTES NFR BLD: 18.3 %
MCH RBC QN AUTO: 27 PG (ref 26–34)
MCHC RBC AUTO-ENTMCNC: 32.3 G/DL (ref 31–36)
MCV RBC AUTO: 83.5 FL (ref 80–100)
MONOCYTES # BLD: 0.8 K/UL (ref 0–1.3)
MONOCYTES NFR BLD: 10.2 %
NEUTROPHILS # BLD: 5.8 K/UL (ref 1.7–7.7)
NEUTROPHILS NFR BLD: 70.2 %
PERFORMED ON: ABNORMAL
PLATELET # BLD AUTO: 253 K/UL (ref 135–450)
PMV BLD AUTO: 9.5 FL (ref 5–10.5)
POTASSIUM SERPL-SCNC: 4 MMOL/L (ref 3.5–5.1)
RBC # BLD AUTO: 4.6 M/UL (ref 4–5.2)
SODIUM SERPL-SCNC: 138 MMOL/L (ref 136–145)
WBC # BLD AUTO: 8.3 K/UL (ref 4–11)

## 2023-06-24 PROCEDURE — 99232 SBSQ HOSP IP/OBS MODERATE 35: CPT | Performed by: INTERNAL MEDICINE

## 2023-06-24 PROCEDURE — 6360000002 HC RX W HCPCS

## 2023-06-24 PROCEDURE — 74019 RADEX ABDOMEN 2 VIEWS: CPT

## 2023-06-24 PROCEDURE — 99254 IP/OBS CNSLTJ NEW/EST MOD 60: CPT | Performed by: SURGERY

## 2023-06-24 PROCEDURE — 36415 COLL VENOUS BLD VENIPUNCTURE: CPT

## 2023-06-24 PROCEDURE — 80048 BASIC METABOLIC PNL TOTAL CA: CPT

## 2023-06-24 PROCEDURE — 1200000000 HC SEMI PRIVATE

## 2023-06-24 PROCEDURE — 6370000000 HC RX 637 (ALT 250 FOR IP): Performed by: INTERNAL MEDICINE

## 2023-06-24 PROCEDURE — 2580000003 HC RX 258: Performed by: INTERNAL MEDICINE

## 2023-06-24 PROCEDURE — 85025 COMPLETE CBC W/AUTO DIFF WBC: CPT

## 2023-06-24 RX ADMIN — PANTOPRAZOLE SODIUM 40 MG: 40 TABLET, DELAYED RELEASE ORAL at 20:40

## 2023-06-24 RX ADMIN — PANTOPRAZOLE SODIUM 40 MG: 40 TABLET, DELAYED RELEASE ORAL at 10:22

## 2023-06-24 RX ADMIN — ENOXAPARIN SODIUM 40 MG: 100 INJECTION SUBCUTANEOUS at 09:40

## 2023-06-24 RX ADMIN — SODIUM CHLORIDE: 9 INJECTION, SOLUTION INTRAVENOUS at 03:16

## 2023-06-24 RX ADMIN — SODIUM CHLORIDE: 9 INJECTION, SOLUTION INTRAVENOUS at 13:23

## 2023-06-24 RX ADMIN — ACETAMINOPHEN 650 MG: 325 TABLET ORAL at 10:22

## 2023-06-24 RX ADMIN — PHENOL 1 SPRAY: 1.5 LIQUID ORAL at 09:40

## 2023-06-24 RX ADMIN — PHENOL 1 SPRAY: 1.5 LIQUID ORAL at 05:13

## 2023-06-24 RX ADMIN — VALSARTAN 320 MG: 80 TABLET ORAL at 10:22

## 2023-06-24 RX ADMIN — AMIODARONE HYDROCHLORIDE 200 MG: 200 TABLET ORAL at 10:22

## 2023-06-24 ASSESSMENT — PAIN DESCRIPTION - ORIENTATION: ORIENTATION: MID

## 2023-06-24 ASSESSMENT — PAIN DESCRIPTION - DESCRIPTORS
DESCRIPTORS: SORE
DESCRIPTORS: SORE

## 2023-06-24 ASSESSMENT — PAIN DESCRIPTION - LOCATION
LOCATION: THROAT
LOCATION: THROAT

## 2023-06-24 ASSESSMENT — PAIN - FUNCTIONAL ASSESSMENT: PAIN_FUNCTIONAL_ASSESSMENT: ACTIVITIES ARE NOT PREVENTED

## 2023-06-24 ASSESSMENT — PAIN DESCRIPTION - FREQUENCY: FREQUENCY: CONTINUOUS

## 2023-06-24 ASSESSMENT — PAIN DESCRIPTION - PAIN TYPE: TYPE: ACUTE PAIN

## 2023-06-24 ASSESSMENT — PAIN SCALES - GENERAL
PAINLEVEL_OUTOF10: 2
PAINLEVEL_OUTOF10: 4

## 2023-06-24 ASSESSMENT — PAIN DESCRIPTION - ONSET: ONSET: ON-GOING

## 2023-06-25 VITALS
SYSTOLIC BLOOD PRESSURE: 184 MMHG | WEIGHT: 175 LBS | BODY MASS INDEX: 32.2 KG/M2 | RESPIRATION RATE: 18 BRPM | HEIGHT: 62 IN | OXYGEN SATURATION: 96 % | HEART RATE: 67 BPM | TEMPERATURE: 97.7 F | DIASTOLIC BLOOD PRESSURE: 73 MMHG

## 2023-06-25 PROCEDURE — 99238 HOSP IP/OBS DSCHRG MGMT 30/<: CPT | Performed by: INTERNAL MEDICINE

## 2023-06-25 PROCEDURE — 6360000002 HC RX W HCPCS

## 2023-06-25 PROCEDURE — 2580000003 HC RX 258: Performed by: INTERNAL MEDICINE

## 2023-06-25 PROCEDURE — 6370000000 HC RX 637 (ALT 250 FOR IP): Performed by: INTERNAL MEDICINE

## 2023-06-25 RX ADMIN — SODIUM CHLORIDE: 9 INJECTION, SOLUTION INTRAVENOUS at 08:35

## 2023-06-25 RX ADMIN — AMIODARONE HYDROCHLORIDE 200 MG: 200 TABLET ORAL at 08:31

## 2023-06-25 RX ADMIN — VALSARTAN 320 MG: 80 TABLET ORAL at 08:31

## 2023-06-25 RX ADMIN — ENOXAPARIN SODIUM 40 MG: 100 INJECTION SUBCUTANEOUS at 08:31

## 2023-06-25 RX ADMIN — PANTOPRAZOLE SODIUM 40 MG: 40 TABLET, DELAYED RELEASE ORAL at 08:31

## 2023-06-25 ASSESSMENT — PAIN DESCRIPTION - LOCATION: LOCATION: ABDOMEN

## 2023-06-25 ASSESSMENT — PAIN SCALES - GENERAL: PAINLEVEL_OUTOF10: 3

## 2023-06-25 ASSESSMENT — PAIN DESCRIPTION - PAIN TYPE: TYPE: ACUTE PAIN

## 2023-06-25 ASSESSMENT — PAIN - FUNCTIONAL ASSESSMENT: PAIN_FUNCTIONAL_ASSESSMENT: ACTIVITIES ARE NOT PREVENTED

## 2023-06-25 ASSESSMENT — PAIN DESCRIPTION - FREQUENCY: FREQUENCY: INTERMITTENT

## 2023-06-25 ASSESSMENT — PAIN DESCRIPTION - ONSET: ONSET: ON-GOING

## 2023-06-25 ASSESSMENT — PAIN DESCRIPTION - DESCRIPTORS: DESCRIPTORS: CRAMPING

## 2023-06-25 ASSESSMENT — PAIN DESCRIPTION - ORIENTATION: ORIENTATION: LOWER

## 2023-06-30 PROCEDURE — 93272 ECG/REVIEW INTERPRET ONLY: CPT | Performed by: INTERNAL MEDICINE

## 2023-07-07 DIAGNOSIS — I48.91 ATRIAL FIBRILLATION, UNSPECIFIED TYPE (HCC): Primary | ICD-10-CM

## 2023-07-12 ENCOUNTER — TELEPHONE (OUTPATIENT)
Dept: CARDIOLOGY CLINIC | Age: 88
End: 2023-07-12

## 2023-07-12 NOTE — TELEPHONE ENCOUNTER
----- Message from GUANAKO Wade CNP sent at 7/11/2023  4:26 PM EDT -----  Please notify patient of event monitor results.

## 2023-07-12 NOTE — TELEPHONE ENCOUNTER
LVM for patient. No AF on monitor since starting amiodarone.  Keep appointment as scheduled in Sept with [address]

## 2023-07-27 NOTE — TELEPHONE ENCOUNTER
Last ov 06/05/2023  Upcoming ov 09/12/2023  CBC 06/2023  BMP 06/2023  TSH 06/2023  EKG 06/2023  XR CHEST 05/2023

## 2023-07-31 RX ORDER — AMIODARONE HYDROCHLORIDE 200 MG/1
200 TABLET ORAL DAILY
Qty: 90 TABLET | Refills: 1 | Status: SHIPPED | OUTPATIENT
Start: 2023-07-31

## 2023-08-15 ENCOUNTER — TELEPHONE (OUTPATIENT)
Dept: CARDIOLOGY CLINIC | Age: 88
End: 2023-08-15

## 2023-08-15 NOTE — TELEPHONE ENCOUNTER
08/15 called pt @ [phone], no answer, unable to lvm due to not being set up yet, when pt returns call offer her clerm due to her living in [place].  [facility] has sooner availability at clerm office

## 2023-08-17 NOTE — TELEPHONE ENCOUNTER
08/17 2nd attempt, called pt @ [phone], no answer, unable to lvm due to not being set up yet, when pt returns call offer her clerm due to her living in [place].  [facility] has sooner availability at clerm office

## 2023-09-25 ENCOUNTER — HOSPITAL ENCOUNTER (OUTPATIENT)
Age: 88
Discharge: HOME | End: 2023-09-25
Payer: MEDICARE

## 2023-09-25 ENCOUNTER — OFFICE VISIT (OUTPATIENT)
Dept: CARDIOLOGY CLINIC | Age: 88
End: 2023-09-25
Payer: MEDICARE

## 2023-09-25 ENCOUNTER — HOSPITAL ENCOUNTER (OUTPATIENT)
Dept: GENERAL RADIOLOGY | Age: 88
Discharge: HOME | End: 2023-09-25
Payer: MEDICARE

## 2023-09-25 ENCOUNTER — TELEPHONE (OUTPATIENT)
Dept: CARDIOLOGY CLINIC | Age: 88
End: 2023-09-25

## 2023-09-25 VITALS
WEIGHT: 184.5 LBS | BODY MASS INDEX: 33.95 KG/M2 | SYSTOLIC BLOOD PRESSURE: 166 MMHG | DIASTOLIC BLOOD PRESSURE: 72 MMHG | HEART RATE: 60 BPM | OXYGEN SATURATION: 97 % | HEIGHT: 62 IN

## 2023-09-25 DIAGNOSIS — R06.02 SHORTNESS OF BREATH: ICD-10-CM

## 2023-09-25 DIAGNOSIS — I50.32 CHRONIC DIASTOLIC HEART FAILURE (HCC): ICD-10-CM

## 2023-09-25 DIAGNOSIS — R00.1 BRADYCARDIA: ICD-10-CM

## 2023-09-25 DIAGNOSIS — I50.32 CHRONIC DIASTOLIC HEART FAILURE (HCC): Primary | ICD-10-CM

## 2023-09-25 DIAGNOSIS — I10 ESSENTIAL HYPERTENSION: ICD-10-CM

## 2023-09-25 PROCEDURE — 99214 OFFICE O/P EST MOD 30 MIN: CPT | Performed by: NURSE PRACTITIONER

## 2023-09-25 PROCEDURE — 71046 X-RAY EXAM CHEST 2 VIEWS: CPT

## 2023-09-25 PROCEDURE — 1123F ACP DISCUSS/DSCN MKR DOCD: CPT | Performed by: NURSE PRACTITIONER

## 2023-09-25 RX ORDER — TRIAMCINOLONE ACETONIDE 1 MG/G
OINTMENT TOPICAL
COMMUNITY
Start: 2023-09-10

## 2023-09-25 NOTE — PATIENT INSTRUCTIONS
Plan:   Check CXR  3 day cardiac monitor   Call to schedule echocardiogram  Restart your daily lasix   Follow up with EP as planned  Follow up Dr. Zhang Perez in [place]

## 2023-09-25 NOTE — TELEPHONE ENCOUNTER
Monitor placed by Coleman Gardner, [address] [place] [address]  Length of monitor 3 days  Monitor ordered by [facility]  Phone ID- [id number]  Patch ID- [id number]  Activation successful prior to pt leaving office?  Yes

## 2023-09-25 NOTE — PROGRESS NOTES
\"CHOLHDLRATIO\"    EF:   Lab Results   Component Value Date/Time    LVEF 70 12/06/2021 12:41 PM    LVEF 58 12/06/2021 07:30 AM       Testing reviewed:     Echo: 04/28/20  Summary   Normal left ventricle systolic function with an estimated ejection fraction   of 55-60%. Definity contrast administered with no evidence of left   ventricular mass or thrombus noted. No regional wall motion abnormalities are seen. Normal left ventricular   diastolic filling pressure. The left atrium is mildly dilated. Mild mitral and tricuspid regurgitation. Systolic pulmonary artery pressure (SPAP) estimated at 42 mmHg (right atrial   pressure 3 mmHg), consistent with mild pulmonary hypertension. Echo 12/2021  Technically difficult examination. Normal left ventricle systolic function with an estimated ejection fraction of 55-60%. Definity contrast administered with no evidence of left ventricular mass or thrombus noted. No regional wall motion abnormalities are seen. Normal left ventricular diastolic filling pressure. Trace aortic and pulmonic regurgitation. The tricuspid valve is normal in structure. Mild mitral and tricuspid regurgitation. Systolic pulmonary artery pressure (SPAP) is normal and estimated at 21 mmHg (right atrial pressure 3 mmHg). NYHA:                        Class III:  Less than ordinary activity causes fatigue, palpitation, or dyspnea. ACC/ AHA Stage:      Stage C:  Structural heart disease with prior or current symptoms of HF    Assessment:  HFpEF  Pulmonary HTN  Afib  Chronic elevated right hemidiaphragm       Visit Diagnosis:    1. Chronic diastolic heart failure ([address])    2. Essential hypertension    3. Shortness of breath    4.  Bradycardia         Plan:   Check CXR given she is on amiodarone therapy   3 day cardiac monitor to evaluate for bradycardia/SND   Call to schedule echocardiogram  Restart your daily lasix - as she appears volume overload with JVD with + HJR  Follow up with EP as

## 2023-09-29 ENCOUNTER — PROCEDURE VISIT (OUTPATIENT)
Dept: CARDIOLOGY CLINIC | Age: 88
End: 2023-09-29

## 2023-09-29 DIAGNOSIS — R06.02 SHORTNESS OF BREATH: ICD-10-CM

## 2023-09-29 DIAGNOSIS — I10 ESSENTIAL HYPERTENSION: ICD-10-CM

## 2023-09-29 DIAGNOSIS — I50.32 CHRONIC DIASTOLIC HEART FAILURE (HCC): ICD-10-CM

## 2023-09-29 DIAGNOSIS — R00.1 BRADYCARDIA: ICD-10-CM

## 2023-10-02 ENCOUNTER — TELEPHONE (OUTPATIENT)
Dept: CARDIOLOGY CLINIC | Age: 88
End: 2023-10-02

## 2023-10-02 DIAGNOSIS — R06.02 SHORTNESS OF BREATH: ICD-10-CM

## 2023-10-02 DIAGNOSIS — I50.32 CHRONIC DIASTOLIC HEART FAILURE (HCC): Primary | ICD-10-CM

## 2023-10-02 NOTE — TELEPHONE ENCOUNTER
Please call with results. Echo shows volume overload, with high filling pressures. Continue lasix daily. Please get update on weights and symptoms. Plan:  Recommend stopping valsartan and changing to Entresto 97-103mg twice a day to help with the breathing as this medication has a water pill property to it and will allow more fluid off of the body. Check BMP and BNP in 1-2 weeks after the above medication change. I will send entresto to [facility]- please have her  30 day coupon.

## 2023-10-02 NOTE — TELEPHONE ENCOUNTER
Spoke with Samie Councilman relayed message per [facility]. Pt states she did not weigh her self today. She weighed 180 on 10/1/2023. She has no edema. She is SOB when she runs around doing things. Pt was told about coupon and placed it at the  for .

## 2023-10-05 ENCOUNTER — APPOINTMENT (OUTPATIENT)
Dept: CT IMAGING | Age: 88
DRG: 389 | End: 2023-10-05
Payer: MEDICARE

## 2023-10-05 ENCOUNTER — APPOINTMENT (OUTPATIENT)
Dept: GENERAL RADIOLOGY | Age: 88
DRG: 389 | End: 2023-10-05
Payer: MEDICARE

## 2023-10-05 ENCOUNTER — HOSPITAL ENCOUNTER (INPATIENT)
Age: 88
LOS: 2 days | Discharge: HOME | DRG: 389 | End: 2023-10-07
Attending: EMERGENCY MEDICINE | Admitting: INTERNAL MEDICINE
Payer: MEDICARE

## 2023-10-05 DIAGNOSIS — K56.609 SMALL BOWEL OBSTRUCTION (HCC): Primary | ICD-10-CM

## 2023-10-05 DIAGNOSIS — R06.09 DYSPNEA ON EXERTION: ICD-10-CM

## 2023-10-05 DIAGNOSIS — I48.0 PAROXYSMAL ATRIAL FIBRILLATION (HCC): Primary | ICD-10-CM

## 2023-10-05 LAB
ALBUMIN SERPL-MCNC: 4.1 G/DL (ref 3.4–5)
ALBUMIN/GLOB SERPL: 1 {RATIO} (ref 1.1–2.2)
ALP SERPL-CCNC: 92 U/L (ref 40–129)
ALT SERPL-CCNC: 17 U/L (ref 10–40)
AMORPH SED URNS QL MICRO: ABNORMAL /HPF
ANION GAP SERPL CALCULATED.3IONS-SCNC: 15 MMOL/L (ref 3–16)
ANTI-XA UNFRAC HEPARIN: >1.1 IU/ML (ref 0.3–0.7)
ANTI-XA UNFRAC HEPARIN: >1.1 IU/ML (ref 0.3–0.7)
APTT BLD: 28.2 SEC (ref 22.7–35.9)
AST SERPL-CCNC: 21 U/L (ref 15–37)
BACTERIA URNS QL MICRO: ABNORMAL /HPF
BASOPHILS # BLD: 0.1 K/UL (ref 0–0.2)
BASOPHILS NFR BLD: 0.6 %
BILIRUB SERPL-MCNC: 0.6 MG/DL (ref 0–1)
BILIRUB UR QL STRIP.AUTO: NEGATIVE
BUN SERPL-MCNC: 15 MG/DL (ref 7–20)
CALCIUM SERPL-MCNC: 9.2 MG/DL (ref 8.3–10.6)
CHLORIDE SERPL-SCNC: 97 MMOL/L (ref 99–110)
CLARITY UR: CLEAR
CO2 SERPL-SCNC: 23 MMOL/L (ref 21–32)
COLOR UR: YELLOW
CREAT SERPL-MCNC: 0.9 MG/DL (ref 0.6–1.2)
DEPRECATED RDW RBC AUTO: 15.7 % (ref 12.4–15.4)
EOSINOPHIL # BLD: 0.1 K/UL (ref 0–0.6)
EOSINOPHIL NFR BLD: 0.9 %
EPI CELLS #/AREA URNS HPF: ABNORMAL /HPF (ref 0–5)
GFR SERPLBLD CREATININE-BSD FMLA CKD-EPI: >60 ML/MIN/{1.73_M2}
GLUCOSE SERPL-MCNC: 177 MG/DL (ref 70–99)
GLUCOSE UR STRIP.AUTO-MCNC: NEGATIVE MG/DL
HCT VFR BLD AUTO: 44.3 % (ref 36–48)
HGB BLD-MCNC: 14.6 G/DL (ref 12–16)
HGB UR QL STRIP.AUTO: ABNORMAL
KETONES UR STRIP.AUTO-MCNC: NEGATIVE MG/DL
LACTATE BLDV-SCNC: 1.1 MMOL/L (ref 0.4–1.9)
LEUKOCYTE ESTERASE UR QL STRIP.AUTO: NEGATIVE
LIPASE SERPL-CCNC: 13 U/L (ref 13–60)
LYMPHOCYTES # BLD: 1.8 K/UL (ref 1–5.1)
LYMPHOCYTES NFR BLD: 16 %
MCH RBC QN AUTO: 27.2 PG (ref 26–34)
MCHC RBC AUTO-ENTMCNC: 32.9 G/DL (ref 31–36)
MCV RBC AUTO: 82.7 FL (ref 80–100)
MONOCYTES # BLD: 0.9 K/UL (ref 0–1.3)
MONOCYTES NFR BLD: 7.8 %
NEUTROPHILS # BLD: 8.2 K/UL (ref 1.7–7.7)
NEUTROPHILS NFR BLD: 74.7 %
NITRITE UR QL STRIP.AUTO: NEGATIVE
PH UR STRIP.AUTO: 6 [PH] (ref 5–8)
PLATELET # BLD AUTO: 317 K/UL (ref 135–450)
PMV BLD AUTO: 9.4 FL (ref 5–10.5)
POTASSIUM SERPL-SCNC: 4.9 MMOL/L (ref 3.5–5.1)
PROT SERPL-MCNC: 8.4 G/DL (ref 6.4–8.2)
PROT UR STRIP.AUTO-MCNC: 30 MG/DL
RBC # BLD AUTO: 5.36 M/UL (ref 4–5.2)
RBC #/AREA URNS HPF: ABNORMAL /HPF (ref 0–4)
RENAL EPI CELLS #/AREA UR COMP ASSIST: ABNORMAL /HPF (ref 0–1)
SODIUM SERPL-SCNC: 135 MMOL/L (ref 136–145)
SP GR UR STRIP.AUTO: 1.01 (ref 1–1.03)
TROPONIN, HIGH SENSITIVITY: 10 NG/L (ref 0–14)
UA COMPLETE W REFLEX CULTURE PNL UR: ABNORMAL
UA DIPSTICK W REFLEX MICRO PNL UR: YES
URN SPEC COLLECT METH UR: ABNORMAL
UROBILINOGEN UR STRIP-ACNC: 0.2 E.U./DL
WBC # BLD AUTO: 11 K/UL (ref 4–11)
WBC #/AREA URNS HPF: ABNORMAL /HPF (ref 0–5)

## 2023-10-05 PROCEDURE — 36415 COLL VENOUS BLD VENIPUNCTURE: CPT

## 2023-10-05 PROCEDURE — 96374 THER/PROPH/DIAG INJ IV PUSH: CPT

## 2023-10-05 PROCEDURE — 80053 COMPREHEN METABOLIC PANEL: CPT

## 2023-10-05 PROCEDURE — 0D9670Z DRAINAGE OF STOMACH WITH DRAINAGE DEVICE, VIA NATURAL OR ARTIFICIAL OPENING: ICD-10-PCS | Performed by: INTERNAL MEDICINE

## 2023-10-05 PROCEDURE — 84484 ASSAY OF TROPONIN QUANT: CPT

## 2023-10-05 PROCEDURE — 83605 ASSAY OF LACTIC ACID: CPT

## 2023-10-05 PROCEDURE — 71045 X-RAY EXAM CHEST 1 VIEW: CPT

## 2023-10-05 PROCEDURE — 85025 COMPLETE CBC W/AUTO DIFF WBC: CPT

## 2023-10-05 PROCEDURE — 83690 ASSAY OF LIPASE: CPT

## 2023-10-05 PROCEDURE — 74177 CT ABD & PELVIS W/CONTRAST: CPT

## 2023-10-05 PROCEDURE — 1200000000 HC SEMI PRIVATE

## 2023-10-05 PROCEDURE — 81001 URINALYSIS AUTO W/SCOPE: CPT

## 2023-10-05 PROCEDURE — 85520 HEPARIN ASSAY: CPT

## 2023-10-05 PROCEDURE — 93005 ELECTROCARDIOGRAM TRACING: CPT | Performed by: INTERNAL MEDICINE

## 2023-10-05 PROCEDURE — 6370000000 HC RX 637 (ALT 250 FOR IP): Performed by: NURSE PRACTITIONER

## 2023-10-05 PROCEDURE — 6360000004 HC RX CONTRAST MEDICATION: Performed by: NURSE PRACTITIONER

## 2023-10-05 PROCEDURE — 99285 EMERGENCY DEPT VISIT HI MDM: CPT

## 2023-10-05 PROCEDURE — 85730 THROMBOPLASTIN TIME PARTIAL: CPT

## 2023-10-05 PROCEDURE — 6360000002 HC RX W HCPCS: Performed by: NURSE PRACTITIONER

## 2023-10-05 PROCEDURE — 96375 TX/PRO/DX INJ NEW DRUG ADDON: CPT

## 2023-10-05 RX ORDER — HEPARIN SODIUM 1000 [USP'U]/ML
4000 INJECTION, SOLUTION INTRAVENOUS; SUBCUTANEOUS ONCE
Status: COMPLETED | OUTPATIENT
Start: 2023-10-05 | End: 2023-10-06

## 2023-10-05 RX ORDER — ACETAMINOPHEN 325 MG/1
650 TABLET ORAL EVERY 6 HOURS PRN
Status: DISCONTINUED | OUTPATIENT
Start: 2023-10-05 | End: 2023-10-07 | Stop reason: HOSPADM

## 2023-10-05 RX ORDER — HEPARIN SODIUM 1000 [USP'U]/ML
4000 INJECTION, SOLUTION INTRAVENOUS; SUBCUTANEOUS PRN
Status: DISCONTINUED | OUTPATIENT
Start: 2023-10-05 | End: 2023-10-07 | Stop reason: ALTCHOICE

## 2023-10-05 RX ORDER — MAGNESIUM SULFATE IN WATER 40 MG/ML
2000 INJECTION, SOLUTION INTRAVENOUS PRN
Status: DISCONTINUED | OUTPATIENT
Start: 2023-10-05 | End: 2023-10-07 | Stop reason: HOSPADM

## 2023-10-05 RX ORDER — OXYMETAZOLINE HYDROCHLORIDE 0.05 G/100ML
2 SPRAY NASAL ONCE
Status: DISCONTINUED | OUTPATIENT
Start: 2023-10-05 | End: 2023-10-07 | Stop reason: HOSPADM

## 2023-10-05 RX ORDER — HEPARIN SODIUM 10000 [USP'U]/100ML
14 INJECTION, SOLUTION INTRAVENOUS CONTINUOUS
Status: DISCONTINUED | OUTPATIENT
Start: 2023-10-05 | End: 2023-10-07

## 2023-10-05 RX ORDER — LABETALOL HYDROCHLORIDE 5 MG/ML
10 INJECTION, SOLUTION INTRAVENOUS EVERY 4 HOURS PRN
Status: DISCONTINUED | OUTPATIENT
Start: 2023-10-05 | End: 2023-10-07 | Stop reason: HOSPADM

## 2023-10-05 RX ORDER — DEXTROSE MONOHYDRATE 100 MG/ML
INJECTION, SOLUTION INTRAVENOUS CONTINUOUS PRN
Status: DISCONTINUED | OUTPATIENT
Start: 2023-10-05 | End: 2023-10-07 | Stop reason: HOSPADM

## 2023-10-05 RX ORDER — KETOROLAC TROMETHAMINE 30 MG/ML
15 INJECTION, SOLUTION INTRAMUSCULAR; INTRAVENOUS ONCE
Status: COMPLETED | OUTPATIENT
Start: 2023-10-05 | End: 2023-10-05

## 2023-10-05 RX ORDER — INSULIN LISPRO 100 [IU]/ML
0-8 INJECTION, SOLUTION INTRAVENOUS; SUBCUTANEOUS EVERY 4 HOURS
Status: DISCONTINUED | OUTPATIENT
Start: 2023-10-05 | End: 2023-10-07 | Stop reason: HOSPADM

## 2023-10-05 RX ORDER — ONDANSETRON 2 MG/ML
4 INJECTION INTRAMUSCULAR; INTRAVENOUS ONCE
Status: COMPLETED | OUTPATIENT
Start: 2023-10-05 | End: 2023-10-05

## 2023-10-05 RX ORDER — NICOTINE 21 MG/24HR
1 PATCH, TRANSDERMAL 24 HOURS TRANSDERMAL DAILY
Status: DISCONTINUED | OUTPATIENT
Start: 2023-10-06 | End: 2023-10-06

## 2023-10-05 RX ORDER — PROMETHAZINE HYDROCHLORIDE 25 MG/1
12.5 TABLET ORAL EVERY 6 HOURS PRN
Status: DISCONTINUED | OUTPATIENT
Start: 2023-10-05 | End: 2023-10-07 | Stop reason: HOSPADM

## 2023-10-05 RX ORDER — SODIUM CHLORIDE 9 MG/ML
INJECTION, SOLUTION INTRAVENOUS CONTINUOUS
Status: ACTIVE | OUTPATIENT
Start: 2023-10-05 | End: 2023-10-06

## 2023-10-05 RX ORDER — HEPARIN SODIUM 1000 [USP'U]/ML
2000 INJECTION, SOLUTION INTRAVENOUS; SUBCUTANEOUS PRN
Status: DISCONTINUED | OUTPATIENT
Start: 2023-10-05 | End: 2023-10-07 | Stop reason: ALTCHOICE

## 2023-10-05 RX ORDER — SODIUM CHLORIDE 0.9 % (FLUSH) 0.9 %
10 SYRINGE (ML) INJECTION PRN
Status: DISCONTINUED | OUTPATIENT
Start: 2023-10-05 | End: 2023-10-07 | Stop reason: HOSPADM

## 2023-10-05 RX ORDER — POTASSIUM CHLORIDE 7.45 MG/ML
10 INJECTION INTRAVENOUS PRN
Status: DISCONTINUED | OUTPATIENT
Start: 2023-10-05 | End: 2023-10-07 | Stop reason: HOSPADM

## 2023-10-05 RX ORDER — ONDANSETRON 2 MG/ML
4 INJECTION INTRAMUSCULAR; INTRAVENOUS EVERY 6 HOURS PRN
Status: DISCONTINUED | OUTPATIENT
Start: 2023-10-05 | End: 2023-10-07 | Stop reason: HOSPADM

## 2023-10-05 RX ORDER — SODIUM CHLORIDE 0.9 % (FLUSH) 0.9 %
10 SYRINGE (ML) INJECTION EVERY 12 HOURS SCHEDULED
Status: DISCONTINUED | OUTPATIENT
Start: 2023-10-05 | End: 2023-10-07 | Stop reason: HOSPADM

## 2023-10-05 RX ORDER — ACETAMINOPHEN 650 MG/1
650 SUPPOSITORY RECTAL EVERY 6 HOURS PRN
Status: DISCONTINUED | OUTPATIENT
Start: 2023-10-05 | End: 2023-10-07 | Stop reason: HOSPADM

## 2023-10-05 RX ORDER — LIDOCAINE HYDROCHLORIDE 20 MG/ML
JELLY TOPICAL ONCE
Status: DISCONTINUED | OUTPATIENT
Start: 2023-10-05 | End: 2023-10-07 | Stop reason: HOSPADM

## 2023-10-05 RX ORDER — SODIUM CHLORIDE 9 MG/ML
INJECTION, SOLUTION INTRAVENOUS PRN
Status: DISCONTINUED | OUTPATIENT
Start: 2023-10-05 | End: 2023-10-07 | Stop reason: HOSPADM

## 2023-10-05 RX ADMIN — IOPAMIDOL 75 ML: 755 INJECTION, SOLUTION INTRAVENOUS at 18:21

## 2023-10-05 RX ADMIN — KETOROLAC TROMETHAMINE 15 MG: 30 INJECTION, SOLUTION INTRAMUSCULAR; INTRAVENOUS at 18:00

## 2023-10-05 RX ADMIN — HYDROMORPHONE HYDROCHLORIDE 0.25 MG: 1 INJECTION, SOLUTION INTRAMUSCULAR; INTRAVENOUS; SUBCUTANEOUS at 20:37

## 2023-10-05 RX ADMIN — Medication: at 18:31

## 2023-10-05 RX ADMIN — ONDANSETRON 4 MG: 2 INJECTION INTRAMUSCULAR; INTRAVENOUS at 18:30

## 2023-10-05 ASSESSMENT — ENCOUNTER SYMPTOMS
EYE PAIN: 0
RHINORRHEA: 0
BACK PAIN: 0
SHORTNESS OF BREATH: 0
DIARRHEA: 0
VOMITING: 0
NAUSEA: 1
COUGH: 0
SORE THROAT: 0
ABDOMINAL PAIN: 1
BLOOD IN STOOL: 0

## 2023-10-05 ASSESSMENT — PAIN DESCRIPTION - LOCATION: LOCATION: ABDOMEN

## 2023-10-05 ASSESSMENT — PAIN - FUNCTIONAL ASSESSMENT: PAIN_FUNCTIONAL_ASSESSMENT: 0-10

## 2023-10-05 ASSESSMENT — PAIN SCALES - GENERAL
PAINLEVEL_OUTOF10: 10
PAINLEVEL_OUTOF10: 5

## 2023-10-05 ASSESSMENT — PAIN DESCRIPTION - PAIN TYPE: TYPE: ACUTE PAIN

## 2023-10-06 ENCOUNTER — TELEPHONE (OUTPATIENT)
Dept: CARDIOLOGY CLINIC | Age: 88
End: 2023-10-06

## 2023-10-06 ENCOUNTER — APPOINTMENT (OUTPATIENT)
Dept: GENERAL RADIOLOGY | Age: 88
DRG: 389 | End: 2023-10-06
Payer: MEDICARE

## 2023-10-06 PROBLEM — R10.13 ABDOMINAL PAIN, EPIGASTRIC: Status: ACTIVE | Noted: 2023-10-06

## 2023-10-06 LAB
ALBUMIN SERPL-MCNC: 3.3 G/DL (ref 3.4–5)
ALBUMIN/GLOB SERPL: 0.9 {RATIO} (ref 1.1–2.2)
ALP SERPL-CCNC: 77 U/L (ref 40–129)
ALT SERPL-CCNC: 14 U/L (ref 10–40)
ANION GAP SERPL CALCULATED.3IONS-SCNC: 12 MMOL/L (ref 3–16)
APTT BLD: 64.5 SEC (ref 22.7–35.9)
APTT BLD: 78.6 SEC (ref 22.7–35.9)
APTT BLD: 83.8 SEC (ref 22.7–35.9)
AST SERPL-CCNC: 13 U/L (ref 15–37)
BASOPHILS # BLD: 0 K/UL (ref 0–0.2)
BASOPHILS NFR BLD: 0.1 %
BILIRUB SERPL-MCNC: 0.4 MG/DL (ref 0–1)
BUN SERPL-MCNC: 23 MG/DL (ref 7–20)
CALCIUM SERPL-MCNC: 7.9 MG/DL (ref 8.3–10.6)
CHLORIDE SERPL-SCNC: 101 MMOL/L (ref 99–110)
CO2 SERPL-SCNC: 22 MMOL/L (ref 21–32)
CREAT SERPL-MCNC: 1 MG/DL (ref 0.6–1.2)
DEPRECATED RDW RBC AUTO: 15.6 % (ref 12.4–15.4)
EKG ATRIAL RATE: 76 BPM
EKG DIAGNOSIS: NORMAL
EKG P AXIS: 56 DEGREES
EKG P-R INTERVAL: 190 MS
EKG Q-T INTERVAL: 416 MS
EKG QRS DURATION: 90 MS
EKG QTC CALCULATION (BAZETT): 468 MS
EKG R AXIS: -31 DEGREES
EKG T AXIS: 71 DEGREES
EKG VENTRICULAR RATE: 76 BPM
EOSINOPHIL # BLD: 0 K/UL (ref 0–0.6)
EOSINOPHIL NFR BLD: 0 %
GFR SERPLBLD CREATININE-BSD FMLA CKD-EPI: 54 ML/MIN/{1.73_M2}
GLUCOSE BLD-MCNC: 123 MG/DL (ref 70–99)
GLUCOSE BLD-MCNC: 134 MG/DL (ref 70–99)
GLUCOSE BLD-MCNC: 152 MG/DL (ref 70–99)
GLUCOSE BLD-MCNC: 191 MG/DL (ref 70–99)
GLUCOSE BLD-MCNC: 206 MG/DL (ref 70–99)
GLUCOSE SERPL-MCNC: 191 MG/DL (ref 70–99)
HCT VFR BLD AUTO: 41.6 % (ref 36–48)
HGB BLD-MCNC: 13.5 G/DL (ref 12–16)
INR PPP: 1.4 (ref 0.84–1.16)
LYMPHOCYTES # BLD: 1.1 K/UL (ref 1–5.1)
LYMPHOCYTES NFR BLD: 8.5 %
MCH RBC QN AUTO: 26.7 PG (ref 26–34)
MCHC RBC AUTO-ENTMCNC: 32.6 G/DL (ref 31–36)
MCV RBC AUTO: 82.1 FL (ref 80–100)
MONOCYTES # BLD: 1.2 K/UL (ref 0–1.3)
MONOCYTES NFR BLD: 9.7 %
NEUTROPHILS # BLD: 10.5 K/UL (ref 1.7–7.7)
NEUTROPHILS NFR BLD: 81.7 %
PERFORMED ON: ABNORMAL
PLATELET # BLD AUTO: 281 K/UL (ref 135–450)
PMV BLD AUTO: 9.5 FL (ref 5–10.5)
POTASSIUM SERPL-SCNC: 4.1 MMOL/L (ref 3.5–5.1)
PROT SERPL-MCNC: 7.1 G/DL (ref 6.4–8.2)
PROTHROMBIN TIME: 17.2 SEC (ref 11.5–14.8)
RBC # BLD AUTO: 5.07 M/UL (ref 4–5.2)
SODIUM SERPL-SCNC: 135 MMOL/L (ref 136–145)
TROPONIN, HIGH SENSITIVITY: 15 NG/L (ref 0–14)
WBC # BLD AUTO: 12.8 K/UL (ref 4–11)

## 2023-10-06 PROCEDURE — 51798 US URINE CAPACITY MEASURE: CPT

## 2023-10-06 PROCEDURE — 99233 SBSQ HOSP IP/OBS HIGH 50: CPT | Performed by: INTERNAL MEDICINE

## 2023-10-06 PROCEDURE — 1200000000 HC SEMI PRIVATE

## 2023-10-06 PROCEDURE — 6370000000 HC RX 637 (ALT 250 FOR IP)

## 2023-10-06 PROCEDURE — 83036 HEMOGLOBIN GLYCOSYLATED A1C: CPT

## 2023-10-06 PROCEDURE — 93010 ELECTROCARDIOGRAM REPORT: CPT | Performed by: INTERNAL MEDICINE

## 2023-10-06 PROCEDURE — 85610 PROTHROMBIN TIME: CPT

## 2023-10-06 PROCEDURE — APPSS30 APP SPLIT SHARED TIME 16-30 MINUTES

## 2023-10-06 PROCEDURE — 85025 COMPLETE CBC W/AUTO DIFF WBC: CPT

## 2023-10-06 PROCEDURE — 2580000003 HC RX 258: Performed by: INTERNAL MEDICINE

## 2023-10-06 PROCEDURE — 85730 THROMBOPLASTIN TIME PARTIAL: CPT

## 2023-10-06 PROCEDURE — 97116 GAIT TRAINING THERAPY: CPT

## 2023-10-06 PROCEDURE — 36415 COLL VENOUS BLD VENIPUNCTURE: CPT

## 2023-10-06 PROCEDURE — 80053 COMPREHEN METABOLIC PANEL: CPT

## 2023-10-06 PROCEDURE — 74018 RADEX ABDOMEN 1 VIEW: CPT

## 2023-10-06 PROCEDURE — 6370000000 HC RX 637 (ALT 250 FOR IP): Performed by: INTERNAL MEDICINE

## 2023-10-06 PROCEDURE — 84484 ASSAY OF TROPONIN QUANT: CPT

## 2023-10-06 PROCEDURE — 97165 OT EVAL LOW COMPLEX 30 MIN: CPT

## 2023-10-06 PROCEDURE — 99222 1ST HOSP IP/OBS MODERATE 55: CPT | Performed by: SURGERY

## 2023-10-06 PROCEDURE — 6360000002 HC RX W HCPCS: Performed by: INTERNAL MEDICINE

## 2023-10-06 PROCEDURE — 97535 SELF CARE MNGMENT TRAINING: CPT

## 2023-10-06 PROCEDURE — 97161 PT EVAL LOW COMPLEX 20 MIN: CPT

## 2023-10-06 RX ORDER — DILTIAZEM HYDROCHLORIDE 60 MG/1
30 TABLET, FILM COATED ORAL DAILY PRN
Status: DISCONTINUED | OUTPATIENT
Start: 2023-10-06 | End: 2023-10-07 | Stop reason: HOSPADM

## 2023-10-06 RX ORDER — AMIODARONE HYDROCHLORIDE 200 MG/1
200 TABLET ORAL DAILY
Status: DISCONTINUED | OUTPATIENT
Start: 2023-10-06 | End: 2023-10-07 | Stop reason: HOSPADM

## 2023-10-06 RX ORDER — METOPROLOL SUCCINATE 25 MG/1
25 TABLET, EXTENDED RELEASE ORAL DAILY PRN
Status: DISCONTINUED | OUTPATIENT
Start: 2023-10-06 | End: 2023-10-06

## 2023-10-06 RX ORDER — PRAVASTATIN SODIUM 40 MG
40 TABLET ORAL DAILY
Status: DISCONTINUED | OUTPATIENT
Start: 2023-10-06 | End: 2023-10-07 | Stop reason: HOSPADM

## 2023-10-06 RX ADMIN — HEPARIN SODIUM 12 UNITS/KG/HR: 10000 INJECTION, SOLUTION INTRAVENOUS at 00:23

## 2023-10-06 RX ADMIN — SODIUM CHLORIDE: 9 INJECTION, SOLUTION INTRAVENOUS at 00:14

## 2023-10-06 RX ADMIN — HEPARIN SODIUM 14 UNITS/KG/HR: 10000 INJECTION, SOLUTION INTRAVENOUS at 23:58

## 2023-10-06 RX ADMIN — SACUBITRIL AND VALSARTAN 1 TABLET: 24; 26 TABLET, FILM COATED ORAL at 21:07

## 2023-10-06 RX ADMIN — PRAVASTATIN SODIUM 40 MG: 40 TABLET ORAL at 10:56

## 2023-10-06 RX ADMIN — ONDANSETRON 4 MG: 2 INJECTION INTRAMUSCULAR; INTRAVENOUS at 00:27

## 2023-10-06 RX ADMIN — AMIODARONE HYDROCHLORIDE 200 MG: 200 TABLET ORAL at 10:56

## 2023-10-06 RX ADMIN — SACUBITRIL AND VALSARTAN 1 TABLET: 24; 26 TABLET, FILM COATED ORAL at 11:00

## 2023-10-06 RX ADMIN — CEFTRIAXONE SODIUM 1000 MG: 1 INJECTION, POWDER, FOR SOLUTION INTRAMUSCULAR; INTRAVENOUS at 00:16

## 2023-10-06 RX ADMIN — SODIUM CHLORIDE, PRESERVATIVE FREE 10 ML: 5 INJECTION INTRAVENOUS at 00:29

## 2023-10-06 RX ADMIN — HEPARIN SODIUM 4000 UNITS: 1000 INJECTION INTRAVENOUS; SUBCUTANEOUS at 00:17

## 2023-10-06 ASSESSMENT — PAIN SCALES - GENERAL: PAINLEVEL_OUTOF10: 0

## 2023-10-06 NOTE — PROGRESS NOTES
Handoff report and transfer of care given at bedside to [facility] . Patient in stable condition, denies needs/concerns at this time. Call light within reach.

## 2023-10-06 NOTE — TELEPHONE ENCOUNTER
Created telephone encounter. Spoke with Dunia Red relayed message per [facility] regarding Monitor results. Pt spouse verbalized understanding.

## 2023-10-06 NOTE — CARE COORDINATION
Case Management Assessment  Initial Evaluation    Date/Time of Evaluation: 10/6/2023 1:58 PM  Assessment Completed by: Russel Wilson RN    If patient is discharged prior to next notation, then this note serves as note for discharge by case management. Patient Name: Linda Tiwari                   YOB: 1935  Diagnosis: Small bowel obstruction ([address]) [W21.384]  SBO (small bowel obstruction) ([address]) [E13.035]                   Date / Time: 10/5/2023  5:15 PM    Patient Admission Status: Inpatient   Readmission Risk (Low < 19, Mod (19-27), High > 27): Readmission Risk Score: 18.6    Current PCP: Marco Antonio Rodriguez MD  PCP verified by CM? Yes Kassandra Gusman)    Chart Reviewed: Yes      History Provided by: Patient  Patient Orientation: Alert and Oriented    Patient Cognition: Alert    Hospitalization in the last 30 days (Readmission):  No    If yes, Readmission Assessment in CM Navigator will be completed. Advance Directives:      Code Status: Full Code   Patient's Primary Decision Maker is: Legal Next of Kin    Primary Decision Maker: vonda tiwari - Spouse - [phone]    Secondary Decision Maker: Sarahybarrera Gareth Child - [phone]    Discharge Planning:    Patient lives with: Spouse/Significant Other Type of Home: House  Primary Care Giver: Self  Patient Support Systems include: Spouse/Significant Other   Current Financial resources: Medicare (BCBS)  Current community resources: None  Current services prior to admission: None            Current DME:              Type of Home Care services:  None    ADLS  Prior functional level: Independent in ADLs/IADLs  Current functional level: Independent in ADLs/IADLs    PT AM-PAC: 24 /24  OT AM-PAC: 25 /24    Family can provide assistance at DC: Yes  Would you like Case Management to discuss the discharge plan with any other family members/significant others, and if so, who?  No  Plans to Return to Present Housing: Yes  Other Identified Issues/Barriers to RETURNING

## 2023-10-06 NOTE — ED PROVIDER NOTES
I independently performed a history and physical on [facility]. All diagnostic, treatment, and disposition decisions were made by myself in conjunction with the advanced practice provider. I personally saw the patient and performed a substantive portion of the visit including all aspects of the medical decision making. For further details of [facility] emergency department encounter, please see Rene Hudson NP's documentation. Patient is an 60-year-old female presenting today due to concern for generalized abdominal pain with nausea starting yesterday and not feeling well. She denies any chest pain or shortness of breath. No actual vomiting. She did eat around noon today. She denies any urinary complaints. She is on Eliquis. By the time I was asked to see her, CT had come back positive for bowel obstruction. She did report her nausea improved after being medicated in the ED but was still complaining of pain and asking for something for this. She denies any fever. No falls or trauma. Physical:   Gen: No acute distress. Alert and answering questions appropriately  Psych: Normal mood and affect  HEENT: NCAT  Neck: supple  Cardiac: RRR  Lungs: C2AB, no R/R/W  Abdomen: soft and mild generalized tenderness and mild distention with no R/G, bowel sounds diminished  Neuro: GCS equals 15    The Ekg interpreted by me shows  normal sinus rhythm with a rate of 76  Axis is   Left axis deviation  QTc is   borderline prolonged QT       ST Segments: nonspecific changes  Significant change from prior EKG dated - 6/23/23  No STEMI, may have lead reversal since now left axis deviation which prior EKG did not have this issue        I was the primary provider for the patient along with NP Guerrero Velazquez. MDM: Patient was evaluated due to concern for worsening abdominal pain since yesterday. CT was concerning for bowel obstruction and general surgery wanted NG tube placed which was reasonable.   She will need to be admitted and agrees with this plan. She denies any chest pain and story not concerning for acute coronary syndrome or pulmonary embolism. She was stable for the floor.           Marii Fuentes MD  10/06/23 8225

## 2023-10-06 NOTE — ACP (ADVANCE CARE PLANNING)
Advance Care Planning     General Advance Care Planning (ACP) Conversation    Date of Conversation: 10/5/2023  Conducted with: Patient with Decision Making Capacity    Healthcare Decision Maker:    Primary Decision Maker: vonda rabago - Spouse - [phone]    Secondary Decision Maker: Deidre Lora - Child - [phone]  Click here to complete Healthcare Decision Makers including selection of the Healthcare Decision Maker Relationship (ie \"Primary\"). Today we documented Decision Maker(s) consistent with Legal Next of Kin hierarchy. Content/Action Overview:   Has ACP document(s) NOT on file - requested patient to provide  Reviewed DNR/DNI and patient elects Full Code (Attempt Resuscitation)        Length of Voluntary ACP Conversation in minutes:  <16 minutes (Non-Billable)    Jim Seip, RN

## 2023-10-06 NOTE — PROGRESS NOTES
Pharmacy: Low Dose Heparin   Current rate: 14 units/kg/hr  [email or web address] 83.8 secs  Goal aPtt  secs  Per protocol, continue with same rate of 14 units/kg/hr, next Ptt at [facility] D 10/6/23175:18 PM  .

## 2023-10-06 NOTE — PROGRESS NOTES
Panic lab value anti-XA 1.73, Called pharmacy and discussed level. Notified MD and placed a redraw order. Value still 1.73, MD ordered aPTT to be drawn. Heparin bolus and gtt on hold until results.

## 2023-10-06 NOTE — PROGRESS NOTES
Inpatient Physical Therapy Evaluation, Treatment, & Discharge Summary    Unit: [facility]  Date:  10/6/2023  Patient Name:    Dion Tiwari  Admitting diagnosis:  Small bowel obstruction ([address]) [J18.813]  SBO (small bowel obstruction) ([address]) [Q08.721]  Admit Date:  10/5/2023  Precautions/Restrictions/WB Status/ Lines/ Wounds/ Oxygen: Fall risk, Bed/chair alarm, Lines (IV and NG tube), and Telemetry      Pt seen for cotreatment this date due to patient endurance, acute illness/injury, and limited functional status information    Treatment Time:  1154- 1219  Treatment Number:  1   Timed Code Treatment Minutes: 10 minutes  Total Treatment Minutes:  10  minutes    Patient Stated Goals for Therapy: \" to get home \"          Discharge Recommendations: Home with PRN assistance  DME needs for discharge: Needs Met       Therapy recommendation for EMS Transport: NA    Therapy recommendations for staff:   Supervision for ambulation with use of gait belt within halls    History of Present Illness:   Per Dr. Sg Sadler H&P:  Pt is an 81 y.o. female who presented to [facility] with past medical of atrial fibrillation on Eliquis, type 2 diabetes, hypertension, hyperlipidemia, prior history of inception presented to the ED with chief complaint of abdominal pain     Patient reported abdominal pain occurred yesterday started epigastric nonradiating has been having difficulty passing bowel movements but his last one was today and it was normal no known alleviating or exacerbating factor no associated fever chills night sweats. Home Health S4 Level Recommendation:  NA        AM-PAC Mobility Score    AM-PAC Inpatient Mobility Raw Score : 24         Subjective  Patient sitting up in chair with no family present. Pt agreeable to this PT session.      Cognition    A&O x4   Able to follow 2 step commands    Pain   No  Location:   Rating: NA /10  Pain Medicine Status: No request made    Preadmission Environment:   Pt. Lives

## 2023-10-06 NOTE — TELEPHONE ENCOUNTER
----- Message from GUANAKO Houston CNP sent at 10/6/2023  9:04 AM EDT -----  Reviewed, please call patient.   No significant arrhythmias noted

## 2023-10-06 NOTE — PROGRESS NOTES
HEART FAILURE CARE PLAN:    Comorbidities Reviewed: Yes   Patient has a past medical history of Atrial fibrillation ([address]), Congestive heart failure ([address]), Diabetes mellitus ([address]), Hydronephrosis, Hyperlipidemia, Hypertension, 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 (supraventricular tachycardia). ECHOCARDIOGRAM Reviewed: Yes   Patient's Ejection Fraction (EF) is greater than 40%    Weights Reviewed: Yes   Admission weight: 179 lb (81.2 kg)   Wt Readings from Last 3 Encounters:   10/06/23 180 lb 3.2 oz (81.7 kg)   09/25/23 184 lb 8 oz (83.7 kg)   08/15/23 181 lb (82.1 kg)     Intake & Output Reviewed: Yes   No intake or output data in the 24 hours ending 10/06/23 0606  Medications Reviewed: Yes   [place]:   sodium chloride flush  10 mL IntraVENous 2 times per day    nicotine  1 patch TransDERmal Daily    oxymetazoline  2 spray Each Nostril Once    lidocaine   Topical Once    insulin lispro  0-8 Units SubCUTAneous Q4H    cefTRIAXone (ROCEPHIN) IV  1,000 mg IntraVENous Q24H     ACE/ARB/ARNI is REQUIRED for EF </= 98% SYSTOLIC FAILURE:   ACE[de-identified] None  ARB[de-identified] None  ARNI[de-identified] Sacubitril/Valsartan-Entresto    Evidenced-Based Beta Blocker is REQUIRED for EF </= 10% SYSTOLIC FAILURE:   [de-identified] Metoprolol SUCCinate- Toprol XL    Diuretics:  [de-identified] Furosemide    Diet Reviewed: Yes   Diet NPO    Goal of Care Reviewed: Yes   Patient and/or Family's stated Goal of Care this Admission: reduce shortness of breath, increase activity tolerance, better understand heart failure and disease management, be more comfortable, and reduce lower extremity edema prior to discharge.

## 2023-10-06 NOTE — PROGRESS NOTES
HEART FAILURE CARE PLAN:    Comorbidities Reviewed: Yes   Patient has a past medical history of Atrial fibrillation ([address]), Congestive heart failure ([address]), Diabetes mellitus ([address]), Hydronephrosis, Hyperlipidemia, Hypertension, 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 (supraventricular tachycardia). ECHOCARDIOGRAM Reviewed: Yes   Patient's Ejection Fraction (EF) is greater than 40%    Weights Reviewed: Yes   Admission weight: 179 lb (81.2 kg)   Wt Readings from Last 3 Encounters:   10/06/23 180 lb 3.2 oz (81.7 kg)   09/25/23 184 lb 8 oz (83.7 kg)   08/15/23 181 lb (82.1 kg)     Intake & Output Reviewed: Yes     Intake/Output Summary (Last 24 hours) at 10/6/2023 1829  Last data filed at 10/6/2023 1807  Gross per 24 hour   Intake 699.37 ml   Output 650 ml   Net 49.37 ml     Medications Reviewed: Yes   SCHEDULED HOSPITAL MEDICATIONS:   amiodarone  200 mg Oral Daily    pravastatin  40 mg Oral Daily    metoprolol tartrate  25 mg Oral BID    sacubitril-valsartan  1 tablet Oral BID    sodium chloride flush  10 mL IntraVENous 2 times per day    oxymetazoline  2 spray Each Nostril Once    lidocaine   Topical Once    insulin lispro  0-8 Units SubCUTAneous Q4H     ACE/ARB/ARNI is REQUIRED for EF </= 70% SYSTOLIC FAILURE:   ACE[de-identified] None  ARB[de-identified] Valsartan  ARNI[de-identified] Sacubitril/Valsartan-Entresto    Evidenced-Based Beta Blocker is REQUIRED for EF </= 03% SYSTOLIC FAILURE:   [de-identified] Metoprolol SUCCinate- Toprol XL    Diuretics:  [de-identified] None    Diet Reviewed: Yes   Diet NPO Exceptions are: Ice Chips, Sips of Water with Meds    Goal of Care Reviewed: Yes   Patient and/or Family's stated Goal of Care this Admission: reduce shortness of breath, increase activity tolerance, better understand heart failure and disease management, be more comfortable, and reduce lower extremity edema prior to discharge.

## 2023-10-06 NOTE — CONSULTS
Pharmacy to Manage Heparin Infusion per [facility]    Dx: Atrial Fibrillation  Pt wt = 81.7 kg - actual.  Baseline aPTT = Pending. Oral factor Xa-inhibitors may alter and elevate anti-Xa levels used for unfractionated heparin monitoring. As a result, anti-Xa monitoring is not accurate while Xa-inhibitor activity is detectable. Utilize aPTT monitoring when patient received an oral factor Xa-inhibitor (apixaban, betrixaban, edoxaban or rivaroxaban) within 72 hours prior to admission (please document last administration time). The goal is to allow a washout of oral factor Xa-inhibitors by using aPTT for 72 hours, then change to ant-Xa levels for UFH. Heparin (weight-based) Infusion: CAD/STEMI/NSTEMI/UA/AFib)   Heparin 60 units/kg IVP bolus (max 4,000 units) followed by Heparin infusion at 12 units/kg/hr (recommended max initial rate: 1000 units/hr). Recheck anti-Xa (unless aPTT being used) in 6 hours. Goal anti-Xa 0.3-0.7 IU/mL  Goal aPTT =  seconds.   Kalpana Pope, PharmD  10/5/2023 10:52 PM

## 2023-10-06 NOTE — PROGRESS NOTES
Telemetry Assignment Communication Form    Patient has orders for continuous telemetry OR pulse oximeter only orders    To be filled out by Clinical    Patient has Admission or Transfer orders and is assigned to Room Number: 213      (Once this top section is completed:  Select \"Route\" and send note to Fax number: [phone] ))      ___________________________________________________________________________      [facility]    Patient assigned to tele box number: __________________               (to be written in by [address] when telemetry box is assigned to patient)    ___________________________________________________________________________      Bedside RN confirming that the box listed above is in fact the telemetry box number being placed on the patient listed above.         X________________________________________ RN signature        __________________________________________RN assigned to Patient (please print)    _______________ Date    ____________ Time

## 2023-10-06 NOTE — PROGRESS NOTES
Shift assessment complete. No output noted in flow sheets since admission. Patient states she voided once in ED, but hasn't voided all night or this morning. Pure wick cannister empty. Removed pure wick. Bladder scan shows 426ml. Pt admits to \"dribbling\", difficulty urinating, and urinating \"small amounts\" of urine at a time. Pt was then assisted to RR and voided 200ml. MD aware. Scheduled meds given. See MAR. Pt denies pain or N/V/D. Denies further needs at this time. Call light within reach. Alarm on.

## 2023-10-06 NOTE — PROGRESS NOTES
10/06/23 1408   Encounter Summary   Encounter Overview/Reason  Initial Encounter   Service Provided For: Patient   Referral/Consult From: [address] Family members   Last Encounter  10/06/23  ([facility] visited; provided pastoral support / encouragement)   Assessment/Intervention/Outcome   Assessment Compromised coping   Intervention Sustaining Presence/Ministry of presence

## 2023-10-06 NOTE — PROGRESS NOTES
Inpatient Occupational Therapy Evaluation and Treatment    Unit: [address] [place]  Date:  10/6/2023  Patient Name:    Linda Parrish Payer  Admitting diagnosis:  Small bowel obstruction ([address]) [N70.599]  SBO (small bowel obstruction) ([address]) [E41.067]  Admit Date:  10/5/2023  Precautions/Restrictions/WB Status/ Lines/ Wounds/ Oxygen: Fall risk, Bed/chair alarm, Lines (IV and NG tube), and Telemetry    Pt seen for cotreatment this date due to acute illness/injury    Treatment Time:  11:15-11:35  Treatment Number:  1  Timed Code Treatment Minutes: 10 minutes  Total Treatment Minutes:  20  minutes    Patient Goals for Therapy: \"to go home \"          Discharge Recommendations: Home with PRN assist  DME needs for discharge: Needs Met       Therapy recommendations for staff:   Stand by assist for ambulation with use of gait belt within halls    History of Present Illness: 81 y.o. female who presented to [facility] with past medical of atrial fibrillation on Eliquis, type 2 diabetes, hypertension, hyperlipidemia, prior history of inception presented to the ED with chief complaint of abdominal pain     Patient reported abdominal pain occurred yesterday started epigastric nonradiating has been having difficulty passing bowel movements but his last one was today and it was normal no known alleviating or exacerbating factor no associated fever chills night sweats. Home Health S4 Level Recommendation:  Level 1 Standard    AM-PAC Score: AM-PAC Inpatient Daily Activity Raw Score: 22     Subjective:  Patient lying reclined in bed with no family present. Pt agreeable to this OT session.      Cognition:    A&O x4   Able to follow 2 step commands    Pain:   No  Location:   Rating: NA /10  Pain Medicine Status: Denies need    Preadmission Environment:   Pt. Lives     Spouse  Home environment:    one story home  Steps to enter first floor:   No steps  Steps to second floor/basement: N/A  Laundry:     1st floor  Bathroom:     walk in shower, grab bars in shower, and comfort height toilet  Pt sleeps in a:    Flat bed  Equipment owned:    none    Preadmission Status:  Pt. Able to drive:    Yes  Pt. Fully independent with ADLs:  Yes  Pt. Required assistance for: Independent PTA  Pt. independent for functional transfers and utilized No Device for mobility in home and No Device out in community  History of falls:    No  Home Health Services:  None    Objective:  Does this pt have an acute or acute on chronic diagnosis of CHF? No    Upper Extremity ROM:    WFL,  pt able to perform all bed mobility, transfers, and gait without ROM limitation. Dominant Hand: Right/Left    Upper Extremity Strength:    BUE strength WFL, but not formally assessed w/ MMT    Upper Extremity Sensation:    WFL    Upper Extremity Proprioception:  WFL    Coordination:  WFL    Tone:  WFL    Balance:  Sitting:    Independent  Standing:    Supervision    Bed Mobility:   Supine to Sit:    Not Tested  Sit to Supine:   Not Tested  Rolling:   Not Tested  Scooting in sitting: Independent  Scooting in supine:  Not Tested    Transfers:    Sit to stand:    Supervision  Stand to sit:    Supervision  Bed to chair:     Not Tested  Bed/ chair to standard toilet:  Not Tested  Bed/chair to [facility]:   Not Tested  Functional Mobility:   SBA in hallway    See PT note for gait analysis. ADLs:  Dressing:      UE:   Not Tested  LE:    Independent with donning socks    Bathing:    UE:  Not Tested  LE:  Not Tested    Eating:   Not Tested    Toileting:  Not Tested    Grooming/hygiene: Independent    Activity Tolerance:   Pt completed therapy session with no adverse symptoms     BP (mmHg) HR (bpm) SpO2 (%) on  Comments   Supine at rest       Seated at EOB       Standing       End of session         Positioning Needs:   Pt up in chair, alarm set, call light provided and all needs within reach .      Ther Ex / Activities Initiated:   N/A    Patient/Family Education:   Pt educated on role of inpatient OT, plan of care, importance of continued activity, DC recommendations, Functional transfer/mobility safety, Safety awareness, and Calling for assist with mobility. CHF Education  N/A    Assessment:  Pt seen for Occupational therapy evaluation in acute care setting. Pt demonstrated decreased Activity tolerance. Pt is functioning close to baseline and does not need additional acute OT at this time.      Recommending Home PRN assist upon discharge as patient functioning below baseline level      Plan:  DC home with PRN assist. DC acute OT    Electronically signed by Brynn Page OT on 10/6/2023 at 1:15 PM      If patient discharges from this facility prior to next visit, this note will serve as the Discharge Summary

## 2023-10-06 NOTE — ED NOTES
2025 perfect serve  sent to Dr. Napoleon Quezada    2032 consult completed with call back from Dr. Napoleon Quezada speaking with Cora Sarmiento, [facility] [place]  10/05/23 2025       Juan Carlos Cunha  10/05/23 2034

## 2023-10-06 NOTE — H&P
Hospital Medicine History & Physical      PCP: Siomara Burkett MD    Date of Admission: 10/5/2023    Date of Service: Pt seen/examined on 10/5/2023    Pt seen/examined face to face on and admitted as inpatient with expected LOS to be two days but can change depending on diagnostic work up and treatment response. Chief Complaint:    Chief Complaint   Patient presents with    Abdominal Pain     Patient reports ABD since yesterday, states she has a HX of blocked bowels and that's what it feels like. History Of Present Illness:      81 y.o. female who presented to [facility] with past medical of atrial fibrillation on Eliquis, type 2 diabetes, hypertension, hyperlipidemia, prior history of inception presented to the ED with chief complaint of abdominal pain    Patient reported abdominal pain occurred yesterday started epigastric nonradiating has been having difficulty passing bowel movements but his last one was today and it was normal no known alleviating or exacerbating factor no associated fever chills night sweats.   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       Past Surgical History:          Procedure Laterality Date    ABLATION OF DYSRHYTHMIC FOCUS      -2014    APPENDECTOMY      CHOLECYSTECTOMY, LAPAROSCOPIC N/A 2/3/2023    ROBOTIC CHOLECYSTECTOMY performed by Yennifer Josue MD at [facility], [address] [place] [address] 20      L ankle    HYSTERECTOMY (CERVIX STATUS UNKNOWN)      SKIN BIOPSY      SMALL INTESTINE SURGERY      TONSILLECTOMY      UPPER GASTROINTESTINAL ENDOSCOPY N/A 4/21/2021    EGD POLYP SNARE performed by Genoveva Varner DO at [address] [place] [address]  4/21/2021 pending  Unable to take medication given abdominal pain  IV as needed  Continue to monitor    Type 2 Diabetes: Reviewed patient's medications. Plan is to hold oral medications and continued with reduced home dose of long acting and Insulin sliding scale    Other chronic medical conditions: Held oral medication in the setting of patient being n.p.o. Atrial fibrillation paroxysmal-transitioned to heparin given elevated chadsvasc high risk of embolic   Hyperlipidemia  HF PEF  Class I obesity:Complicating assessment and treatment. Placing patient at risk for multiple co-morbidities as well as early death and contributing to the patient's presentation. Education, and counseling    Discussion with the primary ER physician in regards to symptoms, history, physical exam, diagnosis and treatment, collaborative decision was to admit the patient.     Diet: NPO except meds ordered    DVT Prophylaxis: Heparin    Dispo:   Expected LOS of two days         Kari Perez DO

## 2023-10-06 NOTE — PROGRESS NOTES
10/6  aPTT = 64.5 sec at 0700. Give heparin bolus of 2000 units and increase heparin gtt to 14 units/kg/hr. Recheck aPTT in 6 hours.   Karen Cormier, PharmD  10/6/2023 7:34 AM

## 2023-10-06 NOTE — CONSULTS
General Surgery   Consult Note      Pt Name: Leticia Lewis Payer  MRN: [id number]  YOB: 1935  Date of evaluation: 10/6/2023  Primary Care Physician: Bessy Mosley MD  Patient evaluated at the request of Blaise Tijerina NP  Reason for evaluation: SBO    SUBJECTIVE:   History of Chief Complaint:    Soto Maher is a 81 y.o. female who presented with abdominal pain. Onset 2 days ago. Pain located in epigastric region, described as sharp. Associated with distension, nausea and vomiting. Last BM and flatus yesterday. Has had multiple previous SBOs, most recent in June that resolved with conservative tx. Denies fevers, chills, chest pain, sob. Prior abdominal surgical history includes appendectomy, cholecystectomy and hysterectomy with BSO. She also endorses small bowel resection 2/2 intussusception 10+ years ago which is not visible on chart review. Past Medical History   has a past medical history of Atrial fibrillation ([address]), Congestive heart failure ([address]), Diabetes mellitus ([address]), Hydronephrosis, Hyperlipidemia, Hypertension, 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 (supraventricular tachycardia). Past Surgical History   has a past surgical history that includes Hysterectomy; Appendectomy; Tonsillectomy; skin biopsy; ablation of dysrhythmic focus; Dilatation, esophagus; Small intestine surgery; fracture surgery; Upper gastrointestinal endoscopy (N/A, 4/21/2021); Upper gastrointestinal endoscopy (4/21/2021); Upper gastrointestinal endoscopy (4/21/2021); and Cholecystectomy, laparoscopic (N/A, 2/3/2023). Medications  Prior to Admission medications    Medication Sig Start Date End Date Taking?  Authorizing Provider   sacubitril-valsartan (ENTRESTO)  MG per tablet Take 1 tablet by mouth 2 times daily 10/2/23   GUANAKO [place] - CNP   triamcinolone (KENALOG) 0.1 % ointment  9/10/23   ProviderRosemarie MD   dilTIAZem (CARDIZEM) 30 MG tablet Take 1 tablet by mouth daily Taking only if Weight Management   AMBULATORY CARE:   Why it is important to manage your weight:  Being overweight increases your risk of health conditions such as heart disease, high blood pressure, type 2 diabetes, and certain types of cancer  It can also increase your risk for osteoarthritis, sleep apnea, and other respiratory problems  Aim for a slow, steady weight loss  Even a small amount of weight loss can lower your risk of health problems  How to lose weight safely:  A safe and healthy way to lose weight is to eat fewer calories and get regular exercise  · You can lose up about 1 pound a week by decreasing the number of calories you eat by 500 calories each day  You can decrease calories by eating smaller portion sizes or by cutting out high-calorie foods  Read labels to find out how many calories are in the foods you eat  · You can also burn calories with exercise such as walking, swimming, or biking  You will be more likely to keep weight off if you make these changes part of your lifestyle  Exercise at least 30 minutes per day on most days of the week  You can also fit in more physical activity by taking the stairs instead of the elevator or parking farther away from stores  Ask your healthcare provider about the best exercise plan for you  Healthy meal plan for weight management:  A healthy meal plan includes a variety of foods, contains fewer calories, and helps you stay healthy  A healthy meal plan includes the following:     · Eat whole-grain foods more often  A healthy meal plan should contain fiber  Fiber is the part of grains, fruits, and vegetables that is not broken down by your body  Whole-grain foods are healthy and provide extra fiber in your diet  Some examples of whole-grain foods are whole-wheat breads and pastas, oatmeal, brown rice, and bulgur  · Eat a variety of vegetables every day  Include dark, leafy greens such as spinach, kale, brad greens, and mustard greens   Eat yellow and orange vegetables such as carrots, sweet potatoes, and winter squash  · Eat a variety of fruits every day  Choose fresh or canned fruit (canned in its own juice or light syrup) instead of juice  Fruit juice has very little or no fiber  · Eat low-fat dairy foods  Drink fat-free (skim) milk or 1% milk  Eat fat-free yogurt and low-fat cottage cheese  Try low-fat cheeses such as mozzarella and other reduced-fat cheeses  · Choose meat and other protein foods that are low in fat  Choose beans or other legumes such as split peas or lentils  Choose fish, skinless poultry (chicken or turkey), or lean cuts of red meat (beef or pork)  Before you cook meat or poultry, cut off any visible fat  · Use less fat and oil  Try baking foods instead of frying them  Add less fat, such as margarine, sour cream, regular salad dressing and mayonnaise to foods  Eat fewer high-fat foods  Some examples of high-fat foods include french fries, doughnuts, ice cream, and cakes  · Eat fewer sweets  Limit foods and drinks that are high in sugar  This includes candy, cookies, regular soda, and sweetened drinks  Ways to decrease calories:   · Eat smaller portions  ? Use a small plate with smaller servings  ? Do not eat second helpings  ? When you eat at a restaurant, ask for a box and place half of your meal in the box before you eat  ? Share an entrée with someone else  · Replace high-calorie snacks with healthy, low-calorie snacks  ? Choose fresh fruit, vegetables, fat-free rice cakes, or air-popped popcorn instead of potato chips, nuts, or chocolate  ? Choose water or calorie-free drinks instead of soda or sweetened drinks  · Do not shop for groceries when you are hungry  You may be more likely to make unhealthy food choices  Take a grocery list of healthy foods and shop after you have eaten  · Eat regular meals  Do not skip meals  Skipping meals can lead to overeating later in the day   This can make it harder for you to lose weight  Eat a healthy snack in place of a meal if you do not have time to eat a regular meal  Talk with a dietitian to help you create a meal plan and schedule that is right for you  Other things to consider as you try to lose weight:   · Be aware of situations that may give you the urge to overeat, such as eating while watching television  Find ways to avoid these situations  For example, read a book, go for a walk, or do crafts  · Meet with a weight loss support group or friends who are also trying to lose weight  This may help you stay motivated to continue working on your weight loss goals  © Copyright [employer] 2021 Information is for End User's use only and may not be sold, redistributed or otherwise used for commercial purposes  All illustrations and images included in CareNotes® are the copyrighted property of A D A M , Inc  or [facility] Tony Watters   The above information is an  only  It is not intended as medical advice for individual conditions or treatments  Talk to your doctor, nurse or pharmacist before following any medical regimen to see if it is safe and effective for you  Start amlodipine daily, and continue to monitor blood pressure    Recommend obtaining complete blood test

## 2023-10-07 ENCOUNTER — APPOINTMENT (OUTPATIENT)
Dept: GENERAL RADIOLOGY | Age: 88
DRG: 389 | End: 2023-10-07
Payer: MEDICARE

## 2023-10-07 VITALS
TEMPERATURE: 97.8 F | RESPIRATION RATE: 18 BRPM | HEIGHT: 62 IN | WEIGHT: 179.8 LBS | SYSTOLIC BLOOD PRESSURE: 168 MMHG | BODY MASS INDEX: 33.09 KG/M2 | DIASTOLIC BLOOD PRESSURE: 68 MMHG | HEART RATE: 60 BPM | OXYGEN SATURATION: 97 %

## 2023-10-07 LAB
ANION GAP SERPL CALCULATED.3IONS-SCNC: 12 MMOL/L (ref 3–16)
APTT BLD: 85.6 SEC (ref 22.7–35.9)
BASOPHILS # BLD: 0 K/UL (ref 0–0.2)
BASOPHILS NFR BLD: 0.4 %
BUN SERPL-MCNC: 18 MG/DL (ref 7–20)
CALCIUM SERPL-MCNC: 8.2 MG/DL (ref 8.3–10.6)
CHLORIDE SERPL-SCNC: 103 MMOL/L (ref 99–110)
CO2 SERPL-SCNC: 24 MMOL/L (ref 21–32)
CREAT SERPL-MCNC: 0.9 MG/DL (ref 0.6–1.2)
DEPRECATED RDW RBC AUTO: 16 % (ref 12.4–15.4)
EOSINOPHIL # BLD: 0.2 K/UL (ref 0–0.6)
EOSINOPHIL NFR BLD: 2.2 %
EST. AVERAGE GLUCOSE BLD GHB EST-MCNC: 116.9 MG/DL
GFR SERPLBLD CREATININE-BSD FMLA CKD-EPI: >60 ML/MIN/{1.73_M2}
GLUCOSE BLD-MCNC: 125 MG/DL (ref 70–99)
GLUCOSE BLD-MCNC: 126 MG/DL (ref 70–99)
GLUCOSE BLD-MCNC: 130 MG/DL (ref 70–99)
GLUCOSE SERPL-MCNC: 155 MG/DL (ref 70–99)
HBA1C MFR BLD: 5.7 %
HCT VFR BLD AUTO: 40.1 % (ref 36–48)
HGB BLD-MCNC: 13.2 G/DL (ref 12–16)
LYMPHOCYTES # BLD: 1.7 K/UL (ref 1–5.1)
LYMPHOCYTES NFR BLD: 21.7 %
MCH RBC QN AUTO: 27 PG (ref 26–34)
MCHC RBC AUTO-ENTMCNC: 33 G/DL (ref 31–36)
MCV RBC AUTO: 81.8 FL (ref 80–100)
MONOCYTES # BLD: 0.8 K/UL (ref 0–1.3)
MONOCYTES NFR BLD: 10.6 %
NEUTROPHILS # BLD: 5.1 K/UL (ref 1.7–7.7)
NEUTROPHILS NFR BLD: 65.1 %
PERFORMED ON: ABNORMAL
PLATELET # BLD AUTO: 253 K/UL (ref 135–450)
PMV BLD AUTO: 9.5 FL (ref 5–10.5)
POTASSIUM SERPL-SCNC: 3.9 MMOL/L (ref 3.5–5.1)
RBC # BLD AUTO: 4.9 M/UL (ref 4–5.2)
SODIUM SERPL-SCNC: 139 MMOL/L (ref 136–145)
WBC # BLD AUTO: 7.8 K/UL (ref 4–11)

## 2023-10-07 PROCEDURE — 6370000000 HC RX 637 (ALT 250 FOR IP): Performed by: INTERNAL MEDICINE

## 2023-10-07 PROCEDURE — 85025 COMPLETE CBC W/AUTO DIFF WBC: CPT

## 2023-10-07 PROCEDURE — 74019 RADEX ABDOMEN 2 VIEWS: CPT

## 2023-10-07 PROCEDURE — 99238 HOSP IP/OBS DSCHRG MGMT 30/<: CPT | Performed by: INTERNAL MEDICINE

## 2023-10-07 PROCEDURE — 36415 COLL VENOUS BLD VENIPUNCTURE: CPT

## 2023-10-07 PROCEDURE — 99232 SBSQ HOSP IP/OBS MODERATE 35: CPT | Performed by: SURGERY

## 2023-10-07 PROCEDURE — 80048 BASIC METABOLIC PNL TOTAL CA: CPT

## 2023-10-07 PROCEDURE — 85730 THROMBOPLASTIN TIME PARTIAL: CPT

## 2023-10-07 PROCEDURE — 6370000000 HC RX 637 (ALT 250 FOR IP)

## 2023-10-07 RX ADMIN — PRAVASTATIN SODIUM 40 MG: 40 TABLET ORAL at 10:36

## 2023-10-07 RX ADMIN — AMIODARONE HYDROCHLORIDE 200 MG: 200 TABLET ORAL at 10:36

## 2023-10-07 RX ADMIN — SACUBITRIL AND VALSARTAN 1 TABLET: 24; 26 TABLET, FILM COATED ORAL at 10:36

## 2023-10-07 RX ADMIN — APIXABAN 5 MG: 5 TABLET, FILM COATED ORAL at 14:54

## 2023-10-07 RX ADMIN — METOPROLOL TARTRATE 25 MG: 25 TABLET, FILM COATED ORAL at 10:36

## 2023-10-07 ASSESSMENT — PAIN DESCRIPTION - LOCATION: LOCATION: THROAT

## 2023-10-07 ASSESSMENT — PAIN DESCRIPTION - ORIENTATION: ORIENTATION: MID

## 2023-10-07 ASSESSMENT — PAIN DESCRIPTION - PAIN TYPE: TYPE: ACUTE PAIN

## 2023-10-07 ASSESSMENT — PAIN SCALES - GENERAL: PAINLEVEL_OUTOF10: 3

## 2023-10-07 ASSESSMENT — PAIN DESCRIPTION - DESCRIPTORS: DESCRIPTORS: SORE

## 2023-10-07 NOTE — PROGRESS NOTES
NG tube removed per order. Patient tolerated well. Meds passed orally. Patient tolerating first few sips of clear liquids.

## 2023-10-07 NOTE — PROGRESS NOTES
Transferred care to [place], [place]. Face to face bedside report given, no need voiced at this time.

## 2023-10-07 NOTE — PLAN OF CARE
Problem: Discharge Planning  Goal: Discharge to home or other facility with appropriate resources  Outcome: Adequate for Discharge     Problem: Pain  Goal: Verbalizes/displays adequate comfort level or baseline comfort level  10/7/2023 1438 by Anita Mccall RN  Outcome: Adequate for Discharge  10/7/2023 1014 by Anita Mccall RN  Outcome: Progressing     Problem: Safety - Adult  Goal: Free from fall injury  Outcome: Adequate for Discharge     Problem: Skin/Tissue Integrity  Goal: Absence of new skin breakdown  Description: 1. Monitor for areas of redness and/or skin breakdown  2. Assess vascular access sites hourly  3. Every 4-6 hours minimum:  Change oxygen saturation probe site  4. Every 4-6 hours:  If on nasal continuous positive airway pressure, respiratory therapy assess nares and determine need for appliance change or resting period.   10/7/2023 1438 by Anita Mccall RN  Outcome: Adequate for Discharge  10/7/2023 1014 by Anita Mccall RN  Outcome: Progressing     Problem: Chronic Conditions and Co-morbidities  Goal: Patient's chronic conditions and co-morbidity symptoms are monitored and maintained or improved  Outcome: Adequate for Discharge     Problem: Cardiovascular - Adult  Goal: Maintains optimal cardiac output and hemodynamic stability  Outcome: Adequate for Discharge  Goal: Absence of cardiac dysrhythmias or at baseline  Outcome: Adequate for Discharge

## 2023-10-07 NOTE — PLAN OF CARE
HEART FAILURE CARE PLAN:    Comorbidities Reviewed: Yes   Patient has a past medical history of Atrial fibrillation ([address]), Congestive heart failure ([address]), Diabetes mellitus ([address]), Hydronephrosis, Hyperlipidemia, Hypertension, 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 (supraventricular tachycardia). ECHOCARDIOGRAM Reviewed: Yes   Patient's Ejection Fraction (EF) is greater than 40%    Weights Reviewed: Yes   Admission weight: 179 lb (81.2 kg)   Wt Readings from Last 3 Encounters:   10/07/23 179 lb 12.8 oz (81.6 kg)   09/25/23 184 lb 8 oz (83.7 kg)   08/15/23 181 lb (82.1 kg)     Intake & Output Reviewed: Yes     Intake/Output Summary (Last 24 hours) at 10/7/2023 1015  Last data filed at 10/6/2023 1807  Gross per 24 hour   Intake 0 ml   Output 450 ml   Net -450 ml     Medications Reviewed: Yes   SCHEDULED HOSPITAL MEDICATIONS:   amiodarone  200 mg Oral Daily    pravastatin  40 mg Oral Daily    metoprolol tartrate  25 mg Oral BID    sacubitril-valsartan  1 tablet Oral BID    sodium chloride flush  10 mL IntraVENous 2 times per day    oxymetazoline  2 spray Each Nostril Once    lidocaine   Topical Once    insulin lispro  0-8 Units SubCUTAneous Q4H     ACE/ARB/ARNI is REQUIRED for EF </= 44% SYSTOLIC FAILURE:   ACE[de-identified] None  ARB[de-identified] None  ARNI[de-identified] Sacubitril/Valsartan-Entresto    Evidenced-Based Beta Blocker is REQUIRED for EF </= 74% SYSTOLIC FAILURE:   [de-identified] Metoprolol SUCCinate- Toprol XL    Diuretics:  [de-identified] None    Diet Reviewed: Yes   Diet NPO Exceptions are: Ice Chips, Sips of Water with Meds    Goal of Care Reviewed: Yes   Patient and/or Family's stated Goal of Care this Admission: reduce shortness of breath, increase activity tolerance, better understand heart failure and disease management, be more comfortable, and reduce lower extremity edema prior to discharge.

## 2023-10-07 NOTE — PROGRESS NOTES
10/7  aPTT = 85.6 sec at 0651. Continue heparin gtt at 14 units/kg/hr. Continue to check aPTT daily.   Moses Catalan, PharmD  10/7/2023 7:43 AM

## 2023-10-07 NOTE — PROGRESS NOTES
Bed side report given to NOEMY Reyes. Pt awake in chair, denies needs at this time. Call light within reach. Chair alarm on.

## 2023-10-07 NOTE — PLAN OF CARE
Problem: Discharge Planning  Goal: Discharge to home or other facility with appropriate resources  10/6/2023 2214 by Bright Russell RN  Outcome: Progressing     Problem: Pain  Goal: Verbalizes/displays adequate comfort level or baseline comfort level  10/7/2023 1014 by Harsh Castrejon RN  Outcome: Progressing  10/6/2023 2214 by Bright Russell RN  Outcome: Progressing     Problem: Safety - Adult  Goal: Free from fall injury  10/6/2023 2214 by Bright Russell RN  Outcome: Progressing     Problem: Skin/Tissue Integrity  Goal: Absence of new skin breakdown  Description: 1. Monitor for areas of redness and/or skin breakdown  2. Assess vascular access sites hourly  3. Every 4-6 hours minimum:  Change oxygen saturation probe site  4. Every 4-6 hours:  If on nasal continuous positive airway pressure, respiratory therapy assess nares and determine need for appliance change or resting period.   10/7/2023 1014 by Harsh Castrejon RN  Outcome: Progressing  10/6/2023 2214 by Brgiht Russell RN  Outcome: Progressing     Problem: Chronic Conditions and Co-morbidities  Goal: Patient's chronic conditions and co-morbidity symptoms are monitored and maintained or improved  10/6/2023 2214 by Bright Russell RN  Outcome: Progressing     Problem: Cardiovascular - Adult  Goal: Maintains optimal cardiac output and hemodynamic stability  10/6/2023 2214 by Bright Russell RN  Outcome: Progressing  Goal: Absence of cardiac dysrhythmias or at baseline  10/6/2023 2214 by Bright Russell RN  Outcome: Progressing

## 2023-10-07 NOTE — PROGRESS NOTES
10/6/23  2045  Low dose Heparin GTT:  aPTT is therapeutic at 2000 check. No changes. Continue infusion at 14units/kg/hr (1,140units/hr). Recheck aPTT with AM labs  10/7/23. Desired range is   seconds.   ATILIO Cummings EUFEMIA [facility] PharmD 10/6/2023 8:44 PM

## 2023-10-07 NOTE — PLAN OF CARE
Problem: Discharge Planning  Goal: Discharge to home or other facility with appropriate resources  10/6/2023 2214 by Kyara Colindres RN  Outcome: Progressing  10/6/2023 1828 by Indiana Menezes RN  Outcome: Progressing     Problem: Pain  Goal: Verbalizes/displays adequate comfort level or baseline comfort level  10/6/2023 2214 by Kyara Colindres RN  Outcome: Progressing  10/6/2023 1828 by Indiana Menezes RN  Outcome: Progressing     Problem: Safety - Adult  Goal: Free from fall injury  10/6/2023 2214 by Kyara Colindres RN  Outcome: Progressing  10/6/2023 1828 by Indiaan Menezes RN  Outcome: Progressing     Problem: Skin/Tissue Integrity  Goal: Absence of new skin breakdown  Description: 1. Monitor for areas of redness and/or skin breakdown  2. Assess vascular access sites hourly  3. Every 4-6 hours minimum:  Change oxygen saturation probe site  4. Every 4-6 hours:  If on nasal continuous positive airway pressure, respiratory therapy assess nares and determine need for appliance change or resting period.   10/6/2023 2214 by Kyara Colindres RN  Outcome: Progressing  10/6/2023 1828 by Indiana Menezes RN  Outcome: Progressing     Problem: Chronic Conditions and Co-morbidities  Goal: Patient's chronic conditions and co-morbidity symptoms are monitored and maintained or improved  10/6/2023 2214 by Kyara Colindres RN  Outcome: Progressing  10/6/2023 1828 by Indiana Menezes RN  Outcome: Progressing     Problem: Cardiovascular - Adult  Goal: Maintains optimal cardiac output and hemodynamic stability  10/6/2023 2214 by Kyara Colindres RN  Outcome: Progressing  10/6/2023 1828 by Indiana Menezes RN  Outcome: Progressing  Goal: Absence of cardiac dysrhythmias or at baseline  Outcome: Progressing

## 2023-10-10 ENCOUNTER — HOSPITAL ENCOUNTER (OUTPATIENT)
Age: 88
Discharge: HOME | End: 2023-10-10
Payer: MEDICARE

## 2023-10-10 DIAGNOSIS — R06.02 SHORTNESS OF BREATH: ICD-10-CM

## 2023-10-10 DIAGNOSIS — I50.32 CHRONIC DIASTOLIC HEART FAILURE (HCC): ICD-10-CM

## 2023-10-10 LAB
ANION GAP SERPL CALCULATED.3IONS-SCNC: 14 MMOL/L (ref 3–16)
BUN SERPL-MCNC: 15 MG/DL (ref 7–20)
CALCIUM SERPL-MCNC: 8.4 MG/DL (ref 8.3–10.6)
CHLORIDE SERPL-SCNC: 100 MMOL/L (ref 99–110)
CO2 SERPL-SCNC: 26 MMOL/L (ref 21–32)
CREAT SERPL-MCNC: 0.9 MG/DL (ref 0.6–1.2)
GFR SERPLBLD CREATININE-BSD FMLA CKD-EPI: >60 ML/MIN/{1.73_M2}
GLUCOSE SERPL-MCNC: 192 MG/DL (ref 70–99)
NT-PROBNP SERPL-MCNC: 229 PG/ML (ref 0–449)
POTASSIUM SERPL-SCNC: 4.1 MMOL/L (ref 3.5–5.1)
SODIUM SERPL-SCNC: 140 MMOL/L (ref 136–145)

## 2023-10-10 PROCEDURE — 83880 ASSAY OF NATRIURETIC PEPTIDE: CPT

## 2023-10-10 PROCEDURE — 80048 BASIC METABOLIC PNL TOTAL CA: CPT

## 2023-10-10 PROCEDURE — 36415 COLL VENOUS BLD VENIPUNCTURE: CPT

## 2023-10-11 ENCOUNTER — TELEPHONE (OUTPATIENT)
Dept: CARDIOLOGY CLINIC | Age: 88
End: 2023-10-11

## 2023-10-11 NOTE — TELEPHONE ENCOUNTER
----- Message from GUANAKO Cheney CNP sent at 10/11/2023  9:05 AM EDT -----  Please notify patient results. Kidney function is stable. Electrolytes are stable. Pro-BNP (heart failure number) is improved  Continue current regimen. Attempted to call pt again. No answer and no vm.

## 2023-10-11 NOTE — TELEPHONE ENCOUNTER
----- Message from GUANAKO Mansfield CNP sent at 10/11/2023  9:05 AM EDT -----  Please notify patient results. Kidney function is stable. Electrolytes are stable. Pro-BNP (heart failure number) is improved  Continue current regimen. Attempted to call patient on home phone and cell phone . Neither one would allow me to leave a . Will try again later.

## 2023-10-12 NOTE — PROGRESS NOTES
Physician Progress Note      PATIENT:               Whit HA  CSN #:                  [id number]  :                       1935  ADMIT DATE:       10/5/2023 5:15 PM  [address] DATE:        10/7/2023 1:26 PM  RESPONDING  PROVIDER #:        Kaycee Moncada MD          QUERY TEXT:    Patient admitted with SBO, noted to have paroxysmal atrial fibrillation and is   maintained on Eliquis. If possible, please document in progress notes and   discharge summary if you are evaluating and/or treating any of the following: The medical record reflects the following:  Risk Factors: 81year-old-female, HTN, HLD, HFpEF, HTN urgency. Clinical Indicators: per H&P 10/05 Atrial fibrillation paroxysmal-transitioned   to heparin given elevated chadsvasc high risk of embolic [id number] score   6. Treatment: IV Heparin, telemetry monitoring, lab workup. Options provided:  -- Secondary hypercoagulable state in a patient with atrial fibrillation  -- Other - I will add my own diagnosis  -- Disagree - Not applicable / Not valid  -- Disagree - Clinically unable to determine / Unknown  -- Refer to Clinical Documentation Reviewer    PROVIDER RESPONSE TEXT:    This patient has secondary hypercoagulable state in a patient with atrial   fibrillation. Query created by: Reg King on 10/9/2023 8:35 AM      Electronically signed by:   Kaycee Moncada MD 10/12/2023 7:10 AM

## 2023-10-13 NOTE — TELEPHONE ENCOUNTER
Cindy Contreras, pt , who is on HIPAA form [phone]. LMOM for pt or Sky to call back for test results. Attempted home number, no vm or .

## 2023-11-21 ENCOUNTER — OFFICE VISIT (OUTPATIENT)
Dept: CARDIOLOGY CLINIC | Age: 88
End: 2023-11-21
Payer: MEDICARE

## 2023-11-21 VITALS
DIASTOLIC BLOOD PRESSURE: 68 MMHG | HEIGHT: 62 IN | WEIGHT: 178 LBS | OXYGEN SATURATION: 98 % | BODY MASS INDEX: 32.76 KG/M2 | HEART RATE: 53 BPM | SYSTOLIC BLOOD PRESSURE: 130 MMHG

## 2023-11-21 DIAGNOSIS — I49.9 IRREGULAR HEART RATE: Primary | ICD-10-CM

## 2023-11-21 DIAGNOSIS — I48.0 PAROXYSMAL ATRIAL FIBRILLATION (HCC): ICD-10-CM

## 2023-11-21 PROCEDURE — 93000 ELECTROCARDIOGRAM COMPLETE: CPT | Performed by: NURSE PRACTITIONER

## 2023-11-21 PROCEDURE — 1123F ACP DISCUSS/DSCN MKR DOCD: CPT | Performed by: NURSE PRACTITIONER

## 2023-11-21 PROCEDURE — 99214 OFFICE O/P EST MOD 30 MIN: CPT | Performed by: NURSE PRACTITIONER

## 2023-11-21 NOTE — PATIENT INSTRUCTIONS
No changes today    Update thyroid labs in December     Monitor BP at home and call if consistently out of goal ranges    Follow up in 6 months or sooner as needed

## 2023-11-21 NOTE — PROGRESS NOTES
[facility]   Electrophysiology Outpatient Note              Date:  November 21, 2023  Patient name: Will Tiwari  YOB: 1935    Primary Care physician: En Aguilar MD    HISTORY OF PRESENT ILLNESS: The patient is a 81 y.o.  female with a history of AF, PVC's, HTN, SB, SVT, pulmonary HTN, chronic diastolic CHF an DM. In 2013, she had SVT. She wore a monitor which detected AF. In 2/2014, she underwent cryoablation of AF. Post procedure she had a retroperitoneal bleed and required a urinary stent. In 2/2019, echo showed an EF of 55-60% and severe pulmonary HTN and she was referred to CHF team. In 1/2020, AF was recurrent and amiodarone was started. Amiodarone was stopped for unknown reasons. In 12/2020, Tikosyn was started. In 12/2021, echo showed an EF of 55 to 60% and a normal SPAP. In 7/14/2022, patient was evaluated in the ER with symptomatic rapid atrial fibrillation. She was given some IV metoprolol and spontaneously converted to sinus rhythm within several hours. Reports that she had missed several doses of Tikosyn prior to this day due to her  being in the hospital.  In 1/2023, she was admitted to the hospital for abdominal pain. Work-up revealed partial small bowel obstruction and gallstones. Chart review reveals that she had an episode of rapid atrial fibrillation on 1/26/2023. An EKG was not performed at this time. At her last office visit in May 2023, metoprolol was stopped due to symptomatic bradycardia. Unfortunately, she had several recurrences of rapid AF and was admitted to the hospital. She was restarted on low dose metoprolol daily as need for HR greater than 60 bpm.  Tikosyn was stopped due to ineffectiveness and she was started on amiodarone. In 6/2023, she had a treadmill stress test that showed a normal response to exercise. She wore a 2-week event monitor that showed this rhythm with rare PACs and PVCs and brief PAT.   Her average

## 2023-11-28 ENCOUNTER — TELEPHONE (OUTPATIENT)
Dept: CARDIOLOGY CLINIC | Age: 88
End: 2023-11-28

## 2023-11-28 ENCOUNTER — APPOINTMENT (OUTPATIENT)
Dept: GENERAL RADIOLOGY | Age: 88
DRG: 309 | End: 2023-11-28
Payer: MEDICARE

## 2023-11-28 ENCOUNTER — HOSPITAL ENCOUNTER (INPATIENT)
Age: 88
LOS: 1 days | Discharge: HOME | DRG: 309 | End: 2023-11-29
Attending: STUDENT IN AN ORGANIZED HEALTH CARE EDUCATION/TRAINING PROGRAM | Admitting: STUDENT IN AN ORGANIZED HEALTH CARE EDUCATION/TRAINING PROGRAM
Payer: MEDICARE

## 2023-11-28 DIAGNOSIS — R00.2 PALPITATIONS: ICD-10-CM

## 2023-11-28 DIAGNOSIS — I48.91 ATRIAL FIBRILLATION, UNSPECIFIED TYPE (HCC): ICD-10-CM

## 2023-11-28 DIAGNOSIS — I48.91 ATRIAL FIBRILLATION WITH RAPID VENTRICULAR RESPONSE (HCC): Primary | ICD-10-CM

## 2023-11-28 LAB
ALBUMIN SERPL-MCNC: 3.5 G/DL (ref 3.4–5)
ALBUMIN/GLOB SERPL: 0.9 {RATIO} (ref 1.1–2.2)
ALP SERPL-CCNC: 77 U/L (ref 40–129)
ALT SERPL-CCNC: 14 U/L (ref 10–40)
ANION GAP SERPL CALCULATED.3IONS-SCNC: 13 MMOL/L (ref 3–16)
AST SERPL-CCNC: 20 U/L (ref 15–37)
BASOPHILS # BLD: 0.1 K/UL (ref 0–0.2)
BASOPHILS NFR BLD: 0.8 %
BILIRUB SERPL-MCNC: 0.4 MG/DL (ref 0–1)
BUN SERPL-MCNC: 13 MG/DL (ref 7–20)
CALCIUM SERPL-MCNC: 8.5 MG/DL (ref 8.3–10.6)
CHLORIDE SERPL-SCNC: 101 MMOL/L (ref 99–110)
CO2 SERPL-SCNC: 23 MMOL/L (ref 21–32)
CREAT SERPL-MCNC: 0.8 MG/DL (ref 0.6–1.2)
DEPRECATED RDW RBC AUTO: 16.1 % (ref 12.4–15.4)
EKG ATRIAL RATE: 127 BPM
EKG DIAGNOSIS: NORMAL
EKG P AXIS: 58 DEGREES
EKG P-R INTERVAL: 136 MS
EKG Q-T INTERVAL: 312 MS
EKG QRS DURATION: 116 MS
EKG QTC CALCULATION (BAZETT): 453 MS
EKG R AXIS: -26 DEGREES
EKG T AXIS: 118 DEGREES
EKG VENTRICULAR RATE: 127 BPM
EOSINOPHIL # BLD: 0.1 K/UL (ref 0–0.6)
EOSINOPHIL NFR BLD: 1.6 %
GFR SERPLBLD CREATININE-BSD FMLA CKD-EPI: >60 ML/MIN/{1.73_M2}
GLUCOSE SERPL-MCNC: 161 MG/DL (ref 70–99)
HCT VFR BLD AUTO: 47.9 % (ref 36–48)
HGB BLD-MCNC: 15.5 G/DL (ref 12–16)
LYMPHOCYTES # BLD: 2.4 K/UL (ref 1–5.1)
LYMPHOCYTES NFR BLD: 27.3 %
MCH RBC QN AUTO: 27.1 PG (ref 26–34)
MCHC RBC AUTO-ENTMCNC: 32.3 G/DL (ref 31–36)
MCV RBC AUTO: 84.1 FL (ref 80–100)
MONOCYTES # BLD: 1 K/UL (ref 0–1.3)
MONOCYTES NFR BLD: 11.8 %
NEUTROPHILS # BLD: 5.1 K/UL (ref 1.7–7.7)
NEUTROPHILS NFR BLD: 58.5 %
NT-PROBNP SERPL-MCNC: 689 PG/ML (ref 0–449)
PLATELET # BLD AUTO: 332 K/UL (ref 135–450)
PMV BLD AUTO: 10.2 FL (ref 5–10.5)
POTASSIUM SERPL-SCNC: 4 MMOL/L (ref 3.5–5.1)
PROT SERPL-MCNC: 7.4 G/DL (ref 6.4–8.2)
RBC # BLD AUTO: 5.7 M/UL (ref 4–5.2)
REASON FOR REJECTION: NORMAL
REJECTED TEST: NORMAL
SODIUM SERPL-SCNC: 137 MMOL/L (ref 136–145)
TROPONIN, HIGH SENSITIVITY: 11 NG/L (ref 0–14)
TROPONIN, HIGH SENSITIVITY: 11 NG/L (ref 0–14)
TROPONIN, HIGH SENSITIVITY: 14 NG/L (ref 0–14)
WBC # BLD AUTO: 8.8 K/UL (ref 4–11)

## 2023-11-28 PROCEDURE — 2500000003 HC RX 250 WO HCPCS: Performed by: STUDENT IN AN ORGANIZED HEALTH CARE EDUCATION/TRAINING PROGRAM

## 2023-11-28 PROCEDURE — 80053 COMPREHEN METABOLIC PANEL: CPT

## 2023-11-28 PROCEDURE — 93010 ELECTROCARDIOGRAM REPORT: CPT | Performed by: INTERNAL MEDICINE

## 2023-11-28 PROCEDURE — 71045 X-RAY EXAM CHEST 1 VIEW: CPT

## 2023-11-28 PROCEDURE — 83036 HEMOGLOBIN GLYCOSYLATED A1C: CPT

## 2023-11-28 PROCEDURE — 36415 COLL VENOUS BLD VENIPUNCTURE: CPT

## 2023-11-28 PROCEDURE — 84443 ASSAY THYROID STIM HORMONE: CPT

## 2023-11-28 PROCEDURE — 85025 COMPLETE CBC W/AUTO DIFF WBC: CPT

## 2023-11-28 PROCEDURE — 2580000003 HC RX 258: Performed by: STUDENT IN AN ORGANIZED HEALTH CARE EDUCATION/TRAINING PROGRAM

## 2023-11-28 PROCEDURE — 96365 THER/PROPH/DIAG IV INF INIT: CPT

## 2023-11-28 PROCEDURE — 80061 LIPID PANEL: CPT

## 2023-11-28 PROCEDURE — 83880 ASSAY OF NATRIURETIC PEPTIDE: CPT

## 2023-11-28 PROCEDURE — 1200000000 HC SEMI PRIVATE

## 2023-11-28 PROCEDURE — 99285 EMERGENCY DEPT VISIT HI MDM: CPT

## 2023-11-28 PROCEDURE — 84484 ASSAY OF TROPONIN QUANT: CPT

## 2023-11-28 PROCEDURE — 93005 ELECTROCARDIOGRAM TRACING: CPT | Performed by: STUDENT IN AN ORGANIZED HEALTH CARE EDUCATION/TRAINING PROGRAM

## 2023-11-28 RX ORDER — ACETAMINOPHEN 650 MG/1
650 SUPPOSITORY RECTAL EVERY 6 HOURS PRN
Status: DISCONTINUED | OUTPATIENT
Start: 2023-11-28 | End: 2023-11-29 | Stop reason: HOSPADM

## 2023-11-28 RX ORDER — ONDANSETRON 4 MG/1
4 TABLET, ORALLY DISINTEGRATING ORAL EVERY 8 HOURS PRN
Status: DISCONTINUED | OUTPATIENT
Start: 2023-11-28 | End: 2023-11-29 | Stop reason: HOSPADM

## 2023-11-28 RX ORDER — POTASSIUM CHLORIDE 20 MEQ/1
40 TABLET, EXTENDED RELEASE ORAL PRN
Status: DISCONTINUED | OUTPATIENT
Start: 2023-11-28 | End: 2023-11-29 | Stop reason: HOSPADM

## 2023-11-28 RX ORDER — SODIUM CHLORIDE 0.9 % (FLUSH) 0.9 %
5-40 SYRINGE (ML) INJECTION EVERY 12 HOURS SCHEDULED
Status: DISCONTINUED | OUTPATIENT
Start: 2023-11-28 | End: 2023-11-29 | Stop reason: HOSPADM

## 2023-11-28 RX ORDER — ENOXAPARIN SODIUM 100 MG/ML
40 INJECTION SUBCUTANEOUS DAILY
Status: DISCONTINUED | OUTPATIENT
Start: 2023-11-29 | End: 2023-11-29

## 2023-11-28 RX ORDER — SODIUM CHLORIDE 9 MG/ML
INJECTION, SOLUTION INTRAVENOUS PRN
Status: DISCONTINUED | OUTPATIENT
Start: 2023-11-28 | End: 2023-11-29 | Stop reason: HOSPADM

## 2023-11-28 RX ORDER — AMIODARONE HYDROCHLORIDE 200 MG/1
200 TABLET ORAL DAILY
Status: DISCONTINUED | OUTPATIENT
Start: 2023-11-29 | End: 2023-11-29

## 2023-11-28 RX ORDER — DILTIAZEM HYDROCHLORIDE 5 MG/ML
10 INJECTION INTRAVENOUS ONCE
Status: DISCONTINUED | OUTPATIENT
Start: 2023-11-28 | End: 2023-11-28

## 2023-11-28 RX ORDER — ACETAMINOPHEN 325 MG/1
650 TABLET ORAL EVERY 6 HOURS PRN
Status: DISCONTINUED | OUTPATIENT
Start: 2023-11-28 | End: 2023-11-29 | Stop reason: HOSPADM

## 2023-11-28 RX ORDER — SODIUM CHLORIDE 0.9 % (FLUSH) 0.9 %
5-40 SYRINGE (ML) INJECTION PRN
Status: DISCONTINUED | OUTPATIENT
Start: 2023-11-28 | End: 2023-11-29 | Stop reason: HOSPADM

## 2023-11-28 RX ORDER — PRAVASTATIN SODIUM 40 MG
40 TABLET ORAL DAILY
Status: DISCONTINUED | OUTPATIENT
Start: 2023-11-29 | End: 2023-11-29 | Stop reason: HOSPADM

## 2023-11-28 RX ORDER — MAGNESIUM SULFATE IN WATER 40 MG/ML
2000 INJECTION, SOLUTION INTRAVENOUS PRN
Status: DISCONTINUED | OUTPATIENT
Start: 2023-11-28 | End: 2023-11-29 | Stop reason: HOSPADM

## 2023-11-28 RX ORDER — POTASSIUM CHLORIDE 7.45 MG/ML
10 INJECTION INTRAVENOUS PRN
Status: DISCONTINUED | OUTPATIENT
Start: 2023-11-28 | End: 2023-11-29 | Stop reason: HOSPADM

## 2023-11-28 RX ORDER — DILTIAZEM HYDROCHLORIDE 5 MG/ML
10 INJECTION INTRAVENOUS ONCE
Status: COMPLETED | OUTPATIENT
Start: 2023-11-28 | End: 2023-11-28

## 2023-11-28 RX ORDER — ONDANSETRON 2 MG/ML
4 INJECTION INTRAMUSCULAR; INTRAVENOUS EVERY 6 HOURS PRN
Status: DISCONTINUED | OUTPATIENT
Start: 2023-11-28 | End: 2023-11-29 | Stop reason: HOSPADM

## 2023-11-28 RX ORDER — FUROSEMIDE 20 MG/1
20 TABLET ORAL DAILY
Status: DISCONTINUED | OUTPATIENT
Start: 2023-11-29 | End: 2023-11-29 | Stop reason: HOSPADM

## 2023-11-28 RX ORDER — POLYETHYLENE GLYCOL 3350 17 G/17G
17 POWDER, FOR SOLUTION ORAL DAILY PRN
Status: DISCONTINUED | OUTPATIENT
Start: 2023-11-28 | End: 2023-11-29 | Stop reason: HOSPADM

## 2023-11-28 RX ADMIN — SODIUM CHLORIDE 5 MG/HR: 900 INJECTION, SOLUTION INTRAVENOUS at 17:01

## 2023-11-28 RX ADMIN — DILTIAZEM HYDROCHLORIDE 10 MG: 5 INJECTION, SOLUTION INTRAVENOUS at 16:32

## 2023-11-28 ASSESSMENT — PAIN - FUNCTIONAL ASSESSMENT: PAIN_FUNCTIONAL_ASSESSMENT: 0-10

## 2023-11-28 ASSESSMENT — PAIN SCALES - GENERAL
PAINLEVEL_OUTOF10: 0
PAINLEVEL_OUTOF10: 0

## 2023-11-28 NOTE — ED NOTES
ED CARDIOLOGY CONSULT  RE-Afib RVR with history of this followed by Dr. Pietro Salmeron  [id number]-PAGED [facility] CARDIO  1805-EP CARDIO RETURNED [address] DR. Blas Flax, Ellison Bamberger  11/28/23 1802

## 2023-11-28 NOTE — ED PROVIDER NOTES
Emergency Department Provider Note  Location: [facility]  ED  11/28/2023     Patient Identification  Rickey Tiwari is a 81 y.o. female    Chief Complaint  Palpitations (Pt states she has been in A-fib since 0500. Pt states she is feeling palpitations in her chest but denies chest pain. Pt states she has a hx of A-fib. )      Mode of Arrival  private car    HPI  (History provided by patient)  This is a 81 y.o. female with a PMH significant for Afib, DM, CHF  presented today for palpitations. Patient reports symptoms started at [facility]. States she is feeling palpitations in her chest but denies any pain. Patient reports it has months since her last episode. Patient denies syncope, dizziness, shortness of breath. States she feels shaky with symptoms. Patient is followed by [facility] for this. Her last visit was on 11/16. She takes toprol, diltiazem and takes as needed for afib episodes. Denies any swelling. Patient is anticoagulated on Eliquis and denies any missed doses    ROS  Review of Systems   All other systems reviewed and are negative. I have reviewed the following nursing documentation:  Allergies: Allergies   Allergen Reactions    Ciprofloxacin Other (See Comments)     Other reaction(s):  Other  Veins turned bright red with IV infusion  Veins turned bright red with IV infusion      Farxiga [Dapagliflozin] Dizziness or Vertigo    Hydrocodone Other (See Comments)     NAUSEA AND DIZZINESS    Metformin And Related Other (See Comments)     NAUSEA AND DIZZINESS    Morphine     Nickel Dermatitis    Omeprazole     Prednisone      Other reaction(s): chest pain & palpitations/H&P    Hydralazine Palpitations and Rash       Past medical history:  has a past medical history of Atrial fibrillation ([address]), Congestive heart failure ([address]) (6/23/2023), Diabetes mellitus ([address]), Hydronephrosis (2/14/2014), Hyperlipidemia, Hypertension, 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

## 2023-11-28 NOTE — TELEPHONE ENCOUNTER
Spoke with pt, Pt denies SOB, CP, dizziness. Pt states she feels like she is running a race. Pt states she took the pill around 5 this morning. Pt would like to know if she can take another please advise.

## 2023-11-28 NOTE — TELEPHONE ENCOUNTER
Pt sts at 5 AM she was in A-fib, so she took   dilTIAZem (CARDIZEM) 30 MG tablet   and now her , with B/P of 149/121. Should she take another Cardizem. Please advise.

## 2023-11-28 NOTE — TELEPHONE ENCOUNTER
Johny Paiz, GUANAKO - BATSHEVA   to Jonel García [place]   AM    11/28/23 10:34 AM  She can try the metoprolol or take another Cardizem. If she remains in rapid Af, she may need to come to the hospital.        Pt called back stating her HR is currently 135 while resting. Advised pt to go to the ED d/t high hr while resting and taking diltiazem 30mg at 5am and 11am. Pt v/u and will have  drive her.      MELVIN SINGLETARY

## 2023-11-28 NOTE — H&P
PRN Meds:      Labs      CBC:   Recent Labs     11/28/23  1621   WBC 8.8   HGB 15.5        BMP:    Recent Labs     11/28/23  1713      K 4.0      CO2 23   BUN 13   CREATININE 0.8   GLUCOSE 161*     Hepatic:   Recent Labs     11/28/23 1713   AST 20   ALT 14   BILITOT 0.4   ALKPHOS 77     Lipids: No results found for: \"CHOL\", \"HDL\", \"TRIG\"  Hemoglobin A1C:   Lab Results   Component Value Date/Time    LABA1C 5.7 10/06/2023 07:00 AM     TSH:   Lab Results   Component Value Date/Time    TSH 2.30 09/04/2013 05:15 PM     Troponin: No results found for: \"TROPONINT\"  Lactic Acid: No results for input(s): \"LACTA\" in the last 72 hours. BNP:   Recent Labs     11/28/23  1713   PROBNP 689*     UA:  Lab Results   Component Value Date/Time    NITRU Negative 10/05/2023 05:45 PM    COLORU Yellow 10/05/2023 05:45 PM    PHUR 6.0 10/05/2023 05:45 PM    WBCUA 6-9 10/05/2023 05:45 PM    RBCUA 5-10 10/05/2023 05:45 PM    BACTERIA Rare 10/05/2023 05:45 PM    CLARITYU Clear 10/05/2023 05:45 PM    SPECGRAV 1.015 10/05/2023 05:45 PM    LEUKOCYTESUR Negative 10/05/2023 05:45 PM    UROBILINOGEN 0.2 10/05/2023 05:45 PM    BILIRUBINUR Negative 10/05/2023 05:45 PM    BLOODU MODERATE 10/05/2023 05:45 PM    GLUCOSEU Negative 10/05/2023 05:45 PM    KETUA Negative 10/05/2023 05:45 PM    AMORPHOUS Rare 10/05/2023 05:45 PM     Urine Cultures:   Lab Results   Component Value Date/Time    LABURIN No growth at 18 to 36 hours 01/04/2021 06:00 PM     Blood Cultures:   Lab Results   Component Value Date/Time    BC No Growth after 4 days of incubation. 02/14/2023 11:48 AM     Lab Results   Component Value Date/Time    BLOODCULT2 No Growth after 4 days of incubation.  02/14/2023 11:48 AM     Organism: No results found for: \"ORG\"    Imaging/Diagnostics Last 24 Hours   XR CHEST PORTABLE    Result Date: 11/28/2023  EXAMINATION: ONE XRAY VIEW OF THE CHEST 11/28/2023 4:28 pm COMPARISON: 10/05/2023 HISTORY: ORDERING SYSTEM PROVIDED HISTORY:

## 2023-11-29 ENCOUNTER — TELEPHONE (OUTPATIENT)
Dept: CARDIOLOGY | Age: 88
End: 2023-11-29

## 2023-11-29 VITALS
OXYGEN SATURATION: 100 % | BODY MASS INDEX: 31.86 KG/M2 | TEMPERATURE: 97.5 F | HEART RATE: 66 BPM | SYSTOLIC BLOOD PRESSURE: 156 MMHG | WEIGHT: 173.1 LBS | DIASTOLIC BLOOD PRESSURE: 57 MMHG | HEIGHT: 62 IN | RESPIRATION RATE: 16 BRPM

## 2023-11-29 LAB
ANION GAP SERPL CALCULATED.3IONS-SCNC: 10 MMOL/L (ref 3–16)
BUN SERPL-MCNC: 17 MG/DL (ref 7–20)
CALCIUM SERPL-MCNC: 8.5 MG/DL (ref 8.3–10.6)
CHLORIDE SERPL-SCNC: 102 MMOL/L (ref 99–110)
CHOLEST SERPL-MCNC: 123 MG/DL (ref 0–199)
CO2 SERPL-SCNC: 26 MMOL/L (ref 21–32)
CREAT SERPL-MCNC: 1.1 MG/DL (ref 0.6–1.2)
DEPRECATED RDW RBC AUTO: 16.1 % (ref 12.4–15.4)
EST. AVERAGE GLUCOSE BLD GHB EST-MCNC: 154.2 MG/DL
GFR SERPLBLD CREATININE-BSD FMLA CKD-EPI: 49 ML/MIN/{1.73_M2}
GLUCOSE BLD-MCNC: 166 MG/DL (ref 70–99)
GLUCOSE SERPL-MCNC: 132 MG/DL (ref 70–99)
HBA1C MFR BLD: 7 %
HCT VFR BLD AUTO: 41.3 % (ref 36–48)
HDLC SERPL-MCNC: 52 MG/DL (ref 40–60)
HGB BLD-MCNC: 13.4 G/DL (ref 12–16)
LDLC SERPL CALC-MCNC: 52 MG/DL
MAGNESIUM SERPL-MCNC: 2 MG/DL (ref 1.8–2.4)
MCH RBC QN AUTO: 26.9 PG (ref 26–34)
MCHC RBC AUTO-ENTMCNC: 32.4 G/DL (ref 31–36)
MCV RBC AUTO: 83.2 FL (ref 80–100)
PERFORMED ON: ABNORMAL
PHOSPHATE SERPL-MCNC: 3.1 MG/DL (ref 2.5–4.9)
PLATELET # BLD AUTO: 276 K/UL (ref 135–450)
PMV BLD AUTO: 9.9 FL (ref 5–10.5)
POTASSIUM SERPL-SCNC: 3.8 MMOL/L (ref 3.5–5.1)
RBC # BLD AUTO: 4.96 M/UL (ref 4–5.2)
SODIUM SERPL-SCNC: 138 MMOL/L (ref 136–145)
TRIGL SERPL-MCNC: 94 MG/DL (ref 0–150)
TROPONIN, HIGH SENSITIVITY: 19 NG/L (ref 0–14)
TSH SERPL DL<=0.005 MIU/L-ACNC: 3.04 UIU/ML (ref 0.27–4.2)
TSH SERPL DL<=0.005 MIU/L-ACNC: 4.61 UIU/ML (ref 0.27–4.2)
VLDLC SERPL CALC-MCNC: 19 MG/DL
WBC # BLD AUTO: 7.6 K/UL (ref 4–11)

## 2023-11-29 PROCEDURE — 84100 ASSAY OF PHOSPHORUS: CPT

## 2023-11-29 PROCEDURE — 2580000003 HC RX 258: Performed by: STUDENT IN AN ORGANIZED HEALTH CARE EDUCATION/TRAINING PROGRAM

## 2023-11-29 PROCEDURE — 6370000000 HC RX 637 (ALT 250 FOR IP): Performed by: INTERNAL MEDICINE

## 2023-11-29 PROCEDURE — 99223 1ST HOSP IP/OBS HIGH 75: CPT | Performed by: INTERNAL MEDICINE

## 2023-11-29 PROCEDURE — 84484 ASSAY OF TROPONIN QUANT: CPT

## 2023-11-29 PROCEDURE — 2500000003 HC RX 250 WO HCPCS: Performed by: STUDENT IN AN ORGANIZED HEALTH CARE EDUCATION/TRAINING PROGRAM

## 2023-11-29 PROCEDURE — 80048 BASIC METABOLIC PNL TOTAL CA: CPT

## 2023-11-29 PROCEDURE — 85027 COMPLETE CBC AUTOMATED: CPT

## 2023-11-29 PROCEDURE — 83735 ASSAY OF MAGNESIUM: CPT

## 2023-11-29 PROCEDURE — 93005 ELECTROCARDIOGRAM TRACING: CPT | Performed by: NURSE PRACTITIONER

## 2023-11-29 PROCEDURE — 6370000000 HC RX 637 (ALT 250 FOR IP): Performed by: STUDENT IN AN ORGANIZED HEALTH CARE EDUCATION/TRAINING PROGRAM

## 2023-11-29 RX ORDER — LOSARTAN POTASSIUM 25 MG/1
25 TABLET ORAL DAILY
Status: DISCONTINUED | OUTPATIENT
Start: 2023-11-29 | End: 2023-11-29 | Stop reason: HOSPADM

## 2023-11-29 RX ORDER — METOPROLOL SUCCINATE 25 MG/1
25 TABLET, EXTENDED RELEASE ORAL DAILY PRN
Qty: 30 TABLET | Refills: 0 | Status: SHIPPED | OUTPATIENT
Start: 2023-11-29 | End: 2023-12-29

## 2023-11-29 RX ORDER — AMIODARONE HYDROCHLORIDE 200 MG/1
200 TABLET ORAL 2 TIMES DAILY
Status: DISCONTINUED | OUTPATIENT
Start: 2023-11-29 | End: 2023-11-29 | Stop reason: HOSPADM

## 2023-11-29 RX ORDER — LOSARTAN POTASSIUM 25 MG/1
25 TABLET ORAL DAILY
Qty: 30 TABLET | Refills: 3 | Status: SHIPPED | OUTPATIENT
Start: 2023-11-29

## 2023-11-29 RX ORDER — AMIODARONE HYDROCHLORIDE 200 MG/1
200 TABLET ORAL 2 TIMES DAILY
Qty: 60 TABLET | Refills: 0 | Status: SHIPPED | OUTPATIENT
Start: 2023-11-29 | End: 2023-12-29

## 2023-11-29 RX ADMIN — LOSARTAN POTASSIUM 25 MG: 25 TABLET, FILM COATED ORAL at 11:17

## 2023-11-29 RX ADMIN — APIXABAN 5 MG: 5 TABLET, FILM COATED ORAL at 10:51

## 2023-11-29 RX ADMIN — SODIUM CHLORIDE 10 MG/HR: 900 INJECTION, SOLUTION INTRAVENOUS at 00:11

## 2023-11-29 RX ADMIN — Medication 10 ML: at 08:31

## 2023-11-29 RX ADMIN — AMIODARONE HYDROCHLORIDE 200 MG: 200 TABLET ORAL at 08:29

## 2023-11-29 RX ADMIN — FUROSEMIDE 20 MG: 20 TABLET ORAL at 08:42

## 2023-11-29 RX ADMIN — PRAVASTATIN SODIUM 40 MG: 40 TABLET ORAL at 08:29

## 2023-11-29 NOTE — PROGRESS NOTES
PIV removed. Tip intact. Dressing in place. Tele box removed. [facility] notified of pt discharge. Discharge paperwork went over with and given to pt and pt's spouse. Instructions on cardiac event monitor given. Pt's cardiac monitor activated and applied to pt. Verbalizes understanding. Pt lock box emptied. Pt wheeled via wheelchair to private car with all belongings. Pt discharged home in stable condition with spouse. Meds to beds picked up.

## 2023-11-29 NOTE — DISCHARGE SUMMARY
BUN 13 17   CREATININE 0.8 1.1   GLUCOSE 161* 132*     Hepatic:   Recent Labs     11/28/23 1713   AST 20   ALT 14   BILITOT 0.4   ALKPHOS 77     Lipids:   Lab Results   Component Value Date/Time    CHOL 123 11/28/2023 11:28 PM    HDL 52 11/28/2023 11:28 PM    TRIG 94 11/28/2023 11:28 PM     Hemoglobin A1C:   Lab Results   Component Value Date/Time    LABA1C 5.7 10/06/2023 07:00 AM     TSH:   Lab Results   Component Value Date/Time    TSH 4.61 11/28/2023 11:28 PM     Troponin: No results found for: \"TROPONINT\"  Lactic Acid: No results for input(s): \"LACTA\" in the last 72 hours. BNP:   Recent Labs     11/28/23 1713   PROBNP 689*     UA:  Lab Results   Component Value Date/Time    NITRU Negative 10/05/2023 05:45 PM    COLORU Yellow 10/05/2023 05:45 PM    PHUR 6.0 10/05/2023 05:45 PM    WBCUA 6-9 10/05/2023 05:45 PM    RBCUA 5-10 10/05/2023 05:45 PM    BACTERIA Rare 10/05/2023 05:45 PM    CLARITYU Clear 10/05/2023 05:45 PM    SPECGRAV 1.015 10/05/2023 05:45 PM    LEUKOCYTESUR Negative 10/05/2023 05:45 PM    UROBILINOGEN 0.2 10/05/2023 05:45 PM    BILIRUBINUR Negative 10/05/2023 05:45 PM    BLOODU MODERATE 10/05/2023 05:45 PM    GLUCOSEU Negative 10/05/2023 05:45 PM    KETUA Negative 10/05/2023 05:45 PM    AMORPHOUS Rare 10/05/2023 05:45 PM     Urine Cultures:   Lab Results   Component Value Date/Time    LABURIN No growth at 18 to 36 hours 01/04/2021 06:00 PM     Blood Cultures:   Lab Results   Component Value Date/Time    BC No Growth after 4 days of incubation. 02/14/2023 11:48 AM     Lab Results   Component Value Date/Time    BLOODCULT2 No Growth after 4 days of incubation.  02/14/2023 11:48 AM     Organism: No results found for: \"ORG\"    Time Spent Discharging patient 35 minutes    Electronically signed by Bharati Quiroz MD on 11/29/2023 at 10:23 AM

## 2023-11-29 NOTE — DISCHARGE INSTR - DIET

## 2023-11-29 NOTE — CONSULTS
Electrophysiology Consultation   Date: 11/29/2023  Admit Date:  11/28/2023  Reason for Consultation: Symptomatic paroxysmal atrial fibrillation  Consult Requesting Physician: Marcial Cheatham MD     Chief Complaint   Patient presents with    Palpitations     Pt states she has been in A-fib since 0500. Pt states she is feeling palpitations in her chest but denies chest pain. Pt states she has a hx of A-fib. HPI:   Mrs. Kaden Lozano is a pleasant 81year old female with a  medical history significant for symptomatic paroxysmal atrial fibrillation (amiodarone; status post cryoballoon ablation), tachy-hammad syndrome, hypertension, heart failure with preserved ejection fraction, hyperlipidemia, and recurrent small bowel obstructions who presents from home with symptomatic atrial fibrillation with RVR and hypertension. According to patient, over the last year she has had episodes of paroxysmal atrial fibrillation approximately every 8-12 weeks. She uses diltiazem PRN and this helps convert her or slow her rates. Unfortunately, she had an episode of atrial fibrillation with RVR with elevated blood pressures around 0500. She called office and was directed to the ER. Patient denies fevers, chest pain, orthopnea, PND, lower extremity edema, abdominal swelling, chills, visual changes, headaches, sore throat, cough, abdominal pain, nausea, vomiting, bleeding, bruising, dysuria, muscle/joint pain, confusion, depression, anxiety, skin lesions, etc.    Emergency Room/Hospital Course:  Patient was evaluated in the ER on 11/28/2023. Her CMP was reassuring. Her BNP was mildly elevated. Her CBC Was reassuring. Her EKG showed atrial fibrillation with RVR. She was admitted and spontaneously converted to NSR with conversion pause of 3.65 seconds.     Past Medical History:   Diagnosis Date    Atrial fibrillation ([address])     Congestive heart failure ([address]) 6/23/2023    Diabetes mellitus ([address])     Hydronephrosis 2/14/2014

## 2023-11-29 NOTE — PLAN OF CARE
Patient seen. Full note to come. Increase amiodarone to 200 mg BID, start losartan, continue furosemide, two week cardiac monitor and follow up with me in 01/2023. I will arrange orders and plan above. Ok to discharge from EP standpoint.     Renato Rubio MD  Cardiac Electrophysiology  [id number] [facility]  [phone] [employer]

## 2023-11-29 NOTE — PROGRESS NOTES
Physician Progress Note      PATIENT:               Leah HA0 N Kia Alex  CSN #:                  [id number]  :                       1935  ADMIT DATE:       2023 4:02 PM  [address] [facility] DATE:        2023 1:19 PM  RESPONDING  PROVIDER #:        Bandar Toure MD          QUERY TEXT:    Patient admitted with Paroxysmal atrial fibrillation  and is maintained on   ELIQUIS. If possible, please document in progress notes and discharge   summary if you are evaluating and/or treating any of the following: The medical record reflects the following:  Risk Factors: hx  afib/ablation on chronic anticoagulants-AF, PVC's, HTN, SB,   SVT, pulmonary HTN, chronic diastolic CHF an DM. Clinical Indicators: [id number] score 6 (HTN, DM, CHF, age, gender)  Treatment: Continue Eliquis, valsartan, and Lasix cardiology consult    Thank-you, Yani Butler RN, BSN, CCDS  Options provided:  -- Secondary hypercoagulable state in a patient with atrial fibrillation  -- Other - I will add my own diagnosis  -- Disagree - Not applicable / Not valid  -- Disagree - Clinically unable to determine / Unknown  -- Refer to Clinical Documentation Reviewer    PROVIDER RESPONSE TEXT:    This patient has secondary hypercoagulable state in a patient with atrial   fibrillation.     Query created by: Alexys Hardy on 2023 12:11 PM      Electronically signed by:  Bandar Toure MD 2023 1:40 PM

## 2023-11-29 NOTE — CARE COORDINATION
CASE MANAGEMENT DISCHARGE SUMMARY      Discharge to: home, no needs         IMM given: (date) 11/28         Transportation:    Family/car:         Confirmed discharge plan with:     Patient: yes      Family:  yes-  at bedside         RN, name: Constantin Jesus

## 2023-11-29 NOTE — TELEPHONE ENCOUNTER
Monitor company [employer]  Length of monitor 14 days  Monitor ordered by Shonna Rodriguez MD   Patch ID [facility]  Device number [id number]  Monitor given to Jah De Los Santos RN. Nurse to apply at the time of discharge.

## 2023-11-29 NOTE — PROGRESS NOTES
Patient admitted to room 209 from ED. Patient oriented to room, call light, bed rails, phone, lights and bathroom. Patient instructed about the schedule of the day including: vital sign frequency, lab draws, possible tests, frequency of MD and staff rounds, including RN/MD rounding together at bedside, daily weights, and I &O's. Patient instructed about prescribed diet, how to use call light, and television. No bed alarm in place, patient is a low fall risk. Telemetry box 81 in place, patient aware of placement and reason. Bed locked, in lowest position, side rails up 2/4, call light within reach. Will continue to monitor.

## 2023-11-30 LAB
EKG ATRIAL RATE: 53 BPM
EKG DIAGNOSIS: NORMAL
EKG P AXIS: 79 DEGREES
EKG P-R INTERVAL: 214 MS
EKG Q-T INTERVAL: 458 MS
EKG QRS DURATION: 98 MS
EKG QTC CALCULATION (BAZETT): 429 MS
EKG R AXIS: 6 DEGREES
EKG T AXIS: 86 DEGREES
EKG VENTRICULAR RATE: 53 BPM

## 2023-11-30 PROCEDURE — 93010 ELECTROCARDIOGRAM REPORT: CPT | Performed by: INTERNAL MEDICINE

## 2023-12-04 ENCOUNTER — TELEPHONE (OUTPATIENT)
Dept: CARDIOLOGY CLINIC | Age: 88
End: 2023-12-04

## 2023-12-04 ENCOUNTER — APPOINTMENT (OUTPATIENT)
Dept: GENERAL RADIOLOGY | Age: 88
End: 2023-12-04
Payer: MEDICARE

## 2023-12-04 ENCOUNTER — HOSPITAL ENCOUNTER (EMERGENCY)
Age: 88
Discharge: HOME | End: 2023-12-04
Attending: EMERGENCY MEDICINE
Payer: MEDICARE

## 2023-12-04 VITALS
OXYGEN SATURATION: 98 % | HEART RATE: 62 BPM | SYSTOLIC BLOOD PRESSURE: 191 MMHG | RESPIRATION RATE: 19 BRPM | TEMPERATURE: 98.2 F | DIASTOLIC BLOOD PRESSURE: 62 MMHG

## 2023-12-04 DIAGNOSIS — I10 HYPERTENSION, UNSPECIFIED TYPE: Primary | ICD-10-CM

## 2023-12-04 LAB
ALBUMIN SERPL-MCNC: 3.8 G/DL (ref 3.4–5)
ALBUMIN/GLOB SERPL: 1 {RATIO} (ref 1.1–2.2)
ALP SERPL-CCNC: 76 U/L (ref 40–129)
ALT SERPL-CCNC: 17 U/L (ref 10–40)
ANION GAP SERPL CALCULATED.3IONS-SCNC: 10 MMOL/L (ref 3–16)
AST SERPL-CCNC: 30 U/L (ref 15–37)
BASOPHILS # BLD: 0.1 K/UL (ref 0–0.2)
BASOPHILS NFR BLD: 0.9 %
BILIRUB SERPL-MCNC: 0.3 MG/DL (ref 0–1)
BUN SERPL-MCNC: 18 MG/DL (ref 7–20)
CALCIUM SERPL-MCNC: 8.8 MG/DL (ref 8.3–10.6)
CHLORIDE SERPL-SCNC: 101 MMOL/L (ref 99–110)
CO2 SERPL-SCNC: 27 MMOL/L (ref 21–32)
CREAT SERPL-MCNC: 0.9 MG/DL (ref 0.6–1.2)
DEPRECATED RDW RBC AUTO: 16 % (ref 12.4–15.4)
EKG ATRIAL RATE: 67 BPM
EKG DIAGNOSIS: NORMAL
EKG P AXIS: 81 DEGREES
EKG P-R INTERVAL: 206 MS
EKG Q-T INTERVAL: 426 MS
EKG QRS DURATION: 106 MS
EKG QTC CALCULATION (BAZETT): 450 MS
EKG R AXIS: 5 DEGREES
EKG T AXIS: 63 DEGREES
EKG VENTRICULAR RATE: 67 BPM
EOSINOPHIL # BLD: 0.1 K/UL (ref 0–0.6)
EOSINOPHIL NFR BLD: 1.8 %
GFR SERPLBLD CREATININE-BSD FMLA CKD-EPI: >60 ML/MIN/{1.73_M2}
GLUCOSE SERPL-MCNC: 132 MG/DL (ref 70–99)
HCT VFR BLD AUTO: 39.7 % (ref 36–48)
HGB BLD-MCNC: 12.9 G/DL (ref 12–16)
LYMPHOCYTES # BLD: 1.2 K/UL (ref 1–5.1)
LYMPHOCYTES NFR BLD: 16.9 %
MCH RBC QN AUTO: 27.1 PG (ref 26–34)
MCHC RBC AUTO-ENTMCNC: 32.4 G/DL (ref 31–36)
MCV RBC AUTO: 83.7 FL (ref 80–100)
MONOCYTES # BLD: 0.7 K/UL (ref 0–1.3)
MONOCYTES NFR BLD: 10 %
NEUTROPHILS # BLD: 4.8 K/UL (ref 1.7–7.7)
NEUTROPHILS NFR BLD: 70.4 %
PLATELET # BLD AUTO: 248 K/UL (ref 135–450)
PMV BLD AUTO: 10.1 FL (ref 5–10.5)
POTASSIUM SERPL-SCNC: 5.3 MMOL/L (ref 3.5–5.1)
PROT SERPL-MCNC: 7.8 G/DL (ref 6.4–8.2)
RBC # BLD AUTO: 4.75 M/UL (ref 4–5.2)
SODIUM SERPL-SCNC: 138 MMOL/L (ref 136–145)
TROPONIN, HIGH SENSITIVITY: 12 NG/L (ref 0–14)
TROPONIN, HIGH SENSITIVITY: 15 NG/L (ref 0–14)
WBC # BLD AUTO: 6.8 K/UL (ref 4–11)

## 2023-12-04 PROCEDURE — 80053 COMPREHEN METABOLIC PANEL: CPT

## 2023-12-04 PROCEDURE — 93005 ELECTROCARDIOGRAM TRACING: CPT | Performed by: EMERGENCY MEDICINE

## 2023-12-04 PROCEDURE — 93010 ELECTROCARDIOGRAM REPORT: CPT | Performed by: INTERNAL MEDICINE

## 2023-12-04 PROCEDURE — 71045 X-RAY EXAM CHEST 1 VIEW: CPT

## 2023-12-04 PROCEDURE — 99285 EMERGENCY DEPT VISIT HI MDM: CPT

## 2023-12-04 PROCEDURE — 84484 ASSAY OF TROPONIN QUANT: CPT

## 2023-12-04 PROCEDURE — 85025 COMPLETE CBC W/AUTO DIFF WBC: CPT

## 2023-12-04 ASSESSMENT — PAIN SCALES - GENERAL: PAINLEVEL_OUTOF10: 0

## 2023-12-04 ASSESSMENT — PAIN - FUNCTIONAL ASSESSMENT: PAIN_FUNCTIONAL_ASSESSMENT: 0-10

## 2023-12-04 NOTE — ED NOTES
Pt ambulatory to and from restroom at this time. Pt's gait even and steady. Pt back to bed, side rails up x2, call light in reach. Pt denies any needs at this time.      Aminta Alvarado RN  12/04/23 [id number]

## 2023-12-04 NOTE — TELEPHONE ENCOUNTER
Pt called to report she has been feeling lightheaded and dizzy since yesterday. BP today in her left arm was 205/76, HR 50 and in the right arm it was 203/73, HR 49. She took her medication this morning. She said her BP was high like this yesterday too. She denies CP, SOB, Palpitations. Advised she go to ER for further evaluation. She reports she will think about it. Please advise    NOA CHARLES 11/21/2023

## 2023-12-04 NOTE — TELEPHONE ENCOUNTER
Please call patient and ensure she took her losartan. See how BP is. If SBP remains > 140, we can have her take an extra losartan 25 mg.

## 2023-12-19 PROCEDURE — 93272 ECG/REVIEW INTERPRET ONLY: CPT | Performed by: INTERNAL MEDICINE

## 2023-12-29 DIAGNOSIS — I48.91 ATRIAL FIBRILLATION, UNSPECIFIED TYPE (HCC): ICD-10-CM

## 2024-01-02 ENCOUNTER — TELEPHONE (OUTPATIENT)
Dept: CARDIOLOGY CLINIC | Age: 89
End: 2024-01-02

## 2024-01-02 NOTE — TELEPHONE ENCOUNTER
Spoke with the patient. Relayed cardiac event monitor results. Patient told to follow up as scheduled. They V/U.

## 2024-01-10 RX ORDER — AMIODARONE HYDROCHLORIDE 200 MG/1
200 TABLET ORAL 2 TIMES DAILY
Qty: 60 TABLET | Refills: 3 | Status: SHIPPED | OUTPATIENT
Start: 2024-01-10

## 2024-01-10 NOTE — TELEPHONE ENCOUNTER
LOV ERIC hospital consult 11/29/2023  Upcoming appointment AGK 1/25/2024  CMP 12/4/2023  TSH 11/28/2023  Chest Xray 12/4/2023  EKG 12/4/2023    [facility]-medication pending for signoff

## 2024-01-10 NOTE — TELEPHONE ENCOUNTER
Medication Refill    Medication needing refilled:  Amiodarone  Dosage of the medication:  200mg  How are you taking this medication (QD, BID, TID, QID, PRN):  BID  30 or 90 day supply called in:  60  Which Pharmacy are we sending the medication to?:   [id number] [place], [place] - [address] - P [phone] - F [phone] [[id number]]     Last ov: 11.21.2023 NPAM  Next ov: 1.25.2024 MAURICIOK

## 2024-01-25 ENCOUNTER — OFFICE VISIT (OUTPATIENT)
Dept: CARDIOLOGY CLINIC | Age: 89
End: 2024-01-25

## 2024-01-25 ENCOUNTER — HOSPITAL ENCOUNTER (OUTPATIENT)
Age: 89
Discharge: HOME | End: 2024-01-25
Payer: MEDICARE

## 2024-01-25 VITALS
BODY MASS INDEX: 32.85 KG/M2 | SYSTOLIC BLOOD PRESSURE: 176 MMHG | WEIGHT: 178.5 LBS | HEART RATE: 57 BPM | HEIGHT: 62 IN | DIASTOLIC BLOOD PRESSURE: 82 MMHG | OXYGEN SATURATION: 99 %

## 2024-01-25 DIAGNOSIS — I10 HYPERTENSION, UNCONTROLLED: Primary | ICD-10-CM

## 2024-01-25 DIAGNOSIS — I47.10 SVT (SUPRAVENTRICULAR TACHYCARDIA): ICD-10-CM

## 2024-01-25 DIAGNOSIS — Z79.899 ON AMIODARONE THERAPY: ICD-10-CM

## 2024-01-25 DIAGNOSIS — I48.0 PAF (PAROXYSMAL ATRIAL FIBRILLATION) (HCC): ICD-10-CM

## 2024-01-25 DIAGNOSIS — I49.3 PVC (PREMATURE VENTRICULAR CONTRACTION): ICD-10-CM

## 2024-01-25 LAB
T4 FREE SERPL-MCNC: 1.9 NG/DL (ref 0.9–1.8)
TSH SERPL DL<=0.005 MIU/L-ACNC: 4.41 UIU/ML (ref 0.27–4.2)

## 2024-01-25 PROCEDURE — 84439 ASSAY OF FREE THYROXINE: CPT

## 2024-01-25 PROCEDURE — 84443 ASSAY THYROID STIM HORMONE: CPT

## 2024-01-25 PROCEDURE — 36415 COLL VENOUS BLD VENIPUNCTURE: CPT

## 2024-01-25 NOTE — PATIENT INSTRUCTIONS
RECOMMENDATIONS:  Discussed pacemaker due to bradycardia and some fatigue- patient not interested in this option.   Increase losartan to 25 mg twice daily for BP control.   Monitor BP at home and call office within the next week or so to report readings.   Check TSH and free T4.   Continue amiodarone 200 mg twice daily.   Follow up in 6 months with EP NP.

## 2024-01-25 NOTE — PROGRESS NOTES
[facility]   Electrophysiology Note    Date: 1/25/24  Patient Name: Lashanda Tiwari  YOB: 1935     Chief Complaint:  Follow-Up from Hospital, Atrial Fibrillation, Tachycardia (SVT ), and Other (PVC )    Primary Care Physician: Dmitry Moreno MD     HISTORY OF PRESENT ILLNESS: Lashanda Tiwari is a 88 y.o. female who presents for follow up for paroxysmal atrial fibrillation. She has a past medical history of HTN, pulmonary HTN, dCHF, DM and SVT. She wore a cardiac event monitor in 10/2013 and AF was detected, longest lasting 2hrs 43 minutes with HR max 160 BPM. She had a Cryoablation for PAF on 2/11/2014. She underwent an echocardiogram in 2/2019 that demonstrated EF of 55-60% with severe pulmonary HTN. She was then referred to the heart failure team. She had recurrent PAF in 1/2020 and was started on eliquis and amiodarone. Amiodarone was stopped for unknown reasons. She underwent an echocardiogram on 4/28/20 that demonstrated an EF of 55-60% with no regional wall motion abnormalities, with LA mildly dilated, mild MR and TR. While seeing Dr. Richards in office 11/3/20 she was found to be in AF with RVR. EKG 11/3/20 demonstrates AF with  BPM. She reported she had an RFCA for AF several years ago and had been in rhythm since.   Her cardiac event monitor from 11/3-11/12/20, showed a high burden of afib with poorly controlled rates. She presented to the ER on 12/18/20 with abdominal pain. Her abdominal pain started with palpitations. Her EKG showed afib rvr. She converted to sinus after the first dose of Tikosyn.  ECG, 2/4/2021, sinus bradycardiac rate 57.      Interval History since last office visit:  In 12/2021, echo showed an EF of 55 to 60% and a normal SPAP. In 7/14/2022, patient was evaluated in the ER with symptomatic rapid atrial fibrillation.  She was given some IV metoprolol and spontaneously converted to sinus rhythm within several hours.  Reports that she had missed

## 2024-01-26 ENCOUNTER — TELEPHONE (OUTPATIENT)
Dept: CARDIOLOGY CLINIC | Age: 89
End: 2024-01-26

## 2024-01-26 DIAGNOSIS — I48.0 PAF (PAROXYSMAL ATRIAL FIBRILLATION) (HCC): Primary | ICD-10-CM

## 2024-01-26 RX ORDER — LOSARTAN POTASSIUM 25 MG/1
25 TABLET ORAL 2 TIMES DAILY
Qty: 60 TABLET | Refills: 3 | Status: SHIPPED | OUTPATIENT
Start: 2024-01-26

## 2024-01-26 NOTE — TELEPHONE ENCOUNTER
----- Message from ÁNGEL Mendoza Jr., MD sent at 1/26/2024  2:58 PM EST -----  Please let patient know thyroid function a little worse.  She has mildly elevated thyroid function.  I don't think we need to change any thing at this point given how well amiodarone is working for her but I'd like to repeat labs in 1 month and if still abnormal then will refer to endocrine to help us manage.  Thanks for help!

## 2024-02-05 NOTE — PROGRESS NOTES
(paroxysmal atrial fibrillation) (HCC)    3. SVT (supraventricular tachycardia) (HCC)    4. PVC (premature ventricular contraction)    5. Shortness of breath    6. Essential hypertension              Heart Failure Therapies:  The 4 Pillars of Heart Failure Therapies    ACE inhibitors, angiotensin receptor blockers, or ARNI (Entresto): losartan 25 BID    2.   Beta blockers: toprol xl 25    3.   Aldosterone blockers:    4.   SGLT2 inhibitors:        Plan:  1. Labs before next visit: BNP, BMP  2. Continue current cardiac medications  3. Discussed COVID and RSV vaccine  4. Follow up with me or Mary Bright in 6 months      This note was scribed in the presence of Stella Richards MD by Niurka Layne RN    The scribe's documentation has been prepared under my direction and personally reviewed by me in its entirety.  I confirm that the note above accurately reflects all work, treatment, procedures, and medical decision making performed by me.      Time Based Itemization  A total of 40 minutes was spent on today's patient encounter.  If applicable, non-patient-facing activities:  ( x)Preparing to see the patient and reviewing records  ( ) Individual interpretation of results  ( ) Discussion or coordination of care with other health care professionals  ( x) Ordering of unique tests, medications, or procedures  ( x) Documentation within the EHR      I appreciate the opportunity of cooperating in the care of this patient.    Stella Richards M.D., [facility]

## 2024-02-06 ENCOUNTER — OFFICE VISIT (OUTPATIENT)
Dept: CARDIOLOGY CLINIC | Age: 89
End: 2024-02-06

## 2024-02-06 ENCOUNTER — TELEPHONE (OUTPATIENT)
Dept: CARDIOLOGY CLINIC | Age: 89
End: 2024-02-06

## 2024-02-06 VITALS
WEIGHT: 178.5 LBS | SYSTOLIC BLOOD PRESSURE: 154 MMHG | OXYGEN SATURATION: 97 % | HEIGHT: 62 IN | HEART RATE: 60 BPM | BODY MASS INDEX: 32.85 KG/M2 | DIASTOLIC BLOOD PRESSURE: 70 MMHG

## 2024-02-06 DIAGNOSIS — I48.0 PAF (PAROXYSMAL ATRIAL FIBRILLATION) (HCC): ICD-10-CM

## 2024-02-06 DIAGNOSIS — I50.32 CHRONIC DIASTOLIC HEART FAILURE (HCC): Primary | ICD-10-CM

## 2024-02-06 DIAGNOSIS — I10 ESSENTIAL HYPERTENSION: ICD-10-CM

## 2024-02-06 DIAGNOSIS — I47.10 SVT (SUPRAVENTRICULAR TACHYCARDIA): ICD-10-CM

## 2024-02-06 DIAGNOSIS — I49.3 PVC (PREMATURE VENTRICULAR CONTRACTION): ICD-10-CM

## 2024-02-06 DIAGNOSIS — R06.02 SHORTNESS OF BREATH: ICD-10-CM

## 2024-02-06 NOTE — TELEPHONE ENCOUNTER
Pt presented herself in office today and dropped of bp log/ HR log for AGK, placed in AGK folder.

## 2024-02-06 NOTE — PATIENT INSTRUCTIONS
Plan:  1. Labs before next visit: BNP, BMP  2. Continue current cardiac medications  3. Discussed COVID and RSV vaccine  4. Follow up with me or Mary Bright in 6 months

## 2024-02-07 NOTE — TELEPHONE ENCOUNTER
AGK- from the messages it looks like pt just dropped off BP log. Would you like pt come in for BP check with her machine?

## 2024-02-07 NOTE — TELEPHONE ENCOUNTER
Did we check her blood pressure with her machine and ours?  I don't want to change if recorded  blood pressures are not truly her blood pressures.

## 2024-02-08 DIAGNOSIS — I50.32 CHRONIC DIASTOLIC HEART FAILURE (HCC): ICD-10-CM

## 2024-02-08 DIAGNOSIS — I48.91 ATRIAL FIBRILLATION WITH RAPID VENTRICULAR RESPONSE (HCC): ICD-10-CM

## 2024-02-08 NOTE — TELEPHONE ENCOUNTER
Pt states she was denied for patient assistance for Eliquis. Pt would like to know what other options she would have for medication. Please advise.

## 2024-02-08 NOTE — TELEPHONE ENCOUNTER
I spoke with the patient and advised her to call insurance and see if rivaroxaban (Xarelto), edoxaban (Sayvasa) or dabigatran (Pradaxa) are covered. She will call our office once she knows.

## 2024-02-09 NOTE — TELEPHONE ENCOUNTER
Called pt. Informed pt of AGK message. Scheduled pt for BP visit. Voiced to pt to make sure she brings her bp machine in so we can double check both. Pt pearl. Pt scheduled on 2/14 @11

## 2024-02-09 NOTE — TELEPHONE ENCOUNTER
Pt returned call. Pt states she has decided to stay on Eliquis. Pt would like refills of this called into [facility].     Last OV- 01.25.24 MAURICIOK  Next OV- 07.25.24 MELVIN

## 2024-02-14 ENCOUNTER — APPOINTMENT (OUTPATIENT)
Dept: CT IMAGING | Age: 89
DRG: 305 | End: 2024-02-14
Attending: EMERGENCY MEDICINE
Payer: MEDICARE

## 2024-02-14 ENCOUNTER — APPOINTMENT (OUTPATIENT)
Dept: GENERAL RADIOLOGY | Age: 89
DRG: 305 | End: 2024-02-14
Payer: MEDICARE

## 2024-02-14 ENCOUNTER — NURSE ONLY (OUTPATIENT)
Dept: CARDIOLOGY CLINIC | Age: 89
End: 2024-02-14

## 2024-02-14 ENCOUNTER — HOSPITAL ENCOUNTER (INPATIENT)
Age: 89
LOS: 4 days | Discharge: HOME | DRG: 305 | End: 2024-02-18
Attending: EMERGENCY MEDICINE | Admitting: INTERNAL MEDICINE
Payer: MEDICARE

## 2024-02-14 VITALS — SYSTOLIC BLOOD PRESSURE: 228 MMHG | DIASTOLIC BLOOD PRESSURE: 110 MMHG

## 2024-02-14 DIAGNOSIS — I16.1 HYPERTENSIVE EMERGENCY: Primary | ICD-10-CM

## 2024-02-14 PROBLEM — I16.0 HYPERTENSIVE URGENCY: Status: ACTIVE | Noted: 2024-02-14

## 2024-02-14 LAB
ALBUMIN SERPL-MCNC: 4.2 G/DL (ref 3.4–5)
ALBUMIN/GLOB SERPL: 1.1 {RATIO} (ref 1.1–2.2)
ALP SERPL-CCNC: 107 U/L (ref 40–129)
ALT SERPL-CCNC: 36 U/L (ref 10–40)
ANION GAP SERPL CALCULATED.3IONS-SCNC: 13 MMOL/L (ref 3–16)
AST SERPL-CCNC: 37 U/L (ref 15–37)
BASOPHILS # BLD: 0 K/UL (ref 0–0.2)
BASOPHILS NFR BLD: 0.5 %
BILIRUB SERPL-MCNC: 0.5 MG/DL (ref 0–1)
BUN SERPL-MCNC: 17 MG/DL (ref 7–20)
CALCIUM SERPL-MCNC: 9 MG/DL (ref 8.3–10.6)
CHLORIDE SERPL-SCNC: 98 MMOL/L (ref 99–110)
CO2 SERPL-SCNC: 26 MMOL/L (ref 21–32)
CREAT SERPL-MCNC: 0.9 MG/DL (ref 0.6–1.2)
DEPRECATED RDW RBC AUTO: 15.6 % (ref 12.4–15.4)
EKG ATRIAL RATE: 52 BPM
EKG DIAGNOSIS: NORMAL
EKG P AXIS: 31 DEGREES
EKG P-R INTERVAL: 190 MS
EKG Q-T INTERVAL: 490 MS
EKG QRS DURATION: 104 MS
EKG QTC CALCULATION (BAZETT): 455 MS
EKG R AXIS: -16 DEGREES
EKG T AXIS: 83 DEGREES
EKG VENTRICULAR RATE: 52 BPM
EOSINOPHIL # BLD: 0.3 K/UL (ref 0–0.6)
EOSINOPHIL NFR BLD: 3 %
GFR SERPLBLD CREATININE-BSD FMLA CKD-EPI: >60 ML/MIN/{1.73_M2}
GLUCOSE SERPL-MCNC: 183 MG/DL (ref 70–99)
HCT VFR BLD AUTO: 42.4 % (ref 36–48)
HGB BLD-MCNC: 13.5 G/DL (ref 12–16)
LYMPHOCYTES # BLD: 2.1 K/UL (ref 1–5.1)
LYMPHOCYTES NFR BLD: 23.2 %
MCH RBC QN AUTO: 25.9 PG (ref 26–34)
MCHC RBC AUTO-ENTMCNC: 31.8 G/DL (ref 31–36)
MCV RBC AUTO: 81.4 FL (ref 80–100)
MONOCYTES # BLD: 0.9 K/UL (ref 0–1.3)
MONOCYTES NFR BLD: 10.3 %
NEUTROPHILS # BLD: 5.7 K/UL (ref 1.7–7.7)
NEUTROPHILS NFR BLD: 63 %
NT-PROBNP SERPL-MCNC: 415 PG/ML (ref 0–449)
PLATELET # BLD AUTO: 304 K/UL (ref 135–450)
PMV BLD AUTO: 9.7 FL (ref 5–10.5)
POTASSIUM SERPL-SCNC: 4.3 MMOL/L (ref 3.5–5.1)
PROT SERPL-MCNC: 8.2 G/DL (ref 6.4–8.2)
RBC # BLD AUTO: 5.21 M/UL (ref 4–5.2)
SODIUM SERPL-SCNC: 137 MMOL/L (ref 136–145)
SPECIMEN STATUS: NORMAL
TROPONIN, HIGH SENSITIVITY: 10 NG/L (ref 0–14)
TROPONIN, HIGH SENSITIVITY: 11 NG/L (ref 0–14)
TROPONIN, HIGH SENSITIVITY: 13 NG/L (ref 0–14)
WBC # BLD AUTO: 9 K/UL (ref 4–11)

## 2024-02-14 PROCEDURE — 6360000002 HC RX W HCPCS: Performed by: EMERGENCY MEDICINE

## 2024-02-14 PROCEDURE — 93010 ELECTROCARDIOGRAM REPORT: CPT | Performed by: INTERNAL MEDICINE

## 2024-02-14 PROCEDURE — 6370000000 HC RX 637 (ALT 250 FOR IP): Performed by: INTERNAL MEDICINE

## 2024-02-14 PROCEDURE — 96365 THER/PROPH/DIAG IV INF INIT: CPT

## 2024-02-14 PROCEDURE — 96375 TX/PRO/DX INJ NEW DRUG ADDON: CPT

## 2024-02-14 PROCEDURE — 85025 COMPLETE CBC W/AUTO DIFF WBC: CPT

## 2024-02-14 PROCEDURE — 93005 ELECTROCARDIOGRAM TRACING: CPT | Performed by: EMERGENCY MEDICINE

## 2024-02-14 PROCEDURE — 83880 ASSAY OF NATRIURETIC PEPTIDE: CPT

## 2024-02-14 PROCEDURE — 71045 X-RAY EXAM CHEST 1 VIEW: CPT

## 2024-02-14 PROCEDURE — 84484 ASSAY OF TROPONIN QUANT: CPT

## 2024-02-14 PROCEDURE — 6360000002 HC RX W HCPCS: Performed by: INTERNAL MEDICINE

## 2024-02-14 PROCEDURE — 96366 THER/PROPH/DIAG IV INF ADDON: CPT

## 2024-02-14 PROCEDURE — 1200000000 HC SEMI PRIVATE

## 2024-02-14 PROCEDURE — 80053 COMPREHEN METABOLIC PANEL: CPT

## 2024-02-14 PROCEDURE — 99285 EMERGENCY DEPT VISIT HI MDM: CPT

## 2024-02-14 PROCEDURE — 36415 COLL VENOUS BLD VENIPUNCTURE: CPT

## 2024-02-14 PROCEDURE — 70450 CT HEAD/BRAIN W/O DYE: CPT

## 2024-02-14 PROCEDURE — 2580000003 HC RX 258: Performed by: INTERNAL MEDICINE

## 2024-02-14 RX ORDER — ACETAMINOPHEN 650 MG/1
650 SUPPOSITORY RECTAL EVERY 6 HOURS PRN
Status: DISCONTINUED | OUTPATIENT
Start: 2024-02-14 | End: 2024-02-18 | Stop reason: HOSPADM

## 2024-02-14 RX ORDER — NICARDIPINE HYDROCHLORIDE 0.1 MG/ML
2.5-15 INJECTION INTRAVENOUS CONTINUOUS
Status: DISCONTINUED | OUTPATIENT
Start: 2024-02-14 | End: 2024-02-15

## 2024-02-14 RX ORDER — ENOXAPARIN SODIUM 100 MG/ML
40 INJECTION SUBCUTANEOUS DAILY
Status: CANCELLED | OUTPATIENT
Start: 2024-02-14

## 2024-02-14 RX ORDER — POLYETHYLENE GLYCOL 3350 17 G/17G
17 POWDER, FOR SOLUTION ORAL DAILY PRN
Status: DISCONTINUED | OUTPATIENT
Start: 2024-02-14 | End: 2024-02-18 | Stop reason: HOSPADM

## 2024-02-14 RX ORDER — SODIUM CHLORIDE 9 MG/ML
INJECTION, SOLUTION INTRAVENOUS PRN
Status: DISCONTINUED | OUTPATIENT
Start: 2024-02-14 | End: 2024-02-18 | Stop reason: HOSPADM

## 2024-02-14 RX ORDER — FUROSEMIDE 20 MG/1
20 TABLET ORAL DAILY
Status: CANCELLED | OUTPATIENT
Start: 2024-02-14

## 2024-02-14 RX ORDER — ACETAMINOPHEN 325 MG/1
650 TABLET ORAL EVERY 6 HOURS PRN
Status: DISCONTINUED | OUTPATIENT
Start: 2024-02-14 | End: 2024-02-18 | Stop reason: HOSPADM

## 2024-02-14 RX ORDER — AMIODARONE HYDROCHLORIDE 200 MG/1
200 TABLET ORAL 2 TIMES DAILY
Status: DISCONTINUED | OUTPATIENT
Start: 2024-02-14 | End: 2024-02-18 | Stop reason: HOSPADM

## 2024-02-14 RX ORDER — FUROSEMIDE 20 MG/1
20 TABLET ORAL DAILY
Status: DISCONTINUED | OUTPATIENT
Start: 2024-02-15 | End: 2024-02-17

## 2024-02-14 RX ORDER — SODIUM CHLORIDE 0.9 % (FLUSH) 0.9 %
5-40 SYRINGE (ML) INJECTION PRN
Status: DISCONTINUED | OUTPATIENT
Start: 2024-02-14 | End: 2024-02-18 | Stop reason: HOSPADM

## 2024-02-14 RX ORDER — ONDANSETRON 2 MG/ML
4 INJECTION INTRAMUSCULAR; INTRAVENOUS EVERY 6 HOURS PRN
Status: DISCONTINUED | OUTPATIENT
Start: 2024-02-14 | End: 2024-02-18 | Stop reason: HOSPADM

## 2024-02-14 RX ORDER — PRAVASTATIN SODIUM 40 MG
40 TABLET ORAL NIGHTLY
Status: DISCONTINUED | OUTPATIENT
Start: 2024-02-15 | End: 2024-02-18 | Stop reason: HOSPADM

## 2024-02-14 RX ORDER — ONDANSETRON 4 MG/1
4 TABLET, ORALLY DISINTEGRATING ORAL EVERY 8 HOURS PRN
Status: DISCONTINUED | OUTPATIENT
Start: 2024-02-14 | End: 2024-02-18 | Stop reason: HOSPADM

## 2024-02-14 RX ORDER — LOSARTAN POTASSIUM 25 MG/1
50 TABLET ORAL 2 TIMES DAILY
Status: DISCONTINUED | OUTPATIENT
Start: 2024-02-14 | End: 2024-02-18 | Stop reason: HOSPADM

## 2024-02-14 RX ORDER — PANTOPRAZOLE SODIUM 40 MG/1
40 TABLET, DELAYED RELEASE ORAL DAILY
Status: DISCONTINUED | OUTPATIENT
Start: 2024-02-15 | End: 2024-02-18 | Stop reason: HOSPADM

## 2024-02-14 RX ORDER — SODIUM CHLORIDE 0.9 % (FLUSH) 0.9 %
5-40 SYRINGE (ML) INJECTION EVERY 12 HOURS SCHEDULED
Status: DISCONTINUED | OUTPATIENT
Start: 2024-02-14 | End: 2024-02-18 | Stop reason: HOSPADM

## 2024-02-14 RX ORDER — LABETALOL HYDROCHLORIDE 5 MG/ML
10 INJECTION, SOLUTION INTRAVENOUS ONCE
Status: COMPLETED | OUTPATIENT
Start: 2024-02-14 | End: 2024-02-14

## 2024-02-14 RX ADMIN — LOSARTAN POTASSIUM 50 MG: 25 TABLET, FILM COATED ORAL at 20:05

## 2024-02-14 RX ADMIN — SODIUM CHLORIDE 7.5 MG/HR: 9 INJECTION, SOLUTION INTRAVENOUS at 18:44

## 2024-02-14 RX ADMIN — APIXABAN 5 MG: 5 TABLET, FILM COATED ORAL at 20:05

## 2024-02-14 RX ADMIN — LABETALOL HYDROCHLORIDE 10 MG: 5 INJECTION INTRAVENOUS at 12:03

## 2024-02-14 RX ADMIN — NICARDIPINE HYDROCHLORIDE 5 MG/HR: 0.1 INJECTION INTRAVENOUS at 17:09

## 2024-02-14 RX ADMIN — AMIODARONE HYDROCHLORIDE 200 MG: 200 TABLET ORAL at 20:05

## 2024-02-14 RX ADMIN — SODIUM CHLORIDE 5 MG/HR: 9 INJECTION, SOLUTION INTRAVENOUS at 22:44

## 2024-02-14 RX ADMIN — NICARDIPINE HYDROCHLORIDE 5 MG/HR: 0.1 INJECTION INTRAVENOUS at 12:50

## 2024-02-14 RX ADMIN — SODIUM CHLORIDE, PRESERVATIVE FREE 10 ML: 5 INJECTION INTRAVENOUS at 20:09

## 2024-02-14 ASSESSMENT — PAIN - FUNCTIONAL ASSESSMENT
PAIN_FUNCTIONAL_ASSESSMENT: NONE - DENIES PAIN

## 2024-02-14 ASSESSMENT — LIFESTYLE VARIABLES
HOW OFTEN DO YOU HAVE A DRINK CONTAINING ALCOHOL: NEVER
HOW MANY STANDARD DRINKS CONTAINING ALCOHOL DO YOU HAVE ON A TYPICAL DAY: PATIENT DOES NOT DRINK

## 2024-02-14 NOTE — ED PROVIDER NOTES
[facility]  ED     EMERGENCY DEPARTMENT ENCOUNTER            Pt Name: Lashanda Tiwari   MRN: [id number]   Birthdate 1935   Date of evaluation: 2/14/2024   Provider: Taiwo Ngo II, DO   PCP: Dmitry Moreno MD   Note Started: 4:09 PM EST 2/14/24          CHIEF COMPLAINT     Chief Complaint   Patient presents with    Hypertension     Patient to the ED for hypertension, was seen at Dr. REYES's office today and was told to come here for evaluation of BP.                HISTORY OF PRESENT ILLNESS:   History from : Patient   Limitations to history : None     Lashanda Tiwari is a 88 y.o. female who presents from primary care office with reported hypertension.  Patient states that she had a typical outpatient follow-up scheduled.  However, on arrival patient's blood pressure was reportedly approximately 250 systolic.  Patient denies any muscle weakness, paresthesias, speech symptoms or gait changes.  No chest pain reported.  In fact, patient declines any symptoms whatsoever at this time.  No recent changes in medications.  On arrival, patient's blood pressure is 247/85.    Nursing Notes were all reviewed and agreed with, or any disagreements were addressed in the HPI.     REVIEW OF SYSTEMS :    Positives and Pertinent negatives as per HPI.      MEDICAL HISTORY   has a past medical history of Atrial fibrillation (HCC), Congestive heart failure (HCC) (6/23/2023), Diabetes mellitus (HCC), Hydronephrosis (2/14/2014), Hyperlipidemia, Hypertension, Intussusception intestine (HCC), and SVT (supraventricular tachycardia) (8/26/2013).    Past Surgical History:   Procedure Laterality Date    ABLATION OF DYSRHYTHMIC FOCUS      -2014    APPENDECTOMY      CHOLECYSTECTOMY, LAPAROSCOPIC N/A 2/3/2023    ROBOTIC CHOLECYSTECTOMY performed by Dmitry Partida MD at [facility] OR    DILATATION, ESOPHAGUS      FRACTURE SURGERY      L ankle    HYSTERECTOMY (CERVIX STATUS UNKNOWN)      SKIN BIOPSY      SMALL INTESTINE

## 2024-02-14 NOTE — H&P
pending clinical course overnight: []Yes  [x]No    --------------------------------------------------------------------------------------------------------------------------------------------------------------------    Imaging:     CT HEAD WO CONTRAST    Result Date: 2/14/2024  EXAMINATION: CT OF THE HEAD WITHOUT CONTRAST  2/14/2024 12:23 pm TECHNIQUE: CT of the head was performed without the administration of intravenous contrast. Automated exposure control, iterative reconstruction, and/or weight based adjustment of the mA/kV was utilized to reduce the radiation dose to as low as reasonably achievable. COMPARISON: None. HISTORY: ORDERING SYSTEM PROVIDED HISTORY: hypertensive emergency TECHNOLOGIST PROVIDED HISTORY: Has a \"code stroke\" or \"stroke alert\" been called?->No Reason for exam:->hypertensive emergency Decision Support Exception - unselect if not a suspected or confirmed emergency medical condition->Emergency Medical Condition (MA) Reason for Exam: htn sent by MD, on blood thinners FINDINGS: BRAIN/VENTRICLES: There is no acute intracerebral hemorrhage or extra-axial fluid collection.  There is mild cerebral atrophy with mild periventricular, subcortical and deep white matter small vessel ischemic disease. ORBITS: Status post bilateral cataract removal.  There is partial opacification of the right mastoid air cells. SINUSES: The visualized paranasal sinuses and mastoid air cells are clear. SOFT TISSUES/SKULL:  The calvarium is intact.     1. No acute intracranial abnormality.     XR CHEST PORTABLE    Result Date: 2/14/2024  EXAMINATION: ONE XRAY VIEW OF THE CHEST 2/14/2024 11:44 am COMPARISON: 4 December 2023 HISTORY: ORDERING SYSTEM PROVIDED HISTORY: high bp TECHNOLOGIST PROVIDED HISTORY: Reason for exam:->high bp Reason for Exam: htn FINDINGS: Lungs: Well inflated. Elevated right hemidiaphragm unchanged.  Minimally increased bronchovascular markings unchanged. Heart and mediastinum: Mild stable cardiac

## 2024-02-15 LAB
ANION GAP SERPL CALCULATED.3IONS-SCNC: 11 MMOL/L (ref 3–16)
BASOPHILS # BLD: 0 K/UL (ref 0–0.2)
BASOPHILS NFR BLD: 0.5 %
BILIRUB UR QL STRIP.AUTO: NEGATIVE
BUN SERPL-MCNC: 17 MG/DL (ref 7–20)
CALCIUM SERPL-MCNC: 8.2 MG/DL (ref 8.3–10.6)
CHLORIDE SERPL-SCNC: 102 MMOL/L (ref 99–110)
CLARITY UR: CLEAR
CO2 SERPL-SCNC: 24 MMOL/L (ref 21–32)
COLOR UR: YELLOW
CREAT SERPL-MCNC: 1.1 MG/DL (ref 0.6–1.2)
CREAT UR-MCNC: 50.8 MG/DL (ref 28–259)
DEPRECATED RDW RBC AUTO: 15.3 % (ref 12.4–15.4)
EKG ATRIAL RATE: 59 BPM
EKG DIAGNOSIS: NORMAL
EKG P AXIS: 54 DEGREES
EKG P-R INTERVAL: 232 MS
EKG Q-T INTERVAL: 428 MS
EKG QRS DURATION: 102 MS
EKG QTC CALCULATION (BAZETT): 423 MS
EKG R AXIS: -15 DEGREES
EKG T AXIS: 73 DEGREES
EKG VENTRICULAR RATE: 59 BPM
EOSINOPHIL # BLD: 0.3 K/UL (ref 0–0.6)
EOSINOPHIL NFR BLD: 3.4 %
GFR SERPLBLD CREATININE-BSD FMLA CKD-EPI: 48 ML/MIN/{1.73_M2}
GLUCOSE SERPL-MCNC: 179 MG/DL (ref 70–99)
GLUCOSE UR STRIP.AUTO-MCNC: NEGATIVE MG/DL
HCT VFR BLD AUTO: 38.2 % (ref 36–48)
HGB BLD-MCNC: 12.3 G/DL (ref 12–16)
HGB UR QL STRIP.AUTO: NEGATIVE
KETONES UR STRIP.AUTO-MCNC: NEGATIVE MG/DL
LEUKOCYTE ESTERASE UR QL STRIP.AUTO: NEGATIVE
LYMPHOCYTES # BLD: 1.5 K/UL (ref 1–5.1)
LYMPHOCYTES NFR BLD: 18 %
MCH RBC QN AUTO: 25.9 PG (ref 26–34)
MCHC RBC AUTO-ENTMCNC: 32.2 G/DL (ref 31–36)
MCV RBC AUTO: 80.6 FL (ref 80–100)
MONOCYTES # BLD: 1 K/UL (ref 0–1.3)
MONOCYTES NFR BLD: 12.1 %
NEUTROPHILS # BLD: 5.4 K/UL (ref 1.7–7.7)
NEUTROPHILS NFR BLD: 66 %
NITRITE UR QL STRIP.AUTO: NEGATIVE
PH UR STRIP.AUTO: 6.5 [PH] (ref 5–8)
PLATELET # BLD AUTO: 290 K/UL (ref 135–450)
PMV BLD AUTO: 9.4 FL (ref 5–10.5)
POTASSIUM SERPL-SCNC: 3.9 MMOL/L (ref 3.5–5.1)
PROT UR STRIP.AUTO-MCNC: NEGATIVE MG/DL
PROT UR-MCNC: 5 MG/DL
PROT/CREAT UR-RTO: 0.1 MG/DL
RBC # BLD AUTO: 4.73 M/UL (ref 4–5.2)
SODIUM SERPL-SCNC: 137 MMOL/L (ref 136–145)
SP GR UR STRIP.AUTO: 1.01 (ref 1–1.03)
UA DIPSTICK W REFLEX MICRO PNL UR: NORMAL
URN SPEC COLLECT METH UR: NORMAL
UROBILINOGEN UR STRIP-ACNC: 0.2 E.U./DL
WBC # BLD AUTO: 8.3 K/UL (ref 4–11)

## 2024-02-15 PROCEDURE — 93005 ELECTROCARDIOGRAM TRACING: CPT | Performed by: INTERNAL MEDICINE

## 2024-02-15 PROCEDURE — 99223 1ST HOSP IP/OBS HIGH 75: CPT | Performed by: INTERNAL MEDICINE

## 2024-02-15 PROCEDURE — 82570 ASSAY OF URINE CREATININE: CPT

## 2024-02-15 PROCEDURE — 6370000000 HC RX 637 (ALT 250 FOR IP): Performed by: INTERNAL MEDICINE

## 2024-02-15 PROCEDURE — 80048 BASIC METABOLIC PNL TOTAL CA: CPT

## 2024-02-15 PROCEDURE — 1200000000 HC SEMI PRIVATE

## 2024-02-15 PROCEDURE — 85025 COMPLETE CBC W/AUTO DIFF WBC: CPT

## 2024-02-15 PROCEDURE — 6360000002 HC RX W HCPCS: Performed by: INTERNAL MEDICINE

## 2024-02-15 PROCEDURE — 84244 ASSAY OF RENIN: CPT

## 2024-02-15 PROCEDURE — 36415 COLL VENOUS BLD VENIPUNCTURE: CPT

## 2024-02-15 PROCEDURE — 81003 URINALYSIS AUTO W/O SCOPE: CPT

## 2024-02-15 PROCEDURE — 2580000003 HC RX 258: Performed by: INTERNAL MEDICINE

## 2024-02-15 PROCEDURE — 83835 ASSAY OF METANEPHRINES: CPT

## 2024-02-15 PROCEDURE — 93010 ELECTROCARDIOGRAM REPORT: CPT | Performed by: INTERNAL MEDICINE

## 2024-02-15 PROCEDURE — 84156 ASSAY OF PROTEIN URINE: CPT

## 2024-02-15 PROCEDURE — 82088 ASSAY OF ALDOSTERONE: CPT

## 2024-02-15 RX ORDER — CARVEDILOL 3.12 MG/1
3.12 TABLET ORAL 2 TIMES DAILY WITH MEALS
Status: DISCONTINUED | OUTPATIENT
Start: 2024-02-15 | End: 2024-02-18 | Stop reason: HOSPADM

## 2024-02-15 RX ORDER — CLONIDINE HYDROCHLORIDE 0.1 MG/1
0.1 TABLET ORAL EVERY 6 HOURS PRN
Status: DISCONTINUED | OUTPATIENT
Start: 2024-02-15 | End: 2024-02-16

## 2024-02-15 RX ORDER — METOPROLOL SUCCINATE 25 MG/1
25 TABLET, EXTENDED RELEASE ORAL DAILY
Status: DISCONTINUED | OUTPATIENT
Start: 2024-02-15 | End: 2024-02-15

## 2024-02-15 RX ADMIN — AMIODARONE HYDROCHLORIDE 200 MG: 200 TABLET ORAL at 20:48

## 2024-02-15 RX ADMIN — APIXABAN 5 MG: 5 TABLET, FILM COATED ORAL at 20:48

## 2024-02-15 RX ADMIN — CARVEDILOL 3.12 MG: 3.12 TABLET, FILM COATED ORAL at 16:42

## 2024-02-15 RX ADMIN — ACETAMINOPHEN 650 MG: 325 TABLET ORAL at 09:08

## 2024-02-15 RX ADMIN — SODIUM CHLORIDE 3 MG/HR: 0.9 INJECTION, SOLUTION INTRAVENOUS at 12:44

## 2024-02-15 RX ADMIN — PRAVASTATIN SODIUM 40 MG: 40 TABLET ORAL at 20:48

## 2024-02-15 RX ADMIN — AMIODARONE HYDROCHLORIDE 200 MG: 200 TABLET ORAL at 09:08

## 2024-02-15 RX ADMIN — FUROSEMIDE 20 MG: 20 TABLET ORAL at 09:09

## 2024-02-15 RX ADMIN — SODIUM CHLORIDE, PRESERVATIVE FREE 10 ML: 5 INJECTION INTRAVENOUS at 20:48

## 2024-02-15 RX ADMIN — LOSARTAN POTASSIUM 50 MG: 25 TABLET, FILM COATED ORAL at 09:09

## 2024-02-15 RX ADMIN — PANTOPRAZOLE SODIUM 40 MG: 40 TABLET, DELAYED RELEASE ORAL at 09:09

## 2024-02-15 RX ADMIN — APIXABAN 5 MG: 5 TABLET, FILM COATED ORAL at 09:09

## 2024-02-15 RX ADMIN — LOSARTAN POTASSIUM 50 MG: 25 TABLET, FILM COATED ORAL at 20:48

## 2024-02-15 RX ADMIN — MUPIROCIN: 20 OINTMENT TOPICAL at 20:48

## 2024-02-15 RX ADMIN — METOPROLOL SUCCINATE 25 MG: 25 TABLET, EXTENDED RELEASE ORAL at 12:46

## 2024-02-15 RX ADMIN — CLONIDINE HYDROCHLORIDE 0.1 MG: 0.1 TABLET ORAL at 16:43

## 2024-02-15 RX ADMIN — SODIUM CHLORIDE, PRESERVATIVE FREE 10 ML: 5 INJECTION INTRAVENOUS at 09:10

## 2024-02-15 ASSESSMENT — PAIN DESCRIPTION - LOCATION: LOCATION: HEAD

## 2024-02-15 ASSESSMENT — PAIN SCALES - GENERAL: PAINLEVEL_OUTOF10: 3

## 2024-02-15 NOTE — PLAN OF CARE
Problem: Discharge Planning  Goal: Discharge to home or other facility with appropriate resources  Outcome: Progressing  Flowsheets (Taken 2/14/2024 2000)  Discharge to home or other facility with appropriate resources: Refer to discharge planning if patient needs post-hospital services based on physician order or complex needs related to functional status, cognitive ability or social support system     Problem: ABCDS Injury Assessment  Goal: Absence of physical injury  Outcome: Progressing

## 2024-02-15 NOTE — PLAN OF CARE
Problem: Discharge Planning  Goal: Discharge to home or other facility with appropriate resources  2/15/2024 0800 by Tom Hua RN  Outcome: Progressing  Discharge to home or other facility with appropriate resources: Refer to discharge planning if patient needs post-hospital services based on physician order or complex needs related to functional status, cognitive ability or social support system     Problem: ABCDS Injury Assessment  Goal: Absence of physical injury  2/15/2024 0800 by Tom Hua RN  Outcome: Progressing     Problem: Chronic Conditions and Co-morbidities  Goal: Patient's chronic conditions and co-morbidity symptoms are monitored and maintained or improved  Outcome: Progressing     Tom Hua RN

## 2024-02-15 NOTE — CONSULTS
[email or web address]  Nephrology Consult Note           Reason for Consult: Hypertensive urgency  Requesting Physician:  Dr. Glass    Chief Complaint:    Chief Complaint   Patient presents with    Hypertension     Patient to the ED for hypertension, was seen at Dr. REYES's office today and was told to come here for evaluation of BP.        History of Present Illness on 2/15/2024:    88 y.o. yo female with PMH of atrial fibrillation, diabetes, hypertension, SVT, CHF with preserved EF who is admitted for hypertensive urgency  Patient was sent from her physician's office due to persistently high blood pressure.  Upon arrival in the emergency room, her blood pressure was 247/85.  She was started on nicardipine drip and has been admitted.  She denies any chest pain or shortness of breath    Past Medical History:        Diagnosis Date    Atrial fibrillation (HCC)     Congestive heart failure (HCC) 6/23/2023    Diabetes mellitus (HCC)     Hydronephrosis 2/14/2014    Hyperlipidemia     Hypertension     Intussusception intestine (HCC)     SVT (supraventricular tachycardia) 8/26/2013    AVNRT       Past Surgical History:        Procedure Laterality Date    ABLATION OF DYSRHYTHMIC FOCUS      -2014    APPENDECTOMY      CHOLECYSTECTOMY, LAPAROSCOPIC N/A 2/3/2023    [place]OTIC [place] [facility] [place]    DILATATION, ESOPHAGUS      FRACTURE SURGERY      L ankle    HYSTERECTOMY (CERVIX STATUS UNKNOWN)      SKIN BIOPSY      SMALL INTESTINE SURGERY      TONSILLECTOMY      UPPER GASTROINTESTINAL ENDOSCOPY N/A 4/21/2021    EGD POLYP SNARE performed by Wei Belle DO at [facility] ENDOSCOPY    UPPER GASTROINTESTINAL ENDOSCOPY  4/21/2021    EGD CONTROL HEMORRHAGE performed by Wei Belle DO at [facility] ENDOSCOPY    UPPER GASTROINTESTINAL ENDOSCOPY  4/21/2021    EGD BIOPSY performed by Wei Belle DO at [facility] ENDOSCOPY       Home Medications:    No current facility-administered 
    Past Surgical History:   Procedure Laterality Date    ABLATION OF DYSRHYTHMIC FOCUS      -2014    APPENDECTOMY      CHOLECYSTECTOMY, LAPAROSCOPIC N/A 2/3/2023    ROBOTIC CHOLECYSTECTOMY performed by Dmitry Partida MD at [facility] [place]    DILATATION, ESOPHAGUS      FRACTURE SURGERY      L ankle    HYSTERECTOMY (CERVIX STATUS UNKNOWN)      SKIN BIOPSY      SMALL INTESTINE SURGERY      TONSILLECTOMY      UPPER GASTROINTESTINAL ENDOSCOPY N/A 4/21/2021    EGD POLYP SNARE performed by Wei Belle DO at [facility] ENDOSCOPY    UPPER GASTROINTESTINAL ENDOSCOPY  4/21/2021    EGD CONTROL HEMORRHAGE performed by Wei Belle DO at [facility] ENDOSCOPY    UPPER GASTROINTESTINAL ENDOSCOPY  4/21/2021    EGD BIOPSY performed by Wei Belle DO at [facility] ENDOSCOPY       Allergies   Allergen Reactions    Ciprofloxacin Other (See Comments)     Other reaction(s): Other  Veins turned bright red with IV infusion  Veins turned bright red with IV infusion      Farxiga [Dapagliflozin] Dizziness or Vertigo    Hydrocodone Other (See Comments)     NAUSEA AND DIZZINESS    Metformin And Related Other (See Comments)     NAUSEA AND DIZZINESS    Morphine     Nickel Dermatitis    Omeprazole     Prednisone      Other reaction(s): chest pain & palpitations/H&P    Hydralazine Palpitations and Rash       Social History:  Reviewed.  reports that she has never smoked. She has never used smokeless tobacco. She reports that she does not drink alcohol and does not use drugs.     Family History:  Reviewed. family history includes Cancer in her sister; Diabetes in her mother; Heart Disease in her brother and father; High Blood Pressure in her brother and father; High Cholesterol in her brother and father.   No premature CAD.     Review of System:  All other systems reviewed except for that noted above. Pertinent negatives and positives are:     General: negative for fever, chills   Ophthalmic ROS: negative for - eye pain or loss of

## 2024-02-16 ENCOUNTER — APPOINTMENT (OUTPATIENT)
Dept: VASCULAR LAB | Age: 89
DRG: 305 | End: 2024-02-16
Payer: MEDICARE

## 2024-02-16 LAB
ALBUMIN SERPL-MCNC: 3.3 G/DL (ref 3.4–5)
ALDOST SERPL-MCNC: 5 NG/DL
ANION GAP SERPL CALCULATED.3IONS-SCNC: 9 MMOL/L (ref 3–16)
BUN SERPL-MCNC: 19 MG/DL (ref 7–20)
CALCIUM SERPL-MCNC: 8.3 MG/DL (ref 8.3–10.6)
CHLORIDE SERPL-SCNC: 102 MMOL/L (ref 99–110)
CO2 SERPL-SCNC: 24 MMOL/L (ref 21–32)
CREAT SERPL-MCNC: 0.9 MG/DL (ref 0.6–1.2)
GFR SERPLBLD CREATININE-BSD FMLA CKD-EPI: >60 ML/MIN/{1.73_M2}
GLUCOSE SERPL-MCNC: 175 MG/DL (ref 70–99)
PHOSPHATE SERPL-MCNC: 2.7 MG/DL (ref 2.5–4.9)
POTASSIUM SERPL-SCNC: 4.3 MMOL/L (ref 3.5–5.1)
SODIUM SERPL-SCNC: 135 MMOL/L (ref 136–145)

## 2024-02-16 PROCEDURE — 80069 RENAL FUNCTION PANEL: CPT

## 2024-02-16 PROCEDURE — 2000000000 HC ICU R&B

## 2024-02-16 PROCEDURE — 6370000000 HC RX 637 (ALT 250 FOR IP): Performed by: INTERNAL MEDICINE

## 2024-02-16 PROCEDURE — 93975 VASCULAR STUDY: CPT

## 2024-02-16 PROCEDURE — 2580000003 HC RX 258: Performed by: INTERNAL MEDICINE

## 2024-02-16 RX ORDER — AMLODIPINE BESYLATE 5 MG/1
5 TABLET ORAL DAILY
Status: DISCONTINUED | OUTPATIENT
Start: 2024-02-16 | End: 2024-02-16

## 2024-02-16 RX ORDER — AMLODIPINE BESYLATE 5 MG/1
5 TABLET ORAL ONCE
Status: COMPLETED | OUTPATIENT
Start: 2024-02-16 | End: 2024-02-16

## 2024-02-16 RX ORDER — AMLODIPINE BESYLATE 5 MG/1
10 TABLET ORAL DAILY
Status: DISCONTINUED | OUTPATIENT
Start: 2024-02-17 | End: 2024-02-18

## 2024-02-16 RX ADMIN — AMLODIPINE BESYLATE 5 MG: 5 TABLET ORAL at 18:22

## 2024-02-16 RX ADMIN — SODIUM CHLORIDE, PRESERVATIVE FREE 10 ML: 5 INJECTION INTRAVENOUS at 21:00

## 2024-02-16 RX ADMIN — MUPIROCIN: 20 OINTMENT TOPICAL at 21:05

## 2024-02-16 RX ADMIN — LOSARTAN POTASSIUM 50 MG: 25 TABLET, FILM COATED ORAL at 20:59

## 2024-02-16 RX ADMIN — SODIUM CHLORIDE, PRESERVATIVE FREE 10 ML: 5 INJECTION INTRAVENOUS at 10:03

## 2024-02-16 RX ADMIN — PRAVASTATIN SODIUM 40 MG: 40 TABLET ORAL at 20:59

## 2024-02-16 RX ADMIN — APIXABAN 5 MG: 5 TABLET, FILM COATED ORAL at 20:59

## 2024-02-16 RX ADMIN — FUROSEMIDE 20 MG: 20 TABLET ORAL at 10:02

## 2024-02-16 RX ADMIN — APIXABAN 5 MG: 5 TABLET, FILM COATED ORAL at 09:57

## 2024-02-16 RX ADMIN — PANTOPRAZOLE SODIUM 40 MG: 40 TABLET, DELAYED RELEASE ORAL at 09:57

## 2024-02-16 RX ADMIN — AMLODIPINE BESYLATE 5 MG: 5 TABLET ORAL at 10:00

## 2024-02-16 RX ADMIN — LOSARTAN POTASSIUM 50 MG: 25 TABLET, FILM COATED ORAL at 09:57

## 2024-02-16 RX ADMIN — AMIODARONE HYDROCHLORIDE 200 MG: 200 TABLET ORAL at 20:59

## 2024-02-16 NOTE — PLAN OF CARE
Problem: Discharge Planning  Goal: Discharge to home or other facility with appropriate resources  2/15/2024 2135 by Erasto Reese, RN  Outcome: Progressing  Flowsheets (Taken 2/15/2024 2000)  Discharge to home or other facility with appropriate resources:   Identify barriers to discharge with patient and caregiver   Arrange for needed discharge resources and transportation as appropriate   Identify discharge learning needs (meds, wound care, etc)   Refer to discharge planning if patient needs post-hospital services based on physician order or complex needs related to functional status, cognitive ability or social support system     Problem: ABCDS Injury Assessment  Goal: Absence of physical injury  2/15/2024 2135 by Erasto Reese, RN  Outcome: Progressing     Problem: Chronic Conditions and Co-morbidities  Goal: Patient's chronic conditions and co-morbidity symptoms are monitored and maintained or improved  2/15/2024 2135 by Erasto Reese, NOEMY  Outcome: Progressing     Problem: Cardiovascular - Adult  Goal: Maintains optimal cardiac output and hemodynamic stability  Outcome: Progressing  Flowsheets (Taken 2/15/2024 2000)  Maintains optimal cardiac output and hemodynamic stability:   Monitor blood pressure and heart rate   Monitor urine output and notify Licensed Independent Practitioner for values outside of normal range   Assess for signs of decreased cardiac output  Goal: Absence of cardiac dysrhythmias or at baseline  Outcome: Progressing  Flowsheets (Taken 2/15/2024 2000)  Absence of cardiac dysrhythmias or at baseline:   Monitor cardiac rate and rhythm   Assess for signs of decreased cardiac output

## 2024-02-17 LAB
ALBUMIN SERPL-MCNC: 3.1 G/DL (ref 3.4–5)
ANION GAP SERPL CALCULATED.3IONS-SCNC: 10 MMOL/L (ref 3–16)
BUN SERPL-MCNC: 24 MG/DL (ref 7–20)
CALCIUM SERPL-MCNC: 8.4 MG/DL (ref 8.3–10.6)
CHLORIDE SERPL-SCNC: 103 MMOL/L (ref 99–110)
CO2 SERPL-SCNC: 22 MMOL/L (ref 21–32)
CREAT SERPL-MCNC: 1 MG/DL (ref 0.6–1.2)
GFR SERPLBLD CREATININE-BSD FMLA CKD-EPI: 54 ML/MIN/{1.73_M2}
GLUCOSE SERPL-MCNC: 187 MG/DL (ref 70–99)
PHOSPHATE SERPL-MCNC: 3.4 MG/DL (ref 2.5–4.9)
POTASSIUM SERPL-SCNC: 4.1 MMOL/L (ref 3.5–5.1)
RENIN PLAS-CCNC: 0.4 NG/ML/HR
SODIUM SERPL-SCNC: 135 MMOL/L (ref 136–145)

## 2024-02-17 PROCEDURE — 2580000003 HC RX 258: Performed by: INTERNAL MEDICINE

## 2024-02-17 PROCEDURE — 2000000000 HC ICU R&B

## 2024-02-17 PROCEDURE — 80069 RENAL FUNCTION PANEL: CPT

## 2024-02-17 PROCEDURE — 6370000000 HC RX 637 (ALT 250 FOR IP): Performed by: INTERNAL MEDICINE

## 2024-02-17 PROCEDURE — 36415 COLL VENOUS BLD VENIPUNCTURE: CPT

## 2024-02-17 RX ORDER — NITROGLYCERIN 40 MG/1
1 PATCH TRANSDERMAL DAILY
Status: DISCONTINUED | OUTPATIENT
Start: 2024-02-17 | End: 2024-02-18 | Stop reason: HOSPADM

## 2024-02-17 RX ORDER — SPIRONOLACTONE 25 MG/1
25 TABLET ORAL 2 TIMES DAILY
Status: DISCONTINUED | OUTPATIENT
Start: 2024-02-17 | End: 2024-02-18

## 2024-02-17 RX ORDER — SPIRONOLACTONE 25 MG/1
25 TABLET ORAL DAILY
Status: DISCONTINUED | OUTPATIENT
Start: 2024-02-17 | End: 2024-02-17

## 2024-02-17 RX ADMIN — AMIODARONE HYDROCHLORIDE 200 MG: 200 TABLET ORAL at 19:35

## 2024-02-17 RX ADMIN — FUROSEMIDE 20 MG: 20 TABLET ORAL at 09:11

## 2024-02-17 RX ADMIN — SPIRONOLACTONE 25 MG: 25 TABLET ORAL at 11:41

## 2024-02-17 RX ADMIN — SPIRONOLACTONE 25 MG: 25 TABLET ORAL at 17:32

## 2024-02-17 RX ADMIN — PANTOPRAZOLE SODIUM 40 MG: 40 TABLET, DELAYED RELEASE ORAL at 09:11

## 2024-02-17 RX ADMIN — APIXABAN 5 MG: 5 TABLET, FILM COATED ORAL at 09:11

## 2024-02-17 RX ADMIN — APIXABAN 5 MG: 5 TABLET, FILM COATED ORAL at 19:39

## 2024-02-17 RX ADMIN — CARVEDILOL 3.12 MG: 3.12 TABLET, FILM COATED ORAL at 09:11

## 2024-02-17 RX ADMIN — MUPIROCIN: 20 OINTMENT TOPICAL at 19:36

## 2024-02-17 RX ADMIN — SODIUM CHLORIDE, PRESERVATIVE FREE 10 ML: 5 INJECTION INTRAVENOUS at 09:15

## 2024-02-17 RX ADMIN — LOSARTAN POTASSIUM 50 MG: 25 TABLET, FILM COATED ORAL at 19:39

## 2024-02-17 RX ADMIN — LOSARTAN POTASSIUM 50 MG: 25 TABLET, FILM COATED ORAL at 09:11

## 2024-02-17 RX ADMIN — SODIUM CHLORIDE, PRESERVATIVE FREE 10 ML: 5 INJECTION INTRAVENOUS at 19:36

## 2024-02-17 RX ADMIN — PRAVASTATIN SODIUM 40 MG: 40 TABLET ORAL at 19:39

## 2024-02-17 RX ADMIN — CARVEDILOL 3.12 MG: 3.12 TABLET, FILM COATED ORAL at 17:33

## 2024-02-17 RX ADMIN — MUPIROCIN: 20 OINTMENT TOPICAL at 09:16

## 2024-02-17 RX ADMIN — AMLODIPINE BESYLATE 10 MG: 5 TABLET ORAL at 09:11

## 2024-02-17 NOTE — PLAN OF CARE
Problem: Discharge Planning  Goal: Discharge to home or other facility with appropriate resources  Outcome: Progressing  Flowsheets (Taken 2/16/2024 2000)  Discharge to home or other facility with appropriate resources:   Identify barriers to discharge with patient and caregiver   Arrange for needed discharge resources and transportation as appropriate   Identify discharge learning needs (meds, wound care, etc)   Refer to discharge planning if patient needs post-hospital services based on physician order or complex needs related to functional status, cognitive ability or social support system     Problem: ABCDS Injury Assessment  Goal: Absence of physical injury  Outcome: Progressing     Problem: Cardiovascular - Adult  Goal: Maintains optimal cardiac output and hemodynamic stability  Outcome: Progressing  Flowsheets (Taken 2/16/2024 2000)  Maintains optimal cardiac output and hemodynamic stability:   Monitor blood pressure and heart rate   Assess for signs of decreased cardiac output  Goal: Absence of cardiac dysrhythmias or at baseline  Outcome: Progressing  Flowsheets (Taken 2/16/2024 2000)  Absence of cardiac dysrhythmias or at baseline:   Monitor cardiac rate and rhythm   Assess for signs of decreased cardiac output

## 2024-02-18 VITALS
WEIGHT: 173.72 LBS | RESPIRATION RATE: 16 BRPM | TEMPERATURE: 97.5 F | SYSTOLIC BLOOD PRESSURE: 186 MMHG | HEIGHT: 62 IN | OXYGEN SATURATION: 97 % | DIASTOLIC BLOOD PRESSURE: 61 MMHG | BODY MASS INDEX: 31.97 KG/M2 | HEART RATE: 45 BPM

## 2024-02-18 LAB
ALBUMIN SERPL-MCNC: 3.3 G/DL (ref 3.4–5)
ANION GAP SERPL CALCULATED.3IONS-SCNC: 11 MMOL/L (ref 3–16)
ANNOTATION COMMENT IMP: NORMAL
BUN SERPL-MCNC: 20 MG/DL (ref 7–20)
CALCIUM SERPL-MCNC: 8.3 MG/DL (ref 8.3–10.6)
CHLORIDE SERPL-SCNC: 103 MMOL/L (ref 99–110)
CO2 SERPL-SCNC: 23 MMOL/L (ref 21–32)
CREAT SERPL-MCNC: 0.9 MG/DL (ref 0.6–1.2)
GFR SERPLBLD CREATININE-BSD FMLA CKD-EPI: >60 ML/MIN/{1.73_M2}
GLUCOSE SERPL-MCNC: 195 MG/DL (ref 70–99)
METANEPHS SERPL-SCNC: 0.13 NMOL/L (ref 0–0.49)
NORMETANEPHRINE SERPL-SCNC: 0.83 NMOL/L (ref 0–0.89)
PHOSPHATE SERPL-MCNC: 3 MG/DL (ref 2.5–4.9)
POTASSIUM SERPL-SCNC: 4.1 MMOL/L (ref 3.5–5.1)
SODIUM SERPL-SCNC: 137 MMOL/L (ref 136–145)

## 2024-02-18 PROCEDURE — 2580000003 HC RX 258: Performed by: INTERNAL MEDICINE

## 2024-02-18 PROCEDURE — 36415 COLL VENOUS BLD VENIPUNCTURE: CPT

## 2024-02-18 PROCEDURE — 6370000000 HC RX 637 (ALT 250 FOR IP): Performed by: INTERNAL MEDICINE

## 2024-02-18 PROCEDURE — 80069 RENAL FUNCTION PANEL: CPT

## 2024-02-18 RX ORDER — CARVEDILOL 3.12 MG/1
3.12 TABLET ORAL 2 TIMES DAILY WITH MEALS
Qty: 60 TABLET | Refills: 0 | Status: SHIPPED | OUTPATIENT
Start: 2024-02-18

## 2024-02-18 RX ORDER — AMLODIPINE BESYLATE 5 MG/1
10 TABLET ORAL NIGHTLY
Status: DISCONTINUED | OUTPATIENT
Start: 2024-02-19 | End: 2024-02-18 | Stop reason: HOSPADM

## 2024-02-18 RX ORDER — PANTOPRAZOLE SODIUM 20 MG/1
40 TABLET, DELAYED RELEASE ORAL DAILY
Qty: 30 TABLET | Refills: 0 | Status: SHIPPED | OUTPATIENT
Start: 2024-02-18

## 2024-02-18 RX ORDER — AMLODIPINE BESYLATE 10 MG/1
10 TABLET ORAL NIGHTLY
Qty: 30 TABLET | Refills: 0 | Status: SHIPPED | OUTPATIENT
Start: 2024-02-19

## 2024-02-18 RX ORDER — NITROGLYCERIN 40 MG/1
1 PATCH TRANSDERMAL DAILY
Qty: 30 PATCH | Refills: 0 | Status: SHIPPED | OUTPATIENT
Start: 2024-02-19

## 2024-02-18 RX ORDER — SPIRONOLACTONE 50 MG/1
50 TABLET, FILM COATED ORAL DAILY
Qty: 30 TABLET | Refills: 0 | Status: SHIPPED | OUTPATIENT
Start: 2024-02-18

## 2024-02-18 RX ORDER — SPIRONOLACTONE 25 MG/1
50 TABLET ORAL DAILY
Status: DISCONTINUED | OUTPATIENT
Start: 2024-02-18 | End: 2024-02-18 | Stop reason: HOSPADM

## 2024-02-18 RX ORDER — LOSARTAN POTASSIUM 50 MG/1
50 TABLET ORAL 2 TIMES DAILY
Qty: 60 TABLET | Refills: 0 | Status: SHIPPED | OUTPATIENT
Start: 2024-02-18

## 2024-02-18 RX ADMIN — AMLODIPINE BESYLATE 10 MG: 5 TABLET ORAL at 08:50

## 2024-02-18 RX ADMIN — SPIRONOLACTONE 25 MG: 25 TABLET ORAL at 08:52

## 2024-02-18 RX ADMIN — SODIUM CHLORIDE, PRESERVATIVE FREE 10 ML: 5 INJECTION INTRAVENOUS at 08:52

## 2024-02-18 RX ADMIN — SPIRONOLACTONE 50 MG: 25 TABLET ORAL at 12:36

## 2024-02-18 RX ADMIN — APIXABAN 5 MG: 5 TABLET, FILM COATED ORAL at 08:50

## 2024-02-18 RX ADMIN — MUPIROCIN: 20 OINTMENT TOPICAL at 08:52

## 2024-02-18 RX ADMIN — PANTOPRAZOLE SODIUM 40 MG: 40 TABLET, DELAYED RELEASE ORAL at 08:52

## 2024-02-18 RX ADMIN — AMIODARONE HYDROCHLORIDE 200 MG: 200 TABLET ORAL at 08:50

## 2024-02-18 RX ADMIN — CARVEDILOL 3.12 MG: 3.12 TABLET, FILM COATED ORAL at 08:50

## 2024-02-18 RX ADMIN — LOSARTAN POTASSIUM 50 MG: 25 TABLET, FILM COATED ORAL at 08:50

## 2024-02-18 NOTE — DISCHARGE SUMMARY
periventricular, subcortical and deep white matter small vessel ischemic disease. ORBITS: Status post bilateral cataract removal.  There is partial opacification of the right mastoid air cells. SINUSES: The visualized paranasal sinuses and mastoid air cells are clear. SOFT TISSUES/SKULL:  The calvarium is intact.     1. No acute intracranial abnormality.     XR CHEST PORTABLE    Result Date: 2/14/2024  EXAMINATION: ONE XRAY VIEW OF THE CHEST 2/14/2024 11:44 am COMPARISON: 4 December 2023 HISTORY: ORDERING SYSTEM PROVIDED HISTORY: high bp TECHNOLOGIST PROVIDED HISTORY: Reason for exam:->high bp Reason for Exam: htn FINDINGS: Lungs: Well inflated. Elevated right hemidiaphragm unchanged.  Minimally increased bronchovascular markings unchanged. Heart and mediastinum: Mild stable cardiac enlargement, left ventricular configuration.  Mildly calcified and tortuous aorta.  Trachea is midline.Ginna are symmetrical, no mass. Osseous structures: Unremarkable Abdomen: Nonspecific bowel gas pattern.     No acute cardiopulmonary disease.  No significant interval change compared to the prior study.       CBC: No results for input(s): \"WBC\", \"HGB\", \"PLT\" in the last 72 hours.  BMP:    Recent Labs     02/16/24  0450 02/17/24  0437 02/18/24  0434   * 135* 137   K 4.3 4.1 4.1    103 103   CO2 24 22 23   BUN 19 24* 20   CREATININE 0.9 1.0 0.9   GLUCOSE 175* 187* 195*     Hepatic: No results for input(s): \"AST\", \"ALT\", \"ALB\", \"BILITOT\", \"ALKPHOS\" in the last 72 hours.  Lipids:   Lab Results   Component Value Date/Time    CHOL 123 11/28/2023 11:28 PM    HDL 52 11/28/2023 11:28 PM    TRIG 94 11/28/2023 11:28 PM     Hemoglobin A1C:   Lab Results   Component Value Date/Time    LABA1C 7.0 11/28/2023 11:28 PM     TSH:   Lab Results   Component Value Date/Time    TSH 4.41 01/25/2024 11:01 AM     Troponin: No results found for: \"TROPONINT\"  Lactic Acid: No results for input(s): \"LACTA\" in the last 72 hours.  BNP: No results for

## 2024-02-18 NOTE — CARE COORDINATION
CASE MANAGEMENT DISCHARGE SUMMARY      Discharge to: Home with [facility]      ransportation:    Family/car: yes       Confirmed discharge plan with:     Patient: yes per RN     Family:  yes          RN, name: Kristine SALGUERO    Note: Discharging nurse to complete EARNEST, reconcile AVS, and place final copy with patient's discharge packet. RN to ensure that written prescriptions for  Level II medications are sent with patient to the facility as per protocol.       
Care managed by IM, Nephro, EP  just signed off. Still adjusting meds. Anticipate DC home with HHC visits- nursing 1-2 days    3:50 PM  [facility] is following  
Chart reviewed day 1. Care provided by EP, nephro and IM. Pt is from home with her . She is IPTA. Pt here with hypertension. She is scheduled to have a renal artery doppler tomorrow. Nicardipine stopped at 1423. B/P stable now at 148/93. Pt will likely have NN. Will follow course. Hanh Fuentes RN   
VL received order for renal artery doppler. Discussed with Tom DESAI RN for patient to be NPO at midnight for testing in the AM tomorrow.       
Identified Issues/Barriers to RETURNING to current housing: none noted  Potential Assistance needed at discharge: (P) N/A            Potential DME:    Patient expects to discharge to: (P) House  Plan for transportation at discharge: (P) Self    Financial    Payor: [employer] MEDICARE / Plan: ANTH[employer]EV[employer] MEDIBLUE ESSENTIAL/PLUS / Product Type: *No Product type* /     Does insurance require precert for SNF: Yes    Potential assistance Purchasing Medications: (P) No  Meds-to-Beds request:        [facility] [id number] - [place], [place] - [address] - P [phone] - F [phone]  [address]  [facility] [address]  Phone: [phone] Fax: [phone]      Notes:    Factors facilitating achievement of predicted outcomes: Family support, Motivated, Cooperative, and Pleasant    Barriers to discharge: Medical complications    Additional Case Management Notes: Referred to patient for d/c planning.  Spoke to patient. Patient is an 88 year old female admitted for HTN.  Patient reports she lives at home with . Patient reports she is independent in ADLs.  Currently denies d/c needs.     The Plan for Transition of Care is related to the following treatment goals of No admission diagnoses are documented for this encounter.    IF APPLICABLE: The Patient and/or patient representative Lashanda and her family were provided with a choice of provider and agrees with the discharge plan. Freedom of choice list with basic dialogue that supports the patient's individualized plan of care/goals and shares the quality data associated with the providers was provided to:     Patient Representative Name:       The Patient and/or Patient Representative Agree with the Discharge Plan?      BAHMAN Da Silva, JOSUE   Case Management Department  Ph: [phone] Fax: [phone]

## 2024-02-18 NOTE — DISCHARGE INSTR - COC
Continuity of Care Form    Patient Name: Lashanda Tiwari   :  1935  MRN:  [id number]    Admit date:  2024  Discharge date:  ***    Code Status Order: Full Code   Advance Directives:     Admitting Physician:  Marco Glass MD  PCP: Dmitry Moreno MD    Discharging Nurse: ***  Discharging Hospital Unit/Room#: 0236/0236-01  Discharging Unit Phone Number: ***    Emergency Contact:   Extended Emergency Contact Information  Primary Emergency Contact: vonda tiwari  Address: [address] [place] [address]  Home Phone: [phone]  Mobile Phone: [phone]  Relation: Spouse  Secondary Emergency Contact: Benito Tiwari   [facility]  Home Phone: [phone]  Relation: Child    Past Surgical History:  Past Surgical History:   Procedure Laterality Date    ABLATION OF DYSRHYTHMIC FOCUS      -    APPENDECTOMY      CHOLECYSTECTOMY, LAPAROSCOPIC N/A 2/3/2023    ROBOTIC CHOLECYSTECTOMY performed by Dmitry Partida MD at [facility] [place]    DILATATION, ESOPHAGUS      FRACTURE SURGERY      L ankle    HYSTERECTOMY (CERVIX STATUS UNKNOWN)      SKIN BIOPSY      SMALL INTESTINE SURGERY      TONSILLECTOMY      UPPER GASTROINTESTINAL ENDOSCOPY N/A 2021    EGD POLYP SNARE performed by Wei Belel DO at [facility] ENDOSCOPY    UPPER GASTROINTESTINAL ENDOSCOPY  2021    EGD CONTROL HEMORRHAGE performed by Wei Belle DO at [facility] ENDOSCOPY    UPPER GASTROINTESTINAL ENDOSCOPY  2021    EGD BIOPSY performed by Wei Belle DO at [facility] ENDOSCOPY       Immunization History:   Immunization History   Administered Date(s) Administered    COVID-19, MODERNA BLUE border, Primary or Immunocompromised, (age 12y+), IM, 100 mcg/0.5mL 2021, 2021    COVID-19, MODERNA Bivalent, (age 12y+), IM, 50 mcg/0.5 mL 2022       Active Problems:  Patient Active Problem List   Diagnosis Code    PAF (paroxysmal atrial fibrillation) ([facility]) I48.0

## 2024-02-19 ENCOUNTER — TELEPHONE (OUTPATIENT)
Dept: CARDIOLOGY CLINIC | Age: 89
End: 2024-02-19

## 2024-02-19 NOTE — TELEPHONE ENCOUNTER
Pt was admitted to [facility] from 2/14-2/18/24 for hypertensive urgency. Pt stated that she was advised upon d/c to call agk to get scheduled for hsfu asap. Agk next available is in April 2024. Please advise appropriate f/u.

## 2024-02-19 NOTE — TELEPHONE ENCOUNTER
Please call patient and schedule with AGK 3/12 at 11:30      Per Nephro in consult note:  -Defer to EP regarding bradycardia and use of Coreg

## 2024-02-20 NOTE — PROGRESS NOTES
[facility]  Nephrology follow-up note           Reason for Consult: Hypertensive urgency  Requesting Physician:  Dr. Glass    Sub/interval history  Blood pressure mainly in the 130s to 170 range, increased to 200 prior to medication this morning   Patient did not require nicardipine drip  EP switched to Coreg on 2/15 from metoprolol      ROS: No chest pain/shortness of breath/fever/nausea/vomiting  PSFH: No visitor    Scheduled Meds:   [START ON 2/19/2024] amLODIPine  10 mg Oral Nightly    spironolactone  50 mg Oral Daily    nitroGLYCERIN   TransDERmal Nightly    nitroGLYCERIN  1 patch TransDERmal Daily    mupirocin   Each Nostril BID    carvedilol  3.125 mg Oral BID WC    amiodarone  200 mg Oral BID    apixaban  5 mg Oral BID    losartan  50 mg Oral BID    pantoprazole  40 mg Oral Daily    pravastatin  40 mg Oral Nightly    sodium chloride flush  5-40 mL IntraVENous 2 times per day     Continuous Infusions:   niCARdipine Stopped (02/17/24 1425)    sodium chloride       PRN Meds:.sodium chloride flush, sodium chloride, ondansetron **OR** ondansetron, polyethylene glycol, acetaminophen **OR** acetaminophen    History of Present Illness on 2/15/2024:    88 y.o. yo female with PMH of atrial fibrillation, diabetes, hypertension, SVT, CHF with preserved EF who is admitted for hypertensive urgency  Patient was sent from her physician's office due to persistently high blood pressure.  Upon arrival in the emergency room, her blood pressure was 247/85.  She was started on nicardipine drip and has been admitted.  She denies any chest pain or shortness of breath    Physical exam:   Constitutional:  VITALS:  BP (!) 180/53   Pulse (!) 42   Temp 97.4 °F (36.3 °C) (Axillary)   Resp 18   Ht 1.575 m (5' 2\")   Wt 78.8 kg (173 lb 11.6 oz)   SpO2 96%   BMI 31.77 kg/m²   Gen: alert, awake  Neck: No JVD  Skin: Unremarkable  Cardiovascular:  S1, S2 without m/r/g   Respiratory: CTA B without w/r/r; respiratory effort 
    [facility]  Nephrology follow-up note           Reason for Consult: Hypertensive urgency  Requesting Physician:  Dr. Glass    Sub/interval history  BP up again  in the 224 and then dropped to 190s  EP switched to Coreg on 2/15 from metoprolol      ROS: No chest pain/shortness of breath/fever/nausea/vomiting  PSFH: No visitor    Scheduled Meds:   spironolactone  25 mg Oral Daily    nitroGLYCERIN   TransDERmal Nightly    nitroGLYCERIN  1 patch TransDERmal Daily    amLODIPine  10 mg Oral Daily    mupirocin   Each Nostril BID    carvedilol  3.125 mg Oral BID WC    amiodarone  200 mg Oral BID    apixaban  5 mg Oral BID    losartan  50 mg Oral BID    pantoprazole  40 mg Oral Daily    pravastatin  40 mg Oral Nightly    sodium chloride flush  5-40 mL IntraVENous 2 times per day    furosemide  20 mg Oral Daily     Continuous Infusions:   niCARdipine Stopped (02/17/24 1425)    sodium chloride       PRN Meds:.sodium chloride flush, sodium chloride, ondansetron **OR** ondansetron, polyethylene glycol, acetaminophen **OR** acetaminophen    History of Present Illness on 2/15/2024:    88 y.o. yo female with PMH of atrial fibrillation, diabetes, hypertension, SVT, CHF with preserved EF who is admitted for hypertensive urgency  Patient was sent from her physician's office due to persistently high blood pressure.  Upon arrival in the emergency room, her blood pressure was 247/85.  She was started on nicardipine drip and has been admitted.  She denies any chest pain or shortness of breath    Physical exam:   Constitutional:  VITALS:  BP (!) 224/76   Pulse 57   Temp 97.8 °F (36.6 °C) (Oral)   Resp 16   Ht 1.575 m (5' 2\")   Wt 78.8 kg (173 lb 11.6 oz)   SpO2 95%   BMI 31.77 kg/m²   Gen: alert, awake  Neck: No JVD  Skin: Unremarkable  Cardiovascular:  S1, S2 without m/r/g   Respiratory: CTA B without w/r/r; respiratory effort normal  Abdomen:  soft, nt, nd,   Extremities: no lower extremity edema  Neuro/Psy: AAoriented 
    [facility]  Nephrology follow-up note           Reason for Consult: Hypertensive urgency  Requesting Physician:  Dr. Glass    Sub/interval history  Patient feels better, heart rate dropped to 40s overnight  EP switched to Coreg on 2/15 from metoprolol      Last 24 h uop 1.8 L    ROS: No chest pain/shortness of breath/fever/nausea/vomiting  PSFH: No visitor    Scheduled Meds:   amLODIPine  5 mg Oral Daily    mupirocin   Each Nostril BID    carvedilol  3.125 mg Oral BID WC    amiodarone  200 mg Oral BID    apixaban  5 mg Oral BID    losartan  50 mg Oral BID    pantoprazole  40 mg Oral Daily    pravastatin  40 mg Oral Nightly    sodium chloride flush  5-40 mL IntraVENous 2 times per day    furosemide  20 mg Oral Daily     Continuous Infusions:   sodium chloride       PRN Meds:.cloNIDine, sodium chloride flush, sodium chloride, ondansetron **OR** ondansetron, polyethylene glycol, acetaminophen **OR** acetaminophen    History of Present Illness on 2/15/2024:    88 y.o. yo female with PMH of atrial fibrillation, diabetes, hypertension, SVT, CHF with preserved EF who is admitted for hypertensive urgency  Patient was sent from her physician's office due to persistently high blood pressure.  Upon arrival in the emergency room, her blood pressure was 247/85.  She was started on nicardipine drip and has been admitted.  She denies any chest pain or shortness of breath    Physical exam:   Constitutional:  VITALS:  BP (!) 168/64   Pulse (!) 43   Temp 97.6 °F (36.4 °C) (Oral)   Resp 17   Ht 1.575 m (5' 2\")   Wt 78.8 kg (173 lb 11.6 oz)   SpO2 94%   BMI 31.77 kg/m²   Gen: alert, awake  Neck: No JVD  Skin: Unremarkable  Cardiovascular:  S1, S2 without m/r/g   Respiratory: CTA B without w/r/r; respiratory effort normal  Abdomen:  soft, nt, nd,   Extremities: no lower extremity edema  Neuro/Psy: AAoriented times 3 ; moves all 4 ext    Data/  Recent Labs     02/14/24  1134 02/15/24  0434   WBC 9.0 8.3   HGB 13.5 12.3 
    V2.0    [facility] Progress Note      Name:  Lashanda Tiwari /Age/Sex: 1935  (88 y.o. female)   MRN & CSN:  [id number] & [id number] Encounter Date/Time: 2/15/2024 7:45 AM EST   Location:  0236 PCP: Dmitry Moreno MD     Attending:Marco Glass MD       Hospital Day: 2    Assessment and Recommendations   Lashanda Tiwari is a 88 y.o. female with pmh of HTN, PAF, HFpEF, pulm HTN who presents with Hypertensive urgency      Plan:   1.  Hypertensive urgency.  /85 on admission. Nicardipine drip stopped early this morning but resumed with pressures back in 170s. Increased home losartan to 50 mg BID.  Cardiology consulted, appreciate their recommendations. Nephrology consulted, appreciate their recommendations. Will get renal artery duplex tomorrow to r/o UCHE as cause of resistant hypertension.   2.  Atrial fibrillation. Cont home amiodarone and apixaban. Only takes toprol xl as needed for a fib. Monitor on telemetry.  3.  Hyperlipidemia. Cont home pravastatin.  4.  HFpEF. Without exacerbation. Monitor with strict I/Os and daily weights. Cont home lasix.  Last echo from 2023 EF was 55 to 60%.      Diet ADULT DIET; Regular; No Added Salt (3-4 gm)   DVT Prophylaxis [] Lovenox, []  Heparin, [] SCDs, [] Ambulation,  [x] Eliquis, [] Xarelto  [] Coumadin   Code Status Full Code   Disposition From: home  Expected Disposition: home  Estimated Date of Discharge: 1-2 days  Patient requires continued admission due to ongoing medication management   Surrogate Decision Maker/ POSHANTELL Swanson Karie, spouse     Personally reviewed Lab Studies and Imaging     Subjective:     Chief Complaint: HTN urgency    Lashanda Tiwari is a 88 y.o. female who presents with uncontrolled hypertension.  Patient presented to her physician office and was noted to have persistently elevated blood pressure.  Patient was sent to the emergency department.  On presentation with systolic blood pressure greater than 200.  
    V2.0    [facility] Progress Note      Name:  Lashanda Tiwari /Age/Sex: 1935  (88 y.o. female)   MRN & CSN:  [id number] & [id number] Encounter Date/Time: 2024 7:45 AM EST   Location:  0236 PCP: Dmitry Moreno MD     Attending:Marco Glass MD       Hospital Day: 3    Assessment and Recommendations   Lashanda Tiwari is a 88 y.o. female with pmh of HTN, PAF, HFpEF, pulm HTN who presents with Hypertensive urgency      Plan:   1.  Hypertensive urgency.  /85 on admission. Nicardipine drip off.. Increased home losartan to 50 mg BID. EP switched home metoprolol to coreg but HR in 40s overnight, lowest 37, so holding. EP to reevaluate today. Asymptomatic with bradycardia. Nephrology following. Renal artery duplex without evidence of UCHE. Renin, aldosterone, and metanephrines pending.   2.  Atrial fibrillation. Cont home amiodarone and apixaban. Transitioned toprol xl to coreg per EP but currently holding as above. Monitor on telemetry.  3.  Hyperlipidemia. Cont home pravastatin.  4.  HFpEF. Without exacerbation. Monitor with strict I/Os and daily weights. Cont home lasix.  Last echo from 2023 EF was 55 to 60%.      Diet ADULT DIET; Regular; No Added Salt (3-4 gm)   DVT Prophylaxis [] Lovenox, []  Heparin, [] SCDs, [] Ambulation,  [x] Eliquis, [] Xarelto  [] Coumadin   Code Status Full Code   Disposition From: home  Expected Disposition: home  Estimated Date of Discharge: 1-2 days  Patient requires continued admission due to ongoing medication management   Surrogate Decision Maker/ POA  Sky Tiwari, spouse     Personally reviewed Lab Studies and Imaging     Subjective:     Chief Complaint: HTN urgency    Lashanda Tiwari is a 88 y.o. female who presents with uncontrolled hypertension.  Patient presented to her physician office and was noted to have persistently elevated blood pressure.  Patient was sent to the emergency department.  On presentation with systolic blood 
  Physician Progress Note      PATIENT:               NAZ HA  [facility] #:                  [id number]  :                       1935  ADMIT DATE:       2024 11:21 AM  DISCH DATE:        2024 5:12 PM  RESPONDING  PROVIDER #:        SHANI GIL          QUERY TEXT:    Patient admitted with HTN urgency, noted to have atrial fibrillation and is   maintained on Eliquis. If possible, please document in progress notes and   discharge summary if you are evaluating and/or treating any of the following:    The medical record reflects the following:  Risk Factors: Age, Sex, HTN, CHF  Clinical Indicators: Per progress notes \" Atrial fibrillation. Continue home   amiodarone and apixaban\". [id number] score 4.  Treatment: Eliquis, cardiology consult, serial labs, supportive care    Thank you,  Kelli Mo RN BSN  Options provided:  -- Secondary hypercoagulable state in a patient with atrial fibrillation  -- Other - I will add my own diagnosis  -- Disagree - Not applicable / Not valid  -- Disagree - Clinically unable to determine / Unknown  -- Refer to Clinical Documentation Reviewer    PROVIDER RESPONSE TEXT:    This patient has secondary hypercoagulable state in a patient with atrial   fibrillation.    Query created by: Kelli Mo on 2024 11:58 AM      Electronically signed by:  SHANI GIL 2024 2:13 PM          
1115: Notified MD of /52 and HR 42. PO meds given around 0915. Received new order for spironolactone.    1300: /76 HR 57. Notified MD, received order for Cardene gtt.  
2221-Secured message to Dr. Allen regarding episodes of HR at 30's while patient is asleep (37bpm as lowest seen by this RN). Also, MD informed regarding previous conversation to EP.     Dr. Allen reply: Ok, likely sleep related bradycardia, continue to monitor.    Plan of care ongoing.    
4 Eyes Skin Assessment     NAME:  Lashanda Tiwari  YOB: 1935  MEDICAL RECORD NUMBER:  [id number]    The patient is being assessed for  Admission    I agree that at least one RN has performed a thorough Head to Toe Skin Assessment on the patient. ALL assessment sites listed below have been assessed.      Areas assessed by both nurses:    Head, Face, Ears, Shoulders, Back, Chest, Arms, Elbows, Hands, Sacrum. Buttock, Coccyx, Ischium, and Legs. Feet and Heels        Does the Patient have a Wound? No noted wound(s)       Ino Prevention initiated by RN: No  Wound Care Orders initiated by RN: No    Pressure Injury (Stage 3,4, Unstageable, DTI, NWPT, and Complex wounds) if present, place Wound referral order by RN under : No    New Ostomies, if present place, Ostomy referral order under : No     Nurse 1 eSignature: Electronically signed by Viviana Zarate RN on 2/14/24 at 6:23 PM EST    **SHARE this note so that the co-signing nurse can place an eSignature**    Nurse 2 eSignature: Electronically signed by Jeet Fontaine RN on 2/14/24 at 6:23 PM EST    
Discharge instructions reviewed with patient and family member.  Patient and family verbalized understanding.  All home medications have been reviewed, questions answered and patient voiced understanding. Given prescriptions, discharge instructions, and appointment times.    
Dr. Majo weathers at bedside. Received order for nitro patch. Will hold cardene gtt for now.  
[facility] notified of consult. Dr. Kasper will reach out to pt's RN  
Received call from EP with regards to HR at 40's referred by AM RN. MD is suspecting that it may be the effect of the newly started Carvedilol. This RN was instructed to observe for now and can refer back if patient develop hypotension or become symptomatic. Plan of care ongoing.  
Shift: [id number]    Admitting diagnosis: Hypertensive urgency    Presentation to hospital: High blood pressure    Surgery: no     Nursing assessment at handoff  stable    Emergency Contact/POA: Sky Phillipser  Family updated: yes -    Most recent vitals: BP (!) 147/100   Pulse 68   Temp 97.5 °F (36.4 °C) (Oral)   Resp 18   Ht 1.575 m (5' 2\")   Wt 79.4 kg (175 lb)   SpO2 94%   BMI 32.01 kg/m²      Rhythm: Normal Sinus Rhythm     NC/HFNC- n/a  Respiratory support: - No ventilator support      Increased O2 requirements: no    Admission weight Weight - Scale: 79.4 kg (175 lb)  Today's weight   Wt Readings from Last 1 Encounters:   02/14/24 79.4 kg (175 lb)         UOP >30ml/hr: yes     North need assessed each shift: no    Restraints: no  Order current and documentation up to date?    Lines/Drains  LDA Insertion Date Discontinued Date Dressing Changes   PIV  2/14/24     TLC       Arterial       North       Vas Cath      ETT       Surgical drains        Night Shift Hospitalist Interventions    Problem(Brief) Date Time Intervention Physician contacted                                               Drip rates at handoff:    niCARdipine Stopped (02/14/24 1843)    sodium chloride      niCARdipine 7.5 mg/hr (02/14/24 1844)       Hospital Course Daily Updates:  Admit Day# 1 2/14/24  -came in due to hypertension /85  -given labetalol IV in ED  -started on Cardene drip  -no acute events during the night  -adequate UO        Lab Data:   CBC:   Recent Labs     02/14/24  1134   WBC 9.0   HGB 13.5   HCT 42.4   MCV 81.4        BMP:    Recent Labs     02/14/24  1134      K 4.3   CO2 26   BUN 17   CREATININE 0.9     LIVR:   Recent Labs     02/14/24  1134   AST 37   ALT 36     PT/INR:   Recent Labs     02/14/24  1134   PROT 8.2     APTT: No results for input(s): \"APTT\" in the last 72 hours.  ABG: No results for input(s): \"PHART\", \"[id number]\", \"PO2ART\" in the last 72 hours.  Consults (if GI or Nephrology- which 
Shift: 7pm-7am    Admitting diagnosis: Hypertensive Urgency    Presentation to hospital: sent by PCP to consult to hospital d/t hypertension    Surgery: no     Nursing assessment at handoff  stable    Emergency Contact/POA: Syk- spouse  Family updated: no    Most recent vitals: BP (!) 164/48   Pulse (!) 42   Temp 97.8 °F (36.6 °C) (Oral)   Resp 16   Ht 1.575 m (5' 2\")   Wt 78.8 kg (173 lb 11.6 oz)   SpO2 92%   BMI 31.77 kg/m²      Rhythm: Sinus bradycardia 40's, 30's when asleep     NC/HFNC- 0 lpm  Respiratory support: - No ventilator support    Vent days: Day     Increased O2 requirements: no    Admission weight Weight - Scale: 79.4 kg (175 lb)  Today's weight   Wt Readings from Last 1 Encounters:   02/16/24 78.8 kg (173 lb 11.6 oz)         UOP >30ml/hr: uses toilet    North need assessed each shift: no    Restraints: no  Order current and documentation up to date?    Lines/Drains  LDA Insertion Date Discontinued Date Dressing Changes   PIV       TLC       Arterial       North       Vas Cath      ETT       Surgical drains        Night Shift Hospitalist Interventions    Problem(Brief) Date Time Intervention Physician contacted                                               Drip rates at handoff:    sodium chloride         Hospital Course Daily Updates:  Admit Day# 1 2/16/24 Nights  -weaned off Nicard gtt  -Referred to EP during shift change due to HR at 40's, awake asymptomatic  -Received call from EP with regards to HR at 40's referred by AM RN. MD is suspecting that it may be the effect of the newly started Carvedilol. This RN was instructed to observe for now and can refer back if patient develop hypotension or become symptomatic   -noted HR at 30's when asleep, informed Dr. Allen: likely sleep related bradycardia, continue to monitor  -no complaint overnight  -NPO MD  -For renal artery doppler today     Admit Day# 2 2/17/24 Nights  -still sinus hammad overnight  -BP within target goal  -no complaint 
VM left with cardiology regarding consult with instructions call to call RN back to obtain info  
02/15/24  0434   WBC 9.0 8.3   HGB 13.5 12.3   HCT 42.4 38.2   MCV 81.4 80.6    290     BMP:    Recent Labs     02/15/24  0434 02/16/24  0450    135*   K 3.9 4.3   CO2 24 24   BUN 17 19   CREATININE 1.1 0.9     LIVR:   Recent Labs     02/14/24  1134   AST 37   ALT 36     PT/INR:   Recent Labs     02/14/24  1134   PROT 8.2     APTT: No results for input(s): \"APTT\" in the last 72 hours.  ABG: No results for input(s): \"PHART\", \"VGF6MSP\", \"PO2ART\" in the last 72 hours.  Consults (if GI or Nephrology- which group?)-  EP, Nephro, Hospitalist       
162/52   Pulse (!) 41   Temp 97.8 °F (36.6 °C) (Oral)   Resp 16   Ht 1.575 m (5' 2\")   Wt 78.8 kg (173 lb 11.6 oz)   SpO2 95%   BMI 31.77 kg/m²     Diet: ADULT DIET; Regular; No Added Salt (3-4 gm)  DVT Prophylaxis: []PPx LMWH  []SQ Heparin  []IPC/SCDs  [x]Eliquis  []Xarelto  []Coumadin  []Other -      Code status: Full Code  PT/OT Eval Status:   []NOT yet ordered  [x]Ordered and Pending   []Seen with Recommendations for:  []Home independently  []Home w/ assist  []HHC  []SNF  []Acute Rehab    Anticipated Discharge Day/Date: 1 to 2 days    Anticipated Discharge Location: [x]Home  []HHC  []SNF  []Acute Rehab  []ECF  []LTAC  []Hospice  []Other -      Consults:      IP CONSULT TO CARDIOLOGY  IP CONSULT TO NEPHROLOGY      This patient has a high likelihood of being discharged tomorrow and is appropriate for A1 Discharge Unit in AM pending clinical course overnight: []Yes  [x]No     ------------------------------------------------------------------------------------------------------------------------------------------------------------------------    MDM    Patient with 1 or more chronic illnesses with severe exacerbation or side effects of treatment and/or 1 acute or chronic illness that poses threat to life or bodily function.    Decision regarding hospitalization or escalation  Patient on drug therapy that requires intensive monitoring.     Medications:  Personally reviewed in detail in conjunction w/ labs as documented for evidence of drug toxicity.     Infusion Medications    niCARdipine Stopped (02/17/24 3667)    sodium chloride       Scheduled Medications    nitroGLYCERIN   TransDERmal Nightly    nitroGLYCERIN  1 patch TransDERmal Daily    spironolactone  25 mg Oral BID    amLODIPine  10 mg Oral Daily    mupirocin   Each Nostril BID    carvedilol  3.125 mg Oral BID WC    amiodarone  200 mg Oral BID    apixaban  5 mg Oral BID    losartan  50 mg Oral BID    pantoprazole  40 mg Oral Daily    pravastatin  40 mg

## 2024-02-21 ENCOUNTER — APPOINTMENT (OUTPATIENT)
Dept: GENERAL RADIOLOGY | Age: 89
End: 2024-02-21
Payer: MEDICARE

## 2024-02-21 ENCOUNTER — HOSPITAL ENCOUNTER (INPATIENT)
Age: 89
LOS: 2 days | Discharge: HOME | End: 2024-02-23
Attending: STUDENT IN AN ORGANIZED HEALTH CARE EDUCATION/TRAINING PROGRAM | Admitting: INTERNAL MEDICINE
Payer: MEDICARE

## 2024-02-21 ENCOUNTER — APPOINTMENT (OUTPATIENT)
Dept: CT IMAGING | Age: 89
End: 2024-02-21
Payer: MEDICARE

## 2024-02-21 DIAGNOSIS — K56.609 SMALL BOWEL OBSTRUCTION (HCC): Primary | ICD-10-CM

## 2024-02-21 LAB
ALBUMIN SERPL-MCNC: 3.9 G/DL (ref 3.4–5)
ALBUMIN SERPL-MCNC: 4.2 G/DL (ref 3.4–5)
ALBUMIN/GLOB SERPL: 1.1 {RATIO} (ref 1.1–2.2)
ALBUMIN/GLOB SERPL: 1.1 {RATIO} (ref 1.1–2.2)
ALP SERPL-CCNC: 100 U/L (ref 40–129)
ALP SERPL-CCNC: 116 U/L (ref 40–129)
ALT SERPL-CCNC: 47 U/L (ref 10–40)
ALT SERPL-CCNC: 55 U/L (ref 10–40)
ANION GAP SERPL CALCULATED.3IONS-SCNC: 15 MMOL/L (ref 3–16)
ANION GAP SERPL CALCULATED.3IONS-SCNC: 18 MMOL/L (ref 3–16)
ANISOCYTOSIS BLD QL SMEAR: ABNORMAL
ANTI-XA UNFRAC HEPARIN: >1.1 IU/ML (ref 0.3–0.7)
APTT BLD: 26.1 SEC (ref 22.7–35.9)
APTT BLD: 50 SEC (ref 22.7–35.9)
APTT BLD: 57.9 SEC (ref 22.7–35.9)
APTT BLD: 90.5 SEC (ref 22.7–35.9)
AST SERPL-CCNC: 50 U/L (ref 15–37)
AST SERPL-CCNC: 68 U/L (ref 15–37)
BASOPHILS # BLD: 0 K/UL (ref 0–0.2)
BASOPHILS # BLD: 0.1 K/UL (ref 0–0.2)
BASOPHILS NFR BLD: 0 %
BASOPHILS NFR BLD: 0.7 %
BILIRUB SERPL-MCNC: 0.5 MG/DL (ref 0–1)
BILIRUB SERPL-MCNC: 0.8 MG/DL (ref 0–1)
BUN SERPL-MCNC: 15 MG/DL (ref 7–20)
BUN SERPL-MCNC: 17 MG/DL (ref 7–20)
BURR CELLS BLD QL SMEAR: ABNORMAL
CALCIUM SERPL-MCNC: 8.7 MG/DL (ref 8.3–10.6)
CALCIUM SERPL-MCNC: 8.8 MG/DL (ref 8.3–10.6)
CHLORIDE SERPL-SCNC: 102 MMOL/L (ref 99–110)
CHLORIDE SERPL-SCNC: 103 MMOL/L (ref 99–110)
CO2 SERPL-SCNC: 17 MMOL/L (ref 21–32)
CO2 SERPL-SCNC: 23 MMOL/L (ref 21–32)
CREAT SERPL-MCNC: 0.8 MG/DL (ref 0.6–1.2)
CREAT SERPL-MCNC: 0.9 MG/DL (ref 0.6–1.2)
DACRYOCYTES BLD QL SMEAR: ABNORMAL
DEPRECATED RDW RBC AUTO: 15.6 % (ref 12.4–15.4)
DEPRECATED RDW RBC AUTO: 16 % (ref 12.4–15.4)
EKG ATRIAL RATE: 64 BPM
EKG DIAGNOSIS: NORMAL
EKG P AXIS: -12 DEGREES
EKG P-R INTERVAL: 192 MS
EKG Q-T INTERVAL: 454 MS
EKG QRS DURATION: 100 MS
EKG QTC CALCULATION (BAZETT): 468 MS
EKG R AXIS: -1 DEGREES
EKG T AXIS: 70 DEGREES
EKG VENTRICULAR RATE: 64 BPM
EOSINOPHIL # BLD: 0 K/UL (ref 0–0.6)
EOSINOPHIL # BLD: 0.3 K/UL (ref 0–0.6)
EOSINOPHIL NFR BLD: 0 %
EOSINOPHIL NFR BLD: 2.6 %
FLUAV RNA RESP QL NAA+PROBE: NOT DETECTED
FLUBV RNA RESP QL NAA+PROBE: NOT DETECTED
GFR SERPLBLD CREATININE-BSD FMLA CKD-EPI: >60 ML/MIN/{1.73_M2}
GFR SERPLBLD CREATININE-BSD FMLA CKD-EPI: >60 ML/MIN/{1.73_M2}
GLUCOSE BLD-MCNC: 166 MG/DL (ref 70–99)
GLUCOSE BLD-MCNC: 210 MG/DL (ref 70–99)
GLUCOSE BLD-MCNC: 276 MG/DL (ref 70–99)
GLUCOSE BLD-MCNC: 315 MG/DL (ref 70–99)
GLUCOSE BLD-MCNC: 319 MG/DL (ref 70–99)
GLUCOSE SERPL-MCNC: 213 MG/DL (ref 70–99)
GLUCOSE SERPL-MCNC: 312 MG/DL (ref 70–99)
HCT VFR BLD AUTO: 42.3 % (ref 36–48)
HCT VFR BLD AUTO: 46.4 % (ref 36–48)
HGB BLD-MCNC: 13.9 G/DL (ref 12–16)
HGB BLD-MCNC: 14.5 G/DL (ref 12–16)
INR PPP: 1.22 (ref 0.84–1.16)
LACTATE BLDV-SCNC: 2.5 MMOL/L (ref 0.4–2)
LIPASE SERPL-CCNC: 15 U/L (ref 13–60)
LYMPHOCYTES # BLD: 0.5 K/UL (ref 1–5.1)
LYMPHOCYTES # BLD: 1.9 K/UL (ref 1–5.1)
LYMPHOCYTES NFR BLD: 17.7 %
LYMPHOCYTES NFR BLD: 2 %
MCH RBC QN AUTO: 25.4 PG (ref 26–34)
MCH RBC QN AUTO: 25.9 PG (ref 26–34)
MCHC RBC AUTO-ENTMCNC: 31.2 G/DL (ref 31–36)
MCHC RBC AUTO-ENTMCNC: 32.9 G/DL (ref 31–36)
MCV RBC AUTO: 78.8 FL (ref 80–100)
MCV RBC AUTO: 81.4 FL (ref 80–100)
MONOCYTES # BLD: 0.9 K/UL (ref 0–1.3)
MONOCYTES # BLD: 1.3 K/UL (ref 0–1.3)
MONOCYTES NFR BLD: 5 %
MONOCYTES NFR BLD: 8.7 %
NEUTROPHILS # BLD: 23.3 K/UL (ref 1.7–7.7)
NEUTROPHILS # BLD: 7.4 K/UL (ref 1.7–7.7)
NEUTROPHILS NFR BLD: 70.3 %
NEUTROPHILS NFR BLD: 85 %
NEUTS BAND NFR BLD MANUAL: 8 % (ref 0–7)
NRBC BLD-RTO: 1 /100 WBC
OVALOCYTES BLD QL SMEAR: ABNORMAL
PERFORMED ON: ABNORMAL
PLATELET # BLD AUTO: 284 K/UL (ref 135–450)
PLATELET # BLD AUTO: 290 K/UL (ref 135–450)
PLATELET BLD QL SMEAR: ADEQUATE
PMV BLD AUTO: 10 FL (ref 5–10.5)
PMV BLD AUTO: 9.3 FL (ref 5–10.5)
POIKILOCYTOSIS BLD QL SMEAR: ABNORMAL
POTASSIUM SERPL-SCNC: 4.3 MMOL/L (ref 3.5–5.1)
POTASSIUM SERPL-SCNC: 5 MMOL/L (ref 3.5–5.1)
PROT SERPL-MCNC: 7.6 G/DL (ref 6.4–8.2)
PROT SERPL-MCNC: 7.9 G/DL (ref 6.4–8.2)
PROTHROMBIN TIME: 15.4 SEC (ref 11.5–14.8)
RBC # BLD AUTO: 5.36 M/UL (ref 4–5.2)
RBC # BLD AUTO: 5.69 M/UL (ref 4–5.2)
REASON FOR REJECTION: NORMAL
REJECTED TEST: NORMAL
SARS-COV-2 RNA RESP QL NAA+PROBE: NOT DETECTED
SCHISTOCYTES BLD QL SMEAR: ABNORMAL
SLIDE REVIEW: ABNORMAL
SODIUM SERPL-SCNC: 137 MMOL/L (ref 136–145)
SODIUM SERPL-SCNC: 141 MMOL/L (ref 136–145)
TROPONIN, HIGH SENSITIVITY: 11 NG/L (ref 0–14)
TROPONIN, HIGH SENSITIVITY: 14 NG/L (ref 0–14)
WBC # BLD AUTO: 10.5 K/UL (ref 4–11)
WBC # BLD AUTO: 25.1 K/UL (ref 4–11)

## 2024-02-21 PROCEDURE — 0D9670Z DRAINAGE OF STOMACH WITH DRAINAGE DEVICE, VIA NATURAL OR ARTIFICIAL OPENING: ICD-10-PCS | Performed by: SURGERY

## 2024-02-21 PROCEDURE — 96374 THER/PROPH/DIAG INJ IV PUSH: CPT

## 2024-02-21 PROCEDURE — 6360000002 HC RX W HCPCS: Performed by: PHYSICIAN ASSISTANT

## 2024-02-21 PROCEDURE — 97166 OT EVAL MOD COMPLEX 45 MIN: CPT

## 2024-02-21 PROCEDURE — 83605 ASSAY OF LACTIC ACID: CPT

## 2024-02-21 PROCEDURE — 85025 COMPLETE CBC W/AUTO DIFF WBC: CPT

## 2024-02-21 PROCEDURE — 85520 HEPARIN ASSAY: CPT

## 2024-02-21 PROCEDURE — 74018 RADEX ABDOMEN 1 VIEW: CPT

## 2024-02-21 PROCEDURE — 84484 ASSAY OF TROPONIN QUANT: CPT

## 2024-02-21 PROCEDURE — 99222 1ST HOSP IP/OBS MODERATE 55: CPT | Performed by: SURGERY

## 2024-02-21 PROCEDURE — 93010 ELECTROCARDIOGRAM REPORT: CPT | Performed by: INTERNAL MEDICINE

## 2024-02-21 PROCEDURE — 93005 ELECTROCARDIOGRAM TRACING: CPT | Performed by: PHYSICIAN ASSISTANT

## 2024-02-21 PROCEDURE — 6360000004 HC RX CONTRAST MEDICATION: Performed by: PHYSICIAN ASSISTANT

## 2024-02-21 PROCEDURE — 80053 COMPREHEN METABOLIC PANEL: CPT

## 2024-02-21 PROCEDURE — 96376 TX/PRO/DX INJ SAME DRUG ADON: CPT

## 2024-02-21 PROCEDURE — 97162 PT EVAL MOD COMPLEX 30 MIN: CPT

## 2024-02-21 PROCEDURE — 74177 CT ABD & PELVIS W/CONTRAST: CPT

## 2024-02-21 PROCEDURE — 6360000002 HC RX W HCPCS: Performed by: INTERNAL MEDICINE

## 2024-02-21 PROCEDURE — 83690 ASSAY OF LIPASE: CPT

## 2024-02-21 PROCEDURE — 2580000003 HC RX 258: Performed by: INTERNAL MEDICINE

## 2024-02-21 PROCEDURE — APPSS30 APP SPLIT SHARED TIME 16-30 MINUTES

## 2024-02-21 PROCEDURE — 85610 PROTHROMBIN TIME: CPT

## 2024-02-21 PROCEDURE — 6370000000 HC RX 637 (ALT 250 FOR IP): Performed by: INTERNAL MEDICINE

## 2024-02-21 PROCEDURE — 87636 SARSCOV2 & INF A&B AMP PRB: CPT

## 2024-02-21 PROCEDURE — 36415 COLL VENOUS BLD VENIPUNCTURE: CPT

## 2024-02-21 PROCEDURE — 1200000000 HC SEMI PRIVATE

## 2024-02-21 PROCEDURE — 85730 THROMBOPLASTIN TIME PARTIAL: CPT

## 2024-02-21 PROCEDURE — 97530 THERAPEUTIC ACTIVITIES: CPT

## 2024-02-21 PROCEDURE — 6360000002 HC RX W HCPCS: Performed by: STUDENT IN AN ORGANIZED HEALTH CARE EDUCATION/TRAINING PROGRAM

## 2024-02-21 PROCEDURE — 96375 TX/PRO/DX INJ NEW DRUG ADDON: CPT

## 2024-02-21 PROCEDURE — 99285 EMERGENCY DEPT VISIT HI MDM: CPT

## 2024-02-21 PROCEDURE — 83036 HEMOGLOBIN GLYCOSYLATED A1C: CPT

## 2024-02-21 RX ORDER — ENOXAPARIN SODIUM 100 MG/ML
40 INJECTION SUBCUTANEOUS NIGHTLY
Status: DISCONTINUED | OUTPATIENT
Start: 2024-02-21 | End: 2024-02-22

## 2024-02-21 RX ORDER — HEPARIN SODIUM 10000 [USP'U]/100ML
16 INJECTION, SOLUTION INTRAVENOUS CONTINUOUS
Status: DISCONTINUED | OUTPATIENT
Start: 2024-02-21 | End: 2024-02-23

## 2024-02-21 RX ORDER — DIPHENHYDRAMINE HYDROCHLORIDE 50 MG/ML
50 INJECTION INTRAMUSCULAR; INTRAVENOUS ONCE
Status: COMPLETED | OUTPATIENT
Start: 2024-02-21 | End: 2024-02-21

## 2024-02-21 RX ORDER — HEPARIN SODIUM 5000 [USP'U]/ML
INJECTION, SOLUTION INTRAVENOUS; SUBCUTANEOUS
Status: DISCONTINUED
Start: 2024-02-21 | End: 2024-02-21 | Stop reason: WASHOUT

## 2024-02-21 RX ORDER — POTASSIUM CHLORIDE 20 MEQ/1
40 TABLET, EXTENDED RELEASE ORAL PRN
Status: DISCONTINUED | OUTPATIENT
Start: 2024-02-21 | End: 2024-02-23 | Stop reason: HOSPADM

## 2024-02-21 RX ORDER — FENTANYL CITRATE 50 UG/ML
INJECTION, SOLUTION INTRAMUSCULAR; INTRAVENOUS
Status: DISPENSED
Start: 2024-02-21 | End: 2024-02-21

## 2024-02-21 RX ORDER — HEPARIN SODIUM 1000 [USP'U]/ML
4000 INJECTION, SOLUTION INTRAVENOUS; SUBCUTANEOUS ONCE
Status: COMPLETED | OUTPATIENT
Start: 2024-02-21 | End: 2024-02-21

## 2024-02-21 RX ORDER — SODIUM CHLORIDE 0.9 % (FLUSH) 0.9 %
10 SYRINGE (ML) INJECTION EVERY 12 HOURS SCHEDULED
Status: DISCONTINUED | OUTPATIENT
Start: 2024-02-21 | End: 2024-02-23 | Stop reason: HOSPADM

## 2024-02-21 RX ORDER — MAGNESIUM SULFATE IN WATER 40 MG/ML
2000 INJECTION, SOLUTION INTRAVENOUS PRN
Status: DISCONTINUED | OUTPATIENT
Start: 2024-02-21 | End: 2024-02-23 | Stop reason: HOSPADM

## 2024-02-21 RX ORDER — HEPARIN SODIUM 10000 [USP'U]/100ML
INJECTION, SOLUTION INTRAVENOUS
Status: DISPENSED
Start: 2024-02-21 | End: 2024-02-21

## 2024-02-21 RX ORDER — NICOTINE 21 MG/24HR
1 PATCH, TRANSDERMAL 24 HOURS TRANSDERMAL DAILY
Status: DISCONTINUED | OUTPATIENT
Start: 2024-02-21 | End: 2024-02-21

## 2024-02-21 RX ORDER — LANOLIN ALCOHOL/MO/W.PET/CERES
3 CREAM (GRAM) TOPICAL NIGHTLY
Status: DISCONTINUED | OUTPATIENT
Start: 2024-02-21 | End: 2024-02-23 | Stop reason: HOSPADM

## 2024-02-21 RX ORDER — GLUCAGON 1 MG/ML
1 KIT INJECTION PRN
Status: DISCONTINUED | OUTPATIENT
Start: 2024-02-21 | End: 2024-02-23 | Stop reason: HOSPADM

## 2024-02-21 RX ORDER — PROMETHAZINE HYDROCHLORIDE 25 MG/1
12.5 TABLET ORAL EVERY 6 HOURS PRN
Status: DISCONTINUED | OUTPATIENT
Start: 2024-02-21 | End: 2024-02-23 | Stop reason: HOSPADM

## 2024-02-21 RX ORDER — OXYMETAZOLINE HYDROCHLORIDE 0.05 G/100ML
2 SPRAY NASAL ONCE
Status: COMPLETED | OUTPATIENT
Start: 2024-02-21 | End: 2024-02-21

## 2024-02-21 RX ORDER — PROCHLORPERAZINE EDISYLATE 5 MG/ML
10 INJECTION INTRAMUSCULAR; INTRAVENOUS ONCE
Status: COMPLETED | OUTPATIENT
Start: 2024-02-21 | End: 2024-02-21

## 2024-02-21 RX ORDER — ONDANSETRON 2 MG/ML
4 INJECTION INTRAMUSCULAR; INTRAVENOUS EVERY 6 HOURS PRN
Status: DISCONTINUED | OUTPATIENT
Start: 2024-02-21 | End: 2024-02-23 | Stop reason: HOSPADM

## 2024-02-21 RX ORDER — FENTANYL CITRATE 50 UG/ML
100 INJECTION, SOLUTION INTRAMUSCULAR; INTRAVENOUS ONCE
Status: COMPLETED | OUTPATIENT
Start: 2024-02-21 | End: 2024-02-21

## 2024-02-21 RX ORDER — INSULIN LISPRO 100 [IU]/ML
0-4 INJECTION, SOLUTION INTRAVENOUS; SUBCUTANEOUS EVERY 4 HOURS
Status: DISCONTINUED | OUTPATIENT
Start: 2024-02-21 | End: 2024-02-23 | Stop reason: HOSPADM

## 2024-02-21 RX ORDER — ACETAMINOPHEN 325 MG/1
650 TABLET ORAL EVERY 6 HOURS PRN
Status: DISCONTINUED | OUTPATIENT
Start: 2024-02-21 | End: 2024-02-23 | Stop reason: HOSPADM

## 2024-02-21 RX ORDER — DEXTROSE MONOHYDRATE 100 MG/ML
INJECTION, SOLUTION INTRAVENOUS CONTINUOUS PRN
Status: DISCONTINUED | OUTPATIENT
Start: 2024-02-21 | End: 2024-02-23 | Stop reason: HOSPADM

## 2024-02-21 RX ORDER — HEPARIN SODIUM 1000 [USP'U]/ML
2000 INJECTION, SOLUTION INTRAVENOUS; SUBCUTANEOUS PRN
Status: DISCONTINUED | OUTPATIENT
Start: 2024-02-21 | End: 2024-02-23 | Stop reason: ALTCHOICE

## 2024-02-21 RX ORDER — SODIUM CHLORIDE 9 MG/ML
INJECTION, SOLUTION INTRAVENOUS CONTINUOUS
Status: ACTIVE | OUTPATIENT
Start: 2024-02-21 | End: 2024-02-21

## 2024-02-21 RX ORDER — SODIUM CHLORIDE 9 MG/ML
INJECTION, SOLUTION INTRAVENOUS PRN
Status: DISCONTINUED | OUTPATIENT
Start: 2024-02-21 | End: 2024-02-23 | Stop reason: HOSPADM

## 2024-02-21 RX ORDER — FENTANYL CITRATE 50 UG/ML
50 INJECTION, SOLUTION INTRAMUSCULAR; INTRAVENOUS ONCE
Status: COMPLETED | OUTPATIENT
Start: 2024-02-21 | End: 2024-02-21

## 2024-02-21 RX ORDER — NITROGLYCERIN 0.1MG/HR
1 PATCH, TRANSDERMAL 24 HOURS TRANSDERMAL DAILY
Status: DISCONTINUED | OUTPATIENT
Start: 2024-02-21 | End: 2024-02-23

## 2024-02-21 RX ORDER — POTASSIUM CHLORIDE 7.45 MG/ML
10 INJECTION INTRAVENOUS PRN
Status: DISCONTINUED | OUTPATIENT
Start: 2024-02-21 | End: 2024-02-23 | Stop reason: HOSPADM

## 2024-02-21 RX ORDER — HEPARIN SODIUM 1000 [USP'U]/ML
4000 INJECTION, SOLUTION INTRAVENOUS; SUBCUTANEOUS PRN
Status: DISCONTINUED | OUTPATIENT
Start: 2024-02-21 | End: 2024-02-23 | Stop reason: ALTCHOICE

## 2024-02-21 RX ORDER — ONDANSETRON 2 MG/ML
8 INJECTION INTRAMUSCULAR; INTRAVENOUS ONCE
Status: COMPLETED | OUTPATIENT
Start: 2024-02-21 | End: 2024-02-21

## 2024-02-21 RX ORDER — LABETALOL HYDROCHLORIDE 5 MG/ML
10 INJECTION, SOLUTION INTRAVENOUS EVERY 4 HOURS PRN
Status: DISCONTINUED | OUTPATIENT
Start: 2024-02-21 | End: 2024-02-23 | Stop reason: HOSPADM

## 2024-02-21 RX ORDER — LIDOCAINE HYDROCHLORIDE 20 MG/ML
JELLY TOPICAL ONCE
Status: COMPLETED | OUTPATIENT
Start: 2024-02-21 | End: 2024-02-21

## 2024-02-21 RX ORDER — SODIUM CHLORIDE 0.9 % (FLUSH) 0.9 %
10 SYRINGE (ML) INJECTION PRN
Status: DISCONTINUED | OUTPATIENT
Start: 2024-02-21 | End: 2024-02-23 | Stop reason: HOSPADM

## 2024-02-21 RX ORDER — ACETAMINOPHEN 650 MG/1
650 SUPPOSITORY RECTAL EVERY 6 HOURS PRN
Status: DISCONTINUED | OUTPATIENT
Start: 2024-02-21 | End: 2024-02-23 | Stop reason: HOSPADM

## 2024-02-21 RX ADMIN — HYDROMORPHONE HYDROCHLORIDE 0.5 MG: 1 INJECTION, SOLUTION INTRAMUSCULAR; INTRAVENOUS; SUBCUTANEOUS at 10:29

## 2024-02-21 RX ADMIN — HYDROMORPHONE HYDROCHLORIDE 0.25 MG: 1 INJECTION, SOLUTION INTRAMUSCULAR; INTRAVENOUS; SUBCUTANEOUS at 02:36

## 2024-02-21 RX ADMIN — FENTANYL CITRATE 50 MCG: 50 INJECTION, SOLUTION INTRAMUSCULAR; INTRAVENOUS at 04:20

## 2024-02-21 RX ADMIN — SODIUM CHLORIDE: 9 INJECTION, SOLUTION INTRAVENOUS at 05:37

## 2024-02-21 RX ADMIN — PROCHLORPERAZINE EDISYLATE 10 MG: 5 INJECTION INTRAMUSCULAR; INTRAVENOUS at 00:59

## 2024-02-21 RX ADMIN — HEPARIN SODIUM 4000 UNITS: 1000 INJECTION INTRAVENOUS; SUBCUTANEOUS at 06:57

## 2024-02-21 RX ADMIN — ONDANSETRON 4 MG: 2 INJECTION INTRAMUSCULAR; INTRAVENOUS at 05:28

## 2024-02-21 RX ADMIN — INSULIN LISPRO 3 UNITS: 100 INJECTION, SOLUTION INTRAVENOUS; SUBCUTANEOUS at 11:47

## 2024-02-21 RX ADMIN — SODIUM CHLORIDE, PRESERVATIVE FREE 10 ML: 5 INJECTION INTRAVENOUS at 21:27

## 2024-02-21 RX ADMIN — ONDANSETRON 4 MG: 2 INJECTION INTRAMUSCULAR; INTRAVENOUS at 16:27

## 2024-02-21 RX ADMIN — HYDROMORPHONE HYDROCHLORIDE 0.5 MG: 1 INJECTION, SOLUTION INTRAMUSCULAR; INTRAVENOUS; SUBCUTANEOUS at 21:23

## 2024-02-21 RX ADMIN — LIDOCAINE HYDROCHLORIDE: 20 JELLY TOPICAL at 04:30

## 2024-02-21 RX ADMIN — HYDROMORPHONE HYDROCHLORIDE 0.5 MG: 1 INJECTION, SOLUTION INTRAMUSCULAR; INTRAVENOUS; SUBCUTANEOUS at 01:00

## 2024-02-21 RX ADMIN — HYDROMORPHONE HYDROCHLORIDE 0.25 MG: 1 INJECTION, SOLUTION INTRAMUSCULAR; INTRAVENOUS; SUBCUTANEOUS at 16:22

## 2024-02-21 RX ADMIN — ONDANSETRON 8 MG: 2 INJECTION INTRAMUSCULAR; INTRAVENOUS at 00:32

## 2024-02-21 RX ADMIN — FENTANYL CITRATE 100 MCG: 50 INJECTION, SOLUTION INTRAMUSCULAR; INTRAVENOUS at 00:22

## 2024-02-21 RX ADMIN — Medication 2 SPRAY: at 04:20

## 2024-02-21 RX ADMIN — ENOXAPARIN SODIUM 40 MG: 100 INJECTION SUBCUTANEOUS at 21:23

## 2024-02-21 RX ADMIN — FENTANYL CITRATE 50 MCG: 50 INJECTION, SOLUTION INTRAMUSCULAR; INTRAVENOUS at 06:47

## 2024-02-21 RX ADMIN — HEPARIN SODIUM 12 UNITS/KG/HR: 10000 INJECTION, SOLUTION INTRAVENOUS at 06:53

## 2024-02-21 RX ADMIN — IOPAMIDOL 75 ML: 755 INJECTION, SOLUTION INTRAVENOUS at 00:41

## 2024-02-21 RX ADMIN — INSULIN LISPRO 1 UNITS: 100 INJECTION, SOLUTION INTRAVENOUS; SUBCUTANEOUS at 16:22

## 2024-02-21 RX ADMIN — DIPHENHYDRAMINE HYDROCHLORIDE 50 MG: 50 INJECTION INTRAMUSCULAR; INTRAVENOUS at 00:59

## 2024-02-21 RX ADMIN — HEPARIN SODIUM 4000 UNITS: 1000 INJECTION INTRAVENOUS; SUBCUTANEOUS at 13:58

## 2024-02-21 RX ADMIN — INSULIN LISPRO 2 UNITS: 100 INJECTION, SOLUTION INTRAVENOUS; SUBCUTANEOUS at 09:12

## 2024-02-21 ASSESSMENT — PAIN DESCRIPTION - LOCATION
LOCATION: ABDOMEN
LOCATION: ABDOMEN
LOCATION: ABDOMEN;HEAD
LOCATION: ABDOMEN

## 2024-02-21 ASSESSMENT — PAIN SCALES - GENERAL
PAINLEVEL_OUTOF10: 9
PAINLEVEL_OUTOF10: 6
PAINLEVEL_OUTOF10: 5
PAINLEVEL_OUTOF10: 10
PAINLEVEL_OUTOF10: 7
PAINLEVEL_OUTOF10: 3
PAINLEVEL_OUTOF10: 7
PAINLEVEL_OUTOF10: 0
PAINLEVEL_OUTOF10: 5
PAINLEVEL_OUTOF10: 10
PAINLEVEL_OUTOF10: 10
PAINLEVEL_OUTOF10: 0
PAINLEVEL_OUTOF10: 5
PAINLEVEL_OUTOF10: 9

## 2024-02-21 ASSESSMENT — PAIN DESCRIPTION - PAIN TYPE
TYPE: ACUTE PAIN

## 2024-02-21 ASSESSMENT — PAIN DESCRIPTION - DESCRIPTORS
DESCRIPTORS: SHARP;ACHING;DISCOMFORT
DESCRIPTORS: ACHING;DISCOMFORT
DESCRIPTORS: ACHING
DESCRIPTORS: ACHING;CRAMPING
DESCRIPTORS: STABBING
DESCRIPTORS: CRAMPING;DISCOMFORT
DESCRIPTORS: ACHING;DISCOMFORT
DESCRIPTORS: SHARP
DESCRIPTORS: ACHING;DISCOMFORT
DESCRIPTORS: CRAMPING;DISCOMFORT
DESCRIPTORS: ACHING;DISCOMFORT

## 2024-02-21 ASSESSMENT — PAIN - FUNCTIONAL ASSESSMENT
PAIN_FUNCTIONAL_ASSESSMENT: PREVENTS OR INTERFERES SOME ACTIVE ACTIVITIES AND ADLS
PAIN_FUNCTIONAL_ASSESSMENT: 0-10
PAIN_FUNCTIONAL_ASSESSMENT: ACTIVITIES ARE NOT PREVENTED

## 2024-02-21 ASSESSMENT — PAIN DESCRIPTION - FREQUENCY
FREQUENCY: CONTINUOUS
FREQUENCY: INTERMITTENT

## 2024-02-21 ASSESSMENT — PAIN DESCRIPTION - ORIENTATION
ORIENTATION: MID
ORIENTATION: MID
ORIENTATION: UPPER
ORIENTATION: MID
ORIENTATION: MID
ORIENTATION: RIGHT;LEFT
ORIENTATION: LOWER

## 2024-02-21 ASSESSMENT — PAIN SCALES - WONG BAKER: WONGBAKER_NUMERICALRESPONSE: 0

## 2024-02-21 ASSESSMENT — PAIN DESCRIPTION - ONSET
ONSET: ON-GOING

## 2024-02-21 ASSESSMENT — PAIN DESCRIPTION - DIRECTION: RADIATING_TOWARDS: NO

## 2024-02-21 NOTE — FLOWSHEET NOTE
02/21/24 0847   Vital Signs   Temp 98.1 °F (36.7 °C)   Temp Source Oral   Pulse 59   Heart Rate Source Monitor   Respirations 20   BP (!) 152/99   MAP (Calculated) 117   BP Location Left upper arm   BP Method Automatic   Patient Position Semi fowlers   Pain Assessment   Pain Assessment 0-10   Pain Level 6   Patient's Stated Pain Goal 0 - No pain   Pain Location Abdomen   Pain Orientation   (all over)   Pain Descriptors Cramping;Discomfort   Functional Pain Assessment Prevents or interferes some active activities and ADLs   Pain Type Acute pain   Pain Radiating Towards n/a   Pain Frequency Intermittent   Pain Onset On-going   Care Plan - Pain Goals   Verbalizes/displays adequate comfort level or baseline comfort level Encourage patient to monitor pain and request assistance;Assess pain using appropriate pain scale;Implement non-pharmacological measures as appropriate and evaluate response   Opioid-Induced Sedation   POSS Score 1   Oxygen Therapy   SpO2 93 %   O2 Device Nasal cannula   O2 Flow Rate (L/min) 2 L/min     Pt is resting in bed pt is tired but A&O x4 vss. BP has decreased. Gave morning medications completed assessment. Pt c/o of pain in abdomen and remains in 2L of O2. Pt didn't want to work with PT/OT due to pain. Pt denies any needs at this time call light within reach bed in lowest position. Has NS running at 100 mL/hr and Heparin at 9.4 mL/hr.

## 2024-02-21 NOTE — PLAN OF CARE
HEART FAILURE CARE PLAN:    Comorbidities Reviewed: Yes   Patient has a past medical history of Atrial fibrillation (HCC), Congestive heart failure (HCC), Diabetes mellitus (HCC), Hydronephrosis, Hyperlipidemia, Hypertension, Intussusception intestine (HCC), and SVT (supraventricular tachycardia).     Weights Reviewed: Yes   Admission weight: 78.5 kg (173 lb)   Wt Readings from Last 3 Encounters:   02/21/24 78.7 kg (173 lb 6.4 oz)   02/16/24 78.8 kg (173 lb 11.6 oz)   02/06/24 81 kg (178 lb 8 oz)     Intake & Output Reviewed: Yes   No intake or output data in the 24 hours ending 02/21/24 0945    ECHOCARDIOGRAM Reviewed: Yes   Patient's Ejection Fraction (EF) is greater than 40%     Medications Reviewed: Yes   SCHEDULED HOSPITAL MEDICATIONS:   sodium chloride flush  10 mL IntraVENous 2 times per day    enoxaparin  40 mg SubCUTAneous Nightly    melatonin  3 mg Oral Nightly    insulin lispro  0-4 Units SubCUTAneous Q4H    nitroGLYCERIN  1 patch TransDERmal Daily    fentaNYL         HOME MEDICATIONS:  Prior to Admission medications    Medication Sig Start Date End Date Taking? Authorizing Provider   nitroGLYCERIN (NITRODUR) 0.2 MG/HR Place 1 patch onto the skin daily 12 hours on, 12 hours off 2/19/24   Nyla Dove,    losartan (COZAAR) 50 MG tablet Take 1 tablet by mouth in the morning and at bedtime 2/18/24   Nyla Dove,    carvedilol (COREG) 3.125 MG tablet Take 1 tablet by mouth 2 times daily (with meals) 2/18/24   Nyla Dove DO   amLODIPine (NORVASC) 10 MG tablet Take 1 tablet by mouth nightly 2/19/24   Nyla Dove DO   spironolactone (ALDACTONE) 50 MG tablet Take 1 tablet by mouth daily 2/18/24   Nyla Dove DO   pantoprazole (PROTONIX) 20 MG tablet Take 2 tablets by mouth daily 2/18/24   Nyla Dove,    apixaban (ELIQUIS) 5 MG TABS tablet Take 1 tablet by mouth 2 times daily 2/9/24   ÁNGEL Mendoza Jr., MD   amiodarone (CORDARONE) 200 MG tablet Take 1 tablet by mouth 2 times daily

## 2024-02-21 NOTE — PROGRESS NOTES
Sister Andreea Villa called and wanted an update. Told her I could not give her any information. She is not on the list in patient chart.

## 2024-02-21 NOTE — ED PROVIDER NOTES
[facility] PCU TELEMETRY  EMERGENCY DEPARTMENT ENCOUNTER        Pt Name: Lashanda Tiwari  MRN: [id number]  Birthdate 1935  Date of evaluation: 2/21/2024  Provider: Kelly Castrejon PA-C  PCP: Dmitry Moreno MD  Note Started: 12:35 AM EST 2/21/24       I have seen and evaluated this patient with my supervising physician Raymundo Diallo DO.      CHIEF COMPLAINT       Chief Complaint   Patient presents with    Abdominal Pain     PT reports upper abd pain since tonight after dinner. Hx of SBO, high BP. Recently started on meds. Pt endorses nausea, but no diarrhea or vomiting.        HISTORY OF PRESENT ILLNESS: 1 or more Elements     History From: Patient and             Chief Complaint: Abdominal pain    Lashanda Tiwari is a 88 y.o. female who presents because this evening after dinner she developed severe periumbilical abdominal pain with nausea that has gotten progressively worse when trying to go to bed this evening.  She has recently been seen at [facility] for hypertension and discharged with new blood pressure medication.  She subsequently developed a pruritic rash throughout her body that started Monday.  She has taken all her medications including nighttime meds this evening.  She denies constipation diarrhea urinary complaints fever.  She had a bowel movement this evening.  She has had a cholecystectomy hysterectomy appendectomy and surgery for bowel obstruction    Nursing Notes were all reviewed and agreed with or any disagreements were addressed in the HPI.    REVIEW OF SYSTEMS :      Review of Systems    Positives and Pertinent negatives as per HPI.     SURGICAL HISTORY     Past Surgical History:   Procedure Laterality Date    ABLATION OF DYSRHYTHMIC FOCUS      -2014    APPENDECTOMY      CHOLECYSTECTOMY, LAPAROSCOPIC N/A 2/3/2023    ROBOTIC CHOLECYSTECTOMY performed by Dmitry Partida MD at [facility] OR    DILATATION, ESOPHAGUS      FRACTURE SURGERY      L ankle    HYSTERECTOMY (CERVIX  MEDICATIONS:  Current Discharge Medication List                 (Please note that portions of this note were completed with a voice recognition program.  Efforts were made to edit the dictations but occasionally words are mis-transcribed.)    Kelly Castrejon PA-C (electronically signed)       Kelly Castrejon PA-C  02/22/24 0036

## 2024-02-21 NOTE — DISCHARGE INSTRUCTIONS
Heart Failure Resources:  Heart Failure Interactive Workbook:  Go to [email or web address].[employer]de[employer]/[email or web address] for a Free Heart Failure Interactive Workbook provided by The American Heart Association. This interactive workbook will provide information on Healthier Living with Heart Failure. Please copy and paste link into search bar. Use your mouse to scroll through the pages.    HF Alma marco a:   Heart Failure Free smart phone marco a available for iPhone and Android download. Use your phone to track sodium intake, fluid intake, symptoms, and weight.     Low Sodium Diet / Recipes:  Go to www.[employer]ombal website for “renal” diet which is Low Sodium! [employer] is a dialysis company, but this website offers free seasonal cookbooks. Each quarter, they will release 25-30 new recipes with a breakdown of calories, sodium, and glucose. You can also go to www[employer]/recipes website for free recipes.     Discharge Instruction Video:  Scan the QR code below with your camera and click the canva.com link to open the video and watch educational information on Heart Failure and Medications from one of our nurses.   https://www.frestyl/design/DAFZnsH_JRk/[email or web address]/edit    Home Exercise Program:   Identification of Green/Yellow/Red zones:  You should be able to identify when you feel good (green zone), if you have 1-2 symptoms of HF (yellow zone), or if you are in need of medical attention (red zone).  In your CHF education folder you were provided a “stop light tool” to outline this information.     We want to you to rate your exertion levels:    Our therapy team has discussed means of identification with you such as the \"Ann Maire scale.\"  The Ann Marie rating scale ranges from 6 to 20, where 6 means \"no exertion at all\" and 20 means \"maximal exertion.\" The goal is to use this to gauge how much effort it is taking for you to do your normal daily tasks.   You should be able to recognize when too much exertion is

## 2024-02-21 NOTE — CONSULTS
Nyla Dove DO   losartan (COZAAR) 50 MG tablet Take 1 tablet by mouth in the morning and at bedtime 2/18/24   Nyla Dove DO   carvedilol (COREG) 3.125 MG tablet Take 1 tablet by mouth 2 times daily (with meals) 2/18/24   Nyla Dove DO   amLODIPine (NORVASC) 10 MG tablet Take 1 tablet by mouth nightly 2/19/24   Nyla Dove DO   spironolactone (ALDACTONE) 50 MG tablet Take 1 tablet by mouth daily 2/18/24   Nyla Dove DO   pantoprazole (PROTONIX) 20 MG tablet Take 2 tablets by mouth daily 2/18/24   Nyla Dove DO   apixaban (ELIQUIS) 5 MG TABS tablet Take 1 tablet by mouth 2 times daily 2/9/24   ÁNGEL Mendoza Jr., MD   amiodarone (CORDARONE) 200 MG tablet Take 1 tablet by mouth 2 times daily 1/10/24   ÁNGEL Mendoza Jr., MD   furosemide (LASIX) 20 MG tablet Take 1 tablet by mouth daily Patient states she will take it when she is home    Provider, MD Rosemarie   pravastatin (PRAVACHOL) 80 MG tablet Take 0.5 tablets by mouth daily    Provider, MD Rosemarie    Scheduled Meds:   sodium chloride flush  10 mL IntraVENous 2 times per day    enoxaparin  40 mg SubCUTAneous Nightly    melatonin  3 mg Oral Nightly    insulin lispro  0-4 Units SubCUTAneous Q4H    nitroGLYCERIN  1 patch TransDERmal Daily    fentaNYL         Continuous Infusions:   sodium chloride 100 mL/hr at 02/21/24 0537    sodium chloride      dextrose      heparin (PORCINE) Infusion 12 Units/kg/hr (02/21/24 0653)    heparin (porcine)       PRN Meds:.sodium chloride flush, sodium chloride, potassium chloride **OR** potassium alternative oral replacement **OR** potassium chloride, magnesium sulfate, promethazine **OR** ondansetron, acetaminophen **OR** acetaminophen, labetalol, glucose, dextrose bolus **OR** dextrose bolus, glucagon (rDNA), dextrose, heparin (porcine), heparin (porcine), HYDROmorphone **OR** HYDROmorphone, fentaNYL, heparin (porcine)    Allergies  is allergic to ciprofloxacin, farxiga [dapagliflozin],  nonfocal    Psych: Oriented x 3. No anxiety or agitation.     LABS:   CBC:   Recent Labs     02/21/24  0021 02/21/24  0511   WBC 10.5 25.1*   HGB 13.9 14.5   HCT 42.3 46.4   MCV 78.8* 81.4    290     BMP:   Recent Labs     02/21/24  0021 02/21/24  0511    137   K 4.3 5.0    102   CO2 23 17*   BUN 15 17   CREATININE 0.9 0.8     LIVER PROFILE:   Recent Labs     02/21/24  0021 02/21/24  0511   AST 50* 68*   ALT 47* 55*   LIPASE 15.0  --    BILITOT 0.5 0.8   ALKPHOS 100 116     PT/INR:   Recent Labs     02/21/24  0608   PROTIME 15.4*   INR 1.22*     APTT:   Recent Labs     02/21/24  0608   APTT 26.1     UA:No results for input(s): \"NITRITE\", \"COLORU\", \"PHUR\", \"LABCAST\", \"WBCUA\", \"RBCUA\", \"MUCUS\", \"TRICHOMONAS\", \"YEAST\", \"BACTERIA\", \"CLARITYU\", \"SPECGRAV\", \"LEUKOCYTESUR\", \"UROBILINOGEN\", \"BILIRUBINUR\", \"BLOODU\", \"GLUCOSEU\", \"AMORPHOUS\" in the last 72 hours.    Invalid input(s): \"KETONESU\"      RADIOLOGY:   I have personally reviewed the following films:    XR ABDOMEN FOR NG/OG/NE TUBE PLACEMENT   Final Result   Enteric tube in the stomach as above.      No evidence of bowel obstruction.         CT ABDOMEN PELVIS W IV CONTRAST Additional Contrast? None   Final Result   Findings consistent with small bowel obstruction with zone of transition in   the right lower quadrant.  Notably, patient had similar pathology present on   prior study from October 2023.      Numerous granulomas in keeping with prior exposure to disease such as TB or   fungus.      Hepatomegaly.      Post cholecystectomy.  Posthysterectomy.              ASSESSMENT:   Lashanda Tiwari is a 88 y.o. female with a pmhx of Afib, CHF, DM, HTN and HLD who presented with abdominal pain, nausea, vomiting and obstipation. Hx of multiple abdominal surgeries and multiple SBOs.     BP extremely high on admission but this has improved. Afebrile. Labs with WBC 10.5 -> 25.1 without obvious infectious source, LA 2.5.     CT reviewed. SB loops are

## 2024-02-21 NOTE — PROGRESS NOTES
Patient admitted to room 323 from ER. Patient oriented to room, call light, bed rails, phone, lights and bathroom. Patient instructed about the schedule of the day including: vital sign frequency, lab draws, possible tests, frequency of MD and staff rounds, daily weights, I &O's and prescribed diet. No Telemetry box in place, patient aware of placement and reason. Bed locked, in lowest position, side rails up 2/4, call light within reach.        Recliner Assessment  Patient is not able to demonstrate the ability to move from a reclining position to an upright position within the recliner due to pain.       4 Eyes Skin Assessment     NAME:  Lashanda Tiwari  YOB: 1935  MEDICAL RECORD NUMBER:  [id number]    The patient is being assessed for  Admission    I agree that at least one RN has performed a thorough Head to Toe Skin Assessment on the patient. ALL assessment sites listed below have been assessed.    Scattered bruising noted, all other skin intact  Areas assessed by both nurses:    Head, Face, Ears, Shoulders, Back, Chest, Arms, Elbows, Hands, Sacrum. Buttock, Coccyx, Ischium, Legs. Feet and Heels, and Under Medical Devices         Does the Patient have a Wound? No noted wound(s)       Ino Prevention initiated by RN: No  Wound Care Orders initiated by RN: No    Pressure Injury (Stage 3,4, Unstageable, DTI, NWPT, and Complex wounds) if present, place Wound referral order by RN under : No    New Ostomies, if present place, Ostomy referral order under : No     Nurse 1 eSignature: Electronically signed by Kisha Dumont RN on 2/21/24 at 7:24 AM EST    **SHARE this note so that the co-signing nurse can place an eSignature**    Nurse 2 eSignature: Electronically signed by Macario Knox RN on 2/21/24 at 7:26 AM EST

## 2024-02-21 NOTE — ED NOTES
Bedside report given to Tammi SALGUERO. NG placed in rt nare per MD orders, Pt tolerated well. Air bolus and gastric content noted. X-ray ordered to verify placement. Pt taken via stretcher in stable condition.

## 2024-02-21 NOTE — ACP (ADVANCE CARE PLANNING)
Advance Care Planning     General Advance Care Planning (ACP) Conversation    Date of Conversation: 2/21/2024  Conducted with: Patient with Decision Making Capacity    Healthcare Decision Maker:    Primary Decision Maker: vonda rabago - Spouse - [phone]    Secondary Decision Maker: Benito Rabago - Child - [phone]  Click here to complete Healthcare Decision Makers including selection of the Healthcare Decision Maker Relationship (ie \"Primary\").   Today we documented Decision Maker(s) consistent with Legal Next of Kin hierarchy.    Content/Action Overview:  DECLINED ACP Conversation - will revisit periodically  Reviewed DNR/DNI and patient elects Full Code (Attempt Resuscitation)        Length of Voluntary ACP Conversation in minutes:  <16 minutes (Non-Billable)    Frankie Urias RN

## 2024-02-21 NOTE — PROGRESS NOTES
Inpatient Occupational Therapy Evaluation and Treatment    Unit: PCU  Date:  2024  Patient Name:    Lashanda Tiwari  Admitting diagnosis:  SBO (small bowel obstruction) ([facility]) [K56.609]  Admit Date:  2024  Precautions/Restrictions/WB Status/ Lines/ Wounds/ Oxygen: Fall risk, Bed/chair alarm, Lines (IV, Supplemental O2 (2L), external catheter, and NG tube), Telemetry, and Continuous pulse oximetry    Pt seen for cotreatment this date due to patient safety, patient endurance, complexity of condition, and acute illness/injury    Treatment Time:  8:30 - 8:51  Treatment Number:  1  Timed Code Treatment Minutes: 11 minutes  Total Treatment Minutes:  21  minutes    Patient Goals for Therapy: \"to get better\"          Discharge Recommendations: SNF - continue to assess   DME needs for discharge: Defer to facility       Therapy recommendations for staff:   Assist of 2 for repositioning in bed    History of Present Illness: Per TESFAYE Ann, DO  \"88 y.o. female who presented to [facility] with past medical Struve atrial fibrillation, type 2 diabetes, hypertension, hyperlipidemia, SVT presented to ED with chief complaint of abdominal pain nausea and vomiting that started this afternoon.     Patient reported that abdominal pain is generalized to worsen with minimal anemia has not been able to eat and drink the past few hours since yesterday afternoon.  No associate of loss of conscious chest pain fever chills cough phlegm production shortness of breath or dysuria.  Patient reports she does have history of SBO this is very similar to prior presentation\"    Pending general surgery consult    Home Health S4 Level Recommendation:  NA    AM-PAC Score: AM-PAC Inpatient Daily Activity Raw Score: 10     Subjective:  Patient lying reclined in bed with no family present.   Pt agreeable to this OT session.     Cognition:    A&O x4   Able to follow 2 step commands    Pain:   Yes  Location: abdominal area  Ratin /10  Pain

## 2024-02-21 NOTE — CARE COORDINATION
Case Management Assessment  Initial Evaluation    Date/Time of Evaluation: 2/21/2024 9:34 AM  Assessment Completed by: Frankie Urias RN    If patient is discharged prior to next notation, then this note serves as note for discharge by case management.    Patient Name: Lashanda Tiwari                   YOB: 1935  Diagnosis: SBO (small bowel obstruction) ([facility]) [K56.609]                   Date / Time: 2/21/2024 12:09 AM    Patient Admission Status: Inpatient   Readmission Risk (Low < 19, Mod (19-27), High > 27): Readmission Risk Score: 24    Current PCP: Dmitry Moreno MD  PCP verified by CM? Yes    Chart Reviewed: Yes      History Provided by: Patient  Patient Orientation: Alert and Oriented    Patient Cognition: Alert    Hospitalization in the last 30 days (Readmission):  Yes    If yes, Readmission Assessment in  Navigator will be completed.    Advance Directives:      Code Status: Full Code   Patient's Primary Decision Maker is: Legal Next of Kin    Primary Decision Maker: vonda tiwari - Spouse - [phone]    Secondary Decision Maker: Benito Tiwari - Child - [phone]    Discharge Planning:    Patient lives with: Spouse/Significant Other Type of Home: House  Primary Care Giver: Self  Patient Support Systems include: Spouse/Significant Other   Current Financial resources: None  Current community resources: ECF/Home Care  Current services prior to admission: None            Current DME:              Type of Home Care services:  None    ADLS  Prior functional level: Independent in ADLs/IADLs  Current functional level: Independent in ADLs/IADLs    PT AM-PAC:   /24  OT AM-PAC:   /24    Family can provide assistance at DC: Yes  Would you like Case Management to discuss the discharge plan with any other family members/significant others, and if so, who? No  Plans to Return to Present Housing: Yes  Other Identified Issues/Barriers to RETURNING to current housing: na  Potential Assistance needed at

## 2024-02-21 NOTE — PROGRESS NOTES
Not Tested   Comments: pt declining to attempt EOB activity due to pain and fatigue     Static Standing: Not tested; Not Tested  Dynamic Standing: Not tested; Not Tested  Comments:     Posture  Seated: Not Tested  Standing: Not Tested    Bed Mobility   Supine to Sit:    Not Tested  Sit to Supine:   Not Tested  Rolling:   Not Tested   Scooting in sitting: Not Tested   Scooting in supine:  Not Tested   Bridging:  Not Tested    Transfer Training     Sit to stand:   Not Tested   Stand to sit:   Not Tested   Bed to/from Chair:  Not Tested with use of N/A    Gait gait deferred due to fatigue; pt ambulated 0 ft.   Distance:      0 ft  Deviations (firm surface/linoleum):  N/A  Assistive Device Used:    N/A  Level of Assist:    Not Tested   Comment:     Stair Training deferred, pt unsafe/ not appropriate to complete stairs at this time  # of Steps:   N/A  Level of Assist:  Not Tested   UE Support:  NA  Assistive Device:  N/A  Pattern:   N/A  Comments:      Therapeutic Exercises Initiated  deferred secondary to pt fatigued  Supine:  N/A    Seated:  N/A    Standing:  N/A    Activity Tolerance   During therapy session noted pt with fatigue  pain    Pt Position BP (mmHg) HR (bpm) SpO2 (%) on 2.5L   Comments   Supine at rest 161/60 61 93%    Seated at EOB       Standing       End of session         Positioning Needs   Pt in bed, alarm set, positioned in proper neutral alignment and pressure relief provided.   Call light provided and all needs within reach  RN aware of pt position/status    Other Activities  None.    Patient/Family Education   Pt educated on role of inpatient PT, POC, importance of continued activity, DC recommendations, safety awareness, and calling for assist with mobility.      Assessment  Pt seen today for physical therapy Evaluation & Treatment. Pt demonstrated decreased Activity tolerance, Balance, ROM, Safety, and Strength as well as decreased independence with Ambulation, Bed Mobility , and Transfers. Pt  reports being independent PTA with no AD for mobility and lives with spouse in single level home. Pt declining to participate in bed mobility or transfers this date due to pain and fatigue, wishing to rest. Pt does participate in AROM and strength assessments and demonstrates marked weakness in BLE's. Pt would continue to benefit from skilled therapy during LOS to progress mobility as tolerated.     Recommending SNF upon discharge as patient functioning well below baseline, demonstrates good rehab potential and unable to return home due to inability to negotiate stairs to enter home/bedroom/bathroom, burden of care beyond caregiver ability, limited safety awareness, and increased fall risk.    Goals :   To be met in 3 visits:  1). Independent with LE Ex x 10 reps  2). Sit to/from stand: Mod A   3). Bed to chair: Mod A   4). CHF goal: N/A    To be met in 6 visits:  1).  Supine to/from sit: Supervision  2).  Sit to/from stand: SBA  3).  Bed to chair: SBA  4).  Gait: Ambulate 50 ft.  with Min A  and use of LRAD (least restrictive assistive device)  5).  Tolerate B LE exercises 3 sets of 10-15 reps      Rehabilitation Potential: Fair  Strengths for achieving goals include:   PLOF   Barriers to achieving goals include:    Pain and Weakness    Plan    To be seen 3-5 x / week  while in acute care setting for therapeutic exercises, bed mobility, transfers, progressive gait training, balance training, and family/patient education.    Signature: Jonny Hernandez PT     If patient discharges from this facility prior to next visit, this note will serve as the Discharge Summary.    CHF Education  N/A

## 2024-02-21 NOTE — ED PROVIDER NOTES
I independently examined and evaluated Lashanda Tiwari.  I personally saw the patient and performed a substantive portion of the visit including all aspects of the medical decision making.    In brief, this 88-year-old female is presenting with abdominal pain, nausea and vomiting that started this afternoon.  Had a recent admission for severe hypertension.  Denies any fevers, chills, chest pain, shortness of breath.    Focused exam revealed   General: Alert, uncomfortable with abdominal pain, retching, patient resting comfortably   Skin: warm, intact, no pallor noted   Head: Normocephalic, atraumatic   Eye: Normal conjunctiva   Cardiac: Normal peripheral perfusion  Respiratory: No acute distress   Musculoskeletal: No deformity, full ROM.   Neurological: alert and oriented, normal sensory and motor observed.   Psychiatric: Cooperative    ED course: Workup obtained and lab work is reassuring.  Treated multiple multiple rounds of pain and nausea medications with improvement.  Blood pressure initially 240s systolic, now coming down to 170s.  CT on pelvis does show small bowel obstruction.  Stomach is not severely distended, patient's nausea is currently well-controlled, will hold off on NG tube at this time.  Plan for admission for further treatment.  Discussed with Dr. Ann, hospitalist, who agreed to admit the patient.    ECG    The Ekg interpreted by me shows sinus rhythm with a rate of 64 bpm.  Normal axis.  No acute injury pattern.  , , QTc 468.    All diagnostic, treatment, and disposition decisions were made by myself in conjunction with the advanced practice provider/resident.    I personally saw the patient and made/approved the management plan and take responsibility for the patient management.     For all further details of the patient's emergency department visit, please see the advanced practice provider's/resident's documentation.    Comment: Please note this report has been produced  using speech recognition software and may contain errors related to that system including errors in grammar, punctuation, and spelling, as well as words and phrases that may be inappropriate. If there are any questions or concerns please feel free to contact the dictating provider for clarification.       Raymundo Diallo,   02/21/24 0446

## 2024-02-21 NOTE — PROGRESS NOTES
Daughter Yesenia called and left message called her back at 1830. Stated to Yesenia that she will need to talk with other family for information.

## 2024-02-21 NOTE — PROGRESS NOTES
Low dose Heparin    APTT (1318) = 57.9.  Subtherapeutic.  Give Heparin 4000 unit bolus and increase Heparin infusion to 16 units/kg/hr.  Next APTT to be drawn six hours post rate increase.    Macario Abraham R.Ph.2/21/20241:47 PM

## 2024-02-21 NOTE — FLOWSHEET NOTE
02/21/24 1613   Vital Signs   Temp 98.3 °F (36.8 °C)   Temp Source Oral   Pulse 62   Heart Rate Source Monitor   Respirations 20   BP (!) 121/49   MAP (Calculated) 73   BP Location Left upper arm   BP Method Automatic   Patient Position Semi fowlers   Pain Assessment   Pain Assessment 0-10   Pain Level 3   Patient's Stated Pain Goal 0 - No pain   Pain Location Abdomen   Pain Orientation   (generalized)   Pain Descriptors Aching;Cramping   Functional Pain Assessment Activities are not prevented   Pain Type Acute pain   Pain Radiating Towards no   Pain Frequency Continuous   Pain Onset On-going   Opioid-Induced Sedation   POSS Score 1   Oxygen Therapy   SpO2 92 %   O2 Device Nasal cannula   O2 Flow Rate (L/min) 2 L/min     Pt is A&O x4 vss. Pt is placed back on 2L sating at 89%. Gave medications and prn pain meds for 3 out of 10 pain. Pt did sit up in the chair for a few hours. Pt remains NPO. Denies any s/s of distress or needs. Call light within reach bed in lowest position bed alarm set

## 2024-02-21 NOTE — PROGRESS NOTES
Chart reviewed briefly.  Seen by nocturnist after midnight.  Patient admitted to hospital abdominal pain.  Appears to have small bowel obstruction.  X-ray this morning is showing improvement.  Continue present treatment.      JONI WALLACE MD 2/21/2024 7:42 AM

## 2024-02-21 NOTE — H&P
Hospital Medicine History & Physical      PCP: Dmitry Moreno MD    Date of Admission: 2/21/2024    Date of Service: Pt seen/examined on 2/21/2024    Pt seen/examined face to face on and admitted as inpatient with expected LOS to be two days but can change depending on diagnostic work up and treatment response.     Chief Complaint:    Chief Complaint   Patient presents with    Abdominal Pain     PT reports upper abd pain since tonight after dinner. Hx of SBO, high BP. Recently started on meds. Pt endorses nausea, but no diarrhea or vomiting.             ASSESSMENT AND PLAN:    Active Hospital Problems    Diagnosis Date Noted    SBO (small bowel obstruction) ([facility]) [K56.609] 02/21/2024     Small bowel obstruction:  NG with suction  Surgery consulted, appreciated    Chronic medical conditions: Held oral medication due to patient being n.p.o., IV formulation or as needed ordered    GERD  Hypertension  Atrial fibrillation: Patient does have high thrombotic risk with a [id number] of 6.  Given unable to give patient Eliquis will start heparin drip  Type 2 Diabetes: Reviewed patient's medications. Plan is to hold oral medications and continued with Insulin sliding scale      .Due to the above diagnosis makes the patient higher risk for morbidity and mortality requiring testing and treatment      Discussion with the primary ER physician in regards to symptoms, history, physical exam, diagnosis and treatment, collaborative decision was to admit the patient.    Diet: NPO     DVT Prophylaxis: heparin    Dispo:   Expected LOS of two days      History Of Present Illness:      88 y.o. female who presented to [facility] with past medical Struve atrial fibrillation, type 2 diabetes, hypertension, hyperlipidemia, SVT presented to ED with chief complaint of abdominal pain nausea and vomiting that started this afternoon.    Patient reported that abdominal pain is generalized to worsen with minimal anemia has not been able  Nyla Dove, DO   spironolactone (ALDACTONE) 50 MG tablet Take 1 tablet by mouth daily 2/18/24   Nyla Dove DO   pantoprazole (PROTONIX) 20 MG tablet Take 2 tablets by mouth daily 2/18/24   Nyla Dove DO   apixaban (ELIQUIS) 5 MG TABS tablet Take 1 tablet by mouth 2 times daily 2/9/24   ÁNGEL Mendoza Jr., MD   amiodarone (CORDARONE) 200 MG tablet Take 1 tablet by mouth 2 times daily 1/10/24   ÁNGEL Mendoza Jr., MD   furosemide (LASIX) 20 MG tablet Take 1 tablet by mouth daily Patient states she will take it when she is home    ProviderRosemarie MD   pravastatin (PRAVACHOL) 80 MG tablet Take 0.5 tablets by mouth daily    Provider, MD Rosemarie       Allergies:  Ciprofloxacin, Farxiga [dapagliflozin], Hydrocodone, Metformin and related, Morphine, Nickel, Omeprazole, Prednisone, and Hydralazine    Social History:          TOBACCO:   reports that she has never smoked. She has never used smokeless tobacco.  ETOH:   reports no history of alcohol use.  E-cigarette/Vaping       Questions Responses    E-cigarette/Vaping Use Never User    Start Date     Passive Exposure     Quit Date     Counseling Given     Comments Unknown              Family History:      Family History reviewed with patient, and does not pertain and non-contributory to the current illness        Problem Relation Age of Onset    Diabetes Mother     Heart Disease Father     High Blood Pressure Father     High Cholesterol Father     Cancer Sister     Heart Disease Brother     High Blood Pressure Brother     High Cholesterol Brother        REVIEW OF SYSTEMS:     Constitutional:  No Fever, No Chills, No Night Sweats  ENT/Mouth:  No Nasal Congestion,  No Hoarseness, No new mouth lesion  Eyes:  No Eye Pain, No Redness, No Discharge  Cardiovascular:  No Chest Pain, No Orthopnea, No Palpitations  Respiratory:  No Cough, No Sputum, No Dyspnea  Gastrointestinal: No Vomiting, No Diarrhea, + abdominal pain  Genitourinary: No Urinary Frequency,

## 2024-02-21 NOTE — CONSULTS
Pharmacy to Manage Heparin Infusion per Hospital Policy    Dx: Afib  Pt wt = 78.7 kg Actual  Baseline aPTT = in process  Platelets: 290    Low Dose Heparin Infusion  Heparin 60 units/kg IVP bolus followed by Heparin infusion at 12 units/kg/hr (recommended initial max dose 1000 units/hr).  Recheck aPTT/anti-Xa in 6 hours.  Goal aPTT =  seconds.  Goal Anti-Xa = 0.3 - 0.7 IU/ml  Initial Bolus Dose: 4000 units  Initial Drip Rate: 12 units/kg/hr  Next aPTT 2/21 @ 1300    Pt on Eliquis at home - last dose on 2/20/24 (unsure of time). Will use aPTT until 2/24/24 @ 0000.     Thank you for this consult. Pharmacy will continue to monitor.     Stacey Pool, RamonaD, [facility], 2/21/2024 6:19 AM

## 2024-02-22 ENCOUNTER — APPOINTMENT (OUTPATIENT)
Dept: GENERAL RADIOLOGY | Age: 89
End: 2024-02-22
Payer: MEDICARE

## 2024-02-22 LAB
ALBUMIN SERPL-MCNC: 3.2 G/DL (ref 3.4–5)
ALBUMIN/GLOB SERPL: 0.8 {RATIO} (ref 1.1–2.2)
ALP SERPL-CCNC: 81 U/L (ref 40–129)
ALT SERPL-CCNC: 38 U/L (ref 10–40)
ANION GAP SERPL CALCULATED.3IONS-SCNC: 12 MMOL/L (ref 3–16)
ANISOCYTOSIS BLD QL SMEAR: ABNORMAL
APTT BLD: 81.9 SEC (ref 22.7–35.9)
AST SERPL-CCNC: 29 U/L (ref 15–37)
BASOPHILS # BLD: 0 K/UL (ref 0–0.2)
BASOPHILS NFR BLD: 0 %
BILIRUB SERPL-MCNC: 0.6 MG/DL (ref 0–1)
BUN SERPL-MCNC: 26 MG/DL (ref 7–20)
CALCIUM SERPL-MCNC: 7.6 MG/DL (ref 8.3–10.6)
CHLORIDE SERPL-SCNC: 100 MMOL/L (ref 99–110)
CO2 SERPL-SCNC: 23 MMOL/L (ref 21–32)
CREAT SERPL-MCNC: 1 MG/DL (ref 0.6–1.2)
DEPRECATED RDW RBC AUTO: 15.8 % (ref 12.4–15.4)
EOSINOPHIL # BLD: 0 K/UL (ref 0–0.6)
EOSINOPHIL NFR BLD: 0 %
EST. AVERAGE GLUCOSE BLD GHB EST-MCNC: 168.6 MG/DL
GFR SERPLBLD CREATININE-BSD FMLA CKD-EPI: 54 ML/MIN/{1.73_M2}
GLUCOSE BLD-MCNC: 149 MG/DL (ref 70–99)
GLUCOSE BLD-MCNC: 158 MG/DL (ref 70–99)
GLUCOSE BLD-MCNC: 169 MG/DL (ref 70–99)
GLUCOSE BLD-MCNC: 183 MG/DL (ref 70–99)
GLUCOSE BLD-MCNC: 184 MG/DL (ref 70–99)
GLUCOSE BLD-MCNC: 204 MG/DL (ref 70–99)
GLUCOSE SERPL-MCNC: 179 MG/DL (ref 70–99)
HBA1C MFR BLD: 7.5 %
HCT VFR BLD AUTO: 39.9 % (ref 36–48)
HGB BLD-MCNC: 12.7 G/DL (ref 12–16)
LACTATE BLDV-SCNC: 0.9 MMOL/L (ref 0.4–2)
LYMPHOCYTES # BLD: 2.8 K/UL (ref 1–5.1)
LYMPHOCYTES NFR BLD: 15 %
MCH RBC QN AUTO: 25.5 PG (ref 26–34)
MCHC RBC AUTO-ENTMCNC: 31.9 G/DL (ref 31–36)
MCV RBC AUTO: 79.8 FL (ref 80–100)
MONOCYTES # BLD: 1 K/UL (ref 0–1.3)
MONOCYTES NFR BLD: 7 %
NEUTROPHILS # BLD: 10.7 K/UL (ref 1.7–7.7)
NEUTROPHILS NFR BLD: 74 %
PERFORMED ON: ABNORMAL
PLATELET # BLD AUTO: 282 K/UL (ref 135–450)
PLATELET BLD QL SMEAR: ADEQUATE
PMV BLD AUTO: 9.8 FL (ref 5–10.5)
POIKILOCYTOSIS BLD QL SMEAR: ABNORMAL
POTASSIUM SERPL-SCNC: 4.6 MMOL/L (ref 3.5–5.1)
PROT SERPL-MCNC: 7 G/DL (ref 6.4–8.2)
RBC # BLD AUTO: 5 M/UL (ref 4–5.2)
SLIDE REVIEW: ABNORMAL
SODIUM SERPL-SCNC: 135 MMOL/L (ref 136–145)
VARIANT LYMPHS NFR BLD MANUAL: 4 % (ref 0–6)
WBC # BLD AUTO: 14.5 K/UL (ref 4–11)

## 2024-02-22 PROCEDURE — 85025 COMPLETE CBC W/AUTO DIFF WBC: CPT

## 2024-02-22 PROCEDURE — 6370000000 HC RX 637 (ALT 250 FOR IP): Performed by: INTERNAL MEDICINE

## 2024-02-22 PROCEDURE — 97530 THERAPEUTIC ACTIVITIES: CPT

## 2024-02-22 PROCEDURE — 94150 VITAL CAPACITY TEST: CPT

## 2024-02-22 PROCEDURE — 80053 COMPREHEN METABOLIC PANEL: CPT

## 2024-02-22 PROCEDURE — 36415 COLL VENOUS BLD VENIPUNCTURE: CPT

## 2024-02-22 PROCEDURE — 6370000000 HC RX 637 (ALT 250 FOR IP)

## 2024-02-22 PROCEDURE — 1200000000 HC SEMI PRIVATE

## 2024-02-22 PROCEDURE — 6360000002 HC RX W HCPCS: Performed by: INTERNAL MEDICINE

## 2024-02-22 PROCEDURE — 2580000003 HC RX 258: Performed by: INTERNAL MEDICINE

## 2024-02-22 PROCEDURE — 85730 THROMBOPLASTIN TIME PARTIAL: CPT

## 2024-02-22 PROCEDURE — 99232 SBSQ HOSP IP/OBS MODERATE 35: CPT | Performed by: SURGERY

## 2024-02-22 PROCEDURE — 99232 SBSQ HOSP IP/OBS MODERATE 35: CPT | Performed by: INTERNAL MEDICINE

## 2024-02-22 PROCEDURE — 83605 ASSAY OF LACTIC ACID: CPT

## 2024-02-22 PROCEDURE — 74019 RADEX ABDOMEN 2 VIEWS: CPT

## 2024-02-22 RX ORDER — DIMETHICONE, OXYBENZONE, AND PADIMATE O 2; 2.5; 6.6 G/100G; G/100G; G/100G
STICK TOPICAL
Status: COMPLETED
Start: 2024-02-22 | End: 2024-02-22

## 2024-02-22 RX ORDER — SODIUM CHLORIDE 9 MG/ML
INJECTION, SOLUTION INTRAVENOUS CONTINUOUS
Status: DISCONTINUED | OUTPATIENT
Start: 2024-02-22 | End: 2024-02-23

## 2024-02-22 RX ADMIN — SODIUM CHLORIDE: 0.9 INJECTION, SOLUTION INTRAVENOUS at 15:57

## 2024-02-22 RX ADMIN — SODIUM CHLORIDE, PRESERVATIVE FREE 10 ML: 5 INJECTION INTRAVENOUS at 21:30

## 2024-02-22 RX ADMIN — HEPARIN SODIUM 16 UNITS/KG/HR: 10000 INJECTION, SOLUTION INTRAVENOUS at 23:20

## 2024-02-22 RX ADMIN — DIMETHICONE, OXYBENZONE, AND PADIMATE O: 2; 2.5; 6.6 STICK TOPICAL at 21:29

## 2024-02-22 RX ADMIN — HEPARIN SODIUM 16 UNITS/KG/HR: 10000 INJECTION, SOLUTION INTRAVENOUS at 03:25

## 2024-02-22 ASSESSMENT — PAIN SCALES - WONG BAKER: WONGBAKER_NUMERICALRESPONSE: 0

## 2024-02-22 NOTE — PROGRESS NOTES
At bedside, with NG tube to intermittent suction, external catheter in place, son at beside, discussed plan of care with patient and family.  Will hold eliquis, will clamp NG and advance to clear to see if patient tolerate

## 2024-02-22 NOTE — PROGRESS NOTES
2/22  aPTT = 81.9 sec at 0313.  Continue heparin gtt at 16 units/kg/hr.  Check aPTT daily start tomorrow (2/23 at 0600).  Dmitry Chavez, PharmD  2/22/2024 3:44 AM

## 2024-02-22 NOTE — PROGRESS NOTES
Bedside report and transfer of care given to NOEMY Robles. Pt currently resting in bed with the call light within reach. Pt denies any other care needs at this time. Pt stable at this time.

## 2024-02-22 NOTE — PROGRESS NOTES
Inpatient Physical Therapy Treatment    Unit: PCU  Date:  2/22/2024  Patient Name:    Lashanda Tiwari  Admitting diagnosis:  SBO (small bowel obstruction) ([facility]) [K56.609]  Admit Date:  2/21/2024  Precautions/Restrictions/WB Status/ Lines/ Wounds/ Oxygen: Fall risk, Bed/chair alarm, Lines (IV, Supplemental O2 (3L), external catheter, and NG tube), Telemetry, and Continuous pulse oximetry    NG clamped  Pt seen for cotreatment this date due to patient safety, patient endurance, and acute illness/injury    Treatment Time:  0830-09:00  Treatment Number:  2   Timed Code Treatment Minutes: 30 minutes  Total Treatment Minutes: 30  minutes    Patient Stated Goals for Therapy: \" to get better \"          Discharge Recommendations: Home with 24hr assistance and Home PT  DME needs for discharge: Shower Chair and may need RW-please confirm       Therapy recommendation for EMS Transport: requires transport by cot due to pt with poor sitting balance/tolerance and inability to perform OOB mobility at this time    Therapy recommendations for staff:   Assist of 1 for ambulation with use of rolling walker (RW) and gait belt to/from BSC  to/from chair  to/from bathroom  within room    History of Present Illness:   Per H&P: \"88 y.o. female who presented to [facility] with past medical Struve atrial fibrillation, type 2 diabetes, hypertension, hyperlipidemia, SVT presented to ED with chief complaint of abdominal pain nausea and vomiting that started this afternoon.     Patient reported that abdominal pain is generalized to worsen with minimal anemia has not been able to eat and drink the past few hours since yesterday afternoon.  No associate of loss of conscious chest pain fever chills cough phlegm production shortness of breath or dysuria.  Patient reports she does have history of SBO this is very similar to prior presentation\"    Home Health S4 Level Recommendation:  NA        AM-PAC Mobility Score    AM-PAC Inpatient Mobility  Treatment. Pt demonstrated decreased Activity tolerance, Balance, ROM, Safety, and Strength as well as decreased independence with Ambulation, Bed Mobility , and Transfers. Pt would continue to benefit from skilled therapy during LOS to progress mobility as tolerated.  Patient demonstrated improved transfers and ambulation  this date.    Recommending SNF upon discharge as patient functioning well below baseline, demonstrates good rehab potential and unable to return home due to inability to negotiate stairs to enter home/bedroom/bathroom, burden of care beyond caregiver ability, limited safety awareness, and increased fall risk.    Goals :   To be met in 3 visits:  1). Independent with LE Ex x 10 reps  2). Sit to/from stand: Mod A ,goal met  3). Bed to chair: Mod A ,goal met  4). CHF goal: N/A    To be met in 6 visits:  1).  Supine to/from sit: Supervision  2).  Sit to/from stand: SBA  3).  Bed to chair: SBA  4).  Gait: Ambulate 150 ft.  with SBA and use of LRAD (least restrictive assistive device)  5).  Tolerate B LE exercises 3 sets of 10-15 reps      Rehabilitation Potential: Fair  Strengths for achieving goals include:   PLOF   Barriers to achieving goals include:    Pain and Weakness    Plan    To be seen 3-5 x / week  while in acute care setting for therapeutic exercises, bed mobility, transfers, progressive gait training, balance training, and family/patient education. Nursing staff to encourage ambulation.    Signature:Salena Granados, PT #[id number]     If patient discharges from this facility prior to next visit, this note will serve as the Discharge Summary.    CHF Education  N/A

## 2024-02-22 NOTE — FLOWSHEET NOTE
02/21/24 2120   Vital Signs   Temp 98.5 °F (36.9 °C)   Temp Source Oral   Pulse 58   Heart Rate Source Monitor   Respirations 20   BP (!) 144/69   MAP (Calculated) 94   BP Location Right upper arm   BP Method Automatic   Patient Position Semi fowlers   Pain Assessment   Pain Assessment 0-10   Pain Level 7   Patient's Stated Pain Goal 0 - No pain   Pain Location Abdomen;Head   Pain Descriptors Aching;Discomfort   Functional Pain Assessment Prevents or interferes some active activities and ADLs   Pain Type Acute pain   Pain Frequency Continuous   Pain Onset On-going   Non-Pharmaceutical Pain Intervention(s) Rest   Opioid-Induced Sedation   POSS Score 1   Oxygen Therapy   SpO2 93 %   O2 Device Nasal cannula   O2 Flow Rate (L/min) 2 L/min     Vital signs taken and stable at this time. Pt has c/o abdominal and headache pain with non pharmaceutical interventions ineffective at this time, prn dilaudid given per orders. Pt denies further needs at this time. Pt A&OX4 with call light within reach and bed alarm on.

## 2024-02-22 NOTE — PROGRESS NOTES
2/21  aPTT = 90.5 sec at 2103.  Continue heparin gtt at 16 units/kg/hr.  Recheck aPTT in 6 hours.  Dmitry Chavez PharmD  2/21/2024 10:22 PM

## 2024-02-22 NOTE — PROGRESS NOTES
[facility]    PATIENT NAME: Lashanda Tiwari     TODAY'S DATE: 2/22/2024    CHIEF COMPLAINT: None    INTERVAL HISTORY/HPI:    Pt patient states she feels well.  She only has abdominal pain if you press on it.  She denies nausea or vomiting.  She is passing gas.  She did have 800 on her NG overnight.     REVIEW OF SYSTEMS:  Pertinent positives and negatives as per interval history section    OBJECTIVE:  VITALS:  BP (!) 176/64   Pulse 73   Temp 98.6 °F (37 °C) (Oral)   Resp 18   Ht 1.575 m (5' 2\")   Wt 75.5 kg (166 lb 8 oz)   SpO2 95%   BMI 30.45 kg/m²     INTAKE/OUTPUT:    I/O last 3 completed shifts:  In: -   Out: 925 [Urine:125; Emesis/NG output:800]  No intake/output data recorded.    CONSTITUTIONAL:  awake and alert  LUNGS:  Respirations easy and unlabored  CARD:  regular rate and rhythm  ABDOMEN:  hypoactive bowel sounds, soft, non-distended, mild tenderness noted diffusely     Data:  CBC:   Recent Labs     02/21/24  0021 02/21/24  0511 02/22/24  0313   WBC 10.5 25.1* 14.5*   HGB 13.9 14.5 12.7   HCT 42.3 46.4 39.9    290 282     BMP:    Recent Labs     02/21/24  0021 02/21/24  0511 02/22/24  0313    137 135*   K 4.3 5.0 4.6    102 100   CO2 23 17* 23   BUN 15 17 26*   CREATININE 0.9 0.8 1.0   GLUCOSE 213* 312* 179*     Hepatic:   Recent Labs     02/21/24  0021 02/21/24  0511 02/22/24  0313   AST 50* 68* 29   ALT 47* 55* 38   BILITOT 0.5 0.8 0.6   ALKPHOS 100 116 81     Mag:    No results for input(s): \"MG\" in the last 72 hours.   Phos:   No results for input(s): \"PHOS\" in the last 72 hours.   INR:   Recent Labs     02/21/24  0608   INR 1.22*       Radiology Review:  *Imaging personally reviewed by me.   Abdominal x-ray images reviewed and show few scattered air-filled loops of small bowel consistent with partial small bowel obstruction or ileus      ASSESSMENT AND PLAN:  Partial small bowel obstruction, clinically improved.  Given her age and history, we will go slow

## 2024-02-22 NOTE — PROGRESS NOTES
Inpatient Occupational Therapy Treatment    Unit: PCU  Date:  2/22/2024  Patient Name:    Lashanda Tiwari  Admitting diagnosis:  SBO (small bowel obstruction) ([facility]) [K56.609]  Admit Date:  2/21/2024  Precautions/Restrictions/WB Status/ Lines/ Wounds/ Oxygen: Fall risk, Bed/chair alarm, Lines (IV, Supplemental O2 (3L), external catheter, and NG tube), Telemetry, and Continuous pulse oximetry    Pt seen for cotreatment this date due to patient safety, patient endurance, complexity of condition, and acute illness/injury    Treatment Time:  8:30 - 9:00  Treatment Number:  2  Timed Code Treatment Minutes: 30 minutes  Total Treatment Minutes:  30 minutes    Patient Goals for Therapy: \"to get better\"          Discharge Recommendations: Home with 24/7 assist  and Home OT   DME needs for discharge: RW and Shower Chair - patient stated that she had a RW today, but stated that she didn't have one during evaluation       Therapy recommendations for staff:   Assist of 1 for ambulation with use of rolling walker (RW) and gait belt to/from chair  to/from bathroom  within room    History of Present Illness: Per TESFAYE Ann,   \"88 y.o. female who presented to [facility] with past medical Struve atrial fibrillation, type 2 diabetes, hypertension, hyperlipidemia, SVT presented to ED with chief complaint of abdominal pain nausea and vomiting that started this afternoon.     Patient reported that abdominal pain is generalized to worsen with minimal anemia has not been able to eat and drink the past few hours since yesterday afternoon.  No associate of loss of conscious chest pain fever chills cough phlegm production shortness of breath or dysuria. Patient reports she does have history of SBO this is very similar to prior presentation\"    Home Health S4 Level Recommendation:  NA    AM-PAC Score: AM-PAC Inpatient Daily Activity Raw Score: 15     Subjective:  Patient lying reclined in bed with family present (son).   Pt agreeable  return to PLOF and prior living environment with the least amount of assistance/supervision required. Patient would continue to benefit from skilled occupational therapy services during their length of stay in order to increase safety and independence with functional mobility and ADL performance.     Recommending Home 24 hr assist and with home PT upon discharge as patient functioning below baseline level    Goal(s) : all remaining goals ongoing 2/22/24  To be met in 3 Visits:  Bed to toilet/BSC:       Max A - GOAL MET 2/22/24  NEW GOAL: Bed to toilet/BSC     SBA   Pt will complete 3/3 CHF goals     N/A    To be met in 5 Visits:  Supine to/from Sit in preparation for ADL task:   Mod A - GOAL MET 2/22/24  NEW GOAL: Supine to/from Sit in preparation for ADL task: Modified Independent   Toileting        Mod A  Grooming       CGA  Upper Body Dressing:      CGA  Lower Body Dressing:      Mod A  Pt to demonstrate UE therapeutic exs x 15 reps with minimal cues    Rehabilitation Potential: Good  Strengths for achieving goals include: PLOF and Pt cooperative   Barriers to achieving goals include:  Complexity of condition    Plan:  To be seen 3-5 x/wk while in acute care setting for therapeutic exercises, bed mobility, transfers, family/patient education, ADL/IADL retraining, and energy conservation training.    Electronically signed by EDUARDO Chang on 2/22/2024 at 12:58 PM      If patient discharges from this facility prior to next visit, this note will serve as the Discharge Summary

## 2024-02-22 NOTE — PROGRESS NOTES
replacement **OR** potassium chloride, magnesium sulfate, promethazine **OR** ondansetron, acetaminophen **OR** acetaminophen, labetalol, glucose, dextrose bolus **OR** dextrose bolus, glucagon (rDNA), dextrose, heparin (porcine), heparin (porcine), HYDROmorphone **OR** HYDROmorphone      Data:  CBC:   Recent Labs     02/21/24 0021 02/21/24 0511 02/22/24 0313   WBC 10.5 25.1* 14.5*   HGB 13.9 14.5 12.7   HCT 42.3 46.4 39.9   MCV 78.8* 81.4 79.8*    290 282     BMP:   Recent Labs     02/21/24 0021 02/21/24 0511 02/22/24 0313    137 135*   K 4.3 5.0 4.6    102 100   CO2 23 17* 23   BUN 15 17 26*   CREATININE 0.9 0.8 1.0     LIVER PROFILE:   Recent Labs     02/21/24 0021 02/21/24 0511 02/22/24 0313   AST 50* 68* 29   ALT 47* 55* 38   LIPASE 15.0  --   --    BILITOT 0.5 0.8 0.6   ALKPHOS 100 116 81     PT/INR:   Recent Labs     02/21/24  0608   PROTIME 15.4*   INR 1.22*       CULTURES    Results       Procedure Component Value Units Date/Time    COVID-19 & Influenza Combo [[id number]] Collected: 02/21/24 0305    Order Status: Completed Specimen: Nasopharyngeal Swab Updated: 02/21/24 0351     SARS-CoV-2 RNA, RT PCR NOT DETECTED     Comment: Not Detected results do not preclude SARS-CoV-2 infection and  should not be used as the sole basis for patient management  decisions.  Results must be combined with clinical observations,  patient history, and epidemiological information.  Testing was performed using RENEE CRISTIN SARS-CoV-2 and Influenza A/B  nucleic acid assay. This test is a multiplex Real-Time Reverse  Transcriptase Polymerase Chain Reaction (RT-PCR)-based in vitro  diagnostic test intended for the qualitative detection of nucleic  acids from SARS-CoV-2, influenza A, and influenza B in nasopharyngeal  and nasal swab specimens for use under the FDA’s Emergency Use  Authorization (EUA) only.    Patient Fact Sheet:  https://www.fda.gov/media/216277/download  Provider Fact Sheet:  X-rays   - General Surgery consulted      HTN, uncontrolled.  Will start home meds when she can take p.o.  - oral regimen on hold  - patient has multiple allergies to HTN medications  - blood pressure improved with Nitrodur patch, pt is on this at home  - home regimen includes: Losartan, coreg, Norvasc, aldactone and lasix  - monitor       Hypoxia  - on 2 liters  - wean as tolerated  - incentive spirometry       Leukocytosis  - likely reactive, improved 25--14.5  - no fever  - monitor     Atrial fibrillation   Hx of SVT  - HR controlled at this time   - on eliquis at home and amiodarone   - started on Heparin gtt as pt NPO  - Secondary hypercoagulable state in a patient with atrial fibrillation  - monitor on telemetry        Chronic diastolic CHF  - appears compensated  - echo as above, EF 55-60%  - holding losartan, lasix, aldactone at this time   - IVF discontinued  - daily weights, strict I/O  - resume home regimen when able   - I will start IVF for now but at 50 cc/hr given history of heart failure.     DM type 2  - ? If patient taking pioglitazone 15 mg at home, no on home med list, last filled 12/23 for 90 day supply  - SSI  - A1c 7.5  - NPO SSI ordered  - monitor glucose        Note above makes patient higher risk for morbidity and mortality requiring testing and treatment.        DVT Prophylaxis: heparin   Diet: Diet NPO Exceptions are: Ice Chips, Sips of Water with Meds  Code Status: Full Code      JONI WALLACE MD 2/22/2024 11:49 AM

## 2024-02-23 VITALS
OXYGEN SATURATION: 95 % | BODY MASS INDEX: 31.83 KG/M2 | HEART RATE: 64 BPM | RESPIRATION RATE: 14 BRPM | DIASTOLIC BLOOD PRESSURE: 65 MMHG | WEIGHT: 173 LBS | SYSTOLIC BLOOD PRESSURE: 180 MMHG | TEMPERATURE: 97.9 F | HEIGHT: 62 IN

## 2024-02-23 LAB
ALBUMIN SERPL-MCNC: 3.1 G/DL (ref 3.4–5)
ALBUMIN/GLOB SERPL: 0.8 {RATIO} (ref 1.1–2.2)
ALP SERPL-CCNC: 79 U/L (ref 40–129)
ALT SERPL-CCNC: 25 U/L (ref 10–40)
ANION GAP SERPL CALCULATED.3IONS-SCNC: 10 MMOL/L (ref 3–16)
APTT BLD: 76.6 SEC (ref 22.7–35.9)
AST SERPL-CCNC: 18 U/L (ref 15–37)
BASOPHILS # BLD: 0 K/UL (ref 0–0.2)
BASOPHILS NFR BLD: 0.2 %
BILIRUB SERPL-MCNC: 0.6 MG/DL (ref 0–1)
BUN SERPL-MCNC: 17 MG/DL (ref 7–20)
CALCIUM SERPL-MCNC: 7.6 MG/DL (ref 8.3–10.6)
CHLORIDE SERPL-SCNC: 103 MMOL/L (ref 99–110)
CO2 SERPL-SCNC: 24 MMOL/L (ref 21–32)
CREAT SERPL-MCNC: 0.7 MG/DL (ref 0.6–1.2)
DEPRECATED RDW RBC AUTO: 15.8 % (ref 12.4–15.4)
EOSINOPHIL # BLD: 0.1 K/UL (ref 0–0.6)
EOSINOPHIL NFR BLD: 1 %
GFR SERPLBLD CREATININE-BSD FMLA CKD-EPI: >60 ML/MIN/{1.73_M2}
GLUCOSE BLD-MCNC: 146 MG/DL (ref 70–99)
GLUCOSE BLD-MCNC: 152 MG/DL (ref 70–99)
GLUCOSE BLD-MCNC: 161 MG/DL (ref 70–99)
GLUCOSE BLD-MCNC: 165 MG/DL (ref 70–99)
GLUCOSE SERPL-MCNC: 175 MG/DL (ref 70–99)
HCT VFR BLD AUTO: 37.6 % (ref 36–48)
HGB BLD-MCNC: 12 G/DL (ref 12–16)
LYMPHOCYTES # BLD: 1.5 K/UL (ref 1–5.1)
LYMPHOCYTES NFR BLD: 12.7 %
MCH RBC QN AUTO: 25.5 PG (ref 26–34)
MCHC RBC AUTO-ENTMCNC: 32 G/DL (ref 31–36)
MCV RBC AUTO: 79.7 FL (ref 80–100)
MONOCYTES # BLD: 1.3 K/UL (ref 0–1.3)
MONOCYTES NFR BLD: 11.5 %
NEUTROPHILS # BLD: 8.7 K/UL (ref 1.7–7.7)
NEUTROPHILS NFR BLD: 74.6 %
PERFORMED ON: ABNORMAL
PLATELET # BLD AUTO: 253 K/UL (ref 135–450)
PMV BLD AUTO: 10.1 FL (ref 5–10.5)
POTASSIUM SERPL-SCNC: 4.1 MMOL/L (ref 3.5–5.1)
PROT SERPL-MCNC: 6.9 G/DL (ref 6.4–8.2)
RBC # BLD AUTO: 4.72 M/UL (ref 4–5.2)
SODIUM SERPL-SCNC: 137 MMOL/L (ref 136–145)
WBC # BLD AUTO: 11.7 K/UL (ref 4–11)

## 2024-02-23 PROCEDURE — 80053 COMPREHEN METABOLIC PANEL: CPT

## 2024-02-23 PROCEDURE — 97110 THERAPEUTIC EXERCISES: CPT

## 2024-02-23 PROCEDURE — 85025 COMPLETE CBC W/AUTO DIFF WBC: CPT

## 2024-02-23 PROCEDURE — 2580000003 HC RX 258: Performed by: INTERNAL MEDICINE

## 2024-02-23 PROCEDURE — 6370000000 HC RX 637 (ALT 250 FOR IP): Performed by: INTERNAL MEDICINE

## 2024-02-23 PROCEDURE — 94761 N-INVAS EAR/PLS OXIMETRY MLT: CPT

## 2024-02-23 PROCEDURE — 99238 HOSP IP/OBS DSCHRG MGMT 30/<: CPT | Performed by: INTERNAL MEDICINE

## 2024-02-23 PROCEDURE — 36415 COLL VENOUS BLD VENIPUNCTURE: CPT

## 2024-02-23 PROCEDURE — 97530 THERAPEUTIC ACTIVITIES: CPT

## 2024-02-23 PROCEDURE — 99231 SBSQ HOSP IP/OBS SF/LOW 25: CPT | Performed by: SURGERY

## 2024-02-23 PROCEDURE — 2700000000 HC OXYGEN THERAPY PER DAY

## 2024-02-23 PROCEDURE — 85730 THROMBOPLASTIN TIME PARTIAL: CPT

## 2024-02-23 RX ORDER — PANTOPRAZOLE SODIUM 40 MG/1
40 TABLET, DELAYED RELEASE ORAL
Status: DISCONTINUED | OUTPATIENT
Start: 2024-02-23 | End: 2024-02-23 | Stop reason: HOSPADM

## 2024-02-23 RX ORDER — FUROSEMIDE 20 MG/1
20 TABLET ORAL DAILY
Status: DISCONTINUED | OUTPATIENT
Start: 2024-02-23 | End: 2024-02-23 | Stop reason: HOSPADM

## 2024-02-23 RX ORDER — CARVEDILOL 3.12 MG/1
3.12 TABLET ORAL 2 TIMES DAILY WITH MEALS
Status: DISCONTINUED | OUTPATIENT
Start: 2024-02-23 | End: 2024-02-23 | Stop reason: HOSPADM

## 2024-02-23 RX ORDER — AMLODIPINE BESYLATE 5 MG/1
10 TABLET ORAL NIGHTLY
Status: DISCONTINUED | OUTPATIENT
Start: 2024-02-23 | End: 2024-02-23 | Stop reason: HOSPADM

## 2024-02-23 RX ORDER — AMIODARONE HYDROCHLORIDE 200 MG/1
200 TABLET ORAL 2 TIMES DAILY
Status: DISCONTINUED | OUTPATIENT
Start: 2024-02-23 | End: 2024-02-23 | Stop reason: HOSPADM

## 2024-02-23 RX ORDER — LOSARTAN POTASSIUM 25 MG/1
50 TABLET ORAL 2 TIMES DAILY
Status: DISCONTINUED | OUTPATIENT
Start: 2024-02-23 | End: 2024-02-23 | Stop reason: HOSPADM

## 2024-02-23 RX ORDER — NITROGLYCERIN 40 MG/1
1 PATCH TRANSDERMAL DAILY
Status: DISCONTINUED | OUTPATIENT
Start: 2024-02-23 | End: 2024-02-23 | Stop reason: HOSPADM

## 2024-02-23 RX ORDER — SPIRONOLACTONE 25 MG/1
50 TABLET ORAL DAILY
Status: DISCONTINUED | OUTPATIENT
Start: 2024-02-23 | End: 2024-02-23 | Stop reason: HOSPADM

## 2024-02-23 RX ORDER — PRAVASTATIN SODIUM 40 MG
40 TABLET ORAL DAILY
Status: DISCONTINUED | OUTPATIENT
Start: 2024-02-23 | End: 2024-02-23 | Stop reason: HOSPADM

## 2024-02-23 RX ADMIN — PANTOPRAZOLE SODIUM 40 MG: 40 TABLET, DELAYED RELEASE ORAL at 11:36

## 2024-02-23 RX ADMIN — PRAVASTATIN SODIUM 40 MG: 40 TABLET ORAL at 11:35

## 2024-02-23 RX ADMIN — SODIUM CHLORIDE, PRESERVATIVE FREE 10 ML: 5 INJECTION INTRAVENOUS at 09:32

## 2024-02-23 RX ADMIN — AMIODARONE HYDROCHLORIDE 200 MG: 200 TABLET ORAL at 11:35

## 2024-02-23 RX ADMIN — LOSARTAN POTASSIUM 50 MG: 25 TABLET, FILM COATED ORAL at 11:36

## 2024-02-23 RX ADMIN — FUROSEMIDE 20 MG: 20 TABLET ORAL at 11:34

## 2024-02-23 RX ADMIN — SPIRONOLACTONE 50 MG: 25 TABLET ORAL at 11:35

## 2024-02-23 RX ADMIN — CARVEDILOL 3.12 MG: 3.12 TABLET, FILM COATED ORAL at 11:36

## 2024-02-23 RX ADMIN — APIXABAN 5 MG: 5 TABLET, FILM COATED ORAL at 11:36

## 2024-02-23 NOTE — PROGRESS NOTES
[facility]    PATIENT NAME: Lashanda Tiwari     TODAY'S DATE: 2/23/2024    CHIEF COMPLAINT: none    INTERVAL HISTORY/HPI:    Pt feels well. No pain. Tolerating PO.     REVIEW OF SYSTEMS:  Pertinent positives and negatives as per interval history section    OBJECTIVE:  VITALS:  BP (!) 180/65   Pulse 64   Temp 97.9 °F (36.6 °C) (Oral)   Resp 14   Ht 1.575 m (5' 2\")   Wt 78.5 kg (173 lb)   SpO2 95%   BMI 31.64 kg/m²     INTAKE/OUTPUT:    I/O last 3 completed shifts:  In: 0   Out: 1675 [Urine:575; Emesis/NG output:1100]  I/O this shift:  In: 222 [P.O.:222]  Out: 476 [Urine:475; Stool:1]    CONSTITUTIONAL:  awake and alert  LUNGS:  Respirations easy and unlabored, clear to auscultation  CARD:  regular rate and rhythm  ABDOMEN:  normal bowel sounds, soft, non-distended, non-tender     Data:  CBC:   Recent Labs     02/21/24  0511 02/22/24  0313 02/23/24  0449   WBC 25.1* 14.5* 11.7*   HGB 14.5 12.7 12.0   HCT 46.4 39.9 37.6    282 253     BMP:    Recent Labs     02/21/24  0511 02/22/24  0313 02/23/24  0449    135* 137   K 5.0 4.6 4.1    100 103   CO2 17* 23 24   BUN 17 26* 17   CREATININE 0.8 1.0 0.7   GLUCOSE 312* 179* 175*     Hepatic:   Recent Labs     02/21/24  0511 02/22/24  0313 02/23/24  0449   AST 68* 29 18   ALT 55* 38 25   BILITOT 0.8 0.6 0.6   ALKPHOS 116 81 79     Mag:    No results for input(s): \"MG\" in the last 72 hours.   Phos:   No results for input(s): \"PHOS\" in the last 72 hours.   INR:   Recent Labs     02/21/24  0608   INR 1.22*       Radiology Review:  *Imaging personally reviewed by me.   NA      ASSESSMENT AND PLAN:  SBO - resolved. OK for discharge from surgical standpoint     Electronically signed by LETICIA WHYTE MD     [id number]

## 2024-02-23 NOTE — FLOWSHEET NOTE
02/22/24 2015   Vitals   Temp 98.3 °F (36.8 °C)   Temp Source Oral   Pulse 70   Heart Rate Source Monitor   Respirations 18   BP (!) 183/63   MAP (Calculated) 103   BP Location Left upper arm   BP Method Automatic   Patient Position Semi fowlers     Assessment & vital signs completed. Evening meds given per MAR. Pt denies any further needs at this time. Call light within reach, pt is stable.

## 2024-02-23 NOTE — PROGRESS NOTES
Patient educated on discharge instructions as well as new medications use, dosage, administration and possible side effects.  Patient verified knowledge. IV removed without difficulty and dry dressing in place. T Pt left facility in stable condition to Home with all of their personal belongings.

## 2024-02-23 NOTE — DISCHARGE SUMMARY
Name:  Lashanda Tiwari  Room:  /0323-02  MRN:    [id number]    Discharge Summary      This discharge summary is in conjunction with a complete physical exam done on the day of discharge.    Discharging Physician: Dr. Jarrett       Admit: 2/21/2024  Discharge:  02/23/24     HPI taken from admission H&P:       88 y.o. female who presented to [facility] with past medical Struve atrial fibrillation, type 2 diabetes, hypertension, hyperlipidemia, SVT presented to ED with chief complaint of abdominal pain nausea and vomiting that started this afternoon.     Patient reported that abdominal pain is generalized to worsen with minimal anemia has not been able to eat and drink the past few hours since yesterday afternoon.  No associate of loss of conscious chest pain fever chills cough phlegm production shortness of breath or dysuria.  Patient reports she does have history of SBO this is very similar to prior presentation       Diagnoses this Admission and Hospital Course     Small Bowel Obstruction resolved.  - CT as above  - NG placed-- NPO at the time of admission.  - PRN pain and nausea control   -I removed her NG tube out.  - ambulate patient 4x a day  - serial X-rays   - General Surgery consulted  -Start full liquid diet--advanced and tolerated. Ok to discharge         HTN, uncontrolled.  I started her on medications  - oral regimen resumed.  She takes Coreg, Lasix, nitro, Norvasc and Aldactone.  - patient has multiple allergies to HTN medications        Hypoxia-I weaned her off the oxygen.  Monitor her oxygen status.- -She is a lifelong non-smoker  - incentive spirometry         Leukocytosis  - likely reactive, improved  - no fever  - monitor      Atrial fibrillation   Hx of SVT  - HR controlled at this time   - on eliquis at home and amiodarone   - started on Heparin gtt as pt NPO.  Stop heparin and start Eliquis  - Secondary hypercoagulable state in a patient with atrial fibrillation  - monitored on

## 2024-02-23 NOTE — FLOWSHEET NOTE
02/23/24 0751   Handoff   Communication Given Shift Handoff   Handoff Given To NOEMY Caldwell   Handoff Received From NOEMY Parish   Handoff Communication Face to Face   Time Handoff Given 0752   End of Shift Check Performed Yes     EOS report given to NOEMY Caldwell. Pt in bed, call light within reach. Pt is stable, care is transferred.

## 2024-02-23 NOTE — PROGRESS NOTES
Inpatient Physical Therapy Treatment    Unit: PCU  Date:  2/23/2024  Patient Name:    Lashanda Tiwari  Admitting diagnosis:  SBO (small bowel obstruction) (HCC) [K56.609]  Admit Date:  2/21/2024  Precautions/Restrictions/WB Status/ Lines/ Wounds/ Oxygen: Fall risk, Bed/chair alarm, Lines (IV, Supplemental O2 (3L), external catheter, and NG tube), Telemetry, and Continuous pulse oximetry    NG clamped  Pt seen for cotreatment this date due to patient safety, patient endurance, and acute illness/injury    Treatment Time:  1519 - 1557  Treatment Number:  3   Timed Code Treatment Minutes: 38 minutes  Total Treatment Minutes: 38  minutes    Patient Stated Goals for Therapy: \" to get better \"          Discharge Recommendations: Home with 24hr assistance and Home PT  DME needs for discharge: Shower Chair       Therapy recommendation for EMS Transport: requires transport by cot due to pt with poor sitting balance/tolerance and inability to perform OOB mobility at this time    Therapy recommendations for staff:   Assist of 1 for ambulation with use of rolling walker (RW) and gait belt to/from BSC  to/from chair  to/from bathroom  within room    History of Present Illness:   Per H&P: \"88 y.o. female who presented to [facility] with past medical Struve atrial fibrillation, type 2 diabetes, hypertension, hyperlipidemia, SVT presented to ED with chief complaint of abdominal pain nausea and vomiting that started this afternoon.     Patient reported that abdominal pain is generalized to worsen with minimal anemia has not been able to eat and drink the past few hours since yesterday afternoon.  No associate of loss of conscious chest pain fever chills cough phlegm production shortness of breath or dysuria.  Patient reports she does have history of SBO this is very similar to prior presentation\"    Home Health S4 Level Recommendation:  NA        AM-PAC Mobility Score    AM-PAC Inpatient Mobility Raw Score : 18

## 2024-02-23 NOTE — PROGRESS NOTES
Patient to have full liquids, heparin and IVF discontinued.  NGT discontinued by MD.  IVF and heparin stopped and IV flushed.  Patient states she is hungry.  Hot tea given per patient request.  Will monitor patient tolerance.

## 2024-02-23 NOTE — PROGRESS NOTES
NG suction started at Low intermittent 60. Pt tolerating well.   Electronically signed by Марина Ward RN on 2/22/2024 at 11:28 PM

## 2024-02-23 NOTE — DISCHARGE INSTR - DIET

## 2024-02-23 NOTE — PLAN OF CARE
Problem: Discharge Planning  Goal: Discharge to home or other facility with appropriate resources  2/23/2024 1608 by Paulette Palomo RN  Outcome: Completed  2/23/2024 1607 by Paulette Palomo RN  Outcome: Progressing     Problem: Pain  Goal: Verbalizes/displays adequate comfort level or baseline comfort level  2/23/2024 1608 by Paulette Palomo RN  Outcome: Completed  2/23/2024 1607 by Paulette Palomo RN  Outcome: Progressing     Problem: Safety - Adult  Goal: Free from fall injury  2/23/2024 1608 by Paulette Palomo RN  Outcome: Completed  2/23/2024 1607 by Paulette Palomo RN  Outcome: Progressing     Problem: ABCDS Injury Assessment  Goal: Absence of physical injury  2/23/2024 1608 by Paulette Palomo RN  Outcome: Completed  2/23/2024 1607 by Paulette Palomo RN  Outcome: Progressing     Problem: Chronic Conditions and Co-morbidities  Goal: Patient's chronic conditions and co-morbidity symptoms are monitored and maintained or improved  2/23/2024 1608 by Paulette Palomo RN  Outcome: Completed  2/23/2024 1607 by Paulette Palomo RN  Outcome: Progressing  Note:   HEART FAILURE CARE PLAN:    Comorbidities Reviewed: Yes   Patient has a past medical history of Atrial fibrillation (HCC), Congestive heart failure (HCC), Diabetes mellitus (HCC), Hydronephrosis, Hyperlipidemia, Hypertension, Intussusception intestine (HCC), and SVT (supraventricular tachycardia).     Weights Reviewed: Yes   Admission weight: 78.5 kg (173 lb)   Wt Readings from Last 3 Encounters:   02/23/24 78.5 kg (173 lb)   02/16/24 78.8 kg (173 lb 11.6 oz)   02/06/24 81 kg (178 lb 8 oz)     Intake & Output Reviewed: Yes     Intake/Output Summary (Last 24 hours) at 2/23/2024 1608  Last data filed at 2/23/2024 1230  Gross per 24 hour   Intake 222 ml   Output 1226 ml   Net -1004 ml       ECHOCARDIOGRAM Reviewed: Yes   Patient's Ejection Fraction (EF) is greater than 40%     Medications Reviewed: Yes   SCHEDULED HOSPITAL MEDICATIONS:   amiodarone  200  Provider, Rosemarie, MD      Diet Reviewed: Yes   ADULT DIET; Full Liquid    Goal of Care Reviewed: Yes   Patient and/or Family's stated Goal of Care this Admission: Reduce shortness of breath, increase activity tolerance, better understand heart failure and disease management, be more comfortable, and reduce lower extremity edema prior to discharge.     Electronically signed by Paulette Palomo RN on 2/23/2024 at 4:08 PM

## 2024-02-23 NOTE — FLOWSHEET NOTE
02/22/24 1545   Vital Signs   Temp 97 °F (36.1 °C)   Pulse 70   Heart Rate Source Monitor   Respirations 18   BP (!) 185/67   MAP (Calculated) 106   BP Location Left upper arm   BP Method Automatic   Pain Assessment   Campos-Baker Pain Rating 0   Oxygen Therapy   SpO2 97 %   O2 Flow Rate (L/min) 2 L/min     Dr. Jarrett aware of patients BP, no meds ordered because of patient's allergies   Mustapha Bhatia

## 2024-02-23 NOTE — PROGRESS NOTES
Daily    sodium chloride flush  10 mL IntraVENous 2 times per day    melatonin  3 mg Oral Nightly    insulin lispro  0-4 Units SubCUTAneous Q4H       Continuous Infusions:   sodium chloride      dextrose         PRN Meds:  sodium chloride flush, sodium chloride, potassium chloride **OR** potassium alternative oral replacement **OR** potassium chloride, magnesium sulfate, promethazine **OR** ondansetron, acetaminophen **OR** acetaminophen, labetalol, glucose, dextrose bolus **OR** dextrose bolus, glucagon (rDNA), dextrose, heparin (porcine), heparin (porcine), HYDROmorphone **OR** HYDROmorphone      Data:  CBC:   Recent Labs     02/21/24  0511 02/22/24 0313 02/23/24  0449   WBC 25.1* 14.5* 11.7*   HGB 14.5 12.7 12.0   HCT 46.4 39.9 37.6   MCV 81.4 79.8* 79.7*    282 253       BMP:   Recent Labs     02/21/24  0511 02/22/24 0313 02/23/24  0449    135* 137   K 5.0 4.6 4.1    100 103   CO2 17* 23 24   BUN 17 26* 17   CREATININE 0.8 1.0 0.7       LIVER PROFILE:   Recent Labs     02/21/24  0021 02/21/24 0511 02/22/24 0313 02/23/24  0449   AST 50* 68* 29 18   ALT 47* 55* 38 25   LIPASE 15.0  --   --   --    BILITOT 0.5 0.8 0.6 0.6   ALKPHOS 100 116 81 79       PT/INR:   Recent Labs     02/21/24  0608   PROTIME 15.4*   INR 1.22*         CULTURES    Results       Procedure Component Value Units Date/Time    COVID-19 & Influenza Combo [[id number]] Collected: 02/21/24 0305    Order Status: Completed Specimen: Nasopharyngeal Swab Updated: 02/21/24 0351     SARS-CoV-2 RNA, RT PCR NOT DETECTED     Comment: Not Detected results do not preclude SARS-CoV-2 infection and  should not be used as the sole basis for patient management  decisions.  Results must be combined with clinical observations,  patient history, and epidemiological information.  Testing was performed using RENEE CRISTIN SARS-CoV-2 and Influenza A/B  nucleic acid assay. This test is a multiplex Real-Time Reverse  Transcriptase Polymerase Chain  pulmonary artery systolic pressure is 63 mmHg assuming a right   atrial pressure of 3 mmHg.   The right atrium is dilated.      Assessment/Plan:    Small Bowel Obstruction resolved.  - CT as above  - NG placed-- NPO at the time of admission.  - PRN pain and nausea control   -I removed her NG tube out.  - ambulate patient 4x a day  - serial X-rays   - General Surgery consulted  -Start full liquid diet      HTN, uncontrolled.  I started her on medications  - oral regimen resume.  She takes Coreg, Lasix, nitro, Norvasc and Aldactone.  - patient has multiple allergies to HTN medications      Hypoxia-I weaned her off the oxygen.  Monitor her oxygen status.- -She is a lifelong non-smoker  - incentive spirometry       Leukocytosis  - likely reactive, improved  - no fever  - monitor     Atrial fibrillation   Hx of SVT  - HR controlled at this time   - on eliquis at home and amiodarone   - started on Heparin gtt as pt NPO.  Stop heparin and start Eliquis  - Secondary hypercoagulable state in a patient with atrial fibrillation  - monitor on telemetry        Chronic diastolic CHF  - appears compensated  - echo as above, EF 55-60%  - holding losartan, lasix, aldactone at this time-okay to resume no  - daily weights, strict I/O  - resume home regimen when able   -Stop IV fluids    DM type 2  - ? If patient taking pioglitazone 15 mg at home, no on home med list, last filled 12/23 for 90 day supply  - SSI  - A1c 7.5  - monitor glucose       Discharge planning     Note above makes patient higher risk for morbidity and mortality requiring testing and treatment.        DVT Prophylaxis: heparin   Diet: ADULT DIET; Full Liquid  Code Status: Full Code      JONI WALLACE MD 2/23/2024 9:29 AM

## 2024-02-23 NOTE — PROGRESS NOTES
2/23  aPTT = 76.6 sec at 0449.  Continue heparin gtt at 16 units/kg/hr.  Continue to check aPTT daily.  Dmitry Chavez, PharmD  2/23/2024 5:41 AM

## 2024-02-23 NOTE — PLAN OF CARE
HEART FAILURE CARE PLAN:    Comorbidities Reviewed: Yes   Patient has a past medical history of Atrial fibrillation (HCC), Congestive heart failure (HCC), Diabetes mellitus (HCC), Hydronephrosis, Hyperlipidemia, Hypertension, Intussusception intestine (HCC), and SVT (supraventricular tachycardia).     Weights Reviewed: Yes   Admission weight: 78.5 kg (173 lb)   Wt Readings from Last 3 Encounters:   02/22/24 75.5 kg (166 lb 8 oz)   02/16/24 78.8 kg (173 lb 11.6 oz)   02/06/24 81 kg (178 lb 8 oz)     Intake & Output Reviewed: Yes     Intake/Output Summary (Last 24 hours) at 2/22/2024 2127  Last data filed at 2/22/2024 1654  Gross per 24 hour   Intake 0 ml   Output 1375 ml   Net -1375 ml       ECHOCARDIOGRAM Reviewed: Yes   Patient's Ejection Fraction (EF) is greater than 40%     Medications Reviewed: Yes   SCHEDULED HOSPITAL MEDICATIONS:   medicated lip balm        sodium chloride flush  10 mL IntraVENous 2 times per day    melatonin  3 mg Oral Nightly    insulin lispro  0-4 Units SubCUTAneous Q4H    nitroGLYCERIN  1 patch TransDERmal Daily     HOME MEDICATIONS:  Prior to Admission medications    Medication Sig Start Date End Date Taking? Authorizing Provider   nitroGLYCERIN (NITRODUR) 0.2 MG/HR Place 1 patch onto the skin daily 12 hours on, 12 hours off 2/19/24   Nyla Dove, DO   losartan (COZAAR) 50 MG tablet Take 1 tablet by mouth in the morning and at bedtime 2/18/24   Nyla Dove,    carvedilol (COREG) 3.125 MG tablet Take 1 tablet by mouth 2 times daily (with meals) 2/18/24   Nyla Dove,    amLODIPine (NORVASC) 10 MG tablet Take 1 tablet by mouth nightly 2/19/24   Nyla Dove,    spironolactone (ALDACTONE) 50 MG tablet Take 1 tablet by mouth daily 2/18/24   Nyla Dove,    pantoprazole (PROTONIX) 20 MG tablet Take 2 tablets by mouth daily 2/18/24   Nyla Dove, DO   apixaban (ELIQUIS) 5 MG TABS tablet Take 1 tablet by mouth 2 times daily 2/9/24   ÁNGEL Mendoza Jr., MD   amiodarone

## 2024-02-23 NOTE — PLAN OF CARE
Problem: Discharge Planning  Goal: Discharge to home or other facility with appropriate resources  Outcome: Progressing     Problem: Pain  Goal: Verbalizes/displays adequate comfort level or baseline comfort level  Outcome: Progressing     Problem: Safety - Adult  Goal: Free from fall injury  Outcome: Progressing     Problem: ABCDS Injury Assessment  Goal: Absence of physical injury  Outcome: Progressing     Problem: Chronic Conditions and Co-morbidities  Goal: Patient's chronic conditions and co-morbidity symptoms are monitored and maintained or improved  Outcome: Progressing  Note:   HEART FAILURE CARE PLAN:    Comorbidities Reviewed: Yes   Patient has a past medical history of Atrial fibrillation (HCC), Congestive heart failure (HCC), Diabetes mellitus (HCC), Hydronephrosis, Hyperlipidemia, Hypertension, Intussusception intestine (HCC), and SVT (supraventricular tachycardia).     Weights Reviewed: Yes   Admission weight: 78.5 kg (173 lb)   Wt Readings from Last 3 Encounters:   02/23/24 78.5 kg (173 lb)   02/16/24 78.8 kg (173 lb 11.6 oz)   02/06/24 81 kg (178 lb 8 oz)     Intake & Output Reviewed: Yes     Intake/Output Summary (Last 24 hours) at 2/23/2024 1608  Last data filed at 2/23/2024 1230  Gross per 24 hour   Intake 222 ml   Output 1226 ml   Net -1004 ml       ECHOCARDIOGRAM Reviewed: Yes   Patient's Ejection Fraction (EF) is greater than 40%     Medications Reviewed: Yes   SCHEDULED HOSPITAL MEDICATIONS:   amiodarone  200 mg Oral BID    amLODIPine  10 mg Oral Nightly    apixaban  5 mg Oral BID    carvedilol  3.125 mg Oral BID WC    losartan  50 mg Oral BID    nitroGLYCERIN  1 patch TransDERmal Daily    pravastatin  40 mg Oral Daily    spironolactone  50 mg Oral Daily    pantoprazole  40 mg Oral QAM AC    furosemide  20 mg Oral Daily    sodium chloride flush  10 mL IntraVENous 2 times per day    melatonin  3 mg Oral Nightly    insulin lispro  0-4 Units SubCUTAneous Q4H     HOME MEDICATIONS:  Prior to  Admission medications    Medication Sig Start Date End Date Taking? Authorizing Provider   nitroGLYCERIN (NITRODUR) 0.2 MG/HR Place 1 patch onto the skin daily 12 hours on, 12 hours off 2/19/24   Nyla Dove DO   losartan (COZAAR) 50 MG tablet Take 1 tablet by mouth in the morning and at bedtime 2/18/24   Nyla Dove DO   carvedilol (COREG) 3.125 MG tablet Take 1 tablet by mouth 2 times daily (with meals) 2/18/24   Nyla Dove DO   amLODIPine (NORVASC) 10 MG tablet Take 1 tablet by mouth nightly 2/19/24   Nyla Dove DO   spironolactone (ALDACTONE) 50 MG tablet Take 1 tablet by mouth daily 2/18/24   Nyla Dove DO   pantoprazole (PROTONIX) 20 MG tablet Take 2 tablets by mouth daily 2/18/24   Nyla Dove DO   apixaban (ELIQUIS) 5 MG TABS tablet Take 1 tablet by mouth 2 times daily 2/9/24   ÁNGEL Mendoza Jr., MD   amiodarone (CORDARONE) 200 MG tablet Take 1 tablet by mouth 2 times daily 1/10/24   ÁNGEL Mendoza Jr., MD   furosemide (LASIX) 20 MG tablet Take 1 tablet by mouth daily Patient states she will take it when she is home    ProviderRosemarie MD   pravastatin (PRAVACHOL) 80 MG tablet Take 0.5 tablets by mouth daily    ProviderRosemarie MD      Diet Reviewed: Yes   ADULT DIET; Full Liquid    Goal of Care Reviewed: Yes   Patient and/or Family's stated Goal of Care this Admission: Reduce shortness of breath, increase activity tolerance, better understand heart failure and disease management, be more comfortable, and reduce lower extremity edema prior to discharge.     Electronically signed by Paulette Palomo RN on 2/23/2024 at 4:08 PM

## 2024-02-29 ENCOUNTER — HOSPITAL ENCOUNTER (OUTPATIENT)
Age: 89
Discharge: HOME | End: 2024-02-29
Payer: MEDICARE

## 2024-02-29 DIAGNOSIS — I48.0 PAF (PAROXYSMAL ATRIAL FIBRILLATION) (HCC): ICD-10-CM

## 2024-02-29 DIAGNOSIS — I50.32 CHRONIC DIASTOLIC HEART FAILURE (HCC): ICD-10-CM

## 2024-02-29 DIAGNOSIS — R06.02 SHORTNESS OF BREATH: ICD-10-CM

## 2024-02-29 LAB
ANION GAP SERPL CALCULATED.3IONS-SCNC: 17 MMOL/L (ref 3–16)
BUN SERPL-MCNC: 15 MG/DL (ref 7–20)
CALCIUM SERPL-MCNC: 8.7 MG/DL (ref 8.3–10.6)
CHLORIDE SERPL-SCNC: 97 MMOL/L (ref 99–110)
CO2 SERPL-SCNC: 21 MMOL/L (ref 21–32)
CREAT SERPL-MCNC: 1 MG/DL (ref 0.6–1.2)
GFR SERPLBLD CREATININE-BSD FMLA CKD-EPI: 54 ML/MIN/{1.73_M2}
GLUCOSE SERPL-MCNC: 268 MG/DL (ref 70–99)
NT-PROBNP SERPL-MCNC: 414 PG/ML (ref 0–449)
POTASSIUM SERPL-SCNC: 4.2 MMOL/L (ref 3.5–5.1)
SODIUM SERPL-SCNC: 135 MMOL/L (ref 136–145)
TSH SERPL DL<=0.005 MIU/L-ACNC: 3.64 UIU/ML (ref 0.27–4.2)

## 2024-02-29 PROCEDURE — 36415 COLL VENOUS BLD VENIPUNCTURE: CPT

## 2024-02-29 PROCEDURE — 83880 ASSAY OF NATRIURETIC PEPTIDE: CPT

## 2024-02-29 PROCEDURE — 84443 ASSAY THYROID STIM HORMONE: CPT

## 2024-02-29 PROCEDURE — 80048 BASIC METABOLIC PNL TOTAL CA: CPT

## 2024-03-04 ENCOUNTER — TELEPHONE (OUTPATIENT)
Dept: CARDIOLOGY CLINIC | Age: 89
End: 2024-03-04

## 2024-03-04 NOTE — TELEPHONE ENCOUNTER
----- Message from ÁNGEL Mendoza Jr., MD sent at 3/1/2024  5:14 PM EST -----  Reviewed.  Please let patient know thyroid function within normal limits.  Thanks.

## 2024-03-05 ENCOUNTER — TELEPHONE (OUTPATIENT)
Dept: CARDIOLOGY CLINIC | Age: 89
End: 2024-03-05

## 2024-03-05 NOTE — TELEPHONE ENCOUNTER
----- Message from Stella Richards MD sent at 3/4/2024 10:11 PM EST -----  Please call patient and let her know that her labs are stable.  No changes.  SHELLY

## 2024-03-06 ENCOUNTER — TELEPHONE (OUTPATIENT)
Dept: CARDIOLOGY CLINIC | Age: 89
End: 2024-03-06

## 2024-03-06 NOTE — TELEPHONE ENCOUNTER
Pt states her BP dr, dr Kasper, put the pt on Minoxidil. Pt was concerned because the medication states it can mess with the heart. Pt wants to know if she is okay to take this medication Please advise

## 2024-03-11 NOTE — PROGRESS NOTES
[facility]   Electrophysiology Note    Date: 3/12/24  Patient Name: Lashanda Tiwari  YOB: 1935     Chief Complaint:  Follow-Up from Hospital, Atrial Fibrillation, Tachycardia (SVT), and Other (PVC)    Primary Care Physician: Dmitry Moreno MD     HISTORY OF PRESENT ILLNESS: Lashanda Tiwari is a 88 y.o. female who presents for follow up for paroxysmal atrial fibrillation. She has a past medical history of HTN, pulmonary HTN, dCHF, DM and SVT. She wore a cardiac event monitor in 10/2013 and AF was detected, longest lasting 2hrs 43 minutes with HR max 160 BPM. She had a Cryoablation for PAF on 2/11/2014. She underwent an echocardiogram in 2/2019 that demonstrated EF of 55-60% with severe pulmonary HTN. She was then referred to the heart failure team. She had recurrent PAF in 1/2020 and was started on eliquis and amiodarone. Amiodarone was stopped for unknown reasons. She underwent an echocardiogram on 4/28/20 that demonstrated an EF of 55-60% with no regional wall motion abnormalities, with LA mildly dilated, mild MR and TR. While seeing Dr. Richards in office 11/3/20 she was found to be in AF with RVR. EKG 11/3/20 demonstrates AF with  BPM. She reported she had an RFCA for AF several years ago and had been in rhythm since.   Her cardiac event monitor from 11/3-11/12/20, showed a high burden of afib with poorly controlled rates. She presented to the ER on 12/18/20 with abdominal pain. Her abdominal pain started with palpitations. Her EKG showed afib rvr. She converted to sinus after the first dose of Tikosyn.  ECG, 2/4/2021, sinus bradycardiac rate 57.   In 12/2021, echo showed an EF of 55 to 60% and a normal SPAP. In 7/14/2022, patient was evaluated in the ER with symptomatic rapid atrial fibrillation.  She was given some IV metoprolol and spontaneously converted to sinus rhythm within several hours.  Reports that she had missed several doses of Tikosyn prior to this day due to

## 2024-03-12 ENCOUNTER — OFFICE VISIT (OUTPATIENT)
Dept: CARDIOLOGY CLINIC | Age: 89
End: 2024-03-12
Payer: MEDICARE

## 2024-03-12 VITALS
OXYGEN SATURATION: 98 % | HEART RATE: 54 BPM | HEIGHT: 62 IN | BODY MASS INDEX: 31.91 KG/M2 | SYSTOLIC BLOOD PRESSURE: 150 MMHG | WEIGHT: 173.4 LBS | DIASTOLIC BLOOD PRESSURE: 82 MMHG

## 2024-03-12 DIAGNOSIS — I49.3 PVC (PREMATURE VENTRICULAR CONTRACTION): ICD-10-CM

## 2024-03-12 DIAGNOSIS — I47.10 SVT (SUPRAVENTRICULAR TACHYCARDIA): ICD-10-CM

## 2024-03-12 DIAGNOSIS — I50.32 CHRONIC DIASTOLIC HEART FAILURE (HCC): ICD-10-CM

## 2024-03-12 DIAGNOSIS — I48.91 ATRIAL FIBRILLATION WITH RAPID VENTRICULAR RESPONSE (HCC): Primary | ICD-10-CM

## 2024-03-12 DIAGNOSIS — I48.0 PAF (PAROXYSMAL ATRIAL FIBRILLATION) (HCC): ICD-10-CM

## 2024-03-12 PROCEDURE — 1123F ACP DISCUSS/DSCN MKR DOCD: CPT | Performed by: INTERNAL MEDICINE

## 2024-03-12 PROCEDURE — 99214 OFFICE O/P EST MOD 30 MIN: CPT | Performed by: INTERNAL MEDICINE

## 2024-03-12 PROCEDURE — 93000 ELECTROCARDIOGRAM COMPLETE: CPT | Performed by: INTERNAL MEDICINE

## 2024-03-12 RX ORDER — AMIODARONE HYDROCHLORIDE 200 MG/1
200 TABLET ORAL 2 TIMES DAILY
Qty: 60 TABLET | Refills: 3 | Status: SHIPPED | OUTPATIENT
Start: 2024-03-12

## 2024-03-12 NOTE — PATIENT INSTRUCTIONS
RECOMMENDATIONS:  Continue amiodarone and eliquis.    Come in in 2 weeks for BP check.   If BP still high, will recommend nephrology referral again.   Follow up in July as scheduled.

## 2024-03-26 ENCOUNTER — NURSE ONLY (OUTPATIENT)
Dept: CARDIOLOGY CLINIC | Age: 89
End: 2024-03-26

## 2024-03-26 ENCOUNTER — TELEPHONE (OUTPATIENT)
Dept: CARDIOLOGY CLINIC | Age: 89
End: 2024-03-26

## 2024-03-26 VITALS — HEART RATE: 55 BPM | DIASTOLIC BLOOD PRESSURE: 58 MMHG | SYSTOLIC BLOOD PRESSURE: 160 MMHG | OXYGEN SATURATION: 98 %

## 2024-03-26 DIAGNOSIS — I10 HYPERTENSION, UNCONTROLLED: Primary | ICD-10-CM

## 2024-03-26 DIAGNOSIS — N13.30 HYDRONEPHROSIS, UNSPECIFIED HYDRONEPHROSIS TYPE: ICD-10-CM

## 2024-03-26 NOTE — PROGRESS NOTES
Patient came for BP check per AGK. /58. HR 55. Will send to AGk for further review/ recommendations.     BP Consistent with last 10 day home BP log-   168/69  165/62  168/68  164/63  159/52  159/58  164/68  156/59  159/62  163/57

## 2024-03-26 NOTE — TELEPHONE ENCOUNTER
Patient presented to office for BP check per AGK last office note. BP today 160/58. HR 55.     AGK please review for further recommendations. Thanks     BP home log from last 10 days-   168/69  165/62  168/68  164/63  159/52  159/58  164/68  156/59  159/62  163/57

## 2024-04-04 NOTE — TELEPHONE ENCOUNTER
Please help me refer to a different HTN/Nephrologist for BP management.  If she doesn't get along with new nephrologist, we will try and manage her blood pressure.

## 2024-05-20 ENCOUNTER — TELEPHONE (OUTPATIENT)
Dept: CARDIOLOGY CLINIC | Age: 89
End: 2024-05-20

## 2024-05-20 NOTE — TELEPHONE ENCOUNTER
Pt is requesting refill of Amlodipine 10mg. Preferred pharmacy is    [facility] [id number] [address] - P [phone]      Last ov 03/12/2024 agk  Next ov 05/28/2024 npam

## 2024-05-20 NOTE — TELEPHONE ENCOUNTER
I spoke with the patient and informed her that her PCP manages this medication. She V/U and will call them for refills.

## 2024-06-04 ENCOUNTER — APPOINTMENT (OUTPATIENT)
Dept: CT IMAGING | Age: 89
DRG: 394 | End: 2024-06-04
Payer: MEDICARE

## 2024-06-04 ENCOUNTER — HOSPITAL ENCOUNTER (INPATIENT)
Age: 89
LOS: 1 days | Discharge: HOME | DRG: 394 | End: 2024-06-05
Attending: EMERGENCY MEDICINE | Admitting: INTERNAL MEDICINE
Payer: MEDICARE

## 2024-06-04 ENCOUNTER — APPOINTMENT (OUTPATIENT)
Dept: GENERAL RADIOLOGY | Age: 89
DRG: 394 | End: 2024-06-04
Payer: MEDICARE

## 2024-06-04 DIAGNOSIS — I48.91 ATRIAL FIBRILLATION WITH RAPID VENTRICULAR RESPONSE (HCC): ICD-10-CM

## 2024-06-04 DIAGNOSIS — K56.600 PARTIAL SMALL BOWEL OBSTRUCTION (HCC): Primary | ICD-10-CM

## 2024-06-04 LAB
ALBUMIN SERPL-MCNC: 3.8 G/DL (ref 3.4–5)
ALBUMIN/GLOB SERPL: 1 {RATIO} (ref 1.1–2.2)
ALP SERPL-CCNC: 122 U/L (ref 40–129)
ALT SERPL-CCNC: 62 U/L (ref 10–40)
AMYLASE SERPL-CCNC: 21 U/L (ref 25–115)
ANION GAP SERPL CALCULATED.3IONS-SCNC: 12 MMOL/L (ref 3–16)
AST SERPL-CCNC: 80 U/L (ref 15–37)
BASOPHILS # BLD: 0.1 K/UL (ref 0–0.2)
BASOPHILS NFR BLD: 0.6 %
BILIRUB SERPL-MCNC: 0.5 MG/DL (ref 0–1)
BILIRUB UR QL STRIP.AUTO: NEGATIVE
BUN SERPL-MCNC: 13 MG/DL (ref 7–20)
CALCIUM SERPL-MCNC: 8.7 MG/DL (ref 8.3–10.6)
CHLORIDE SERPL-SCNC: 98 MMOL/L (ref 99–110)
CLARITY UR: CLEAR
CO2 SERPL-SCNC: 24 MMOL/L (ref 21–32)
COLOR UR: YELLOW
CREAT SERPL-MCNC: 0.9 MG/DL (ref 0.6–1.2)
DEPRECATED RDW RBC AUTO: 17 % (ref 12.4–15.4)
EKG ATRIAL RATE: 82 BPM
EKG DIAGNOSIS: NORMAL
EKG P AXIS: 76 DEGREES
EKG P-R INTERVAL: 156 MS
EKG Q-T INTERVAL: 396 MS
EKG QRS DURATION: 100 MS
EKG QTC CALCULATION (BAZETT): 462 MS
EKG R AXIS: -39 DEGREES
EKG T AXIS: 78 DEGREES
EKG VENTRICULAR RATE: 82 BPM
EOSINOPHIL # BLD: 0.2 K/UL (ref 0–0.6)
EOSINOPHIL NFR BLD: 2.1 %
EPI CELLS #/AREA URNS HPF: NORMAL /HPF (ref 0–5)
GFR SERPLBLD CREATININE-BSD FMLA CKD-EPI: 62 ML/MIN/{1.73_M2}
GLUCOSE BLD-MCNC: 152 MG/DL (ref 70–99)
GLUCOSE BLD-MCNC: 169 MG/DL (ref 70–99)
GLUCOSE SERPL-MCNC: 248 MG/DL (ref 70–99)
GLUCOSE UR STRIP.AUTO-MCNC: 250 MG/DL
HCT VFR BLD AUTO: 45.7 % (ref 36–48)
HGB BLD-MCNC: 14.7 G/DL (ref 12–16)
HGB UR QL STRIP.AUTO: NEGATIVE
KETONES UR STRIP.AUTO-MCNC: NEGATIVE MG/DL
LEUKOCYTE ESTERASE UR QL STRIP.AUTO: NEGATIVE
LIPASE SERPL-CCNC: 13 U/L (ref 13–60)
LYMPHOCYTES # BLD: 1.2 K/UL (ref 1–5.1)
LYMPHOCYTES NFR BLD: 14.5 %
MCH RBC QN AUTO: 25.1 PG (ref 26–34)
MCHC RBC AUTO-ENTMCNC: 32.2 G/DL (ref 31–36)
MCV RBC AUTO: 77.9 FL (ref 80–100)
MONOCYTES # BLD: 0.7 K/UL (ref 0–1.3)
MONOCYTES NFR BLD: 8.2 %
NEUTROPHILS # BLD: 6.3 K/UL (ref 1.7–7.7)
NEUTROPHILS NFR BLD: 74.6 %
NITRITE UR QL STRIP.AUTO: NEGATIVE
PERFORMED ON: ABNORMAL
PERFORMED ON: ABNORMAL
PH UR STRIP.AUTO: 7.5 [PH] (ref 5–8)
PLATELET # BLD AUTO: 333 K/UL (ref 135–450)
PMV BLD AUTO: 9.7 FL (ref 5–10.5)
POTASSIUM SERPL-SCNC: 4.2 MMOL/L (ref 3.5–5.1)
PROT SERPL-MCNC: 7.8 G/DL (ref 6.4–8.2)
PROT UR STRIP.AUTO-MCNC: ABNORMAL MG/DL
RBC # BLD AUTO: 5.87 M/UL (ref 4–5.2)
RBC #/AREA URNS HPF: NORMAL /HPF (ref 0–4)
SODIUM SERPL-SCNC: 134 MMOL/L (ref 136–145)
SP GR UR STRIP.AUTO: 1.01 (ref 1–1.03)
TROPONIN, HIGH SENSITIVITY: 11 NG/L (ref 0–14)
UA COMPLETE W REFLEX CULTURE PNL UR: ABNORMAL
UA DIPSTICK W REFLEX MICRO PNL UR: YES
URN SPEC COLLECT METH UR: ABNORMAL
UROBILINOGEN UR STRIP-ACNC: 0.2 E.U./DL
WBC # BLD AUTO: 8.5 K/UL (ref 4–11)
WBC #/AREA URNS HPF: NORMAL /HPF (ref 0–5)

## 2024-06-04 PROCEDURE — 99285 EMERGENCY DEPT VISIT HI MDM: CPT

## 2024-06-04 PROCEDURE — 82150 ASSAY OF AMYLASE: CPT

## 2024-06-04 PROCEDURE — 96375 TX/PRO/DX INJ NEW DRUG ADDON: CPT

## 2024-06-04 PROCEDURE — 83036 HEMOGLOBIN GLYCOSYLATED A1C: CPT

## 2024-06-04 PROCEDURE — 85025 COMPLETE CBC W/AUTO DIFF WBC: CPT

## 2024-06-04 PROCEDURE — 80053 COMPREHEN METABOLIC PANEL: CPT

## 2024-06-04 PROCEDURE — 96374 THER/PROPH/DIAG INJ IV PUSH: CPT

## 2024-06-04 PROCEDURE — 93010 ELECTROCARDIOGRAM REPORT: CPT | Performed by: INTERNAL MEDICINE

## 2024-06-04 PROCEDURE — 81001 URINALYSIS AUTO W/SCOPE: CPT

## 2024-06-04 PROCEDURE — 99223 1ST HOSP IP/OBS HIGH 75: CPT | Performed by: INTERNAL MEDICINE

## 2024-06-04 PROCEDURE — 1200000000 HC SEMI PRIVATE

## 2024-06-04 PROCEDURE — 74177 CT ABD & PELVIS W/CONTRAST: CPT

## 2024-06-04 PROCEDURE — 93005 ELECTROCARDIOGRAM TRACING: CPT | Performed by: EMERGENCY MEDICINE

## 2024-06-04 PROCEDURE — 71045 X-RAY EXAM CHEST 1 VIEW: CPT

## 2024-06-04 PROCEDURE — 99222 1ST HOSP IP/OBS MODERATE 55: CPT | Performed by: SURGERY

## 2024-06-04 PROCEDURE — 83690 ASSAY OF LIPASE: CPT

## 2024-06-04 PROCEDURE — 6360000002 HC RX W HCPCS: Performed by: NURSE PRACTITIONER

## 2024-06-04 PROCEDURE — 36415 COLL VENOUS BLD VENIPUNCTURE: CPT

## 2024-06-04 PROCEDURE — 6360000002 HC RX W HCPCS: Performed by: INTERNAL MEDICINE

## 2024-06-04 PROCEDURE — 2580000003 HC RX 258

## 2024-06-04 PROCEDURE — 6360000004 HC RX CONTRAST MEDICATION: Performed by: NURSE PRACTITIONER

## 2024-06-04 PROCEDURE — APPSS30 APP SPLIT SHARED TIME 16-30 MINUTES

## 2024-06-04 PROCEDURE — 84484 ASSAY OF TROPONIN QUANT: CPT

## 2024-06-04 PROCEDURE — C9113 INJ PANTOPRAZOLE SODIUM, VIA: HCPCS | Performed by: INTERNAL MEDICINE

## 2024-06-04 RX ORDER — SODIUM CHLORIDE 9 MG/ML
INJECTION, SOLUTION INTRAVENOUS PRN
Status: DISCONTINUED | OUTPATIENT
Start: 2024-06-04 | End: 2024-06-05 | Stop reason: HOSPADM

## 2024-06-04 RX ORDER — INSULIN LISPRO 100 [IU]/ML
0-4 INJECTION, SOLUTION INTRAVENOUS; SUBCUTANEOUS EVERY 4 HOURS
Status: DISCONTINUED | OUTPATIENT
Start: 2024-06-04 | End: 2024-06-05 | Stop reason: HOSPADM

## 2024-06-04 RX ORDER — ACETAMINOPHEN 325 MG/1
650 TABLET ORAL EVERY 6 HOURS PRN
Status: DISCONTINUED | OUTPATIENT
Start: 2024-06-04 | End: 2024-06-05 | Stop reason: HOSPADM

## 2024-06-04 RX ORDER — ONDANSETRON 2 MG/ML
4 INJECTION INTRAMUSCULAR; INTRAVENOUS EVERY 6 HOURS PRN
Status: DISCONTINUED | OUTPATIENT
Start: 2024-06-04 | End: 2024-06-05 | Stop reason: HOSPADM

## 2024-06-04 RX ORDER — DEXTROSE MONOHYDRATE 100 MG/ML
INJECTION, SOLUTION INTRAVENOUS CONTINUOUS PRN
Status: DISCONTINUED | OUTPATIENT
Start: 2024-06-04 | End: 2024-06-05 | Stop reason: HOSPADM

## 2024-06-04 RX ORDER — LOSARTAN POTASSIUM 25 MG/1
25 TABLET ORAL DAILY
Status: DISCONTINUED | OUTPATIENT
Start: 2024-06-04 | End: 2024-06-05 | Stop reason: HOSPADM

## 2024-06-04 RX ORDER — AMIODARONE HYDROCHLORIDE 200 MG/1
200 TABLET ORAL DAILY
Status: DISCONTINUED | OUTPATIENT
Start: 2024-06-04 | End: 2024-06-05 | Stop reason: HOSPADM

## 2024-06-04 RX ORDER — SODIUM CHLORIDE 0.9 % (FLUSH) 0.9 %
10 SYRINGE (ML) INJECTION PRN
Status: DISCONTINUED | OUTPATIENT
Start: 2024-06-04 | End: 2024-06-05 | Stop reason: HOSPADM

## 2024-06-04 RX ORDER — ONDANSETRON 2 MG/ML
4 INJECTION INTRAMUSCULAR; INTRAVENOUS ONCE
Status: COMPLETED | OUTPATIENT
Start: 2024-06-04 | End: 2024-06-04

## 2024-06-04 RX ORDER — AMLODIPINE BESYLATE 5 MG/1
5 TABLET ORAL DAILY
Status: DISCONTINUED | OUTPATIENT
Start: 2024-06-04 | End: 2024-06-05 | Stop reason: HOSPADM

## 2024-06-04 RX ORDER — KETOROLAC TROMETHAMINE 15 MG/ML
15 INJECTION, SOLUTION INTRAMUSCULAR; INTRAVENOUS EVERY 6 HOURS PRN
Status: DISCONTINUED | OUTPATIENT
Start: 2024-06-04 | End: 2024-06-05 | Stop reason: HOSPADM

## 2024-06-04 RX ORDER — KETOROLAC TROMETHAMINE 15 MG/ML
15 INJECTION, SOLUTION INTRAMUSCULAR; INTRAVENOUS ONCE
Status: COMPLETED | OUTPATIENT
Start: 2024-06-04 | End: 2024-06-04

## 2024-06-04 RX ORDER — POTASSIUM CHLORIDE 20 MEQ/1
40 TABLET, EXTENDED RELEASE ORAL PRN
Status: DISCONTINUED | OUTPATIENT
Start: 2024-06-04 | End: 2024-06-05 | Stop reason: HOSPADM

## 2024-06-04 RX ORDER — ACETAMINOPHEN 650 MG/1
650 SUPPOSITORY RECTAL EVERY 6 HOURS PRN
Status: DISCONTINUED | OUTPATIENT
Start: 2024-06-04 | End: 2024-06-05 | Stop reason: HOSPADM

## 2024-06-04 RX ORDER — POTASSIUM CHLORIDE 7.45 MG/ML
10 INJECTION INTRAVENOUS PRN
Status: DISCONTINUED | OUTPATIENT
Start: 2024-06-04 | End: 2024-06-05 | Stop reason: HOSPADM

## 2024-06-04 RX ORDER — SODIUM CHLORIDE 0.9 % (FLUSH) 0.9 %
5-40 SYRINGE (ML) INJECTION EVERY 12 HOURS SCHEDULED
Status: DISCONTINUED | OUTPATIENT
Start: 2024-06-04 | End: 2024-06-05 | Stop reason: HOSPADM

## 2024-06-04 RX ORDER — POLYETHYLENE GLYCOL 3350 17 G/17G
17 POWDER, FOR SOLUTION ORAL DAILY PRN
Status: DISCONTINUED | OUTPATIENT
Start: 2024-06-04 | End: 2024-06-05 | Stop reason: HOSPADM

## 2024-06-04 RX ORDER — PANTOPRAZOLE SODIUM 40 MG/10ML
40 INJECTION, POWDER, LYOPHILIZED, FOR SOLUTION INTRAVENOUS DAILY
Status: DISCONTINUED | OUTPATIENT
Start: 2024-06-04 | End: 2024-06-05 | Stop reason: HOSPADM

## 2024-06-04 RX ORDER — SODIUM CHLORIDE 9 MG/ML
INJECTION, SOLUTION INTRAVENOUS CONTINUOUS
Status: DISCONTINUED | OUTPATIENT
Start: 2024-06-04 | End: 2024-06-05 | Stop reason: HOSPADM

## 2024-06-04 RX ORDER — ONDANSETRON 4 MG/1
4 TABLET, ORALLY DISINTEGRATING ORAL EVERY 8 HOURS PRN
Status: DISCONTINUED | OUTPATIENT
Start: 2024-06-04 | End: 2024-06-05 | Stop reason: HOSPADM

## 2024-06-04 RX ORDER — INSULIN LISPRO 100 [IU]/ML
0-4 INJECTION, SOLUTION INTRAVENOUS; SUBCUTANEOUS EVERY 4 HOURS
Status: DISCONTINUED | OUTPATIENT
Start: 2024-06-04 | End: 2024-06-04

## 2024-06-04 RX ORDER — GLUCAGON 1 MG/ML
1 KIT INJECTION PRN
Status: DISCONTINUED | OUTPATIENT
Start: 2024-06-04 | End: 2024-06-05 | Stop reason: HOSPADM

## 2024-06-04 RX ORDER — MAGNESIUM SULFATE 1 G/100ML
1000 INJECTION INTRAVENOUS PRN
Status: DISCONTINUED | OUTPATIENT
Start: 2024-06-04 | End: 2024-06-05 | Stop reason: HOSPADM

## 2024-06-04 RX ADMIN — ONDANSETRON 4 MG: 2 INJECTION INTRAMUSCULAR; INTRAVENOUS at 09:41

## 2024-06-04 RX ADMIN — SODIUM CHLORIDE: 9 INJECTION, SOLUTION INTRAVENOUS at 17:53

## 2024-06-04 RX ADMIN — IOPAMIDOL 75 ML: 755 INJECTION, SOLUTION INTRAVENOUS at 10:10

## 2024-06-04 RX ADMIN — Medication 5 ML: at 20:36

## 2024-06-04 RX ADMIN — PANTOPRAZOLE SODIUM 40 MG: 40 INJECTION, POWDER, FOR SOLUTION INTRAVENOUS at 17:49

## 2024-06-04 RX ADMIN — KETOROLAC TROMETHAMINE 15 MG: 15 INJECTION, SOLUTION INTRAMUSCULAR; INTRAVENOUS at 12:42

## 2024-06-04 NOTE — CONSULTS
of mucosal thickening of the left and sigmoid colon which   may be artifact due to incomplete distention. Mild colitis is not excluded.   3. Mild bladder prolapse.   4. Stable soft tissue stranding along the mesenteric root with tiny reactive   lymph nodes. This may be related to a chronic history of bowel inflammation   or mesenteric panniculitis.         XR CHEST PORTABLE   Final Result   1.  No acute abnormality.              ASSESSMENT:   Lashanda Tiwari is a 88 y.o. female with a pmhx of Afib, CHF, DM, HTN and HLD who presented with abdominal pain and distension. Hx of multiple SBOs that are becoming more frequent lately.  Admitted with SBO Feb '24, Oct '23, June '23, Jan '23, Nov '22 and prior to that there was a gap from Sept '20.    Vitals and labs are overall unremarkable.     CT reviewed. Displays dilation of a short segment of small bowel with associated stool fecalization leading up to prior SB-SB anastomosis. No marked upstream dilation yet. Down stream bowel is decompressed.     Suspect early presentation of recurrent small bowel obstruction 2/2 adhesion vs anastomotic stricture  Anticoagulated with Eliquis for Afib - on hold      PLAN:   Plan for conservative management as below - given history of multiple obstructions that seem to be occurring more frequently there is a lower threshold for further imaging or surgical intervention  Hold off on NG for now given no nausea or vomiting - may need to have one placed if abdominal pain/distention worsens or if she develops nausea or vomiting  Encourage aggressive ambulation and up in chair as tolerated  Diet NPO  IV hydration  As needed pain control and antiemetics  DVT prophylaxis with heparin or Lovenox - defer to primary - will place SCD order for now  Hold Eliquis in case of procedural need      Thank you for the interesting evaluation. Will discuss assessment, status, and recommended plan of care with surgeon on call.       Mandy Velázquez,

## 2024-06-04 NOTE — ED PROVIDER NOTES
[address] MEDICAL-SURGICAL  EMERGENCY DEPARTMENT ENCOUNTER        Pt Name: Lashanda Tiwari  MRN: [id number]  Birthdate 1935  Date of evaluation: 6/4/2024  Provider: GUANAKO Anderson - CNP  PCP: Dmitry Moreno MD  Note Started: 9:26 AM EDT 6/4/24      SHON. I have evaluated this patient.        CHIEF COMPLAINT       Chief Complaint   Patient presents with    Abdominal Pain     Abdominal pain; pt with history of frequent bowel obstructions; nausea; small BM this am        HISTORY OF PRESENT ILLNESS: 1 or more Elements     History From: Patient     Limitations to history : None    Social Determinants Significantly Affecting Health : None    Chief Complaint: Abdominal pain    Lashanda Tiwari is a 88 y.o. female who presents to the emergency department today with symptoms of abdominal pain.  Patient states that has been having symptoms of frequent bowel obstructions.  States about every 3 months she is admitted for bowel obstruction.  Has had a history of surgical pair of bowel obstruction but since has been able to medically manage to these.  States she was told it was due to adhesions.  She reports nausea but no vomiting.  Episodes of abdominal pain located in the mid to upper abdomen.  No shortness of breath.  Did have some palpitations earlier this morning but does have a history of A-fib felt like it was a \"A-fib attack\".  States that since then no chest pain or shortness of breath.  No other acute concerns.    Nursing Notes were all reviewed and agreed with or any disagreements were addressed in the HPI.    REVIEW OF SYSTEMS :      Review of Systems    Positives and Pertinent negatives as per HPI.     SURGICAL HISTORY     Past Surgical History:   Procedure Laterality Date    ABLATION OF DYSRHYTHMIC FOCUS      -2014    APPENDECTOMY      CHOLECYSTECTOMY, LAPAROSCOPIC N/A 2/3/2023    ROBOTIC CHOLECYSTECTOMY performed by Dmitry Partida [place] [facility] [place]    DILATATION, ESOPHAGUS      FRACTURE

## 2024-06-04 NOTE — PROGRESS NOTES
Patient admitted to room 213 from Ed. Side rails up x2. Patient does not have an order for telemetry. Bed is locked and in lowest position. Call light placed in patient reach. Patient explained the routine of the hospital, including but not limited to lab work, vital signs, hourly rounding, etc. Care plans and education updated.    BP (!) 161/75   Pulse 88   Temp 97.7 °F (36.5 °C) (Oral)   Resp 18   Ht 1.585 m (5' 2.4\")   Wt 77.8 kg (171 lb 8 oz)   SpO2 96%   BMI 30.97 kg/m²     Most recent set of vitals as shown.     Patient has an LDA that does not require CHG wipes, including possible a surgery incision, stewart catheter, or a central line.     4 Eyes Skin Assessment     The patient is being assess for   Admission    I agree that 2 RN's have performed a thorough Head to Toe Skin Assessment on the patient. ALL assessment sites listed below have been assessed.      Areas assessed for pressure by both nurses:   [x]   Head, Face, and Ears   [x]   Shoulders, Back, and Chest, Abdomen  [x]   Arms, Elbows, and Hands   [x]   Coccyx, Sacrum, and Ischium  [x]   Legs, Feet, and Heels        Skin Assessed Under all Medical Devices by both nurses:                  All Mepilex Borders were peeled back and area peeked at by both nurses:  No:    Please list where Mepilex Borders are located:  NA             **SHARE this note so that the co-signing nurse is able to place an eSignature**    Co-signer eSignature: Electronically signed by Juana Kimbrough RN on 6/4/24 at 6:28 PM EDT    Does the Patient have Skin Breakdown related to pressure?  No              Ino Prevention initiated:  NA   Wound Care Orders initiated:  NA      [facility] nurse consulted for Pressure Injury (Stage 3,4, Unstageable, DTI, NWPT, Complex wounds)and New or Established Ostomies:  NA      Primary Nurse eSignature: Electronically signed by NII VELAZQUEZ RN on 6/4/24 at 6:27 PM EDT      Bedside Mobility Assessment Tool (BMAT):     Assessment Level 1- Sit

## 2024-06-04 NOTE — ED PROVIDER NOTES
Emergency Department Attending Provider Note  Location: [facility] ED      Lashanda Tiwari was evaluated in the Emergency Department. Although initial history and physical exam information was obtained by Mehul Rodriges (who also dictated a record of this visit), I personally saw the patient and made/approved the management plan and take responsibility for the patient management.     Patient seen and evaluated.  Relevant records reviewed. Chronic medical conditions reviewed.    HPI: 88-year-old female presents with abdominal pain.  She has a history of frequent small bowel obstructions.  This morning she developed nausea and abdominal pain.  She did have a small bowel movement this morning.  She states she wanted to come in early and catch this before it developed into a full-blown bowel obstruction.     Physical Exam: Wake and alert, uncomfortable.  She does have mild diffuse abdominal tenderness.     MDM: Here the patient is nontoxic-appearing.  Lab work shows no leukocytosis.  Renal function is normal.  She is slightly hyperglycemic.  Urinalysis shows no UTI.  Lipase normal.  CT shows prior partial small bowel resection.  There is fecalization in that area but no clear evidence of bowel obstruction.  General surgery was consulted and they are recommending admission for IV fluids and bowel rest.  They are not recommending NG tube placement at this time.  The patient is comfortable with the plan and will be admitted.         I independently interpreted the following diagnostic test and studies:    Results for orders placed or performed during the hospital encounter of 06/04/24   Amylase   Result Value Ref Range    Amylase 21 (L) 25 - 115 U/L   Comprehensive Metabolic Panel   Result Value Ref Range    Sodium 134 (L) 136 - 145 mmol/L    Potassium 4.2 3.5 - 5.1 mmol/L    Chloride 98 (L) 99 - 110 mmol/L    CO2 24 21 - 32 mmol/L    Anion Gap 12 3 - 16    Glucose 248 (H) 70 - 99 mg/dL    BUN 13 7 -

## 2024-06-04 NOTE — H&P
of this year   - CT abd/pelvis as above with fecalization of small bowel at the anastomosis site suggesting chronic stasis   - no NGT in place currently given no vomiting   - NPO, IVF, prn pain control and antiemetics  - general surgery consulted     Transaminitis   -unclear etiology , hold statins   - monitor LFTs    HTN  - on losartan, Norvasc, and Coreg  , resume orals as tolerated  - add IV hydralazine prn       DM type 2  - SSI  - NPO  - monitor BG     PAF  Secondary hypercoagulable state  - AC on Eliquis- holding - can supplement with lovenox  - on amiodarone resume     Chronic Diastolic CHF   - last echo on 9/29/23 with EF of 55-60% and grade III diastolic dysfunction  - holding lasix while on IVF  - monitor daily weights and I/Os    GERD  - on PPI    Note partial SBO makes the patient higher risk for morbidity and mortality requiring testing and treatment.    DVT Prophylaxis: Eliquis   Diet: Diet NPO  Code Status: Full Code    Darius Castellanos MD, 6/4/2024 4:26 PM

## 2024-06-04 NOTE — PLAN OF CARE
Concern for partial SBO  - NPO  - IVF  - general surgery consulted    Nursing communication in for med rec    Admit 2W    Jaelyn Kingsley PA-C  06/04/24

## 2024-06-04 NOTE — DISCHARGE INSTRUCTIONS
No change in home medications       Continue a full liquid diet (examples provided) for 1 - 2 days following discharge until your bowels are working better. Advance to your regular diet as tolerated. Continue to drink plenty of water. Continue to be up and walking as much as tolerated as this is best to stimulate your bowels.       Your information:  Name: Lashanda Tiwari  : 1935    Your instructions:    Follow instructions above.    What to do after you leave the hospital:    Recommended diet: regular diet    Recommended activity: activity as tolerated        The following personal items were collected during your admission and were returned to you:    Belongings  Dental Appliances: Uppers  Vision - Corrective Lenses: None  Hearing Aid: None  Clothing: Pants, Shirt, Footwear, Undergarments  Jewelry: Ring  Body Piercings Removed: No  Electronic Devices: None  Weapons (Notify Protective Services/Security): None  Other Valuables: At home  Home Medications: None  Valuables Given To: Other (Comment)  Provide Name(s) of Who Valuable(s) Were Given To: NA  Patient approves for provider to speak to responsible person post operatively: Yes    Information obtained by:  By signing below, I understand that if any problems occur once I leave the hospital I am to contact MD.  I understand and acknowledge receipt of the instructions indicated above.       Heart Failure Resources:  Heart Failure Interactive Workbook:  Go to https://[employer]/publication/?q=999492 for a Free Heart Failure Interactive Workbook provided by The American [employer] Association. This interactive workbook will provide information on Healthier Living with Heart Failure. Please copy and paste link into search bar. Use your mouse to scroll through the pages.    HF [place] marco a:   Heart Failure Free smart phone marco a available for iPhone and Android download. Use your phone to track sodium intake, fluid intake, symptoms, and weight.     Low

## 2024-06-05 ENCOUNTER — APPOINTMENT (OUTPATIENT)
Dept: GENERAL RADIOLOGY | Age: 89
DRG: 394 | End: 2024-06-05
Payer: MEDICARE

## 2024-06-05 VITALS
BODY MASS INDEX: 31.47 KG/M2 | TEMPERATURE: 97.9 F | OXYGEN SATURATION: 96 % | WEIGHT: 171 LBS | HEART RATE: 88 BPM | SYSTOLIC BLOOD PRESSURE: 131 MMHG | RESPIRATION RATE: 14 BRPM | DIASTOLIC BLOOD PRESSURE: 74 MMHG | HEIGHT: 62 IN

## 2024-06-05 PROBLEM — I48.91 ATRIAL FIBRILLATION WITH RAPID VENTRICULAR RESPONSE (HCC): Status: ACTIVE | Noted: 2024-06-05

## 2024-06-05 LAB
ALBUMIN SERPL-MCNC: 3.3 G/DL (ref 3.4–5)
ALBUMIN/GLOB SERPL: 0.9 {RATIO} (ref 1.1–2.2)
ALP SERPL-CCNC: 109 U/L (ref 40–129)
ALT SERPL-CCNC: 48 U/L (ref 10–40)
ANION GAP SERPL CALCULATED.3IONS-SCNC: 10 MMOL/L (ref 3–16)
AST SERPL-CCNC: 51 U/L (ref 15–37)
BASOPHILS # BLD: 0 K/UL (ref 0–0.2)
BASOPHILS NFR BLD: 0.4 %
BILIRUB SERPL-MCNC: 0.7 MG/DL (ref 0–1)
BUN SERPL-MCNC: 15 MG/DL (ref 7–20)
CALCIUM SERPL-MCNC: 8.2 MG/DL (ref 8.3–10.6)
CHLORIDE SERPL-SCNC: 103 MMOL/L (ref 99–110)
CO2 SERPL-SCNC: 24 MMOL/L (ref 21–32)
CREAT SERPL-MCNC: 0.8 MG/DL (ref 0.6–1.2)
DEPRECATED RDW RBC AUTO: 17.1 % (ref 12.4–15.4)
EOSINOPHIL # BLD: 0.3 K/UL (ref 0–0.6)
EOSINOPHIL NFR BLD: 3 %
EST. AVERAGE GLUCOSE BLD GHB EST-MCNC: 182.9 MG/DL
GFR SERPLBLD CREATININE-BSD FMLA CKD-EPI: 71 ML/MIN/{1.73_M2}
GLUCOSE BLD-MCNC: 152 MG/DL (ref 70–99)
GLUCOSE BLD-MCNC: 152 MG/DL (ref 70–99)
GLUCOSE BLD-MCNC: 158 MG/DL (ref 70–99)
GLUCOSE BLD-MCNC: 166 MG/DL (ref 70–99)
GLUCOSE SERPL-MCNC: 159 MG/DL (ref 70–99)
HBA1C MFR BLD: 8 %
HCT VFR BLD AUTO: 42.6 % (ref 36–48)
HGB BLD-MCNC: 13.9 G/DL (ref 12–16)
LYMPHOCYTES # BLD: 1.7 K/UL (ref 1–5.1)
LYMPHOCYTES NFR BLD: 20 %
MCH RBC QN AUTO: 25.4 PG (ref 26–34)
MCHC RBC AUTO-ENTMCNC: 32.5 G/DL (ref 31–36)
MCV RBC AUTO: 78.1 FL (ref 80–100)
MONOCYTES # BLD: 0.8 K/UL (ref 0–1.3)
MONOCYTES NFR BLD: 9.5 %
NEUTROPHILS # BLD: 5.7 K/UL (ref 1.7–7.7)
NEUTROPHILS NFR BLD: 67.1 %
PERFORMED ON: ABNORMAL
PLATELET # BLD AUTO: 319 K/UL (ref 135–450)
PMV BLD AUTO: 9.3 FL (ref 5–10.5)
POTASSIUM SERPL-SCNC: 4.2 MMOL/L (ref 3.5–5.1)
PROT SERPL-MCNC: 7.1 G/DL (ref 6.4–8.2)
RBC # BLD AUTO: 5.45 M/UL (ref 4–5.2)
SODIUM SERPL-SCNC: 137 MMOL/L (ref 136–145)
WBC # BLD AUTO: 8.5 K/UL (ref 4–11)

## 2024-06-05 PROCEDURE — C9113 INJ PANTOPRAZOLE SODIUM, VIA: HCPCS | Performed by: INTERNAL MEDICINE

## 2024-06-05 PROCEDURE — 2580000003 HC RX 258

## 2024-06-05 PROCEDURE — 80053 COMPREHEN METABOLIC PANEL: CPT

## 2024-06-05 PROCEDURE — 85025 COMPLETE CBC W/AUTO DIFF WBC: CPT

## 2024-06-05 PROCEDURE — 36415 COLL VENOUS BLD VENIPUNCTURE: CPT

## 2024-06-05 PROCEDURE — 6370000000 HC RX 637 (ALT 250 FOR IP): Performed by: INTERNAL MEDICINE

## 2024-06-05 PROCEDURE — 99231 SBSQ HOSP IP/OBS SF/LOW 25: CPT | Performed by: SURGERY

## 2024-06-05 PROCEDURE — 74019 RADEX ABDOMEN 2 VIEWS: CPT

## 2024-06-05 PROCEDURE — APPSS15 APP SPLIT SHARED TIME 0-15 MINUTES

## 2024-06-05 PROCEDURE — 6360000002 HC RX W HCPCS: Performed by: INTERNAL MEDICINE

## 2024-06-05 RX ORDER — AMIODARONE HYDROCHLORIDE 200 MG/1
200 TABLET ORAL 2 TIMES DAILY
Qty: 60 TABLET | Refills: 3 | COMMUNITY
Start: 2024-06-05

## 2024-06-05 RX ADMIN — PANTOPRAZOLE SODIUM 40 MG: 40 INJECTION, POWDER, FOR SOLUTION INTRAVENOUS at 08:32

## 2024-06-05 RX ADMIN — Medication 5 ML: at 08:30

## 2024-06-05 RX ADMIN — LOSARTAN POTASSIUM 25 MG: 25 TABLET, FILM COATED ORAL at 08:31

## 2024-06-05 RX ADMIN — SODIUM CHLORIDE: 9 INJECTION, SOLUTION INTRAVENOUS at 05:53

## 2024-06-05 NOTE — PROGRESS NOTES
General Surgery  Daily Progress Note    Pt Name: Lashanda Tiwari  Medical Record Number: [id number]  Date of Birth 1935   Today's Date: 6/5/2024  Admit date: 6/4/2024  LOS: Day 1    SUBJECTIVE  Feeling better. Abd pain has resolved. Passing gas. Wants to eat. Wants to go home.    OBJECTIVE  Vitals:    06/04/24 1701 06/04/24 2008 06/05/24 0401 06/05/24 0735   BP: (!) 161/75 126/80 126/74 131/74   Pulse: 88 84 87 88   Resp: 18 18 17 14   Temp: 97.7 °F (36.5 °C) 97.4 °F (36.3 °C) 98.4 °F (36.9 °C) 97.9 °F (36.6 °C)   TempSrc: Oral Oral Oral Oral   SpO2: 96% 94% 94% 96%   Weight: 77.8 kg (171 lb 8 oz)  77.6 kg (171 lb)    Height:           Gen: No distress. Alert.   Resp: Normal rate. Easy and unlabored. No accessory muscle use.   CV: Regular rate. Regular rhythm.   GI: Non-tender. Non-distended.  Normal bowel sounds.  Skin: Warm and dry. No nodule or rash on exposed extremities.       I/O last 3 completed shifts:  In: 890.5 [I.V.:890.5]  Out: 350 [Urine:350]  No intake/output data recorded.    LABS  CBC:   Recent Labs     06/04/24  0854 06/05/24  0542   WBC 8.5 8.5   HGB 14.7 13.9   HCT 45.7 42.6   MCV 77.9* 78.1*    319     BMP:   Recent Labs     06/04/24  0854 06/05/24  0542   * 137   K 4.2 4.2   CL 98* 103   CO2 24 24   BUN 13 15   CREATININE 0.9 0.8     LIVER PROFILE:   Recent Labs     06/04/24  0854 06/05/24  0542   AST 80* 51*   ALT 62* 48*   LIPASE 13.0  --    AMYLASE 21*  --    BILITOT 0.5 0.7   ALKPHOS 122 109     PT/INR: No results for input(s): \"PROTIME\", \"INR\" in the last 72 hours.  APTT: No results for input(s): \"APTT\" in the last 72 hours.  UA:  Recent Labs     06/04/24  0945   COLORU Yellow   PHUR 7.5   WBCUA 0-2   RBCUA 0-2   CLARITYU Clear   LEUKOCYTESUR Negative   UROBILINOGEN 0.2   BILIRUBINUR Negative   BLOODU Negative   GLUCOSEU 250*         IMAGING  XR ABDOMEN (2 VIEWS)   Final Result   Mild small bowel dilatation.         CT ABDOMEN PELVIS W IV CONTRAST Additional Contrast?

## 2024-06-05 NOTE — PROGRESS NOTES
D/c instructions given to pt with no new medications. Explanation of instructions. Verbalized understanding of instructions. Waiting for ride to arrive for . Will call nurse when ride arrives. Call light in reach.

## 2024-06-05 NOTE — DISCHARGE SUMMARY
Name:  Lashanda Tiwari  Room:  0213/0213-02  MRN:    [id number]    Discharge Summary      This discharge summary is in conjunction with a complete physical exam done on the day of discharge.    Discharging Provider: GUANAKO Chung - CNP   Attending Physician: Dr. Tompkins       Admit: 6/4/2024  Discharge:  06/05/24     HPI taken from admission H&P:     The patient is a 88 y.o. female with PMH of afib, CHF, DM, HTN, HLD, SVT, SBO who presented to [facility] ED with complaint of abdominal pain since this am  Reports she has recurrent SBO and recently admitted 3 months ago with the same which was mx conservatively   Had a normal day until yesterday , had BM but started with sudden mid abd pain this am with nausea but no emesis. Pain similar to previous SBO admissions and comes for eval . CT abd with partial SBO and was admitted  No fever or chills. No rectal bleeding. No UTI symptoms    Diagnoses this Admission and Hospital Course   Partial SBO  - recurrent admissions for this, last was in February of this year   - CT abd/pelvis as above with fecalization of small bowel at the anastomosis site suggesting chronic stasis   - no NGT in place currently given no vomiting   - NPO, IVF, prn pain control and antiemetics  - general surgery consulted   - Abd xray shows mild small bowel dilatation   - Denies nausea/vomiting and pain  - Change to full liquid diet --tolerated ok to discharge home      Transaminitis   - unclear etiology, hold statins   - History of increased LFTs w previous SBO  - monitor LFTs  - Trending down  - Can resume statin on discharge, follow w PCP     HTN  - Resumed losartan, Norvasc  - BP stable      DM type 2  - SSI  - NPO  - monitor BG   - BG stable, no episodes of hypoglycemia  - A1c 8--discussed to follow-up with PCP   - Stopped her diabetic medications recently due to hypoglycemia  - Follow w PCP     PAF  Secondary hypercoagulable state  - AC on Eliquis- holding --resumed   - She stopped amiodarone

## 2024-06-05 NOTE — PROGRESS NOTES
Pt resting. Resp e/e. Shift assessment completed and charted. No needs. Will monitor. July Dalton RN

## 2024-06-05 NOTE — CARE COORDINATION
Issues/Barriers to RETURNING to current housing: None  Potential Assistance needed at discharge: N/A            Potential DME:    Patient expects to discharge to: House  Plan for transportation at discharge: Family    Financial    Payor: [facility] MEDICARE / Plan: [employer] MEDIBLUE ESSENTIAL/PLUS / Product Type: *No Product type* /     Does insurance require precert for SNF: Yes    Potential assistance Purchasing Medications: No  Meds-to-Beds request: Yes      [facility] [id number] [place], [place] - [address] - P [phone] - F [phone]  [address]  [facility] [address]  Phone: [phone] Fax: [phone]      Notes:    Factors facilitating achievement of predicted outcomes: Family support, Motivated, Cooperative, and Pleasant    Barriers to discharge: Pain and Decreased endurance    Additional Case Management Notes: IPTA lives in house with spouse. No needs    The Plan for Transition of Care is related to the following treatment goals of SBO (small bowel obstruction) (HCC) [K56.609]  Partial small bowel obstruction (HCC) [K56.600]    IF APPLICABLE: The Patient and/or patient representative Lashanda and her family were provided with a choice of provider and agrees with the discharge plan. Freedom of choice list with basic dialogue that supports the patient's individualized plan of care/goals and shares the quality data associated with the providers was provided to:     Patient Representative Name:       The Patient and/or Patient Representative Agree with the Discharge Plan?      Niya Forrester RN  Case Management Department  Ph: [phone]

## 2024-06-05 NOTE — PLAN OF CARE
Problem: Chronic Conditions and Co-morbidities  Goal: Patient's chronic conditions and co-morbidity symptoms are monitored and maintained or improved  6/4/2024 2150 by July Dalton RN  Outcome: Progressing  Flowsheets (Taken 6/4/2024 2035)  Care Plan - Patient's Chronic Conditions and Co-Morbidity Symptoms are Monitored and Maintained or Improved: Monitor and assess patient's chronic conditions and comorbid symptoms for stability, deterioration, or improvement  6/4/2024 1806 by Francine Molina RN  Outcome: Progressing     Problem: Discharge Planning  Goal: Discharge to home or other facility with appropriate resources  6/4/2024 2150 by July Dalton RN  Outcome: Progressing  Flowsheets (Taken 6/4/2024 2035)  Discharge to home or other facility with appropriate resources: Identify barriers to discharge with patient and caregiver  6/4/2024 1806 by Francine Molina RN  Outcome: Progressing     Problem: ABCDS Injury Assessment  Goal: Absence of physical injury  6/4/2024 2150 by July Dalton RN  Outcome: Progressing  6/4/2024 1806 by Francine Molina RN  Outcome: Progressing     Problem: Gastrointestinal - Adult  Goal: Minimal or absence of nausea and vomiting  Outcome: Progressing     Problem: Metabolic/Fluid and Electrolytes - Adult  Goal: Electrolytes maintained within normal limits  Outcome: Progressing

## 2024-09-09 DIAGNOSIS — I48.91 ATRIAL FIBRILLATION WITH RAPID VENTRICULAR RESPONSE (HCC): ICD-10-CM

## 2024-09-09 RX ORDER — AMIODARONE HYDROCHLORIDE 200 MG/1
200 TABLET ORAL 2 TIMES DAILY
Qty: 180 TABLET | Refills: 1 | Status: SHIPPED | OUTPATIENT
Start: 2024-09-09

## 2024-10-04 ENCOUNTER — OFFICE VISIT (OUTPATIENT)
Dept: CARDIOLOGY CLINIC | Age: 89
End: 2024-10-04
Payer: MEDICARE

## 2024-10-04 VITALS
HEART RATE: 66 BPM | DIASTOLIC BLOOD PRESSURE: 62 MMHG | SYSTOLIC BLOOD PRESSURE: 152 MMHG | HEIGHT: 62 IN | BODY MASS INDEX: 32.06 KG/M2 | OXYGEN SATURATION: 99 % | WEIGHT: 174.2 LBS

## 2024-10-04 DIAGNOSIS — I48.91 ATRIAL FIBRILLATION WITH RAPID VENTRICULAR RESPONSE (HCC): ICD-10-CM

## 2024-10-04 DIAGNOSIS — I48.0 PAF (PAROXYSMAL ATRIAL FIBRILLATION) (HCC): Primary | ICD-10-CM

## 2024-10-04 DIAGNOSIS — I10 ESSENTIAL HYPERTENSION: ICD-10-CM

## 2024-10-04 DIAGNOSIS — I47.10 SVT (SUPRAVENTRICULAR TACHYCARDIA) (HCC): ICD-10-CM

## 2024-10-04 PROCEDURE — 93000 ELECTROCARDIOGRAM COMPLETE: CPT | Performed by: NURSE PRACTITIONER

## 2024-10-04 PROCEDURE — 99214 OFFICE O/P EST MOD 30 MIN: CPT | Performed by: NURSE PRACTITIONER

## 2024-10-04 PROCEDURE — 1123F ACP DISCUSS/DSCN MKR DOCD: CPT | Performed by: NURSE PRACTITIONER

## 2024-10-04 RX ORDER — AMIODARONE HYDROCHLORIDE 200 MG/1
100 TABLET ORAL 2 TIMES DAILY
Qty: 180 TABLET | Refills: 1
Start: 2024-10-04

## 2024-10-04 RX ORDER — SPIRONOLACTONE 25 MG/1
25 TABLET ORAL DAILY
COMMUNITY
Start: 2024-07-10

## 2024-10-04 NOTE — PROGRESS NOTES
amiodarone 100 mg PO daily   THS due 12/2023  BMP, TSH and LFT's due 12/2024  CBC due 6/2025  Monitor BP at home and call if consistently out of goal ranges  Follow up in 6 months or sooner if needed     IFTIKHAR Shore, GUANAKO-CNP  [facility]  [phone]

## 2024-10-04 NOTE — PATIENT INSTRUCTIONS
No changes today     Monitor BP at home and call if consistently out of goal ranges    Labs up to date     Follow up in 6 months

## 2024-12-20 DIAGNOSIS — I48.91 ATRIAL FIBRILLATION WITH RAPID VENTRICULAR RESPONSE (HCC): ICD-10-CM

## 2024-12-20 DIAGNOSIS — I50.32 CHRONIC DIASTOLIC HEART FAILURE (HCC): ICD-10-CM

## 2024-12-27 DIAGNOSIS — I48.91 ATRIAL FIBRILLATION WITH RAPID VENTRICULAR RESPONSE (HCC): ICD-10-CM

## 2024-12-27 DIAGNOSIS — I50.32 CHRONIC DIASTOLIC HEART FAILURE (HCC): ICD-10-CM

## 2024-12-27 NOTE — TELEPHONE ENCOUNTER
Refill  apixaban (ELIQUIS) 5 MG TABS tablet   [facility] [id number] - [place], [place] - [address] - P [phone] - F [phone]     Pt is completely out and wants this sent in today.    LOV 10/4/24  Next 4/4/25

## 2025-01-30 ENCOUNTER — OFFICE VISIT (OUTPATIENT)
Dept: CARDIOLOGY CLINIC | Age: 89
End: 2025-01-30
Payer: MEDICARE

## 2025-01-30 VITALS
BODY MASS INDEX: 32.94 KG/M2 | SYSTOLIC BLOOD PRESSURE: 130 MMHG | HEIGHT: 62 IN | OXYGEN SATURATION: 98 % | HEART RATE: 65 BPM | WEIGHT: 179 LBS | DIASTOLIC BLOOD PRESSURE: 58 MMHG

## 2025-01-30 DIAGNOSIS — R06.09 DYSPNEA ON EXERTION: ICD-10-CM

## 2025-01-30 DIAGNOSIS — I10 ESSENTIAL HYPERTENSION: ICD-10-CM

## 2025-01-30 DIAGNOSIS — I27.20 PULMONARY HYPERTENSION (HCC): ICD-10-CM

## 2025-01-30 DIAGNOSIS — I50.33 ACUTE ON CHRONIC DIASTOLIC CONGESTIVE HEART FAILURE (HCC): Primary | ICD-10-CM

## 2025-01-30 PROCEDURE — 1159F MED LIST DOCD IN RCRD: CPT | Performed by: NURSE PRACTITIONER

## 2025-01-30 PROCEDURE — 99214 OFFICE O/P EST MOD 30 MIN: CPT | Performed by: NURSE PRACTITIONER

## 2025-01-30 PROCEDURE — 1123F ACP DISCUSS/DSCN MKR DOCD: CPT | Performed by: NURSE PRACTITIONER

## 2025-01-30 PROCEDURE — 1160F RVW MEDS BY RX/DR IN RCRD: CPT | Performed by: NURSE PRACTITIONER

## 2025-01-30 RX ORDER — VALSARTAN 80 MG/1
80 TABLET ORAL DAILY
Qty: 30 TABLET | Refills: 3 | Status: SHIPPED | OUTPATIENT
Start: 2025-01-30

## 2025-01-30 RX ORDER — AMLODIPINE BESYLATE 10 MG/1
5 TABLET ORAL NIGHTLY
Qty: 30 TABLET | Refills: 0
Start: 2025-01-30

## 2025-01-30 RX ORDER — MOMETASONE FUROATE 1 MG/G
OINTMENT TOPICAL DAILY
COMMUNITY
Start: 2024-08-29

## 2025-01-30 RX ORDER — GLIPIZIDE 2.5 MG/1
1 TABLET, EXTENDED RELEASE ORAL DAILY
COMMUNITY
Start: 2024-09-10

## 2025-01-30 NOTE — PROGRESS NOTES
History:   reports that she has never smoked. She has never used smokeless tobacco. She reports that she does not drink alcohol and does not use drugs.   Family History:   Family History   Problem Relation Age of Onset    Diabetes Mother     Heart Disease Father     High Blood Pressure Father     High Cholesterol Father     Cancer Sister     Heart Disease Brother     High Blood Pressure Brother     High Cholesterol Brother      Home Medications:  Prior to Admission medications    Medication Sig Start Date End Date Taking? Authorizing Provider   apixaban (ELIQUIS) 5 MG TABS tablet Take 1 tablet by mouth 2 times daily 12/27/24   Tanya Landaverde APRN - CNP   spironolactone (ALDACTONE) 25 MG tablet Take 1 tablet by mouth daily 7/10/24   Rosemarie Chandler MD   amiodarone (CORDARONE) 200 MG tablet Take 0.5 tablets by mouth 2 times daily 10/4/24   Маирна Shore APRN - CNP   losartan (COZAAR) 50 MG tablet Take 1 tablet by mouth in the morning and at bedtime  Patient taking differently: Take 0.5 tablets by mouth daily 2/18/24   Nyla Dove DO   amLODIPine (NORVASC) 10 MG tablet Take 1 tablet by mouth nightly 2/19/24   Nyla Dove DO   pantoprazole (PROTONIX) 20 MG tablet Take 2 tablets by mouth daily 2/18/24   Nyla Dove DO   furosemide (LASIX) 20 MG tablet Take 1 tablet by mouth daily Patient states she will take it when she is home    Rosemarie Chandler MD   pravastatin (PRAVACHOL) 80 MG tablet Take 0.5 tablets by mouth daily  Patient not taking: Reported on 6/4/2024    Rosemarie Chandler MD      Allergies:  Ciprofloxacin, Farxiga [dapagliflozin], Hydrocodone, Metformin and related, Morphine, Nickel, Omeprazole, Prednisone, and Hydralazine     Physical Examination:    Vitals:    01/30/25 1302 01/30/25 1307   BP: (!) 128/56 (!) 130/58   Pulse: 65    SpO2: 98%    Weight: 81.2 kg (179 lb)    Height: 1.575 m (5' 2\")         Physical exam was reviewed and updated as below at the time of the visit:

## 2025-01-30 NOTE — PATIENT INSTRUCTIONS
Call to schedule echocardiogram [phone]   Reduce the amlodipine to 5mg daily for now and will consider reducing further or stopping  Take 40 mg lasix for 1 day and then go back to 20 mg daily   Limit total daily fluid intake to 2L or 64 ounces a day   BMP and BNP in about 1 week - not fasting   Stop losartan and start valsartan 80mg daily   Follow up with EP as planned  Follow up 6 weeks

## 2025-02-05 DIAGNOSIS — I10 ESSENTIAL HYPERTENSION: ICD-10-CM

## 2025-02-05 DIAGNOSIS — I50.33 ACUTE ON CHRONIC DIASTOLIC CONGESTIVE HEART FAILURE (HCC): ICD-10-CM

## 2025-02-05 LAB
ANION GAP SERPL CALCULATED.3IONS-SCNC: 10 MMOL/L (ref 3–16)
BUN SERPL-MCNC: 19 MG/DL (ref 7–20)
CALCIUM SERPL-MCNC: 8.6 MG/DL (ref 8.3–10.6)
CHLORIDE SERPL-SCNC: 102 MMOL/L (ref 99–110)
CO2 SERPL-SCNC: 25 MMOL/L (ref 21–32)
CREAT SERPL-MCNC: 1.2 MG/DL (ref 0.6–1.2)
DEPRECATED RDW RBC AUTO: 16.5 % (ref 12.4–15.4)
FERRITIN SERPL IA-MCNC: 25.5 NG/ML (ref 15–150)
GFR SERPLBLD CREATININE-BSD FMLA CKD-EPI: 43 ML/MIN/{1.73_M2}
GLUCOSE SERPL-MCNC: 203 MG/DL (ref 70–99)
HCT VFR BLD AUTO: 37.3 % (ref 36–48)
HGB BLD-MCNC: 12.1 G/DL (ref 12–16)
IRON SATN MFR SERPL: 9 % (ref 15–50)
IRON SERPL-MCNC: 34 UG/DL (ref 37–145)
MCH RBC QN AUTO: 25 PG (ref 26–34)
MCHC RBC AUTO-ENTMCNC: 32.4 G/DL (ref 31–36)
MCV RBC AUTO: 77.3 FL (ref 80–100)
NT-PROBNP SERPL-MCNC: 112 PG/ML (ref 0–449)
PLATELET # BLD AUTO: 318 K/UL (ref 135–450)
PMV BLD AUTO: 10.7 FL (ref 5–10.5)
POTASSIUM SERPL-SCNC: 4.5 MMOL/L (ref 3.5–5.1)
RBC # BLD AUTO: 4.83 M/UL (ref 4–5.2)
SODIUM SERPL-SCNC: 137 MMOL/L (ref 136–145)
TIBC SERPL-MCNC: 399 UG/DL (ref 260–445)
WBC # BLD AUTO: 7.2 K/UL (ref 4–11)

## 2025-02-06 NOTE — RESULT ENCOUNTER NOTE
GEMINI- Spoke with pt. She V/U of NPRB message. Pt reports she does not want to do the infusions. She will take the iron pill. Informed pt oral iron will not be as effective as IV and will likely not bring her numbers up to where they need to be. She V/U. She will call back if she decides to proceed with IV

## 2025-03-23 DIAGNOSIS — I48.91 ATRIAL FIBRILLATION WITH RAPID VENTRICULAR RESPONSE (HCC): ICD-10-CM

## 2025-03-23 DIAGNOSIS — I50.32 CHRONIC DIASTOLIC HEART FAILURE (HCC): ICD-10-CM

## 2025-03-24 RX ORDER — APIXABAN 5 MG/1
5 TABLET, FILM COATED ORAL 2 TIMES DAILY
Qty: 180 TABLET | Refills: 2 | Status: SHIPPED | OUTPATIENT
Start: 2025-03-24

## 2025-04-01 ENCOUNTER — HOSPITAL ENCOUNTER (OUTPATIENT)
Dept: CARDIOLOGY | Age: 89
Discharge: HOME | End: 2025-04-03
Payer: MEDICARE

## 2025-04-01 VITALS
WEIGHT: 174 LBS | HEIGHT: 62 IN | BODY MASS INDEX: 32.02 KG/M2 | DIASTOLIC BLOOD PRESSURE: 62 MMHG | SYSTOLIC BLOOD PRESSURE: 152 MMHG

## 2025-04-01 DIAGNOSIS — I50.33 ACUTE ON CHRONIC DIASTOLIC CONGESTIVE HEART FAILURE (HCC): ICD-10-CM

## 2025-04-01 DIAGNOSIS — I48.91 ATRIAL FIBRILLATION WITH RAPID VENTRICULAR RESPONSE (HCC): ICD-10-CM

## 2025-04-01 LAB
ECHO AO ASC DIAM: 2.6 CM
ECHO AO ASCENDING AORTA INDEX: 1.44 CM/M2
ECHO AO ROOT DIAM: 2.4 CM
ECHO AO ROOT INDEX: 1.33 CM/M2
ECHO AV AREA PEAK VELOCITY: 2 CM2
ECHO AV AREA/BSA PEAK VELOCITY: 1.1 CM2/M2
ECHO AV CUSP MM: 1.7 CM
ECHO AV PEAK GRADIENT: 12 MMHG
ECHO AV PEAK GRADIENT: 12 MMHG
ECHO AV PEAK VELOCITY: 1.7 M/S
ECHO AV VELOCITY RATIO: 0.76
ECHO BSA: 1.86 M2
ECHO EST RA PRESSURE: 3 MMHG
ECHO LA AREA 2C: 21.2 CM2
ECHO LA AREA 4C: 20.9 CM2
ECHO LA DIAMETER INDEX: 2.28 CM/M2
ECHO LA DIAMETER: 4.1 CM
ECHO LA MAJOR AXIS: 6.1 CM
ECHO LA MINOR AXIS: 5.8 CM
ECHO LA TO AORTIC ROOT RATIO: 1.71
ECHO LA VOL BP: 61 ML (ref 22–52)
ECHO LA VOL MOD A2C: 64 ML (ref 22–52)
ECHO LA VOL MOD A4C: 56 ML (ref 22–52)
ECHO LA VOL/BSA BIPLANE: 34 ML/M2 (ref 16–34)
ECHO LA VOLUME INDEX MOD A2C: 36 ML/M2 (ref 16–34)
ECHO LA VOLUME INDEX MOD A4C: 31 ML/M2 (ref 16–34)
ECHO LV E' LATERAL VELOCITY: 11.7 CM/S
ECHO LV E' SEPTAL VELOCITY: 16.6 CM/S
ECHO LV EDV 3D: 120 ML
ECHO LV EDV INDEX 3D: 67 ML/M2
ECHO LV EF PHYSICIAN: 59 %
ECHO LV EJECTION FRACTION 3D: 59 %
ECHO LV ESV 3D: 49 ML
ECHO LV ESV INDEX 3D: 27 ML/M2
ECHO LV FRACTIONAL SHORTENING: 35 % (ref 28–44)
ECHO LV GLOBAL LONGITUDINAL STRAIN (GLS): -14.9 %
ECHO LV GLOBAL LONGITUDINAL STRAIN (GLS): -22.9 %
ECHO LV GLOBAL LONGITUDINAL STRAIN (GLS): -23.9 %
ECHO LV GLOBAL LONGITUDINAL STRAIN (GLS): -29.8 %
ECHO LV INTERNAL DIMENSION DIASTOLE INDEX: 2.56 CM/M2
ECHO LV INTERNAL DIMENSION DIASTOLIC: 4.6 CM (ref 3.9–5.3)
ECHO LV INTERNAL DIMENSION SYSTOLIC INDEX: 1.67 CM/M2
ECHO LV INTERNAL DIMENSION SYSTOLIC: 3 CM
ECHO LV ISOVOLUMETRIC RELAXATION TIME (IVRT): 55 MS
ECHO LV IVSD: 1.2 CM (ref 0.6–0.9)
ECHO LV MASS 2D: 169.9 G (ref 67–162)
ECHO LV MASS 3D INDEX: 81.1 G/M2
ECHO LV MASS 3D: 146 G
ECHO LV MASS INDEX 2D: 94.4 G/M2 (ref 43–95)
ECHO LV POSTERIOR WALL DIASTOLIC: 0.9 CM (ref 0.6–0.9)
ECHO LV RELATIVE WALL THICKNESS RATIO: 0.39
ECHO LVOT AREA: 2.5 CM2
ECHO LVOT DIAM: 1.8 CM
ECHO LVOT MEAN GRADIENT: 4 MMHG
ECHO LVOT PEAK GRADIENT: 7 MMHG
ECHO LVOT PEAK VELOCITY: 1.3 M/S
ECHO LVOT STROKE VOLUME INDEX: 37.7 ML/M2
ECHO LVOT SV: 67.9 ML
ECHO LVOT VTI: 26.7 CM
ECHO MV A VELOCITY: 0.59 M/S
ECHO MV E DECELERATION TIME (DT): 193 MS
ECHO MV E VELOCITY: 1.12 M/S
ECHO MV E/A RATIO: 1.9
ECHO MV E/E' LATERAL: 9.57
ECHO MV E/E' RATIO (AVERAGED): 8.16
ECHO MV E/E' SEPTAL: 6.75
ECHO RA AREA 4C: 12.2 CM2
ECHO RA END SYSTOLIC VOLUME APICAL 4 CHAMBER INDEX BSA: 15 ML/M2
ECHO RA VOLUME: 27 ML
ECHO RIGHT VENTRICULAR SYSTOLIC PRESSURE (RVSP): 41 MMHG
ECHO RV FREE WALL PEAK S': 18.4 CM/S
ECHO RV TAPSE: 2.7 CM (ref 1.7–?)
ECHO TV REGURGITANT MAX VELOCITY: 3.09 M/S
ECHO TV REGURGITANT PEAK GRADIENT: 38 MMHG

## 2025-04-01 PROCEDURE — 93306 TTE W/DOPPLER COMPLETE: CPT

## 2025-04-01 NOTE — ED PROVIDER NOTES
[address] [address] ED  EMERGENCY DEPARTMENT ENCOUNTER        Pt Name: Trinda Sandifer Payer  MRN: [id number]  [address] [facility] [place] 1935  Date of evaluation: 10/5/2023  Provider: GUANAKO Fitzgerald - CNP  PCP: Vina Lesches, MD  Note Started: 5:33 PM EDT 10/5/23      SHON. I have evaluated this patient. CHIEF COMPLAINT       Chief Complaint   Patient presents with    Abdominal Pain     Patient reports ABD since yesterday, states she has a HX of blocked bowels and that's what it feels like. HISTORY OF PRESENT ILLNESS: 1 or more Elements     History From: Patient     Limitations to history : None    Social Determinants Significantly Affecting Health : None    Chief Complaint: Abdominal pain     Alex Lamb is a 81 y.o. female who presents to the emergency department today with symptoms of abdominal pain. Patient reports symptoms of tight pain in the epigastric area of her abdomen. States that she had similar symptoms like this before with a bowel obstruction. No fever no chills. No neck pain or back pain. States otherwise feeling well. Nausea, but no vomiting. BM today which was \" Normal\"     Nursing Notes were all reviewed and agreed with or any disagreements were addressed in the HPI. REVIEW OF SYSTEMS :      Review of Systems   Constitutional:  Negative for chills, diaphoresis and fever. HENT:  Negative for congestion, ear pain, rhinorrhea and sore throat. Eyes:  Negative for pain and visual disturbance. Respiratory:  Negative for cough and shortness of breath. Cardiovascular:  Negative for chest pain and leg swelling. Gastrointestinal:  Positive for abdominal pain and nausea. Negative for blood in stool, diarrhea and vomiting. Genitourinary:  Negative for difficulty urinating, dysuria, flank pain and frequency. Musculoskeletal:  Negative for back pain and neck pain. Skin:  Negative for rash and wound. Neurological:  Negative for dizziness and light-headedness. Patient mom call returned. She stated the patient needed a surgery clearance scheduled. Patient clearance was scheduled. Mom verbally understood the information given.

## 2025-04-01 NOTE — TELEPHONE ENCOUNTER
Pt is requesting refill of Amiodarone 200mg. Preferred pharmacy is    [facility] [id number] - [facility] [address] - P [phone]     Last ov 10/04/2024 npam  Next ov 05/02/2025 npam

## 2025-04-01 NOTE — TELEPHONE ENCOUNTER
Last OV 10/04/2024  Upcoming OV 05/02/2025  BMP 02/2025  EKG 10/2024  TSH 10/2024 in care everywhere  LFT 11/2024 in care everywhere

## 2025-04-03 ENCOUNTER — RESULTS FOLLOW-UP (OUTPATIENT)
Dept: CARDIOLOGY CLINIC | Age: 89
End: 2025-04-03

## 2025-04-03 NOTE — RESULT ENCOUNTER NOTE
Reviewed. Please let the patient know that her heart is improving. The pressures are much better on this echo compared to the last echo.   Continue current regimen and keep follow up to discuss further.

## 2025-04-07 RX ORDER — AMIODARONE HYDROCHLORIDE 200 MG/1
100 TABLET ORAL DAILY
Qty: 180 TABLET | Refills: 1 | Status: SHIPPED | OUTPATIENT
Start: 2025-04-07

## 2025-05-02 ENCOUNTER — OFFICE VISIT (OUTPATIENT)
Dept: CARDIOLOGY CLINIC | Age: 89
End: 2025-05-02
Payer: MEDICARE

## 2025-05-02 VITALS
HEART RATE: 61 BPM | HEIGHT: 62 IN | BODY MASS INDEX: 32.9 KG/M2 | DIASTOLIC BLOOD PRESSURE: 70 MMHG | WEIGHT: 178.79 LBS | SYSTOLIC BLOOD PRESSURE: 140 MMHG | OXYGEN SATURATION: 96 %

## 2025-05-02 DIAGNOSIS — I10 ESSENTIAL HYPERTENSION: ICD-10-CM

## 2025-05-02 DIAGNOSIS — I48.0 PAF (PAROXYSMAL ATRIAL FIBRILLATION) (HCC): Primary | ICD-10-CM

## 2025-05-02 DIAGNOSIS — R07.9 CHEST PAIN, UNSPECIFIED TYPE: ICD-10-CM

## 2025-05-02 DIAGNOSIS — I47.10 SVT (SUPRAVENTRICULAR TACHYCARDIA): ICD-10-CM

## 2025-05-02 PROCEDURE — G2211 COMPLEX E/M VISIT ADD ON: HCPCS | Performed by: NURSE PRACTITIONER

## 2025-05-02 PROCEDURE — 99215 OFFICE O/P EST HI 40 MIN: CPT | Performed by: NURSE PRACTITIONER

## 2025-05-02 PROCEDURE — 1159F MED LIST DOCD IN RCRD: CPT | Performed by: NURSE PRACTITIONER

## 2025-05-02 PROCEDURE — 1160F RVW MEDS BY RX/DR IN RCRD: CPT | Performed by: NURSE PRACTITIONER

## 2025-05-02 PROCEDURE — 1123F ACP DISCUSS/DSCN MKR DOCD: CPT | Performed by: NURSE PRACTITIONER

## 2025-05-02 PROCEDURE — 93000 ELECTROCARDIOGRAM COMPLETE: CPT | Performed by: NURSE PRACTITIONER

## 2025-05-02 RX ORDER — FUROSEMIDE 40 MG/1
40 TABLET ORAL DAILY
Qty: 30 TABLET | Refills: 1 | Status: SHIPPED | OUTPATIENT
Start: 2025-05-02

## 2025-05-02 NOTE — PROGRESS NOTES
regurgitation.  Mild tricuspid regurgitation.  Systolic pulmonary artery pressure (SPAP) estimated at 60 mmHg (RA pressure 3 mmHg), consistent with severe pulmonary hypertension.  Mild pulmonic regurgitation present.     RAINER 2/11/2014:  Global ejection fraction is normal and estimated from 50 % to 55 %.     Echo 8/26/2013:  Overall left ventricular function is normal.   Ejection fraction is visually estimated to be 50-55 %.   There is trivial tricuspid regurgitation with RVSP estimated at 30 mmHg.   Limited 2-D echocardiogram   There is limited visualization of the valvular structures but no obvious major abnormalities noted.   There is reversal of E/A inflow velocities across the mitral valve suggesting impaired left ventricular relaxation.      All labs and testing reviewed.  CARDIOLOGY LABS:   CBC: No results for input(s): \"WBC\", \"HGB\", \"HCT\", \"PLT\" in the last 72 hours.  BMP: No results for input(s): \"NA\", \"K\", \"CO2\", \"BUN\", \"CREATININE\", \"LABGLOM\", \"GLUCOSE\" in the last 72 hours.  PT/INR: No results for input(s): \"PROTIME\", \"INR\" in the last 72 hours.  APTT:No results for input(s): \"APTT\" in the last 72 hours.  FASTING LIPID PANEL:  Lab Results   Component Value Date/Time    HDL 52 11/28/2023 11:28 PM    TRIG 94 11/28/2023 11:28 PM     LIVER PROFILE:No results for input(s): \"AST\", \"ALT\" in the last 72 hours.    Invalid input(s): \"ALB\"    IMPRESSION:    Assessment:   Paroxsymal atrial fibrillation: stable    -s/p cryoablation 2014   -Tikosyn started 12/2020 (stopped due to ineffectiveness)   -amiodarone started 6/2023   -[id number] score 6 (HTN, DM, CHF, age, gender)   Sinus bradycardia: stable   PSVT: noted in 2013  Chronic diastolic CHF: compensated  Pulmonary HTN: followed by Dr. Richards   HTN: suboptimal today  HLD   DM       Plan:   Continue Eliquis and amlodipine  Increase Lasix to 40 mg p.o. daily given ongoing swelling  Only use Toprol as needed for breakthrough episodes of AF   Continue amiodarone 100

## 2025-05-08 DIAGNOSIS — I48.0 PAF (PAROXYSMAL ATRIAL FIBRILLATION) (HCC): ICD-10-CM

## 2025-05-08 LAB
ALBUMIN SERPL-MCNC: 3.8 G/DL (ref 3.4–5)
ALP SERPL-CCNC: 87 U/L (ref 40–129)
ALT SERPL-CCNC: 38 U/L (ref 10–40)
ANION GAP SERPL CALCULATED.3IONS-SCNC: 13 MMOL/L (ref 3–16)
AST SERPL-CCNC: 45 U/L (ref 15–37)
BILIRUB DIRECT SERPL-MCNC: 0.3 MG/DL (ref 0–0.3)
BILIRUB INDIRECT SERPL-MCNC: 0.2 MG/DL (ref 0–1)
BILIRUB SERPL-MCNC: 0.5 MG/DL (ref 0–1)
BUN SERPL-MCNC: 22 MG/DL (ref 7–20)
CALCIUM SERPL-MCNC: 8.7 MG/DL (ref 8.3–10.6)
CHLORIDE SERPL-SCNC: 103 MMOL/L (ref 99–110)
CO2 SERPL-SCNC: 21 MMOL/L (ref 21–32)
CREAT SERPL-MCNC: 1.1 MG/DL (ref 0.6–1.2)
GFR SERPLBLD CREATININE-BSD FMLA CKD-EPI: 48 ML/MIN/{1.73_M2}
GLUCOSE SERPL-MCNC: 222 MG/DL (ref 70–99)
POTASSIUM SERPL-SCNC: 4.5 MMOL/L (ref 3.5–5.1)
PROT SERPL-MCNC: 6.8 G/DL (ref 6.4–8.2)
SODIUM SERPL-SCNC: 137 MMOL/L (ref 136–145)
TSH SERPL DL<=0.005 MIU/L-ACNC: 3.82 UIU/ML (ref 0.27–4.2)

## 2025-05-09 ENCOUNTER — HOSPITAL ENCOUNTER (OUTPATIENT)
Dept: NUCLEAR MEDICINE | Age: 89
Discharge: HOME | End: 2025-05-09
Payer: MEDICARE

## 2025-05-09 ENCOUNTER — RESULTS FOLLOW-UP (OUTPATIENT)
Dept: CARDIOLOGY | Age: 89
End: 2025-05-09

## 2025-05-09 ENCOUNTER — HOSPITAL ENCOUNTER (OUTPATIENT)
Age: 89
Discharge: HOME | End: 2025-05-11
Payer: MEDICARE

## 2025-05-09 DIAGNOSIS — R07.9 CHEST PAIN, UNSPECIFIED TYPE: ICD-10-CM

## 2025-05-09 LAB
NUC STRESS EJECTION FRACTION: 82 %
NUC STRESS LV EDV: 89 ML (ref 56–104)
NUC STRESS LV ESV: 16 ML (ref 19–49)
NUC STRESS LV MASS: 131 G
STRESS BASELINE DIAS BP: 62 MMHG
STRESS BASELINE HR: 69 BPM
STRESS BASELINE SYS BP: 140 MMHG
STRESS ESTIMATED WORKLOAD: 1 METS
STRESS PEAK DIAS BP: 61 MMHG
STRESS PEAK SYS BP: 155 MMHG
STRESS PERCENT HR ACHIEVED: 68 %
STRESS POST PEAK HR: 90 BPM
STRESS RATE PRESSURE PRODUCT: ABNORMAL BPM*MMHG
STRESS TARGET HR: 132 BPM

## 2025-05-09 PROCEDURE — 6360000002 HC RX W HCPCS: Performed by: NURSE PRACTITIONER

## 2025-05-09 PROCEDURE — 78452 HT MUSCLE IMAGE SPECT MULT: CPT

## 2025-05-09 PROCEDURE — 93017 CV STRESS TEST TRACING ONLY: CPT

## 2025-05-09 PROCEDURE — 3430000000 HC RX DIAGNOSTIC RADIOPHARMACEUTICAL: Performed by: NURSE PRACTITIONER

## 2025-05-09 PROCEDURE — A9502 TC99M TETROFOSMIN: HCPCS | Performed by: NURSE PRACTITIONER

## 2025-05-09 RX ORDER — REGADENOSON 0.08 MG/ML
0.4 INJECTION, SOLUTION INTRAVENOUS
Status: COMPLETED | OUTPATIENT
Start: 2025-05-09 | End: 2025-05-09

## 2025-05-09 RX ADMIN — TETROFOSMIN 33.2 MILLICURIE: 1.38 INJECTION, POWDER, LYOPHILIZED, FOR SOLUTION INTRAVENOUS at 11:24

## 2025-05-09 RX ADMIN — TETROFOSMIN 10.9 MILLICURIE: 1.38 INJECTION, POWDER, LYOPHILIZED, FOR SOLUTION INTRAVENOUS at 08:50

## 2025-05-09 RX ADMIN — REGADENOSON 0.4 MG: 0.08 INJECTION, SOLUTION INTRAVENOUS at 11:24

## 2025-05-09 NOTE — RESULT ENCOUNTER NOTE
Mercedes, I saw Ms Tiwari in clinic last week. She had exertional chest pain. Stress positive. [facility] recommended. Please discuss when she sees you on 5/13/2025.

## 2025-05-09 NOTE — TELEPHONE ENCOUNTER
Pt called office to schedule [facility] advised of npam message that nprb will discuss with pt on 5/13 ov . Pt v/u

## 2025-05-09 NOTE — RESULT ENCOUNTER NOTE
I spoke with the patient and relayed MELVIN message. She states that she would like to think more on having the procedure and wishes to discuss it with NPRB at her upcoming appointment. GEMINI CHARLES and JOO.

## 2025-05-09 NOTE — RESULT ENCOUNTER NOTE
Please let patient know her stress test is abnormal. Discussed with interventional cardiology. Recommend an angiogram to take a closer look. Please schedule with Dr. Goodman (reviewed with him).

## 2025-05-13 ENCOUNTER — OFFICE VISIT (OUTPATIENT)
Dept: CARDIOLOGY CLINIC | Age: 89
End: 2025-05-13
Payer: MEDICARE

## 2025-05-13 VITALS
DIASTOLIC BLOOD PRESSURE: 58 MMHG | WEIGHT: 178 LBS | HEART RATE: 70 BPM | HEIGHT: 62 IN | OXYGEN SATURATION: 97 % | BODY MASS INDEX: 32.76 KG/M2 | SYSTOLIC BLOOD PRESSURE: 122 MMHG

## 2025-05-13 DIAGNOSIS — D50.9 IRON DEFICIENCY ANEMIA, UNSPECIFIED IRON DEFICIENCY ANEMIA TYPE: ICD-10-CM

## 2025-05-13 DIAGNOSIS — I50.32 CHRONIC DIASTOLIC CHF (CONGESTIVE HEART FAILURE) (HCC): Primary | ICD-10-CM

## 2025-05-13 DIAGNOSIS — I48.0 PAF (PAROXYSMAL ATRIAL FIBRILLATION) (HCC): ICD-10-CM

## 2025-05-13 DIAGNOSIS — R94.39 ABNORMAL STRESS TEST: ICD-10-CM

## 2025-05-13 PROCEDURE — 1123F ACP DISCUSS/DSCN MKR DOCD: CPT | Performed by: NURSE PRACTITIONER

## 2025-05-13 PROCEDURE — G2211 COMPLEX E/M VISIT ADD ON: HCPCS | Performed by: NURSE PRACTITIONER

## 2025-05-13 PROCEDURE — 1159F MED LIST DOCD IN RCRD: CPT | Performed by: NURSE PRACTITIONER

## 2025-05-13 PROCEDURE — 99214 OFFICE O/P EST MOD 30 MIN: CPT | Performed by: NURSE PRACTITIONER

## 2025-05-13 PROCEDURE — 1160F RVW MEDS BY RX/DR IN RCRD: CPT | Performed by: NURSE PRACTITIONER

## 2025-05-13 RX ORDER — AMLODIPINE BESYLATE 5 MG/1
5 TABLET ORAL NIGHTLY
Qty: 90 TABLET | Refills: 3 | Status: SHIPPED | OUTPATIENT
Start: 2025-05-13

## 2025-05-13 NOTE — PROGRESS NOTES
[facility]  Cardiac Follow-up    Primary Care Doctor:  Dmitry Moreno MD    Chief Complaint   Patient presents with    Follow-up    Atrial Fibrillation    Hypertension    Congestive Heart Failure    Hyperlipidemia        History of Present Illness:  I had the pleasure of seeing Lashanda Tiwari as a follow up for CHF.   Follows with Dr. Richards for pulmonary HTN. Hx of Afib, SVT, DM. S/p cryoablation 2014, admitted 1/2019 with [facility]- medically managed. Admitted 10/2019 with [facility]- med managed. Echo from 2/2019 showed EF 55-60%, SPAP 60mmhg. Follows with EP for afib- on amiodarone.  Admitted 6/2023 with [facility].     Since last visit, echo completed 4/1/2025. Completed stress test on 5/9/2025 abnromal and LHC recommended. She didn't set up as she wanted to discuss in the office 1st. She had a prior afib ablation and had bleeding post-procedure.   She was having chest pain/burning with exertion.   Improved chest pain with the increase in the lasix. She is doing better. Edema of the BLE improved.   Appetite is down. No dizziness or lightheadedness.     Lashanda Tiwari describes symptoms including lower extremity edema but denies palpitations, syncope.     Home weights: 173 lbs  Appetite: down  Fluid intake:   Diet:     Taking medications as prescribed: Yes  Able to afford medications: Yes    Past Medical History:   has a past medical history of Atrial fibrillation (HCC), Congestive heart failure (HCC), Diabetes mellitus (HCC), Hydronephrosis, Hyperlipidemia, Hypertension, Intussusception intestine (HCC), and SVT (supraventricular tachycardia).  Surgical History:   has a past surgical history that includes Hysterectomy; Appendectomy; Tonsillectomy; skin biopsy; ablation of dysrhythmic focus; Dilatation, esophagus; Small intestine surgery; fracture surgery; Upper gastrointestinal endoscopy (N/A, 4/21/2021); Upper gastrointestinal endoscopy (4/21/2021); Upper gastrointestinal endoscopy (4/21/2021); and

## 2025-05-13 NOTE — PATIENT INSTRUCTIONS
Continue norvasc 5mg daily   Continue Spironolactone (aldactone)   Take 40 mg lasix daily   Check CBC and iron studies next month   Discuss cardiac cath; will have  call you with date and time of the procedure  Follow up with EP as planned  Follow up 6 weeks

## 2025-05-22 ENCOUNTER — TELEPHONE (OUTPATIENT)
Dept: CARDIOLOGY CLINIC | Age: 89
End: 2025-05-22

## 2025-05-22 NOTE — TELEPHONE ENCOUNTER
Pt called the office stating that she is to have a cardiac cath but has not received a call for a date and time.     Per NPRB instructions from last ov     -Discuss cardiac cath; will have  call you with date and time of the procedur     LOV 5.13.25 NPRB    Please advise

## 2025-05-23 NOTE — TELEPHONE ENCOUNTER
Mercedes, do you want just a left heart cath or left and right.  Will send info once clarified.  Thank you

## 2025-05-27 NOTE — TELEPHONE ENCOUNTER
Procedure:  C/C  Doctor:  Dr. Ewing (NPRB)  Date:  6/12/25  Time:  9am  Arrival:  7:30am  Reps:  n/a  Anesthesia:  n/a      Spoke with patient. Please have patient arrive to the main entrance of [facility] ([address] [place], [place] [id number]) and check in with the registration desk.  They will be directed to the Cath Lab.  RN's - Please call patient regarding medication instructions. Remind patient to be NPO after midnight (8 hours prior). Do not apply lotions/creams on skin the day of procedure.    FXW, NPRB - fyi only.

## 2025-05-30 NOTE — TELEPHONE ENCOUNTER
Spoke with patient.  Medications reviewed.  Hold Eliquis 48 hours prior.  Hold Lasix, glipizide and spironolactone the morning of procedure.  All others may be taken with sips of water.  TONYA

## 2025-06-10 ENCOUNTER — RESULTS FOLLOW-UP (OUTPATIENT)
Dept: CARDIOLOGY | Age: 89
End: 2025-06-10

## 2025-06-10 ENCOUNTER — HOSPITAL ENCOUNTER (OUTPATIENT)
Dept: LAB | Age: 89
Discharge: HOME | End: 2025-06-10
Payer: MEDICARE

## 2025-06-10 DIAGNOSIS — I48.0 PAF (PAROXYSMAL ATRIAL FIBRILLATION) (HCC): ICD-10-CM

## 2025-06-10 DIAGNOSIS — D50.9 IRON DEFICIENCY ANEMIA, UNSPECIFIED IRON DEFICIENCY ANEMIA TYPE: ICD-10-CM

## 2025-06-10 DIAGNOSIS — I50.32 CHRONIC DIASTOLIC CHF (CONGESTIVE HEART FAILURE) (HCC): ICD-10-CM

## 2025-06-10 LAB
DEPRECATED RDW RBC AUTO: 20 % (ref 12.4–15.4)
FERRITIN SERPL IA-MCNC: 54.2 NG/ML (ref 15–150)
HCT VFR BLD AUTO: 38.4 % (ref 36–48)
HGB BLD-MCNC: 12.4 G/DL (ref 12–16)
IRON SATN MFR SERPL: 24 % (ref 15–50)
IRON SERPL-MCNC: 81 UG/DL (ref 37–145)
MCH RBC QN AUTO: 25 PG (ref 26–34)
MCHC RBC AUTO-ENTMCNC: 32.3 G/DL (ref 31–36)
MCV RBC AUTO: 77.3 FL (ref 80–100)
PLATELET # BLD AUTO: 250 K/UL (ref 135–450)
PMV BLD AUTO: 9.8 FL (ref 5–10.5)
RBC # BLD AUTO: 4.97 M/UL (ref 4–5.2)
TIBC SERPL-MCNC: 337 UG/DL (ref 260–445)
WBC # BLD AUTO: 8 K/UL (ref 4–11)

## 2025-06-10 PROCEDURE — 83540 ASSAY OF IRON: CPT

## 2025-06-10 PROCEDURE — 36415 COLL VENOUS BLD VENIPUNCTURE: CPT

## 2025-06-10 PROCEDURE — 82728 ASSAY OF FERRITIN: CPT

## 2025-06-10 PROCEDURE — 85027 COMPLETE CBC AUTOMATED: CPT

## 2025-06-10 PROCEDURE — 83550 IRON BINDING TEST: CPT

## 2025-06-12 ENCOUNTER — HOSPITAL ENCOUNTER (OUTPATIENT)
Age: 89
Setting detail: OBSERVATION
Discharge: HOME | End: 2025-06-15
Attending: INTERNAL MEDICINE | Admitting: INTERNAL MEDICINE
Payer: MEDICARE

## 2025-06-12 DIAGNOSIS — I25.10 CAD IN NATIVE ARTERY: ICD-10-CM

## 2025-06-12 DIAGNOSIS — R94.39 ABNORMAL CARDIOVASCULAR STRESS TEST: ICD-10-CM

## 2025-06-12 DIAGNOSIS — I50.32 CHRONIC DIASTOLIC HEART FAILURE (HCC): ICD-10-CM

## 2025-06-12 PROBLEM — I20.0 UNSTABLE ANGINA (HCC): Status: ACTIVE | Noted: 2025-06-12

## 2025-06-12 LAB
ANION GAP SERPL CALCULATED.3IONS-SCNC: 13 MMOL/L (ref 3–16)
BUN SERPL-MCNC: 15 MG/DL (ref 7–20)
CALCIUM SERPL-MCNC: 8.9 MG/DL (ref 8.3–10.6)
CHLORIDE SERPL-SCNC: 104 MMOL/L (ref 99–110)
CHOLEST SERPL-MCNC: 140 MG/DL (ref 0–199)
CO2 SERPL-SCNC: 22 MMOL/L (ref 21–32)
CREAT SERPL-MCNC: 1.1 MG/DL (ref 0.6–1.2)
DEPRECATED RDW RBC AUTO: 19.8 % (ref 12.4–15.4)
EKG ATRIAL RATE: 76 BPM
EKG DIAGNOSIS: NORMAL
EKG P AXIS: 73 DEGREES
EKG P-R INTERVAL: 204 MS
EKG Q-T INTERVAL: 410 MS
EKG QRS DURATION: 106 MS
EKG QTC CALCULATION (BAZETT): 461 MS
EKG R AXIS: -27 DEGREES
EKG T AXIS: 80 DEGREES
EKG VENTRICULAR RATE: 76 BPM
GFR SERPLBLD CREATININE-BSD FMLA CKD-EPI: 48 ML/MIN/{1.73_M2}
GLUCOSE SERPL-MCNC: 165 MG/DL (ref 70–99)
HCT VFR BLD AUTO: 38.4 % (ref 36–48)
HDLC SERPL-MCNC: 54 MG/DL (ref 40–60)
HGB BLD-MCNC: 12.6 G/DL (ref 12–16)
INR PPP: 1.07 (ref 0.86–1.14)
LDLC SERPL CALC-MCNC: 63 MG/DL
MCH RBC QN AUTO: 25.6 PG (ref 26–34)
MCHC RBC AUTO-ENTMCNC: 32.7 G/DL (ref 31–36)
MCV RBC AUTO: 78.1 FL (ref 80–100)
PLATELET # BLD AUTO: 272 K/UL (ref 135–450)
PMV BLD AUTO: 9.1 FL (ref 5–10.5)
POC ACT LR: 252 SEC
POTASSIUM SERPL-SCNC: 3.9 MMOL/L (ref 3.5–5.1)
PROTHROMBIN TIME: 14.2 SEC (ref 12.1–14.9)
RBC # BLD AUTO: 4.92 M/UL (ref 4–5.2)
SODIUM SERPL-SCNC: 139 MMOL/L (ref 136–145)
TRIGL SERPL-MCNC: 116 MG/DL (ref 0–150)
VLDLC SERPL CALC-MCNC: 23 MG/DL
WBC # BLD AUTO: 8.5 K/UL (ref 4–11)

## 2025-06-12 PROCEDURE — 99152 MOD SED SAME PHYS/QHP 5/>YRS: CPT | Performed by: INTERNAL MEDICINE

## 2025-06-12 PROCEDURE — 93460 R&L HRT ART/VENTRICLE ANGIO: CPT | Performed by: INTERNAL MEDICINE

## 2025-06-12 PROCEDURE — 2500000003 HC RX 250 WO HCPCS: Performed by: INTERNAL MEDICINE

## 2025-06-12 PROCEDURE — 7100000010 HC PHASE II RECOVERY - FIRST 15 MIN: Performed by: INTERNAL MEDICINE

## 2025-06-12 PROCEDURE — C1894 INTRO/SHEATH, NON-LASER: HCPCS | Performed by: INTERNAL MEDICINE

## 2025-06-12 PROCEDURE — 6360000004 HC RX CONTRAST MEDICATION: Performed by: INTERNAL MEDICINE

## 2025-06-12 PROCEDURE — 93005 ELECTROCARDIOGRAM TRACING: CPT | Performed by: INTERNAL MEDICINE

## 2025-06-12 PROCEDURE — 2709999900 HC NON-CHARGEABLE SUPPLY: Performed by: INTERNAL MEDICINE

## 2025-06-12 PROCEDURE — 80048 BASIC METABOLIC PNL TOTAL CA: CPT

## 2025-06-12 PROCEDURE — G0378 HOSPITAL OBSERVATION PER HR: HCPCS

## 2025-06-12 PROCEDURE — 80061 LIPID PANEL: CPT

## 2025-06-12 PROCEDURE — C1769 GUIDE WIRE: HCPCS | Performed by: INTERNAL MEDICINE

## 2025-06-12 PROCEDURE — G0378 HOSPITAL OBSERVATION PER HR: HCPCS | Performed by: INTERNAL MEDICINE

## 2025-06-12 PROCEDURE — 85610 PROTHROMBIN TIME: CPT

## 2025-06-12 PROCEDURE — 99153 MOD SED SAME PHYS/QHP EA: CPT | Performed by: INTERNAL MEDICINE

## 2025-06-12 PROCEDURE — 7100000011 HC PHASE II RECOVERY - ADDTL 15 MIN: Performed by: INTERNAL MEDICINE

## 2025-06-12 PROCEDURE — 93010 ELECTROCARDIOGRAM REPORT: CPT | Performed by: INTERNAL MEDICINE

## 2025-06-12 PROCEDURE — 85347 COAGULATION TIME ACTIVATED: CPT

## 2025-06-12 PROCEDURE — 6360000002 HC RX W HCPCS: Performed by: INTERNAL MEDICINE

## 2025-06-12 PROCEDURE — C1887 CATHETER, GUIDING: HCPCS | Performed by: INTERNAL MEDICINE

## 2025-06-12 PROCEDURE — 6370000000 HC RX 637 (ALT 250 FOR IP): Performed by: INTERNAL MEDICINE

## 2025-06-12 PROCEDURE — 85027 COMPLETE CBC AUTOMATED: CPT

## 2025-06-12 RX ORDER — SODIUM CHLORIDE 9 MG/ML
INJECTION, SOLUTION INTRAVENOUS PRN
Status: DISCONTINUED | OUTPATIENT
Start: 2025-06-12 | End: 2025-06-12 | Stop reason: HOSPADM

## 2025-06-12 RX ORDER — ONDANSETRON 2 MG/ML
4 INJECTION INTRAMUSCULAR; INTRAVENOUS EVERY 6 HOURS PRN
Status: DISCONTINUED | OUTPATIENT
Start: 2025-06-12 | End: 2025-06-12 | Stop reason: HOSPADM

## 2025-06-12 RX ORDER — SODIUM CHLORIDE 0.9 % (FLUSH) 0.9 %
5-40 SYRINGE (ML) INJECTION EVERY 12 HOURS SCHEDULED
Status: DISCONTINUED | OUTPATIENT
Start: 2025-06-12 | End: 2025-06-15 | Stop reason: HOSPADM

## 2025-06-12 RX ORDER — IOPAMIDOL 755 MG/ML
INJECTION, SOLUTION INTRAVASCULAR PRN
Status: DISCONTINUED | OUTPATIENT
Start: 2025-06-12 | End: 2025-06-12 | Stop reason: HOSPADM

## 2025-06-12 RX ORDER — CLOPIDOGREL 300 MG/1
600 TABLET, FILM COATED ORAL ONCE
Status: COMPLETED | OUTPATIENT
Start: 2025-06-12 | End: 2025-06-12

## 2025-06-12 RX ORDER — ASPIRIN 325 MG
325 TABLET ORAL ONCE
Status: COMPLETED | OUTPATIENT
Start: 2025-06-13 | End: 2025-06-13

## 2025-06-12 RX ORDER — SODIUM CHLORIDE 0.9 % (FLUSH) 0.9 %
5-40 SYRINGE (ML) INJECTION PRN
Status: DISCONTINUED | OUTPATIENT
Start: 2025-06-12 | End: 2025-06-13 | Stop reason: HOSPADM

## 2025-06-12 RX ORDER — LIDOCAINE HYDROCHLORIDE 20 MG/ML
INJECTION, SOLUTION EPIDURAL; INFILTRATION; INTRACAUDAL; PERINEURAL PRN
Status: DISCONTINUED | OUTPATIENT
Start: 2025-06-12 | End: 2025-06-12 | Stop reason: HOSPADM

## 2025-06-12 RX ORDER — ACETAMINOPHEN 325 MG/1
650 TABLET ORAL EVERY 4 HOURS PRN
Status: DISCONTINUED | OUTPATIENT
Start: 2025-06-12 | End: 2025-06-15 | Stop reason: HOSPADM

## 2025-06-12 RX ORDER — FUROSEMIDE 10 MG/ML
40 INJECTION INTRAMUSCULAR; INTRAVENOUS ONCE
Status: COMPLETED | OUTPATIENT
Start: 2025-06-12 | End: 2025-06-12

## 2025-06-12 RX ORDER — HEPARIN SODIUM 1000 [USP'U]/ML
INJECTION, SOLUTION INTRAVENOUS; SUBCUTANEOUS PRN
Status: DISCONTINUED | OUTPATIENT
Start: 2025-06-12 | End: 2025-06-12 | Stop reason: HOSPADM

## 2025-06-12 RX ORDER — LORAZEPAM 0.5 MG/1
0.5 TABLET ORAL
Status: COMPLETED | OUTPATIENT
Start: 2025-06-12 | End: 2025-06-13

## 2025-06-12 RX ORDER — SODIUM CHLORIDE 0.9 % (FLUSH) 0.9 %
5-40 SYRINGE (ML) INJECTION EVERY 12 HOURS SCHEDULED
Status: DISCONTINUED | OUTPATIENT
Start: 2025-06-12 | End: 2025-06-12 | Stop reason: HOSPADM

## 2025-06-12 RX ORDER — CLOPIDOGREL BISULFATE 75 MG/1
75 TABLET ORAL DAILY
Status: DISCONTINUED | OUTPATIENT
Start: 2025-06-13 | End: 2025-06-15 | Stop reason: HOSPADM

## 2025-06-12 RX ORDER — SODIUM CHLORIDE 0.9 % (FLUSH) 0.9 %
5-40 SYRINGE (ML) INJECTION PRN
Status: DISCONTINUED | OUTPATIENT
Start: 2025-06-12 | End: 2025-06-15 | Stop reason: HOSPADM

## 2025-06-12 RX ORDER — PRAVASTATIN SODIUM 40 MG
40 TABLET ORAL NIGHTLY
Status: DISCONTINUED | OUTPATIENT
Start: 2025-06-12 | End: 2025-06-15 | Stop reason: HOSPADM

## 2025-06-12 RX ORDER — SODIUM CHLORIDE 9 MG/ML
INJECTION, SOLUTION INTRAVENOUS PRN
Status: DISCONTINUED | OUTPATIENT
Start: 2025-06-12 | End: 2025-06-13 | Stop reason: HOSPADM

## 2025-06-12 RX ORDER — AMIODARONE HYDROCHLORIDE 200 MG/1
100 TABLET ORAL DAILY
Status: DISCONTINUED | OUTPATIENT
Start: 2025-06-13 | End: 2025-06-15 | Stop reason: HOSPADM

## 2025-06-12 RX ORDER — ONDANSETRON 2 MG/ML
4 INJECTION INTRAMUSCULAR; INTRAVENOUS EVERY 6 HOURS PRN
Status: DISCONTINUED | OUTPATIENT
Start: 2025-06-12 | End: 2025-06-13 | Stop reason: HOSPADM

## 2025-06-12 RX ORDER — PANTOPRAZOLE SODIUM 40 MG/1
40 TABLET, DELAYED RELEASE ORAL DAILY
Status: DISCONTINUED | OUTPATIENT
Start: 2025-06-13 | End: 2025-06-15 | Stop reason: HOSPADM

## 2025-06-12 RX ORDER — MIDAZOLAM HYDROCHLORIDE 5 MG/ML
INJECTION, SOLUTION INTRAMUSCULAR; INTRAVENOUS PRN
Status: DISCONTINUED | OUTPATIENT
Start: 2025-06-12 | End: 2025-06-12 | Stop reason: HOSPADM

## 2025-06-12 RX ORDER — ASPIRIN 325 MG
325 TABLET ORAL ONCE
Status: COMPLETED | OUTPATIENT
Start: 2025-06-12 | End: 2025-06-12

## 2025-06-12 RX ORDER — SODIUM CHLORIDE 0.9 % (FLUSH) 0.9 %
5-40 SYRINGE (ML) INJECTION PRN
Status: DISCONTINUED | OUTPATIENT
Start: 2025-06-12 | End: 2025-06-12 | Stop reason: HOSPADM

## 2025-06-12 RX ORDER — SODIUM CHLORIDE 9 MG/ML
INJECTION, SOLUTION INTRAVENOUS PRN
Status: DISCONTINUED | OUTPATIENT
Start: 2025-06-12 | End: 2025-06-15 | Stop reason: HOSPADM

## 2025-06-12 RX ORDER — SODIUM CHLORIDE 0.9 % (FLUSH) 0.9 %
5-40 SYRINGE (ML) INJECTION EVERY 12 HOURS SCHEDULED
Status: DISCONTINUED | OUTPATIENT
Start: 2025-06-12 | End: 2025-06-13 | Stop reason: HOSPADM

## 2025-06-12 RX ORDER — LORAZEPAM 0.5 MG/1
0.5 TABLET ORAL
Status: COMPLETED | OUTPATIENT
Start: 2025-06-12 | End: 2025-06-12

## 2025-06-12 RX ORDER — FENTANYL CITRATE 50 UG/ML
INJECTION, SOLUTION INTRAMUSCULAR; INTRAVENOUS PRN
Status: DISCONTINUED | OUTPATIENT
Start: 2025-06-12 | End: 2025-06-12 | Stop reason: HOSPADM

## 2025-06-12 RX ADMIN — ASPIRIN 325 MG: 325 TABLET ORAL at 07:29

## 2025-06-12 RX ADMIN — PRAVASTATIN SODIUM 40 MG: 40 TABLET ORAL at 19:45

## 2025-06-12 RX ADMIN — FUROSEMIDE 40 MG: 10 INJECTION, SOLUTION INTRAMUSCULAR; INTRAVENOUS at 18:17

## 2025-06-12 RX ADMIN — LORAZEPAM 0.5 MG: 0.5 TABLET ORAL at 07:29

## 2025-06-12 RX ADMIN — Medication 10 ML: at 19:45

## 2025-06-12 RX ADMIN — CLOPIDOGREL BISULFATE 600 MG: 300 TABLET, FILM COATED ORAL at 18:17

## 2025-06-12 ASSESSMENT — PAIN SCALES - GENERAL: PAINLEVEL_OUTOF10: 0

## 2025-06-12 NOTE — H&P
Sedation Pre-Procedure Note    Patient Name: Lashanda Tiwari   YOB: 1935  Room/Bed: [facility] Room/  Medical Record Number: [id number]  Date: 6/12/2025   Time: 10:36 AM       Indication: Chest pain concerning for progressive angina, abnormal stress test    Consent: I have discussed with the patient and/or the patient representative the indication, alternatives, and the possible risks and/or complications of the planned procedure and the anesthesia methods.  Risks including MI, cardiac failure, cardiac arrest and other fatal and nonfatal dysrhythmias, excessive bleeding, stroke and others discussed at length with the patient the patient and/or patient representative appear to understand and agree to proceed.    Vital Signs:   Vitals:    06/12/25 0710   BP: (!) 176/78   SpO2:        Past Medical History:   has a past medical history of Atrial fibrillation (HCC), Congestive heart failure (HCC), Diabetes mellitus (HCC), Hydronephrosis, Hyperlipidemia, Hypertension, Intussusception intestine (HCC), and SVT (supraventricular tachycardia).    Past Surgical History:   has a past surgical history that includes Hysterectomy; Appendectomy; Tonsillectomy; skin biopsy; ablation of dysrhythmic focus; Dilatation, esophagus; Small intestine surgery; fracture surgery; Upper gastrointestinal endoscopy (N/A, 4/21/2021); Upper gastrointestinal endoscopy (4/21/2021); Upper gastrointestinal endoscopy (4/21/2021); and Cholecystectomy, laparoscopic (N/A, 2/3/2023).    Medications:   Scheduled Meds:    sodium chloride flush  5-40 mL IntraVENous 2 times per day     Continuous Infusions:    sodium chloride       PRN Meds: sodium chloride flush, sodium chloride, ondansetron  Home Meds:   Prior to Admission medications    Medication Sig Start Date End Date Taking? Authorizing Provider   amLODIPine (NORVASC) 5 MG tablet Take 1 tablet by mouth nightly 5/13/25  Yes Mary Bright, APRN - CNP   furosemide (LASIX) 40 MG tablet

## 2025-06-12 NOTE — DISCHARGE INSTRUCTIONS
Cath Labs at  [facility]   Discharge Instructions        6/12/2025  Lashanda Tiwari   Date of Birth 1935       Activity:  No driving for 24 hours.  In 24 hours you may remove dressing and shower, wash site gently with soap and water and leave open to air  Avoid submerging your arm in sitting water for 5 days.  Do not use your right hand for 24 hours, then  No lifting more than 5 pounds for 5 days.   No lotions, powders, or ointments near site for 5 days.   No work/school for 5 days unless instructed otherwise by your cardiologist.    Diet:   Resume previous diet, if a cardiac diet is specified you will receive a handout with  general guidelines.   Drink extra non-alcoholic/decaffienated fluids for first 24 hours after your procedure.    Arm Management:  If bleeding occurs from the site or a hematoma (lump) begins to increase in size, apply pressure directly over the site, call 911 to return to the hospital.    Special Instructions:  Report any coolness or numbness in the arm  Report any chills, fever, itching, red bumps or rash   Report any of the following to the MD: drainage from the site, redness and/or swelling at the site, increased tenderness at the site   If you are currently taking Metformin or Metformin combination medications for Diabetes, hold your dose for 48 hours after your procedure.  Consult your Cardiologist before taking any NSAIDS, vitamin supplements, estrogen, or estrogen plus progestin.  Do not stop taking Plavix, Brilinta or Effient, without first consulting your cardiologist.    Sedation Discharge Instructions:  For the next 24 hours do not drive a car, operate machinery, power tools or kitchen appliances.    Do not drink alcohol; including beer or wine.    Do not make any important decisions or sign any important papers.  For the next 24 hours you can expect drowsiness, light-headed or dizziness, nausea/ vomiting, inability to concentrate, fatigue and desire to sleep.  We

## 2025-06-12 NOTE — PROGRESS NOTES
4 Eyes Skin Assessment     NAME:  Lashanda Tiwari  YOB: 1935  MEDICAL RECORD NUMBER:  [id number]    The patient is being assessed for  Admission    I agree that at least one RN has performed a thorough Head to Toe Skin Assessment on the patient. ALL assessment sites listed below have been assessed.      Areas assessed by both nurses:    Head, Face, Ears, Shoulders, Back, Chest, Arms, Elbows, Hands, Sacrum. Buttock, Coccyx, Ischium, Legs. Feet and Heels, and Under Medical Devices         Does the Patient have a Wound? No noted wound(s)       Ino Prevention initiated by RN: No  Wound Care Orders initiated by RN: No    Pressure Injury (Stage 3,4, Unstageable, DTI, NWPT, and Complex wounds) if present, place Wound referral order by RN under : No    New Ostomies, if present place, Ostomy referral order under : No     Nurse 1 eSignature: Electronically signed by Jennifer Lindsey RN on 6/12/25 at 6:42 PM EDT    **SHARE this note so that the co-signing nurse can place an eSignature**    Nurse 2 eSignature: {Esignature:[id number]}

## 2025-06-12 NOTE — PROGRESS NOTES
Report given to patients nurse Jennifer SALGUERO. Pt transferred to room 368. CMU called and monitor is on and verified.

## 2025-06-12 NOTE — PROGRESS NOTES
Both TR band removed and brachial venous sheath. Both sites dressed/remain with hemostasis. Pt up to restroom at this time   EMERGENCY DEPARTMENT HISTORY AND PHYSICAL EXAM 
 
Date: 10/16/2018 Patient Name: Theresa Sharp History of Presenting Illness Chief Complaint Patient presents with  Sore Throat  Abdominal Pain History Provided By: Patient's Father Chief Complaint: Fever Duration: 1 Days Timing:  Acute Location: N/A Quality: Tmax in .2F Severity: Mild Modifying Factors: No modifying factors Associated Symptoms: constipation, sore throat, abdominal pain, ear pain, congestion Additional History (Context):  
4:19 AM 
David Wolfe is a 2 y.o. female with no pertinent PMHX who presents via fatherr to the emergency department C/O fever (Tmax in . 2F) onset 1 day ago. Associated sxs include constipation, sore throat, abdominal pain, ear pain, congestion. Per pt's father, there were no pregnancy complications and was born full term. Per pt's father, pt's vaccinations are mostly UTD. Per pt's father, pt was seen at [facility] [place] today and was given Amoxicillin. Pt denies v/d, sick contact, and any other sxs or complaints. PCP: Chase Fortune MD 
 
Current Outpatient Prescriptions Medication Sig Dispense Refill  ondansetron (ZOFRAN ODT) 4 mg disintegrating tablet Take 1/2 tablets every 8 hours as needed for nausea and vomiting. 10 Tab 0  
 amoxicillin (AMOXIL) 250 mg/5 mL suspension Take  by mouth. Past History Past Medical History: 
History reviewed. No pertinent past medical history. Past Surgical History: 
History reviewed. No pertinent surgical history. Family History: 
History reviewed. No pertinent family history. Social History: 
Social History Substance Use Topics  Smoking status: Never Smoker  Smokeless tobacco: Never Used  Alcohol use None Allergies: 
No Known Allergies Review of Systems Review of Systems Constitutional: Positive for fever. HENT: Positive for congestion, ear pain and sore throat. Gastrointestinal: Positive for abdominal pain and constipation. Negative for diarrhea and vomiting. All other systems reviewed and are negative. Physical Exam  
 
Vitals:  
 10/16/18 0247 10/16/18 0458 Pulse: 148 Resp: 38 Temp: (!) 102.2 °F (39 °C) 100.3 °F (37.9 °C) SpO2: 99% Weight: 15.2 kg Height: (!) 96.5 cm Physical Exam  
Constitutional: She is active. Interactive Sleeping comfortably HENT:  
Head: Atraumatic. Left Ear: Tympanic membrane normal.  
Nose: Nose normal. No nasal discharge. Mouth/Throat: Mucous membranes are moist. Dentition is normal. Pharynx erythema present. No oropharyngeal exudate or pharynx swelling. No tonsillar exudate. Pharynx is normal.  
Copious nasal secretions Right ear mildly inflammed Redness to throat No swelling no patchy exudate but thick hoarse voice No PTA Eyes: EOM are normal. Pupils are equal, round, and reactive to light. Right eye exhibits no discharge. Left eye exhibits no discharge. Neck: Normal range of motion. Neck supple. No adenopathy. Cardiovascular: Regular rhythm, S1 normal and S2 normal.  Tachycardia present. Pulmonary/Chest: Effort normal and breath sounds normal. No nasal flaring. No respiratory distress. She exhibits no retraction. Abdominal: Soft. Bowel sounds are normal. She exhibits no distension. There is no tenderness. There is no guarding. Musculoskeletal: Normal range of motion. She exhibits no tenderness. Neurological: She is alert and oriented for age. No cranial nerve deficit or sensory deficit. Coordination normal. GCS eye subscore is 4. GCS verbal subscore is 5. GCS motor subscore is 6. Skin: Skin is warm and dry. Capillary refill takes less than 3 seconds. No petechiae and no rash noted. No cyanosis. Nursing note and vitals reviewed. Diagnostic Study Results Labs - Recent Results (from the past 12 hour(s)) URINALYSIS W/ RFLX MICROSCOPIC  
 Collection Time: 10/16/18  3:00 AM  
Result Value Ref Range Color DARK YELLOW Appearance TURBID Specific gravity >1.030 (H) 1.005 - 1.030  
 pH (UA) 6.0 5.0 - 8.0 Protein 30 (A) NEG mg/dL Glucose NEGATIVE  NEG mg/dL Ketone TRACE (A) NEG mg/dL Bilirubin SMALL (A) NEG Blood NEGATIVE  NEG Urobilinogen 1.0 0.2 - 1.0 EU/dL Nitrites NEGATIVE  NEG Leukocyte Esterase NEGATIVE  NEG    
URINE MICROSCOPIC ONLY Collection Time: 10/16/18  3:00 AM  
Result Value Ref Range WBC 0 to 1 0 - 5 /hpf  
 RBC NEGATIVE  0 - 5 /hpf Epithelial cells FEW 0 - 5 /lpf Bacteria FEW (A) NEG /hpf Amorphous Crystals 4+ (A) NEG  
 CA Oxalate crystals 2+ (A) NEG Radiologic Studies - No orders to display CT Results  (Last 48 hours) None CXR Results  (Last 48 hours) None Medications given in the ED- Medications  
acetaminophen (TYLENOL) solution 227.84 mg (227.84 mg Oral Given 10/16/18 0344) Medical Decision Making I am the first provider for this patient. I reviewed the vital signs, available nursing notes, past medical history, past surgical history, family history and social history. Vital Signs-Reviewed the patient's vital signs. Pulse Oximetry Analysis - 99% on RA Records Reviewed: Nursing Notes Provider Notes (Medical Decision Making): 
Ddx: Strep findings Procedures: 
Procedures ED Course:  
4:19 AM Initial assessment performed. The patients presenting problems have been discussed, and they are in agreement with the care plan formulated and outlined with them. I have encouraged them to ask questions as they arise throughout their visit. Diagnosis and Disposition DISCHARGE NOTE: 
5:29 AM 
Roque Damico results have been reviewed with her father. He has been counseled regarding her diagnosis, treatment, and plan.   He verbally conveys understanding and agreement of the signs, symptoms, diagnosis, treatment and prognosis and additionally agrees to follow up as discussed. He also agrees with the care-plan and conveys that all of his questions have been answered. I have also provided discharge instructions for him that include: educational information regarding their diagnosis and treatment, and list of reasons why they would want to return to the ED prior to their follow-up appointment, should her condition change. CLINICAL IMPRESSION: 
 
1. Acute streptococcal pharyngitis 2. Fever in pediatric patient PLAN: 
1. D/C Home 2. Discharge Medication List as of 10/16/2018  4:49 AM  
  
START taking these medications Details  
ondansetron (ZOFRAN ODT) 4 mg disintegrating tablet Take 1/2 tablets every 8 hours as needed for nausea and vomiting., Print, Disp-10 Tab, R-0  
  
  
CONTINUE these medications which have NOT CHANGED Details  
amoxicillin (AMOXIL) 250 mg/5 mL suspension Take  by mouth., Historical Med 3. Follow-up Information Follow up With Details Comments Contact Info Phys Other, MD Schedule an appointment as soon as possible for a visit in 2 days For primary care follow up Patient can only remember the practice name and not the physician [facility] EMERGENCY DEPT Go to As needed, if symptoms worsen [address] 
[facility] [address] 
[phone]  
  
 
_______________________________ Attestations: This note is prepared by [facility], acting as Scribe for Elda Connor. Dejon Andujar MD. Elda Connor. Dejon Andujar MD:  The scribe's documentation has been prepared under my direction and personally reviewed by me in its entirety. I confirm that the note above accurately reflects all work, treatment, procedures, and medical decision making performed by me. 
_______________________________

## 2025-06-12 NOTE — PROGRESS NOTES
PRELIMINARY PROCEDURE REPORT        INDICATION FOR PROCEDURE  Progressive unstable angina, abnormal stress test, cardiomyopathy, HFpEF      PROCEDURE FINDINGS  Successful left heart cath via right radial approach, access closed with TR band with excellent hemostasis  Successful right heart cath via right antecubital approach  Obstructive CAD involving proximal LAD (90%) and right PDA (moderate to severe diffuse disease) with LAD disease correlating well with a recent stress test findings  Elevated right and left-sided filling pressures suggesting volume overload  Mild to moderately elevated PA pressures with normal PVR  Preserved cardiac output/index    RA - 15 mmHg  RV - 50/17 mmHg  PA - 55/27/36 mmHg  PCWP - 25 mmHg  LVEDP - 25 mmHg    Cardiac output/index (Lashanda): 7.09/3.8  Cardiac output/index (Thermodilution): not obtained    SVR - 919  PVR - 124    PLAN/RECOMMENDATIONS  Admit to the hospital for observation  IV diuresis for high filling pressures and volume overload  Start Plavix 75 mg once daily tomorrow after 600 mg loading dose today  Complex PCI to proximal LAD with possible atherectomy tomorrow      No complications.    See full report for details.      Olegario Ewing MD, FACC, [facility]  Interventional Cardiology  [facility]  [phone] (c)

## 2025-06-12 NOTE — PROGRESS NOTES
Jennifer An was made aware of nickel skin test to see if pt gets a reaction.  A dime was taped on to pt's left upper arm.

## 2025-06-13 ENCOUNTER — APPOINTMENT (OUTPATIENT)
Dept: CT IMAGING | Age: 89
End: 2025-06-13
Attending: INTERNAL MEDICINE
Payer: MEDICARE

## 2025-06-13 LAB
ANION GAP SERPL CALCULATED.3IONS-SCNC: 13 MMOL/L (ref 3–16)
BUN SERPL-MCNC: 17 MG/DL (ref 7–20)
CALCIUM SERPL-MCNC: 8.7 MG/DL (ref 8.3–10.6)
CHLORIDE SERPL-SCNC: 104 MMOL/L (ref 99–110)
CO2 SERPL-SCNC: 22 MMOL/L (ref 21–32)
CREAT SERPL-MCNC: 1.2 MG/DL (ref 0.6–1.2)
DEPRECATED RDW RBC AUTO: 20.4 % (ref 12.4–15.4)
EKG ATRIAL RATE: 56 BPM
EKG ATRIAL RATE: 70 BPM
EKG ATRIAL RATE: 72 BPM
EKG DIAGNOSIS: NORMAL
EKG P AXIS: 51 DEGREES
EKG P AXIS: 73 DEGREES
EKG P AXIS: 73 DEGREES
EKG P-R INTERVAL: 208 MS
EKG P-R INTERVAL: 214 MS
EKG P-R INTERVAL: 224 MS
EKG Q-T INTERVAL: 466 MS
EKG Q-T INTERVAL: 474 MS
EKG Q-T INTERVAL: 482 MS
EKG QRS DURATION: 104 MS
EKG QRS DURATION: 108 MS
EKG QRS DURATION: 116 MS
EKG QTC CALCULATION (BAZETT): 465 MS
EKG QTC CALCULATION (BAZETT): 510 MS
EKG QTC CALCULATION (BAZETT): 511 MS
EKG R AXIS: -12 DEGREES
EKG R AXIS: -14 DEGREES
EKG R AXIS: -16 DEGREES
EKG T AXIS: 68 DEGREES
EKG T AXIS: 76 DEGREES
EKG T AXIS: 81 DEGREES
EKG VENTRICULAR RATE: 56 BPM
EKG VENTRICULAR RATE: 70 BPM
EKG VENTRICULAR RATE: 72 BPM
GFR SERPLBLD CREATININE-BSD FMLA CKD-EPI: 43 ML/MIN/{1.73_M2}
GLUCOSE SERPL-MCNC: 152 MG/DL (ref 70–99)
HCT VFR BLD AUTO: 37.7 % (ref 36–48)
HGB BLD-MCNC: 12.2 G/DL (ref 12–16)
MCH RBC QN AUTO: 25.4 PG (ref 26–34)
MCHC RBC AUTO-ENTMCNC: 32.3 G/DL (ref 31–36)
MCV RBC AUTO: 78.6 FL (ref 80–100)
PLATELET # BLD AUTO: 257 K/UL (ref 135–450)
PMV BLD AUTO: 9.2 FL (ref 5–10.5)
POC ACT LR: 198 SEC
POC ACT LR: 369 SEC
POC ACT LR: >400 SEC
POTASSIUM SERPL-SCNC: 3.7 MMOL/L (ref 3.5–5.1)
POTASSIUM SERPL-SCNC: 3.7 MMOL/L (ref 3.5–5.1)
RBC # BLD AUTO: 4.8 M/UL (ref 4–5.2)
SODIUM SERPL-SCNC: 139 MMOL/L (ref 136–145)
WBC # BLD AUTO: 7.9 K/UL (ref 4–11)

## 2025-06-13 PROCEDURE — 93454 CORONARY ARTERY ANGIO S&I: CPT | Performed by: INTERNAL MEDICINE

## 2025-06-13 PROCEDURE — 6360000004 HC RX CONTRAST MEDICATION: Performed by: INTERNAL MEDICINE

## 2025-06-13 PROCEDURE — 92978 ENDOLUMINL IVUS OCT C 1ST: CPT | Performed by: INTERNAL MEDICINE

## 2025-06-13 PROCEDURE — G0378 HOSPITAL OBSERVATION PER HR: HCPCS

## 2025-06-13 PROCEDURE — 93010 ELECTROCARDIOGRAM REPORT: CPT | Performed by: INTERNAL MEDICINE

## 2025-06-13 PROCEDURE — C9600 PERC DRUG-EL COR STENT SING: HCPCS | Performed by: INTERNAL MEDICINE

## 2025-06-13 PROCEDURE — 6370000000 HC RX 637 (ALT 250 FOR IP): Performed by: INTERNAL MEDICINE

## 2025-06-13 PROCEDURE — 2500000003 HC RX 250 WO HCPCS: Performed by: INTERNAL MEDICINE

## 2025-06-13 PROCEDURE — C1753 CATH, INTRAVAS ULTRASOUND: HCPCS | Performed by: INTERNAL MEDICINE

## 2025-06-13 PROCEDURE — C1725 CATH, TRANSLUMIN NON-LASER: HCPCS | Performed by: INTERNAL MEDICINE

## 2025-06-13 PROCEDURE — 99152 MOD SED SAME PHYS/QHP 5/>YRS: CPT | Performed by: INTERNAL MEDICINE

## 2025-06-13 PROCEDURE — C1887 CATHETER, GUIDING: HCPCS | Performed by: INTERNAL MEDICINE

## 2025-06-13 PROCEDURE — 92928 PRQ TCAT PLMT NTRAC ST 1 LES: CPT | Performed by: INTERNAL MEDICINE

## 2025-06-13 PROCEDURE — C1760 CLOSURE DEV, VASC: HCPCS | Performed by: INTERNAL MEDICINE

## 2025-06-13 PROCEDURE — 7100000011 HC PHASE II RECOVERY - ADDTL 15 MIN: Performed by: INTERNAL MEDICINE

## 2025-06-13 PROCEDURE — C1894 INTRO/SHEATH, NON-LASER: HCPCS | Performed by: INTERNAL MEDICINE

## 2025-06-13 PROCEDURE — 7100000010 HC PHASE II RECOVERY - FIRST 15 MIN: Performed by: INTERNAL MEDICINE

## 2025-06-13 PROCEDURE — 85347 COAGULATION TIME ACTIVATED: CPT

## 2025-06-13 PROCEDURE — 93005 ELECTROCARDIOGRAM TRACING: CPT | Performed by: INTERNAL MEDICINE

## 2025-06-13 PROCEDURE — 85027 COMPLETE CBC AUTOMATED: CPT

## 2025-06-13 PROCEDURE — 2580000003 HC RX 258: Performed by: INTERNAL MEDICINE

## 2025-06-13 PROCEDURE — 80048 BASIC METABOLIC PNL TOTAL CA: CPT

## 2025-06-13 PROCEDURE — C1769 GUIDE WIRE: HCPCS | Performed by: INTERNAL MEDICINE

## 2025-06-13 PROCEDURE — 6360000002 HC RX W HCPCS: Performed by: INTERNAL MEDICINE

## 2025-06-13 PROCEDURE — 92920 PRQ TRLUML C ANGIOP 1ART&/BR: CPT | Performed by: INTERNAL MEDICINE

## 2025-06-13 PROCEDURE — 99153 MOD SED SAME PHYS/QHP EA: CPT | Performed by: INTERNAL MEDICINE

## 2025-06-13 PROCEDURE — C1874 STENT, COATED/COV W/DEL SYS: HCPCS | Performed by: INTERNAL MEDICINE

## 2025-06-13 PROCEDURE — 2709999900 HC NON-CHARGEABLE SUPPLY: Performed by: INTERNAL MEDICINE

## 2025-06-13 PROCEDURE — 71275 CT ANGIOGRAPHY CHEST: CPT

## 2025-06-13 PROCEDURE — 36415 COLL VENOUS BLD VENIPUNCTURE: CPT

## 2025-06-13 DEVICE — XIENCE SIERRA™ EVEROLIMUS ELUTING CORONARY STENT SYSTEM 2.50 MM X 33 MM / RAPID-EXCHANGE
Type: IMPLANTABLE DEVICE | Status: FUNCTIONAL
Brand: XIENCE SIERRA™

## 2025-06-13 RX ORDER — IOPAMIDOL 755 MG/ML
INJECTION, SOLUTION INTRAVASCULAR PRN
Status: DISCONTINUED | OUTPATIENT
Start: 2025-06-13 | End: 2025-06-13 | Stop reason: HOSPADM

## 2025-06-13 RX ORDER — ACETAMINOPHEN 325 MG/1
650 TABLET ORAL EVERY 4 HOURS PRN
Status: DISCONTINUED | OUTPATIENT
Start: 2025-06-13 | End: 2025-06-13 | Stop reason: SDUPTHER

## 2025-06-13 RX ORDER — LIDOCAINE HYDROCHLORIDE 20 MG/ML
INJECTION, SOLUTION EPIDURAL; INFILTRATION; INTRACAUDAL; PERINEURAL PRN
Status: DISCONTINUED | OUTPATIENT
Start: 2025-06-13 | End: 2025-06-13 | Stop reason: HOSPADM

## 2025-06-13 RX ORDER — HEPARIN SODIUM 1000 [USP'U]/ML
INJECTION, SOLUTION INTRAVENOUS; SUBCUTANEOUS PRN
Status: DISCONTINUED | OUTPATIENT
Start: 2025-06-13 | End: 2025-06-13 | Stop reason: HOSPADM

## 2025-06-13 RX ORDER — NITROGLYCERIN 20 MG/100ML
INJECTION INTRAVENOUS CONTINUOUS PRN
Status: COMPLETED | OUTPATIENT
Start: 2025-06-13 | End: 2025-06-13

## 2025-06-13 RX ORDER — SODIUM CHLORIDE 9 MG/ML
INJECTION, SOLUTION INTRAVENOUS PRN
Status: DISCONTINUED | OUTPATIENT
Start: 2025-06-13 | End: 2025-06-15 | Stop reason: HOSPADM

## 2025-06-13 RX ORDER — MIDAZOLAM HYDROCHLORIDE 5 MG/ML
INJECTION, SOLUTION INTRAMUSCULAR; INTRAVENOUS PRN
Status: DISCONTINUED | OUTPATIENT
Start: 2025-06-13 | End: 2025-06-13 | Stop reason: HOSPADM

## 2025-06-13 RX ORDER — FENTANYL CITRATE-0.9 % NACL/PF 10 MCG/ML
25-200 PLASTIC BAG, INJECTION (ML) INTRAVENOUS CONTINUOUS
Refills: 0 | Status: DISCONTINUED | OUTPATIENT
Start: 2025-06-13 | End: 2025-06-13 | Stop reason: ALTCHOICE

## 2025-06-13 RX ORDER — SODIUM CHLORIDE 9 MG/ML
INJECTION, SOLUTION INTRAVENOUS CONTINUOUS
Status: ACTIVE | OUTPATIENT
Start: 2025-06-13 | End: 2025-06-13

## 2025-06-13 RX ORDER — NITROGLYCERIN 20 MG/100ML
5-200 INJECTION INTRAVENOUS CONTINUOUS
Status: DISCONTINUED | OUTPATIENT
Start: 2025-06-13 | End: 2025-06-13

## 2025-06-13 RX ORDER — FENTANYL CITRATE 50 UG/ML
INJECTION, SOLUTION INTRAMUSCULAR; INTRAVENOUS PRN
Status: DISCONTINUED | OUTPATIENT
Start: 2025-06-13 | End: 2025-06-13 | Stop reason: HOSPADM

## 2025-06-13 RX ORDER — IOPAMIDOL 755 MG/ML
75 INJECTION, SOLUTION INTRAVASCULAR
Status: COMPLETED | OUTPATIENT
Start: 2025-06-13 | End: 2025-06-13

## 2025-06-13 RX ORDER — AMLODIPINE BESYLATE 5 MG/1
5 TABLET ORAL DAILY
Status: DISCONTINUED | OUTPATIENT
Start: 2025-06-14 | End: 2025-06-15 | Stop reason: HOSPADM

## 2025-06-13 RX ORDER — FENTANYL CITRATE-0.9 % NACL/PF 20 MCG/2ML
25 SYRINGE (ML) INTRAVENOUS EVERY 30 MIN PRN
Refills: 0 | Status: DISCONTINUED | OUTPATIENT
Start: 2025-06-13 | End: 2025-06-13

## 2025-06-13 RX ORDER — SODIUM CHLORIDE 0.9 % (FLUSH) 0.9 %
5-40 SYRINGE (ML) INJECTION PRN
Status: DISCONTINUED | OUTPATIENT
Start: 2025-06-13 | End: 2025-06-15 | Stop reason: HOSPADM

## 2025-06-13 RX ORDER — NITROGLYCERIN 20 MG/100ML
5-200 INJECTION INTRAVENOUS CONTINUOUS
Status: DISCONTINUED | OUTPATIENT
Start: 2025-06-13 | End: 2025-06-14

## 2025-06-13 RX ORDER — SODIUM CHLORIDE 0.9 % (FLUSH) 0.9 %
5-40 SYRINGE (ML) INJECTION EVERY 12 HOURS SCHEDULED
Status: DISCONTINUED | OUTPATIENT
Start: 2025-06-13 | End: 2025-06-15 | Stop reason: HOSPADM

## 2025-06-13 RX ADMIN — ONDANSETRON 4 MG: 2 INJECTION, SOLUTION INTRAMUSCULAR; INTRAVENOUS at 12:33

## 2025-06-13 RX ADMIN — Medication 25 MCG: at 10:59

## 2025-06-13 RX ADMIN — PRAVASTATIN SODIUM 40 MG: 40 TABLET ORAL at 21:04

## 2025-06-13 RX ADMIN — CLOPIDOGREL BISULFATE 75 MG: 75 TABLET, FILM COATED ORAL at 07:23

## 2025-06-13 RX ADMIN — NITROGLYCERIN 20 MCG/MIN: 20 INJECTION INTRAVENOUS at 10:48

## 2025-06-13 RX ADMIN — LORAZEPAM 0.5 MG: 0.5 TABLET ORAL at 07:23

## 2025-06-13 RX ADMIN — SODIUM CHLORIDE 75 ML/HR: 0.9 INJECTION, SOLUTION INTRAVENOUS at 07:50

## 2025-06-13 RX ADMIN — ASPIRIN 325 MG: 325 TABLET ORAL at 07:23

## 2025-06-13 RX ADMIN — FAMOTIDINE 20 MG: 10 INJECTION, SOLUTION INTRAVENOUS at 10:50

## 2025-06-13 RX ADMIN — Medication 10 ML: at 21:04

## 2025-06-13 RX ADMIN — FENTANYL CITRATE 50 MCG/HR: 0.05 INJECTION, SOLUTION INTRAMUSCULAR; INTRAVENOUS at 11:04

## 2025-06-13 RX ADMIN — SODIUM CHLORIDE 100 ML/HR: 0.9 INJECTION, SOLUTION INTRAVENOUS at 13:32

## 2025-06-13 RX ADMIN — IOPAMIDOL 75 ML: 755 INJECTION, SOLUTION INTRAVENOUS at 10:30

## 2025-06-13 RX ADMIN — NITROGLYCERIN 10 MCG/MIN: 20 INJECTION INTRAVENOUS at 10:00

## 2025-06-13 NOTE — SEDATION DOCUMENTATION
Sedation Pre-Procedure Note    Patient Name: Lashanda Tiwari   YOB: 1935  Room/Bed: [facility] Room/PL  Medical Record Number: [id number]  Date: 6/13/2025   Time: 8:33 AM       Indication: Unstable angina    Consent: I have discussed with the patient and/or the patient representative the indication, alternatives, and the possible risks and/or complications of the planned procedure and the anesthesia methods.  Risks including MI, cardiac failure, cardiac arrest and other fatal and nonfatal dysrhythmias, excessive bleeding, stroke and others discussed at length with the patient the patient and/or patient representative appear to understand and agree to proceed.    Vital Signs:   Vitals:    06/13/25 0830   BP: (!) 177/80   Pulse: 81   Resp: 13   Temp:    SpO2: 96%       Past Medical History:   has a past medical history of Atrial fibrillation (HCC), CAD in native artery, Congestive heart failure (HCC), Diabetes mellitus (HCC), Hydronephrosis, Hyperlipidemia, Hypertension, Intussusception intestine (HCC), and SVT (supraventricular tachycardia).    Past Surgical History:   has a past surgical history that includes Hysterectomy; Appendectomy; Tonsillectomy; skin biopsy; ablation of dysrhythmic focus; Dilatation, esophagus; Small intestine surgery; fracture surgery; Upper gastrointestinal endoscopy (N/A, 4/21/2021); Upper gastrointestinal endoscopy (4/21/2021); Upper gastrointestinal endoscopy (4/21/2021); and Cholecystectomy, laparoscopic (N/A, 2/3/2023).    Medications:   Scheduled Meds:    sodium chloride flush  5-40 mL IntraVENous 2 times per day    clopidogrel  75 mg Oral Daily    amiodarone  100 mg Oral Daily    pantoprazole  40 mg Oral Daily    pravastatin  40 mg Oral Nightly    sodium chloride flush  5-40 mL IntraVENous 2 times per day     Continuous Infusions:    sodium chloride      sodium chloride 75 mL/hr (06/13/25 0750)     PRN Meds: sodium chloride flush, sodium chloride, acetaminophen, sodium  appropriate candidate for plan of sedation: yes    Electronically signed by ALICJA PASTRANA MD on 6/13/2025 at 8:33 AM

## 2025-06-13 NOTE — PROGRESS NOTES
Pt returned from cath lab procedural area after ct to r/o dissection was completed. Pt was c/o chest pain that went up into her neck. 2 Cath lab RNs who brought pt back to pre/post area stayed to help get pt comfortable. Pt had nitro drip running that can be titrated to help chest pain and fentanyl drip ordered to be started in pre/post area. 2nd post procedural EKG was ordered and done. Dr. Ewing came to bedside to reassess pt. Fentanyl drip was started when arrived from pharmacy. Pt is currently comfortable and resting.   Dry/Warm

## 2025-06-13 NOTE — PROGRESS NOTES
Report given to patients nurse Salena SALGUERO. Pt transferred to room 218. CMU called and monitor is on and verified.

## 2025-06-13 NOTE — PROGRESS NOTES
Pt arrived on the unit via hospital bed accompanied by transportation staff. She is alert and oriented x 4, pleasant, and easy to engage in conversation. Denied pain. She has intermittent waves of nausea. She also c/o shivering. Warm blankets given. She was oriented to the room and call system. Encouraged her to call for assistance. She expressed understanding.    Addendum 1720    Pt with nausea and active vomiting. Assisted to pt to an upright position. Offered a wet washcloth, mouth wash, and emotional support for comfort. Her  is at the bedside and is calming. A message was sent to Dr. Ewing to request additional antiemetic medication. Waiting for a response.

## 2025-06-13 NOTE — PLAN OF CARE
Problem: Cardiovascular - Adult  Goal: Maintains optimal cardiac output and hemodynamic stability  Outcome: Progressing  Flowsheets (Taken 6/12/2025 2221)  Maintains optimal cardiac output and hemodynamic stability:   Monitor blood pressure and heart rate   Assess for signs of decreased cardiac output

## 2025-06-13 NOTE — PROGRESS NOTES
Pt up to bedside commode to void. Pt tolerated well but did become a little dizzy. Got pt situated back in bed. Pt states that she is feeling better. States she continues to be pain free.

## 2025-06-13 NOTE — PROGRESS NOTES
PRELIMINARY PROCEDURE REPORT        INDICATION FOR PROCEDURE  Unstable angina, planned PCI      PROCEDURE FINDINGS  Successful selective coronary angiogram with right femoral approach, access closed with Perclose with excellent hemostasis  OCT guided PCI to 90% stenosis in proximal to mid LAD with placement of a drug-eluting stent with excellent result      PLAN/RECOMMENDATIONS  Bedrest for 3 hours  Nitroglycerin infusion for residual chest pain post procedure  Gentle IV hydration  Continue DAPT, high intensity statin  Likely discharge home tomorrow if no hemodynamic or access site complications      No complications.    See full report for details.      Olegario Ewing MD, FACC, [facility]  Interventional Cardiology  [facility]  [phone] (c)

## 2025-06-14 PROCEDURE — 2500000003 HC RX 250 WO HCPCS: Performed by: INTERNAL MEDICINE

## 2025-06-14 PROCEDURE — G0378 HOSPITAL OBSERVATION PER HR: HCPCS

## 2025-06-14 PROCEDURE — 93005 ELECTROCARDIOGRAM TRACING: CPT | Performed by: INTERNAL MEDICINE

## 2025-06-14 PROCEDURE — 6370000000 HC RX 637 (ALT 250 FOR IP): Performed by: INTERNAL MEDICINE

## 2025-06-14 RX ORDER — CLOPIDOGREL BISULFATE 75 MG/1
75 TABLET ORAL DAILY
Qty: 90 TABLET | Refills: 3 | Status: SHIPPED | OUTPATIENT
Start: 2025-06-15

## 2025-06-14 RX ADMIN — ACETAMINOPHEN 650 MG: 325 TABLET ORAL at 05:02

## 2025-06-14 RX ADMIN — AMLODIPINE BESYLATE 5 MG: 5 TABLET ORAL at 08:52

## 2025-06-14 RX ADMIN — Medication 10 ML: at 08:52

## 2025-06-14 RX ADMIN — CLOPIDOGREL BISULFATE 75 MG: 75 TABLET, FILM COATED ORAL at 08:51

## 2025-06-14 RX ADMIN — PRAVASTATIN SODIUM 40 MG: 40 TABLET ORAL at 20:33

## 2025-06-14 RX ADMIN — PANTOPRAZOLE SODIUM 40 MG: 40 TABLET, DELAYED RELEASE ORAL at 08:51

## 2025-06-14 RX ADMIN — Medication 10 ML: at 20:33

## 2025-06-14 ASSESSMENT — PAIN DESCRIPTION - DESCRIPTORS: DESCRIPTORS: ACHING

## 2025-06-14 ASSESSMENT — PAIN SCALES - GENERAL: PAINLEVEL_OUTOF10: 1

## 2025-06-14 ASSESSMENT — PAIN - FUNCTIONAL ASSESSMENT: PAIN_FUNCTIONAL_ASSESSMENT: ACTIVITIES ARE NOT PREVENTED

## 2025-06-14 ASSESSMENT — PAIN DESCRIPTION - LOCATION: LOCATION: HEAD;CHEST

## 2025-06-14 NOTE — PLAN OF CARE
Problem: Chronic Conditions and Co-morbidities  Goal: Patient's chronic conditions and co-morbidity symptoms are monitored and maintained or improved  6/14/2025 0749 by Salena Guerrero RN  Outcome: Progressing     Problem: Discharge Planning  Goal: Discharge to home or other facility with appropriate resources  6/14/2025 0749 by Salena Guerrero RN  Outcome: Progressing  Flowsheets (Taken 6/14/2025 0749)  Discharge to home or other facility with appropriate resources:   Identify barriers to discharge with patient and caregiver   Arrange for needed discharge resources and transportation as appropriate   Identify discharge learning needs (meds, wound care, etc)     Problem: ABCDS Injury Assessment  Goal: Absence of physical injury  6/14/2025 0749 by Salena Guerrero RN  Outcome: Progressing     Problem: Cardiovascular - Adult  Goal: Maintains optimal cardiac output and hemodynamic stability  6/14/2025 0749 by Salena Guerrero RN  Outcome: Progressing     Problem: Cardiovascular - Adult  Goal: Absence of cardiac dysrhythmias or at baseline  6/14/2025 0749 by Salena Guerrero RN  Outcome: Progressing     Problem: Pain  Goal: Verbalizes/displays adequate comfort level or baseline comfort level  6/14/2025 0749 by Salena Guerrero RN  Outcome: Progressing  Note: Pt will be satisfied with pain control. Pt uses numeric pain rating scale with reassessments after pain med administration. Will continue to monitor progression throughout shift.       Problem: Safety - Adult  Goal: Free from fall injury  6/14/2025 0749 by Salena Guerrero RN  Outcome: Progressing  Note: Pt will remain free from falls throughout hospital stay. Fall precautions in place, bed alarm on, bed in lowest position with wheels locked and side rails 2/4 up. Room door open and hourly rounding completed. Will continue to monitor throughout shift.

## 2025-06-14 NOTE — PROGRESS NOTES
Patient okay to discharge home today.  She is to continue all previous cardiac medications with addition of clopidogrel 75 mg once daily prescription for which has been sent to her [facility] pharmacy in [facility].  Follow-up with cardiology will be arranged.    Full progress note to follow.    Olegario Ewing MD, FACC, [facility]  Interventional Cardiology  [facility]  [phone] (c)

## 2025-06-14 NOTE — PROGRESS NOTES
Pt c/o chest pain that came on suddenly after ambulating. Rated 3/10. Feels like heaviness, burning, tightness. The pain radiates up into her neck. V/S stable today, but now /102. She also stated she is having a hard time taking a deep breath. \"Feels like heart is beating in my brain.\" She is tearful. No other Sx. A message was sent to Dr. Ewing, as he has placed a DC order.    A [employer] message was sent to Dr. Ewing, but he is not on call. A call was placed to the answering service. Requested to speak with the on-call cardiologist    Addendum [id number]    Dr. Ewing ordered a STAT EKG and Nitro SL. Pt refused Nitro, however. EKG tech on the way to the room.

## 2025-06-14 NOTE — PLAN OF CARE
Problem: Chronic Conditions and Co-morbidities  Goal: Patient's chronic conditions and co-morbidity symptoms are monitored and maintained or improved  Outcome: Progressing     Problem: Discharge Planning  Goal: Discharge to home or other facility with appropriate resources  Outcome: Progressing     Problem: ABCDS Injury Assessment  Goal: Absence of physical injury  Outcome: Progressing     Problem: Pain  Goal: Verbalizes/displays adequate comfort level or baseline comfort level  Outcome: Progressing   Pt will be satisfied with pain control. Pt uses numeric pain rating scale with reassessments after pain med administration. Will continue to monitor progression throughout shift.     Problem: Safety - Adult  Goal: Free from fall injury  Outcome: Progressing   Pt will remain free from falls throughout hospital stay. Fall precautions in place, bed alarm on, bed in lowest position with wheels locked and side rails 2/4 up. Room door open and hourly rounding completed. Will continue to monitor throughout shift.     Problem: Cardiovascular - Adult  Goal: Absence of cardiac dysrhythmias or at baseline  Outcome: Progressing

## 2025-06-14 NOTE — PROGRESS NOTES
Pt ambulated in the hallway approximately 100ft. No assistive devices required. She denied SOB and dizziness.

## 2025-06-14 NOTE — PROGRESS NOTES
Pt ambulated in the hallway approximately 50ft. She tolerated the walk well; denied SOB, CP, chest burning, dizziness. She returned to bed in good spirits.    Addendum 1844  Pt ambulated in the kahn approximately 100 feet after dinner. She tolerated the walk well. No complaints.

## 2025-06-15 VITALS
DIASTOLIC BLOOD PRESSURE: 50 MMHG | HEART RATE: 62 BPM | SYSTOLIC BLOOD PRESSURE: 148 MMHG | WEIGHT: 177 LBS | TEMPERATURE: 97.7 F | HEIGHT: 62 IN | RESPIRATION RATE: 18 BRPM | OXYGEN SATURATION: 97 % | BODY MASS INDEX: 32.57 KG/M2

## 2025-06-15 LAB
EKG ATRIAL RATE: 68 BPM
EKG DIAGNOSIS: NORMAL
EKG P AXIS: 51 DEGREES
EKG P-R INTERVAL: 214 MS
EKG Q-T INTERVAL: 410 MS
EKG QRS DURATION: 100 MS
EKG QTC CALCULATION (BAZETT): 435 MS
EKG R AXIS: 2 DEGREES
EKG T AXIS: 78 DEGREES
EKG VENTRICULAR RATE: 68 BPM

## 2025-06-15 PROCEDURE — 6370000000 HC RX 637 (ALT 250 FOR IP): Performed by: INTERNAL MEDICINE

## 2025-06-15 PROCEDURE — G0378 HOSPITAL OBSERVATION PER HR: HCPCS

## 2025-06-15 PROCEDURE — 2500000003 HC RX 250 WO HCPCS: Performed by: INTERNAL MEDICINE

## 2025-06-15 RX ORDER — FUROSEMIDE 40 MG/1
40 TABLET ORAL DAILY
Qty: 30 TABLET | Refills: 1 | Status: SHIPPED | OUTPATIENT
Start: 2025-06-15

## 2025-06-15 RX ADMIN — PANTOPRAZOLE SODIUM 40 MG: 40 TABLET, DELAYED RELEASE ORAL at 08:46

## 2025-06-15 RX ADMIN — AMIODARONE HYDROCHLORIDE 100 MG: 200 TABLET ORAL at 08:46

## 2025-06-15 RX ADMIN — AMLODIPINE BESYLATE 5 MG: 5 TABLET ORAL at 08:46

## 2025-06-15 RX ADMIN — CLOPIDOGREL BISULFATE 75 MG: 75 TABLET, FILM COATED ORAL at 08:46

## 2025-06-15 RX ADMIN — Medication 10 ML: at 08:46

## 2025-06-15 ASSESSMENT — PAIN SCALES - GENERAL: PAINLEVEL_OUTOF10: 0

## 2025-06-15 NOTE — PLAN OF CARE
Problem: Pain  Goal: Verbalizes/displays adequate comfort level or baseline comfort level  6/15/2025 1123 by Michelle Olivas, RN  Outcome: Progressing     Problem: Safety - Adult  Goal: Free from fall injury  6/15/2025 1123 by Michelle Olivas, RN  Outcome: Progressing

## 2025-06-15 NOTE — PLAN OF CARE
Problem: Chronic Conditions and Co-morbidities  Goal: Patient's chronic conditions and co-morbidity symptoms are monitored and maintained or improved  Outcome: Progressing     Problem: Discharge Planning  Goal: Discharge to home or other facility with appropriate resources  Outcome: Progressing     Problem: ABCDS Injury Assessment  Goal: Absence of physical injury  Outcome: Progressing     Problem: Cardiovascular - Adult  Goal: Maintains optimal cardiac output and hemodynamic stability  Outcome: Progressing     Problem: Cardiovascular - Adult  Goal: Absence of cardiac dysrhythmias or at baseline  Outcome: Progressing     Problem: Pain  Goal: Verbalizes/displays adequate comfort level or baseline comfort level  Outcome: Progressing   Pt will be satisfied with pain control. Pt uses numeric pain rating scale with reassessments after pain med administration. Will continue to monitor progression throughout shift.     Problem: Safety - Adult  Goal: Free from fall injury  Outcome: Progressing   Pt will remain free from falls throughout hospital stay. Fall precautions in place, bed alarm on, bed in lowest position with wheels locked and side rails 2/4 up. Room door open and hourly rounding completed. Will continue to monitor throughout shift.

## 2025-06-17 LAB — POC ACT LR: 124 SEC

## 2025-06-22 LAB
ECHO BSA: 1.87 M2
ECHO BSA: 1.87 M2

## 2025-07-08 RX ORDER — FUROSEMIDE 40 MG/1
40 TABLET ORAL DAILY
Qty: 90 TABLET | Refills: 0 | Status: SHIPPED | OUTPATIENT
Start: 2025-07-08

## 2025-07-08 NOTE — TELEPHONE ENCOUNTER
Last Office Visit: 5/13/2025 Provider: THOMAS  **Is provider OOT? No    Next Office Visit: 7/15/2025 Provider: THOMAS    **Has patient already had 30 day supply? No    Lab orders needed? no   Encounter provider correct? Yes If not, change provider  Script changes since last refill? no    LAST LABS:   BMP:  Lab Results   Component Value Date/Time     06/13/2025 06:23 AM    K 3.7 06/13/2025 06:23 AM    K 3.7 06/13/2025 06:23 AM     06/13/2025 06:23 AM    CO2 22 06/13/2025 06:23 AM    BUN 17 06/13/2025 06:23 AM    CREATININE 1.2 06/13/2025 06:23 AM    GLUCOSE 152 06/13/2025 06:23 AM    CALCIUM 8.7 06/13/2025 06:23 AM    LABGLOM 43 06/13/2025 06:23 AM    LABGLOM 54 02/29/2024 09:06 AM        **Care Everywhere? no

## 2025-07-15 ENCOUNTER — OFFICE VISIT (OUTPATIENT)
Dept: CARDIOLOGY CLINIC | Age: 89
End: 2025-07-15
Payer: MEDICARE

## 2025-07-15 VITALS
BODY MASS INDEX: 31.83 KG/M2 | OXYGEN SATURATION: 98 % | SYSTOLIC BLOOD PRESSURE: 130 MMHG | DIASTOLIC BLOOD PRESSURE: 60 MMHG | HEART RATE: 64 BPM | HEIGHT: 62 IN | WEIGHT: 173 LBS

## 2025-07-15 DIAGNOSIS — I27.20 PULMONARY HYPERTENSION (HCC): ICD-10-CM

## 2025-07-15 DIAGNOSIS — I48.0 PAF (PAROXYSMAL ATRIAL FIBRILLATION) (HCC): ICD-10-CM

## 2025-07-15 DIAGNOSIS — I50.32 CHRONIC DIASTOLIC CHF (CONGESTIVE HEART FAILURE) (HCC): Primary | ICD-10-CM

## 2025-07-15 DIAGNOSIS — I25.10 ASHD (ARTERIOSCLEROTIC HEART DISEASE): ICD-10-CM

## 2025-07-15 PROCEDURE — G2211 COMPLEX E/M VISIT ADD ON: HCPCS | Performed by: NURSE PRACTITIONER

## 2025-07-15 PROCEDURE — 99214 OFFICE O/P EST MOD 30 MIN: CPT | Performed by: NURSE PRACTITIONER

## 2025-07-15 PROCEDURE — 1159F MED LIST DOCD IN RCRD: CPT | Performed by: NURSE PRACTITIONER

## 2025-07-15 PROCEDURE — 1160F RVW MEDS BY RX/DR IN RCRD: CPT | Performed by: NURSE PRACTITIONER

## 2025-07-15 PROCEDURE — 1123F ACP DISCUSS/DSCN MKR DOCD: CPT | Performed by: NURSE PRACTITIONER

## 2025-07-15 NOTE — PATIENT INSTRUCTIONS
Continue norvasc 5mg daily   Continue Spironolactone (aldactone)   Take 40 mg lasix daily   Repeat labs in November   Follow up with EP as planned  Follow up 6 months or sooner if needed

## 2025-07-15 NOTE — PROGRESS NOTES
Value Ref Range Status   06/13/2025 20.4 (H) 12.4 - 15.4 % Final   06/12/2025 19.8 (H) 12.4 - 15.4 % Final   06/10/2025 20.0 (H) 12.4 - 15.4 % Final     Platelets   Date Value Ref Range Status   06/13/2025 257 135 - 450 K/uL Final   06/12/2025 272 135 - 450 K/uL Final   06/10/2025 250 135 - 450 K/uL Final     Iron:    Iron   Date Value Ref Range Status   06/10/2025 81 37 - 145 ug/dL Final     TIBC   Date Value Ref Range Status   06/10/2025 337 260 - 445 ug/dL Final     Ferritin   Date Value Ref Range Status   06/10/2025 54.2 15.0 - 150.0 ng/mL Final     BMP:   Sodium   Date Value Ref Range Status   06/13/2025 139 136 - 145 mmol/L Final   06/12/2025 139 136 - 145 mmol/L Final   05/08/2025 137 136 - 145 mmol/L Final     Potassium   Date Value Ref Range Status   06/13/2025 3.7 3.5 - 5.1 mmol/L Final     Potassium reflex Magnesium   Date Value Ref Range Status   06/13/2025 3.7 3.5 - 5.1 mmol/L Final   06/12/2025 3.9 3.5 - 5.1 mmol/L Final     Chloride   Date Value Ref Range Status   06/13/2025 104 99 - 110 mmol/L Final   06/12/2025 104 99 - 110 mmol/L Final   05/08/2025 103 99 - 110 mmol/L Final     CO2   Date Value Ref Range Status   06/13/2025 22 21 - 32 mmol/L Final   06/12/2025 22 21 - 32 mmol/L Final   05/08/2025 21 21 - 32 mmol/L Final     Phosphorus   Date Value Ref Range Status   02/18/2024 3.0 2.5 - 4.9 mg/dL Final   02/17/2024 3.4 2.5 - 4.9 mg/dL Final   02/16/2024 2.7 2.5 - 4.9 mg/dL Final     BUN   Date Value Ref Range Status   06/13/2025 17 7 - 20 mg/dL Final   06/12/2025 15 7 - 20 mg/dL Final   05/08/2025 22 (H) 7 - 20 mg/dL Final     Creatinine   Date Value Ref Range Status   06/13/2025 1.2 0.6 - 1.2 mg/dL Final   06/12/2025 1.1 0.6 - 1.2 mg/dL Final   05/08/2025 1.1 0.6 - 1.2 mg/dL Final     BNP:   Lab Results   Component Value Date    PROBNP 112 02/05/2025    PROBNP 414 02/29/2024    PROBNP 415 02/14/2024     Lipids:   Cholesterol, Total   Date Value Ref Range Status   06/12/2025 140 0 - 199 mg/dL Final

## (undated) DEVICE — ARM DRAPE

## (undated) DEVICE — NC TREK NEO™ CORONARY DILATATION CATHETER 3.00 MM X 12 MM / RAPID-EXCHANGE: Brand: NC TREK NEO™

## (undated) DEVICE — DRESSING TRNSPAR W2XL2.75IN FLM SHT SEMIPERMEABLE WIND

## (undated) DEVICE — CATHETER GUIDE 6FR L100CM GRN PTFE XBLAD3.5 TRUELUMEN HYBRID

## (undated) DEVICE — GUIDEWIRE VASC L260CM DIA0.035IN RAD 3MM J TIP L7CM PTFE

## (undated) DEVICE — CATHETER DIAG 5FR L100CM LUMN ID0.047IN JL3.5 CRV 0 SIDE H

## (undated) DEVICE — CATHETER DIAG 5FR L100CM LUMN ID0.047IN JR4 CRV 0 SIDE H

## (undated) DEVICE — TISSUE RETRIEVAL SYSTEM: Brand: INZII RETRIEVAL SYSTEM

## (undated) DEVICE — REDUCER: Brand: ENDOWRIST

## (undated) DEVICE — ELECTRODE PT RET AD L9FT HI MOIST COND ADH HYDRGEL CORDED

## (undated) DEVICE — NC TREK NEO™ CORONARY DILATATION CATHETER 2.50 X 20 MM / RAPID-EXCHANGE: Brand: NC TREK NEO™

## (undated) DEVICE — TRAP POLYP ETRAP

## (undated) DEVICE — ELECTRODE ECG MONITR FOAM TEAR DROP ADLT RED

## (undated) DEVICE — CANNULA SEAL

## (undated) DEVICE — Device

## (undated) DEVICE — SHIELD RAD ANGIO FEM ENTRY W/ ABSORBENT ORNG STRL RADPAD LTX

## (undated) DEVICE — GLIDESHEATH SLENDER STAINLESS STEEL KIT: Brand: GLIDESHEATH SLENDER

## (undated) DEVICE — [HIGH FLOW HEATED INSUFFLATOR TUBING,  DO NOT USE IF PACKAGE IS DAMAGED]

## (undated) DEVICE — CATH BLLN ANGIO 2.50X20MM NC EUPHORIA RX

## (undated) DEVICE — PERCLOSE™ PROSTYLE™ SUTURE-MEDIATED CLOSURE AND REPAIR SYSTEM: Brand: PERCLOSE™ PROSTYLE™

## (undated) DEVICE — CANNULA,OXY,ADULT,SUPERSOFT,W/7'TUB,SC: Brand: MEDLINE INDUSTRIES, INC.

## (undated) DEVICE — ENDOSCOPIC KIT 2 12 FT OP4 DE2 GWN SYR

## (undated) DEVICE — GAUZE,SPONGE,2"X2",8PLY,STERILE,LF,2'S: Brand: MEDLINE

## (undated) DEVICE — STRIP,CLOSURE,WOUND,MEDI-STRIP,1/2X4: Brand: MEDLINE

## (undated) DEVICE — SOLUTION IV HEPARIN SODIUM SODIUM CHL 0.9% 500 ML INJ VIAFLX

## (undated) DEVICE — SUTURE ETHBND EXCEL SZ 0 L18IN NONABSORBABLE GRN L22MM MO-7 CX41D

## (undated) DEVICE — FORCEPS BX L240CM JAW DIA2.8MM L CAP W/ NDL MIC MESH TOOTH

## (undated) DEVICE — CATHETER IMAGING DRAGONFLY OPSTAR

## (undated) DEVICE — PINNACLE INTRODUCER SHEATH: Brand: PINNACLE

## (undated) DEVICE — GLOVE,SURG,SENSICARE SLT,LF,PF,7.5: Brand: MEDLINE

## (undated) DEVICE — Device: Brand: ASAHI SION BLUE

## (undated) DEVICE — CATHETER ANGIOPLSTY RX 2.7 FRX139 CM 2.5X15 MM SCOREFLEX NC

## (undated) DEVICE — Device: Brand: DISPOSABLE ELECTROSURGICAL SNARE

## (undated) DEVICE — CATH BLLN ANGIO 2X20MM NC EUPHORIA RX

## (undated) DEVICE — SUTURE VCRL SZ 4-0 L18IN ABSRB UD L19MM PS-2 3/8 CIR PRIM J496H

## (undated) DEVICE — GUIDEWIRE VASC L150CM DIA0.035IN FLX END L7CM J 3MM PTFE

## (undated) DEVICE — SEAL

## (undated) DEVICE — CONMED SCOPE SAVER BITE BLOCK, 20X27 MM: Brand: SCOPE SAVER

## (undated) DEVICE — CATHETER PULM ART 5FR INFL 0.75CC L110CM BAL DIA8MM SGL WDG

## (undated) DEVICE — TR BAND RADIAL ARTERY COMPRESSION DEVICE: Brand: TR BAND

## (undated) DEVICE — BLADELESS OBTURATOR: Brand: WECK VISTA

## (undated) DEVICE — TROCAR: Brand: KII FIOS FIRST ENTRY

## (undated) DEVICE — 3M™ STERI-STRIP™ COMPOUND BENZOIN TINCTURE 40 BAGS/CARTON 4 CARTONS/CASE C1544: Brand: 3M™ STERI-STRIP™

## (undated) DEVICE — CATH LAB PACK: Brand: MEDLINE INDUSTRIES, INC.

## (undated) DEVICE — CLIP LIG L235CM RESOL 360 BX/20